# Patient Record
Sex: MALE | Race: AMERICAN INDIAN OR ALASKA NATIVE | NOT HISPANIC OR LATINO | Employment: UNEMPLOYED | URBAN - METROPOLITAN AREA
[De-identification: names, ages, dates, MRNs, and addresses within clinical notes are randomized per-mention and may not be internally consistent; named-entity substitution may affect disease eponyms.]

---

## 2024-06-07 ENCOUNTER — TRANSFERRED RECORDS (OUTPATIENT)
Dept: HEALTH INFORMATION MANAGEMENT | Facility: CLINIC | Age: 39
End: 2024-06-07

## 2024-07-30 ENCOUNTER — TRANSFERRED RECORDS (OUTPATIENT)
Dept: HEALTH INFORMATION MANAGEMENT | Facility: CLINIC | Age: 39
End: 2024-07-30

## 2024-08-12 ENCOUNTER — REFERRAL (OUTPATIENT)
Dept: TRANSPLANT | Facility: CLINIC | Age: 39
End: 2024-08-12

## 2024-08-12 DIAGNOSIS — K70.11 ALCOHOLIC HEPATITIS WITH ASCITES (H): ICD-10-CM

## 2024-08-12 DIAGNOSIS — K70.10 ALCOHOLIC HEPATITIS, UNSPECIFIED WHETHER ASCITES PRESENT (H): Primary | ICD-10-CM

## 2024-08-12 NOTE — LETTER
PHYSICIAN ORDERS      DATE & TIME ISSUED: 2024 1:56 PM  PATIENT NAME: Jag Smith   : 1985     [facility] MR# : [id number]     DIAGNOSIS:  Cirrhosis  ICD-10 CODE: K70.2  Orders  1 year after issue.    These labs must be drawn all together on the same day when done:   INR   Basic Metabolic Panel   Hepatic Panel        Phosphatidyethanol (PEth)     PLEASE FAX RESULTS AS SOON AS POSSIBLE TO [phone], ATTN:LabDE    FOR CLINICAL QUESTIONS, PLEASE CALL Aicha Kwon RN, at [phone]    .  Hepatology/Liver Transplant  Medical Director, Liver Transplantation  [facility]

## 2024-08-12 NOTE — LETTER
8/14/2024    Jag Smith   [address]  [facility] [address]    Dear Jag,    Thank you for your interest in the Transplant Center at [facility]. We look forward to being a part of your care team and assisting you through the transplant process.    As we discussed, your transplant coordinator is Megan Kwon (Liver).  You may call your coordinator at any time with questions or concerns. Your first scheduled call will be on 8/19/2024 between 10:00 AM -1:00 PM.  If this needs to change, call [phone].    Please complete the following.    Fill out and return the enclosed forms  Authorization for Electronic Communication  Authorization to Discuss Protected Health Information  Authorization for Release of Protected Health Information    Sign up for:  [employer], access to your electronic medical record (see enclosed pamphlet)  [employer] a transplant education website        You can use these tools to learn more about your transplant, communicate with your care team, and track your medical details      Sincerely,      Solid Organ Transplant  [facility]    cc: Referring Physician

## 2024-08-14 VITALS — WEIGHT: 245 LBS | BODY MASS INDEX: 33.18 KG/M2 | HEIGHT: 72 IN

## 2024-08-14 PROBLEM — F10.10 ALCOHOL ABUSE: Status: ACTIVE | Noted: 2024-06-08

## 2024-08-14 PROBLEM — E87.6 HYPOKALEMIA: Status: ACTIVE | Noted: 2024-06-08

## 2024-08-14 PROBLEM — D72.829 LEUKOCYTOSIS: Status: ACTIVE | Noted: 2024-06-21

## 2024-08-14 PROBLEM — J18.9 PNEUMONIA: Status: ACTIVE | Noted: 2024-07-30

## 2024-08-14 PROBLEM — K70.10 ALCOHOLIC HEPATITIS (H): Status: ACTIVE | Noted: 2024-06-08

## 2024-08-14 PROBLEM — R17 JAUNDICE: Status: ACTIVE | Noted: 2024-06-08

## 2024-08-14 PROBLEM — E80.6 HYPERBILIRUBINEMIA: Status: ACTIVE | Noted: 2024-06-08

## 2024-08-14 RX ORDER — FUROSEMIDE 40 MG
40 TABLET ORAL
Status: ON HOLD | COMMUNITY
Start: 2024-08-06 | End: 2024-09-05

## 2024-08-14 RX ORDER — THIAMINE HCL 100 MG
1 TABLET ORAL 2 TIMES DAILY WITH MEALS
Status: ON HOLD | COMMUNITY
Start: 2024-06-25

## 2024-08-14 RX ORDER — FOLIC ACID 1 MG/1
1 TABLET ORAL DAILY
Status: ON HOLD | COMMUNITY
Start: 2024-06-25

## 2024-08-14 RX ORDER — GAUZE BANDAGE 2" X 2"
100 BANDAGE TOPICAL DAILY
Status: ON HOLD | COMMUNITY
Start: 2024-06-25 | End: 2024-10-03

## 2024-08-14 RX ORDER — SPIRONOLACTONE 100 MG/1
100 TABLET, FILM COATED ORAL
Status: ON HOLD | COMMUNITY
Start: 2024-08-06 | End: 2024-09-05

## 2024-08-14 RX ORDER — FOLIC ACID/MV,IRON,MIN/LUTEIN 0.4-18-25
1 TABLET ORAL DAILY
Status: ON HOLD | COMMUNITY
Start: 2024-06-25

## 2024-08-14 NOTE — TELEPHONE ENCOUNTER
Patient was asked the following questions during liver intake call.     SOT LIVER INTAKE      Referring Provider: Rik Xavier-CNP   Referring Diagnosis: Alcholic Hepatitis   PCP:  [facility]      1)Do you know why you have liver disease: Yes, alcohol      If Alcoholic Cirrhosis is present when was your last drink: June 7th, 2024      Have you ever been through treatment for alcohol: Yes, outpatient & inpatient 2020  2) Presence of Ascites: yes Paracentesis: no  3) Presence of Hepatic Encephalopathy: yes  Medications: lasix, furosemide, folic acid, vitamin d 1  4) History of GI Bleeding: yes  5) Oxygen Use: no  6) EGD: no  7) Colonoscopy: no  8) MELD Score: 31  9)  Labs available for review from PCP/GI:  yes  10)HCC Diagnosis: no (if no, go to #11)                                            11)Insurance information:          [place] Medicaid [id number]   [facility] [id number]     Referral intake process completed.  Patient is aware that after financial approval is received, medical records will be requested.   Patient confirmed for a callback from transplant coordinator on 8/19/2024.       Confirmed coordinator will discuss evaluation process in more detail at the time of their call.   Patient is aware of the need to arrange age appropriate cancer screening, vaccinations, and dental care.  Reminded patient to complete questionnaire, complete medical records release, and review packet prior to evaluation visit .  Assessed patient for special needs (ie--wheelchair, assistance, guardian, and ):   no  Patient instructed to call [phone] with questions.      Patient gave verbal consent during intake call to obtain medical records and documents outside of [facility]:   Yes

## 2024-08-19 ENCOUNTER — TRANSFERRED RECORDS (OUTPATIENT)
Dept: HEALTH INFORMATION MANAGEMENT | Facility: CLINIC | Age: 39
End: 2024-08-19

## 2024-08-19 NOTE — TELEPHONE ENCOUNTER
LIVER DISEASE ETOH - possibly early consideration criteria? A1AT  REFERRING PROVIDER [facility]   -----------------------------------------------------------------------------------------------------------------------------  MELD 31       ABO   unknown      Didn't know liver issues til beginning of June, ended up in hospital and discovered that he had liver disease.      Went to hospital from [facility], went to [place] for a month ago.  When he went to [place] he was still drinking at that time, didn't feel go- went to [place] ER, then sent to [place];  Was feeling weak, tired, lightheaded, severe fatigue with even short walks.      Ascites  Suresh Furosemide - on both   Told to limit fluid to 64 oz/day;  limiting sodium   Marco [place] no, but says abdomen bloated- getting updated US today at [facility]  TIPS no   HE no   Kidney function no   Variceal screening never had EGD or colonosocopy     Had some bleeding hemorrhoids - had a little blood in stool today, oozing.   HCC Screening CT June [place]   Alcohol use   Was drinking heavily, worse with loss of grandma 5 months ago;  last ETOH early June before admission - prior to that was drinking e/o day - was drinking beer and mixed drinks 10 drinks around 3 x a week; prior mom and family telling him to quit but was never told by medical professional until hospitalization in June; Did have DUI in 2010- did 8 weeks of court ordered treatment at that time at [facility];  seeking resources for rule 25 through [place] right now to do assessment;     Hospitalizations Recent PNA, fatigue worse with PNA   ---------------------------------------------------------------------------------------------------------------------------------  PMH  Cardiac- denies  Pulm- recent; non smoker   Cancer- denies  Diabetes- denies  Surgery- no, only eye surgery    SHX   Lives with mom- caregiver  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

## 2024-08-21 ENCOUNTER — ALLIED HEALTH/NURSE VISIT (OUTPATIENT)
Dept: TRANSPLANT | Facility: CLINIC | Age: 39
End: 2024-08-21
Payer: MEDICAID

## 2024-08-21 DIAGNOSIS — K70.11 ALCOHOLIC HEPATITIS WITH ASCITES (H): Primary | ICD-10-CM

## 2024-08-21 NOTE — CONFIDENTIAL NOTE
"Psychosocial Assessment  Jag Smith had a virtual visit as part of his evaluation as a potential liver transplant recipient. DONG spoke with Jag over the phone and was able to speak with his mother, Sania, as well.  Living Situation: Jag currently lives at home with his mother in [place], [place]. It is a single story house that has three steps to get to the front door. [place], [place] is about [place]  [place]iles away from our transplant center. I reviewed with him the requirement to remain local post transplant for about a month with a caregiver. His mom, Sania, would be his primary caregiver, I reviewed local lodging options.  Education/Employment:  Jag completed the 12th  grade, he is not a . Jag is currently not employed. 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 He had worked at the school since he was 23 years old, he started at the [employer] a year ago. Jag reports that he stopped working in February after the death of his grandmother; he \"was not doing well emotionally or physically\".   Financial /Income: Jag does not currently have a source of income. He stated that he had just applied for food stamps. This writer discussed applying for SSDI.  Health Insurance:  Jag has Medicaid, he stated that he just renewed it. This writer talked with Jag about the financial risks of transplant, particularly about the high cost of transplant related medications and the importance of maintaining adequate health insurance coverage.  Family/Social Support: Jag is not/has not been , he does not have children. Jag has three older brothers, one lives in [facility] [place], one lives in [place], [place] and one lives in [facility] [place]. His father is , he currently lives with his mother, Sania, who Jag identifies as his primary support. This writer spoke with Sania on the phone to go over caregiver expectations. Sania said that she is retired and would be " available to stay locally with Jag for the 30 days.  This writer stressed the importance of having a stable and involved support network before and after transplant.  Provided Jag  with education about the relationship between a stable support system and better surgical and post-transplant outcomes compared to patients with a limited support system.    Functional Status: Jag manages his own medications. He is independent with ADLs, although he does need support with things like cooking and cleaning due to his fatigue. He stated that the single story home is accessible, there are three steps to get in/out of the front door, he says that he uses the railing to pull himself up.   Chemical Dependency:  ***  Mental Health: Jag reports that he has a history of depression and anxiety. He has been prescribed medications for anxiety and depression in the past which he stated were helpful, however, he reported that when he was hospitalized in June his doctors discontinued them. He does have an appointment scheduled with a psychiatrist coming up.   Adjustment to Illness:  This writer provided Jag  with supportive counseling throughout this interview.  This writer also encouraged Jag to attend the liver transplant support group for additional support and encouragement.   Impression/Recommendations:   Jag verbalizes understanding the psychosocial risks of transplant and teaching provided during this evaluation.  Jag has met the sobriety criteria recommended for listing, however this writer recommends Jag complete a chemical dependency evaluation and any treatment recommendations before being listed for transplant.  Pending completion of a chemical dependency evaluation and recommendations from the chemical dependency evaluation, he would be an acceptable transplant candidate from a psychosocial perspective.  There are no contraindications to transplant from a psychosocial perspective.  Jag has adequate  finances and health insurance for transplant, and an intact support system. Jag is a *** transplant candidate from a psychosocial perspective.  This writer will remain available to assist patient throughout the evaluation process and will follow patient through transplant if --- is listed.  It was a pleasure to evaluate this patient for liver transplant.   Teaching completed during assessment:  1.     Housing and relocation needs post transplant.  2.     Caregiver needs post transplant.  3.     Financial issues related to transplant.  4.     Risks of alcohol use post transplant.  5.     Common psychosocial stressors pre/post transplant.        6.     Liver Transplant support group availability.        7.     Advanced Health Care Directive             Psychosocial Risks of Transplant Reviewed:  1.     Increased stress related to your emotional, family, social, employment, or   financial situation.  2.     Affect on work and/or disability benefits.  3.     Affect on future health and life insurance.  4.     Transplant outcome expectations may not be met.  5.     Mental Health risks: anxiety, depression, PTSD, guilt, grief and chronic fatigue.

## 2024-08-21 NOTE — PROGRESS NOTES
"Psychosocial Assessment  Jag Smith had a virtual visit as part of his evaluation as a potential liver transplant recipient. DONG spoke with Jag over the phone and was able to speak with his mother, Sania, as well.   Living Situation: Jag currently lives at home with his mother in [place], [place]. It is a single story house that has three steps to get to the front door. [place], [place] is about 270  [place]iles away from our transplant center. I reviewed with him the requirement to remain local post transplant for about a month with a caregiver. His mom, Sania, would be his primary caregiver, I reviewed local lodging options.   Education/Employment:  Jag completed the 12th grade, he is not a . Jag is currently not employed. 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 He had worked at the school since he was 23 years old, he started at the [employer] a year ago. Jag reports that he stopped working in February after the death of his grandmother; he \"was not doing well emotionally or physically\".   Financial /Income: Jag does not currently have a source of income. He stated that he had just applied for food stamps. This writer discussed applying for SSDI.   Health Insurance:   Jag has Medicaid and [place] [employer]. This writer talked with Jag about the financial risks of transplant, particularly about the high cost of transplant related medications and the importance of maintaining adequate health insurance coverage.  Family/Social Support: Jag is not/has not been , he does not have children. Jag has three older brothers, one lives in [place] [place], one lives in [place], [place] and one lives in [place]. His father is , he currently lives with his mother, Sania, who Jag identifies as his primary support. This writer spoke with Sania on the phone to go over caregiver expectations. Sania said that she is retired and would be available to stay " "locally with Jag for the 30 days.  This writer stressed the importance of having a stable and involved support network before and after transplant.  Provided Jag  with education about the relationship between a stable support system and better surgical and post-transplant outcomes compared to patients with a limited support system.    Functional Status: Jag manages his own medications. He is independent with ADLs, although he does need support with things like cooking and cleaning due to his fatigue. He stated that the single story home is accessible, there are three steps to get in/out of the front door, he says that he uses the railing to pull himself up.   Chemical Dependency:  Jag denies current/previous use of tobacco products, misuse/overuse of pain medications and illicit drug use.   Jag was hospitalized for alcoholic hepatitis 2024, he states that it was the first time he was told by a medical professional to abstain from alcohol. Jag does have a history of alcohol use concerns. He first consumed alcohol in the 9th grade, he reports that after that he would \"go out and drink here and there until it got really bad a few months ago when I lost my grandma\". Jag reports that he has a history of three treatment programs. Ten years ago he got a DUI; he explained that his DUI was because police were stopping and checking everyone to \"fill their quota\", he said he only had 2 beers- following that he participated in an OP treatment program. After treatment, he did not drink for 3-4 years until his cousin  in a car accident. In  he was wanting to drink again and engaged in another OP treatment program in [place]. He completed the 12 week program but he did not find it helpful so he asked to go into an IP program at [facility]. When he was discharged from [facility], he was recommended OP treatment but Jag said that he needed money/needed to work and could not make time for it. He " stated that after his IP program that he would consume 6-8 drinks a week up until January/February of 2024 when it increased to 6-8 drinks a day 3-4 days per week. He would drink beer, mixed drinks and sometimes hard liquor. Jag discontinued alcohol use after his hospitalization in June.  This writer will note that Jag was hospitalized 6/7 for 3 weeks (most of June) and upon chart review was recommended CD treatment (see 6/25 discharge note). Jag did not get a CD assessment; he states that he tried to reach out to [facility] but they kept rescheduling on him. He was hospitalized again 7/30 with pneumonia for a week. Jag has been sober outside of a hospital setting for a total of 7 weeks. Jag stated that he has an appointment scheduled at [facility] tomorrow (8/22/24), this writer asked that he schedule a CD assessment while he was there in-person.  Mental Health: Jag reports that he has a history of depression and anxiety. He has been prescribed medications in the past for anxiety and depression which he stated were helpful, however, he reported that when he was hospitalized in June his doctors discontinued them. He has an appointment scheduled with a psychiatrist coming up. Jag denies history of psychiatric hospitalizations and denies previous/current SI.  Adjustment to Illness:  This writer provided Jag  with supportive counseling throughout this interview.  This writer also encouraged Jag to attend the liver transplant support group for additional support and encouragement.   Impression/Recommendations:   Jag verbalizes understanding the psychosocial risks of transplant and teaching provided during this evaluation.  Jag has not met the sobriety criteria recommended for listing, this writer recommends Jag complete a chemical dependency evaluation before being listed for transplant.   Jag has adequate finances and health insurance for transplant, and an intact support system. This writer will  remain available to assist patient throughout the evaluation process and will follow patient through transplant if he is listed.  It was a pleasure to evaluate this patient for liver transplant.   Teaching completed during assessment:  1.     Housing and relocation needs post transplant.  2.     Caregiver needs post transplant.  3.     Financial issues related to transplant.  4.     Risks of alcohol use post transplant.  5.     Common psychosocial stressors pre/post transplant.        6.     Liver Transplant support group availability.        7.     Advanced Health Care Directive             Psychosocial Risks of Transplant Reviewed:  1.     Increased stress related to your emotional, family, social, employment, or   financial situation.  2.     Affect on work and/or disability benefits.  3.     Affect on future health and life insurance.  4.     Transplant outcome expectations may not be met.  5.     Mental Health risks: anxiety, depression, PTSD, guilt, grief and chronic fatigue.     Jennifer CABALLERO, [facility]  Liver Transplant   Phone: [phone]

## 2024-08-22 ENCOUNTER — TELEPHONE (OUTPATIENT)
Dept: TRANSPLANT | Facility: CLINIC | Age: 39
End: 2024-08-22
Payer: MEDICAID

## 2024-08-22 NOTE — TELEPHONE ENCOUNTER
Received a call from BRENNA Kumar care coordinator for [facility].  Patient is reported to have a critical sodium level, and RN CC with like to know if acute criteria for liver transplant listing is a possibility for this patient.  Reviewing the recommendations of social work from visit yesterday, patient must complete YUSEF intake for therapy recommendations, prior to listing.     Recommended to RN CC, to commence treatment locally.  Chart forwarded to inpatient team to review, transfer number given to team.

## 2024-08-22 NOTE — Clinical Note
HIGH MELD Patient in Referral from [facility]. Patient likely going to ED soon for hyponatremia concerns, per local GI RNCC. See note, SW note regarding acute criteria considerations needing YUSEF prior to listing. Thanks!  Respectfully,  Ash Pulliam RN      Pre-Liver Transplant Coordinator

## 2024-09-05 ENCOUNTER — TRANSFERRED RECORDS (OUTPATIENT)
Dept: HEALTH INFORMATION MANAGEMENT | Facility: CLINIC | Age: 39
End: 2024-09-05
Payer: MEDICAID

## 2024-09-06 ENCOUNTER — TELEPHONE (OUTPATIENT)
Dept: TRANSPLANT | Facility: CLINIC | Age: 39
End: 2024-09-06
Payer: MEDICAID

## 2024-09-06 NOTE — TELEPHONE ENCOUNTER
Voice mail left for another coordinator, call from Stacy at [facility] regarding this patient admitted at [facility] with hypoNa+, MELD 35,     Paient seen a couple weeks ago by social work, does not appear to meet early consideration criteria per Jennifer.  Last ETOH intake was early June.  Jennifer has informed him that he needs to complete chemical dependency eval and engage in recs.  Unclear if he has done this, will follow up with patient.  Tentative plan for him to be seen in outpatient clinic on 10/8/24.     Left my cell number for GI clinic to call and discuss.  Will follow up with patient.     [facility]: [phone]

## 2024-09-13 ENCOUNTER — TELEPHONE (OUTPATIENT)
Dept: TRANSPLANT | Facility: CLINIC | Age: 39
End: 2024-09-13
Payer: MEDICAID

## 2024-09-13 NOTE — TELEPHONE ENCOUNTER
Transplant Social Work Services Phone Call    Data: SW has completed a psychosocial evaluation over the phone 8/22, pt was not a candidate for early consideration   Intervention: DONG called pt to get an update on if he was able to schedule a CD eval  Assessment: Jag stated that he had not scheduled a CD evaluation yet, SW explained that it is an important piece to be considered for liver transplant. Jag said that he was currently in the hospital but would work on it and follow up with this writer.  Education provided by DONG: see above  Plan: monitor for a call back from Jag, follow up with pt if I haven't heard back.     Jennifer CABALLERO, LGSW  [facility]  Liver Transplant   Phone: [phone]

## 2024-09-16 NOTE — PROGRESS NOTES
Transfer Type: [facility]  Transfer Triage Note    Date of call: 09/16/24  Time of call: 1:45 PM    Current Patient Location:  [facility]  Current Level of Care: Med Surg    Vitals: P 101/54  Pulse 78  Temp 97.2  F (36.2  C)  Resp 15  Ht 1.829 m (6')  Wt 98.2 kg (216 lb 8 oz)  SpO2 100%  BMI 29.36 kg/m2   Diagnosis: decompensated cirrhosis  Reason for requested transfer: Transplant evaluation that requires inpatient admission   Isolation Needs: None    Care everywhere has been updated and reviewed: Yes  Necessary images have been sent through PACS: No    If patient is transferring for specialty care or specific procedure, the specialist required has participated in the transfer call and agreed with need for transfer and anticipated timeline: Yes, Provider name: [facility] specialty with: GI    Transfer accepted: Yes  Stability of Patient: Patient is at risk for instability, however patient requires urgent transfer and does not meet ICU criteria   Is the patient appropriate for [facility]? No, What specific [place] needs are anticipated? Transplant work up  Level of Care Needed: Med Surg  Telemetry Needed:  None  Expected Time of Arrival for Transfer: greater than 24 hours  Arrival Location:  [facility]     Recommendations for Management and Stabilization: Not needed    Additional Comments: Per Khadar Wood Casey Luis is a 39 year old male who was admitted to [facility] with decompensated cirrhosis likely from alcohol, last drink June 7th, colitis, DENI, anemia, cholelithiasis, hyponatremia, thrombocytopenia, hyperkalemia who was admitted for fatigue and worsening abdominal distension. He has had paracenteses 6L - concerning for SBP on ceftriaxone/flagyl for 1 week, and zosyn for 3 days. No not on antibiotics.     On midodrine  Albumin  Octreotide  Lokelma  Rifaximin   Sodium bicarb - 5 tabs  lactulose    Discussed with GI in [facility] in  [place]    Sodium 120  Creatinine 1.85  Potassium 5.6  Co2 11     ALT 64  Bili 22.2    WBC 12.8  Hgb 9.1  Plt 119    INR (from 8/22) 2.2    Peth on the 22nd negative    Not a liver transplant candidate at this time per  and social work's note (Jessica Stevie from 8/21)      Lizette Mayer MD      Addendum 9/20/24 0819:  For the past few days, his hgb has been 6.5-6.7. He had 2 units of pRBCs. He has been bleeding more, and there is concern for an upper GI bleed. He is not having hematemesis. There is no capability for EGD. His Cr is worsening, GI at [facility] recommended continuing current management.     Last para 1 week ago. Holding lasix recently, but given yesterday. Hgb uptrending when giving lasix.       Priscila Vincent MD, MPH  Internal Medicine-Pediatrics [facility]       Addendum 9/21/24 2:01 PM   Dr. Cox called with an update this afternoon. CTA did not show a source of bleeding in the GI tract. INR increasing from 2.1 to 4.0. A bed at [facility] will be available this evening.     Priscila Vincent MD, MPH  Internal Medicine-Pediatrics [facility]

## 2024-09-21 ENCOUNTER — HOSPITAL ENCOUNTER (INPATIENT)
Facility: CLINIC | Age: 39
End: 2024-09-21
Attending: PEDIATRICS | Admitting: HOSPITALIST
Payer: MEDICAID

## 2024-09-21 DIAGNOSIS — E83.42 HYPOMAGNESEMIA: ICD-10-CM

## 2024-09-21 DIAGNOSIS — Z94.4 LIVER REPLACED BY TRANSPLANT (H): Primary | ICD-10-CM

## 2024-09-21 DIAGNOSIS — G89.18 ACUTE POST-OPERATIVE PAIN: ICD-10-CM

## 2024-09-21 DIAGNOSIS — F10.21 ALCOHOL USE DISORDER, SEVERE, IN EARLY REMISSION (H): ICD-10-CM

## 2024-09-21 DIAGNOSIS — K70.10 ALCOHOLIC HEPATITIS, UNSPECIFIED WHETHER ASCITES PRESENT (H): ICD-10-CM

## 2024-09-21 DIAGNOSIS — E87.5 HYPERKALEMIA: ICD-10-CM

## 2024-09-21 DIAGNOSIS — H11.33 SUBCONJUNCTIVAL HEMORRHAGE OF BOTH EYES: ICD-10-CM

## 2024-09-21 DIAGNOSIS — R60.0 BILATERAL LOWER EXTREMITY EDEMA: ICD-10-CM

## 2024-09-21 LAB
ABO/RH(D): NORMAL
ANTIBODY SCREEN: NEGATIVE
SPECIMEN EXPIRATION DATE: NORMAL

## 2024-09-21 PROCEDURE — 99223 1ST HOSP IP/OBS HIGH 75: CPT | Performed by: HOSPITALIST

## 2024-09-21 PROCEDURE — 80048 BASIC METABOLIC PNL TOTAL CA: CPT | Performed by: HOSPITALIST

## 2024-09-21 PROCEDURE — 250N000013 HC RX MED GY IP 250 OP 250 PS 637: Performed by: HOSPITALIST

## 2024-09-21 PROCEDURE — 250N000009 HC RX 250: Performed by: HOSPITALIST

## 2024-09-21 PROCEDURE — 85730 THROMBOPLASTIN TIME PARTIAL: CPT | Performed by: HOSPITALIST

## 2024-09-21 PROCEDURE — 250N000011 HC RX IP 250 OP 636: Mod: JB | Performed by: HOSPITALIST

## 2024-09-21 PROCEDURE — 120N000002 HC R&B MED SURG/OB UMMC

## 2024-09-21 PROCEDURE — 82247 BILIRUBIN TOTAL: CPT | Performed by: HOSPITALIST

## 2024-09-21 PROCEDURE — 85025 COMPLETE CBC W/AUTO DIFF WBC: CPT | Performed by: HOSPITALIST

## 2024-09-21 PROCEDURE — 85610 PROTHROMBIN TIME: CPT | Performed by: HOSPITALIST

## 2024-09-21 PROCEDURE — 86923 COMPATIBILITY TEST ELECTRIC: CPT | Performed by: PHYSICIAN ASSISTANT

## 2024-09-21 PROCEDURE — 86923 COMPATIBILITY TEST ELECTRIC: CPT

## 2024-09-21 PROCEDURE — 86900 BLOOD TYPING SEROLOGIC ABO: CPT | Performed by: HOSPITALIST

## 2024-09-21 PROCEDURE — 36415 COLL VENOUS BLD VENIPUNCTURE: CPT | Performed by: HOSPITALIST

## 2024-09-21 RX ORDER — AMOXICILLIN 250 MG
1 CAPSULE ORAL 2 TIMES DAILY PRN
Status: DISCONTINUED | OUTPATIENT
Start: 2024-09-21 | End: 2024-09-30

## 2024-09-21 RX ORDER — LIDOCAINE 40 MG/G
CREAM TOPICAL
Status: ACTIVE | OUTPATIENT
Start: 2024-09-21

## 2024-09-21 RX ORDER — PROCHLORPERAZINE 25 MG
25 SUPPOSITORY, RECTAL RECTAL EVERY 12 HOURS PRN
Status: ACTIVE | OUTPATIENT
Start: 2024-09-21

## 2024-09-21 RX ORDER — CALCIUM CARBONATE 500 MG/1
1000 TABLET, CHEWABLE ORAL 4 TIMES DAILY PRN
Status: ACTIVE | OUTPATIENT
Start: 2024-09-21

## 2024-09-21 RX ORDER — NALOXONE HYDROCHLORIDE 0.4 MG/ML
0.4 INJECTION, SOLUTION INTRAMUSCULAR; INTRAVENOUS; SUBCUTANEOUS
Status: ACTIVE | OUTPATIENT
Start: 2024-09-21

## 2024-09-21 RX ORDER — OCTREOTIDE ACETATE 100 UG/ML
100 INJECTION, SOLUTION INTRAVENOUS; SUBCUTANEOUS EVERY 8 HOURS
Status: DISCONTINUED | OUTPATIENT
Start: 2024-09-21 | End: 2024-09-22

## 2024-09-21 RX ORDER — AMOXICILLIN 250 MG
2 CAPSULE ORAL 2 TIMES DAILY PRN
Status: DISCONTINUED | OUTPATIENT
Start: 2024-09-21 | End: 2024-09-30

## 2024-09-21 RX ORDER — NALOXONE HYDROCHLORIDE 0.4 MG/ML
0.2 INJECTION, SOLUTION INTRAMUSCULAR; INTRAVENOUS; SUBCUTANEOUS
Status: ACTIVE | OUTPATIENT
Start: 2024-09-21

## 2024-09-21 RX ORDER — SODIUM BICARBONATE 650 MG/1
1300 TABLET ORAL 3 TIMES DAILY
Status: DISCONTINUED | OUTPATIENT
Start: 2024-09-21 | End: 2024-09-27

## 2024-09-21 RX ORDER — OXYCODONE HYDROCHLORIDE 5 MG/1
5 TABLET ORAL EVERY 6 HOURS PRN
Status: DISCONTINUED | OUTPATIENT
Start: 2024-09-21 | End: 2024-09-28

## 2024-09-21 RX ORDER — FOLIC ACID 1 MG/1
1 TABLET ORAL DAILY
Status: DISPENSED | OUTPATIENT
Start: 2024-09-22

## 2024-09-21 RX ORDER — ONDANSETRON 4 MG/1
4 TABLET, ORALLY DISINTEGRATING ORAL EVERY 6 HOURS PRN
Status: DISPENSED | OUTPATIENT
Start: 2024-09-21

## 2024-09-21 RX ORDER — PROCHLORPERAZINE MALEATE 10 MG
10 TABLET ORAL EVERY 6 HOURS PRN
Status: ACTIVE | OUTPATIENT
Start: 2024-09-21

## 2024-09-21 RX ORDER — ONDANSETRON 2 MG/ML
4 INJECTION INTRAMUSCULAR; INTRAVENOUS EVERY 6 HOURS PRN
Status: DISPENSED | OUTPATIENT
Start: 2024-09-21

## 2024-09-21 RX ORDER — LACTULOSE 10 G/10G
10 SOLUTION ORAL 3 TIMES DAILY
Status: DISCONTINUED | OUTPATIENT
Start: 2024-09-21 | End: 2024-09-26

## 2024-09-21 RX ADMIN — PANTOPRAZOLE SODIUM 40 MG: 40 INJECTION, POWDER, FOR SOLUTION INTRAVENOUS at 23:44

## 2024-09-21 RX ADMIN — OCTREOTIDE ACETATE 100 MCG: 100 INJECTION, SOLUTION INTRAVENOUS; SUBCUTANEOUS at 23:49

## 2024-09-21 RX ADMIN — SODIUM BICARBONATE 1300 MG: 650 TABLET ORAL at 23:45

## 2024-09-21 RX ADMIN — RIFAXIMIN 550 MG: 550 TABLET ORAL at 23:45

## 2024-09-21 ASSESSMENT — COLUMBIA-SUICIDE SEVERITY RATING SCALE - C-SSRS
2. HAVE YOU ACTUALLY HAD ANY THOUGHTS OF KILLING YOURSELF IN THE PAST MONTH?: NO
6. HAVE YOU EVER DONE ANYTHING, STARTED TO DO ANYTHING, OR PREPARED TO DO ANYTHING TO END YOUR LIFE?: NO
1. IN THE PAST MONTH, HAVE YOU WISHED YOU WERE DEAD OR WISHED YOU COULD GO TO SLEEP AND NOT WAKE UP?: NO

## 2024-09-21 ASSESSMENT — ACTIVITIES OF DAILY LIVING (ADL)
ADLS_ACUITY_SCORE: 20
ADLS_ACUITY_SCORE: 35

## 2024-09-21 NOTE — LETTER
Transition Communication Hand-off for Care Transitions to Next Level of Care Provider    Name: Jag Smith  : 1985  MRN #: [id number]  Primary Care Provider: ELMER HUNTLEY     Primary Clinic: [address] [place] [address]     Reason for Hospitalization:  Decompensated Liver Cirrhosis  Liver transplant status  Admit Date/Time: 2024  9:51 PM  Discharge Date: 10/9/2024  Payor Source: Payor: [place] / Plan: [place] / Product Type: Medicaid /          Reason for Communication Hand-off Referral: Other Continuity of care    Discharge Plan: Patient staying locally for 4-6 weeks for close outpatient follow up  Discharge Plan:      Flowsheet Row Most Recent Value   Concerns Comments --  [Patient's mom, Sania, reported she is the only family member that would be included in a family conference]             Concern for non-adherence with plan of care:  No  Discharge Needs Assessment:  Needs      Flowsheet Row Most Recent Value   Equipment Currently Used at Home none   # of Referrals Placed by [facility]          Follow-up specialty is recommended: Yes    Follow-up plan:    Future Appointments   Date Time Provider Department Center   10/10/2024  7:30 AM  LAB [facility]   10/10/2024  9:30 AM Dionicio No, [facility]   10/11/2024  9:45 AM Tiffanie Pardo, [facility]   10/15/2024  8:00 AM  LAB [facility]   10/15/2024  9:30 AM Marycruz Alcala APRN CNP [facility]   10/16/2024  9:00 AM Muriel Gotti, LICSW [facility]   10/17/2024  7:00 AM  LAB [facility]   10/21/2024  7:15 AM  LAB [facility]   10/24/2024  8:15 AM  LAB [facility]   10/24/2024  9:30 AM Fernando Colon MD [facility]   10/28/2024  7:00 AM  LAB [facility]   10/31/2024  7:00 AM  LAB [facility]   2024  8:00 AM [facility]   2024 10:00 AM Marycruz Alcala APRN [facility]   2024  7:00 AM  LAB [facility]  "  11/11/2024  7:15 AM [facility]   11/11/2024  2:30 PM Meli Pulido RD [facility]   11/14/2024  7:45 AM [facility]   11/14/2024  9:00 AM Fernando Colon MD [facility]   1/6/2025  7:30 AM [facility]   1/6/2025  8:30 AM Leventhal, Thomas Michael, MD [facility]       Any outstanding tests or procedures:        Referrals       Future Labs/Procedures    Occupational Therapy  Referral     Process Instructions:    If ordering DME please utilize the DME ordering process. If you are ordering services for pediatric feeding, please utilize order: Speech Therapy  Referral [9048]    Comments:    Please be aware that coverage of these services is subject to the terms and limitations of your health insurance plan.  Call member services at your health plan with any benefit or coverage questions.   [employer] will call you to coordinate your care as prescribed by your provider. If you don't hear from a representative within 2 business days, please call [phone].    Physical Therapy  Referral     Process Instructions:    If ordering DME, please utilize the DME ordering process. If ordering services for Hand Therapy, please utiize order: Occupational Therapy  Referral [9047.005] and select \"Hand Therapy\" under Specialty Services.    Comments:    Please be aware that coverage of these services is subject to the terms and limitations of your health insurance plan.  Call member services at your health plan with any benefit or coverage questions.   [employer] will call you to coordinate your care as prescribed by your provider. If you don't hear from a representative within 2 business days, please call [phone].            Supplies       Future Labs/Procedures    Miscellaneous DME Order     Process Instructions:    Please limit the use of the Miscellaneous Order as a replacement for already existing DME orders. Please use an existing " DME order when ever possible.      Comments:    DME Documentation:   Describe the reason for need to support medical necessity: Post Liver Transplant .     I, the undersigned, certify that the above prescribed supplies are medically necessary for this patient and is both reasonable and necessary in reference to accepted standards of medical and necessary in reference to accepted standards of medical practice in the treatment of this patient's condition and is not prescribed as a convenience.    Miscellaneous DME Order     Process Instructions:    Please limit the use of the Miscellaneous Order as a replacement for already existing DME orders. Please use an existing DME order when ever possible.      Comments:    DME Documentation:   Describe the reason for need to support medical necessity: Post Liver Transplant.     I, the undersigned, certify that the above prescribed supplies are medically necessary for this patient and is both reasonable and necessary in reference to accepted standards of medical and necessary in reference to accepted standards of medical practice in the treatment of this patient's condition and is not prescribed as a convenience.    Walker             Araiza Recommendations:  Please see AVS    Lynnette Choi RN    AVS/Discharge Summary is the source of truth; this is a helpful guide for improved communication of patient story

## 2024-09-22 LAB
ALBUMIN MFR UR ELPH: 51.5 MG/DL
ALBUMIN SERPL BCG-MCNC: 2.9 G/DL (ref 3.5–5.2)
ALBUMIN SERPL BCG-MCNC: 3.3 G/DL (ref 3.5–5.2)
ALBUMIN UR-MCNC: 20 MG/DL
ALP SERPL-CCNC: 81 U/L (ref 40–150)
ALP SERPL-CCNC: 91 U/L (ref 40–150)
ALT SERPL W P-5'-P-CCNC: 48 U/L (ref 0–70)
ALT SERPL W P-5'-P-CCNC: 52 U/L (ref 0–70)
ANION GAP SERPL CALCULATED.3IONS-SCNC: 10 MMOL/L (ref 7–15)
ANION GAP SERPL CALCULATED.3IONS-SCNC: 11 MMOL/L (ref 7–15)
APPEARANCE UR: ABNORMAL
APTT PPP: 65 SECONDS (ref 22–38)
AST SERPL W P-5'-P-CCNC: 119 U/L (ref 0–45)
AST SERPL W P-5'-P-CCNC: 125 U/L (ref 0–45)
BASOPHILS # BLD AUTO: 0.1 10E3/UL (ref 0–0.2)
BASOPHILS NFR BLD AUTO: 1 %
BILIRUB DIRECT SERPL-MCNC: 22.06 MG/DL (ref 0–0.3)
BILIRUB SERPL-MCNC: 29.1 MG/DL
BILIRUB SERPL-MCNC: 31.8 MG/DL
BILIRUB UR QL STRIP: ABNORMAL
BUN SERPL-MCNC: 26.5 MG/DL (ref 6–20)
BUN SERPL-MCNC: 26.9 MG/DL (ref 6–20)
CALCIUM SERPL-MCNC: 8.5 MG/DL (ref 8.8–10.4)
CALCIUM SERPL-MCNC: 8.7 MG/DL (ref 8.8–10.4)
CHLORIDE SERPL-SCNC: 105 MMOL/L (ref 98–107)
CHLORIDE SERPL-SCNC: 107 MMOL/L (ref 98–107)
COLOR UR AUTO: ABNORMAL
CREAT SERPL-MCNC: 1.51 MG/DL (ref 0.67–1.17)
CREAT SERPL-MCNC: 1.54 MG/DL (ref 0.67–1.17)
CREAT UR-MCNC: 117 MG/DL
EGFRCR SERPLBLD CKD-EPI 2021: 59 ML/MIN/1.73M2
EGFRCR SERPLBLD CKD-EPI 2021: 61 ML/MIN/1.73M2
EOSINOPHIL # BLD AUTO: 0.1 10E3/UL (ref 0–0.7)
EOSINOPHIL NFR BLD AUTO: 2 %
ERYTHROCYTE [DISTWIDTH] IN BLOOD BY AUTOMATED COUNT: 20.8 % (ref 10–15)
ERYTHROCYTE [DISTWIDTH] IN BLOOD BY AUTOMATED COUNT: 21 % (ref 10–15)
GLUCOSE SERPL-MCNC: 105 MG/DL (ref 70–99)
GLUCOSE SERPL-MCNC: 87 MG/DL (ref 70–99)
GLUCOSE UR STRIP-MCNC: NEGATIVE MG/DL
HCO3 SERPL-SCNC: 14 MMOL/L (ref 22–29)
HCO3 SERPL-SCNC: 16 MMOL/L (ref 22–29)
HCT VFR BLD AUTO: 21.8 % (ref 40–53)
HCT VFR BLD AUTO: 22.8 % (ref 40–53)
HGB BLD-MCNC: 7.4 G/DL (ref 13.3–17.7)
HGB BLD-MCNC: 7.6 G/DL (ref 13.3–17.7)
HGB UR QL STRIP: ABNORMAL
HYALINE CASTS: 6 /LPF
IMM GRANULOCYTES # BLD: 0.1 10E3/UL
IMM GRANULOCYTES NFR BLD: 1 %
INR PPP: 3.08 (ref 0.85–1.15)
KETONES UR STRIP-MCNC: NEGATIVE MG/DL
LEUKOCYTE ESTERASE UR QL STRIP: NEGATIVE
LYMPHOCYTES # BLD AUTO: 1 10E3/UL (ref 0.8–5.3)
LYMPHOCYTES NFR BLD AUTO: 14 %
MCH RBC QN AUTO: 33.9 PG (ref 26.5–33)
MCH RBC QN AUTO: 34.1 PG (ref 26.5–33)
MCHC RBC AUTO-ENTMCNC: 33.3 G/DL (ref 31.5–36.5)
MCHC RBC AUTO-ENTMCNC: 33.9 G/DL (ref 31.5–36.5)
MCV RBC AUTO: 101 FL (ref 78–100)
MCV RBC AUTO: 102 FL (ref 78–100)
MONOCYTES # BLD AUTO: 0.7 10E3/UL (ref 0–1.3)
MONOCYTES NFR BLD AUTO: 10 %
NEUTROPHILS # BLD AUTO: 5.3 10E3/UL (ref 1.6–8.3)
NEUTROPHILS NFR BLD AUTO: 73 %
NITRATE UR QL: NEGATIVE
NRBC # BLD AUTO: 0 10E3/UL
NRBC BLD AUTO-RTO: 0 /100
PH UR STRIP: 6 [PH] (ref 5–7)
PLATELET # BLD AUTO: 100 10E3/UL (ref 150–450)
PLATELET # BLD AUTO: 84 10E3/UL (ref 150–450)
POTASSIUM SERPL-SCNC: 4.2 MMOL/L (ref 3.4–5.3)
POTASSIUM SERPL-SCNC: 4.6 MMOL/L (ref 3.4–5.3)
PROT SERPL-MCNC: ABNORMAL G/DL
PROT SERPL-MCNC: ABNORMAL G/DL
PROT/CREAT 24H UR: 0.44 MG/MG CR (ref 0–0.2)
RBC # BLD AUTO: 2.17 10E6/UL (ref 4.4–5.9)
RBC # BLD AUTO: 2.24 10E6/UL (ref 4.4–5.9)
RBC URINE: 11 /HPF
SODIUM SERPL-SCNC: 131 MMOL/L (ref 135–145)
SODIUM SERPL-SCNC: 132 MMOL/L (ref 135–145)
SODIUM UR-SCNC: 22 MMOL/L
SP GR UR STRIP: 1.03 (ref 1–1.03)
SQUAMOUS EPITHELIAL: <1 /HPF
UROBILINOGEN UR STRIP-MCNC: NORMAL MG/DL
WBC # BLD AUTO: 7.2 10E3/UL (ref 4–11)
WBC # BLD AUTO: 7.3 10E3/UL (ref 4–11)
WBC URINE: 5 /HPF

## 2024-09-22 PROCEDURE — 250N000009 HC RX 250: Performed by: HOSPITALIST

## 2024-09-22 PROCEDURE — 120N000002 HC R&B MED SURG/OB UMMC

## 2024-09-22 PROCEDURE — 84300 ASSAY OF URINE SODIUM: CPT | Performed by: STUDENT IN AN ORGANIZED HEALTH CARE EDUCATION/TRAINING PROGRAM

## 2024-09-22 PROCEDURE — 250N000013 HC RX MED GY IP 250 OP 250 PS 637: Performed by: PHYSICIAN ASSISTANT

## 2024-09-22 PROCEDURE — 99223 1ST HOSP IP/OBS HIGH 75: CPT | Performed by: STUDENT IN AN ORGANIZED HEALTH CARE EDUCATION/TRAINING PROGRAM

## 2024-09-22 PROCEDURE — 85027 COMPLETE CBC AUTOMATED: CPT | Performed by: HOSPITALIST

## 2024-09-22 PROCEDURE — 81001 URINALYSIS AUTO W/SCOPE: CPT | Performed by: HOSPITALIST

## 2024-09-22 PROCEDURE — 250N000013 HC RX MED GY IP 250 OP 250 PS 637: Performed by: HOSPITALIST

## 2024-09-22 PROCEDURE — 250N000011 HC RX IP 250 OP 636: Mod: JB | Performed by: HOSPITALIST

## 2024-09-22 PROCEDURE — 36415 COLL VENOUS BLD VENIPUNCTURE: CPT | Performed by: HOSPITALIST

## 2024-09-22 PROCEDURE — 82248 BILIRUBIN DIRECT: CPT | Performed by: INTERNAL MEDICINE

## 2024-09-22 PROCEDURE — 99207 PR APP CREDIT; MD BILLING SHARED VISIT: CPT | Performed by: PHYSICIAN ASSISTANT

## 2024-09-22 PROCEDURE — 82247 BILIRUBIN TOTAL: CPT | Performed by: HOSPITALIST

## 2024-09-22 PROCEDURE — 99255 IP/OBS CONSLTJ NEW/EST HI 80: CPT | Mod: GC | Performed by: INTERNAL MEDICINE

## 2024-09-22 PROCEDURE — 80048 BASIC METABOLIC PNL TOTAL CA: CPT | Performed by: HOSPITALIST

## 2024-09-22 PROCEDURE — 84156 ASSAY OF PROTEIN URINE: CPT | Performed by: HOSPITALIST

## 2024-09-22 PROCEDURE — 99232 SBSQ HOSP IP/OBS MODERATE 35: CPT | Mod: FS | Performed by: INTERNAL MEDICINE

## 2024-09-22 RX ORDER — LACTULOSE 10 G/15ML
10 SOLUTION ORAL 3 TIMES DAILY PRN
Status: DISCONTINUED | OUTPATIENT
Start: 2024-09-22 | End: 2024-09-29

## 2024-09-22 RX ORDER — SULFAMETHOXAZOLE/TRIMETHOPRIM 800-160 MG
1 TABLET ORAL DAILY
Status: DISCONTINUED | OUTPATIENT
Start: 2024-09-22 | End: 2024-09-23

## 2024-09-22 RX ADMIN — LACTULOSE 10 G: 10 POWDER, FOR SOLUTION ORAL at 13:22

## 2024-09-22 RX ADMIN — SODIUM BICARBONATE 1300 MG: 650 TABLET ORAL at 13:22

## 2024-09-22 RX ADMIN — OCTREOTIDE ACETATE 100 MCG: 100 INJECTION, SOLUTION INTRAVENOUS; SUBCUTANEOUS at 09:23

## 2024-09-22 RX ADMIN — PANTOPRAZOLE SODIUM 40 MG: 40 INJECTION, POWDER, FOR SOLUTION INTRAVENOUS at 21:18

## 2024-09-22 RX ADMIN — OXYCODONE HYDROCHLORIDE 5 MG: 5 TABLET ORAL at 00:27

## 2024-09-22 RX ADMIN — SULFAMETHOXAZOLE AND TRIMETHOPRIM 1 TABLET: 800; 160 TABLET ORAL at 15:04

## 2024-09-22 RX ADMIN — RIFAXIMIN 550 MG: 550 TABLET ORAL at 09:19

## 2024-09-22 RX ADMIN — SODIUM BICARBONATE 1300 MG: 650 TABLET ORAL at 21:18

## 2024-09-22 RX ADMIN — FOLIC ACID 1 MG: 1 TABLET ORAL at 09:18

## 2024-09-22 RX ADMIN — OXYCODONE HYDROCHLORIDE 5 MG: 5 TABLET ORAL at 09:18

## 2024-09-22 RX ADMIN — OXYCODONE HYDROCHLORIDE 5 MG: 5 TABLET ORAL at 18:23

## 2024-09-22 RX ADMIN — LACTULOSE 10 G: 10 POWDER, FOR SOLUTION ORAL at 21:18

## 2024-09-22 RX ADMIN — THIAMINE HCL TAB 100 MG 100 MG: 100 TAB at 09:19

## 2024-09-22 RX ADMIN — SODIUM BICARBONATE 1300 MG: 650 TABLET ORAL at 09:19

## 2024-09-22 RX ADMIN — LACTULOSE 10 G: 10 POWDER, FOR SOLUTION ORAL at 09:23

## 2024-09-22 RX ADMIN — CARBIDOPA AND LEVODOPA 12.5 MG: 50; 200 TABLET, EXTENDED RELEASE ORAL at 09:19

## 2024-09-22 RX ADMIN — RIFAXIMIN 550 MG: 550 TABLET ORAL at 21:18

## 2024-09-22 RX ADMIN — CARBIDOPA AND LEVODOPA 12.5 MG: 50; 200 TABLET, EXTENDED RELEASE ORAL at 13:23

## 2024-09-22 RX ADMIN — CARBIDOPA AND LEVODOPA 12.5 MG: 50; 200 TABLET, EXTENDED RELEASE ORAL at 18:23

## 2024-09-22 RX ADMIN — PANTOPRAZOLE SODIUM 40 MG: 40 INJECTION, POWDER, FOR SOLUTION INTRAVENOUS at 09:19

## 2024-09-22 RX ADMIN — ONDANSETRON 4 MG: 2 INJECTION INTRAMUSCULAR; INTRAVENOUS at 09:32

## 2024-09-22 ASSESSMENT — ACTIVITIES OF DAILY LIVING (ADL)
ADLS_ACUITY_SCORE: 21
ADLS_ACUITY_SCORE: 20
ADLS_ACUITY_SCORE: 21
ADLS_ACUITY_SCORE: 20
ADLS_ACUITY_SCORE: 21
ADLS_ACUITY_SCORE: 20
ADLS_ACUITY_SCORE: 21
ADLS_ACUITY_SCORE: 21
ADLS_ACUITY_SCORE: 20
ADLS_ACUITY_SCORE: 21
ADLS_ACUITY_SCORE: 20
ADLS_ACUITY_SCORE: 21

## 2024-09-22 NOTE — CARE PLAN
/81 (BP Location: Right arm)   Pulse 85   Temp 98.2  F (36.8  C) (Oral)   Resp 16   Ht 1.829 m (6')   Wt 102.3 kg (225 lb 8 oz)   SpO2 100%   BMI 30.58 kg/m     Time: 9305-8361  Patient admitted from [facility], alert and oriented, SBA, skin assessment and admission done, VS stable, continue to monitor.

## 2024-09-22 NOTE — PLAN OF CARE
Goal Outcome Evaluation:      Plan of Care Reviewed With: patient    Overall Patient Progress: no change    Time: 8198-8276  Status: presenting as a direct admit from [facility] for liver transplant evaluation   Neuros: A&Ox4, makes needs known.   Cardiac: WNL, denies cardiac chest pain.   Respiratory: WNL on RA, denies SOB.   Pain: abdominal pain with prn oxycodone x1 with relief, pt slept well overnight.   Nausea: Denies N/V   Diet: NPO at midnight.   GI/: Voided overnight, sample sent to lab. Abdomen distended, +BS, +flatus, pt reports he had x3 BMs on 9/21, refused scheduled lactulose at bedtime.   Skin: Skin is jaundice in color. BUE bruised and abdomen with large bruising. R abdomen old paracentesis site covered with dressing. BLE edema, elevated on pillows.   Lines: R/L piv placed in OSH is SL.   Labs: Reviewed. Critical bilirubin total 31.8 (Med gold night MD paged at 1448).   Activity: Up ambulated to BR overnight with SBA.   New Changes this Shift: NPO, no acute changes this shift.   Plan: Continue POC.

## 2024-09-22 NOTE — PLAN OF CARE
/63 (BP Location: Right arm)   Pulse 86   Temp 97.9  F (36.6  C) (Oral)   Resp 16   Ht 1.829 m (6')   Wt 102.3 kg (225 lb 8 oz)   SpO2 100%   BMI 30.58 kg/m      Cares from: 0700 - 1500  VSS on RA. C/o right abdomen pain, managed with PRN oxycodone x1. AxO x4. Skin is jaundice in color. BUE bruised and abdomen with large bruising. R abdomen- old paracentesis site covered with dressing. BLE edema +3, elevated on pillows. R./L. PIV- SL. C/o nausea, managed with IV zofran x1- effective. Continue plan of care.    Goal Outcome Evaluation:  Plan of Care Reviewed With: patient  Overall Patient Progress: no change

## 2024-09-22 NOTE — CONSULTS
GASTROENTEROLOGY CONSULTATION      Date of Admission:  9/21/2024          ASSESSMENT AND RECOMMENDATIONS:     39 year old male with a history of EtOH related decompensated cirrhosis who presents from [facility] for liver transplant evaluation.    #. EtOH related decompensated cirrhosis  #. Acute kidney injury  #. Spontaneous bacterial peritonitis    MELD 3.0: 37 at 9/22/2024  7:37 AM  MELD-Na: 37 at 9/22/2024  7:37 AM  Calculated from:  Serum Creatinine: 1.54 mg/dL at 9/22/2024  7:37 AM  Serum Sodium: 132 mmol/L at 9/22/2024  7:37 AM  Total Bilirubin: 29.1 mg/dL at 9/22/2024  7:37 AM  Serum Albumin: 2.9 g/dL at 9/22/2024  7:37 AM  INR(ratio): 3.08 at 9/21/2024 11:17 PM  Age at listing (hypothetical): 39 years  Sex: Male at 9/22/2024  7:37 AM      Patient with diagnosis of cirrhosis on 06/2024. Presented at that time at OSH with jaundice, found to have alcoholic hepatitis, treated with prednisone. Sober since 06/07/2024.    Then with another admission on 07/2024 for pneumonia, then 09/05-09/21 at OSH for hyponatremia, found to have SBP and DENI this admission. Now transferred here for liver transplant evaluation.    #. Ascites: Recent diagnosis of SBP, with repeat tap showing resolution on 09/13. Off diuretics given DENI.  #. Varices: No prior EGD.  #. HE: Diagnosed with HE at OSH, started on lactulose, rifaximin  #. DENI: No evidence of HRS at this time, improving.  #. HCC: No liver lesion on prior imaging.         RECOMMENDATIONS    - Can discontinue Octreotide at this time. Okay to continue Midodrine.  - Continue PO Folic acid and Thiamine.  - Continue PO Lactulose, PO Rifaximine.  - Hold diuretics given DENI.  - No indication for albumin at this time.  - Start cipro PO Q24 for SBP prophylaxis.  - Will plan for EGD later this week for variceal screening.  - Liver transplant evaluation to be started while inpatient.     Gastroenterology follow up recommendations: TBD    Thank you for involving us in this  patient's care. Please do not hesitate to contact the GI service with any questions or concerns.     Patient care plan discussed with Dr. Clayton, GI staff physician.    Williams Mayen MD  Gastroenterology Fellow  Pager             Chief Complaint:   We were asked by Medicine service of [place] to evaluate this patient with cirrhosis    History is obtained from the patient and the medical record.          History of Present Illness:   Jag Smith is a 39 year old male with a history of EtOH related decompensated cirrhosis who presents from [facility] for liver transplant evaluation.    Patient with diagnosis of cirrhosis on 06/2024. Presented at that time at OSH with jaundice, found to have alcoholic hepatitis, treated with prednisone. Sober since 06/07/2024.    Then with another admission on 07/2024 for pneumonia, then 09/05-09/21 at OSH for hyponatremia, found to have SBP and DENI this admission.     Now transferred here for liver transplant evaluation.      Patient seen this morning, feels well, only reports some mild abdominal pain on the RLQ of his abdomen which is the site of his prior paracentesis. Denies chest pain, shortness of breath, nausea, fever, chills, diarrhea, or blood in his stool. Denies any ongoing alcohol use. [place] from [place]. Works at a school district. Denies tobacco use, illicit drug use.          Past Medical History:   Reviewed and edited as appropriate  Past Medical History:   Diagnosis Date    Alcoholic hepatitis (H28) 06/07/2024    Anxiety     Depression     Hypertension             Past Surgical History:   Reviewed and edited as appropriate   Past Surgical History:   Procedure Laterality Date    DACRYOCYSTORHINOSTOMY Left 06/14/2013    INCISION AND DRAINAGE BUTTOCKS Left 03/03/2017            Previous Endoscopy:   No results found for this or any previous visit.         Social History:   Reviewed and edited as appropriate  Social History      Socioeconomic History    Marital status: Single     Spouse name: Not on file    Number of children: Not on file    Years of education: Not on file    Highest education level: Not on file   Occupational History    Not on file   Tobacco Use    Smoking status: Never    Smokeless tobacco: Never   Substance and Sexual Activity    Alcohol use: Not Currently     Comment: last drink june 7th, 2024    Drug use: Never    Sexual activity: Not on file   Other Topics Concern    Not on file   Social History Narrative    Not on file     Social Determinants of Health     Financial Resource Strain: Low Risk  (9/21/2024)    Financial Resource Strain     Within the past 12 months, have you or your family members you live with been unable to get utilities (heat, electricity) when it was really needed?: No   Food Insecurity: Low Risk  (9/21/2024)    Food Insecurity     Within the past 12 months, did you worry that your food would run out before you got money to buy more?: No     Within the past 12 months, did the food you bought just not last and you didn t have money to get more?: No   Transportation Needs: Low Risk  (9/21/2024)    Transportation Needs     Within the past 12 months, has lack of transportation kept you from medical appointments, getting your medicines, non-medical meetings or appointments, work, or from getting things that you need?: No   Physical Activity: Not on file   Stress: Not on file   Social Connections: Not on file   Interpersonal Safety: High Risk (9/22/2024)    Interpersonal Safety     Do you feel physically and emotionally safe where you currently live?: No     Within the past 12 months, have you been hit, slapped, kicked or otherwise physically hurt by someone?: No     Within the past 12 months, have you been humiliated or emotionally abused in other ways by your partner or ex-partner?: No   Housing Stability: Low Risk  (9/21/2024)    Housing Stability     Do you have housing? : Yes     Are you worried  about losing your housing?: No            Family History:   Reviewed and edited as appropriate  No known history of gastrointestinal/liver disease or  gastrointestinal malignancies       Allergies:   Reviewed and edited as appropriate     Allergies   Allergen Reactions    Azithromycin Rash            Medications:     Current Facility-Administered Medications   Medication Dose Route Frequency Provider Last Rate Last Admin    calcium carbonate (TUMS) chewable tablet 1,000 mg  1,000 mg Oral 4x Daily PRN José Miguel Aviles MD        folic acid (FOLVITE) tablet 1 mg  1 mg Oral Daily José Miguel Aviles MD   1 mg at 09/22/24 0918    lactulose (CEPHULAC) Packet 10 g  10 g Oral TID José Miguel Aviles MD   10 g at 09/22/24 1322    lactulose (CHRONULAC) solution 10 g  10 g Oral TID PRN Marissa Bocanegra PA-C        lidocaine (LMX4) cream   Topical Q1H PRN José Miguel Aviles MD        lidocaine 1 % 0.1-1 mL  0.1-1 mL Other Q1H PRN José Miguel Aviles MD        melatonin tablet 5 mg  5 mg Oral At Bedtime PRN José Miguel Aviles MD        midodrine (PROAMATINE) tablet 12.5 mg  12.5 mg Oral TID w/meals José Miguel Avlies MD   12.5 mg at 09/22/24 1323    naloxone (NARCAN) injection 0.2 mg  0.2 mg Intravenous Q2 Min PRN Lizette Mayer MD        Or    naloxone (NARCAN) injection 0.4 mg  0.4 mg Intravenous Q2 Min PRN Lizette Mayer MD        Or    naloxone (NARCAN) injection 0.2 mg  0.2 mg Intramuscular Q2 Min PRN Lizette Mayer MD        Or    naloxone (NARCAN) injection 0.4 mg  0.4 mg Intramuscular Q2 Min PRN Lizette Mayer MD        ondansetron (ZOFRAN ODT) ODT tab 4 mg  4 mg Oral Q6H PRN José Miguel Aviles MD        Or    ondansetron (ZOFRAN) injection 4 mg  4 mg Intravenous Q6H PRN José Miguel Aviles MD   4 mg at 09/22/24 0932    oxyCODONE (ROXICODONE) tablet 5 mg  5 mg Oral Q6H PRN José Miguel Aviles,  MD   5 mg at 09/22/24 0918    pantoprazole (PROTONIX) IV push injection 40 mg  40 mg Intravenous BID José Miguel Aviles MD   40 mg at 09/22/24 0919    prochlorperazine (COMPAZINE) injection 10 mg  10 mg Intravenous Q6H PRN José Miguel Aviles MD        Or    prochlorperazine (COMPAZINE) tablet 10 mg  10 mg Oral Q6H PRN José Miguel Aviles MD        Or    prochlorperazine (COMPAZINE) suppository 25 mg  25 mg Rectal Q12H PRN José Miguel Aviles MD        rifaximin (XIFAXAN) tablet 550 mg  550 mg Oral BID José Miguel Aviles MD   550 mg at 09/22/24 0919    senna-docusate (SENOKOT-S/PERICOLACE) 8.6-50 MG per tablet 1 tablet  1 tablet Oral BID PRN José Miguel Aviles MD        Or    senna-docusate (SENOKOT-S/PERICOLACE) 8.6-50 MG per tablet 2 tablet  2 tablet Oral BID PRN José Miguel Aviles MD        sodium bicarbonate tablet 1,300 mg  1,300 mg Oral TID José Miguel Aviles MD   1,300 mg at 09/22/24 1322    sodium chloride (PF) 0.9% PF flush 3 mL  3 mL Intracatheter Q8H José Miguel Aviles MD   3 mL at 09/22/24 0922    sodium chloride (PF) 0.9% PF flush 3 mL  3 mL Intracatheter q1 min prn José Miguel Aviles MD        sulfamethoxazole-trimethoprim (BACTRIM DS) 800-160 MG per tablet 1 tablet  1 tablet Oral Daily Marissa Bocanegra PA-C   1 tablet at 09/22/24 1504    thiamine (B-1) tablet 100 mg  100 mg Oral Daily José Miguel Aviles MD   100 mg at 09/22/24 0919             Review of Systems:     A complete review of systems was performed and is negative except as noted in the HPI           Physical Exam:   /66 (BP Location: Right arm)   Pulse 85   Temp 98.5  F (36.9  C) (Oral)   Resp 16   Ht 1.829 m (6')   Wt 102.3 kg (225 lb 8 oz)   SpO2 99%   BMI 30.58 kg/m    Wt:   Wt Readings from Last 2 Encounters:   09/21/24 102.3 kg (225 lb 8 oz)   08/14/24 111.1 kg (245 lb)      Constitutional: cooperative, pleasant,  not dyspneic/diaphoretic, no acute distress  Eyes: Sclera icteric  Ears/nose/mouth/throat: Normal oropharynx without ulcers or exudate  Neck: supple  CV: No edema  Respiratory: Unlabored breathing  Abdomen: Mildly distended, nontender  Skin: + jaundice  Neuro: AAO x 3, No asterixis  Psych: Normal affect  MSK: Normal gait         Data:   Labs and imaging below were independently reviewed and interpreted    BMP  Recent Labs   Lab 09/22/24 0737 09/21/24 2317   * 131*   POTASSIUM 4.6 4.2   CHLORIDE 107 105   AMAIRANI 8.5* 8.7*   CO2 14* 16*   BUN 26.5* 26.9*   CR 1.54* 1.51*   GLC 87 105*     CBC  Recent Labs   Lab 09/22/24 0737 09/21/24 2317   WBC 7.3 7.2   RBC 2.17* 2.24*   HGB 7.4* 7.6*   HCT 21.8* 22.8*   * 102*   MCH 34.1* 33.9*   MCHC 33.9 33.3   RDW 20.8* 21.0*   PLT 84* 100*     INR  Recent Labs   Lab 09/21/24 2317   INR 3.08*     LFTs  Recent Labs   Lab 09/22/24 0737 09/21/24 2317   ALKPHOS 81 91   * 125*   ALT 48 52   BILITOTAL 29.1* 31.8*   ALBUMIN 2.9* 3.3*      PANCNo lab results found in last 7 days.

## 2024-09-22 NOTE — H&P
[facility]    History and Physical - Hospitalist Service, GOLD TEAM        Date of Admission:  9/21/2024    Assessment & Plan      Jag Smith is a 39 year old male with recently diagnosed decompensated alcoholic liver cirrhosis who is presenting as a direct admit from [facility] for liver transplant evaluation    He was an otherwise healthy male with a PMHx of BETTY/MDD and HTN as of June 2024. On June 7, 2024 presented with c/o jaundice and was diagnosed with acute alcoholic hepatitis with a MF of 46 and MELD of 28 for which he was started on Prednisolone. He was discharged with GI follow-up. He's been sober since June 7th.   Admitted in July for bilateral pneumonia.   Readmitted again on 9/5/24 for abdominal distention. During this stay from 9/5-9/21, he was treated for hyponatremia, HRS, SBP and anemia. He is being referred to the [facility] for transplant work-up.    #Decompensated Alcoholic Liver Cirrhosis  - Transferred for transplant evaluation. Hepatology consult placed  - MELD 3.0 is 36  - He was treated for SBP from 9/6 (had 6L drained) with 3 days of zosyn and 1 week of ceftriaxone and flagyl. He had a repeat diagnostic tap on 9/13 that showed resolution  - Continued to hold lasix and spironolactone given HRS  - Continue PO lactulose and PO Rifaximin  - Continue PO Folic acid and Thiamine    #HRS  - Continue PO midodrine 12.5mg TID and  Octrotide 100mg Q8H  - He's received 25g of albumin/day from 9/19-9/21.   - Appreciate nephrology recs  - Continue PO bicarb 1300mg TID    #Coagulopathy  - INR and PTT elevated likely due to decompensated liver disease  - Thrombocytopenia also due to liver diease  - Patient has diffuse bruising all over his abdominal wall and his arms  - Received 10mg PO Vitamin K on 9/20 and 9/21    #Anemia  - Type and screen, transfuse for Hgb>7  - On 9/20, there was a concern for UGIB as patient's Hgb dropped to 6.5 and  "needed 2U PRBC.   - A CTA abdomen pelvis was done on 9/21 and it showed no source of active bleeding  - Patient reports no melena or hematemesis to me. However, he did report a nosebleed 2 days ago and that he has been having some bleeding per rectum with each BM- which he notes is from his pre-existing hemorrhoids.   - With nosebleeds, daily blood draws, bruising across his abdominal wall and small volume BRBPR, it's possible his Hgb has trended down  - For now, NPO for GI to evaluate him in AM    #Hyponatremia  - Improving from 120 on admission. He is on fluid restriction with 1L per day    #Incidental CTAP findings from 9/21  - Cholelithiasis w gallbladder wall thickening. No note of cholecystitis concerns, no biliary dilatation  - Colon thickening also noted on CT w/o any concern for colitis     Per the last progress note on transfer   \"Final plan  As the patient is sober since the month of June, current transplant team in [facility], planned for liver transplant soon after his transfer\"          Diet: NPO per Anesthesia Guidelines for Procedure/Surgery Except for: Meds  Regular Diet Adult    DVT Prophylaxis: Pneumatic Compression Devices + Ambulation. Patient is at risk of bleed + clotting with his liver disease. However, he states he's been walking at least 3 times a day. If this changes, need to consider chemoprophylaxis  Lima Catheter: Not present  Lines: None     Cardiac Monitoring: None  Code Status:  FULL CODE    Clinically Significant Risk Factors Present on Admission                          # Obesity: Estimated body mass index is 30.58 kg/m  as calculated from the following:    Height as of this encounter: 1.829 m (6').    Weight as of this encounter: 102.3 kg (225 lb 8 oz).              Disposition Plan     Medically Ready for Discharge: Anticipated in 2-4 Days           José Miguel Aviles MD  Hospitalist Service, [facility]  Securely message with [employer] (more info)  Text page via [employer] Paging/Directory   See signed in provider for up to date coverage information    ______________________________________________________________________    Chief Complaint   Patient came in as a direct admission for liver Tx evaluation    History is obtained from the patient and chart review    History of Present Illness   Jag Smith is a 39 year old male who is presenting to [facility] as a transfer from [facility]   He confirms the hospital course of SBP, paracentesis and renal injury.   At the time of interview, his only symptom is abdominal pain at the site of the paracentesis. He reports that he was stuck 6 times in the same area in order to have one successful procedure and since then the pain has been ongoing.   Otherwise, no cough, dyspnea, HA, sore throat, nausea vomiting (he does get nauseous with lactulose), diarrhea, blood in stool or urine or syncopal episodes      Past Medical History    No past medical history on file.  BETTY  MDD  HTN    Past Surgical History   No past surgical history on file.  Keely-rectal abscess I & D  Fracture of metacarpal R hand    Prior to Admission Medications   Prior to Admission Medications   Prescriptions Last Dose Informant Patient Reported? Taking?   calcium citrate-vitamin D (CALCIUM CITRATE-VITAMIN D3) 315-6.25 MG-MCG TABS per tablet   Yes No   Sig: Take 1 tablet by mouth 2 times daily (with meals)   folic acid (FOLVITE) 1 MG tablet   Yes No   Sig: Take 1 mg by mouth   furosemide (LASIX) 40 MG tablet   Yes No   Sig: Take 40 mg by mouth   multivitamin w/minerals (CERTAVITE/ANTIOXIDANTS) tablet   Yes No   Sig: Take 1 tablet by mouth   omeprazole (PRILOSEC) 20 MG DR capsule   Yes No   Sig: Take 20 mg by mouth   spironolactone (ALDACTONE) 100 MG tablet   Yes No   Sig: Take 100 mg by mouth   vitamin B1 (THIAMINE) 100 MG tablet   Yes No   Sig: Take 100 mg by mouth      Facility-Administered Medications:  None        Review of Systems    The 10 point Review of Systems is negative other than noted in the HPI or here.      Physical Exam   Vital Signs: Temp: 98.2  F (36.8  C) Temp src: Oral BP: 112/81 Pulse: 85   Resp: 16 SpO2: 100 % O2 Device: None (Room air)    Weight: 225 lbs 8 oz    General Appearance: Alert, well appearing, and in no acute distress  Mental Status: Oriented to person, place, and time  HEENT: Jaundice ++ Pupils equal and reactive, extraocular eye movements intact. Mucous membranes moist, pharynx normal without lesions.   Chest: Clear to auscultation bilaterally, no wheezes, rales or rhonchi or crackels, symmetric air entry  Heart: Normal S1, S2. No murmurs, rubs, clicks or gallops. Normal rate, regular rhythm  Abdomen: Soft, mildly distended, diffuse bruising on the abdominal wall, no masses or organomegaly, Bowel sounds heard  Neurological: Alert, oriented, normal speech, CNS: CN 3-7, 9-12 intact, Strength bilaterally intact in UE and LE 5/5  Skin and Extremities: Peripheral pulses normal, no pedal edema, no clubbing or cyanosis. No rashes, no suspicious skin lesions noted

## 2024-09-22 NOTE — PHARMACY-ADMISSION MEDICATION HISTORY
Pharmacy Intern Admission Medication History    Admission medication history is complete. The information provided in this note is only as accurate as the sources available at the time of the update.    Information Source(s): Patient via in-person    Pertinent Information:   Patient was hospitalized at [facility], so all of the medication in the list are what he was given at the that hospital.      furosemide (LASIX) 40 MG tablet: --> he states that he had been on this med for 5 days then it got discontinued.   spironolactone (ALDACTONE) 100 MG tablet: --> he doesn't recall taking this med.    Changes made to PTA medication list:  Added: None  Deleted: None  Changed: None    Allergies reviewed with patient and updates made in EHR: yes    Medication History Completed By: Olinda Becerril 9/22/2024 11:49 AM    PTA Med List   Medication Sig Last Dose    calcium citrate-vitamin D (CALCIUM CITRATE-VITAMIN D3) 315-6.25 MG-MCG TABS per tablet Take 1 tablet by mouth 2 times daily (with meals) Unknown    folic acid (FOLVITE) 1 MG tablet Take 1 mg by mouth daily. Unknown    multivitamin w/minerals (CERTAVITE/ANTIOXIDANTS) tablet Take 1 tablet by mouth daily. Unknown    omeprazole (PRILOSEC) 20 MG DR capsule Take 20 mg by mouth daily. Unknown    vitamin B1 (THIAMINE) 100 MG tablet Take 100 mg by mouth daily. Unknown

## 2024-09-22 NOTE — CONSULTS
Nephrology Initial Consult  September 22, 2024      Jag Smith MRN:[id number] YOB: 1985  Date of Admission:9/21/2024  Primary care provider: No primary care provider on file.  Requesting physician: Ben Mcmullen MD    ASSESSMENT AND RECOMMENDATIONS:   Jag Smith is a 39 year old male with h/o EtOH related decompensated cirrhosis who was admitted at OSH with DENI and SBP now transferred here for transplant workup, for whom renal team is consulted regarding DENI.     # DENI - appearing to be HRS (Va 22 after fluids)  # cirrhosis  GI stating octreotide can stop - reasonable. Midodrine recommended to continue by GI, also reasonable. Creatinine, being stable for the time being, making us less enthusiastic about considering terlipressin (GI didn't bring this up in their note - perhaps their thinking is similarly unenthusiastic at this time).      # hyponatremia, mild  Fluid restrict diet.     # metabolic acidosis  Given serum bicarb down to low teens, would recommend continuing current order for sodium-bicarb 1300mg TID. CTM BMP in event we need to increase this dose further. Patient not in any respiratory distress when seen on rounds by me.       Recommendations were communicated to primary team via this note.    Yaron Bowman MD   Division of Nephrology and Hypertension  [employer]  Vocera Web Console      REASON FOR CONSULT: DENI    HISTORY OF PRESENT ILLNESS:  Admitting provider and nursing notes reviewed  Jag Smith is a 39 year old male with h/o EtOH related decompensated cirrhosis who was admitted at OSH with DENI and SBP now transferred here for transplant workup, for whom renal team is consulted regarding DENI.     The patient has a creatinine of 1.5, which is stable over several days based on OSH labs in care-everywhere. Per report the patient is s/p IV albumin, and VA checked today is 22. Creatinine was 1.0 in August.     T bili is 29.     Kidneys described as  "\"unremarkable\" on CTA done 9/21 at OSH.    PAST MEDICAL HISTORY:  Reviewed with patient on 09/22/2024     Past Medical History:   Diagnosis Date    Alcoholic hepatitis (H28) 06/07/2024    Anxiety     Depression     Hypertension        Past Surgical History:   Procedure Laterality Date    DACRYOCYSTORHINOSTOMY Left 06/14/2013    INCISION AND DRAINAGE BUTTOCKS Left 03/03/2017        MEDICATIONS:  PTA Meds  Prior to Admission medications    Medication Sig Last Dose Taking? Auth Provider Long Term End Date   calcium citrate-vitamin D (CALCIUM CITRATE-VITAMIN D3) 315-6.25 MG-MCG TABS per tablet Take 1 tablet by mouth 2 times daily (with meals) Unknown Yes Reported, Patient     folic acid (FOLVITE) 1 MG tablet Take 1 mg by mouth daily. Unknown Yes Reported, Patient     multivitamin w/minerals (CERTAVITE/ANTIOXIDANTS) tablet Take 1 tablet by mouth daily. Unknown Yes Reported, Patient     omeprazole (PRILOSEC) 20 MG DR capsule Take 20 mg by mouth daily. Unknown Yes Reported, Patient     vitamin B1 (THIAMINE) 100 MG tablet Take 100 mg by mouth daily. Unknown Yes Reported, Patient  10/3/24   furosemide (LASIX) 40 MG tablet Take 40 mg by mouth   Reported, Patient Yes 9/5/24   spironolactone (ALDACTONE) 100 MG tablet Take 100 mg by mouth   Reported, Patient Yes 9/5/24      Current Meds  Current Facility-Administered Medications   Medication Dose Route Frequency Provider Last Rate Last Admin    folic acid (FOLVITE) tablet 1 mg  1 mg Oral Daily José Miguel Aviles MD   1 mg at 09/22/24 0918    lactulose (CEPHULAC) Packet 10 g  10 g Oral TID José Miguel Aviles MD   10 g at 09/22/24 1322    midodrine (PROAMATINE) tablet 12.5 mg  12.5 mg Oral TID w/meals José Miguel Aviles MD   12.5 mg at 09/22/24 1323    pantoprazole (PROTONIX) IV push injection 40 mg  40 mg Intravenous BID José Miguel Aviles MD   40 mg at 09/22/24 0919    rifaximin (XIFAXAN) tablet 550 mg  550 mg Oral BID José Miguel Aivles" MD Luís   550 mg at 09/22/24 0919    sodium bicarbonate tablet 1,300 mg  1,300 mg Oral TID José Miguel Aviles MD   1,300 mg at 09/22/24 1322    sodium chloride (PF) 0.9% PF flush 3 mL  3 mL Intracatheter Q8H José Miguel Aviles MD   3 mL at 09/22/24 0922    sulfamethoxazole-trimethoprim (BACTRIM DS) 800-160 MG per tablet 1 tablet  1 tablet Oral Daily Marissa Bocanegra PA-C   1 tablet at 09/22/24 1504    thiamine (B-1) tablet 100 mg  100 mg Oral Daily José Miguel Aviles MD   100 mg at 09/22/24 0919     Infusion Meds  Current Facility-Administered Medications   Medication Dose Route Frequency Provider Last Rate Last Admin       ALLERGIES:    Allergies   Allergen Reactions    Azithromycin Rash       REVIEW OF SYSTEMS:  A comprehensive of systems was negative except as noted above.    SOCIAL HISTORY:   Social History     Socioeconomic History    Marital status: Single     Spouse name: Not on file    Number of children: Not on file    Years of education: Not on file    Highest education level: Not on file   Occupational History    Not on file   Tobacco Use    Smoking status: Never    Smokeless tobacco: Never   Substance and Sexual Activity    Alcohol use: Not Currently     Comment: last drink june 7th, 2024    Drug use: Never    Sexual activity: Not on file   Other Topics Concern    Not on file   Social History Narrative    Not on file     Social Determinants of Health     Financial Resource Strain: Low Risk  (9/21/2024)    Financial Resource Strain     Within the past 12 months, have you or your family members you live with been unable to get utilities (heat, electricity) when it was really needed?: No   Food Insecurity: Low Risk  (9/21/2024)    Food Insecurity     Within the past 12 months, did you worry that your food would run out before you got money to buy more?: No     Within the past 12 months, did the food you bought just not last and you didn t have money to get more?: No    Transportation Needs: Low Risk  (2024)    Transportation Needs     Within the past 12 months, has lack of transportation kept you from medical appointments, getting your medicines, non-medical meetings or appointments, work, or from getting things that you need?: No   Physical Activity: Not on file   Stress: Not on file   Social Connections: Not on file   Interpersonal Safety: High Risk (2024)    Interpersonal Safety     Do you feel physically and emotionally safe where you currently live?: No     Within the past 12 months, have you been hit, slapped, kicked or otherwise physically hurt by someone?: No     Within the past 12 months, have you been humiliated or emotionally abused in other ways by your partner or ex-partner?: No   Housing Stability: Low Risk  (2024)    Housing Stability     Do you have housing? : Yes     Are you worried about losing your housing?: No     Reviewed with patient       FAMILY MEDICAL HISTORY:   Family History   Problem Relation Age of Onset    Alcoholism Brother     Alzheimer Disease Maternal Grandmother 94    Diabetes Maternal Aunt     Colon Cancer No family hx of    Reviewed with patient     PHYSICAL EXAM:   Temp  Av.2  F (36.8  C)  Min: 97.9  F (36.6  C)  Max: 98.5  F (36.9  C)      Pulse  Av.5  Min: 85  Max: 90 Resp  Av  Min: 16  Max: 16  SpO2  Av.8 %  Min: 99 %  Max: 100 %       /66 (BP Location: Right arm)   Pulse 85   Temp 98.5  F (36.9  C) (Oral)   Resp 16   Ht 1.829 m (6')   Wt 102.3 kg (225 lb 8 oz)   SpO2 99%   BMI 30.58 kg/m     Date 24 0700 - 24 0659   Shift 0693-0674 5147-6227 [id number] 24 Hour Total   INTAKE   P.O. 240   240   Shift Total(mL/kg) 240(2.35)   240(2.35)   OUTPUT   Shift Total(mL/kg)       Weight (kg) 102.28 102.28 102.28 102.28      Admit Weight: 102.3 kg (225 lb 8 oz)     GENERAL APPEARANCE: no acute distress, is awake  EYES: has scleral icterus  Pulmonary: lungs CTAB  CV: regular rhythm, normal  rate, no rub   - Edema - 1+ LE bilat  GI: soft, nontender, normal bowel sounds  MS: no gross abnormalities noted  SKIN: jaundiced   NEURO: conversant     LABS:   I have reviewed the following labs:  CMP  Recent Labs   Lab 09/22/24 0737 09/21/24 2317   * 131*   POTASSIUM 4.6 4.2   CHLORIDE 107 105   CO2 14* 16*   ANIONGAP 11 10   GLC 87 105*   BUN 26.5* 26.9*   CR 1.54* 1.51*   GFRESTIMATED 59* 61   AMAIRANI 8.5* 8.7*   ALBUMIN 2.9* 3.3*   BILITOTAL 29.1* 31.8*   ALKPHOS 81 91   * 125*   ALT 48 52     CBC  Recent Labs   Lab 09/22/24 0737 09/21/24 2317   HGB 7.4* 7.6*   WBC 7.3 7.2   RBC 2.17* 2.24*   HCT 21.8* 22.8*   * 102*   MCH 34.1* 33.9*   MCHC 33.9 33.3   RDW 20.8* 21.0*   PLT 84* 100*     INR  Recent Labs   Lab 09/21/24 2317   INR 3.08*   PTT 65*     ABGNo lab results found in last 7 days.   URINE STUDIES  Recent Labs   Lab Test 09/22/24 0417   COLOR Dark Yellow*   APPEARANCE Slightly Cloudy*   URINEGLC Negative   URINEBILI Large*   URINEKETONE Negative   SG 1.027   UBLD Small*   URINEPH 6.0   PROTEIN 20*   NITRITE Negative   LEUKEST Negative   RBCU 11*   WBCU 5     No lab results found.  PTH  No lab results found.  IRON STUDIES  No lab results found.    IMAGING:  All imaging studies reviewed by me.     Yaron Bowman MD

## 2024-09-22 NOTE — PROGRESS NOTES
[facility]    Medicine Progress Note - Hospitalist Service, GOLD TEAM 5    Date of Admission:  9/21/2024    Assessment & Plan   Jag Smith is a 39 year old male with PMHx of BETTY/MDD and HTN and recently diagnosed decompensated alcoholic liver cirrhosis who is presenting as a direct admit from [facility] with worsening liver failure admitted on 9/21/2024 for expedited liver transplant evaluation    #Decompensated Alcoholic Liver Cirrhosis  C/b portal HTN, ascites, hx of SBP, and HE. No known EV as no prior EGD performed. Recently diagnosed in June 2024 with etiology related to alcohol, last drink in June. PETh 8/22 negative. Recent hospital stay on 9/5-9/21 for hyponatremia, HRS, SBP (dx 9/6 s/p treatment of 5 days of ceftriaxone and flagyl) and anemia. Last paracentesis on 9/13 negative for SBP. Transferred for transplant evaluation. Per prior notes, patient wasn't a liver candidate, waiting for patient to complete chem dep evaluation.   - Hepatology consult placed  - Start Bactrim DS daily for SBP ppx per GI recs.   - Continued to hold lasix and spironolactone given HRS  - Continue PO lactulose and PO Rifaximin, goal BM 3-4 per day  - Continue PO Folic acid and Thiamine  - Daily CMP, Plt, and INR  - EGD per GI later this upcoming week for variceal screening.     MELD 3.0: 37 at 9/21/2024 11:17 PM  MELD-Na: 37 at 9/21/2024 11:17 PM  Calculated from:  Serum Creatinine: 1.51 mg/dL at 9/21/2024 11:17 PM  Serum Sodium: 131 mmol/L at 9/21/2024 11:17 PM  Total Bilirubin: 31.8 mg/dL at 9/21/2024 11:17 PM  Serum Albumin: 3.3 g/dL at 9/21/2024 11:17 PM  INR(ratio): 3.08 at 9/21/2024 11:17 PM  Age at listing (hypothetical): 39 years  Sex: Male at 9/21/2024 11:17 PM    # HRS  Baseline Cr recent 0.9-1.0, but developed DENI on 9/5 suspicious for HRS. Recent creatinine ranging 1.2-1.8, currently 1.5.   - Nephrology consult, awaiting recommendations.   - Continue PO  midodrine 12.5mg TID   - Stop Octreotide   - No indication for albumin currently per GI  - Continue PO bicarb 1300mg TID  - Trend BMP, Renally dose medications, avoid nephrotoxic agents.     # Coagulopathy  INR and PTT elevated and thrombocytopenia likely due to decompensated liver disease.  Patient has diffuse bruising all over his abdominal wall and his arms. Received 10mg PO Vitamin K on 9/20 and 9/21  - Trend CBC and INR    # Anemia  Baseline Hgb previously 11-12.0s, slowly drifted down to 9.0s over the last month, then acute drop from 9/17-->9/18 and again on 9/20, there was a concern for UGIB as patient's Hgb dropped to 6.5 and needed 2U PRBC.  A CTA abdomen pelvis was done on 9/21 and it showed no source of active bleeding. Currently patient denies any melena or hematemesis. Intermittent nose bleeds and hematochezia, thought to be 2/2 prior know hemorrhoids. Since transfusion, hgb trending in 7.0s.   - Type and screen, transfuse for Hgb > 7.0  - Trend CBC daily   - No concern for GIB per GI discussion, will advance diet and monitor     # Hyponatremia   Improving from 120 on admission currently to 130s.   - Continue  fluid restriction with 1L per day  - Trend BMP daily     # Incidental CTAP findings from 9/21  - Cholelithiasis w gallbladder wall thickening. No note of cholecystitis concerns, no biliary dilatation  - Colon thickening also noted on CT w/o any concern for colitis             Diet: Fluid restriction 1000 ML FLUID  NPO per Anesthesia Guidelines for Procedure/Surgery Except for: Meds    DVT Prophylaxis: Pneumatic Compression Devices  Lima Catheter: Not present  Lines: None     Cardiac Monitoring: None  Code Status: Full Code      Clinically Significant Risk Factors Present on Admission         # Hyponatremia: Lowest Na = 131 mmol/L in last 2 days, will monitor as appropriate      # Hypoalbuminemia: Lowest albumin = 3.3 g/dL at 9/21/2024 11:17 PM, will monitor as appropriate  # Coagulation Defect:  INR = 3.08 (Ref range: 0.85 - 1.15) and/or PTT = 65 Seconds (Ref range: 22 - 38 Seconds), will monitor for bleeding  # Thrombocytopenia: Lowest platelets = 84 in last 2 days, will monitor for bleeding      # Anemia: based on hgb <11       # Obesity: Estimated body mass index is 30.58 kg/m  as calculated from the following:    Height as of this encounter: 1.829 m (6').    Weight as of this encounter: 102.3 kg (225 lb 8 oz).              Disposition Plan     Medically Ready for Discharge: Anticipated in 2-4 Days         The patient's care was discussed with the Attending Physician, Dr. Ryan Mcmullen, Bedside Nurse, Patient, and GI Consultant(s).    Marissa Bocanegra PA-C  Hospitalist Service, GOLD TEAM [address] [place] [address]  Securely message with [employer] (more info)  Text page via [facility] Paging/Directory   See signed in provider for up to date coverage information  ______________________________________________________________________    Interval History   Patient reports feeling okay.  Continues to have some abdominal pain in his right lower quadrant where he had his last paracentesis.  Denies any fevers, chills, chest pain, shortness of breath, nausea, vomiting or urinary symptoms.  Reports having 3-4 stools a day.  Denies any black stools.  Has some rectal bleeding that he suspects is related to known hemorrhoids.  Denies any hematemesis or coffee-ground emesis.  Patient denies any alcohol use since June.  States he was planning on getting chem dep treatment and evaluation in outpatient but got sick and has not yet been completed.    Physical Exam   Vital Signs: Temp: 98  F (36.7  C) Temp src: Oral BP: 100/69 Pulse: 90   Resp: 16 SpO2: 100 % O2 Device: None (Room air)    Weight: 225 lbs 8 oz  GENERAL: Alert and awake. Answering questions appropriately. Oriented x 3. NAD, chronically ill appearing. Pleasant and conversational   HEENT: Icteric sclera.  Mucous membranes moist    CARDIOVASCULAR: RRR. S1, S2. No murmurs, rubs, or gallops.   RESPIRATORY: Effort normal on RA. Clear to auscultation bilaterally, no rales, rhonchi or wheezes  GI: Abdomen soft, mild TTP in RLQ without rebound or guarding, normoactive bowel sounds present  EXTREMITIES: +1 pitting edema in his LE bilaterally.   NEUROLOGICAL: CN II-XII grossly intact. Moving all extremities symmetrically. No asterixis.   SKIN: Intact. Warm and dry. + Jaundice.   PSYCH: Affect appropriate     Medical Decision Making       50 MINUTES SPENT BY ME on the date of service doing chart review, history, exam, documentation & further activities per the note.      Data   ------------------------- PAST 24 HR DATA REVIEWED -----------------------------------------------    I have personally reviewed the following data over the past 24 hrs:    7.3  \   7.4 (L)   / 84 (L)     132 (L) 107 26.5 (H) /  87   4.6 14 (L) 1.54 (H) \     ALT: 48 AST: 119 (H) AP: 81 TBILI: 29.1 (HH)   ALB: 2.9 (L) TOT PROTEIN: N/A LIPASE: N/A     INR:  3.08 (H) PTT:  65 (H)   D-dimer:  N/A Fibrinogen:  N/A       Imaging results reviewed over the past 24 hrs:   No results found for this or any previous visit (from the past 24 hour(s)).

## 2024-09-22 NOTE — PLAN OF CARE
Admitted/transferred from: [place], [place]  2 RN full  skin assessment completed by Shivam Maguire RN and BRENNA Upton  Skin assessment finding: UE bruises, ABD bruises, Paracentesis site R ABD, and LE edema.   Interventions/actions:  Monitor.   Will continue to monitor.

## 2024-09-23 ENCOUNTER — APPOINTMENT (OUTPATIENT)
Dept: CARDIOLOGY | Facility: CLINIC | Age: 39
End: 2024-09-23
Attending: STUDENT IN AN ORGANIZED HEALTH CARE EDUCATION/TRAINING PROGRAM
Payer: MEDICAID

## 2024-09-23 ENCOUNTER — APPOINTMENT (OUTPATIENT)
Dept: GENERAL RADIOLOGY | Facility: CLINIC | Age: 39
End: 2024-09-23
Attending: STUDENT IN AN ORGANIZED HEALTH CARE EDUCATION/TRAINING PROGRAM
Payer: MEDICAID

## 2024-09-23 LAB
ALBUMIN SERPL BCG-MCNC: 2.9 G/DL (ref 3.5–5.2)
ALP SERPL-CCNC: 83 U/L (ref 40–150)
ALT SERPL W P-5'-P-CCNC: 46 U/L (ref 0–70)
ANION GAP SERPL CALCULATED.3IONS-SCNC: 10 MMOL/L (ref 7–15)
AST SERPL W P-5'-P-CCNC: 118 U/L (ref 0–45)
BILIRUB SERPL-MCNC: 31 MG/DL
BUN SERPL-MCNC: 25.9 MG/DL (ref 6–20)
CALCIUM SERPL-MCNC: 8.4 MG/DL (ref 8.8–10.4)
CHLORIDE SERPL-SCNC: 107 MMOL/L (ref 98–107)
CHOLEST SERPL-MCNC: ABNORMAL MG/DL
CMV IGG SERPL IA-ACNC: >10 U/ML
CMV IGG SERPL IA-ACNC: ABNORMAL
CREAT SERPL-MCNC: 1.53 MG/DL (ref 0.67–1.17)
EBV VCA IGG SER IA-ACNC: 86.2 U/ML
EBV VCA IGG SER IA-ACNC: POSITIVE
EGFRCR SERPLBLD CKD-EPI 2021: 60 ML/MIN/1.73M2
ERYTHROCYTE [DISTWIDTH] IN BLOOD BY AUTOMATED COUNT: 20.8 % (ref 10–15)
EST. AVERAGE GLUCOSE BLD GHB EST-MCNC: 88 MG/DL
GLUCOSE SERPL-MCNC: 110 MG/DL (ref 70–99)
HAV AB SER QL IA: REACTIVE
HBA1C MFR BLD: 4.7 %
HBV SURFACE AG SERPL QL IA: NONREACTIVE
HCO3 SERPL-SCNC: 16 MMOL/L (ref 22–29)
HCT VFR BLD AUTO: 20.4 % (ref 40–53)
HCV AB SERPL QL IA: NONREACTIVE
HDLC SERPL-MCNC: 10 MG/DL
HGB BLD-MCNC: 7 G/DL (ref 13.3–17.7)
HIV 1+2 AB+HIV1 P24 AG SERPL QL IA: NONREACTIVE
INR PPP: 3.33 (ref 0.85–1.15)
IRON BINDING CAPACITY (ROCHE): ABNORMAL
IRON SATN MFR SERPL: ABNORMAL %
IRON SERPL-MCNC: 58 UG/DL (ref 61–157)
LDLC SERPL CALC-MCNC: ABNORMAL MG/DL
LVEF ECHO: NORMAL
MCH RBC QN AUTO: 34.5 PG (ref 26.5–33)
MCHC RBC AUTO-ENTMCNC: 34.3 G/DL (ref 31.5–36.5)
MCV RBC AUTO: 101 FL (ref 78–100)
NONHDLC SERPL-MCNC: ABNORMAL MG/DL
PHOSPHATE SERPL-MCNC: 3.1 MG/DL (ref 2.5–4.5)
PLATELET # BLD AUTO: 81 10E3/UL (ref 150–450)
POTASSIUM SERPL-SCNC: 4.2 MMOL/L (ref 3.4–5.3)
PROT SERPL-MCNC: ABNORMAL G/DL
RBC # BLD AUTO: 2.03 10E6/UL (ref 4.4–5.9)
SODIUM SERPL-SCNC: 133 MMOL/L (ref 135–145)
T PALLIDUM AB SER QL: NONREACTIVE
TRIGL SERPL-MCNC: ABNORMAL MG/DL
TSH SERPL DL<=0.005 MIU/L-ACNC: 2.17 UIU/ML (ref 0.3–4.2)
WBC # BLD AUTO: 7.3 10E3/UL (ref 4–11)

## 2024-09-23 PROCEDURE — 99207 PR APP CREDIT; MD BILLING SHARED VISIT: CPT | Mod: FS | Performed by: INTERNAL MEDICINE

## 2024-09-23 PROCEDURE — 255N000002 HC RX 255 OP 636: Performed by: INTERNAL MEDICINE

## 2024-09-23 PROCEDURE — 84443 ASSAY THYROID STIM HORMONE: CPT | Performed by: STUDENT IN AN ORGANIZED HEALTH CARE EDUCATION/TRAINING PROGRAM

## 2024-09-23 PROCEDURE — 83036 HEMOGLOBIN GLYCOSYLATED A1C: CPT | Performed by: STUDENT IN AN ORGANIZED HEALTH CARE EDUCATION/TRAINING PROGRAM

## 2024-09-23 PROCEDURE — 36415 COLL VENOUS BLD VENIPUNCTURE: CPT | Performed by: STUDENT IN AN ORGANIZED HEALTH CARE EDUCATION/TRAINING PROGRAM

## 2024-09-23 PROCEDURE — 93005 ELECTROCARDIOGRAM TRACING: CPT

## 2024-09-23 PROCEDURE — 80069 RENAL FUNCTION PANEL: CPT | Performed by: PHYSICIAN ASSISTANT

## 2024-09-23 PROCEDURE — 83550 IRON BINDING TEST: CPT | Performed by: STUDENT IN AN ORGANIZED HEALTH CARE EDUCATION/TRAINING PROGRAM

## 2024-09-23 PROCEDURE — 87340 HEPATITIS B SURFACE AG IA: CPT | Performed by: STUDENT IN AN ORGANIZED HEALTH CARE EDUCATION/TRAINING PROGRAM

## 2024-09-23 PROCEDURE — 86481 TB AG RESPONSE T-CELL SUSP: CPT | Performed by: STUDENT IN AN ORGANIZED HEALTH CARE EDUCATION/TRAINING PROGRAM

## 2024-09-23 PROCEDURE — 86708 HEPATITIS A ANTIBODY: CPT | Performed by: STUDENT IN AN ORGANIZED HEALTH CARE EDUCATION/TRAINING PROGRAM

## 2024-09-23 PROCEDURE — 250N000011 HC RX IP 250 OP 636: Performed by: HOSPITALIST

## 2024-09-23 PROCEDURE — 93306 TTE W/DOPPLER COMPLETE: CPT | Mod: 26 | Performed by: INTERNAL MEDICINE

## 2024-09-23 PROCEDURE — 85027 COMPLETE CBC AUTOMATED: CPT | Performed by: PHYSICIAN ASSISTANT

## 2024-09-23 PROCEDURE — 85610 PROTHROMBIN TIME: CPT | Performed by: PHYSICIAN ASSISTANT

## 2024-09-23 PROCEDURE — 82107 ALPHA-FETOPROTEIN L3: CPT | Performed by: STUDENT IN AN ORGANIZED HEALTH CARE EDUCATION/TRAINING PROGRAM

## 2024-09-23 PROCEDURE — 86780 TREPONEMA PALLIDUM: CPT | Performed by: STUDENT IN AN ORGANIZED HEALTH CARE EDUCATION/TRAINING PROGRAM

## 2024-09-23 PROCEDURE — 250N000013 HC RX MED GY IP 250 OP 250 PS 637: Performed by: HOSPITALIST

## 2024-09-23 PROCEDURE — 86803 HEPATITIS C AB TEST: CPT | Performed by: STUDENT IN AN ORGANIZED HEALTH CARE EDUCATION/TRAINING PROGRAM

## 2024-09-23 PROCEDURE — 83540 ASSAY OF IRON: CPT | Performed by: STUDENT IN AN ORGANIZED HEALTH CARE EDUCATION/TRAINING PROGRAM

## 2024-09-23 PROCEDURE — 250N000009 HC RX 250: Performed by: HOSPITALIST

## 2024-09-23 PROCEDURE — 71046 X-RAY EXAM CHEST 2 VIEWS: CPT | Mod: 26 | Performed by: RADIOLOGY

## 2024-09-23 PROCEDURE — 80321 ALCOHOLS BIOMARKERS 1OR 2: CPT | Performed by: STUDENT IN AN ORGANIZED HEALTH CARE EDUCATION/TRAINING PROGRAM

## 2024-09-23 PROCEDURE — 120N000002 HC R&B MED SURG/OB UMMC

## 2024-09-23 PROCEDURE — 36415 COLL VENOUS BLD VENIPUNCTURE: CPT | Performed by: PHYSICIAN ASSISTANT

## 2024-09-23 PROCEDURE — 250N000013 HC RX MED GY IP 250 OP 250 PS 637: Performed by: PHYSICIAN ASSISTANT

## 2024-09-23 PROCEDURE — 82247 BILIRUBIN TOTAL: CPT | Performed by: PHYSICIAN ASSISTANT

## 2024-09-23 PROCEDURE — 80061 LIPID PANEL: CPT | Performed by: STUDENT IN AN ORGANIZED HEALTH CARE EDUCATION/TRAINING PROGRAM

## 2024-09-23 PROCEDURE — 93010 ELECTROCARDIOGRAM REPORT: CPT | Performed by: INTERNAL MEDICINE

## 2024-09-23 PROCEDURE — 86665 EPSTEIN-BARR CAPSID VCA: CPT | Performed by: STUDENT IN AN ORGANIZED HEALTH CARE EDUCATION/TRAINING PROGRAM

## 2024-09-23 PROCEDURE — 87389 HIV-1 AG W/HIV-1&-2 AB AG IA: CPT | Performed by: STUDENT IN AN ORGANIZED HEALTH CARE EDUCATION/TRAINING PROGRAM

## 2024-09-23 PROCEDURE — 99233 SBSQ HOSP IP/OBS HIGH 50: CPT | Mod: GC | Performed by: INTERNAL MEDICINE

## 2024-09-23 PROCEDURE — 84450 TRANSFERASE (AST) (SGOT): CPT | Performed by: PHYSICIAN ASSISTANT

## 2024-09-23 PROCEDURE — 86644 CMV ANTIBODY: CPT | Performed by: STUDENT IN AN ORGANIZED HEALTH CARE EDUCATION/TRAINING PROGRAM

## 2024-09-23 PROCEDURE — 99233 SBSQ HOSP IP/OBS HIGH 50: CPT | Mod: FS | Performed by: PHYSICIAN ASSISTANT

## 2024-09-23 PROCEDURE — 71046 X-RAY EXAM CHEST 2 VIEWS: CPT

## 2024-09-23 RX ORDER — CIPROFLOXACIN 250 MG/1
500 TABLET, FILM COATED ORAL EVERY 12 HOURS SCHEDULED
Status: DISCONTINUED | OUTPATIENT
Start: 2024-09-23 | End: 2024-09-28

## 2024-09-23 RX ADMIN — RIFAXIMIN 550 MG: 550 TABLET ORAL at 08:16

## 2024-09-23 RX ADMIN — LACTULOSE 10 G: 10 POWDER, FOR SOLUTION ORAL at 22:00

## 2024-09-23 RX ADMIN — CIPROFLOXACIN 500 MG: 500 TABLET ORAL at 20:40

## 2024-09-23 RX ADMIN — PANTOPRAZOLE SODIUM 40 MG: 40 INJECTION, POWDER, FOR SOLUTION INTRAVENOUS at 08:16

## 2024-09-23 RX ADMIN — ONDANSETRON 4 MG: 2 INJECTION INTRAMUSCULAR; INTRAVENOUS at 17:12

## 2024-09-23 RX ADMIN — LACTULOSE 10 G: 10 POWDER, FOR SOLUTION ORAL at 08:16

## 2024-09-23 RX ADMIN — PANTOPRAZOLE SODIUM 40 MG: 40 INJECTION, POWDER, FOR SOLUTION INTRAVENOUS at 20:41

## 2024-09-23 RX ADMIN — SODIUM BICARBONATE 1300 MG: 650 TABLET ORAL at 14:18

## 2024-09-23 RX ADMIN — FOLIC ACID 1 MG: 1 TABLET ORAL at 08:17

## 2024-09-23 RX ADMIN — OXYCODONE HYDROCHLORIDE 5 MG: 5 TABLET ORAL at 08:22

## 2024-09-23 RX ADMIN — OXYCODONE HYDROCHLORIDE 5 MG: 5 TABLET ORAL at 20:38

## 2024-09-23 RX ADMIN — RIFAXIMIN 550 MG: 550 TABLET ORAL at 20:40

## 2024-09-23 RX ADMIN — CARBIDOPA AND LEVODOPA 12.5 MG: 50; 200 TABLET, EXTENDED RELEASE ORAL at 12:49

## 2024-09-23 RX ADMIN — OXYCODONE HYDROCHLORIDE 5 MG: 5 TABLET ORAL at 14:26

## 2024-09-23 RX ADMIN — CARBIDOPA AND LEVODOPA 12.5 MG: 50; 200 TABLET, EXTENDED RELEASE ORAL at 08:23

## 2024-09-23 RX ADMIN — PERFLUTREN 6 ML: 6.52 INJECTION, SUSPENSION INTRAVENOUS at 15:46

## 2024-09-23 RX ADMIN — PROCHLORPERAZINE EDISYLATE 10 MG: 5 INJECTION INTRAMUSCULAR; INTRAVENOUS at 22:00

## 2024-09-23 RX ADMIN — OXYCODONE HYDROCHLORIDE 5 MG: 5 TABLET ORAL at 00:36

## 2024-09-23 RX ADMIN — THIAMINE HCL TAB 100 MG 100 MG: 100 TAB at 08:17

## 2024-09-23 RX ADMIN — SODIUM BICARBONATE 1300 MG: 650 TABLET ORAL at 08:16

## 2024-09-23 RX ADMIN — SODIUM BICARBONATE 1300 MG: 650 TABLET ORAL at 20:39

## 2024-09-23 RX ADMIN — CARBIDOPA AND LEVODOPA 12.5 MG: 50; 200 TABLET, EXTENDED RELEASE ORAL at 17:14

## 2024-09-23 RX ADMIN — SULFAMETHOXAZOLE AND TRIMETHOPRIM 1 TABLET: 800; 160 TABLET ORAL at 08:17

## 2024-09-23 RX ADMIN — LACTULOSE 10 G: 10 POWDER, FOR SOLUTION ORAL at 14:18

## 2024-09-23 ASSESSMENT — ACTIVITIES OF DAILY LIVING (ADL)
ADLS_ACUITY_SCORE: 20

## 2024-09-23 NOTE — PROGRESS NOTES
HEPATOLOGY PROGRESS NOTE    Date: 09/23/2024     39 year old male with a history of EtOH related decompensated cirrhosis who presents from [facility] for liver transplant evaluation.     #. EtOH related decompensated cirrhosis, MELD 38   #. Acute kidney injury, Stable   #. Spontaneous bacterial peritonitis  -Patient diagnosed with cirrhosis in 6/7/2024 in setting of acute alcoholic hepatitis. Tx with steroids and although he initially responded, liver disease then subsequently progressively worsened. He was then admitted in 9/2024 for PNA and then again in 9/5-9/21 (transferred here) where he was found to have SBP and DENI.  #. Ascites: Recent diagnosis of SBP, with repeat tap showing resolution on 09/13. Off diuretics given DENI.  #. Varices: No prior EGD.  #. HE: Diagnosed with HE at OSH, started on lactulose, rifaximin  #. DENI: No evidence of HRS at this time, improving.  #. HCC: No liver lesion on prior imaging.  #. Liver Transplant Candidacy: Patient overall good candidate for LT.  Ongoing early sobriety, last drink 6/2024. Hx of DUI x2 and hx of IP Chem Dep x1 and OP chem dep x2. Plan for IP LT eval             >Cardiology: needs echo (ordered)             >Surgery: will need formal eval. US doppler 8/2024 w/o  mention of PV thrombosis             >SW: consulted. YUSEF eval placed            RECOMMENDATIONS  - Okay to continue Midodrine.  - Continue PO Folic acid and Thiamine.  - Continue PO Lactulose, PO Rifaximine.  - Hold diuretics given recent DENI.  - No indication for albumin at this time.  - Start cipro PO Q24 for SBP prophylaxis; Stop Bactrim   - Will plan for EGD 9/25/2024   - Liver transplant evaluation to be started while inpatient.   - Chem dep eval ; consult placed       Gastroenterology follow up recommendations: Pending clinical course.      Thank you for involving us in this patient's care. Please do not hesitate to contact the IP Hep service with any questions or concerns.      Pt care  plan discussed with Dr. Clayton, Hepatology staff physician.      Zuleyma Shaikh MD   Gastroenterology/Transplant Hepatology Fellow  Division of Gastroenterology, Hepatology and Nutrition  [facility]    _______________________________________________________________        Subjective:  NAEON. Patient seen at bedside this AM, NAD. Denies any abdominal pain, nausea, vomiting, fever, chills.     Objective:  Blood pressure 100/68, pulse 91, temperature 98.7  F (37.1  C), temperature source Oral, resp. rate 16, height 1.829 m (6'), weight 102.2 kg (225 lb 6.4 oz), SpO2 100%.    Gen: A&Ox3, NAD  HEENT: ncat, perrl, eomi, sclera icteric  CV: RRR  Lungs: CTA b/l  Abd: Distended, soft,+ttp in lower abdomen. Periumbilical ecchymosis appreciated. No fluid wave    Skin: + jaundice, + stigmata of chronic liver disease  MS: reduced muscle mass for age  Neuro: non focal       LABS:  BMP  Recent Labs   Lab 09/23/24  0659 09/22/24  0737 09/21/24  2317   * 132* 131*   POTASSIUM 4.2 4.6 4.2   CHLORIDE 107 107 105   AMAIRANI 8.4* 8.5* 8.7*   CO2 16* 14* 16*   BUN 25.9* 26.5* 26.9*   CR 1.53* 1.54* 1.51*   * 87 105*     CBC  Recent Labs   Lab 09/23/24  0659 09/22/24  0737 09/21/24  2317   WBC 7.3 7.3 7.2   RBC 2.03* 2.17* 2.24*   HGB 7.0* 7.4* 7.6*   HCT 20.4* 21.8* 22.8*   * 101* 102*   MCH 34.5* 34.1* 33.9*   MCHC 34.3 33.9 33.3   RDW 20.8* 20.8* 21.0*   PLT 81* 84* 100*     INR  Recent Labs   Lab 09/23/24  0659 09/21/24  2317   INR 3.33* 3.08*     LFTs  Recent Labs   Lab 09/23/24  0659 09/22/24  0737 09/21/24  2317   ALKPHOS 83 81 91   * 119* 125*   ALT 46 48 52   BILITOTAL 31.0* 29.1* 31.8*   ALBUMIN 2.9* 2.9* 3.3*      PANCNo lab results found in last 7 days.

## 2024-09-23 NOTE — PROGRESS NOTES
Brief nephrology note:    No changes to current recommendations. Will continue to follow     Jag Jasbir Smith is a 39 year old male with h/o EtOH related decompensated cirrhosis who was admitted at OSH with DENI and SBP now transferred here for transplant workup.  Nephrology consulted for DENI.     Likely non-oliguric DENI  sCr on admit 1.51.  Peak sCr 1.54.  Baseline sCr 0.7  UPCR 0.44, U Na 22  Diagnosed w SBP 9/6 and treated for 10d   At OSH, received 25g of albumin/day from 9/19-9/21 (underdosed)  Was transferred on octreotide & midodrine, octreotide stopped on 9/22  Given his sCr has been fairly stable (ranging from 1.4-1.7) since 9/6, and has not worsened since the discontinuation of octreotide, additionally this U Na is 22, this seems to be less concerning for HRS, though given the extent of his liver disease he certain does have hepatorenal physiology at play  His DENI is may be 2/2 ATN in the setting of his SBP v bile cast nephropathy given his high bili (has been >>20 since 9/8)    Hyponatremia in setting of cirrhosis: on fluid restriction   Metabolic acidosis: likely 2/2 DENI, cont w bicarb supplementation     Decompensated cirrhosis

## 2024-09-23 NOTE — PLAN OF CARE
/69 (BP Location: Left arm)   Pulse 83   Temp 98.7  F (37.1  C) (Oral)   Resp 16   Ht 1.829 m (6')   Wt 102.3 kg (225 lb 8 oz)   SpO2 99%   BMI 30.58 kg/m      Goal Outcome Evaluation:      Plan of Care Reviewed With: patient    Overall Patient Progress: no changeOverall Patient Progress: no change     A&Ox4, VSS on RA, Pain managed with PRN oxycodone, PIVx2 SL, Bruising on BUE and Abdomen, Old R paracentesis site CDI, Up ad ginny, Regular 2g Na diet -- will be NPO at midnight

## 2024-09-23 NOTE — PLAN OF CARE
/66 (BP Location: Right arm)   Pulse 83   Temp 98.2  F (36.8  C) (Oral)   Resp 18   Ht 1.829 m (6')   Wt 102.3 kg (225 lb 8 oz)   SpO2 100%   BMI 30.58 kg/m      Neuro: Alert and oriented x 4, lets needs known  Cardiac:Wnl, denies chest pain  Respiratory:WNL, Denies SOB  GI/: AUOP , No BM overnight  Diet/Appetite:Tolerating diet, denies nausea,   Skin:no new skin deficient this shift, jaundise  LDA: L/R PIV SL  Labs: Reviewed.   Activity: Independent  Pain:pain contolled with oxy 5 mg x1   Plan:Cont with POC          Goal Outcome Evaluation:Ongoing

## 2024-09-23 NOTE — PROGRESS NOTES
[facility]    Medicine Progress Note - Hospitalist Service, GOLD TEAM 5    Date of Admission:  9/21/2024    Assessment & Plan   Jag Smith is a 39 year old male with PMHx of BETTY/MDD and HTN and recently diagnosed decompensated alcoholic liver cirrhosis who is presenting as a direct admit from [facility] with worsening liver failure admitted on 9/21/2024 for expedited liver transplant evaluation     # Decompensated alcoholic liver cirrhosis  # Recurrent ascites  # Hx SBP  # Hepatic encephalopathy  Recently diagnosed in June 2024 with etiology related to alcohol, last drink in June. No prior EGD. PETh 8/22 negative. Admitted at OSH 9/5-9/21 for HRS, SBP from para 9/6 (s/p treatment with ceftriaxone and Flagyl). Last paracentesis on 9/13 negative for SBP. Transferred for transplant evaluation. Per prior notes, patient wasn't a liver candidate, waiting for patient to complete chem dep evaluation.   - Hepatology involvement appreciated  - routine lab workup per hepatology including CMV, EBV, hep B/C, HIV, TB, syphilis, AFP  - Switch from bactrim to cipro for SBP ppx due to DENI.   - Continue to hold PTA lasix and spironolactone given HRS  - Continue PO lactulose and PO Rifaximin, goal BM 3-4 per day  - Continue PO Folic acid and Thiamine  - Daily CMP, Plt, and INR  - EGD planned for Wednesday; NPO at [place] on 9/25/24    MELD 3.0: 38 at 9/23/2024  6:59 AM  MELD-Na: 37 at 9/23/2024  6:59 AM  Calculated from:  Serum Creatinine: 1.53 mg/dL at 9/23/2024  6:59 AM  Serum Sodium: 133 mmol/L at 9/23/2024  6:59 AM  Total Bilirubin: 31.0 mg/dL at 9/23/2024  6:59 AM  Serum Albumin: 2.9 g/dL at 9/23/2024  6:59 AM  INR(ratio): 3.33 at 9/23/2024  6:59 AM  Age at listing (hypothetical): 39 years  Sex: Male at 9/23/2024  6:59 AM     # HRS  Baseline Cr recent 0.9-1.0, but developed DENI on 9/5 suspicious for HRS. Recent creatinine ranging 1.2-1.8, currently 1.5.   - Nephrology  consult, awaiting recommendations.   - Continue PO midodrine 12.5mg TID   - No indication for albumin currently per GI  - Continue PO bicarb 1300 mg TID  - Trend BMP, renally dose medications, avoid nephrotoxic agents.      # Cirrhosis-related coagulopathy  INR and PTT elevated and thrombocytopenia likely due to decompensated liver disease.  Patient has diffuse bruising all over his abdominal wall and his arms. Received 10mg PO Vitamin K on 9/20 and 9/21  - Trend CBC and INR     # Acute on chronic anemia  Baseline Hgb previously 11-12.0s, slowly drifted down to 9.0s over the last month, then acute drop from 9/17-->9/18 and again on 9/20, there was a concern for UGIB as patient's Hgb dropped to 6.5 and needed 2U PRBC.  A CTA abdomen pelvis was done on 9/21 and it showed no source of active bleeding. Currently patient denies any melena or hematemesis. Intermittent nose bleeds and hematochezia, thought to be 2/2 prior know hemorrhoids. Since transfusion, hgb trending in 7.0s.   - Type and screen, transfuse for Hgb < 7  - Trend CBC daily   - No concern for GIB per GI discussion, will advance diet and monitor      # Acute hyponatremia  Improving from 120 on admission at OSH currently to 130s.   - Continue fluid restriction with 1L per day  - Trend BMP daily           Diet: Fluid restriction 1000 ML FLUID  Regular Diet Adult    DVT Prophylaxis: Pneumatic Compression Devices  Lima Catheter: Not present  Lines: None     Cardiac Monitoring: None  Code Status: Full Code      Clinically Significant Risk Factors         # Hyponatremia: Lowest Na = 131 mmol/L in last 2 days, will monitor as appropriate      # Hypoalbuminemia: Lowest albumin = 2.9 g/dL at 9/23/2024  6:59 AM, will monitor as appropriate  # Coagulation Defect: INR = 3.33 (Ref range: 0.85 - 1.15) and/or PTT = 65 Seconds (Ref range: 22 - 38 Seconds), will monitor for bleeding  # Thrombocytopenia: Lowest platelets = 81 in last 2 days, will monitor for bleeding              # Obesity: Estimated body mass index is 30.57 kg/m  as calculated from the following:    Height as of this encounter: 1.829 m (6').    Weight as of this encounter: 102.2 kg (225 lb 6.4 oz)., PRESENT ON ADMISSION            Disposition Plan     Medically Ready for Discharge: Anticipated in 2-4 Days           The patient's care was discussed with the Attending Physician, Dr. Mcmullen, Bedside Nurse, Patient, Patient's Family, and GI hepatology team .    Peg Fan PA-C  Hospitalist Service, GOLD TEAM [address] [place] [address]  Securely message with [employer] (more info)  Text page via [facility] Paging/Directory   See signed in provider for up to date coverage information  ______________________________________________________________________    Interval History   Patient was seen in the bedside.  He reports feeling that his strength is improving.  He was able to have a full conversation with me although his speech was slow.  He denies any pain or discomfort except to his recent paracentesis site.  He states he has good appetite and last bowel movement was this morning and was soft. Feels his edema is somewhat worse than where it's been.     Physical Exam   Vital Signs: Temp: 98.7  F (37.1  C) Temp src: Oral BP: 100/68 Pulse: 91   Resp: 16 SpO2: 100 % O2 Device: None (Room air)    Weight: 225 lbs 6.4 oz    GENERAL: adult male seen sitting comfortably in bed. NAD.   NEURO / PSYCH: Alert, converses appropriately. No focal deficits. Moves all extremities.   HEENT: Anicteric sclera.   CV: RRR. S1, S2. No murmurs appreciated.  2+ right radial pulse.  RESPIRATORY: Effort normal. Lungs CTAB with no wheezing, rales, rhonchi.   GI: Abdomen soft and non distended with bowel sounds rare. No tenderness or guarding to palpation.   MSK: no gross deformities  EXTREMITIES: trace to 1+ edema to BLE  SKIN: No jaundice. No rashes or lesions to exposed areas.       Medical Decision Making       60 MINUTES  SPENT BY ME on the date of service doing chart review, history, exam, documentation & further activities per the note.      Data     I have personally reviewed the following data over the past 24 hrs:    7.3  \   7.0 (L)   / 81 (L)     133 (L) 107 25.9 (H) /  110 (H)   4.2 16 (L) 1.53 (H) \     ALT: 46 AST: 118 (H) AP: 83 TBILI: 31.0 (HH)   ALB: 2.9 (L) TOT PROTEIN: N/A LIPASE: N/A     INR:  3.33 (H) PTT:  N/A   D-dimer:  N/A Fibrinogen:  N/A

## 2024-09-24 ENCOUNTER — DOCUMENTATION ONLY (OUTPATIENT)
Dept: TRANSPLANT | Facility: CLINIC | Age: 39
End: 2024-09-24
Payer: MEDICAID

## 2024-09-24 ENCOUNTER — COMMITTEE REVIEW (OUTPATIENT)
Dept: TRANSPLANT | Facility: CLINIC | Age: 39
End: 2024-09-24
Payer: MEDICAID

## 2024-09-24 ENCOUNTER — APPOINTMENT (OUTPATIENT)
Dept: PHYSICAL THERAPY | Facility: CLINIC | Age: 39
End: 2024-09-24
Attending: STUDENT IN AN ORGANIZED HEALTH CARE EDUCATION/TRAINING PROGRAM
Payer: MEDICAID

## 2024-09-24 ENCOUNTER — TELEPHONE (OUTPATIENT)
Dept: TRANSPLANT | Facility: CLINIC | Age: 39
End: 2024-09-24
Payer: MEDICAID

## 2024-09-24 LAB
ABO/RH(D): NORMAL
ALBUMIN SERPL BCG-MCNC: 2.9 G/DL (ref 3.5–5.2)
ALP SERPL-CCNC: 82 U/L (ref 40–150)
ALT SERPL W P-5'-P-CCNC: 47 U/L (ref 0–70)
ANION GAP SERPL CALCULATED.3IONS-SCNC: 11 MMOL/L (ref 7–15)
ANTIBODY SCREEN: NEGATIVE
AST SERPL W P-5'-P-CCNC: 128 U/L (ref 0–45)
ATRIAL RATE - MUSE: 87 BPM
BILIRUB SERPL-MCNC: 30.8 MG/DL
BLD PROD TYP BPU: NORMAL
BLOOD COMPONENT TYPE: NORMAL
BUN SERPL-MCNC: 28.5 MG/DL (ref 6–20)
CALCIUM SERPL-MCNC: 8.2 MG/DL (ref 8.8–10.4)
CHLORIDE SERPL-SCNC: 105 MMOL/L (ref 98–107)
CODING SYSTEM: NORMAL
CREAT SERPL-MCNC: 1.91 MG/DL (ref 0.67–1.17)
CROSSMATCH: NORMAL
DIASTOLIC BLOOD PRESSURE - MUSE: NORMAL MMHG
EGFRCR SERPLBLD CKD-EPI 2021: 46 ML/MIN/1.73M2
ERYTHROCYTE [DISTWIDTH] IN BLOOD BY AUTOMATED COUNT: 21.2 % (ref 10–15)
GAMMA INTERFERON BACKGROUND BLD IA-ACNC: 0.03 IU/ML
GLUCOSE SERPL-MCNC: 86 MG/DL (ref 70–99)
HCO3 SERPL-SCNC: 15 MMOL/L (ref 22–29)
HCT VFR BLD AUTO: 20.5 % (ref 40–53)
HGB BLD-MCNC: 6.8 G/DL (ref 13.3–17.7)
INR PPP: 3.51 (ref 0.85–1.15)
INTERPRETATION ECG - MUSE: NORMAL
M TB IFN-G BLD-IMP: NEGATIVE
M TB IFN-G CD4+ BCKGRND COR BLD-ACNC: 9.97 IU/ML
MCH RBC QN AUTO: 33.3 PG (ref 26.5–33)
MCHC RBC AUTO-ENTMCNC: 33.2 G/DL (ref 31.5–36.5)
MCV RBC AUTO: 101 FL (ref 78–100)
MITOGEN IGNF BCKGRD COR BLD-ACNC: -0.02 IU/ML
MITOGEN IGNF BCKGRD COR BLD-ACNC: 0.03 IU/ML
P AXIS - MUSE: 3 DEGREES
PLATELET # BLD AUTO: 78 10E3/UL (ref 150–450)
POTASSIUM SERPL-SCNC: 4.5 MMOL/L (ref 3.4–5.3)
PR INTERVAL - MUSE: 144 MS
PROT SERPL-MCNC: ABNORMAL G/DL
QRS DURATION - MUSE: 92 MS
QT - MUSE: 380 MS
QTC - MUSE: 457 MS
QUANTIFERON MITOGEN: 10 IU/ML
QUANTIFERON NIL TUBE: 0.03 IU/ML
QUANTIFERON TB1 TUBE: 0.06 IU/ML
QUANTIFERON TB2 TUBE: 0.01
R AXIS - MUSE: -2 DEGREES
RBC # BLD AUTO: 2.04 10E6/UL (ref 4.4–5.9)
SODIUM SERPL-SCNC: 131 MMOL/L (ref 135–145)
SPECIMEN EXPIRATION DATE: NORMAL
SYSTOLIC BLOOD PRESSURE - MUSE: NORMAL MMHG
T AXIS - MUSE: 30 DEGREES
UNIT ABO/RH: NORMAL
UNIT NUMBER: NORMAL
UNIT STATUS: NORMAL
UNIT TYPE ISBT: 5100
VENTRICULAR RATE- MUSE: 87 BPM
WBC # BLD AUTO: 8.7 10E3/UL (ref 4–11)

## 2024-09-24 PROCEDURE — 250N000011 HC RX IP 250 OP 636: Mod: JZ | Performed by: PHYSICIAN ASSISTANT

## 2024-09-24 PROCEDURE — 80048 BASIC METABOLIC PNL TOTAL CA: CPT | Performed by: PHYSICIAN ASSISTANT

## 2024-09-24 PROCEDURE — C7901 HC HOPD MH 30-60 MINS: HCPCS | Performed by: COUNSELOR

## 2024-09-24 PROCEDURE — 86923 COMPATIBILITY TEST ELECTRIC: CPT | Performed by: PHYSICIAN ASSISTANT

## 2024-09-24 PROCEDURE — 86850 RBC ANTIBODY SCREEN: CPT | Performed by: PHYSICIAN ASSISTANT

## 2024-09-24 PROCEDURE — 120N000002 HC R&B MED SURG/OB UMMC

## 2024-09-24 PROCEDURE — 84450 TRANSFERASE (AST) (SGOT): CPT | Performed by: PHYSICIAN ASSISTANT

## 2024-09-24 PROCEDURE — 99233 SBSQ HOSP IP/OBS HIGH 50: CPT | Mod: GC | Performed by: INTERNAL MEDICINE

## 2024-09-24 PROCEDURE — 97161 PT EVAL LOW COMPLEX 20 MIN: CPT | Mod: GP

## 2024-09-24 PROCEDURE — 999N000111 HC STATISTIC OT IP EVAL DEFER

## 2024-09-24 PROCEDURE — 99222 1ST HOSP IP/OBS MODERATE 55: CPT | Mod: GC | Performed by: TRANSPLANT SURGERY

## 2024-09-24 PROCEDURE — 86900 BLOOD TYPING SEROLOGIC ABO: CPT | Performed by: PHYSICIAN ASSISTANT

## 2024-09-24 PROCEDURE — P9016 RBC LEUKOCYTES REDUCED: HCPCS

## 2024-09-24 PROCEDURE — 82247 BILIRUBIN TOTAL: CPT | Performed by: PHYSICIAN ASSISTANT

## 2024-09-24 PROCEDURE — 97530 THERAPEUTIC ACTIVITIES: CPT | Mod: GP

## 2024-09-24 PROCEDURE — 85027 COMPLETE CBC AUTOMATED: CPT | Performed by: PHYSICIAN ASSISTANT

## 2024-09-24 PROCEDURE — P9047 ALBUMIN (HUMAN), 25%, 50ML: HCPCS | Mod: JZ | Performed by: PHYSICIAN ASSISTANT

## 2024-09-24 PROCEDURE — 85610 PROTHROMBIN TIME: CPT | Performed by: PHYSICIAN ASSISTANT

## 2024-09-24 PROCEDURE — 250N000013 HC RX MED GY IP 250 OP 250 PS 637: Performed by: PHYSICIAN ASSISTANT

## 2024-09-24 PROCEDURE — 36415 COLL VENOUS BLD VENIPUNCTURE: CPT | Performed by: PHYSICIAN ASSISTANT

## 2024-09-24 PROCEDURE — 99207 PR APP CREDIT; MD BILLING SHARED VISIT: CPT | Performed by: PHYSICIAN ASSISTANT

## 2024-09-24 PROCEDURE — 250N000013 HC RX MED GY IP 250 OP 250 PS 637: Performed by: HOSPITALIST

## 2024-09-24 PROCEDURE — 99233 SBSQ HOSP IP/OBS HIGH 50: CPT | Mod: FS | Performed by: STUDENT IN AN ORGANIZED HEALTH CARE EDUCATION/TRAINING PROGRAM

## 2024-09-24 PROCEDURE — 250N000011 HC RX IP 250 OP 636: Performed by: HOSPITALIST

## 2024-09-24 PROCEDURE — 250N000009 HC RX 250: Performed by: HOSPITALIST

## 2024-09-24 PROCEDURE — 97116 GAIT TRAINING THERAPY: CPT | Mod: GP

## 2024-09-24 RX ORDER — ALBUMIN (HUMAN) 12.5 G/50ML
50 SOLUTION INTRAVENOUS 2 TIMES DAILY
Status: DISCONTINUED | OUTPATIENT
Start: 2024-09-24 | End: 2024-09-25

## 2024-09-24 RX ADMIN — RIFAXIMIN 550 MG: 550 TABLET ORAL at 21:49

## 2024-09-24 RX ADMIN — SODIUM BICARBONATE 1300 MG: 650 TABLET ORAL at 14:22

## 2024-09-24 RX ADMIN — ONDANSETRON 4 MG: 2 INJECTION INTRAMUSCULAR; INTRAVENOUS at 09:32

## 2024-09-24 RX ADMIN — PANTOPRAZOLE SODIUM 40 MG: 40 INJECTION, POWDER, FOR SOLUTION INTRAVENOUS at 21:49

## 2024-09-24 RX ADMIN — ONDANSETRON 4 MG: 2 INJECTION INTRAMUSCULAR; INTRAVENOUS at 16:10

## 2024-09-24 RX ADMIN — LACTULOSE 10 G: 10 POWDER, FOR SOLUTION ORAL at 08:58

## 2024-09-24 RX ADMIN — CARBIDOPA AND LEVODOPA 12.5 MG: 50; 200 TABLET, EXTENDED RELEASE ORAL at 09:01

## 2024-09-24 RX ADMIN — LACTULOSE 10 G: 10 POWDER, FOR SOLUTION ORAL at 22:23

## 2024-09-24 RX ADMIN — CARBIDOPA AND LEVODOPA 12.5 MG: 50; 200 TABLET, EXTENDED RELEASE ORAL at 18:15

## 2024-09-24 RX ADMIN — SODIUM BICARBONATE 1300 MG: 650 TABLET ORAL at 08:58

## 2024-09-24 RX ADMIN — ALBUMIN HUMAN 50 G: 0.25 SOLUTION INTRAVENOUS at 21:48

## 2024-09-24 RX ADMIN — CIPROFLOXACIN 500 MG: 500 TABLET ORAL at 21:49

## 2024-09-24 RX ADMIN — CIPROFLOXACIN 500 MG: 500 TABLET ORAL at 08:58

## 2024-09-24 RX ADMIN — OXYCODONE HYDROCHLORIDE 5 MG: 5 TABLET ORAL at 09:01

## 2024-09-24 RX ADMIN — RIFAXIMIN 550 MG: 550 TABLET ORAL at 08:58

## 2024-09-24 RX ADMIN — PANTOPRAZOLE SODIUM 40 MG: 40 INJECTION, POWDER, FOR SOLUTION INTRAVENOUS at 08:58

## 2024-09-24 RX ADMIN — ONDANSETRON 4 MG: 2 INJECTION INTRAMUSCULAR; INTRAVENOUS at 22:50

## 2024-09-24 RX ADMIN — OXYCODONE HYDROCHLORIDE 5 MG: 5 TABLET ORAL at 16:05

## 2024-09-24 RX ADMIN — THIAMINE HCL TAB 100 MG 100 MG: 100 TAB at 08:58

## 2024-09-24 RX ADMIN — OXYCODONE HYDROCHLORIDE 5 MG: 5 TABLET ORAL at 22:49

## 2024-09-24 RX ADMIN — SODIUM BICARBONATE 1300 MG: 650 TABLET ORAL at 21:50

## 2024-09-24 RX ADMIN — LACTULOSE 10 G: 10 POWDER, FOR SOLUTION ORAL at 14:22

## 2024-09-24 RX ADMIN — FOLIC ACID 1 MG: 1 TABLET ORAL at 08:58

## 2024-09-24 ASSESSMENT — ACTIVITIES OF DAILY LIVING (ADL)
ADLS_ACUITY_SCORE: 20
DEPENDENT_IADLS:: INDEPENDENT;TRANSPORTATION
ADLS_ACUITY_SCORE: 20
ADLS_ACUITY_SCORE: 22
ADLS_ACUITY_SCORE: 22
ADLS_ACUITY_SCORE: 20
ADLS_ACUITY_SCORE: 22
ADLS_ACUITY_SCORE: 20
ADLS_ACUITY_SCORE: 20

## 2024-09-24 NOTE — PROGRESS NOTES
"CLINICAL NUTRITION SERVICES - ASSESSMENT NOTE     Nutrition Prescription    RECOMMENDATIONS FOR MDs/PROVIDERS TO ORDER:  For liver transplant evaluation purposes, pt's BMI at this time is 30.57 kg/m^2    Recommendations already ordered by Registered Dietitian (RD):  None at this time     Future/Additional Recommendations:  Encourage patient to consume at least 75% of meals TID or the equivalent with snacks/supplements.  If consuming less than this offer oral nutrition supplements, scheduled snacks, and/or consider ordering calorie counts to assess PO intake adequacy.        REASON FOR ASSESSMENT  Jag Smith is a/an 39 year old male assessed by the dietitian for Admission Nutrition Risk Screen for positive and Provider Order - \"Liver transplant evaluation\"    NUTRITION HISTORY  Per chart review, pt recently had RD assessment on 9/6/24.  Per that note, pt reported good appetite and weights variable 2/2 ascites. Pt was on a heart healthy diet + 1200 ml fluid restriction at time of that RD visit.     Pt also saw RD at OSH on 6/20/24, per that note: \"Visit with patient this morning. He would like to work on eating a healthier diet. Pt reports he has been trying to reduce his sugar intake. We discussed working on a general, overall healthful diet using MyPlate. He is to work on portion control, reducing his sodium intake and improving his intake of protein, fruits, vegetables, and whole grains. We discussed starting a short term protein supplement daily to assist in adequate intake. He is currently eating well at meals; % throughout the day\"    CURRENT NUTRITION ORDERS  Diet: Regular and 1000 mL Fluid Restriction  Intake/Tolerance: fair-good appetite documented per chart review since admission. Orders to be NPO at midnight tonight for procedure.    LABS  Labs reviewed  - Na+ 131 (L)  - BUN 28.5 (H), Cr 1.91 (H), GFR 46 (L) --> per Nephrology note yesterday, pt with DENI  - Tbili 30.8 (H)  - (9/23): Phos " "WNL    MEDICATIONS  Medications reviewed  - Folic acid  - Thiamine  - Lactulose TID    ANTHROPOMETRICS  Height: 182.9 cm (6' 0\")  Most Recent Weight: 102.2 kg (225 lb 6.4 oz)    IBW: 80.9 kg   BMI: Obesity Grade I BMI 30-34.9   Weight History: 20 lb wt loss the past month (8%)  Wt Readings from Last 10 Encounters:   09/23/24 102.2 kg (225 lb 6.4 oz)   08/14/24 111.1 kg (245 lb)      Additional PTA weight hx per OSH RD note on 9/6/22:  09/05/24 105.1 kg (231 lb 9.6 oz)   08/09/24 111.1 kg (245 lb)   08/07/24 110.5 kg (243 lb 9.6 oz)   08/06/24 108.7 kg (239 lb 10.2 oz)   07/26/24 106.9 kg (235 lb 11.2 oz)   07/02/24 107 kg (236 lb)   06/25/24 111.2 kg (245 lb 2.4 oz)   09/29/22 108.9 kg (240 lb)       Dosing Weight: 86 kg (adjusted based on 102.2 kg and IBW)    ASSESSED NUTRITION NEEDS  Estimated Energy Needs: 4284-0253+ kcals/day (25 - 30+ kcals/kg)  Justification: Maintenance, aim higher end given cirrhosis  Estimated Protein Needs: 103-129 grams protein/day (1.2 - 1.5 grams of pro/kg)  Justification: Hypercatabolism with acute illness, pending ongoing renal function  Estimated Fluid Needs: 1000 mL fluid restriction currently per provider    PHYSICAL FINDINGS  See malnutrition section below.    MALNUTRITION  % Intake: Unable to assess  % Weight Loss: > 5% in 1 month (severe)  Subcutaneous Fat Loss: Unable to assess  Muscle Loss: Unable to assess  Fluid Accumulation/Edema: mild-moderate per flowsheets, unclear if nutrition related or not  Malnutrition Diagnosis: Unable to determine due to pt with other cares during attempts to visit    NUTRITION DIAGNOSIS  Increased nutrient needs (energy; protein, pending ongoing renal function) related to decompensated cirrhosis as evidenced by estimated energy needs 30 kcal/kg and estimated protein needs 1.2-1.5 g/kg (pending ongoing renal function)      INTERVENTIONS  Implementation  Nutrition Education: Unable to complete due to pt with other cares during attempts to visit "     Goals  Patient to consume % of nutritionally adequate meal trays TID, or the equivalent with supplements/snacks.     Monitoring/Evaluation  Progress toward goals will be monitored and evaluated per protocol.     Jane Zazueta RD, , LD  Weekday Units covered: 5A (non-Heme Onc pts) and 7B (8287-1852)  Available by 5A or 7B Clinical Dietitieddie Sorensen  Weekend Coverage: Weekend Clinical Dietitieddie Sorensen    Clinical Dietitians no longer available via paging
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
## 2024-09-24 NOTE — LETTER
September 27, 2024    Jag Smith   [address]  [facility] [address]      Dear Mr. Smith,    This letter is sent to confirm that you have completed your transplant work-up and you are a candidate in the liver transplant program at the [facility].  You were placed on the liver active waitlist on 9/24/24.      When you are active on the waitlist and an organ becomes available, a coordinator will need to speak to you immediately.  You could be contacted at any time during the day or night as an organ could become available at any time.  Please make certain our office always has your current telephone numbers and address.      Items we will need from you:    We have received approval from your insurance company for the transplant procedure.  It is critical that you notify us if there is any change in your insurance.  It is also important that you familiarize yourself with the details of your specific insurance policy.  Our patient  is available to assist you if you should have any questions regarding your coverage.    During this waiting period, we may request additional periodic laboratory tests with your primary physician.  It will be your responsibility to remind your physician to forward your results to the Transplant Office.    We need to be kept informed of any changes in your medical condition such as:  changes in your medications,   significant changes in your health  significant infections (such as pneumonia or abscesses)  blood transfusions  any condition which requires hospitalization  any surgery    Remember to complete any routine cancer screening tests required before your transplant.  This includes colonoscopy; prostate screening for men, and mammogram and gynecologic testing for women, as well as dental work.  Your primary care clinic can assist you with arranging for these exams.  Remind your caregivers to forward copies of the  records and final reports.      We want you to know that our program has physician and surgeon coverage 24 hours a day, 365 days a year. In addition, our transplant surgeons and physicians will not be on call for two or more transplant programs more than 30 miles apart unless the circumstances have been reviewed and approved by the [employer]aring (UNOS) Membership and Professional Standards Committee (MPSC). Finally, our primary physician and primary surgeons are not designated as the primary surgeon or primary physician at more than 1 transplant hospital. If this coverage changes or there are substantial program changes, you will be notified in writing by letter.     Attached is a letter from UNOS that describes the services and information offered to patients by UN and the Organ Procurement and Transplantation Network (OPTN).    We appreciate having had the opportunity to participate in your care.  If you have questions, please feel free to call the Transplant Office at [phone] or [phone].      Sincerely,    Aicha Kwon RN, BSN  Pre-Liver Transplant Coordinator  Phone: [phone]     Solid Organ Transplant  [facility], [facility]      Enclosures: OS Letter  cc: Care Team                        The [employer] [facility]ment and Transplantation Network Toll-free patient services line:  [phone]  Your resource for organ transplant information    Staffed 8:30 am - 5:00 pm ET Monday - Friday Leave a message 24/7 to receive a call back    The Organ Procurement and Transplantation Network (OPTN) is the national transplant system. It makes the policies that decide how donated organs are matched to patients waiting for a transplant. The OPTN:    Makes sure donated organs get matched to people on the transplant waiting list  Tells people about the donation and transplant processes  Makes sure that  the public knows about the need for more organ and tissue donations    The OPTN has a free patient services line that you can call to:  Get more information about:  Organ donation and organ transplants  Donation and transplant policies  Get an information kit with:  A list of transplant hospitals  Waiting list information  Talk about any questions you may have about your transplant hospital or organ procurement organization. The staff will do their best to help you or point you to others who may help.  Find out how you can volunteer with the OPTN and help shape transplant policy     The patient services line number is: 2-865-687-6334    Patient services line staff CANNOT answer questions about your own medical care, including:  Waiting list status  Test results  Medical records  You will need to call your transplant hospital for this information.    The following websites have more information about transplantation and donation:    OPTN: https://optn.transplant.hrsa.gov/    For potential living donors and transplant recipients:    Living with transplant: https://www.transplantliving.org/    Living donation process: https://optn.transplant.hrsa.gov/living-donation/    Financial assistance: https://www.livingdonorassistance.org/    Transplantation data: https://www.srtr.org/    Organ donation: https://www.organdonor.gov/    Volunteer with the OPTN: https://optn.transplant.hrsa.gov/get-involved/

## 2024-09-24 NOTE — COMMITTEE REVIEW
Liver Committee Review Note     Evaluation Date: 9/22/2024  Committee Review Date: 9/24/2024    Organ being evaluated for: Liver    Transplant Phase: Evaluation  Transplant Status: Active    Transplant Coordinator: Megan Kwon  Transplant Surgeon:        Referring Physician: Morenita Jolly    Primary Diagnosis:   Secondary Diagnosis:     Transplant Eligibility: Cirrhosis with MELD, ETOH    Committee Review Decision: Approved    Relative Contraindications: None    Absolute Contraindications: None    Live Donor: No     Committee Chair Nolan Ding MD verbally attested to the committee's decision.    Committee Discussion Details:     Committee Review Members:  Anesthesiology Cleve Duncan, DO   Cardiology Jamison Hernandez MD   Nutrition Yany Pulido, VASHTI    - Clinical ADA Dorado, Afshin Bush, [facility]   Transplant Katherin Cruz, APRN CNP, Ash Pulliam RN   Transplant Hepatology  Everette Estes MD, TIFFANIE SALDAÑA MD, Darcie Lewis, [facility], Linda Chavira MD, Nolan Clayton MD, Thomas M. Leventhal, MD   Transplant Surgery Fernando Colon MD, Lexie Wells NP, Russell Waters MD       Approved to list, meets guidelines for early consideration after full review of transplant team.

## 2024-09-24 NOTE — PLAN OF CARE
Plan of Care Reviewed With: patient, parent    Overall Patient Progress: no change    Outcome Evaluation: AO x4. VSS. No changes [id number] shift. 1000 mL fluid restrcition. NPO at midnight.    Status:[id number] shift. Pt reported doing okay. Received a call stating pt was on the transplant list - pt/family happy about that. AO x4 - can have intermittent confusion if not on top of lactulose. L/R PIV - SL. Reg diet with a 1000 mL FR. To be NPO at midnight. Independent. No BM. VSS - occasional softer BP's. BLE edema. Jaundice.     Labs: Reviewed.     Pain/Nausea: Pain rated as high as 7/10 in abdomen. PRN oxy given. Pt complained of nausea - IV zofran given - effective.     New Changes: Oxy given. Zofran given. Transplant news.     Plan: Possible care conference to be held, 9/25. Monitor pain. Continue POC.     Vital signs:  Temp: 98.4  F (36.9  C) Temp src: Oral BP: 121/74 Pulse: 98   Resp: 16 SpO2: 100 % O2 Device: None (Room air)   Height: 182.9 cm (6') Weight: 102.2 kg (225 lb 6.4 oz)  Estimated body mass index is 30.57 kg/m  as calculated from the following:    Height as of this encounter: 1.829 m (6').    Weight as of this encounter: 102.2 kg (225 lb 6.4 oz).

## 2024-09-24 NOTE — PLAN OF CARE
Occupational Therapy: Defer - Orders received. Chart reviewed and discussed with care team.?Occupational Therapy not indicated.?Per discussion with PT, patient is completing ADLs IND in room and appropriate for single rehab discipline to follow. No IP OT needs identified. PT to continue to follow. Defer discharge recommendations to PT and medical team.?Will complete orders. Please re-order OT if mobility status changes or further needs arise.

## 2024-09-24 NOTE — PLAN OF CARE
Goal Outcome Evaluation:      Plan of Care Reviewed With: patient    Overall Patient Progress: no change    Outcome Evaluation: No acute changes. Patient remains alert and oriented, VSS, RA. Patient had ECHO, CXR and labs completed this shift. Patient on 1000mL fluid restriction. Patient having low urine output. Patient had 2 BM this shift - needs to have 1-2 more today. Lactulose taken per orders.    /68 (BP Location: Right arm)   Pulse 83   Temp 98.5  F (36.9  C) (Oral)   Resp 16   Ht 1.829 m (6')   Wt 102.2 kg (225 lb 6.4 oz)   SpO2 97%   BMI 30.57 kg/m

## 2024-09-24 NOTE — CONSULTS
"Care Management Initial Consult    General Information  Assessment completed with: Parents, Mom, Sania    Type of CM/SW Visit: Initial Assessment    Primary Care Provider verified and updated as needed: Yes   Jamison Iqbal    Readmission within the last 30 days: no previous admission in last 30 days        Reason for Consult: other (see comments) (\"family conference\")    Advance Care Planning: Advance Care Planning Reviewed: other (see comments) (Social work provided patient's mom with an advanced health directive and encouraged her and her son to request it be notarized during this hospital stay if desired by patient)          Communication Assessment  Patient's communication style: spoken language (English or Bilingual)    Hearing Difficulty or Deaf: no   Wear Glasses or Blind: yes    Cognitive  Cognitive/Neuro/Behavioral: WDL                      Living Environment:   People in home: parent(s), sibling(s)     Current living Arrangements: house      Able to return to prior arrangements: yes       Family/Social Support:  Care provided by: self, parent(s)  Patient's mom, Sania, reported she is retired and has been available to act as a caregiver for her son    Provides care for: no one  Marital Status: Single  Support system: Friend, Parent(s), Sibling(s)          Description of Support System: Supportive, Involved    Support Assessment: Adequate family and caregiver support    Current Resources:   Patient receiving home care services: No        Community Resources: WIC    Equipment currently used at home: none, other (see comments) (Patient's mom reported they have a shower chair at home)    Supplies currently used at home: None    Employment/Financial:  Employment Status: unemployed, other (see comments) (Patient's mom indicated her son 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        Financial Concerns: none     Referral to Financial Worker: No       Does the patient's insurance plan have a 3 day qualifying hospital stay waiver?  No    Lifestyle & Psychosocial Needs:  Social Determinants of Health     Food Insecurity: Low Risk  (9/21/2024)    Food Insecurity     Within the past 12 months, did you worry that your food would run out before you got money to buy more?: No     Within the past 12 months, did the food you bought just not last and you didn t have money to get more?: No   Depression: Not at risk (7/26/2024)    Received from [facility]    PHQ-2     PHQ-2 Total : 0   Housing Stability: Low Risk  (9/21/2024)    Housing Stability     Do you have housing? : Yes     Are you worried about losing your housing?: No   Tobacco Use: Low Risk  (9/22/2024)    Patient History     Smoking Tobacco Use: Never     Smokeless Tobacco Use: Never     Passive Exposure: Not on file   Recent Concern: Tobacco Use - Medium Risk (9/5/2024)    Received from [facility]    Patient History     Smoking Tobacco Use: Passive Smoke Exposure - Never Smoker     Smokeless Tobacco Use: Never     Passive Exposure: Yes   Financial Resource Strain: Low Risk  (9/21/2024)    Financial Resource Strain     Within the past 12 months, have you or your family members you live with been unable to get utilities (heat, electricity) when it was really needed?: No   Alcohol Use: Not At Risk (9/5/2024)    Received from [facility]    AUDIT-C     Frequency of Alcohol Consumption: Never     Average Number of Drinks: Patient does not drink     Frequency of Binge Drinking: Never   Recent Concern: Alcohol Use - Alcohol Misuse (6/8/2024)    Received from [facility]    AUDIT-C     Frequency of Alcohol Consumption: 4 or more times a week     Average Number of Drinks: 10 or more     Frequency of Binge Drinking: Daily or almost daily    Transportation Needs: Low Risk  (9/21/2024)    Transportation Needs     Within the past 12 months, has lack of transportation kept you from medical appointments, getting your medicines, non-medical meetings or appointments, work, or from getting things that you need?: No   Physical Activity: Not on file   Interpersonal Safety: High Risk (9/22/2024)    Interpersonal Safety     Do you feel physically and emotionally safe where you currently live?: No     Within the past 12 months, have you been hit, slapped, kicked or otherwise physically hurt by someone?: No     Within the past 12 months, have you been humiliated or emotionally abused in other ways by your partner or ex-partner?: No   Stress: Not on file   Social Connections: Not on file   Health Literacy: Not on file       Functional Status:  Prior to admission patient needed assistance:   Dependent ADLs:: Independent  Dependent IADLs:: Independent, Transportation for long distances       Mental Health Status:  Did not assess as patient was sleeping and social work spoke with patient's mom       Chemical Dependency Status:  Did not assess as patient was sleeping and social work spoke with patient's mom.    Per YUSEF Consult Completed on 9/24/24, patient has a long history of alcohol use and is willing to attend an IOP YUSEF treatment program with [facility]. Per this note, patient is not healthy enough to attend IP YUSEF treatment programming.              Values/Beliefs:  Spiritual, Cultural Beliefs, Jew Practices, Values that affect care: no               Discussed  Partnership in Safe Discharge Planning  document with patient/family: No    Additional Information:  Background:  Per H&P, patient was a direct admission from [facility] for liver transplant evaluation. He was recently diagnosed with decompensated alcoholic liver cirrhosis.    Progress:  Social work reviewed patient s chart, patient was discussed during rounds and  "social work met with patient and his mom, Sania, at bedside to complete a Care Management Initial Consult due to a social work consult for a \"family conference\". Introduced self, explained role and the reason for this visit. Patient was sleeping and did not wake but his mom agreeable to this assessment.     Sania indicated she was not sure why there was a consult placed on her son's behalf for a family conference and said she would be the family involved in this type of discussion.    Sania talked about her son being fully independent with ADLs/IADLs prior to this hospitalization and said he did not feel comfortable driving long distances. She said she assisted with transportation if needed for long distance trips.    Social work engaged in active listening and validation of Sania's feelings during this visit as she talked about this hospitalization being stressful for her.    Plan:   Social work left a blank advanced health care directive in patient's room as well as a printed copy of Honoring Choices, Your Rights: Making Your Own Health Care Treatment Decisions in patient's room    Patient's mom reported she anticipates her son to discharge home with family when stable. She said family/friends will provide transportation upon discharge.     Next Steps:   Patient's mom encouraged her and her son to contact care management if needs, questions or concerns arise during this hospitalization.      Care Management will continue to follow for a safe hospital discharge.       ADA Vernon, [facility] Hospital[facility]     Phone: [phone]    "

## 2024-09-24 NOTE — PROGRESS NOTES
[facility]    Medicine Progress Note - Hospitalist Service, GOLD TEAM 5    Date of Admission:  9/21/2024    Assessment & Plan   Jag Smith is a 39 year old male with PMHx of BETTY/MDD and HTN and recently diagnosed decompensated alcoholic liver cirrhosis who is presenting as a direct admit from [facility] with worsening liver failure admitted on 9/21/2024 for expedited liver transplant evaluation     # Decompensated alcoholic liver cirrhosis  # Recurrent ascites  # Hx SBP  # Hepatic encephalopathy  Recently diagnosed in June 2024 with etiology related to alcohol, last drink in June. No prior EGD. PETh 8/22 negative. Admitted at OSH 9/5-9/21 for HRS, SBP from para 9/6 (s/p treatment with ceftriaxone and Flagyl). Last paracentesis on 9/13 negative for SBP. Transferred for transplant evaluation. Per prior notes, patient wasn't a liver candidate, waiting for patient to complete chem dep evaluation.   Hepatology involvement appreciated  Routine lab workup per hepatology including CMV, EBV, hep B/C, HIV, TB, syphilis, AFP  EGD planned for Wednesday; NPO at [facility] on 9/25/24  ECHO, CXR, and EKG complete  Per transplant team, requesting outside images be obtained. Waiting to hear back from them regarding which images specifically  Cipro for SBP ppx  Discontinued Bactrim ppx given DENI  Continue to hold PTA lasix and spironolactone given concern for HRS  Continue PO lactulose and PO Rifaximin, goal BM 3-4 per day  Continue PO Folic acid and Thiamine  Daily CMP, Plt, and INR  Chem dep consult completed, please see their note for more details.     MELD 3.0: 41 at 9/24/2024  6:52 AM  MELD-Na: 40 at 9/24/2024  6:52 AM  Calculated from:  Serum Creatinine: 1.91 mg/dL at 9/24/2024  6:52 AM  Serum Sodium: 131 mmol/L at 9/24/2024  6:52 AM  Total Bilirubin: 30.8 mg/dL at 9/24/2024  6:52 AM  Serum Albumin: 2.9 g/dL at 9/24/2024  6:52 AM  INR(ratio): 3.51 at 9/24/2024  6:52  AM  Age at listing (hypothetical): 39 years  Sex: Male at 9/24/2024  6:52 AM     # HRS  Baseline Cr recent 0.9-1.0, but developed DENI on 9/5 suspicious for HRS. Recent creatinine ranging 1.2-1.8, worsened to 1.9 today.   - Nephrology consult, appreciate recs  - albumin challenge with 50 g albumin BID x 2 days  - Continue PO midodrine 12.5mg TID   - Continue PO bicarb 1300 mg TID  - Trend BMP, renally dose medications, avoid nephrotoxic agents.      # Cirrhosis-related coagulopathy  INR and PTT elevated and thrombocytopenia likely due to decompensated liver disease.  Patient has diffuse bruising all over his abdominal wall and his arms. Received 10mg PO Vitamin K on 9/20 and 9/21  - Trend CBC and INR     # Acute on chronic anemia  Baseline Hgb previously 11-12.0s, slowly drifted down to 9.0s over the last month, then acute drop from 9/17-->9/18 and again on 9/20, there was a concern for UGIB as patient's Hgb dropped to 6.5 and needed 2U PRBC.  A CTA abdomen pelvis was done on 9/21 and it showed no source of active bleeding. Currently patient denies any melena or hematemesis. Intermittent nose bleeds and hematochezia, thought to be 2/2 prior know hemorrhoids. Since transfusion, hgb trending in 7.0s.   - Type and screen, transfuse for Hgb < 7  - give 1 unit(s) PRBC today 9/24  - Trend CBC daily   - No concern for GIB per GI discussion, will advance diet and monitor      # Acute hyponatremia  Improving from 120 on admission at OSH currently to 130s.   - Continue fluid restriction with 1L per day  - Trend BMP daily           Diet: Fluid restriction 1000 ML FLUID  Regular Diet Adult  NPO per Anesthesia Guidelines for Procedure/Surgery Except for: Meds    DVT Prophylaxis: Pneumatic Compression Devices  Lima Catheter: Not present  Lines: None     Cardiac Monitoring: None  Code Status: Full Code      Clinically Significant Risk Factors         # Hyponatremia: Lowest Na = 131 mmol/L in last 2 days, will monitor as  appropriate      # Hypoalbuminemia: Lowest albumin = 2.9 g/dL at 9/24/2024  6:52 AM, will monitor as appropriate    # Coagulation Defect: INR = 3.51 (Ref range: 0.85 - 1.15) and/or PTT = 65 Seconds (Ref range: 22 - 38 Seconds), will monitor for bleeding  # Thrombocytopenia: Lowest platelets = 78 in last 2 days, will monitor for bleeding             # Obesity: Estimated body mass index is 30.57 kg/m  as calculated from the following:    Height as of this encounter: 1.829 m (6').    Weight as of this encounter: 102.2 kg (225 lb 6.4 oz)., PRESENT ON ADMISSION     # Financial/Environmental Concerns: none         Disposition Plan     Medically Ready for Discharge: Anticipated in 2-4 Days           The patient's care was discussed with the Attending Physician, Dr. Garces, Bedside Nurse, Patient, nephrology team.     Peg Fan PA-C  Hospitalist Service, [address]  Securely message with [employer] (more info)  Text page via [facility] Paging/Directory   See signed in provider for up to date coverage information  ______________________________________________________________________    Interval History   Patient was seen in the bedside.  He denies any symptoms or complaints today.  He has ongoing pain that is improving at site of most recent paracentesis.  He is interested in speaking with hepatology about the plan.  He does not feel that his edema has worsened.  He had 3 bowel movements yesterday, without evidence of blood.     Physical Exam   Vital Signs: Temp: 98.4  F (36.9  C) Temp src: Oral BP: 112/71 Pulse: 86   Resp: 16 SpO2: 99 % O2 Device: None (Room air)    Weight: 225 lbs 6.4 oz    GENERAL: adult male seen sitting comfortably in bed. NAD.   NEURO / PSYCH: Alert, converses appropriately. No focal deficits. Moves all extremities.   HEENT: scleral icterus noted  CV: RRR. S1, S2. No murmurs appreciated.  2+ right radial pulse.  RESPIRATORY: Effort normal. Lungs  CTAB with no wheezing, rales, rhonchi.   GI: Abdomen soft and non distended with bowel sounds rare. No tenderness or guarding to palpation.   MSK: no gross deformities  EXTREMITIES: trace to 1+ edema to BLE  SKIN: Jaundiced. No rashes or lesions to exposed areas.       Medical Decision Making       45 MINUTES SPENT BY ME on the date of service doing chart review, history, exam, documentation & further activities per the note.      Data     I have personally reviewed the following data over the past 24 hrs:    8.7  \   6.8 (LL)   / 78 (L)     131 (L) 105 28.5 (H) /  86   4.5 15 (L) 1.91 (H) \     ALT: 47 AST: 128 (H) AP: 82 TBILI: 30.8 (HH)   ALB: 2.9 (L) TOT PROTEIN: N/A LIPASE: N/A     TSH: N/A T4: N/A A1C: 4.7     INR:  3.51 (H) PTT:  N/A   D-dimer:  N/A Fibrinogen:  N/A

## 2024-09-24 NOTE — CONSULTS
"9/24/2024  Pt completed his YUSEF CA today. Pt would like to attend an IP YUSEF treatment program, however, due to his health he will not be able to do this. He is open to [facility] UYSEF treatment and he will contact [facility] to get started with treatment.    CLARISA Service Initiation Date ID: [id number]    Recommendations:   1)  Complete an Intensive Outpatient CD Treatment Program.  2)  Abstain from all mood-altering chemicals unless prescribed by a licensed provider.   3)  You are strongly encouraged to attend one weekly sober support group meeting, such as 12 step based (AA/NA), SMART Recovery, Health Realizations, Refuge Recovery, TENISHA, [place] Recovery Connection meetings.     4)  Follow all the recommendations of your treatment/medical providers.  5)  It is recommended to continue to provide ongoing PEth/ETG testing per transplant team guidelines.    Clinical Substantiation:    Pt has a long history of alcohol use. He reports his drinking increased after his grandmother passed away in early 20254. What pt reported to me about his drinking versus what he reported to the liver transplant  is somewhat concerning. He reported he would drink was a 10 pack of beer and a couple of mixed drinks on Fridays and Saturdays to me and to the  he reported he would have \"6-8 drinks a day 3-4 days per week.\" Pt appears to lack insight into sober coping skills. He isn't sure what lead him to drink in the past. His biggest motivation for sobriety at this time is to stay alive. Pt is willing to attend a YUSEF treatment program.    Referrals/ Alternatives:  [facility]:  [address]  [place] [address]  Phone: [phone]  Fax: [phone]   [email or web address]     YUSEF consult completed by: LORETA Pandey.  Phone Number: [phone]  E-mail Address: [email or web address] " Evaluation Department  [address] [place] [address]     *Due to regulation of Title 42 of the Code of Federal Regulations (CFR) Part 2: Confidentiality laws apply to this note and the information wherein.  Thus, this note cannot be copy and pasted into any other health care staff's note nor can it be included in general medical records sent to ANY outside agency without the patient's written consent.

## 2024-09-24 NOTE — PLAN OF CARE
"Goal Outcome Evaluation:      Plan of Care Reviewed With: parent    Overall Patient Progress: improvingOverall Patient Progress: improving    Outcome Evaluation: Social work met with patient and his mom, Sania, at bedside to follow up on a social work consult regarding a \"family conference\". Patient was sleeping and did not wake so social work completed a CMA with Snaia. Sania indicated she is \"hoping for the best\" for her son, talked about anticipating to discharge home when patient is medically stable and that she will provide transportation upon discharge.      ADA Vernon, LICSW  7B   Phone: [phone]     "

## 2024-09-24 NOTE — PROGRESS NOTES
Nephrology Progress Note  09/24/2024           MEDICAL DECISION MAKING     RECOMMENDATIONS:  Renally dose medications, avoid unnecessary nephrotoxins, avoid unnecessary radiographic contrast, daily renal panel, strict I/O, daily standing weights     ASSESSMENT:  Jag Smith is a 39 year old male with h/o EtOH related decompensated cirrhosis who was admitted at OSH with DENI and SBP now transferred here for transplant workup.  Nephrology consulted for DENI.      Likely non-oliguric DENI  sCr on admit 1.51.  Peak sCr 1.54.  Baseline sCr 0.7  UPCR 0.44, U Na 22  Diagnosed w SBP 9/6 and treated for 10d   At OSH, received 25g of albumin/day from 9/19-9/21 (underdosed)  Was transferred on octreotide & midodrine, octreotide stopped on 9/22  Given his sCr has been fairly stable (ranging from 1.4-1.7) since 9/6, and has not worsened since the discontinuation of octreotide, additionally this U Na is 22, this seems to be less concerning for HRS, though given the extent of his liver disease he certain does have hepatorenal physiology at play  On direct visualization of urine microscopy, granular casts were appreciated, suggestive of ATN  His DENI is may be 2/2 ATN in the setting of his SBP v bile cast nephropathy given his high bili (has been >>20 since 9/8)     Hyponatremia in setting of cirrhosis: on fluid restriction   Metabolic acidosis: likely 2/2 DENI, cont w bicarb supplementation      Decompensated cirrhosis     Recommendations were communicated to primary team via note     Seen and discussed with Dr. Henry Sanderson MD   Division of Renal Disease and Hypertension  [facility]  [email or web address]  Vocera Web Console    SUBJECTIVE     Interval History :   Nursing and provider notes from last 24 hours reviewed.  In the last 24 hours Jag Smith   No SOB  Feeling well  Some pain at prior para sight    A comprehensive review of systems was negative except as noted above.    OBJECTIVE     Physical Exam:   I/O last 3  completed shifts:  In: 1017 [P.O.:1017]  Out: -    BP 97/65 (BP Location: Right arm, Patient Position: Semi-Blackwood's)   Pulse 97   Temp 98.6  F (37  C) (Oral)   Resp 16   Ht 1.829 m (6')   Wt 102.2 kg (225 lb 6.4 oz)   SpO2 99%   BMI 30.57 kg/m       General:lying in bed  HEENT:scleral icterus   Pulmonary:unlabored   Abdominal:mild distention   Extremities:1+ b/l LE edema   Neuro:A&Ox4  Access:PIV    Labs:   All labs reviewed by me  Electrolytes/Renal -   Recent Labs   Lab Test 09/24/24  0652 09/23/24  0659 09/22/24  0737   * 133* 132*   POTASSIUM 4.5 4.2 4.6   CHLORIDE 105 107 107   CO2 15* 16* 14*   BUN 28.5* 25.9* 26.5*   CR 1.91* 1.53* 1.54*   GLC 86 110* 87   AMAIRANI 8.2* 8.4* 8.5*   PHOS  --  3.1  --        CBC -   Recent Labs   Lab Test 09/24/24  0652 09/23/24  0659 09/22/24  0737   WBC 8.7 7.3 7.3   HGB 6.8* 7.0* 7.4*   PLT 78* 81* 84*       LFTs -   Recent Labs   Lab Test 09/24/24  0652 09/23/24  0659 09/22/24  0737   ALKPHOS 82 83 81   BILITOTAL 30.8* 31.0* 29.1*   ALT 47 46 48   * 118* 119*   ALBUMIN 2.9* 2.9* 2.9*       Iron Panel -   Recent Labs   Lab Test 09/23/24  0659   IRON 58*       Current Medications:  Current Facility-Administered Medications   Medication Dose Route Frequency Provider Last Rate Last Admin    ciprofloxacin (CIPRO) tablet 500 mg  500 mg Oral Q12H [facility] (08/20) Peg Fan PA-C   500 mg at 09/24/24 0858    folic acid (FOLVITE) tablet 1 mg  1 mg Oral Daily José Miguel Aviles MD   1 mg at 09/24/24 0858    lactulose (CEPHULAC) Packet 10 g  10 g Oral TID José Miguel Aviles MD   10 g at 09/24/24 0858    midodrine (PROAMATINE) tablet 12.5 mg  12.5 mg Oral TID w/meals José Miguel Aviles MD   12.5 mg at 09/24/24 0901    pantoprazole (PROTONIX) IV push injection 40 mg  40 mg Intravenous BID José Miguel Aviles MD   40 mg at 09/24/24 0858    rifaximin (XIFAXAN) tablet 550 mg  550 mg Oral BID José Miguel Aviles MD   550 mg at  09/24/24 0858    sodium bicarbonate tablet 1,300 mg  1,300 mg Oral TID José Miguel Aviles MD   1,300 mg at 09/24/24 0858    sodium chloride (PF) 0.9% PF flush 3 mL  3 mL Intracatheter Q8H José Miguel Aviles MD   3 mL at 09/24/24 0859    thiamine (B-1) tablet 100 mg  100 mg Oral Daily José Miguel Aviles MD   100 mg at 09/24/24 0858     Current Facility-Administered Medications   Medication Dose Route Frequency Provider Last Rate Last Admin     Georges Sanderson MD

## 2024-09-24 NOTE — PLAN OF CARE
Goal Outcome Evaluation:      Plan of Care Reviewed With: patient    Overall Patient Progress: improving    Blood pressure 101/56, pulse 88, temperature 97.9  F (36.6  C), temperature source Oral, resp. rate 16, height 1.829 m (6'), weight 102.2 kg (225 lb 6.4 oz), SpO2 100%.    Status: Decompensated alcoholic liver cirrhosis   Activity: SBA-Indp, noob overnight  Neuros: A&O x 4  Cardiac: WDL  Respiratory: WDL, RA  GI/: no Bms, gave scheduled lactulose, low urine output  Diet: NPO @ 0000, FR 1 L  Skin/Incisions: Jaundice, +3 LE edema  Lines/Drains: PIV x2 SL  Pain/Nausea: PRN compazine and oxy given  New Changes: none  Plan:  continue POC

## 2024-09-24 NOTE — CONFIDENTIAL NOTE
Pre-Liver Transplant Evaluation/Teaching    TEACHING TOPICS: Evaluation Process, Evaluation Items, Diagnostic Studies, Consultation, Chemical Dependency Policy, CD Eval, Donor Source, Liver Allocation, MELD System, UNOS, Waiting List, Follow up while on transplant list, Follow up after transplantation, Infection and Rejection, Immunosuppression , Medication Teaching, Lab Recording after transplant, Laboratory Frequency after transplant , Consent for evaluation and One year survival rates    INSTRUCTIONAL MATERIAL USED/GIVEN:   Liver Transplant Handbook, MELD Booklet, Donor Booklet, Web Sites Options, Verbal instructions, Multiple Listing Brochure , Consent for evaluation signed, One year survival rates and SRTR (Scientific Registry) Data    Person(s) involved in teaching: patient and mom via phone  Asks Questions: YES  Eager to Learn: YES  Cooperative: YES  Receptive (willing/able to accept information): YES  Reason for the appointment, diagnosis and treatment plan: YES  Knowledge of proper use of medications and conditions for which they are ordered (with special attention to potential side effects or drug interactions): YES  Which situations necessitate calling provider and whom to contact: YES    Teaching Concerns Addressed   Comments: Extended criteria  Nutritional needs and diet plan: YES  How and/when to access community resources: YES  Patient is aware of and agrees to required commitment to post-op care and long term follow-up: YES    Patient open to all Extended Criteria organ offers (underutilized donors): Yes or no: Yes  Details: open to extended criteria.  Patient having technical difficulties getting into docusign to complete.  Can read it but not sign it, will have admin team re-send documents for completion but patient verbally accepts all extended criteria.     Patient has name and phone number of transplant coordinator.   Time Spent face-to-face teachin minutes.

## 2024-09-24 NOTE — PROGRESS NOTES
HEPATOLOGY PROGRESS NOTE    Date: 09/24/2024     39 year old male with a history of EtOH related decompensated cirrhosis who presents from [facility] for liver transplant evaluation. MELD 41. ABO O.      #. EtOH related decompensated cirrhosis, MELD 41  #. Acute kidney injury, worsening   #. Spontaneous bacterial peritonitis  -Patient diagnosed with cirrhosis in 6/7/2024 in setting of acute alcoholic hepatitis. Tx with steroids and although he initially responded, liver disease then subsequently progressively worsened. He was then admitted in 9/2024 for PNA and then again in 9/5-9/21 (transferred here) where he was found to have SBP and DENI.  #. Ascites: Recent diagnosis of SBP, with repeat tap showing resolution on 09/13. Off diuretics given DENI.  #. Varices: No prior EGD.  #. HE: Diagnosed with HE at OSH, started on lactulose, rifaximin  #. DENI: No evidence of HRS at this timeRenal function has bumped today, unclear etiology ? Iatorgenic (Bactrim vs Infection although largely benign abdomen and not toxic appearing)  #. HCC: No liver lesion on prior imaging.  #. Liver Transplant Candidacy: Patient overall good candidate for LT.  Tentative plan to list for LT pending insurance approval.             >Cardiology: s/p TTE, normal LV and RV function. No PAH             >Surgery: evaluated 9/24, deemed good candidate overall.             >SW: consulted. Underwent YUSEF eval, recommendation of IOP. This can be done post-LT            RECOMMENDATIONS  - Okay to continue Midodrine.  - Continue PO Folic acid and Thiamine.  - Continue PO Lactulose, PO Rifaximine.  - Hold diuretics given recent DENI.  - No indication for albumin or octreotide at this time.  - cipro PO Q24 for SBP prophylaxis  - Will plan for EGD 9/25/2024 (please make NPO at midnight)       Gastroenterology follow up recommendations: Pending clinical course.      Thank you for involving us in this patient's care. Please do not hesitate to contact the IP  Hep service with any questions or concerns.      Pt care plan discussed with Dr. Calyton, Hepatology staff physician.      Zuleyma Shaikh MD   Gastroenterology/Transplant Hepatology Fellow  Division of Gastroenterology, Hepatology and Nutrition  [facility]    _______________________________________________________________      Objective:  Blood pressure 121/74, pulse 98, temperature 98.4  F (36.9  C), temperature source Oral, resp. rate 16, height 1.829 m (6'), weight 102.2 kg (225 lb 6.4 oz), SpO2 100%.    Gen: A&Ox3, NAD  HEENT: ncat, perrl, eomi, sclera icteric  CV: RRR  Lungs: CTA b/l  Abd: Distended, soft,+ttp in lower abdomen. Periumbilical ecchymosis appreciated. No fluid wave    Skin: + jaundice, + stigmata of chronic liver disease  MS: reduced muscle mass for age  Neuro: non focal       LABS:  BMP  Recent Labs   Lab 09/24/24  0652 09/23/24  0659 09/22/24  0737 09/21/24  2317   * 133* 132* 131*   POTASSIUM 4.5 4.2 4.6 4.2   CHLORIDE 105 107 107 105   AMAIRANI 8.2* 8.4* 8.5* 8.7*   CO2 15* 16* 14* 16*   BUN 28.5* 25.9* 26.5* 26.9*   CR 1.91* 1.53* 1.54* 1.51*   GLC 86 110* 87 105*     CBC  Recent Labs   Lab 09/24/24  0652 09/23/24  0659 09/22/24  0737 09/21/24  2317   WBC 8.7 7.3 7.3 7.2   RBC 2.04* 2.03* 2.17* 2.24*   HGB 6.8* 7.0* 7.4* 7.6*   HCT 20.5* 20.4* 21.8* 22.8*   * 101* 101* 102*   MCH 33.3* 34.5* 34.1* 33.9*   MCHC 33.2 34.3 33.9 33.3   RDW 21.2* 20.8* 20.8* 21.0*   PLT 78* 81* 84* 100*     INR  Recent Labs   Lab 09/24/24  0652 09/23/24  0659 09/21/24  2317   INR 3.51* 3.33* 3.08*     LFTs  Recent Labs   Lab 09/24/24  0652 09/23/24  0659 09/22/24  0737 09/21/24  2317   ALKPHOS 82 83 81 91   * 118* 119* 125*   ALT 47 46 48 52   BILITOTAL 30.8* 31.0* 29.1* 31.8*   ALBUMIN 2.9* 2.9* 2.9* 3.3*      PANCNo lab results found in last 7 days.

## 2024-09-24 NOTE — PLAN OF CARE
Goal Outcome Evaluation:      Plan of Care Reviewed With: patient    Overall Patient Progress: improving    Outcome Evaluation: No acute changes this shift - pt remains alert and oriented; taking lactulose as ordered. x2 BM this shift, pt needs to have 2 more. Patient received 1 unit PRBCs, no hemoglobin recheck ordered. Patient with fair diet toleration. 1000mL fluid restriction. Plan for NPO tonight for procedure tomorrow.    /71   Pulse 86   Temp 98.4  F (36.9  C) (Oral)   Resp 16   Ht 1.829 m (6')   Wt 102.2 kg (225 lb 6.4 oz)   SpO2 99%   BMI 30.57 kg/m

## 2024-09-24 NOTE — CONSULTS
Assessment and Plan:  1. liver transplant evaluation - patient is a good candidate overall. Benefits and surgical risks of a liver transplantation were discussed.  2. End stage liver disease due to cirrohsis of the liver due to EtOH  3. SBP resolved 9/13/24    MELD 3.0: 38 at 9/23/2024  6:59 AM  MELD-Na: 37 at 9/23/2024  6:59 AM  Calculated from:  Serum Creatinine: 1.53 mg/dL at 9/23/2024  6:59 AM  Serum Sodium: 133 mmol/L at 9/23/2024  6:59 AM  Total Bilirubin: 31.0 mg/dL at 9/23/2024  6:59 AM  Serum Albumin: 2.9 g/dL at 9/23/2024  6:59 AM  INR(ratio): 3.33 at 9/23/2024  6:59 AM  Age at listing (hypothetical): 39 years  Sex: Male at 9/23/2024  6:59 AM      Surgical evaluation:  1. Portal Vein: patent  2. Hepatic Artery: patent  3. TIPS: no  4. Previous Abdominal Surgery: no  5. Hepatocellular Carcinoma: no  6. Ascites: yes  7. Costal Angle: wide  8. Portopulmonary Hypertension: no  9. Hepatopulmonary Syndrome: no  10. Cardiac Evaluation: pending  11. Nutritional Status: okay, albumin 2.9  12. Tobacco:  no  13. Diabetes: no  14. HTN: not currently but does have hx  15. LD candidate: no    Recommendations:   Needs imaging from outside hospital uploaded for review. If okay, then no surgical contraindications      Patients overall evaluation will be discussed at the Liver Transplant selection committee meeting with a final recommendation on the patients suitability for transplant to be made at that time.    HPI:  39M with etoh cirrhosis here for liver evaluation. Sober since June.     Previous Transplant Hx: no    Cardiovascular Hx:  h/o Cardiac Issues: no  Exercise Tolerance: okay    Potential Donor(s): n/a    ROS:   REVIEW OF SYSTEMS (check box if normal)  [X]   GENERAL [X]   PULMONARY [X]   GENITOURINARY  [X]    CNS [X]   CARDIAC [X]    ENDOCRINE  [X]   EARS,NOSE,THROAT [X]    GASTROINTESTINAL [X]   NEUROLOGIC   [X]   MUSCLOSKELTAL [X]    HEMATOLOGY    Examination:     Vitals:  There were no vitals taken for this  visit.    GENERAL APPEARANCE: alert and no distress  EYES: PERRL  HENT: mouth without ulcers or lesions  NECK: supple, no adenopathy  RESP: lungs clear to auscultation - no rales, rhonchi or wheezes  CV: regular rhythm, normal rate, no rub   ABDOMEN: soft, nontender, no scars  MS: extremities normal- no gross deformities noted, no evidence of inflammation in joints, no muscle tenderness  SKIN: no rash  NEURO: Normal strength and tone, sensory exam grossly normal, mentation intact and speech normal  PSYCH: mentation appears normal. and affect normal/bright      Results:             I had a long discussion with the patient regarding liver transplantation which included but was not limited to  the following points:  Liver transplant selection committee process.  The federal rules for cadaveric waiting list, the size and blood type matching of the organ. The availability of living-related donor transplantation.  The types of donors: brain death donors, non-heart beating donors, partial liver grafts: splits and living donor grafts  Extended criteria Donors (older age, steasosis) and the increased risk of primary non-function using the extended criteria donors  The CDC high risk donors, Risk of donor transmitted infections and donor transmitted malignancy  The liver transplant operation and the associated risks and technical complications which can include intraoperative death, post operative death, Primary non-function, bleeding requiring re-operations, arterial and biliary complications, bowel perforations, and intra abdominal abscess. Some of these complicaitons may require a second operation.  The postoperative course, the ICU stay and risk of postoperative complications which can include sepsis, MI, stroke, brain injury, pneumonia, pleural effusions, and renal dysfunction.  The current 1 year and 5 year graft and patient survivals.  The need for life long immunosuppressive therapy and the side effects of these  medications, including the possibility of toxicity, opportunistic infections, risk of cancer including lymphoma, and the possibility of rejection even if the patient is taking the medication exactly as prescribed.  The need for compliance with medications and follow-up visits in the clinic and thereafter.  The patient and family understand these risks and wish to proceed to transplantation    I spent 60 minutes with the patient and more than 50% of the time was spend in direct face to face counseling.

## 2024-09-24 NOTE — PROGRESS NOTES
"   09/24/24 1611   Appointment Info   Signing Clinician's Name / Credentials (PT) WILLIAM Mckeon   Student Supervision Direct Patient Contact Provided;Therapy services provided with the co-signing licensed therapist guiding and directing the services, and providing the skilled judgement and assessment throughout the session       Present no   Living Environment   People in Home parent(s);sibling(s);other relative(s)   Current Living Arrangements house   Home Accessibility stairs to enter home   Number of Stairs, Main Entrance 3   Stair Railings, Main Entrance railings on both sides of stairs   Transportation Anticipated family or friend will provide   Living Environment Comments Pt lives in a 1-story home w/ mother, adult brother and brother's 2 children. There are 3 JENNIFER and 12 stairs to basement. Pt does not utilize basement and all needs met on main floor.   Self-Care   Usual Activity Tolerance moderate   Current Activity Tolerance fair   Regular Exercise No   Equipment Currently Used at Home none   Fall history within last six months no   Activity/Exercise/Self-Care Comment Does not use AD. Shower chair and shower grab bars available at home.   General Information   Onset of Illness/Injury or Date of Surgery 09/21/24   Referring Physician Zuleyma Shaikh MD   Patient/Family Therapy Goals Statement (PT) get up and walk more   Pertinent History of Current Problem (include personal factors and/or comorbidities that impact the POC) Per EMR, a 39 year old male assessed by the dietitian for Admission Nutrition Risk Screen for positive and Provider Order - \"Liver transplant evaluation.   Existing Precautions/Restrictions no known precautions/restrictions   General Observations Activity Ambulate - 3 TIMES DAILY   Cognition   Affect/Mental Status (Cognition) WFL   Orientation Status (Cognition) oriented x 4   Pain Assessment   Patient Currently in Pain Yes, see Vital Sign flowsheet " "  Integumentary/Edema   Integumentary/Edema no deficits were identifed   Posture    Posture Comments maintained midline in sitting/standing   Range of Motion (ROM)   Range of Motion ROM is WFL   Strength (Manual Muscle Testing)   Strength Comments Demonstrated at least 3/5 BUE/BLE per functional observation   Bed Mobility   Comment, (Bed Mobility) IND   Transfers   Comment, (Transfers) IND   Gait/Stairs (Locomotion)   Comment, (Gait/Stairs) Pt ambulated 10' SBAx1 w/o device   Balance   Balance no deficits were identified   Sensory Examination   Sensory Perception patient reports no sensory changes   Coordination   Coordination no deficits were identified   Muscle Tone   Muscle Tone no deficits were identified   Clinical Impression   Criteria for Skilled Therapeutic Intervention Yes, treatment indicated   PT Diagnosis (PT) impaired functional mobility   Influenced by the following impairments pain, fatigue, low activity tolerance   Functional limitations due to impairments gait tolerance, stairs   Clinical Presentation (PT Evaluation Complexity) stable   Clinical Presentation Rationale pt presentation   Clinical Decision Making (Complexity) low complexity   Planned Therapy Interventions (PT) home exercise program;patient/family education;postural re-education;stair training;strengthening;progressive activity/exercise   Risk & Benefits of therapy have been explained evaluation/treatment results reviewed;care plan/treatment goals reviewed;risks/benefits reviewed;current/potential barriers reviewed;participants voiced agreement with care plan;participants included;patient   Clinical Impression Comments Pt is a 39 year old male assessed by the dietitian for Admission Nutrition Risk Screen for positive and Provider Order - \"Liver transplant evaluation. The main impairments are pain, fatigue, and low activity tolerance. Skilled PT is appropriate to address gait tolerance and stairs to return to PLOF.   PT Total Evaluation " Time   PT Eval, Low Complexity Minutes (78162) 4   Physical Therapy Goals   PT Frequency 3x/week   PT Predicted Duration/Target Date for Goal Attainment 10/01/24   PT: Gait Independent;Greater than 200 feet   PT: Stairs Independent;3 stairs   PT: Perform aerobic activity with stable cardiovascular response intermittent activity;20 minutes;NuStep;treadmill;ambulation   PT Discharge Planning   PT Plan progress gait tolerance, stairs, nustep   PT Discharge Recommendation (DC Rec) home with assist;home with outpatient physical therapy   PT Rationale for DC Rec Pt is functioning below baseline, secondary to fatigue, pain, and low activity tolerance. Currently IND for bed mobility/transfers and SBA w/o AD for gait. Recommend discharge to home w/ assist and home w/ OP PT to improve activity tolerance to return to PLOF.   PT Brief overview of current status SBAx1 w/o AD   Total Session Time   Total Session Time (sum of timed and untimed services) 4

## 2024-09-25 ENCOUNTER — DOCUMENTATION ONLY (OUTPATIENT)
Dept: PHARMACY | Facility: CLINIC | Age: 39
End: 2024-09-25

## 2024-09-25 ENCOUNTER — APPOINTMENT (OUTPATIENT)
Dept: PHYSICAL THERAPY | Facility: CLINIC | Age: 39
End: 2024-09-25
Attending: PEDIATRICS
Payer: MEDICAID

## 2024-09-25 LAB
ABSOLUTE NEUTROPHILS, BODY FLUID: 8.6 /UL
AFP L3 MFR SERPL: 25.4 %
AFP SERPL-MCNC: 9 NG/ML
ALBUMIN BODY FLUID SOURCE: NORMAL
ALBUMIN FLD-MCNC: 0.7 G/DL
ALBUMIN SERPL BCG-MCNC: 3.2 G/DL (ref 3.5–5.2)
ALP SERPL-CCNC: 75 U/L (ref 40–150)
ALT SERPL W P-5'-P-CCNC: 45 U/L (ref 0–70)
ANION GAP SERPL CALCULATED.3IONS-SCNC: 8 MMOL/L (ref 7–15)
APPEARANCE FLD: ABNORMAL
AST SERPL W P-5'-P-CCNC: 127 U/L (ref 0–45)
BILIRUB SERPL-MCNC: 34.3 MG/DL
BLD PROD TYP BPU: NORMAL
BLOOD COMPONENT TYPE: NORMAL
BUN SERPL-MCNC: 34 MG/DL (ref 6–20)
CALCIUM SERPL-MCNC: 8.5 MG/DL (ref 8.8–10.4)
CELL COUNT BODY FLUID SOURCE: ABNORMAL
CHLORIDE SERPL-SCNC: 106 MMOL/L (ref 98–107)
CODING SYSTEM: NORMAL
COLOR FLD: YELLOW
CREAT SERPL-MCNC: 2.11 MG/DL (ref 0.67–1.17)
CROSSMATCH: NORMAL
EGFRCR SERPLBLD CKD-EPI 2021: 41 ML/MIN/1.73M2
ERYTHROCYTE [DISTWIDTH] IN BLOOD BY AUTOMATED COUNT: 22.8 % (ref 10–15)
FERRITIN SERPL-MCNC: 2577 NG/ML (ref 31–409)
FIBRINOGEN PPP-MCNC: 132 MG/DL (ref 170–510)
GLUCOSE SERPL-MCNC: 90 MG/DL (ref 70–99)
HCO3 SERPL-SCNC: 15 MMOL/L (ref 22–29)
HCT VFR BLD AUTO: 20.4 % (ref 40–53)
HGB BLD-MCNC: 6.9 G/DL (ref 13.3–17.7)
HOLD SPECIMEN: NORMAL
HOLD SPECIMEN: NORMAL
INR PPP: 3.36 (ref 0.85–1.15)
ISSUE DATE AND TIME: NORMAL
LABORATORY REPORT: NORMAL
LACTATE SERPL-SCNC: 1.2 MMOL/L (ref 0.7–2)
LYMPHOCYTES NFR FLD MANUAL: 12 %
MCH RBC QN AUTO: 33.2 PG (ref 26.5–33)
MCHC RBC AUTO-ENTMCNC: 33.8 G/DL (ref 31.5–36.5)
MCV RBC AUTO: 98 FL (ref 78–100)
MONOS+MACROS NFR FLD MANUAL: 83 %
NEUTS BAND NFR FLD MANUAL: 6 %
PETH INTERPRETATION: NORMAL
PLATELET # BLD AUTO: 64 10E3/UL (ref 150–450)
PLPETH BLD-MCNC: <10 NG/ML
POPETH BLD-MCNC: <10 NG/ML
POTASSIUM SERPL-SCNC: 4.6 MMOL/L (ref 3.4–5.3)
PROT FLD-MCNC: 1.1 G/DL
PROT SERPL-MCNC: ABNORMAL G/DL
PROTEIN BODY FLUID SOURCE: NORMAL
RBC # BLD AUTO: 2.08 10E6/UL (ref 4.4–5.9)
SODIUM SERPL-SCNC: 129 MMOL/L (ref 135–145)
UNIT ABO/RH: NORMAL
UNIT NUMBER: NORMAL
UNIT STATUS: NORMAL
UNIT TYPE ISBT: 5100
UPPER GI ENDOSCOPY: NORMAL
WBC # BLD AUTO: 7.9 10E3/UL (ref 4–11)
WBC # FLD AUTO: 144 /UL

## 2024-09-25 PROCEDURE — 97116 GAIT TRAINING THERAPY: CPT | Mod: GP

## 2024-09-25 PROCEDURE — 120N000002 HC R&B MED SURG/OB UMMC

## 2024-09-25 PROCEDURE — 250N000013 HC RX MED GY IP 250 OP 250 PS 637: Performed by: PHYSICIAN ASSISTANT

## 2024-09-25 PROCEDURE — 250N000009 HC RX 250: Performed by: HOSPITALIST

## 2024-09-25 PROCEDURE — 36415 COLL VENOUS BLD VENIPUNCTURE: CPT | Performed by: PHYSICIAN ASSISTANT

## 2024-09-25 PROCEDURE — 87205 SMEAR GRAM STAIN: CPT | Performed by: PHYSICIAN ASSISTANT

## 2024-09-25 PROCEDURE — 82042 OTHER SOURCE ALBUMIN QUAN EA: CPT | Performed by: PHYSICIAN ASSISTANT

## 2024-09-25 PROCEDURE — 99233 SBSQ HOSP IP/OBS HIGH 50: CPT | Mod: GC | Performed by: INTERNAL MEDICINE

## 2024-09-25 PROCEDURE — G0500 MOD SEDAT ENDO SERVICE >5YRS: HCPCS | Performed by: INTERNAL MEDICINE

## 2024-09-25 PROCEDURE — 89051 BODY FLUID CELL COUNT: CPT | Performed by: PHYSICIAN ASSISTANT

## 2024-09-25 PROCEDURE — P9047 ALBUMIN (HUMAN), 25%, 50ML: HCPCS | Mod: JZ | Performed by: PHYSICIAN ASSISTANT

## 2024-09-25 PROCEDURE — 97530 THERAPEUTIC ACTIVITIES: CPT | Mod: GP

## 2024-09-25 PROCEDURE — 84460 ALANINE AMINO (ALT) (SGPT): CPT | Performed by: PHYSICIAN ASSISTANT

## 2024-09-25 PROCEDURE — 49083 ABD PARACENTESIS W/IMAGING: CPT | Performed by: STUDENT IN AN ORGANIZED HEALTH CARE EDUCATION/TRAINING PROGRAM

## 2024-09-25 PROCEDURE — 250N000011 HC RX IP 250 OP 636: Performed by: INTERNAL MEDICINE

## 2024-09-25 PROCEDURE — 0DJ08ZZ INSPECTION OF UPPER INTESTINAL TRACT, VIA NATURAL OR ARTIFICIAL OPENING ENDOSCOPIC: ICD-10-PCS | Performed by: INTERNAL MEDICINE

## 2024-09-25 PROCEDURE — 83605 ASSAY OF LACTIC ACID: CPT | Performed by: STUDENT IN AN ORGANIZED HEALTH CARE EDUCATION/TRAINING PROGRAM

## 2024-09-25 PROCEDURE — 250N000009 HC RX 250: Performed by: INTERNAL MEDICINE

## 2024-09-25 PROCEDURE — 250N000011 HC RX IP 250 OP 636: Performed by: HOSPITALIST

## 2024-09-25 PROCEDURE — 250N000011 HC RX IP 250 OP 636: Mod: JZ | Performed by: PHYSICIAN ASSISTANT

## 2024-09-25 PROCEDURE — 85384 FIBRINOGEN ACTIVITY: CPT | Performed by: STUDENT IN AN ORGANIZED HEALTH CARE EDUCATION/TRAINING PROGRAM

## 2024-09-25 PROCEDURE — 0W9G3ZZ DRAINAGE OF PERITONEAL CAVITY, PERCUTANEOUS APPROACH: ICD-10-PCS | Performed by: STUDENT IN AN ORGANIZED HEALTH CARE EDUCATION/TRAINING PROGRAM

## 2024-09-25 PROCEDURE — 82247 BILIRUBIN TOTAL: CPT | Performed by: PHYSICIAN ASSISTANT

## 2024-09-25 PROCEDURE — 36415 COLL VENOUS BLD VENIPUNCTURE: CPT | Performed by: STUDENT IN AN ORGANIZED HEALTH CARE EDUCATION/TRAINING PROGRAM

## 2024-09-25 PROCEDURE — 250N000013 HC RX MED GY IP 250 OP 250 PS 637: Performed by: HOSPITALIST

## 2024-09-25 PROCEDURE — 85027 COMPLETE CBC AUTOMATED: CPT | Performed by: PHYSICIAN ASSISTANT

## 2024-09-25 PROCEDURE — 82728 ASSAY OF FERRITIN: CPT | Performed by: STUDENT IN AN ORGANIZED HEALTH CARE EDUCATION/TRAINING PROGRAM

## 2024-09-25 PROCEDURE — 84157 ASSAY OF PROTEIN OTHER: CPT | Performed by: PHYSICIAN ASSISTANT

## 2024-09-25 PROCEDURE — P9016 RBC LEUKOCYTES REDUCED: HCPCS | Performed by: PHYSICIAN ASSISTANT

## 2024-09-25 PROCEDURE — 85610 PROTHROMBIN TIME: CPT | Performed by: PHYSICIAN ASSISTANT

## 2024-09-25 PROCEDURE — 43244 EGD VARICES LIGATION: CPT | Performed by: INTERNAL MEDICINE

## 2024-09-25 PROCEDURE — 80048 BASIC METABOLIC PNL TOTAL CA: CPT | Performed by: PHYSICIAN ASSISTANT

## 2024-09-25 PROCEDURE — 99233 SBSQ HOSP IP/OBS HIGH 50: CPT | Mod: 25 | Performed by: PHYSICIAN ASSISTANT

## 2024-09-25 RX ORDER — ALBUMIN (HUMAN) 12.5 G/50ML
12.5 SOLUTION INTRAVENOUS ONCE
Status: COMPLETED | OUTPATIENT
Start: 2024-09-25 | End: 2024-09-25

## 2024-09-25 RX ORDER — DIPHENHYDRAMINE HYDROCHLORIDE 50 MG/ML
INJECTION INTRAMUSCULAR; INTRAVENOUS PRN
Status: DISCONTINUED | OUTPATIENT
Start: 2024-09-25 | End: 2024-09-28 | Stop reason: HOSPADM

## 2024-09-25 RX ORDER — FENTANYL CITRATE 50 UG/ML
INJECTION, SOLUTION INTRAMUSCULAR; INTRAVENOUS PRN
Status: DISCONTINUED | OUTPATIENT
Start: 2024-09-25 | End: 2024-09-28 | Stop reason: HOSPADM

## 2024-09-25 RX ADMIN — OXYCODONE HYDROCHLORIDE 5 MG: 5 TABLET ORAL at 18:35

## 2024-09-25 RX ADMIN — OXYCODONE HYDROCHLORIDE 5 MG: 5 TABLET ORAL at 05:00

## 2024-09-25 RX ADMIN — LACTULOSE 10 G: 10 POWDER, FOR SOLUTION ORAL at 21:24

## 2024-09-25 RX ADMIN — CARBIDOPA AND LEVODOPA 12.5 MG: 50; 200 TABLET, EXTENDED RELEASE ORAL at 09:08

## 2024-09-25 RX ADMIN — SODIUM BICARBONATE 1300 MG: 650 TABLET ORAL at 15:32

## 2024-09-25 RX ADMIN — ALBUMIN HUMAN 12.5 G: 0.25 SOLUTION INTRAVENOUS at 16:37

## 2024-09-25 RX ADMIN — ONDANSETRON 4 MG: 2 INJECTION INTRAMUSCULAR; INTRAVENOUS at 12:17

## 2024-09-25 RX ADMIN — THIAMINE HCL TAB 100 MG 100 MG: 100 TAB at 09:08

## 2024-09-25 RX ADMIN — CIPROFLOXACIN 500 MG: 500 TABLET ORAL at 21:24

## 2024-09-25 RX ADMIN — CARBIDOPA AND LEVODOPA 12.5 MG: 50; 200 TABLET, EXTENDED RELEASE ORAL at 18:28

## 2024-09-25 RX ADMIN — ONDANSETRON 4 MG: 2 INJECTION INTRAMUSCULAR; INTRAVENOUS at 04:57

## 2024-09-25 RX ADMIN — LACTULOSE 10 G: 10 POWDER, FOR SOLUTION ORAL at 09:43

## 2024-09-25 RX ADMIN — PANTOPRAZOLE SODIUM 40 MG: 40 INJECTION, POWDER, FOR SOLUTION INTRAVENOUS at 09:07

## 2024-09-25 RX ADMIN — SODIUM BICARBONATE 1300 MG: 650 TABLET ORAL at 21:24

## 2024-09-25 RX ADMIN — LACTULOSE 10 G: 20 SOLUTION ORAL at 16:36

## 2024-09-25 RX ADMIN — PANTOPRAZOLE SODIUM 40 MG: 40 INJECTION, POWDER, FOR SOLUTION INTRAVENOUS at 21:23

## 2024-09-25 RX ADMIN — ONDANSETRON 4 MG: 2 INJECTION INTRAMUSCULAR; INTRAVENOUS at 18:35

## 2024-09-25 RX ADMIN — CARBIDOPA AND LEVODOPA 12.5 MG: 50; 200 TABLET, EXTENDED RELEASE ORAL at 12:18

## 2024-09-25 RX ADMIN — OXYCODONE HYDROCHLORIDE 5 MG: 5 TABLET ORAL at 12:18

## 2024-09-25 RX ADMIN — FOLIC ACID 1 MG: 1 TABLET ORAL at 09:07

## 2024-09-25 RX ADMIN — RIFAXIMIN 550 MG: 550 TABLET ORAL at 21:24

## 2024-09-25 RX ADMIN — SODIUM BICARBONATE 1300 MG: 650 TABLET ORAL at 09:08

## 2024-09-25 RX ADMIN — CIPROFLOXACIN 500 MG: 500 TABLET ORAL at 09:07

## 2024-09-25 RX ADMIN — RIFAXIMIN 550 MG: 550 TABLET ORAL at 09:07

## 2024-09-25 RX ADMIN — LACTULOSE 10 G: 10 POWDER, FOR SOLUTION ORAL at 15:33

## 2024-09-25 ASSESSMENT — ACTIVITIES OF DAILY LIVING (ADL)
ADLS_ACUITY_SCORE: 22
ADLS_ACUITY_SCORE: 20
ADLS_ACUITY_SCORE: 22
ADLS_ACUITY_SCORE: 20
ADLS_ACUITY_SCORE: 22
ADLS_ACUITY_SCORE: 20
ADLS_ACUITY_SCORE: 22
ADLS_ACUITY_SCORE: 20
ADLS_ACUITY_SCORE: 20

## 2024-09-25 NOTE — PROGRESS NOTES
HEPATOLOGY PROGRESS NOTE    Date: 09/25/2024     39 year old male with a history of EtOH related decompensated cirrhosis who presents from [facility] for liver transplant evaluation. Patient is currently listed for LT. MELD 41. ABO O.      #. EtOH related decompensated cirrhosis, MELD 41  #. Acute kidney injury, worsening   #. Spontaneous bacterial peritonitis  -Patient diagnosed with cirrhosis in 6/7/2024 in setting of acute alcoholic hepatitis. Tx with steroids and although he initially responded, liver disease then subsequently progressively worsened. He was then admitted in 9/2024 for PNA and then again in 9/5-9/21 (transferred here) where he was found to have SBP and DENI.  #. Ascites: Recent diagnosis of SBP, with repeat tap showing resolution on 09/13. Off diuretics given DENI.  #. Varices: EGD 9/25 w/ large EV s/p EVLx2.   #. HE: Diagnosed with HE at OSH, started on lactulose, rifaximin  #. DENI: No evidence of HRS at this timeRenal function has bumped today, unclear etiology ? Iatorgenic (Bactrim vs Infection although largely benign abdomen and not toxic appearing) vs infectious (SBP?)  vs ATN   #. HCC: No liver lesion on prior imaging.  #. Liver Transplant Candidacy: Patient is listed for LT as of 9/24.             >Cardiology: s/p TTE, normal LV and RV function. No PAH             >Surgery: evaluated 9/24, deemed good candidate overall.             >SW: consulted. Underwent YUSEF eval, recommendation of IOP. This can be done post-LT            RECOMMENDATIONS  - Full liquid diet, regular diet tomorrow   - Obtain diagnostic paracentesis, please send for gram stain, cell count, cx.   - Obtain UA   - Okay to continue Midodrine.  - Continue PO Folic acid and Thiamine.  - Continue PO Lactulose, PO Rifaximine.  - Hold diuretics given recent DENI.  - No indication for octreotide at this time.  - cipro PO Q24 for SBP prophylaxis (renal dosing)         Gastroenterology follow up recommendations: Pending  clinical course.      Thank you for involving us in this patient's care. Please do not hesitate to contact the IP Hep service with any questions or concerns.      Pt care plan discussed with Dr. Clayton, Hepatology staff physician.      Zuleyma Shaikh MD   Gastroenterology/Transplant Hepatology Fellow  Division of Gastroenterology, Hepatology and Nutrition  [facility]    _______________________________________________________________      Objective:  Blood pressure 117/78, pulse 88, temperature 98.4  F (36.9  C), temperature source Oral, resp. rate 19, height 1.829 m (6'), weight 105.6 kg (232 lb 12.9 oz), SpO2 100%.    Gen: A&Ox3, NAD  HEENT: ncat, perrl, eomi, sclera icteric  CV: RRR  Lungs: CTA b/l  Abd: Distended, soft,+ttp R>L. Periumbilical ecchymosis appreciated. No fluid wave    Skin: + jaundice, + stigmata of chronic liver disease  MS: reduced muscle mass for age  Neuro: non focal       LABS:  BMP  Recent Labs   Lab 09/25/24  0653 09/24/24  0652 09/23/24  0659 09/22/24  0737   * 131* 133* 132*   POTASSIUM 4.6 4.5 4.2 4.6   CHLORIDE 106 105 107 107   AMAIRANI 8.5* 8.2* 8.4* 8.5*   CO2 15* 15* 16* 14*   BUN 34.0* 28.5* 25.9* 26.5*   CR 2.11* 1.91* 1.53* 1.54*   GLC 90 86 110* 87     CBC  Recent Labs   Lab 09/25/24  0653 09/24/24  0652 09/23/24  0659 09/22/24  0737   WBC 7.9 8.7 7.3 7.3   RBC 2.08* 2.04* 2.03* 2.17*   HGB 6.9* 6.8* 7.0* 7.4*   HCT 20.4* 20.5* 20.4* 21.8*   MCV 98 101* 101* 101*   MCH 33.2* 33.3* 34.5* 34.1*   MCHC 33.8 33.2 34.3 33.9   RDW 22.8* 21.2* 20.8* 20.8*   PLT 64* 78* 81* 84*     INR  Recent Labs   Lab 09/25/24  0653 09/24/24  0652 09/23/24  0659 09/21/24  2317   INR 3.36* 3.51* 3.33* 3.08*     LFTs  Recent Labs   Lab 09/25/24  0653 09/24/24  0652 09/23/24  0659 09/22/24  0737   ALKPHOS 75 82 83 81   * 128* 118* 119*   ALT 45 47 46 48   BILITOTAL 34.3* 30.8* 31.0* 29.1*   ALBUMIN 3.2* 2.9* 2.9* 2.9*      PANCNo lab results found in last 7 days.

## 2024-09-25 NOTE — PROGRESS NOTES
[facility]    Medicine Progress Note - Hospitalist Service, GOLD TEAM 5    Date of Admission:  9/21/2024    Assessment & Plan   Jag Smith is a 39 year old male with PMHx of BETTY/MDD and HTN and recently diagnosed decompensated alcoholic liver cirrhosis who is presenting as a direct admit from [facility] with worsening liver failure admitted on 9/21/2024 for expedited liver transplant evaluation    Updates today:   - listed for transplant as of 9/24/24  - s/p EGD with esophageal varices seen, s/p banding x 2  - worsening renal function despite albumin challenge yesterday making HRS less likely. Possibly ATN.   - dx and tx para today, eval for recurrent SBP  - 3 stools yesterday on lactulose     # Decompensated alcoholic liver cirrhosis  # Recurrent ascites  # Esophageal varices s/p banding x2  # Hx SBP  # Hx hepatic encephalopathy  Recently diagnosed in June 2024 with etiology related to alcohol, last drink in June. No prior EGD. PETh 8/22 negative. Admitted at OSH 9/5-9/21 for HRS, SBP from para 9/6 (s/p treatment with ceftriaxone and Flagyl). Last paracentesis on 9/13 negative for SBP. Transferred for transplant evaluation. Per prior notes, patient wasn't a liver candidate, waiting for patient to complete chem dep evaluation. Underwent all necessary testing and listed for transplant on 9/24/24.   Hepatology involvement appreciated  Routine lab workup per hepatology including CMV, EBV, hep B/C, HIV, TB, syphilis. AFP and peth pending  EGD 9/25 with esophageal varices noted s/p banding x 2. Full liquid today, regular diet tomorrow.   Cipro for SBP ppx  Continue to hold PTA lasix and spironolactone given concern for HRS  Continue PO lactulose and PO Rifaximin, goal BM 3-4 per day  Continue PO Folic acid and Thiamine  Daily CMP, Plt, and INR  Diagnostic and therapeutic para today.     MELD 3.0: 41 at 9/25/2024  6:53 AM  MELD-Na: 41 at 9/25/2024  6:53  AM  Calculated from:  Serum Creatinine: 2.11 mg/dL at 2024  6:53 AM  Serum Sodium: 129 mmol/L at 2024  6:53 AM  Total Bilirubin: 34.3 mg/dL at 2024  6:53 AM  Serum Albumin: 3.2 g/dL at 2024  6:53 AM  INR(ratio): 3.36 at 2024  6:53 AM  Age at listin years  Sex: Male at 2024  6:53 AM     # Non-oliguric DENI, worsening  Baseline Cr recent 0.9-1.0, but developed DENI on  suspicious for HRS. Recent creatinine ranging 1.2-1.8, worsened to 2.1 today.  - Nephrology consult, appreciate recs  - albumin challenge with 50 g albumin BID on - without significant improvement  - Continue PO midodrine 12.5mg TID   - Continue PO bicarb 1300 mg TID  - Trend BMP, renally dose medications, avoid nephrotoxic agents.      # Cirrhosis-related coagulopathy  INR and PTT elevated and thrombocytopenia likely due to decompensated liver disease.  Patient has diffuse bruising all over his abdominal wall and his arms. Received 10mg PO Vitamin K on  and   - Trend CBC and INR     # Acute on chronic anemia  Baseline Hgb previously 11-12.0s, slowly drifted down to 9.0s over the last month, then acute drop from --> and again on , there was a concern for UGIB as patient's Hgb dropped to 6.5 and needed 2U PRBC.  A CTA abdomen pelvis was done on  and it showed no source of active bleeding. Currently patient denies any melena or hematemesis. Intermittent nose bleeds and hematochezia, thought to be 2/2 prior know hemorrhoids. Since transfusion, hgb trending in 7.0s.   - Type and screen, transfuse for Hgb < 7  - s/p 1 unit(s) prbc daily on  and  for hgb 6.8-6.9  - Trend CBC daily   - No concern for GIB per GI discussion, will advance diet and monitor      # Acute hyponatremia  Improving from 120 on admission at OSH currently to 129.   - Continue fluid restriction with 1L per day  - Trend BMP daily           Diet: Fluid restriction 1000 ML FLUID  Full Liquid Diet    DVT Prophylaxis:  Pneumatic Compression Devices  Lima Catheter: Not present  Lines: None     Cardiac Monitoring: None  Code Status: Full Code      Clinically Significant Risk Factors         # Hyponatremia: Lowest Na = 129 mmol/L in last 2 days, will monitor as appropriate      # Hypoalbuminemia: Lowest albumin = 2.9 g/dL at 9/24/2024  6:52 AM, will monitor as appropriate    # Coagulation Defect: INR = 3.36 (Ref range: 0.85 - 1.15) and/or PTT = 65 Seconds (Ref range: 22 - 38 Seconds), will monitor for bleeding  # Thrombocytopenia: Lowest platelets = 64 in last 2 days, will monitor for bleeding  # Acute Kidney Injury, unspecified: based on a >150% or 0.3 mg/dL increase in last creatinine compared to past 90 day average, will monitor renal function            # Obesity: Estimated body mass index is 31.57 kg/m  as calculated from the following:    Height as of this encounter: 1.829 m (6').    Weight as of this encounter: 105.6 kg (232 lb 12.9 oz)., PRESENT ON ADMISSION     # Financial/Environmental Concerns: none         Disposition Plan     Medically Ready for Discharge: Anticipated in 2-4 Days           The patient's care was discussed with the Attending Physician, Dr. Garces, Bedside Nurse, Patient, nephrology team.     Peg Fan PA-C  Hospitalist Service, GOLD TEAM [address] [place] [address]  Securely message with [employer] (more info)  Text page via [facility] Paging/Directory   See signed in provider for up to date coverage information  ______________________________________________________________________    Interval History   Patient was seen in the bedside.  He denies any symptoms or complaints today.  He has ongoing pain that is improving at site of most recent paracentesis. He had 3 bowel movements yesterday, without evidence of blood. Swelling stable. Fair appetite.     Physical Exam   Vital Signs: Temp: 98.4  F (36.9  C) Temp src: Oral BP: 118/56 Pulse: 94   Resp: 16 SpO2: 99 % O2 Device:  Nasal cannula Oxygen Delivery: 1 LPM  Weight: 232 lbs 12.89 oz    GENERAL: adult male seen sitting comfortably in bed. NAD.   NEURO / PSYCH: Alert, converses appropriately. No focal deficits. Moves all extremities.   HEENT: scleral icterus noted  CV: RRR. S1, S2. No murmurs appreciated.  2+ right radial pulse.  RESPIRATORY: Effort normal. Lungs CTAB with no wheezing, rales, rhonchi.   GI: Abdomen soft and non distended with bowel sounds rare. No tenderness or guarding to palpation.   MSK: no gross deformities  EXTREMITIES: 1-2+ edema to BLE  SKIN: Jaundiced. No rashes or lesions to exposed areas.       Medical Decision Making       60 MINUTES SPENT BY ME on the date of service doing chart review, history, exam, documentation & further activities per the note.      Data     I have personally reviewed the following data over the past 24 hrs:    7.9  \   6.9 (LL)   / 64 (L)     129 (L) 106 34.0 (H) /  90   4.6 15 (L) 2.11 (H) \     ALT: 45 AST: 127 (H) AP: 75 TBILI: 34.3 (HH)   ALB: 3.2 (L) TOT PROTEIN: N/A LIPASE: N/A     Procal: N/A CRP: N/A Lactic Acid: 1.2       INR:  3.36 (H) PTT:  N/A   D-dimer:  N/A Fibrinogen:  132 (L)     Ferritin:  2,577 (H) % Retic:  N/A LDH:  N/A

## 2024-09-25 NOTE — PROGRESS NOTES
Transplant Social Work Services Progress Note      Date of Initial Social Work Evaluation: 8/21/2024  Collaborated with: liver transplant team    Data: Jag is in evaluation for liver transplant, he is currently inpatient at the [facility]  Intervention: SW and liver transplant team reviewed and discussed pt to determine if he is an acceptable liver transplant candidate   Assessment: Jag was transferred to [facility] 9/21/2024 from [facility]. Jag completed a CD evaluation while inpatient where treatment was recommended, he is agreeable and willing to engage in treatment but has been and is currently too ill. He is motivated to complete treatment post transplant and has identified a program that is local to him ([facility]). Jag has an intact support system to provide support and care post-transplant. Jag has adequate finances and health insurance for transplant.  This SW along with the liver transplant team have determined that Jag is an acceptable liver transplant candidate, a plan is in place to complete the recommended CD treatment once he is able to.  Plan: SW will remain available for psychosocial support.    Jennifer CABALLERO, LGSW  [facility]  Liver Transplant   Phone: [phone]

## 2024-09-25 NOTE — PROGRESS NOTES
PA Initiation    Medication: XIFAXAN 550 MG PO TABS  Insurance Company: [place] Medicaid ([facility] - Phone [phone] Fax [phone]  Start Date: 9/25/2024          Rubina Dennis  [facility] Liaison  Phone [phone]  Fax [phone]  Available on Teams & Vocera

## 2024-09-25 NOTE — PROGRESS NOTES
Nephrology Progress Note  09/25/2024           MEDICAL DECISION MAKING     RECOMMENDATIONS:  Renally dose medications, avoid unnecessary nephrotoxins, avoid unnecessary radiographic contrast, daily renal panel, strict I/O, daily standing weights     ASSESSMENT:  Jag Smtih is a 39 year old male with h/o EtOH related decompensated cirrhosis who was admitted at OSH with DENI and SBP now transferred here for transplant workup.  Nephrology consulted for DENI.      Worsening likely non-oliguric DENI  sCr on admit 1.51.  Peak sCr 2.1.  Baseline sCr 0.7  UPCR 0.44, U Na 22  Diagnosed w SBP 9/6 and treated for 10d   At OSH, received 25g of albumin/day from 9/19-9/21 (underdosed)  Was transferred on octreotide & midodrine, octreotide stopped on 9/22  Given his sCr has been fairly stable (ranging from 1.4-1.7) since 9/6, and has not worsened since the discontinuation of octreotide, additionally this U Na is 22, this seems to be less concerning for HRS, though given the extent of his liver disease he certain does have hepatorenal physiology at play  On direct visualization of urine microscopy, granular casts were appreciated, suggestive of ATN  His DENI is may be 2/2 ATN in the setting of his SBP v bile cast nephropathy given his high bili (has been >>20 since 9/8)     Hyponatremia in setting of cirrhosis: on fluid restriction   Metabolic acidosis: likely 2/2 DENI, cont w bicarb supplementation      Decompensated cirrhosis     Recommendations were communicated to primary team via note     Seen and discussed with Dr. Henry Sanderson MD   Division of Renal Disease and Hypertension  [facility]  [facility]  Vocera Web Console    SUBJECTIVE     Interval History :   Nursing and provider notes from last 24 hours reviewed.  In the last 24 hours Jag Smith   EGD done this AM  Listed for transplant on 9/24    A comprehensive review of systems was negative except as noted above.    OBJECTIVE     Physical Exam:   I/O last  3 completed shifts:  In: 1737 [P.O.:1460]  Out: -    /71 (BP Location: Left arm)   Pulse 94   Temp 98.3  F (36.8  C) (Oral)   Resp 16   Ht 1.829 m (6')   Wt 105.6 kg (232 lb 12.9 oz)   SpO2 99%   BMI 31.57 kg/m       General:sitting in bed  HEENT:scleral icterus   Pulmonary:unlabored   Abdominal:mild distention   Extremities:1+ b/l LE edema   Neuro:A&Ox4  Access:PIV    Labs:   All labs reviewed by me  Electrolytes/Renal -   Recent Labs   Lab Test 09/25/24  0653 09/24/24  0652 09/23/24  0659   * 131* 133*   POTASSIUM 4.6 4.5 4.2   CHLORIDE 106 105 107   CO2 15* 15* 16*   BUN 34.0* 28.5* 25.9*   CR 2.11* 1.91* 1.53*   GLC 90 86 110*   AMAIRANI 8.5* 8.2* 8.4*   PHOS  --   --  3.1       CBC -   Recent Labs   Lab Test 09/25/24  0653 09/24/24  0652 09/23/24  0659   WBC 7.9 8.7 7.3   HGB 6.9* 6.8* 7.0*   PLT 64* 78* 81*       LFTs -   Recent Labs   Lab Test 09/25/24  0653 09/24/24  0652 09/23/24  0659   ALKPHOS 75 82 83   BILITOTAL 34.3* 30.8* 31.0*   ALT 45 47 46   * 128* 118*   ALBUMIN 3.2* 2.9* 2.9*       Iron Panel -   Recent Labs   Lab Test 09/25/24  0653 09/23/24  0659   IRON  --  58*   ELPIDIO 2,577*  --        Current Medications:  Current Facility-Administered Medications   Medication Dose Route Frequency Provider Last Rate Last Admin    ciprofloxacin (CIPRO) tablet 500 mg  500 mg Oral Q12H [facility] (08/20) Peg Fan PA-C   500 mg at 09/25/24 0907    folic acid (FOLVITE) tablet 1 mg  1 mg Oral Daily José Miguel Aviles MD   1 mg at 09/25/24 0907    lactulose (CEPHULAC) Packet 10 g  10 g Oral TID José Miguel Aviles MD   10 g at 09/25/24 0943    midodrine (PROAMATINE) tablet 12.5 mg  12.5 mg Oral TID w/meals José Miguel Aviles MD   12.5 mg at 09/25/24 1218    pantoprazole (PROTONIX) IV push injection 40 mg  40 mg Intravenous BID José Miguel Aviles MD   40 mg at 09/25/24 0907    rifaximin (XIFAXAN) tablet 550 mg  550 mg Oral BID José Miguel Aviles MD    550 mg at 09/25/24 0907    sodium bicarbonate tablet 1,300 mg  1,300 mg Oral TID José Miguel Aviles MD   1,300 mg at 09/25/24 0908    sodium chloride (PF) 0.9% PF flush 3 mL  3 mL Intracatheter Q8H José Miguel Aviles MD   3 mL at 09/25/24 0909    thiamine (B-1) tablet 100 mg  100 mg Oral Daily José Miguel Aviles MD   100 mg at 09/25/24 0908     Current Facility-Administered Medications   Medication Dose Route Frequency Provider Last Rate Last Admin     Georges Sanderson MD

## 2024-09-25 NOTE — OR NURSING
Patient had EGD with variceal banding x 2.  Patient tolerated procedure under conscious sedation and 2 liters nasal cannula.  Patient to resume full liquid diet for 24 hours.  Report called to 7B RN, Nancy Campbell.  Patient transferred back to floor on 2 liters nasal cannula and in stable condition

## 2024-09-25 NOTE — PROCEDURES
[facility]  CAPS PROCEDURE NOTE  Date of Admission:  9/21/2024  Consult Requested by: Medicine  Reason for Consult: diagnostic evaluation of ascites and management of symptomatic ascites    Indication/HPI: 39-year-old man with alcoholic liver cirrhosis admitted for expedited liver transplant evaluation. Diagnostic and therapeutic paracentesis requested by primary team. After discussion with hepatologist, primary team felt 2 liters ascites fluid removal today balances risks and benefits best.     Pre-Procedure Diagnosis: Ascites    Post-Procedure Diagnosis: Ascites    Risk Assessment: Higher bleeding risk due to liver-induced coagulopathy but within acceptable parameters for paracentesis without need for cryoprecipitate (fibrinogen 132).     Procedure Outcome:  Therapeutic paracentesis performed with 2 liters of ascites removed.   Sample sent to the lab for analysis.   The patient reported a lot of pain with lidocaine injection but less with catheter introduction.   See additional procedure details below.    The primary covering service should follow up and address any lab results as appropriate.    Morgan Holcomb MD  [facility]  Securely message with [email or web address] (more info)  Text page via [facility] Paging/Directory   See signed in provider for up to date coverage information      [facility]    Paracentesis    Date/Time: 9/25/2024 3:19 PM    Performed by: Morgan Holcomb MD  Authorized by: Morgan Holcomb MD    PRE-PROCEDURE DETAILS  Initial or subsequent exam: subsequent  Procedure purpose: therapeutic and diagnostic  Indications: abdominal discomfort secondary to ascites        UNIVERSAL PROTOCOL   Site Marked: Yes  Prior Images Obtained and Reviewed:  NA  Required items: Required blood products, implants, devices and special equipment available    Patient identity confirmed:   Verbally with patient, arm band and hospital-assigned identification number  NA - No sedation, light sedation, or local anesthesia  Confirmation Checklist:  Patient's identity using two indicators  Time out: Immediately prior to the procedure a time out was called    Universal Protocol: the Joint Commission Universal Protocol was followed    Preparation: Patient was prepped and draped in usual sterile fashion    ESBL (mL):  0     ANESTHESIA    Anesthesia:  Local infiltration  Local Anesthetic:  Lidocaine 1% without epinephrine  Anesthetic Total (mL):  10      SEDATION    Patient Sedated: No    POST PROCEDURE DETAILS  Needle gauge: 19 guague, 5 [place] Islander pigtail catheter.  Ultrasound guidance: yes  Puncture site: left lower quadrant  Fluid removed: 2000(ml)  Fluid characteristics: brittany colored.  Dressing: Dermabond adhesive glue.        PROCEDURE    Patient Tolerance:  Patient tolerated the procedure well with no immediate complications  Length of time physician/provider present for 1:1 monitoring during sedation: 0        POC US GUIDE FOR PARACENTESIS     Narrative  US Indication: abdominal distension    Limited abdominal ultrasound was performed and demonstrated an adequate fluid collection on the left side of the abdomen.    Doppler of the skin demonstrated an area at this site without significant vasculature.  A paracentesis at this site was subsequently performed. Lidocaine and catheter needle visualized entering the ascites fluid.    Morgan Holcomb MD

## 2024-09-25 NOTE — PROGRESS NOTES
Brief GI procedure note    EGD performed without complication under moderate sedation in UPU.    Esophagus- large esophageal varices with red kingston signs, bandedx2    Stomach- mild portal hypertensive gastropathy in fundus and body    Duodenum- normal    Assessment  Large esophageal varices, bandedx2 for primary prophylaxis of variceal bleeding.      Recommend  Return to hospital floor  Full liquid diet  Regular diet tomorrow    Nolan Clayton MD  Hepatology staff

## 2024-09-25 NOTE — PLAN OF CARE
Goal Outcome Evaluation:    /59 (BP Location: Left arm, Patient Position: Supine)   Pulse 91   Temp 98.4  F (36.9  C) (Oral)   Resp 16   Ht 1.829 m (6')   Wt 105.6 kg (232 lb 12.9 oz)   SpO2 97%   BMI 31.57 kg/m    [id number]    Neuro: A&Ox4.   Cardiac: SR. VSS.   Respiratory: Sating 97% on RA.  GI/: Adequate urine output. BM X1  Diet/appetite: Tolerating full diet. Eating well.  Activity:  Stand by assist of  up to chair and in halls.  Pain: At acceptable level on current regimen.   Skin: No new deficits noted.  LDA's:PIV x2.  New this shift: Pt went to endo for EGD,see note. Paracentesis done at bedside. Hemoglobin 6.9, pt is getting one unit of PRBC.   Plan: Continue with POC. Notify primary team with changes.

## 2024-09-25 NOTE — PLAN OF CARE
Goal Outcome Evaluation:      Plan of Care Reviewed With: patient    Overall Patient Progress: improving    Blood pressure 93/67, pulse 90, temperature 98.2  F (36.8  C), temperature source Oral, resp. rate 18, height 1.829 m (6'), weight 102.2 kg (225 lb 6.4 oz), SpO2 99%.    Status: Decompensated alcoholic liver cirrhosis   Activity: INDP  Neuros: A&O x 4  Cardiac: WDL, soft BP  Respiratory: WDL, RA  GI/: 1 small BM, gave scheduled lactulose, low urine output  Diet: NPO @ 0000, FR 1 L (exceeded)  Skin/Incisions: Jaundice, +3 LE edema  Lines/Drains: PIV x2 SL  Pain/Nausea: PRN zofran and oxy given x 2  New Changes: none  Plan:  continue POC, procedure this am

## 2024-09-26 ENCOUNTER — APPOINTMENT (OUTPATIENT)
Dept: PHYSICAL THERAPY | Facility: CLINIC | Age: 39
End: 2024-09-26
Attending: PEDIATRICS
Payer: MEDICAID

## 2024-09-26 LAB
ALBUMIN SERPL BCG-MCNC: 3.5 G/DL (ref 3.5–5.2)
ALP SERPL-CCNC: 73 U/L (ref 40–150)
ALT SERPL W P-5'-P-CCNC: 45 U/L (ref 0–70)
ANION GAP SERPL CALCULATED.3IONS-SCNC: 11 MMOL/L (ref 7–15)
AST SERPL W P-5'-P-CCNC: 135 U/L (ref 0–45)
BILIRUB SERPL-MCNC: 38 MG/DL
BUN SERPL-MCNC: 33.6 MG/DL (ref 6–20)
CALCIUM SERPL-MCNC: 9 MG/DL (ref 8.8–10.4)
CHLORIDE SERPL-SCNC: 103 MMOL/L (ref 98–107)
CREAT SERPL-MCNC: 2.22 MG/DL (ref 0.67–1.17)
EGFRCR SERPLBLD CKD-EPI 2021: 38 ML/MIN/1.73M2
ERYTHROCYTE [DISTWIDTH] IN BLOOD BY AUTOMATED COUNT: 27.3 % (ref 10–15)
GLUCOSE SERPL-MCNC: 77 MG/DL (ref 70–99)
HCO3 SERPL-SCNC: 15 MMOL/L (ref 22–29)
HCT VFR BLD AUTO: 23.8 % (ref 40–53)
HGB BLD-MCNC: 7.9 G/DL (ref 13.3–17.7)
INR PPP: 3.19 (ref 0.85–1.15)
LACTATE SERPL-SCNC: 1.9 MMOL/L (ref 0.7–2)
MCH RBC QN AUTO: 31 PG (ref 26.5–33)
MCHC RBC AUTO-ENTMCNC: 33.2 G/DL (ref 31.5–36.5)
MCV RBC AUTO: 93 FL (ref 78–100)
PLATELET # BLD AUTO: 67 10E3/UL (ref 150–450)
POTASSIUM SERPL-SCNC: 4.6 MMOL/L (ref 3.4–5.3)
PROT SERPL-MCNC: ABNORMAL G/DL
RBC # BLD AUTO: 2.55 10E6/UL (ref 4.4–5.9)
SODIUM SERPL-SCNC: 129 MMOL/L (ref 135–145)
WBC # BLD AUTO: 7.9 10E3/UL (ref 4–11)

## 2024-09-26 PROCEDURE — 120N000002 HC R&B MED SURG/OB UMMC

## 2024-09-26 PROCEDURE — 99233 SBSQ HOSP IP/OBS HIGH 50: CPT | Performed by: PHYSICIAN ASSISTANT

## 2024-09-26 PROCEDURE — 97116 GAIT TRAINING THERAPY: CPT | Mod: GP

## 2024-09-26 PROCEDURE — 99232 SBSQ HOSP IP/OBS MODERATE 35: CPT | Mod: GC | Performed by: STUDENT IN AN ORGANIZED HEALTH CARE EDUCATION/TRAINING PROGRAM

## 2024-09-26 PROCEDURE — 250N000013 HC RX MED GY IP 250 OP 250 PS 637: Performed by: HOSPITALIST

## 2024-09-26 PROCEDURE — 80048 BASIC METABOLIC PNL TOTAL CA: CPT | Performed by: PHYSICIAN ASSISTANT

## 2024-09-26 PROCEDURE — 36415 COLL VENOUS BLD VENIPUNCTURE: CPT | Performed by: PHYSICIAN ASSISTANT

## 2024-09-26 PROCEDURE — 83605 ASSAY OF LACTIC ACID: CPT | Performed by: STUDENT IN AN ORGANIZED HEALTH CARE EDUCATION/TRAINING PROGRAM

## 2024-09-26 PROCEDURE — 85027 COMPLETE CBC AUTOMATED: CPT | Performed by: PHYSICIAN ASSISTANT

## 2024-09-26 PROCEDURE — 250N000009 HC RX 250: Performed by: HOSPITALIST

## 2024-09-26 PROCEDURE — 250N000013 HC RX MED GY IP 250 OP 250 PS 637: Performed by: PHYSICIAN ASSISTANT

## 2024-09-26 PROCEDURE — 250N000011 HC RX IP 250 OP 636: Performed by: STUDENT IN AN ORGANIZED HEALTH CARE EDUCATION/TRAINING PROGRAM

## 2024-09-26 PROCEDURE — 97530 THERAPEUTIC ACTIVITIES: CPT | Mod: GP

## 2024-09-26 PROCEDURE — 250N000011 HC RX IP 250 OP 636: Performed by: HOSPITALIST

## 2024-09-26 PROCEDURE — 36415 COLL VENOUS BLD VENIPUNCTURE: CPT | Performed by: STUDENT IN AN ORGANIZED HEALTH CARE EDUCATION/TRAINING PROGRAM

## 2024-09-26 PROCEDURE — 99233 SBSQ HOSP IP/OBS HIGH 50: CPT | Performed by: INTERNAL MEDICINE

## 2024-09-26 PROCEDURE — 250N000011 HC RX IP 250 OP 636: Mod: JZ | Performed by: INTERNAL MEDICINE

## 2024-09-26 PROCEDURE — 85610 PROTHROMBIN TIME: CPT | Performed by: PHYSICIAN ASSISTANT

## 2024-09-26 PROCEDURE — 82247 BILIRUBIN TOTAL: CPT | Performed by: PHYSICIAN ASSISTANT

## 2024-09-26 PROCEDURE — P9047 ALBUMIN (HUMAN), 25%, 50ML: HCPCS | Mod: JZ | Performed by: INTERNAL MEDICINE

## 2024-09-26 RX ORDER — ALBUMIN (HUMAN) 12.5 G/50ML
25 SOLUTION INTRAVENOUS ONCE
Status: COMPLETED | OUTPATIENT
Start: 2024-09-26 | End: 2024-09-26

## 2024-09-26 RX ORDER — LACTULOSE 10 G/15ML
10 SOLUTION ORAL 3 TIMES DAILY
Status: DISCONTINUED | OUTPATIENT
Start: 2024-09-26 | End: 2024-09-28

## 2024-09-26 RX ORDER — FUROSEMIDE 10 MG/ML
40 INJECTION INTRAMUSCULAR; INTRAVENOUS ONCE
Status: COMPLETED | OUTPATIENT
Start: 2024-09-26 | End: 2024-09-26

## 2024-09-26 RX ADMIN — SODIUM BICARBONATE 1300 MG: 650 TABLET ORAL at 21:48

## 2024-09-26 RX ADMIN — PANTOPRAZOLE SODIUM 40 MG: 40 INJECTION, POWDER, FOR SOLUTION INTRAVENOUS at 21:48

## 2024-09-26 RX ADMIN — ONDANSETRON 4 MG: 2 INJECTION INTRAMUSCULAR; INTRAVENOUS at 01:32

## 2024-09-26 RX ADMIN — CARBIDOPA AND LEVODOPA 12.5 MG: 50; 200 TABLET, EXTENDED RELEASE ORAL at 08:44

## 2024-09-26 RX ADMIN — LACTULOSE 10 G: 10 POWDER, FOR SOLUTION ORAL at 09:07

## 2024-09-26 RX ADMIN — SODIUM BICARBONATE 1300 MG: 650 TABLET ORAL at 15:47

## 2024-09-26 RX ADMIN — RIFAXIMIN 550 MG: 550 TABLET ORAL at 08:43

## 2024-09-26 RX ADMIN — ALBUMIN HUMAN 25 G: 0.25 SOLUTION INTRAVENOUS at 01:13

## 2024-09-26 RX ADMIN — FOLIC ACID 1 MG: 1 TABLET ORAL at 08:43

## 2024-09-26 RX ADMIN — CARBIDOPA AND LEVODOPA 12.5 MG: 50; 200 TABLET, EXTENDED RELEASE ORAL at 12:16

## 2024-09-26 RX ADMIN — SODIUM BICARBONATE 1300 MG: 650 TABLET ORAL at 08:44

## 2024-09-26 RX ADMIN — FUROSEMIDE 40 MG: 10 INJECTION, SOLUTION INTRAVENOUS at 15:54

## 2024-09-26 RX ADMIN — LACTULOSE 10 G: 20 SOLUTION ORAL at 01:33

## 2024-09-26 RX ADMIN — ONDANSETRON 4 MG: 4 TABLET, ORALLY DISINTEGRATING ORAL at 15:46

## 2024-09-26 RX ADMIN — CIPROFLOXACIN 500 MG: 500 TABLET ORAL at 08:44

## 2024-09-26 RX ADMIN — OXYCODONE HYDROCHLORIDE 5 MG: 5 TABLET ORAL at 01:32

## 2024-09-26 RX ADMIN — OXYCODONE HYDROCHLORIDE 5 MG: 5 TABLET ORAL at 21:47

## 2024-09-26 RX ADMIN — LACTULOSE 10 G: 20 SOLUTION ORAL at 21:48

## 2024-09-26 RX ADMIN — CARBIDOPA AND LEVODOPA 12.5 MG: 50; 200 TABLET, EXTENDED RELEASE ORAL at 17:00

## 2024-09-26 RX ADMIN — CIPROFLOXACIN 500 MG: 500 TABLET ORAL at 21:48

## 2024-09-26 RX ADMIN — ONDANSETRON 4 MG: 2 INJECTION INTRAMUSCULAR; INTRAVENOUS at 08:05

## 2024-09-26 RX ADMIN — LACTULOSE 10 G: 20 SOLUTION ORAL at 15:46

## 2024-09-26 RX ADMIN — OXYCODONE HYDROCHLORIDE 5 MG: 5 TABLET ORAL at 08:42

## 2024-09-26 RX ADMIN — OXYCODONE HYDROCHLORIDE 5 MG: 5 TABLET ORAL at 16:59

## 2024-09-26 RX ADMIN — ONDANSETRON 4 MG: 4 TABLET, ORALLY DISINTEGRATING ORAL at 21:47

## 2024-09-26 RX ADMIN — THIAMINE HCL TAB 100 MG 100 MG: 100 TAB at 08:44

## 2024-09-26 RX ADMIN — RIFAXIMIN 550 MG: 550 TABLET ORAL at 21:48

## 2024-09-26 RX ADMIN — PANTOPRAZOLE SODIUM 40 MG: 40 INJECTION, POWDER, FOR SOLUTION INTRAVENOUS at 08:46

## 2024-09-26 ASSESSMENT — ACTIVITIES OF DAILY LIVING (ADL)
ADLS_ACUITY_SCORE: 20
ADLS_ACUITY_SCORE: 23
ADLS_ACUITY_SCORE: 20
ADLS_ACUITY_SCORE: 23
ADLS_ACUITY_SCORE: 20
ADLS_ACUITY_SCORE: 20
ADLS_ACUITY_SCORE: 23
ADLS_ACUITY_SCORE: 20
ADLS_ACUITY_SCORE: 20
ADLS_ACUITY_SCORE: 23
ADLS_ACUITY_SCORE: 20
ADLS_ACUITY_SCORE: 23
ADLS_ACUITY_SCORE: 20
ADLS_ACUITY_SCORE: 20
ADLS_ACUITY_SCORE: 23
ADLS_ACUITY_SCORE: 20
ADLS_ACUITY_SCORE: 20
ADLS_ACUITY_SCORE: 23

## 2024-09-26 NOTE — PLAN OF CARE
Goal Outcome Evaluation:      Plan of Care Reviewed With: patient          Outcome Evaluation: Patient ambulated in hallway this shift. Medications given in applesauce because of fluid restriction. Patient encourage to order fruit for snacks to help aide in the one liter fluid restriction, patient reported that helpful. Continue to monitor.

## 2024-09-26 NOTE — PLAN OF CARE
Goal Outcome Evaluation:      Plan of Care Reviewed With: patient    Overall Patient Progress: improving    Blood pressure 115/66, pulse 89, temperature 98.6  F (37  C), temperature source Oral, resp. rate 16, height 1.829 m (6'), weight 105.6 kg (232 lb 12.9 oz), SpO2 100%.       Status: Decompensated alcoholic liver cirrhosis   Activity: INDP  Neuros: A&O x 4  Cardiac: WDL, soft BP, paged provider for BP, order albumin, given with increase in pressure  Respiratory: WDL, RA  GI/: 1 BM, gave PRN lactulose, low urine output  Diet: NPO @ 0000, FR 1 L  Skin/Incisions: Jaundice, +3 LE edema  Lines/Drains: PIV x2 SL  Pain/Nausea: PRN zofran and oxy given x 1  New Changes: low BP, gave albumin per provider order  Plan:  continue POC, procedure this am

## 2024-09-26 NOTE — PROGRESS NOTES
9/26/2024  12:43 AM    HOSPITAL MEDICINE NOTE:  MIRIAM page received - low BP.  BP 93/43 (BP Location: Left arm)   Pulse 91   Temp 98  F (36.7  C) (Axillary)   Resp 16   Ht 1.829 m (6')   Wt 105.6 kg (232 lb 12.9 oz)   SpO2 96%   BMI 31.57 kg/m     S/p recent 2L paracentesis.  Plan:  albumin 25g X 1 IV    Ryan Mcmullen MD  [phone] pager  [phone] mobile/cell

## 2024-09-26 NOTE — PROGRESS NOTES
[facility]    Medicine Progress Note - Hospitalist Service, GOLD TEAM 5    Date of Admission:  9/21/2024    Assessment & Plan   Jag Smith is a 39 year old male with PMHx of BETTY/MDD and HTN and recently diagnosed decompensated alcoholic liver cirrhosis who is presenting as a direct admit from [facility] with worsening liver failure admitted on 9/21/2024 for expedited liver transplant evaluation    Updates today:   - listed for transplant as of 9/24/24  - s/p EGD with esophageal varices seen, s/p banding x 2  - worsening renal function despite albumin challenge yesterday making HRS less likely. Possibly ATN.   - dx and tx para today, eval for recurrent SBP  - 3 stools yesterday on lactulose     # Decompensated alcoholic liver cirrhosis  # Recurrent ascites  # Esophageal varices s/p banding x2  # Hx SBP  # Hx hepatic encephalopathy  Recently diagnosed in June 2024 with etiology related to alcohol, last drink in June. No prior EGD. PETh 8/22 negative. Admitted at OSH 9/5-9/21 for HRS, SBP from para 9/6 (s/p treatment with ceftriaxone and Flagyl). Last paracentesis on 9/13 negative for SBP. Transferred for transplant evaluation. Per prior notes, patient wasn't a liver candidate, waiting for patient to complete chem dep evaluation. Underwent all necessary testing and listed for transplant on 9/24/24. Diagnostic and therapeutic para 9/25 with 2 L removed, negative for SBP.   Hepatology involvement appreciated  Routine lab workup per hepatology including CMV, EBV, hep B/C, HIV, TB, syphilis. AFP and peth pending  EGD 9/25 with esophageal varices noted s/p banding x 2.  Cipro for SBP ppx  Continue to hold PTA lasix and spironolactone given concern for HRS  Continue PO lactulose and PO Rifaximin, goal BM 3-4 per day  Continue PO Folic acid and Thiamine  Daily CMP, Plt, and INR    MELD 3.0: 42 at 9/26/2024  7:02 AM  MELD-Na: 41 at 9/26/2024  7:02  AM  Calculated from:  Serum Creatinine: 2.22 mg/dL at 2024  7:02 AM  Serum Sodium: 129 mmol/L at 2024  7:02 AM  Total Bilirubin: 38.0 mg/dL at 2024  7:02 AM  Serum Albumin: 3.5 g/dL at 2024  7:02 AM  INR(ratio): 3.19 at 2024  7:02 AM  Age at listin years  Sex: Male at 2024  7:02 AM     # Non-oliguric DENI, worsening  Baseline Cr recent 0.9-1.0, but developed DENI on  suspicious for HRS. Recent creatinine ranging 1.2-1.8, worsened to 2.1 today. Possibly ATN  - Nephrology consult, appreciate recs  - albumin challenge with 50 g albumin BID on - without significant improvement  - Continue PO midodrine 12.5mg TID   - Continue PO bicarb 1300 mg TID  - Trend BMP, renally dose medications, avoid nephrotoxic agents.   - one time 40 mg IV Lasix today      # Cirrhosis-related coagulopathy  INR and PTT elevated and thrombocytopenia likely due to decompensated liver disease.  Patient has diffuse bruising all over his abdominal wall and his arms. Received 10mg PO Vitamin K on  and   - Trend CBC and INR     # Acute on chronic anemia  Baseline Hgb previously 11-12.0s, slowly drifted down to 9.0s over the last month, then acute drop from --> and again on , there was a concern for UGIB as patient's Hgb dropped to 6.5 and needed 2U PRBC.  A CTA abdomen pelvis was done on  and it showed no source of active bleeding. Currently patient denies any melena or hematemesis. Intermittent nose bleeds and hematochezia, thought to be 2/2 prior know hemorrhoids. Since transfusion, hgb trending in 7.0s.   - Type and screen, transfuse for Hgb < 7  - s/p 1 unit(s) prbc daily on  and  for hgb 6.8-6.9  - Trend CBC daily   - No concern for GIB per GI discussion, will advance diet and monitor      # Acute hyponatremia  Improving from 120 on admission at OSH currently to 129.   - Continue fluid restriction with 1L per day  - Trend BMP daily           Diet: Fluid restriction  1000 ML FLUID  Regular Diet Adult    DVT Prophylaxis: Pneumatic Compression Devices  Lima Catheter: Not present  Lines: None     Cardiac Monitoring: None  Code Status: Full Code      Clinically Significant Risk Factors         # Hyponatremia: Lowest Na = 129 mmol/L in last 2 days, will monitor as appropriate  # Hypocalcemia: Lowest Ca = 8.5 mg/dL in last 2 days, will monitor and replace as appropriate     # Hypoalbuminemia: Lowest albumin = 2.9 g/dL at 9/24/2024  6:52 AM, will monitor as appropriate    # Coagulation Defect: INR = 3.19 (Ref range: 0.85 - 1.15) and/or PTT = 65 Seconds (Ref range: 22 - 38 Seconds), will monitor for bleeding  # Thrombocytopenia: Lowest platelets = 64 in last 2 days, will monitor for bleeding  # Acute Kidney Injury, unspecified: based on a >150% or 0.3 mg/dL increase in last creatinine compared to past 90 day average, will monitor renal function            # Obesity: Estimated body mass index is 31.57 kg/m  as calculated from the following:    Height as of this encounter: 1.829 m (6').    Weight as of this encounter: 105.6 kg (232 lb 12.9 oz).      # Financial/Environmental Concerns: none         Disposition Plan     Medically Ready for Discharge: Anticipated in 2-4 Days           The patient's care was discussed with the Attending Physician, Dr. Garces, Bedside Nurse, Patient, nephrology team.     Peg Fan PA-C  Hospitalist Service, GOLD TEAM [address] [place] [address]  Securely message with [employer] (more info)  Text page via [facility] Paging/Directory   See signed in provider for up to date coverage information  ______________________________________________________________________    Interval History   Patient was seen in the bedside.  He denies any new symptoms or complaints today.  He has ongoing pain that is improving at site of most recent paracentesis. He had 3 loose bowel movements yesterday, without evidence of blood. Swelling stable. Fair  appetite. Has been able to ambulate the halls.     Physical Exam   Vital Signs: Temp: 98.5  F (36.9  C) Temp src: Oral BP: 115/65 Pulse: 89   Resp: 16 SpO2: 99 % O2 Device: None (Room air)    Weight: 232 lbs 12.89 oz    GENERAL: adult male seen sitting comfortably in bed. NAD.   NEURO / PSYCH: Alert, converses appropriately. No focal deficits. Moves all extremities.   HEENT: scleral icterus noted  CV: RRR. S1, S2. No murmurs appreciated.  2+ right radial pulse.  RESPIRATORY: Effort normal. Lungs CTAB with no wheezing, rales, rhonchi.   GI: Abdomen soft and non distended with bowel sounds rare. No tenderness or guarding to palpation.   MSK: no gross deformities  EXTREMITIES: 1-2+ edema to BLE  SKIN: Jaundiced. No rashes or lesions to exposed areas.       Medical Decision Making       45 MINUTES SPENT BY ME on the date of service doing chart review, history, exam, documentation & further activities per the note.      Data     I have personally reviewed the following data over the past 24 hrs:    7.9  \   7.9 (L)   / 67 (L)     129 (L) 103 33.6 (H) /  77   4.6 15 (L) 2.22 (H) \     ALT: 45 AST: 135 (H) AP: 73 TBILI: 38.0 (HH)   ALB: 3.5 TOT PROTEIN: N/A LIPASE: N/A     Procal: N/A CRP: N/A Lactic Acid: 1.9       INR:  3.19 (H) PTT:  N/A   D-dimer:  N/A Fibrinogen:  N/A

## 2024-09-26 NOTE — PROVIDER NOTIFICATION
Paged: Benedict @ 12:30 am    FYI: current BP 93/43    Benedict ordered albumin 25g x 1 IV    Writer administered as ordered    Pt has increased BP following admin.    Blood pressure 114/69, pulse 89, temperature 98  F (36.7  C), temperature source Axillary, resp. rate 16, height 1.829 m (6'), weight 105.6 kg (232 lb 12.9 oz), SpO2 96%.

## 2024-09-26 NOTE — PLAN OF CARE
Goal Outcome Evaluation:      Plan of Care Reviewed With: patient      /67 (BP Location: Left arm)   Pulse 98   Temp 98.4  F (36.9  C) (Oral)   Resp 14   Ht 1.829 m (6')   Wt 105.6 kg (232 lb 12.9 oz)   SpO2 98%   BMI 31.57 kg/m          Neuro: Alert & oriented x 4, able to make needs known  Cardiac: WDL, denies cardiac chest pain      Respiratory: Sating 994-96% on RA.  GI/: Adequate urine output, no BM this shift  Diet/appetite: Tolerating full diet  Activity:  SBA, did not get OOB this shift  Pain/Nausea: oxycodone and Zofran given X 1 for pain and nausea  Line:PIV x 2 SL  Changes: No new change this shift  Plan: Continue with current POC.

## 2024-09-26 NOTE — CONSULTS
Transplant Admission Psychosocial Assessment    Patient Name: Jag Smith  : 1985  Age: 39 year old  MRN: [id number]  Date of Initial Social Work Evaluation: 24    Patient on waitlist for transplant.  Met with pt and mother, Sania, to update psychosocial assessment and provide education about SW role while inpatient, and to begin discussion of expectations/requirements, caregiver needs and follow up needs post-transplant.     Presenting Information   Living Situation: Jag currently lives at home with his mother in [place], [place].   If not local, plans for short term stay: Sania is currently staying at the [facility] while Jag is in the hospital, it is being paid by the [place]. We reviewed local lodging options for post-transplant and discussed lodging/travel benefits.  Previous Functional Status: Since the initial evaluation Jag's functional status has declined, per the assessment: He is independent with ADLs, although he does need support with things like cooking and cleaning due to his fatigue. He stated that the single story home is accessible, there are three steps to get in/out of the front door, he says that he uses the railing to pull himself up.   Cultural/Language/Spiritual Considerations: none reported    Support System  Primary Support Person Sania sarah  Other support:  Sania will be primary support while they are local  Plan for support in immediate post-transplant period: stay locally    Health Care Directive  Decision Maker: self when able  Alternate Decision Maker: NOK: Sania sarah  Health Care Directive: Provided education    Mental Health/Coping:   History of Mental Health: no changes reported  History of Chemical Health: no changes reported. SW discussed treatment post discharge, Jag has a plan to begin  Current status: no changes  Coping: appropriate  Services Needed/Recommended: will continue to assess    Financial   Income: Jag does not currently have a source of  income. This writer discussed applying for SSDI.   Impact of transplant on income: none reported  Insurance and medication coverage: Jag has Medicaid and [employer].   Financial concerns: none reported  Resources needed: will continue to assess    Education provided by DONG: Social Work role inpatient setting, parking information and discussed looking into lodging benefits through insurance.    Assessment and recommendations and plan:  I met with PT and mother at bedside and updated psychological assessment. No changes to his living environment, after staying locally post transplant he will return home with his mother. He continues to have adequate finances and insurance. SW encouraged pt and Sania to look into travel benefits through insurance as well as applying for SSDI. His mother will be his primary caregiver post transplant.    Jennifer CABALLERO, LGSW  [facility]  Liver Transplant   Phone: [phone]

## 2024-09-26 NOTE — PROGRESS NOTES
Nephrology Progress Note  09/26/2024           MEDICAL DECISION MAKING     RECOMMENDATIONS:  One time dose of 40mg IV Lasix (ordered)    Renally dose medications, avoid unnecessary nephrotoxins, avoid unnecessary radiographic contrast, daily renal panel, strict I/O, daily standing weights     ASSESSMENT:  Jag Smith is a 39 year old male with h/o EtOH related decompensated cirrhosis who was admitted at OSH with DENI and SBP now transferred here for transplant workup.  Nephrology consulted for DENI.      Worsening likely non-oliguric DENI  sCr on admit 1.51.  Peak sCr 2.1.  Baseline sCr 0.7  UPCR 0.44, U Na 22  Diagnosed w SBP 9/6 and treated for 10d   At OSH, received 25g of albumin/day from 9/19-9/21 (underdosed)  Was transferred on octreotide & midodrine, octreotide stopped on 9/22  Given his sCr has been fairly stable (ranging from 1.4-1.7) since 9/6, and has not worsened since the discontinuation of octreotide, additionally this U Na is 22, this seems to be less concerning for HRS, though given the extent of his liver disease he certain does have hepatorenal physiology at play  On direct visualization of urine microscopy, granular casts were appreciated, suggestive of ATN  His DENI is may be 2/2 ATN in the setting of his SBP v bile cast nephropathy given his high bili (has been >>20 since 9/8)     Hyponatremia in setting of cirrhosis: on fluid restriction   Metabolic acidosis: likely 2/2 DENI, cont w bicarb supplementation      Decompensated cirrhosis     Recommendations were communicated to primary team via note     Seen and discussed with Dr. Henry Sanderson MD   Division of Renal Disease and Hypertension  [facility]  [employer] Web Console    SUBJECTIVE     Interval History :   Nursing and provider notes from last 24 hours reviewed.  In the last 24 hours Jag Smith   Mom at bedside. Making urine. Breathing well. Some LE swelling   Para done yesterday  Listed for transplant on 9/24    A  comprehensive review of systems was negative except as noted above.    OBJECTIVE     Physical Exam:   I/O last 3 completed shifts:  In: 1565 [P.O.:1165]  Out: -    /65   Pulse 89   Temp 98.5  F (36.9  C) (Oral)   Resp 16   Ht 1.829 m (6')   Wt 105.6 kg (232 lb 12.9 oz)   SpO2 99%   BMI 31.57 kg/m       General:sitting in bed  HEENT:scleral icterus   Pulmonary:unlabored   Abdominal:mild distention   Extremities:1+ b/l LE edema   Neuro:A&Ox4  Access:PIV    Labs:   All labs reviewed by me  Electrolytes/Renal -   Recent Labs   Lab Test 09/26/24  0702 09/25/24  0653 09/24/24  0652 09/23/24  0659   * 129* 131* 133*   POTASSIUM 4.6 4.6 4.5 4.2   CHLORIDE 103 106 105 107   CO2 15* 15* 15* 16*   BUN 33.6* 34.0* 28.5* 25.9*   CR 2.22* 2.11* 1.91* 1.53*   GLC 77 90 86 110*   AMAIRANI 9.0 8.5* 8.2* 8.4*   PHOS  --   --   --  3.1       CBC -   Recent Labs   Lab Test 09/26/24  0702 09/25/24  0653 09/24/24  0652   WBC 7.9 7.9 8.7   HGB 7.9* 6.9* 6.8*   PLT 67* 64* 78*       LFTs -   Recent Labs   Lab Test 09/26/24  0702 09/25/24  0653 09/24/24  0652   ALKPHOS 73 75 82   BILITOTAL 38.0* 34.3* 30.8*   ALT 45 45 47   * 127* 128*   ALBUMIN 3.5 3.2* 2.9*       Iron Panel -   Recent Labs   Lab Test 09/25/24  0653 09/23/24  0659   IRON  --  58*   ELPIDIO 2,577*  --        Current Medications:  Current Facility-Administered Medications   Medication Dose Route Frequency Provider Last Rate Last Admin    ciprofloxacin (CIPRO) tablet 500 mg  500 mg Oral Q12H [facility] (08/20) Peg Fan, AYDEN   500 mg at 09/26/24 0844    folic acid (FOLVITE) tablet 1 mg  1 mg Oral Daily José Miguel Aviles MD   1 mg at 09/26/24 0843    furosemide (LASIX) injection 40 mg  40 mg Intravenous Once Georges Sanderson MD        lactulose (CEPHULAC) Packet 10 g  10 g Oral TID José Miguel Aviles MD   10 g at 09/26/24 0907    midodrine (PROAMATINE) tablet 12.5 mg  12.5 mg Oral TID w/meals José Miguel Aviles MD   12.5 mg at  09/26/24 1216    pantoprazole (PROTONIX) IV push injection 40 mg  40 mg Intravenous BID José Miguel Aviles MD   40 mg at 09/26/24 0846    rifaximin (XIFAXAN) tablet 550 mg  550 mg Oral BID José Miguel Aviles MD   550 mg at 09/26/24 0843    sodium bicarbonate tablet 1,300 mg  1,300 mg Oral TID José Miguel Aviles MD   1,300 mg at 09/26/24 0844    sodium chloride (PF) 0.9% PF flush 3 mL  3 mL Intracatheter Q8H José Miguel Aviles MD   3 mL at 09/26/24 0914    thiamine (B-1) tablet 100 mg  100 mg Oral Daily José Miguel Aviles MD   100 mg at 09/26/24 0844     Current Facility-Administered Medications   Medication Dose Route Frequency Provider Last Rate Last Admin     Georges Sanderson MD

## 2024-09-26 NOTE — PROGRESS NOTES
HEPATOLOGY PROGRESS NOTE    Date: 09/26/2024     39 year old male with a history of EtOH related decompensated cirrhosis who presents from [facility] for liver transplant evaluation. Patient is currently listed for LT. MELD 41. ABO O.      #. EtOH related decompensated cirrhosis, MELD 42  #. Acute kidney injury 2/2 ATN   #. Spontaneous bacterial peritonitis  -Patient diagnosed with cirrhosis in 6/7/2024 in setting of acute alcoholic hepatitis. Tx with steroids and although he initially responded, liver disease then subsequently progressively worsened. He was then admitted in 9/2024 for PNA and then again in 9/5-9/21 (transferred here) where he was found to have SBP and DENI.  #. Ascites: Recent diagnosis of SBP, with repeat tap showing resolution on 09/13. Off diuretics given DENI.  #. Varices: EGD 9/25 w/ large EV s/p EVLx2.   #. HE: Diagnosed with HE at OSH, started on lactulose, rifaximin  #. DENI: No evidence of HRS at this timeRenal function has continued to uptrnd today. Diagnostic paracentesis (-) for SBP.  UA consistent with ATN   #. HCC: No liver lesion on prior imaging.  #. Liver Transplant Candidacy: Patient is listed for LT as of 9/24.             >Cardiology: s/p TTE, normal LV and RV function. No PAH             >Surgery: evaluated 9/24, deemed good candidate overall.             >SW: consulted. Underwent YUSEF eval, recommendation of IOP. This can be done post-LT            RECOMMENDATIONS  - Regular Diet   - Avoid Nephrotoxic agents   - Okay to continue Midodrine.  - Continue PO Folic acid and Thiamine.  - Continue PO Lactulose, PO Rifaximine.  - Hold diuretics given recent DENI.  - No indication for octreotide at this time.  - cipro PO Q24 for SBP prophylaxis (renal dosing)       Gastroenterology follow up recommendations: Pending clinical course.      Thank you for involving us in this patient's care. Please do not hesitate to contact the IP Hep service with any questions or concerns.      Pt  care plan discussed with Dr. Chavira, Hepatology staff physician.      Zuleyma Shaikh MD   Gastroenterology/Transplant Hepatology Fellow  Division of Gastroenterology, Hepatology and Nutrition  [facility]    _______________________________________________________________      Objective:  Blood pressure 115/65, pulse 89, temperature 98.5  F (36.9  C), temperature source Oral, resp. rate 16, height 1.829 m (6'), weight 105.6 kg (232 lb 12.9 oz), SpO2 99%.    Gen: A&Ox3, NAD  HEENT: ncat, perrl, eomi, sclera icteric  CV: RRR  Lungs: CTA b/l  Abd: Distended, soft,+ttp R>L. Periumbilical ecchymosis appreciated. No fluid wave    Skin: + jaundice, + stigmata of chronic liver disease  MS: reduced muscle mass for age  Neuro: non focal       LABS:  BMP  Recent Labs   Lab 09/26/24  0702 09/25/24  0653 09/24/24  0652 09/23/24  0659   * 129* 131* 133*   POTASSIUM 4.6 4.6 4.5 4.2   CHLORIDE 103 106 105 107   AMAIRANI 9.0 8.5* 8.2* 8.4*   CO2 15* 15* 15* 16*   BUN 33.6* 34.0* 28.5* 25.9*   CR 2.22* 2.11* 1.91* 1.53*   GLC 77 90 86 110*     CBC  Recent Labs   Lab 09/26/24  0702 09/25/24  0653 09/24/24  0652 09/23/24  0659   WBC 7.9 7.9 8.7 7.3   RBC 2.55* 2.08* 2.04* 2.03*   HGB 7.9* 6.9* 6.8* 7.0*   HCT 23.8* 20.4* 20.5* 20.4*   MCV 93 98 101* 101*   MCH 31.0 33.2* 33.3* 34.5*   MCHC 33.2 33.8 33.2 34.3   RDW 27.3* 22.8* 21.2* 20.8*   PLT 67* 64* 78* 81*     INR  Recent Labs   Lab 09/26/24  0702 09/25/24  0653 09/24/24  0652 09/23/24  0659   INR 3.19* 3.36* 3.51* 3.33*     LFTs  Recent Labs   Lab 09/26/24  0702 09/25/24  0653 09/24/24  0652 09/23/24  0659   ALKPHOS 73 75 82 83   * 127* 128* 118*   ALT 45 45 47 46   BILITOTAL 38.0* 34.3* 30.8* 31.0*   ALBUMIN 3.5 3.2* 2.9* 2.9*      PANCNo lab results found in last 7 days.

## 2024-09-27 ENCOUNTER — DOCUMENTATION ONLY (OUTPATIENT)
Dept: TRANSPLANT | Facility: CLINIC | Age: 39
End: 2024-09-27
Payer: MEDICAID

## 2024-09-27 ENCOUNTER — APPOINTMENT (OUTPATIENT)
Dept: GENERAL RADIOLOGY | Facility: CLINIC | Age: 39
End: 2024-09-27
Attending: NURSE PRACTITIONER
Payer: MEDICAID

## 2024-09-27 ENCOUNTER — ANESTHESIA EVENT (OUTPATIENT)
Dept: SURGERY | Facility: CLINIC | Age: 39
End: 2024-09-27
Payer: MEDICAID

## 2024-09-27 ENCOUNTER — ORGAN (OUTPATIENT)
Dept: TRANSPLANT | Facility: CLINIC | Age: 39
End: 2024-09-27

## 2024-09-27 PROBLEM — N17.0 ACUTE KIDNEY FAILURE WITH TUBULAR NECROSIS (H): Status: ACTIVE | Noted: 2024-09-27

## 2024-09-27 LAB
ABO/RH(D): NORMAL
ALBUMIN SERPL BCG-MCNC: 3.3 G/DL (ref 3.5–5.2)
ALBUMIN UR-MCNC: NEGATIVE MG/DL
ALP SERPL-CCNC: 85 U/L (ref 40–150)
ALT SERPL W P-5'-P-CCNC: 45 U/L (ref 0–70)
AMYLASE SERPL-CCNC: 46 U/L (ref 28–100)
ANION GAP SERPL CALCULATED.3IONS-SCNC: 14 MMOL/L (ref 7–15)
ANTIBODY SCREEN: NEGATIVE
APPEARANCE UR: ABNORMAL
APTT PPP: 63 SECONDS (ref 22–38)
AST SERPL W P-5'-P-CCNC: 129 U/L (ref 0–45)
BASE EXCESS BLDV CALC-SCNC: -8.9 MMOL/L (ref -3–3)
BASOPHILS # BLD AUTO: 0.1 10E3/UL (ref 0–0.2)
BASOPHILS NFR BLD AUTO: 1 %
BILIRUB SERPL-MCNC: 38.2 MG/DL
BILIRUB UR QL STRIP: ABNORMAL
BLD PROD TYP BPU: NORMAL
BLOOD COMPONENT TYPE: NORMAL
BUN SERPL-MCNC: 34.1 MG/DL (ref 6–20)
CALCIUM SERPL-MCNC: 8.9 MG/DL (ref 8.8–10.4)
CHLORIDE SERPL-SCNC: 104 MMOL/L (ref 98–107)
CODING SYSTEM: NORMAL
COLOR UR AUTO: ABNORMAL
CREAT SERPL-MCNC: 2.27 MG/DL (ref 0.67–1.17)
CROSSMATCH: NORMAL
EGFRCR SERPLBLD CKD-EPI 2021: 37 ML/MIN/1.73M2
EOSINOPHIL # BLD AUTO: 0.2 10E3/UL (ref 0–0.7)
EOSINOPHIL NFR BLD AUTO: 2 %
ERYTHROCYTE [DISTWIDTH] IN BLOOD BY AUTOMATED COUNT: 27.4 % (ref 10–15)
ERYTHROCYTE [DISTWIDTH] IN BLOOD BY AUTOMATED COUNT: 27.4 % (ref 10–15)
FIBRINOGEN PPP-MCNC: 150 MG/DL (ref 170–510)
GLUCOSE BLDC GLUCOMTR-MCNC: 129 MG/DL (ref 70–99)
GLUCOSE SERPL-MCNC: 82 MG/DL (ref 70–99)
GLUCOSE UR STRIP-MCNC: NEGATIVE MG/DL
HBV CORE AB SERPL QL IA: NORMAL
HBV SURFACE AB SERPL IA-ACNC: NORMAL M[IU]/ML
HBV SURFACE AB SERPL IA-ACNC: NORMAL M[IU]/ML
HBV SURFACE AG SERPL QL IA: NORMAL
HCO3 BLDV-SCNC: 16 MMOL/L (ref 21–28)
HCO3 SERPL-SCNC: 13 MMOL/L (ref 22–29)
HCT VFR BLD AUTO: 22.9 % (ref 40–53)
HCT VFR BLD AUTO: 22.9 % (ref 40–53)
HCV AB SERPL QL IA: NONREACTIVE
HGB BLD-MCNC: 7.8 G/DL (ref 13.3–17.7)
HGB BLD-MCNC: 7.8 G/DL (ref 13.3–17.7)
HGB UR QL STRIP: NEGATIVE
HYALINE CASTS: 4 /LPF
IMM GRANULOCYTES # BLD: 0.1 10E3/UL
IMM GRANULOCYTES NFR BLD: 1 %
INR PPP: 3.14 (ref 0.85–1.15)
ISSUE DATE AND TIME: NORMAL
KETONES UR STRIP-MCNC: NEGATIVE MG/DL
LACTATE SERPL-SCNC: 1.9 MMOL/L (ref 0.7–2)
LACTATE SERPL-SCNC: 2.6 MMOL/L (ref 0.7–2)
LEUKOCYTE ESTERASE UR QL STRIP: NEGATIVE
LYMPHOCYTES # BLD AUTO: 1.4 10E3/UL (ref 0.8–5.3)
LYMPHOCYTES NFR BLD AUTO: 15 %
MAGNESIUM SERPL-MCNC: 1.5 MG/DL (ref 1.7–2.3)
MCH RBC QN AUTO: 32.2 PG (ref 26.5–33)
MCH RBC QN AUTO: 32.2 PG (ref 26.5–33)
MCHC RBC AUTO-ENTMCNC: 34.1 G/DL (ref 31.5–36.5)
MCHC RBC AUTO-ENTMCNC: 34.1 G/DL (ref 31.5–36.5)
MCV RBC AUTO: 95 FL (ref 78–100)
MCV RBC AUTO: 95 FL (ref 78–100)
MONOCYTES # BLD AUTO: 1 10E3/UL (ref 0–1.3)
MONOCYTES NFR BLD AUTO: 10 %
MUCOUS THREADS #/AREA URNS LPF: PRESENT /LPF
NEUTROPHILS # BLD AUTO: 7.1 10E3/UL (ref 1.6–8.3)
NEUTROPHILS NFR BLD AUTO: 72 %
NITRATE UR QL: NEGATIVE
NRBC # BLD AUTO: 0 10E3/UL
NRBC BLD AUTO-RTO: 0 /100
O2/TOTAL GAS SETTING VFR VENT: 21 %
OXYHGB MFR BLDV: 88 % (ref 70–75)
PCO2 BLDV: 29 MM HG (ref 40–50)
PH BLDV: 7.35 [PH] (ref 7.32–7.43)
PH UR STRIP: 6 [PH] (ref 5–7)
PHOSPHATE SERPL-MCNC: 3.6 MG/DL (ref 2.5–4.5)
PLATELET # BLD AUTO: 77 10E3/UL (ref 150–450)
PLATELET # BLD AUTO: 77 10E3/UL (ref 150–450)
PO2 BLDV: 55 MM HG (ref 25–47)
POTASSIUM SERPL-SCNC: 4.5 MMOL/L (ref 3.4–5.3)
PROT SERPL-MCNC: ABNORMAL G/DL
RBC # BLD AUTO: 2.42 10E6/UL (ref 4.4–5.9)
RBC # BLD AUTO: 2.42 10E6/UL (ref 4.4–5.9)
RBC URINE: 1 /HPF
SAO2 % BLDV: 89.1 % (ref 70–75)
SODIUM SERPL-SCNC: 131 MMOL/L (ref 135–145)
SP GR UR STRIP: 1.01 (ref 1–1.03)
SPECIMEN EXPIRATION DATE: NORMAL
UNIT ABO/RH: NORMAL
UNIT NUMBER: NORMAL
UNIT STATUS: NORMAL
UNIT TYPE ISBT: 5100
UNIT TYPE ISBT: 600
UNIT TYPE ISBT: 6200
UROBILINOGEN UR STRIP-MCNC: NORMAL MG/DL
WBC # BLD AUTO: 9.5 10E3/UL (ref 4–11)
WBC # BLD AUTO: 9.5 10E3/UL (ref 4–11)
WBC URINE: 5 /HPF

## 2024-09-27 PROCEDURE — 71046 X-RAY EXAM CHEST 2 VIEWS: CPT

## 2024-09-27 PROCEDURE — 87081 CULTURE SCREEN ONLY: CPT | Performed by: NURSE PRACTITIONER

## 2024-09-27 PROCEDURE — 86923 COMPATIBILITY TEST ELECTRIC: CPT

## 2024-09-27 PROCEDURE — 86644 CMV ANTIBODY: CPT | Performed by: NURSE PRACTITIONER

## 2024-09-27 PROCEDURE — 83735 ASSAY OF MAGNESIUM: CPT | Performed by: NURSE PRACTITIONER

## 2024-09-27 PROCEDURE — 85610 PROTHROMBIN TIME: CPT | Performed by: PHYSICIAN ASSISTANT

## 2024-09-27 PROCEDURE — 82805 BLOOD GASES W/O2 SATURATION: CPT | Performed by: STUDENT IN AN ORGANIZED HEALTH CARE EDUCATION/TRAINING PROGRAM

## 2024-09-27 PROCEDURE — 87535 HIV-1 PROBE&REVERSE TRNSCRPJ: CPT | Performed by: NURSE PRACTITIONER

## 2024-09-27 PROCEDURE — 86900 BLOOD TYPING SEROLOGIC ABO: CPT | Performed by: NURSE PRACTITIONER

## 2024-09-27 PROCEDURE — 82150 ASSAY OF AMYLASE: CPT | Performed by: NURSE PRACTITIONER

## 2024-09-27 PROCEDURE — 86706 HEP B SURFACE ANTIBODY: CPT | Performed by: NURSE PRACTITIONER

## 2024-09-27 PROCEDURE — 99207 PR APP CREDIT; MD BILLING SHARED VISIT: CPT | Performed by: PHYSICIAN ASSISTANT

## 2024-09-27 PROCEDURE — 81001 URINALYSIS AUTO W/SCOPE: CPT | Performed by: NURSE PRACTITIONER

## 2024-09-27 PROCEDURE — 86923 COMPATIBILITY TEST ELECTRIC: CPT | Performed by: PHYSICIAN ASSISTANT

## 2024-09-27 PROCEDURE — 93005 ELECTROCARDIOGRAM TRACING: CPT

## 2024-09-27 PROCEDURE — 250N000013 HC RX MED GY IP 250 OP 250 PS 637: Performed by: HOSPITALIST

## 2024-09-27 PROCEDURE — 87521 HEPATITIS C PROBE&RVRS TRNSC: CPT | Performed by: NURSE PRACTITIONER

## 2024-09-27 PROCEDURE — 71046 X-RAY EXAM CHEST 2 VIEWS: CPT | Mod: 26 | Performed by: RADIOLOGY

## 2024-09-27 PROCEDURE — 36415 COLL VENOUS BLD VENIPUNCTURE: CPT | Performed by: PHYSICIAN ASSISTANT

## 2024-09-27 PROCEDURE — 83605 ASSAY OF LACTIC ACID: CPT | Performed by: STUDENT IN AN ORGANIZED HEALTH CARE EDUCATION/TRAINING PROGRAM

## 2024-09-27 PROCEDURE — 36415 COLL VENOUS BLD VENIPUNCTURE: CPT | Performed by: NURSE PRACTITIONER

## 2024-09-27 PROCEDURE — 93010 ELECTROCARDIOGRAM REPORT: CPT | Performed by: INTERNAL MEDICINE

## 2024-09-27 PROCEDURE — 250N000011 HC RX IP 250 OP 636: Performed by: HOSPITALIST

## 2024-09-27 PROCEDURE — 86704 HEP B CORE ANTIBODY TOTAL: CPT | Performed by: NURSE PRACTITIONER

## 2024-09-27 PROCEDURE — 250N000009 HC RX 250: Performed by: HOSPITALIST

## 2024-09-27 PROCEDURE — 120N000002 HC R&B MED SURG/OB UMMC

## 2024-09-27 PROCEDURE — 36415 COLL VENOUS BLD VENIPUNCTURE: CPT | Performed by: STUDENT IN AN ORGANIZED HEALTH CARE EDUCATION/TRAINING PROGRAM

## 2024-09-27 PROCEDURE — 85384 FIBRINOGEN ACTIVITY: CPT | Performed by: NURSE PRACTITIONER

## 2024-09-27 PROCEDURE — 250N000013 HC RX MED GY IP 250 OP 250 PS 637: Performed by: PHYSICIAN ASSISTANT

## 2024-09-27 PROCEDURE — 86665 EPSTEIN-BARR CAPSID VCA: CPT | Performed by: NURSE PRACTITIONER

## 2024-09-27 PROCEDURE — 99233 SBSQ HOSP IP/OBS HIGH 50: CPT | Mod: GC | Performed by: INTERNAL MEDICINE

## 2024-09-27 PROCEDURE — 85025 COMPLETE CBC W/AUTO DIFF WBC: CPT | Performed by: NURSE PRACTITIONER

## 2024-09-27 PROCEDURE — 87516 HEPATITIS B DNA AMP PROBE: CPT | Performed by: NURSE PRACTITIONER

## 2024-09-27 PROCEDURE — 250N000011 HC RX IP 250 OP 636: Performed by: STUDENT IN AN ORGANIZED HEALTH CARE EDUCATION/TRAINING PROGRAM

## 2024-09-27 PROCEDURE — 250N000013 HC RX MED GY IP 250 OP 250 PS 637: Performed by: STUDENT IN AN ORGANIZED HEALTH CARE EDUCATION/TRAINING PROGRAM

## 2024-09-27 PROCEDURE — 80048 BASIC METABOLIC PNL TOTAL CA: CPT | Performed by: PHYSICIAN ASSISTANT

## 2024-09-27 PROCEDURE — 82247 BILIRUBIN TOTAL: CPT | Performed by: PHYSICIAN ASSISTANT

## 2024-09-27 PROCEDURE — 86850 RBC ANTIBODY SCREEN: CPT | Performed by: NURSE PRACTITIONER

## 2024-09-27 PROCEDURE — 87340 HEPATITIS B SURFACE AG IA: CPT | Performed by: NURSE PRACTITIONER

## 2024-09-27 PROCEDURE — 99232 SBSQ HOSP IP/OBS MODERATE 35: CPT | Mod: FS | Performed by: STUDENT IN AN ORGANIZED HEALTH CARE EDUCATION/TRAINING PROGRAM

## 2024-09-27 PROCEDURE — 86803 HEPATITIS C AB TEST: CPT | Performed by: NURSE PRACTITIONER

## 2024-09-27 PROCEDURE — 85025 COMPLETE CBC W/AUTO DIFF WBC: CPT | Performed by: PHYSICIAN ASSISTANT

## 2024-09-27 PROCEDURE — 85730 THROMBOPLASTIN TIME PARTIAL: CPT | Performed by: NURSE PRACTITIONER

## 2024-09-27 PROCEDURE — 99207 PR NO BILLABLE SERVICE THIS VISIT: CPT | Performed by: PHYSICIAN ASSISTANT

## 2024-09-27 RX ORDER — LIDOCAINE 40 MG/G
CREAM TOPICAL
Status: DISCONTINUED | OUTPATIENT
Start: 2024-09-28 | End: 2024-09-28 | Stop reason: HOSPADM

## 2024-09-27 RX ORDER — SODIUM BICARBONATE 650 MG/1
1950 TABLET ORAL 3 TIMES DAILY
Status: DISCONTINUED | OUTPATIENT
Start: 2024-09-27 | End: 2024-09-29

## 2024-09-27 RX ORDER — PIPERACILLIN SODIUM, TAZOBACTAM SODIUM 3; .375 G/15ML; G/15ML
3.38 INJECTION, POWDER, LYOPHILIZED, FOR SOLUTION INTRAVENOUS EVERY 4 HOURS PRN
Status: DISCONTINUED | OUTPATIENT
Start: 2024-09-28 | End: 2024-09-28 | Stop reason: HOSPADM

## 2024-09-27 RX ORDER — FUROSEMIDE 10 MG/ML
40 INJECTION INTRAMUSCULAR; INTRAVENOUS ONCE
Status: COMPLETED | OUTPATIENT
Start: 2024-09-27 | End: 2024-09-27

## 2024-09-27 RX ORDER — PIPERACILLIN SODIUM, TAZOBACTAM SODIUM 3; .375 G/15ML; G/15ML
3.38 INJECTION, POWDER, LYOPHILIZED, FOR SOLUTION INTRAVENOUS ONCE
Status: COMPLETED | OUTPATIENT
Start: 2024-09-28 | End: 2024-09-28

## 2024-09-27 RX ORDER — FLUCONAZOLE 2 MG/ML
400 INJECTION, SOLUTION INTRAVENOUS ONCE
Status: COMPLETED | OUTPATIENT
Start: 2024-09-28 | End: 2024-09-28

## 2024-09-27 RX ADMIN — OXYCODONE HYDROCHLORIDE 5 MG: 5 TABLET ORAL at 06:11

## 2024-09-27 RX ADMIN — RIFAXIMIN 550 MG: 550 TABLET ORAL at 08:58

## 2024-09-27 RX ADMIN — CARBIDOPA AND LEVODOPA 12.5 MG: 50; 200 TABLET, EXTENDED RELEASE ORAL at 08:57

## 2024-09-27 RX ADMIN — OXYCODONE HYDROCHLORIDE 5 MG: 5 TABLET ORAL at 20:07

## 2024-09-27 RX ADMIN — OXYCODONE HYDROCHLORIDE 5 MG: 5 TABLET ORAL at 13:56

## 2024-09-27 RX ADMIN — THIAMINE HCL TAB 100 MG 100 MG: 100 TAB at 08:57

## 2024-09-27 RX ADMIN — ONDANSETRON 4 MG: 4 TABLET, ORALLY DISINTEGRATING ORAL at 06:11

## 2024-09-27 RX ADMIN — LACTULOSE 10 G: 20 SOLUTION ORAL at 20:07

## 2024-09-27 RX ADMIN — LACTULOSE 10 G: 20 SOLUTION ORAL at 08:58

## 2024-09-27 RX ADMIN — PANTOPRAZOLE SODIUM 40 MG: 40 INJECTION, POWDER, FOR SOLUTION INTRAVENOUS at 08:58

## 2024-09-27 RX ADMIN — PANTOPRAZOLE SODIUM 40 MG: 40 INJECTION, POWDER, FOR SOLUTION INTRAVENOUS at 20:07

## 2024-09-27 RX ADMIN — SODIUM BICARBONATE 1300 MG: 650 TABLET ORAL at 08:58

## 2024-09-27 RX ADMIN — CIPROFLOXACIN 500 MG: 500 TABLET ORAL at 20:07

## 2024-09-27 RX ADMIN — FUROSEMIDE 40 MG: 10 INJECTION, SOLUTION INTRAMUSCULAR; INTRAVENOUS at 18:13

## 2024-09-27 RX ADMIN — SODIUM BICARBONATE 1300 MG: 650 TABLET ORAL at 13:56

## 2024-09-27 RX ADMIN — FOLIC ACID 1 MG: 1 TABLET ORAL at 08:58

## 2024-09-27 RX ADMIN — SODIUM BICARBONATE 1950 MG: 650 TABLET ORAL at 20:07

## 2024-09-27 RX ADMIN — CARBIDOPA AND LEVODOPA 12.5 MG: 50; 200 TABLET, EXTENDED RELEASE ORAL at 13:56

## 2024-09-27 RX ADMIN — LACTULOSE 10 G: 20 SOLUTION ORAL at 13:56

## 2024-09-27 RX ADMIN — CIPROFLOXACIN 500 MG: 500 TABLET ORAL at 08:58

## 2024-09-27 RX ADMIN — CARBIDOPA AND LEVODOPA 12.5 MG: 50; 200 TABLET, EXTENDED RELEASE ORAL at 18:13

## 2024-09-27 RX ADMIN — RIFAXIMIN 550 MG: 550 TABLET ORAL at 20:07

## 2024-09-27 ASSESSMENT — ACTIVITIES OF DAILY LIVING (ADL)
ADLS_ACUITY_SCORE: 23
ADLS_ACUITY_SCORE: 25
ADLS_ACUITY_SCORE: 23
ADLS_ACUITY_SCORE: 24
ADLS_ACUITY_SCORE: 23
ADLS_ACUITY_SCORE: 25
ADLS_ACUITY_SCORE: 25
ADLS_ACUITY_SCORE: 23
ADLS_ACUITY_SCORE: 24
ADLS_ACUITY_SCORE: 23
ADLS_ACUITY_SCORE: 23

## 2024-09-27 NOTE — PROGRESS NOTES
[facility]    Medicine Progress Note - Hospitalist Service, GOLD TEAM 5    Date of Admission:  2024    Assessment & Plan   Jag Smith is a 39 year old male with PMHx of BETTY/MDD and HTN and recently diagnosed decompensated alcoholic liver cirrhosis who is presenting as a direct admit from [facility] with worsening liver failure admitted on 2024 for expedited liver transplant evaluation     # Decompensated alcoholic liver cirrhosis  # Recurrent ascites  # Esophageal varices s/p banding x2  # Hx SBP  # Hx hepatic encephalopathy  Recently diagnosed in 2024 with etiology related to alcohol, last drink in . No prior EGD. PETh  negative. Admitted at OSH - for HRS, SBP from para  (s/p treatment with ceftriaxone and Flagyl). Last paracentesis on  negative for SBP. Transferred for transplant evaluation. Underwent all necessary testing and listed for transplant on 24. Diagnostic and therapeutic para  with 2 L removed, negative for SBP.   Hepatology involvement appreciated  EGD  with esophageal varices noted s/p banding x 2.  Cipro for SBP ppx  Continue to hold PTA lasix and spironolactone given concern for HRS  Continue PO lactulose and PO Rifaximin, goal BM 3-4 per day  Continue po folic acid and thiamine  Daily CMP, CBC, and INR    MELD 3.0: 42 at 2024  6:46 AM  MELD-Na: 41 at 2024  6:46 AM  Calculated from:  Serum Creatinine: 2.27 mg/dL at 2024  6:46 AM  Serum Sodium: 131 mmol/L at 2024  6:46 AM  Total Bilirubin: 38.2 mg/dL at 2024  6:46 AM  Serum Albumin: 3.3 g/dL at 2024  6:46 AM  INR(ratio): 3.14 at 2024  6:46 AM  Age at listin years  Sex: Male at 2024  6:46 AM     # Non-oliguric DENI, worsening  Baseline Cr recent 0.9-1.0, but developed DENI on  suspicious for HRS. Recent creatinine ranging 1.2-1.8, worsened to 2.2 today. Possibly ATN per nephrology.   -  Nephrology consult, appreciate recs  - albumin challenge with 50 g albumin BID on 9/24-9/25 without significant improvement  - Continue PO midodrine 12.5mg TID   - Continue PO bicarb 1300 mg TID  - Trend BMP, renally dose medications, avoid nephrotoxic agents.      # Cirrhosis-related coagulopathy  INR and PTT elevated and thrombocytopenia likely due to decompensated liver disease.  Patient has diffuse bruising all over his abdominal wall and his arms. Received 10mg PO Vitamin K on 9/20 and 9/21  - Trend CBC and INR     # Acute on chronic anemia  Baseline Hgb previously 11-12.0s, slowly drifted down to 9.0s over the last month, then acute drop from 9/17-->9/18 and again on 9/20, there was a concern for UGIB as patient's Hgb dropped to 6.5 and needed 2U PRBC.  A CTA abdomen pelvis was done on 9/21 and it showed no source of active bleeding. Currently patient denies any melena or hematemesis. Intermittent nose bleeds and hematochezia, thought to be 2/2 prior know hemorrhoids. Since transfusion, hgb trending in 7.0s.   - Type and screen, transfuse for Hgb < 7  - s/p 1 unit(s) prbc daily each on 9/24 and 9/25 for hgb 6.8-6.9  - Trend CBC daily   - No concern for GIB per GI discussion, will advance diet and monitor      # Acute hyponatremia  Improving from 120 on admission at OSH currently to 129.   - Continue fluid restriction with 1L per day  - Trend BMP daily           Diet: Fluid restriction 1000 ML FLUID  Regular Diet Adult    DVT Prophylaxis: Pneumatic Compression Devices  Lima Catheter: Not present  Lines: None     Cardiac Monitoring: None  Code Status: Full Code      Clinically Significant Risk Factors         # Hyponatremia: Lowest Na = 129 mmol/L in last 2 days, will monitor as appropriate      # Hypoalbuminemia: Lowest albumin = 2.9 g/dL at 9/24/2024  6:52 AM, will monitor as appropriate    # Coagulation Defect: INR = 3.14 (Ref range: 0.85 - 1.15) and/or PTT = 65 Seconds (Ref range: 22 - 38 Seconds), will  monitor for bleeding  # Thrombocytopenia: Lowest platelets = 67 in last 2 days, will monitor for bleeding  # Acute Kidney Injury, unspecified: based on a >150% or 0.3 mg/dL increase in last creatinine compared to past 90 day average, will monitor renal function            # Obesity: Estimated body mass index is 31.57 kg/m  as calculated from the following:    Height as of this encounter: 1.829 m (6').    Weight as of this encounter: 105.6 kg (232 lb 12.9 oz).      # Financial/Environmental Concerns: none         Disposition Plan     Medically Ready for Discharge: Anticipated in 2-4 Days           The patient's care was discussed with the Attending Physician, Dr. Garces, Bedside Nurse, Patient.     Peg Fan PA-C  Hospitalist Service, [address]  Securely message with [employer] (more info)  Text page via [facility] Paging/Directory   See signed in provider for up to date coverage information  ______________________________________________________________________    Interval History   Patient was seen in the bedside. He denies any new symptoms or complaints today. He has been eating quite well. He has ongoing abdominal pain localized to para sites. He had 3 Bms yesterday and 1 thus far today.     Physical Exam   Vital Signs: Temp: 98.5  F (36.9  C) Temp src: Oral BP: 99/64 Pulse: 96   Resp: 16 SpO2: 99 % O2 Device: None (Room air)    Weight: 232 lbs 12.89 oz    GENERAL: adult male seen sitting comfortably in bed. NAD.   NEURO / PSYCH: Alert, converses appropriately. No focal deficits. Moves all extremities.   HEENT: scleral icterus noted  CV: RRR. S1, S2. No murmurs appreciated.  2+ right radial pulse.  RESPIRATORY: Effort normal. Lungs CTAB with no wheezing, rales, rhonchi.   GI: Abdomen soft and non distended with bowel sounds rare. No tenderness or guarding to palpation.   MSK: no gross deformities  EXTREMITIES: trace to 1+ edema to BLE  SKIN: Jaundiced. No  rashes or lesions to exposed areas.       Medical Decision Making       30 MINUTES SPENT BY ME on the date of service doing chart review, history, exam, documentation & further activities per the note.      Data     I have personally reviewed the following data over the past 24 hrs:    9.5  \   7.8 (L)   / 77 (L)     131 (L) 104 34.1 (H) /  82   4.5 13 (L) 2.27 (H) \     ALT: 45 AST: 129 (H) AP: 85 TBILI: 38.2 (HH)   ALB: 3.3 (L) TOT PROTEIN: N/A LIPASE: N/A     INR:  3.14 (H) PTT:  N/A   D-dimer:  N/A Fibrinogen:  N/A

## 2024-09-27 NOTE — PROGRESS NOTES
Brief Medicine Progress Note  September 27, 2024     Notified by Transplant Surgery team that there is a liver offer for patient. All labs/relevant orders have been placed. Plan for pt to go to OR 0400 on 9/28.      Jose Manuel Schwartz PA-C  [facility] Hospitalist Service  Securely message with [employer] (more info)  Text page via [facility] Paging/Directory

## 2024-09-27 NOTE — CODE/RAPID RESPONSE
Rapid Response Team Note    Assessment   A rapid response was called on Jag Smith due to  Hyperlactatemia w/ LA 2.6 . This presentation is likely due to ESLD and DENI. Patient VSS and no current complaints. Has been having intermittent nose bleed, though not currently. Plan for liver transplant in AM.    Plan   -  Receiving albumin x 1 and zosyn x 1 per transplant team  -  The Internal Medicine primary team was able to be reached and they are in agreement with the above plan.  - Repeat LA in 2 hours   -  Disposition: The patient will remain on the current unit. We will continue to monitor this patient closely.  -  Reassessment and plan follow-up will be performed by the primary team    Belinda Harley PA-C  Rapid Response Team [facility]  Securely message with [employer]     Medical Decision Making       30 MINUTES SPENT BY ME on the date of service doing chart review, history, exam, documentation & further activities per the note.          Hospital Course   Brief Summary of events leading to rapid response:   RRT called for LA 2.6. Patient w/ intermittent nose bleed, though not currently. No other complaints.     Physical Exam   Vital Signs: Temp: 98.3  F (36.8  C) Temp src: Oral BP: 108/75 Pulse: 98   Resp: 16 SpO2: 100 % O2 Device: None (Room air)    Weight: 232 lbs 12.89 oz      Exam:     Physical Exam   Constitutional: Jaundice young male sitting up in bed. NAD. Conversant.   Well nourished, well developed, resting comfortably   HEENT:   Head: Normocephalic and atraumatic.   Eyes: Bilateral scleral icterus. Pupils are equal, round, and reactive to light.  Pharynx has no erythema or exudate, mucous membranes are moist  Neck:   No adenopathy, no bony tenderness  Cardiovascular: Regular rate and rhythm without murmurs or gallops  Pulmonary/Chest: Clear to auscultation bilaterally, with no wheezes or retractions. No respiratory distress.  GI: Soft with good bowel sounds.  Non-tender, non-distended, with no  guarding, no rebound, no peritoneal signs.   Back:  No bony or CVA tenderness   Musculoskeletal:  No edema or clubbing   Skin: Skin is warm and dry. No rash noted to exposed skin areas.   Neurological: Alert and oriented to person, place, and time. Nonfocal exam  Psychiatric:  Normal mood and affect.          Sepsis Evaluation   The patient is not known to have an infection.    NO EVIDENCE OF SEPSIS at this time.  Vital sign, physical exam, and lab findings are due to ESLD and DENI.

## 2024-09-27 NOTE — PLAN OF CARE
Goal Outcome Evaluation:      Plan of Care Reviewed With: patient    Overall Patient Progress: no change    Outcome Evaluation: continues to progress    Neuro:A/Ox4  Respiratory:WDL on RA  Cardiac:WDL. Denies chest pain  Diet:reg. 1L fluild restriction  GI/:voiding spontaneously, passing gas  Incision/Drains:old paracentesis sites  IV Access:R PIV SL, L PIV SL  Pain:managed with PRN oxy  VS:Temp: 98.5  F (36.9  C) Temp src: Oral BP: 104/61 Pulse: 94   Resp: 18 SpO2: 98 % O2 Device: None (Room air)     Activity:ind     New Changes this shift:none  Plan: continue POC

## 2024-09-27 NOTE — PROGRESS NOTES
Nephrology Progress Note  09/27/2024           MEDICAL DECISION MAKING     RECOMMENDATIONS:  One time dose of 40mg IV Lasix (ordered)  Bicarb tabs increased (ordered)    Renally dose medications, avoid unnecessary nephrotoxins, avoid unnecessary radiographic contrast, daily renal panel, strict I/O, daily standing weights     ASSESSMENT:  Jag Smith is a 39 year old male with h/o EtOH related decompensated cirrhosis who was admitted at OSH with DENI and SBP now transferred here for transplant workup.  Nephrology consulted for DENI.      Worsening likely non-oliguric DENI  sCr on admit 1.51.  Peak sCr 2.1.  Baseline sCr 0.7  UPCR 0.44, U Na 22  Diagnosed w SBP 9/6 and treated for 10d   At OSH, received 25g of albumin/day from 9/19-9/21 (underdosed)  Was transferred on octreotide & midodrine, octreotide stopped on 9/22  Given his sCr has been fairly stable (ranging from 1.4-1.7) since 9/6, and has not worsened since the discontinuation of octreotide, additionally this U Na is 22, this seems to be less concerning for HRS, though given the extent of his liver disease he certain does have hepatorenal physiology at play  On direct visualization of urine microscopy, granular casts were appreciated, suggestive of ATN  His DENI is may be 2/2 ATN in the setting of his SBP v bile cast nephropathy given his high bili (has been >>20 since 9/8)     Hyponatremia in setting of cirrhosis: on fluid restriction   Metabolic acidosis: likely 2/2 DENI, cont w bicarb supplementation      Decompensated cirrhosis     In the event of a liver transplant, patient was consented for dialysis on 9/27/24    Recommendations were communicated to primary team via note     Seen and discussed with Dr. Henry Sanderson MD   Division of Renal Disease and Hypertension  [facility]  [employer] Web Console    SUBJECTIVE     Interval History :   Nursing and provider notes from last 24 hours reviewed.  In the last 24 hours Jag Smith    Lasix yesterday w reportedly good output  sCr mostly the same today  Denies SOB    A comprehensive review of systems was negative except as noted above.    OBJECTIVE     Physical Exam:   I/O last 3 completed shifts:  In: 1075 [P.O.:1050; I.V.:25]  Out: -    BP 99/64 (BP Location: Right arm)   Pulse 96   Temp 98.5  F (36.9  C) (Oral)   Resp 16   Ht 1.829 m (6')   Wt 105.6 kg (232 lb 12.9 oz)   SpO2 99%   BMI 31.57 kg/m       General: standing in room  HEENT:scleral icterus   Pulmonary:unlabored   Abdominal:mild distention   Extremities:1+ b/l LE edema   Neuro:A&Ox4  Access:PIV    Labs:   All labs reviewed by me  Electrolytes/Renal -   Recent Labs   Lab Test 09/27/24 0646 09/26/24  0702 09/25/24  0653 09/24/24  0652 09/23/24  0659   * 129* 129*   < > 133*   POTASSIUM 4.5 4.6 4.6   < > 4.2   CHLORIDE 104 103 106   < > 107   CO2 13* 15* 15*   < > 16*   BUN 34.1* 33.6* 34.0*   < > 25.9*   CR 2.27* 2.22* 2.11*   < > 1.53*   GLC 82 77 90   < > 110*   AMAIRANI 8.9 9.0 8.5*   < > 8.4*   PHOS  --   --   --   --  3.1    < > = values in this interval not displayed.       CBC -   Recent Labs   Lab Test 09/27/24 0646 09/26/24  0702 09/25/24  0653   WBC 9.5 7.9 7.9   HGB 7.8* 7.9* 6.9*   PLT 77* 67* 64*       LFTs -   Recent Labs   Lab Test 09/27/24 0646 09/26/24  0702 09/25/24  0653   ALKPHOS 85 73 75   BILITOTAL 38.2* 38.0* 34.3*   ALT 45 45 45   * 135* 127*   ALBUMIN 3.3* 3.5 3.2*       Iron Panel -   Recent Labs   Lab Test 09/25/24  0653 09/23/24  0659   IRON  --  58*   ELPIDIO 2,577*  --        Current Medications:  Current Facility-Administered Medications   Medication Dose Route Frequency Provider Last Rate Last Admin    ciprofloxacin (CIPRO) tablet 500 mg  500 mg Oral Q12H [facility] (08/20) Peg Fan PA-C   500 mg at 09/27/24 0858    folic acid (FOLVITE) tablet 1 mg  1 mg Oral Daily José Miguel Aviles MD   1 mg at 09/27/24 0858    lactulose (CHRONULAC) solution 10 g  10 g Oral TID Peg Fan PA-C    10 g at 09/27/24 1356    midodrine (PROAMATINE) tablet 12.5 mg  12.5 mg Oral TID w/meals José Miguel Aviles MD   12.5 mg at 09/27/24 1356    pantoprazole (PROTONIX) IV push injection 40 mg  40 mg Intravenous BID José Miguel Aviles MD   40 mg at 09/27/24 0858    rifaximin (XIFAXAN) tablet 550 mg  550 mg Oral BID José Miguel Aviles MD   550 mg at 09/27/24 0858    sodium bicarbonate tablet 1,300 mg  1,300 mg Oral TID José Miguel Aviles MD   1,300 mg at 09/27/24 1356    sodium chloride (PF) 0.9% PF flush 3 mL  3 mL Intracatheter Q8H José Miguel Aviles MD   3 mL at 09/27/24 0858    thiamine (B-1) tablet 100 mg  100 mg Oral Daily José Miguel Aviles MD   100 mg at 09/27/24 0857     Current Facility-Administered Medications   Medication Dose Route Frequency Provider Last Rate Last Admin     Georges Sanderson MD

## 2024-09-27 NOTE — PROGRESS NOTES
"Care Management Follow Up    Length of Stay (days): 6    Expected Discharge Date: 09/30/2024     Concerns to be Addressed: other (see comments) (\"Family conference\")   (Patient's mom, Sania, reported she is the only family member that would be included in a family conference)    Patient plan of care discussed at interdisciplinary rounds: Yes    Anticipated Discharge Disposition:  Home              Anticipated Discharge Services:  TBD    Anticipated Discharge DME:  TBD      Referrals Placed by CM/SW:  None    Private pay costs discussed: Not applicable    Discussed  Partnership in Safe Discharge Planning  document with patient/family: No     Handoff Completed: No, handoff not indicated or clinically appropriate    Additional Information:  Background:  Per H&P, patient was a direct admission from [facility] for liver transplant evaluation. He was recently diagnosed with decompensated alcoholic liver cirrhosis.     Progress: Social work reviewed patient's chart and he was discussed during rounds. Patient has been added to the transplant list and is not stable to discharge at this time.    Next Steps:   Patient's mom and her son encouraged to contact care management if needs, questions or concerns arise during this hospitalization.       Care Management will continue to follow for a safe hospital discharge.       ADA Vrenon, LICSW  7B   Phone: [phone]        "

## 2024-09-27 NOTE — TELEPHONE ENCOUNTER
"TRANSPLANT OR REPORT    Organ: Liver  Laterality (if known): na  Organ Location: Local    [facility] ID: [id number]   Donor OR Time: 0200 9/28  Expected/Actual Cross Clamp Time: 0400  Expected Organ Arrival Time: 4022-1240    Surgeon: Isaiah  Time in OR: 0400- 9/28  Time in 3C: 0300-9/28    Recipient Details  Admission ETA: Admitted  Unit: 7B  Isolation: no  Latex Allergy: no  : no  Diagnosis: ESLD    Liver Transplants  Bypass/Perfusion: no  Cellsaver: Yes  Hemodialysis: no  ~ \"RENAL STAFF TEACHING SERVICE MEDICINE\" : Remind LI Fellow to discuss with nephrology on call.  ~ CRRT Resource Nurse:   (Telephone Number for CRRT [phone]. When prompted, the caller should say  CRRT Resource 1\")      Liver or KP/PA Recipients - Vessel Banking:  Donor has positive serologies for HIV/HCV/HBV: no  Donor has risk criteria for HIV/HCV/HBV: no      Transplant Coordinator Contact Info: Ashley      Vessel Bank Information  Transplant hospitals must not store a donor s extra vessels if the donor has tested positive for any of the following:   - HIV by antibody, antigen, or nucleic acid test (MATTHEW)   - Hepatitis B surface antigen (HBsAg)   - Hepatitis B (HBV) by MATTHEW   - Hepatitis C (HCV) by antibody or MATTHEW     Extra vessels from donors that do not test positive for HIV, HBV, or HCV as above may be stored   "

## 2024-09-28 ENCOUNTER — ORGAN (OUTPATIENT)
Dept: TRANSPLANT | Facility: CLINIC | Age: 39
End: 2024-09-28
Payer: MEDICAID

## 2024-09-28 ENCOUNTER — APPOINTMENT (OUTPATIENT)
Dept: GENERAL RADIOLOGY | Facility: CLINIC | Age: 39
End: 2024-09-28
Attending: STUDENT IN AN ORGANIZED HEALTH CARE EDUCATION/TRAINING PROGRAM
Payer: MEDICAID

## 2024-09-28 ENCOUNTER — APPOINTMENT (OUTPATIENT)
Dept: GENERAL RADIOLOGY | Facility: CLINIC | Age: 39
End: 2024-09-28
Attending: PHYSICIAN ASSISTANT
Payer: MEDICAID

## 2024-09-28 ENCOUNTER — DOCUMENTATION ONLY (OUTPATIENT)
Dept: TRANSPLANT | Facility: CLINIC | Age: 39
End: 2024-09-28
Payer: MEDICAID

## 2024-09-28 ENCOUNTER — ANESTHESIA (OUTPATIENT)
Dept: SURGERY | Facility: CLINIC | Age: 39
End: 2024-09-28
Payer: MEDICAID

## 2024-09-28 ENCOUNTER — APPOINTMENT (OUTPATIENT)
Dept: ULTRASOUND IMAGING | Facility: CLINIC | Age: 39
End: 2024-09-28
Attending: STUDENT IN AN ORGANIZED HEALTH CARE EDUCATION/TRAINING PROGRAM
Payer: MEDICAID

## 2024-09-28 LAB
ALBUMIN SERPL BCG-MCNC: 3.1 G/DL (ref 3.5–5.2)
ALLEN'S TEST: ABNORMAL
ALP SERPL-CCNC: 64 U/L (ref 40–150)
ALT SERPL W P-5'-P-CCNC: 418 U/L (ref 0–70)
ANION GAP SERPL CALCULATED.3IONS-SCNC: 16 MMOL/L (ref 7–15)
ANION GAP SERPL CALCULATED.3IONS-SCNC: 19 MMOL/L (ref 7–15)
APTT PPP: 31 SECONDS (ref 22–38)
APTT PPP: 34 SECONDS (ref 22–38)
APTT PPP: 40 SECONDS (ref 22–38)
APTT PPP: 52 SECONDS (ref 22–38)
APTT PPP: 86 SECONDS (ref 22–38)
AST SERPL W P-5'-P-CCNC: 678 U/L (ref 0–45)
BASE EXCESS BLDA CALC-SCNC: -0.3 MMOL/L (ref -3–3)
BASE EXCESS BLDA CALC-SCNC: -3.4 MMOL/L (ref -3–3)
BASE EXCESS BLDA CALC-SCNC: -5.4 MMOL/L (ref -3–3)
BASE EXCESS BLDA CALC-SCNC: -7.3 MMOL/L (ref -3–3)
BASE EXCESS BLDA CALC-SCNC: -9.5 MMOL/L (ref -3–3)
BASE EXCESS BLDA CALC-SCNC: -9.9 MMOL/L (ref -3–3)
BASE EXCESS BLDA CALC-SCNC: 0.9 MMOL/L (ref -3–3)
BASE EXCESS BLDA CALC-SCNC: 1.8 MMOL/L (ref -3–3)
BASE EXCESS BLDA CALC-SCNC: 2 MMOL/L (ref -3–3)
BASOPHILS # BLD AUTO: 0 10E3/UL (ref 0–0.2)
BASOPHILS NFR BLD AUTO: 0 %
BILIRUB DIRECT SERPL-MCNC: 13.08 MG/DL (ref 0–0.3)
BILIRUB SERPL-MCNC: 14.3 MG/DL
BLD PROD TYP BPU: NORMAL
BLOOD COMPONENT TYPE: NORMAL
BUN SERPL-MCNC: 27 MG/DL (ref 6–20)
BUN SERPL-MCNC: 27 MG/DL (ref 6–20)
CA-I BLD-MCNC: 4.1 MG/DL (ref 4.4–5.2)
CA-I BLD-MCNC: 4.3 MG/DL (ref 4.4–5.2)
CA-I BLD-MCNC: 4.4 MG/DL (ref 4.4–5.2)
CA-I BLD-MCNC: 4.7 MG/DL (ref 4.4–5.2)
CA-I BLD-MCNC: 5 MG/DL (ref 4.4–5.2)
CA-I BLD-MCNC: 5 MG/DL (ref 4.4–5.2)
CA-I BLD-MCNC: 5.4 MG/DL (ref 4.4–5.2)
CALCIUM SERPL-MCNC: 10.2 MG/DL (ref 8.8–10.4)
CALCIUM SERPL-MCNC: 10.6 MG/DL (ref 8.8–10.4)
CHLORIDE SERPL-SCNC: 100 MMOL/L (ref 98–107)
CHLORIDE SERPL-SCNC: 103 MMOL/L (ref 98–107)
CLOT INIT KAOL IND TO POST HEP NEUT TRTO: 1 {RATIO}
CLOT INIT KAOL IND TO POST HEP NEUT TRTO: 1 {RATIO}
CLOT INIT KAOL IND TO POST HEP NEUT TRTO: 1.2 {RATIO}
CLOT INIT KAOLIN IND BLD US: 127 SEC (ref 121–175)
CLOT INIT KAOLIN IND BLD US: 130 SEC (ref 121–175)
CLOT INIT KAOLIN IND BLD US: 142 SEC (ref 113–166)
CLOT INIT KAOLIN IND BLD US: 164 SEC (ref 121–175)
CLOT INIT KAOLIN IND BLD US: 166 SEC (ref 121–175)
CLOT INIT KAOLIN IND BLD US: 194 SEC (ref 113–166)
CLOT INIT KAOLIN IND BLD US: 202 SEC (ref 121–175)
CLOT INIT KAOLIN IND BLD US: 227 SEC (ref 113–166)
CLOT INIT KAOLIN IND P HEP NEUT BLD US: 100 PERCENT (ref 92–100)
CLOT INIT KAOLIN IND P HEP NEUT BLD US: 100 PERCENT (ref 92–100)
CLOT INIT KAOLIN IND P HEP NEUT BLD US: 143 SEC (ref 103–153)
CLOT INIT KAOLIN IND P HEP NEUT BLD US: 196 SEC (ref 103–153)
CLOT INIT KAOLIN IND P HEP NEUT BLD US: 199 SEC (ref 103–153)
CLOT INIT KAOLIN IND P HEP NEUT BLD US: 67 PERCENT (ref 92–100)
CLOT INIT KAOLIN IND P HEP NEUT BLD US: 97 PERCENT (ref 92–100)
CLOT INIT KAOLIN IND P HEP NEUT BLD US: 97 PERCENT (ref 92–100)
CLOT STIFF PLT CONT BLD CALC: 10.4 HPA (ref 11.9–29.8)
CLOT STIFF PLT CONT BLD CALC: 4.2 HPA (ref 11.9–29.8)
CLOT STIFF PLT CONT BLD CALC: 6.7 HPA (ref 12.8–32.3)
CLOT STIFF PLT CONT BLD CALC: 7.4 HPA (ref 11.9–29.8)
CLOT STIFF PLT CONT BLD CALC: 7.4 HPA (ref 12.8–32.3)
CLOT STIFF PLT CONT BLD CALC: 8.4 HPA (ref 12.8–32.3)
CLOT STIFF PLT CONT BLD CALC: 9.2 HPA (ref 12.8–32.3)
CLOT STIFF PLT CONT BLD CALC: 9.9 HPA (ref 12.8–32.3)
CLOT STIFF TF IND P HEP NEUT BLD US: 10.5 HPA (ref 14–35.4)
CLOT STIFF TF IND P HEP NEUT BLD US: 11.1 HPA (ref 14–35.4)
CLOT STIFF TF IND P HEP NEUT BLD US: 11.8 HPA (ref 14–35.4)
CLOT STIFF TF IND P HEP NEUT BLD US: 12.3 HPA (ref 13–33.2)
CLOT STIFF TF IND P HEP NEUT BLD US: 5.2 HPA (ref 13–33.2)
CLOT STIFF TF IND P HEP NEUT BLD US: 8.4 HPA (ref 14–35.4)
CLOT STIFF TF IND P HEP NEUT BLD US: 9.2 HPA (ref 13–33.2)
CLOT STIFF TF IND P HEP NEUT BLD US: 9.2 HPA (ref 14–35.4)
CLOT STIFF TF IND+IIB-IIIA INH P HEP NEU: 1 HPA (ref 1–3.7)
CLOT STIFF TF IND+IIB-IIIA INH P HEP NEU: 1.7 HPA (ref 0.9–4.2)
CLOT STIFF TF IND+IIB-IIIA INH P HEP NEU: 1.8 HPA (ref 0.9–4.2)
CLOT STIFF TF IND+IIB-IIIA INH P HEP NEU: 1.8 HPA (ref 1–3.7)
CLOT STIFF TF IND+IIB-IIIA INH P HEP NEU: 1.9 HPA (ref 0.9–4.2)
CLOT STIFF TF IND+IIB-IIIA INH P HEP NEU: 1.9 HPA (ref 0.9–4.2)
CLOT STIFF TF IND+IIB-IIIA INH P HEP NEU: 1.9 HPA (ref 1–3.7)
CLOT STIFF TF IND+IIB-IIIA INH P HEP NEU: 2.1 HPA (ref 0.9–4.2)
CODING SYSTEM: NORMAL
COHGB MFR BLD: 100 % (ref 96–97)
CREAT SERPL-MCNC: 1.72 MG/DL (ref 0.67–1.17)
CREAT SERPL-MCNC: 1.9 MG/DL (ref 0.67–1.17)
CROSSMATCH: NORMAL
EGFRCR SERPLBLD CKD-EPI 2021: 46 ML/MIN/1.73M2
EGFRCR SERPLBLD CKD-EPI 2021: 52 ML/MIN/1.73M2
EOSINOPHIL # BLD AUTO: 0 10E3/UL (ref 0–0.7)
EOSINOPHIL NFR BLD AUTO: 0 %
ERYTHROCYTE [DISTWIDTH] IN BLOOD BY AUTOMATED COUNT: 18.4 % (ref 10–15)
ERYTHROCYTE [DISTWIDTH] IN BLOOD BY AUTOMATED COUNT: 18.5 % (ref 10–15)
ERYTHROCYTE [DISTWIDTH] IN BLOOD BY AUTOMATED COUNT: 19 % (ref 10–15)
EST. AVERAGE GLUCOSE BLD GHB EST-MCNC: 108 MG/DL
FIBRINOGEN PPP-MCNC: 156 MG/DL (ref 170–510)
FIBRINOGEN PPP-MCNC: 173 MG/DL (ref 170–510)
FIBRINOGEN PPP-MCNC: 201 MG/DL (ref 170–510)
FIBRINOGEN PPP-MCNC: 245 MG/DL (ref 170–510)
FIBRINOGEN PPP-MCNC: 248 MG/DL (ref 170–510)
GLUCOSE BLD-MCNC: 140 MG/DL (ref 70–99)
GLUCOSE BLD-MCNC: 150 MG/DL (ref 70–99)
GLUCOSE BLD-MCNC: 168 MG/DL (ref 70–99)
GLUCOSE BLD-MCNC: 198 MG/DL (ref 70–99)
GLUCOSE BLD-MCNC: 200 MG/DL (ref 70–99)
GLUCOSE BLD-MCNC: 216 MG/DL (ref 70–99)
GLUCOSE BLD-MCNC: 223 MG/DL (ref 70–99)
GLUCOSE BLD-MCNC: 87 MG/DL (ref 70–99)
GLUCOSE BLDC GLUCOMTR-MCNC: 125 MG/DL (ref 70–99)
GLUCOSE BLDC GLUCOMTR-MCNC: 183 MG/DL (ref 70–99)
GLUCOSE BLDC GLUCOMTR-MCNC: 200 MG/DL (ref 70–99)
GLUCOSE BLDC GLUCOMTR-MCNC: 203 MG/DL (ref 70–99)
GLUCOSE BLDC GLUCOMTR-MCNC: 208 MG/DL (ref 70–99)
GLUCOSE BLDC GLUCOMTR-MCNC: 215 MG/DL (ref 70–99)
GLUCOSE BLDC GLUCOMTR-MCNC: 219 MG/DL (ref 70–99)
GLUCOSE BLDC GLUCOMTR-MCNC: 86 MG/DL (ref 70–99)
GLUCOSE SERPL-MCNC: 200 MG/DL (ref 70–99)
GLUCOSE SERPL-MCNC: 218 MG/DL (ref 70–99)
HBA1C MFR BLD: 5.4 %
HCO3 BLD-SCNC: 26 MMOL/L (ref 21–28)
HCO3 BLDA-SCNC: 16 MMOL/L (ref 21–28)
HCO3 BLDA-SCNC: 17 MMOL/L (ref 21–28)
HCO3 BLDA-SCNC: 18 MMOL/L (ref 21–28)
HCO3 BLDA-SCNC: 19 MMOL/L (ref 21–28)
HCO3 BLDA-SCNC: 21 MMOL/L (ref 21–28)
HCO3 BLDA-SCNC: 24 MMOL/L (ref 21–28)
HCO3 BLDA-SCNC: 25 MMOL/L (ref 21–28)
HCO3 BLDA-SCNC: 26 MMOL/L (ref 21–28)
HCO3 SERPL-SCNC: 23 MMOL/L (ref 22–29)
HCO3 SERPL-SCNC: 24 MMOL/L (ref 22–29)
HCT VFR BLD AUTO: 21.9 % (ref 40–53)
HCT VFR BLD AUTO: 25.1 % (ref 40–53)
HCT VFR BLD AUTO: 25.2 % (ref 40–53)
HGB BLD-MCNC: 6.7 G/DL (ref 13.3–17.7)
HGB BLD-MCNC: 7 G/DL (ref 13.3–17.7)
HGB BLD-MCNC: 7 G/DL (ref 13.3–17.7)
HGB BLD-MCNC: 7.4 G/DL (ref 13.3–17.7)
HGB BLD-MCNC: 7.5 G/DL (ref 13.3–17.7)
HGB BLD-MCNC: 7.5 G/DL (ref 13.3–17.7)
HGB BLD-MCNC: 7.6 G/DL (ref 13.3–17.7)
HGB BLD-MCNC: 8.3 G/DL (ref 13.3–17.7)
HGB BLD-MCNC: 8.7 G/DL (ref 13.3–17.7)
HGB BLD-MCNC: 8.7 G/DL (ref 13.3–17.7)
HGB BLD-MCNC: 8.8 G/DL (ref 13.3–17.7)
HGB BLD-MCNC: 8.9 G/DL (ref 13.3–17.7)
IMM GRANULOCYTES # BLD: 0.1 10E3/UL
IMM GRANULOCYTES NFR BLD: 1 %
INR PPP: 1.26 (ref 0.85–1.15)
INR PPP: 1.37 (ref 0.85–1.15)
INR PPP: 1.57 (ref 0.85–1.15)
INR PPP: 1.86 (ref 0.85–1.15)
INR PPP: 2.44 (ref 0.85–1.15)
ISSUE DATE AND TIME: NORMAL
LACTATE BLD-SCNC: 1.8 MMOL/L (ref 0.7–2)
LACTATE BLD-SCNC: 1.8 MMOL/L (ref 0.7–2)
LACTATE BLD-SCNC: 3 MMOL/L (ref 0.7–2)
LACTATE BLD-SCNC: 3.3 MMOL/L (ref 0.7–2)
LACTATE BLD-SCNC: 4.2 MMOL/L (ref 0.7–2)
LACTATE BLD-SCNC: 4.3 MMOL/L (ref 0.7–2)
LACTATE BLD-SCNC: 4.4 MMOL/L (ref 0.7–2)
LACTATE BLD-SCNC: 4.6 MMOL/L (ref 0.7–2)
LACTATE SERPL-SCNC: 3.7 MMOL/L (ref 0.7–2)
LACTATE SERPL-SCNC: 4.5 MMOL/L (ref 0.7–2)
LACTATE SERPL-SCNC: 5 MMOL/L (ref 0.7–2)
LYMPHOCYTES # BLD AUTO: 0.3 10E3/UL (ref 0.8–5.3)
LYMPHOCYTES NFR BLD AUTO: 5 %
MAGNESIUM SERPL-MCNC: 1.5 MG/DL (ref 1.7–2.3)
MAGNESIUM SERPL-MCNC: 1.7 MG/DL (ref 1.7–2.3)
MCH RBC QN AUTO: 30.1 PG (ref 26.5–33)
MCH RBC QN AUTO: 30.3 PG (ref 26.5–33)
MCH RBC QN AUTO: 30.4 PG (ref 26.5–33)
MCHC RBC AUTO-ENTMCNC: 34.2 G/DL (ref 31.5–36.5)
MCHC RBC AUTO-ENTMCNC: 34.5 G/DL (ref 31.5–36.5)
MCHC RBC AUTO-ENTMCNC: 35.1 G/DL (ref 31.5–36.5)
MCV RBC AUTO: 87 FL (ref 78–100)
MCV RBC AUTO: 88 FL (ref 78–100)
MCV RBC AUTO: 88 FL (ref 78–100)
MONOCYTES # BLD AUTO: 0.2 10E3/UL (ref 0–1.3)
MONOCYTES NFR BLD AUTO: 4 %
NEUTROPHILS # BLD AUTO: 4.9 10E3/UL (ref 1.6–8.3)
NEUTROPHILS NFR BLD AUTO: 89 %
NRBC # BLD AUTO: 0 10E3/UL
NRBC BLD AUTO-RTO: 0 /100
O2/TOTAL GAS SETTING VFR VENT: 100 %
O2/TOTAL GAS SETTING VFR VENT: 33 %
O2/TOTAL GAS SETTING VFR VENT: 37 %
O2/TOTAL GAS SETTING VFR VENT: 50 %
O2/TOTAL GAS SETTING VFR VENT: 60 %
OXYHGB MFR BLDA: 92 % (ref 92–100)
OXYHGB MFR BLDA: 95 % (ref 92–100)
OXYHGB MFR BLDA: 96 % (ref 92–100)
OXYHGB MFR BLDA: 97 % (ref 92–100)
OXYHGB MFR BLDA: 97 % (ref 92–100)
OXYHGB MFR BLDA: 98 % (ref 92–100)
PCO2 BLD: 38 MM HG (ref 35–45)
PCO2 BLDA: 31 MM HG (ref 35–45)
PCO2 BLDA: 33 MM HG (ref 35–45)
PCO2 BLDA: 34 MM HG (ref 35–45)
PCO2 BLDA: 34 MM HG (ref 35–45)
PCO2 BLDA: 36 MM HG (ref 35–45)
PCO2 BLDA: 36 MM HG (ref 35–45)
PCO2 BLDA: 39 MM HG (ref 35–45)
PCO2 BLDA: 40 MM HG (ref 35–45)
PH BLD: 7.45 [PH] (ref 7.35–7.45)
PH BLDA: 7.24 [PH] (ref 7.35–7.45)
PH BLDA: 7.26 [PH] (ref 7.35–7.45)
PH BLDA: 7.34 [PH] (ref 7.35–7.45)
PH BLDA: 7.39 [PH] (ref 7.35–7.45)
PH BLDA: 7.4 [PH] (ref 7.35–7.45)
PH BLDA: 7.44 [PH] (ref 7.35–7.45)
PH BLDA: 7.45 [PH] (ref 7.35–7.45)
PH BLDA: 7.45 [PH] (ref 7.35–7.45)
PHOSPHATE SERPL-MCNC: 4 MG/DL (ref 2.5–4.5)
PHOSPHATE SERPL-MCNC: 5.1 MG/DL (ref 2.5–4.5)
PLATELET # BLD AUTO: 59 10E3/UL (ref 150–450)
PLATELET # BLD AUTO: 72 10E3/UL (ref 150–450)
PLATELET # BLD AUTO: 75 10E3/UL (ref 150–450)
PLATELET # BLD AUTO: 75 10E3/UL (ref 150–450)
PLATELET # BLD AUTO: 80 10E3/UL (ref 150–450)
PO2 BLD: 139 MM HG (ref 80–105)
PO2 BLDA: 106 MM HG (ref 80–105)
PO2 BLDA: 110 MM HG (ref 80–105)
PO2 BLDA: 120 MM HG (ref 80–105)
PO2 BLDA: 202 MM HG (ref 80–105)
PO2 BLDA: 72 MM HG (ref 80–105)
PO2 BLDA: 79 MM HG (ref 80–105)
PO2 BLDA: 92 MM HG (ref 80–105)
PO2 BLDA: 94 MM HG (ref 80–105)
POTASSIUM BLD-SCNC: 3.5 MMOL/L (ref 3.4–5.3)
POTASSIUM BLD-SCNC: 3.5 MMOL/L (ref 3.4–5.3)
POTASSIUM BLD-SCNC: 3.7 MMOL/L (ref 3.4–5.3)
POTASSIUM BLD-SCNC: 4.3 MMOL/L (ref 3.4–5.3)
POTASSIUM BLD-SCNC: 4.4 MMOL/L (ref 3.4–5.3)
POTASSIUM BLD-SCNC: 4.6 MMOL/L (ref 3.4–5.3)
POTASSIUM BLD-SCNC: 4.6 MMOL/L (ref 3.4–5.3)
POTASSIUM BLD-SCNC: 4.9 MMOL/L (ref 3.4–5.3)
POTASSIUM SERPL-SCNC: 3.2 MMOL/L (ref 3.4–5.3)
POTASSIUM SERPL-SCNC: 3.8 MMOL/L (ref 3.4–5.3)
PROT SERPL-MCNC: 4.8 G/DL (ref 6.4–8.3)
RADIOLOGIST FLAGS: ABNORMAL
RBC # BLD AUTO: 2.49 10E6/UL (ref 4.4–5.9)
RBC # BLD AUTO: 2.87 10E6/UL (ref 4.4–5.9)
RBC # BLD AUTO: 2.89 10E6/UL (ref 4.4–5.9)
SAO2 % BLDA: 100 % (ref 96–97)
SAO2 % BLDA: 93 % (ref 96–97)
SAO2 % BLDA: 97 % (ref 92–100)
SAO2 % BLDA: 97 % (ref 96–97)
SAO2 % BLDA: 97 % (ref 96–97)
SAO2 % BLDA: 99 % (ref 96–97)
SARS-COV-2 RNA RESP QL NAA+PROBE: NEGATIVE
SODIUM BLD-SCNC: 134 MMOL/L (ref 135–145)
SODIUM BLD-SCNC: 135 MMOL/L (ref 135–145)
SODIUM BLD-SCNC: 135 MMOL/L (ref 135–145)
SODIUM BLD-SCNC: 136 MMOL/L (ref 135–145)
SODIUM BLD-SCNC: 139 MMOL/L (ref 135–145)
SODIUM BLD-SCNC: 141 MMOL/L (ref 135–145)
SODIUM BLD-SCNC: 142 MMOL/L (ref 135–145)
SODIUM BLD-SCNC: 142 MMOL/L (ref 135–145)
SODIUM SERPL-SCNC: 140 MMOL/L (ref 135–145)
SODIUM SERPL-SCNC: 145 MMOL/L (ref 135–145)
UNIT ABO/RH: NORMAL
UNIT NUMBER: NORMAL
UNIT STATUS: NORMAL
UNIT TYPE ISBT: 5100
UNIT TYPE ISBT: 6200
UNIT TYPE ISBT: 7300
UNIT TYPE ISBT: 9500
WBC # BLD AUTO: 4 10E3/UL (ref 4–11)
WBC # BLD AUTO: 4.2 10E3/UL (ref 4–11)
WBC # BLD AUTO: 5.5 10E3/UL (ref 4–11)

## 2024-09-28 PROCEDURE — 93975 VASCULAR STUDY: CPT

## 2024-09-28 PROCEDURE — 99292 CRITICAL CARE ADDL 30 MIN: CPT | Mod: FS | Performed by: ANESTHESIOLOGY

## 2024-09-28 PROCEDURE — 83036 HEMOGLOBIN GLYCOSYLATED A1C: CPT | Performed by: PHYSICIAN ASSISTANT

## 2024-09-28 PROCEDURE — 250N000013 HC RX MED GY IP 250 OP 250 PS 637

## 2024-09-28 PROCEDURE — 47135 TRANSPLANTATION OF LIVER: CPT | Performed by: TRANSPLANT SURGERY

## 2024-09-28 PROCEDURE — 999N000157 HC STATISTIC RCP TIME EA 10 MIN

## 2024-09-28 PROCEDURE — 85049 AUTOMATED PLATELET COUNT: CPT | Performed by: STUDENT IN AN ORGANIZED HEALTH CARE EDUCATION/TRAINING PROGRAM

## 2024-09-28 PROCEDURE — P9045 ALBUMIN (HUMAN), 5%, 250 ML: HCPCS | Performed by: STUDENT IN AN ORGANIZED HEALTH CARE EDUCATION/TRAINING PROGRAM

## 2024-09-28 PROCEDURE — 82330 ASSAY OF CALCIUM: CPT | Performed by: PHYSICIAN ASSISTANT

## 2024-09-28 PROCEDURE — 85730 THROMBOPLASTIN TIME PARTIAL: CPT | Performed by: PHYSICIAN ASSISTANT

## 2024-09-28 PROCEDURE — 250N000011 HC RX IP 250 OP 636: Performed by: STUDENT IN AN ORGANIZED HEALTH CARE EDUCATION/TRAINING PROGRAM

## 2024-09-28 PROCEDURE — 85027 COMPLETE CBC AUTOMATED: CPT | Performed by: STUDENT IN AN ORGANIZED HEALTH CARE EDUCATION/TRAINING PROGRAM

## 2024-09-28 PROCEDURE — 85018 HEMOGLOBIN: CPT | Performed by: STUDENT IN AN ORGANIZED HEALTH CARE EDUCATION/TRAINING PROGRAM

## 2024-09-28 PROCEDURE — 250N000011 HC RX IP 250 OP 636: Mod: JZ | Performed by: ANESTHESIOLOGY

## 2024-09-28 PROCEDURE — 250N000011 HC RX IP 250 OP 636: Performed by: NURSE PRACTITIONER

## 2024-09-28 PROCEDURE — 370N000017 HC ANESTHESIA TECHNICAL FEE, PER MIN: Performed by: TRANSPLANT SURGERY

## 2024-09-28 PROCEDURE — 83605 ASSAY OF LACTIC ACID: CPT | Performed by: STUDENT IN AN ORGANIZED HEALTH CARE EDUCATION/TRAINING PROGRAM

## 2024-09-28 PROCEDURE — 82805 BLOOD GASES W/O2 SATURATION: CPT | Performed by: STUDENT IN AN ORGANIZED HEALTH CARE EDUCATION/TRAINING PROGRAM

## 2024-09-28 PROCEDURE — 88313 SPECIAL STAINS GROUP 2: CPT | Mod: 26 | Performed by: PATHOLOGY

## 2024-09-28 PROCEDURE — P9016 RBC LEUKOCYTES REDUCED: HCPCS

## 2024-09-28 PROCEDURE — 82330 ASSAY OF CALCIUM: CPT

## 2024-09-28 PROCEDURE — 83605 ASSAY OF LACTIC ACID: CPT

## 2024-09-28 PROCEDURE — 0FY00Z0 TRANSPLANTATION OF LIVER, ALLOGENEIC, OPEN APPROACH: ICD-10-PCS | Performed by: TRANSPLANT SURGERY

## 2024-09-28 PROCEDURE — 85610 PROTHROMBIN TIME: CPT | Performed by: STUDENT IN AN ORGANIZED HEALTH CARE EDUCATION/TRAINING PROGRAM

## 2024-09-28 PROCEDURE — 250N000011 HC RX IP 250 OP 636: Performed by: TRANSPLANT SURGERY

## 2024-09-28 PROCEDURE — 250N000009 HC RX 250: Performed by: TRANSPLANT SURGERY

## 2024-09-28 PROCEDURE — P9012 CRYOPRECIPITATE EACH UNIT: HCPCS

## 2024-09-28 PROCEDURE — 250N000013 HC RX MED GY IP 250 OP 250 PS 637: Performed by: PHYSICIAN ASSISTANT

## 2024-09-28 PROCEDURE — P9059 PLASMA, FRZ BETWEEN 8-24HOUR: HCPCS

## 2024-09-28 PROCEDURE — 250N000009 HC RX 250: Performed by: STUDENT IN AN ORGANIZED HEALTH CARE EDUCATION/TRAINING PROGRAM

## 2024-09-28 PROCEDURE — S5010 5% DEXTROSE AND 0.45% SALINE: HCPCS | Performed by: STUDENT IN AN ORGANIZED HEALTH CARE EDUCATION/TRAINING PROGRAM

## 2024-09-28 PROCEDURE — 85730 THROMBOPLASTIN TIME PARTIAL: CPT | Performed by: STUDENT IN AN ORGANIZED HEALTH CARE EDUCATION/TRAINING PROGRAM

## 2024-09-28 PROCEDURE — 71045 X-RAY EXAM CHEST 1 VIEW: CPT | Mod: 26 | Performed by: RADIOLOGY

## 2024-09-28 PROCEDURE — 88313 SPECIAL STAINS GROUP 2: CPT | Mod: TC | Performed by: TRANSPLANT SURGERY

## 2024-09-28 PROCEDURE — C1760 CLOSURE DEV, VASC: HCPCS | Performed by: TRANSPLANT SURGERY

## 2024-09-28 PROCEDURE — 258N000003 HC RX IP 258 OP 636: Performed by: STUDENT IN AN ORGANIZED HEALTH CARE EDUCATION/TRAINING PROGRAM

## 2024-09-28 PROCEDURE — 84295 ASSAY OF SERUM SODIUM: CPT | Performed by: STUDENT IN AN ORGANIZED HEALTH CARE EDUCATION/TRAINING PROGRAM

## 2024-09-28 PROCEDURE — 258N000003 HC RX IP 258 OP 636: Performed by: PHYSICIAN ASSISTANT

## 2024-09-28 PROCEDURE — 83735 ASSAY OF MAGNESIUM: CPT | Performed by: PHYSICIAN ASSISTANT

## 2024-09-28 PROCEDURE — 250N000011 HC RX IP 250 OP 636

## 2024-09-28 PROCEDURE — 88309 TISSUE EXAM BY PATHOLOGIST: CPT | Mod: 26 | Performed by: PATHOLOGY

## 2024-09-28 PROCEDURE — P9037 PLATE PHERES LEUKOREDU IRRAD: HCPCS

## 2024-09-28 PROCEDURE — 999N000065 XR ABDOMEN PORT 1 VIEW

## 2024-09-28 PROCEDURE — 76700 US EXAM ABDOM COMPLETE: CPT

## 2024-09-28 PROCEDURE — 85384 FIBRINOGEN ACTIVITY: CPT | Performed by: PHYSICIAN ASSISTANT

## 2024-09-28 PROCEDURE — P9045 ALBUMIN (HUMAN), 5%, 250 ML: HCPCS | Mod: JZ | Performed by: ANESTHESIOLOGY

## 2024-09-28 PROCEDURE — 85384 FIBRINOGEN ACTIVITY: CPT | Performed by: STUDENT IN AN ORGANIZED HEALTH CARE EDUCATION/TRAINING PROGRAM

## 2024-09-28 PROCEDURE — 250N000012 HC RX MED GY IP 250 OP 636 PS 637: Performed by: TRANSPLANT SURGERY

## 2024-09-28 PROCEDURE — 85025 COMPLETE CBC W/AUTO DIFF WBC: CPT | Performed by: STUDENT IN AN ORGANIZED HEALTH CARE EDUCATION/TRAINING PROGRAM

## 2024-09-28 PROCEDURE — 250N000025 HC SEVOFLURANE, PER MIN: Performed by: TRANSPLANT SURGERY

## 2024-09-28 PROCEDURE — 84100 ASSAY OF PHOSPHORUS: CPT | Performed by: PHYSICIAN ASSISTANT

## 2024-09-28 PROCEDURE — 250N000009 HC RX 250: Performed by: PHYSICIAN ASSISTANT

## 2024-09-28 PROCEDURE — 85018 HEMOGLOBIN: CPT | Performed by: PHYSICIAN ASSISTANT

## 2024-09-28 PROCEDURE — 85396 CLOTTING ASSAY WHOLE BLOOD: CPT

## 2024-09-28 PROCEDURE — 74018 RADEX ABDOMEN 1 VIEW: CPT | Mod: 26 | Performed by: RADIOLOGY

## 2024-09-28 PROCEDURE — P9012 CRYOPRECIPITATE EACH UNIT: HCPCS | Performed by: PHYSICIAN ASSISTANT

## 2024-09-28 PROCEDURE — 999N000141 HC STATISTIC PRE-PROCEDURE NURSING ASSESSMENT: Performed by: TRANSPLANT SURGERY

## 2024-09-28 PROCEDURE — 272N000001 HC OR GENERAL SUPPLY STERILE: Performed by: TRANSPLANT SURGERY

## 2024-09-28 PROCEDURE — 272N000002 HC OR SUPPLY OTHER OPNP: Performed by: TRANSPLANT SURGERY

## 2024-09-28 PROCEDURE — P9016 RBC LEUKOCYTES REDUCED: HCPCS | Performed by: PHYSICIAN ASSISTANT

## 2024-09-28 PROCEDURE — 99291 CRITICAL CARE FIRST HOUR: CPT | Mod: FS | Performed by: ANESTHESIOLOGY

## 2024-09-28 PROCEDURE — 250N000013 HC RX MED GY IP 250 OP 250 PS 637: Performed by: HOSPITALIST

## 2024-09-28 PROCEDURE — 94002 VENT MGMT INPAT INIT DAY: CPT

## 2024-09-28 PROCEDURE — 250N000012 HC RX MED GY IP 250 OP 636 PS 637: Performed by: STUDENT IN AN ORGANIZED HEALTH CARE EDUCATION/TRAINING PROGRAM

## 2024-09-28 PROCEDURE — 258N000003 HC RX IP 258 OP 636: Performed by: TRANSPLANT SURGERY

## 2024-09-28 PROCEDURE — 87635 SARS-COV-2 COVID-19 AMP PRB: CPT

## 2024-09-28 PROCEDURE — 258N000003 HC RX IP 258 OP 636: Performed by: NURSE PRACTITIONER

## 2024-09-28 PROCEDURE — 83735 ASSAY OF MAGNESIUM: CPT

## 2024-09-28 PROCEDURE — 85049 AUTOMATED PLATELET COUNT: CPT | Performed by: PHYSICIAN ASSISTANT

## 2024-09-28 PROCEDURE — 258N000003 HC RX IP 258 OP 636: Performed by: ANESTHESIOLOGY

## 2024-09-28 PROCEDURE — 250N000013 HC RX MED GY IP 250 OP 250 PS 637: Performed by: STUDENT IN AN ORGANIZED HEALTH CARE EDUCATION/TRAINING PROGRAM

## 2024-09-28 PROCEDURE — 999N000065 XR CHEST PORT 1 VIEW

## 2024-09-28 PROCEDURE — P9037 PLATE PHERES LEUKOREDU IRRAD: HCPCS | Performed by: PHYSICIAN ASSISTANT

## 2024-09-28 PROCEDURE — 93975 VASCULAR STUDY: CPT | Mod: 26 | Performed by: RADIOLOGY

## 2024-09-28 PROCEDURE — 120N000005 HC R&B MS OVERFLOW UMMC

## 2024-09-28 PROCEDURE — 88304 TISSUE EXAM BY PATHOLOGIST: CPT | Mod: 26 | Performed by: PATHOLOGY

## 2024-09-28 PROCEDURE — 250N000009 HC RX 250: Performed by: ANESTHESIOLOGY

## 2024-09-28 PROCEDURE — 88304 TISSUE EXAM BY PATHOLOGIST: CPT | Mod: TC | Performed by: TRANSPLANT SURGERY

## 2024-09-28 PROCEDURE — 82310 ASSAY OF CALCIUM: CPT | Performed by: STUDENT IN AN ORGANIZED HEALTH CARE EDUCATION/TRAINING PROGRAM

## 2024-09-28 PROCEDURE — 360N000078 HC SURGERY LEVEL 5, PER MIN: Performed by: TRANSPLANT SURGERY

## 2024-09-28 PROCEDURE — 82248 BILIRUBIN DIRECT: CPT | Performed by: STUDENT IN AN ORGANIZED HEALTH CARE EDUCATION/TRAINING PROGRAM

## 2024-09-28 PROCEDURE — 250N000011 HC RX IP 250 OP 636: Performed by: PHYSICIAN ASSISTANT

## 2024-09-28 PROCEDURE — 85610 PROTHROMBIN TIME: CPT | Performed by: PHYSICIAN ASSISTANT

## 2024-09-28 PROCEDURE — 258N000001 HC RX 258: Performed by: ANESTHESIOLOGY

## 2024-09-28 PROCEDURE — 250N000009 HC RX 250: Performed by: HOSPITALIST

## 2024-09-28 PROCEDURE — 84100 ASSAY OF PHOSPHORUS: CPT

## 2024-09-28 PROCEDURE — 812N000006 HC ACQUISITION LIVER CADAVER DONOR

## 2024-09-28 RX ORDER — CHLORHEXIDINE GLUCONATE ORAL RINSE 1.2 MG/ML
15 SOLUTION DENTAL EVERY 12 HOURS
Status: DISCONTINUED | OUTPATIENT
Start: 2024-09-28 | End: 2024-09-29

## 2024-09-28 RX ORDER — FLUCONAZOLE 2 MG/ML
400 INJECTION, SOLUTION INTRAVENOUS EVERY 24 HOURS
Status: DISCONTINUED | OUTPATIENT
Start: 2024-09-29 | End: 2024-09-28

## 2024-09-28 RX ORDER — HYDROMORPHONE HCL IN WATER/PF 6 MG/30 ML
0.2 PATIENT CONTROLLED ANALGESIA SYRINGE INTRAVENOUS
Status: DISCONTINUED | OUTPATIENT
Start: 2024-09-28 | End: 2024-09-30

## 2024-09-28 RX ORDER — FIBRINOGEN (HUMAN) 700-1300MG
1150 KIT INTRAVENOUS
Status: COMPLETED | OUTPATIENT
Start: 2024-09-28 | End: 2024-09-28

## 2024-09-28 RX ORDER — VASOPRESSIN IN 0.9 % NACL 2 UNIT/2ML
SYRINGE (ML) INTRAVENOUS PRN
Status: DISCONTINUED | OUTPATIENT
Start: 2024-09-28 | End: 2024-09-28

## 2024-09-28 RX ORDER — NOREPINEPHRINE BITARTRATE 0.06 MG/ML
.01-.6 INJECTION, SOLUTION INTRAVENOUS CONTINUOUS
Status: DISCONTINUED | OUTPATIENT
Start: 2024-09-28 | End: 2024-09-29

## 2024-09-28 RX ORDER — DEXTROSE MONOHYDRATE AND SODIUM CHLORIDE 5; .45 G/100ML; G/100ML
INJECTION, SOLUTION INTRAVENOUS CONTINUOUS
Status: DISCONTINUED | OUTPATIENT
Start: 2024-09-28 | End: 2024-09-29

## 2024-09-28 RX ORDER — METHOCARBAMOL 500 MG/1
500 TABLET, FILM COATED ORAL EVERY 8 HOURS PRN
Status: DISCONTINUED | OUTPATIENT
Start: 2024-09-28 | End: 2024-09-29

## 2024-09-28 RX ORDER — PROPOFOL 10 MG/ML
5-75 INJECTION, EMULSION INTRAVENOUS CONTINUOUS
Status: DISCONTINUED | OUTPATIENT
Start: 2024-09-28 | End: 2024-09-28

## 2024-09-28 RX ORDER — MYCOPHENOLATE MOFETIL 200 MG/ML
750 POWDER, FOR SUSPENSION ORAL
Status: DISCONTINUED | OUTPATIENT
Start: 2024-09-28 | End: 2024-10-01

## 2024-09-28 RX ORDER — PREDNISONE 10 MG/1
10 TABLET ORAL ONCE
Status: COMPLETED | OUTPATIENT
Start: 2024-10-03 | End: 2024-10-03

## 2024-09-28 RX ORDER — DEXTROSE MONOHYDRATE 25 G/50ML
INJECTION, SOLUTION INTRAVENOUS PRN
Status: DISCONTINUED | OUTPATIENT
Start: 2024-09-28 | End: 2024-09-28

## 2024-09-28 RX ORDER — DEXTROSE MONOHYDRATE 100 MG/ML
INJECTION, SOLUTION INTRAVENOUS CONTINUOUS PRN
Status: ACTIVE | OUTPATIENT
Start: 2024-09-28

## 2024-09-28 RX ORDER — EPINEPHRINE IN 0.9 % SOD CHLOR 5 MG/250ML
.01-.3 PLASTIC BAG, INJECTION (ML) INTRAVENOUS CONTINUOUS
Status: DISCONTINUED | OUTPATIENT
Start: 2024-09-28 | End: 2024-09-28 | Stop reason: HOSPADM

## 2024-09-28 RX ORDER — OXYCODONE HYDROCHLORIDE 5 MG/1
5-10 TABLET ORAL EVERY 6 HOURS PRN
Status: DISCONTINUED | OUTPATIENT
Start: 2024-09-28 | End: 2024-09-30

## 2024-09-28 RX ORDER — SODIUM CHLORIDE, SODIUM GLUCONATE, SODIUM ACETATE, POTASSIUM CHLORIDE AND MAGNESIUM CHLORIDE 526; 502; 368; 37; 30 MG/100ML; MG/100ML; MG/100ML; MG/100ML; MG/100ML
INJECTION, SOLUTION INTRAVENOUS CONTINUOUS PRN
Status: DISCONTINUED | OUTPATIENT
Start: 2024-09-28 | End: 2024-09-28

## 2024-09-28 RX ORDER — DEXTROSE MONOHYDRATE 25 G/50ML
25-50 INJECTION, SOLUTION INTRAVENOUS
Status: ACTIVE | OUTPATIENT
Start: 2024-09-28

## 2024-09-28 RX ORDER — ALBUTEROL SULFATE 90 UG/1
AEROSOL, METERED RESPIRATORY (INHALATION) PRN
Status: DISCONTINUED | OUTPATIENT
Start: 2024-09-28 | End: 2024-09-28

## 2024-09-28 RX ORDER — CALCIUM CHLORIDE 100 MG/ML
INJECTION INTRAVENOUS; INTRAVENTRICULAR PRN
Status: DISCONTINUED | OUTPATIENT
Start: 2024-09-28 | End: 2024-09-28

## 2024-09-28 RX ORDER — NICOTINE POLACRILEX 4 MG
15-30 LOZENGE BUCCAL
Status: ACTIVE | OUTPATIENT
Start: 2024-09-28

## 2024-09-28 RX ORDER — FENTANYL CITRATE 50 UG/ML
INJECTION, SOLUTION INTRAMUSCULAR; INTRAVENOUS PRN
Status: DISCONTINUED | OUTPATIENT
Start: 2024-09-28 | End: 2024-09-28

## 2024-09-28 RX ORDER — TACROLIMUS 1 MG/1
2 CAPSULE ORAL
Status: DISCONTINUED | OUTPATIENT
Start: 2024-09-28 | End: 2024-10-01

## 2024-09-28 RX ORDER — VALGANCICLOVIR 450 MG/1
450 TABLET, FILM COATED ORAL DAILY
Status: DISPENSED | OUTPATIENT
Start: 2024-09-28

## 2024-09-28 RX ORDER — PIPERACILLIN SODIUM, TAZOBACTAM SODIUM 3; .375 G/15ML; G/15ML
3.38 INJECTION, POWDER, LYOPHILIZED, FOR SOLUTION INTRAVENOUS EVERY 6 HOURS
Status: COMPLETED | OUTPATIENT
Start: 2024-09-28 | End: 2024-09-30

## 2024-09-28 RX ORDER — PROPOFOL 10 MG/ML
INJECTION, EMULSION INTRAVENOUS PRN
Status: DISCONTINUED | OUTPATIENT
Start: 2024-09-28 | End: 2024-09-28

## 2024-09-28 RX ORDER — METHYLPREDNISOLONE SODIUM SUCCINATE 125 MG/2ML
50 INJECTION, POWDER, LYOPHILIZED, FOR SOLUTION INTRAMUSCULAR; INTRAVENOUS ONCE
Status: COMPLETED | OUTPATIENT
Start: 2024-10-01 | End: 2024-10-01

## 2024-09-28 RX ORDER — FUROSEMIDE 10 MG/ML
INJECTION INTRAMUSCULAR; INTRAVENOUS PRN
Status: DISCONTINUED | OUTPATIENT
Start: 2024-09-28 | End: 2024-09-28

## 2024-09-28 RX ORDER — OXYCODONE HYDROCHLORIDE 5 MG/1
5 TABLET ORAL EVERY 6 HOURS PRN
Status: CANCELLED | OUTPATIENT
Start: 2024-09-28

## 2024-09-28 RX ORDER — VALGANCICLOVIR HYDROCHLORIDE 50 MG/ML
450 POWDER, FOR SOLUTION ORAL DAILY
Status: DISCONTINUED | OUTPATIENT
Start: 2024-09-28 | End: 2024-10-01

## 2024-09-28 RX ORDER — LIDOCAINE 40 MG/G
CREAM TOPICAL
Status: DISCONTINUED | OUTPATIENT
Start: 2024-09-28 | End: 2024-09-28 | Stop reason: HOSPADM

## 2024-09-28 RX ORDER — FENTANYL CITRATE 50 UG/ML
INJECTION, SOLUTION INTRAMUSCULAR; INTRAVENOUS
Status: COMPLETED
Start: 2024-09-28 | End: 2024-09-28

## 2024-09-28 RX ORDER — MYCOPHENOLATE MOFETIL 250 MG/1
750 CAPSULE ORAL
Status: DISPENSED | OUTPATIENT
Start: 2024-09-28

## 2024-09-28 RX ORDER — PAPAVERINE HYDROCHLORIDE 30 MG/ML
INJECTION INTRAMUSCULAR; INTRAVENOUS PRN
Status: DISCONTINUED | OUTPATIENT
Start: 2024-09-28 | End: 2024-09-28 | Stop reason: HOSPADM

## 2024-09-28 RX ORDER — NOREPINEPHRINE BITARTRATE 0.06 MG/ML
.01-.6 INJECTION, SOLUTION INTRAVENOUS CONTINUOUS
Status: DISCONTINUED | OUTPATIENT
Start: 2024-09-28 | End: 2024-09-28 | Stop reason: HOSPADM

## 2024-09-28 RX ORDER — METHYLPREDNISOLONE SODIUM SUCCINATE 125 MG/2ML
100 INJECTION, POWDER, LYOPHILIZED, FOR SOLUTION INTRAMUSCULAR; INTRAVENOUS ONCE
Status: COMPLETED | OUTPATIENT
Start: 2024-09-30 | End: 2024-09-30

## 2024-09-28 RX ORDER — LIDOCAINE 4 G/G
2 PATCH TOPICAL
Status: DISCONTINUED | OUTPATIENT
Start: 2024-09-28 | End: 2024-10-07

## 2024-09-28 RX ORDER — ASPIRIN 81 MG/1
81 TABLET, CHEWABLE ORAL DAILY
Status: DISPENSED | OUTPATIENT
Start: 2024-09-28

## 2024-09-28 RX ORDER — FENTANYL CITRATE 50 UG/ML
100 INJECTION, SOLUTION INTRAMUSCULAR; INTRAVENOUS ONCE
Status: COMPLETED | OUTPATIENT
Start: 2024-09-28 | End: 2024-09-28

## 2024-09-28 RX ORDER — POTASSIUM CHLORIDE 29.8 MG/ML
20 INJECTION INTRAVENOUS
Status: COMPLETED | OUTPATIENT
Start: 2024-09-28 | End: 2024-09-28

## 2024-09-28 RX ADMIN — ALBUMIN HUMAN 25 G: 0.05 INJECTION, SOLUTION INTRAVENOUS at 17:26

## 2024-09-28 RX ADMIN — PROPOFOL 50 MCG/KG/MIN: 10 INJECTION, EMULSION INTRAVENOUS at 14:37

## 2024-09-28 RX ADMIN — FENTANYL CITRATE 50 MCG: 50 INJECTION INTRAMUSCULAR; INTRAVENOUS at 08:13

## 2024-09-28 RX ADMIN — NOREPINEPHRINE BITARTRATE 19.2 MCG: 1 INJECTION, SOLUTION, CONCENTRATE INTRAVENOUS at 09:34

## 2024-09-28 RX ADMIN — POTASSIUM CHLORIDE 20 MEQ: 29.8 INJECTION, SOLUTION INTRAVENOUS at 21:28

## 2024-09-28 RX ADMIN — PIPERACILLIN AND TAZOBACTAM 3.38 G: 3; .375 INJECTION, POWDER, FOR SOLUTION INTRAVENOUS at 09:11

## 2024-09-28 RX ADMIN — POTASSIUM CHLORIDE 20 MEQ: 29.8 INJECTION, SOLUTION INTRAVENOUS at 20:26

## 2024-09-28 RX ADMIN — SODIUM BICARBONATE 50 MEQ: 84 INJECTION, SOLUTION INTRAVENOUS at 07:35

## 2024-09-28 RX ADMIN — ALBUMIN (HUMAN): 12.5 SOLUTION INTRAVENOUS at 08:36

## 2024-09-28 RX ADMIN — EPINEPHRINE 0.03 MCG/KG/MIN: 1 INJECTION INTRAMUSCULAR; INTRAVENOUS; SUBCUTANEOUS at 09:12

## 2024-09-28 RX ADMIN — OXYCODONE HYDROCHLORIDE 5 MG: 5 TABLET ORAL at 19:34

## 2024-09-28 RX ADMIN — FLUCONAZOLE 400 MG: 2 INJECTION, SOLUTION INTRAVENOUS at 05:10

## 2024-09-28 RX ADMIN — SODIUM CHLORIDE 1000 MG: 9 INJECTION, SOLUTION INTRAVENOUS at 11:15

## 2024-09-28 RX ADMIN — SODIUM CHLORIDE, SODIUM GLUCONATE, SODIUM ACETATE, POTASSIUM CHLORIDE AND MAGNESIUM CHLORIDE: 526; 502; 368; 37; 30 INJECTION, SOLUTION INTRAVENOUS at 05:01

## 2024-09-28 RX ADMIN — METHOCARBAMOL 500 MG: 500 TABLET ORAL at 21:23

## 2024-09-28 RX ADMIN — FENTANYL CITRATE 50 MCG: 50 INJECTION INTRAMUSCULAR; INTRAVENOUS at 11:58

## 2024-09-28 RX ADMIN — NOREPINEPHRINE BITARTRATE 12.8 MCG: 1 INJECTION, SOLUTION, CONCENTRATE INTRAVENOUS at 05:02

## 2024-09-28 RX ADMIN — CALCIUM CHLORIDE INJECTION 1 G: 100 INJECTION, SOLUTION INTRAVENOUS at 09:35

## 2024-09-28 RX ADMIN — Medication 1 UNITS: at 05:38

## 2024-09-28 RX ADMIN — PROPOFOL 50 MCG/KG/MIN: 10 INJECTION, EMULSION INTRAVENOUS at 12:04

## 2024-09-28 RX ADMIN — Medication 2 UNITS: at 09:36

## 2024-09-28 RX ADMIN — OXYCODONE HYDROCHLORIDE 5 MG: 5 TABLET ORAL at 21:23

## 2024-09-28 RX ADMIN — NOREPINEPHRINE BITARTRATE 12.8 MCG: 1 INJECTION, SOLUTION, CONCENTRATE INTRAVENOUS at 05:36

## 2024-09-28 RX ADMIN — CALCIUM CHLORIDE 1 G/HR: 100 INJECTION, SOLUTION INTRAVENOUS at 05:19

## 2024-09-28 RX ADMIN — PANTOPRAZOLE SODIUM 40 MG: 40 INJECTION, POWDER, FOR SOLUTION INTRAVENOUS at 19:40

## 2024-09-28 RX ADMIN — EPINEPHRINE 100 MCG: 1 INJECTION INTRAMUSCULAR; INTRAVENOUS; SUBCUTANEOUS at 09:33

## 2024-09-28 RX ADMIN — SODIUM BICARBONATE 1950 MG: 650 TABLET ORAL at 15:25

## 2024-09-28 RX ADMIN — PROPOFOL 45 MCG/KG/MIN: 10 INJECTION, EMULSION INTRAVENOUS at 17:05

## 2024-09-28 RX ADMIN — CALCIUM CHLORIDE INJECTION 0.5 G: 100 INJECTION, SOLUTION INTRAVENOUS at 10:49

## 2024-09-28 RX ADMIN — SODIUM CHLORIDE, SODIUM GLUCONATE, SODIUM ACETATE, POTASSIUM CHLORIDE AND MAGNESIUM CHLORIDE: 526; 502; 368; 37; 30 INJECTION, SOLUTION INTRAVENOUS at 08:30

## 2024-09-28 RX ADMIN — SODIUM BICARBONATE 50 MEQ: 84 INJECTION, SOLUTION INTRAVENOUS at 08:27

## 2024-09-28 RX ADMIN — SODIUM BICARBONATE 50 MEQ: 84 INJECTION, SOLUTION INTRAVENOUS at 08:05

## 2024-09-28 RX ADMIN — FENTANYL CITRATE 50 MCG: 50 INJECTION INTRAMUSCULAR; INTRAVENOUS at 09:00

## 2024-09-28 RX ADMIN — DEXTROSE 50 % IN WATER (D50W) INTRAVENOUS SYRINGE 25 ML: at 08:17

## 2024-09-28 RX ADMIN — CHLORHEXIDINE GLUCONATE 0.12% ORAL RINSE 15 ML: 1.2 LIQUID ORAL at 19:40

## 2024-09-28 RX ADMIN — Medication 2 UNITS: at 09:34

## 2024-09-28 RX ADMIN — SODIUM CHLORIDE, SODIUM GLUCONATE, SODIUM ACETATE, POTASSIUM CHLORIDE AND MAGNESIUM CHLORIDE: 526; 502; 368; 37; 30 INJECTION, SOLUTION INTRAVENOUS at 09:45

## 2024-09-28 RX ADMIN — Medication 30 MG: at 07:15

## 2024-09-28 RX ADMIN — HYDROMORPHONE HYDROCHLORIDE 0.2 MG: 0.2 INJECTION, SOLUTION INTRAMUSCULAR; INTRAVENOUS; SUBCUTANEOUS at 22:57

## 2024-09-28 RX ADMIN — FENTANYL CITRATE 100 MCG: 50 INJECTION, SOLUTION INTRAMUSCULAR; INTRAVENOUS at 13:03

## 2024-09-28 RX ADMIN — PROPOFOL 60 MCG/KG/MIN: 10 INJECTION, EMULSION INTRAVENOUS at 13:28

## 2024-09-28 RX ADMIN — HYDROMORPHONE HYDROCHLORIDE 1 MG: 1 INJECTION, SOLUTION INTRAMUSCULAR; INTRAVENOUS; SUBCUTANEOUS at 12:16

## 2024-09-28 RX ADMIN — PIPERACILLIN AND TAZOBACTAM 3.38 G: 3; .375 INJECTION, POWDER, LYOPHILIZED, FOR SOLUTION INTRAVENOUS at 16:40

## 2024-09-28 RX ADMIN — Medication 200 MG: at 12:52

## 2024-09-28 RX ADMIN — INSULIN HUMAN 2 UNITS/HR: 1 INJECTION, SOLUTION INTRAVENOUS at 05:54

## 2024-09-28 RX ADMIN — SODIUM CHLORIDE, SODIUM GLUCONATE, SODIUM ACETATE, POTASSIUM CHLORIDE AND MAGNESIUM CHLORIDE: 526; 502; 368; 37; 30 INJECTION, SOLUTION INTRAVENOUS at 08:00

## 2024-09-28 RX ADMIN — PIPERACILLIN AND TAZOBACTAM 3.38 G: 3; .375 INJECTION, POWDER, FOR SOLUTION INTRAVENOUS at 11:06

## 2024-09-28 RX ADMIN — Medication 50 MG: at 09:13

## 2024-09-28 RX ADMIN — NOREPINEPHRINE BITARTRATE 12.8 MCG: 1 INJECTION, SOLUTION, CONCENTRATE INTRAVENOUS at 05:40

## 2024-09-28 RX ADMIN — EPINEPHRINE 100 MCG: 1 INJECTION INTRAMUSCULAR; INTRAVENOUS; SUBCUTANEOUS at 09:31

## 2024-09-28 RX ADMIN — Medication 100 MG: at 04:20

## 2024-09-28 RX ADMIN — SODIUM CHLORIDE, SODIUM GLUCONATE, SODIUM ACETATE, POTASSIUM CHLORIDE AND MAGNESIUM CHLORIDE: 526; 502; 368; 37; 30 INJECTION, SOLUTION INTRAVENOUS at 11:15

## 2024-09-28 RX ADMIN — FENTANYL CITRATE 100 MCG: 50 INJECTION INTRAMUSCULAR; INTRAVENOUS at 09:20

## 2024-09-28 RX ADMIN — CALCIUM CHLORIDE INJECTION 0.5 G: 100 INJECTION, SOLUTION INTRAVENOUS at 09:13

## 2024-09-28 RX ADMIN — FIBRINOGEN (HUMAN) 1150 MG: KIT INTRAVENOUS at 11:38

## 2024-09-28 RX ADMIN — EPINEPHRINE 100 MCG: 1 INJECTION INTRAMUSCULAR; INTRAVENOUS; SUBCUTANEOUS at 09:28

## 2024-09-28 RX ADMIN — TACROLIMUS 2 MG: 5 CAPSULE ORAL at 17:25

## 2024-09-28 RX ADMIN — FENTANYL CITRATE 150 MCG: 50 INJECTION INTRAMUSCULAR; INTRAVENOUS at 04:19

## 2024-09-28 RX ADMIN — PIPERACILLIN AND TAZOBACTAM 3.38 G: 3; .375 INJECTION, POWDER, FOR SOLUTION INTRAVENOUS at 07:12

## 2024-09-28 RX ADMIN — NOREPINEPHRINE BITARTRATE 12.8 MCG: 1 INJECTION, SOLUTION, CONCENTRATE INTRAVENOUS at 04:38

## 2024-09-28 RX ADMIN — DEXTROSE AND SODIUM CHLORIDE: 5; 450 INJECTION, SOLUTION INTRAVENOUS at 13:30

## 2024-09-28 RX ADMIN — ALBUTEROL SULFATE 6 PUFF: 108 INHALANT RESPIRATORY (INHALATION) at 09:18

## 2024-09-28 RX ADMIN — CALCIUM CHLORIDE INJECTION 0.5 G: 100 INJECTION, SOLUTION INTRAVENOUS at 06:03

## 2024-09-28 RX ADMIN — SODIUM CHLORIDE, SODIUM GLUCONATE, SODIUM ACETATE, POTASSIUM CHLORIDE AND MAGNESIUM CHLORIDE: 526; 502; 368; 37; 30 INJECTION, SOLUTION INTRAVENOUS at 07:00

## 2024-09-28 RX ADMIN — INSULIN HUMAN 10 UNITS/HR: 1 INJECTION, SOLUTION INTRAVENOUS at 13:29

## 2024-09-28 RX ADMIN — SODIUM CHLORIDE, SODIUM GLUCONATE, SODIUM ACETATE, POTASSIUM CHLORIDE AND MAGNESIUM CHLORIDE: 526; 502; 368; 37; 30 INJECTION, SOLUTION INTRAVENOUS at 07:08

## 2024-09-28 RX ADMIN — NOREPINEPHRINE BITARTRATE 12.8 MCG: 1 INJECTION, SOLUTION, CONCENTRATE INTRAVENOUS at 04:51

## 2024-09-28 RX ADMIN — VALGANCICLOVIR HYDROCHLORIDE 450 MG: 50 POWDER, FOR SOLUTION ORAL at 15:26

## 2024-09-28 RX ADMIN — DEXTROSE 50 % IN WATER (D50W) INTRAVENOUS SYRINGE 25 ML: at 09:12

## 2024-09-28 RX ADMIN — NOREPINEPHRINE BITARTRATE 19.2 MCG: 1 INJECTION, SOLUTION, CONCENTRATE INTRAVENOUS at 05:16

## 2024-09-28 RX ADMIN — Medication 20 MG: at 11:29

## 2024-09-28 RX ADMIN — MICAFUNGIN SODIUM 100 MG: 50 INJECTION, POWDER, LYOPHILIZED, FOR SOLUTION INTRAVENOUS at 15:17

## 2024-09-28 RX ADMIN — SODIUM CHLORIDE, SODIUM GLUCONATE, SODIUM ACETATE, POTASSIUM CHLORIDE AND MAGNESIUM CHLORIDE: 526; 502; 368; 37; 30 INJECTION, SOLUTION INTRAVENOUS at 04:13

## 2024-09-28 RX ADMIN — VASOPRESSIN 4 UNITS/HR: 20 INJECTION, SOLUTION INTRAMUSCULAR; SUBCUTANEOUS at 05:11

## 2024-09-28 RX ADMIN — SODIUM BICARBONATE 50 MEQ: 84 INJECTION, SOLUTION INTRAVENOUS at 09:10

## 2024-09-28 RX ADMIN — MIDAZOLAM 2 MG: 1 INJECTION INTRAMUSCULAR; INTRAVENOUS at 04:13

## 2024-09-28 RX ADMIN — Medication 25 MCG: at 16:35

## 2024-09-28 RX ADMIN — Medication 100 MCG/HR: at 13:22

## 2024-09-28 RX ADMIN — NOREPINEPHRINE BITARTRATE 0.03 MCG/KG/MIN: 1 INJECTION, SOLUTION, CONCENTRATE INTRAVENOUS at 05:38

## 2024-09-28 RX ADMIN — HYDROMORPHONE HYDROCHLORIDE 0.2 MG: 0.2 INJECTION, SOLUTION INTRAMUSCULAR; INTRAVENOUS; SUBCUTANEOUS at 18:10

## 2024-09-28 RX ADMIN — Medication 20 MG: at 08:44

## 2024-09-28 RX ADMIN — LIDOCAINE 2 PATCH: 4 PATCH TOPICAL at 22:57

## 2024-09-28 RX ADMIN — DEXTROSE 50 % IN WATER (D50W) INTRAVENOUS SYRINGE 25 ML: at 07:15

## 2024-09-28 RX ADMIN — VASOPRESSIN 1 UNITS/HR: 20 INJECTION, SOLUTION INTRAMUSCULAR; SUBCUTANEOUS at 05:01

## 2024-09-28 RX ADMIN — FENTANYL CITRATE 50 MCG: 50 INJECTION INTRAMUSCULAR; INTRAVENOUS at 10:47

## 2024-09-28 RX ADMIN — CALCIUM CHLORIDE 1 G/HR: 100 INJECTION, SOLUTION INTRAVENOUS at 08:29

## 2024-09-28 RX ADMIN — Medication 1 UNITS: at 05:55

## 2024-09-28 RX ADMIN — ALBUTEROL SULFATE 6 PUFF: 108 INHALANT RESPIRATORY (INHALATION) at 09:25

## 2024-09-28 RX ADMIN — SODIUM BICARBONATE 1950 MG: 650 TABLET ORAL at 19:37

## 2024-09-28 RX ADMIN — FENTANYL CITRATE 50 MCG: 50 INJECTION INTRAMUSCULAR; INTRAVENOUS at 10:01

## 2024-09-28 RX ADMIN — INSULIN HUMAN 11 UNITS/HR: 1 INJECTION, SOLUTION INTRAVENOUS at 15:43

## 2024-09-28 RX ADMIN — MYCOPHENOLATE MOFETIL 750 MG: 200 POWDER, FOR SUSPENSION ORAL at 17:26

## 2024-09-28 RX ADMIN — SODIUM BICARBONATE 50 MEQ: 84 INJECTION, SOLUTION INTRAVENOUS at 09:45

## 2024-09-28 RX ADMIN — PIPERACILLIN AND TAZOBACTAM 3.38 G: 3; .375 INJECTION, POWDER, LYOPHILIZED, FOR SOLUTION INTRAVENOUS at 22:57

## 2024-09-28 RX ADMIN — Medication 50 MCG: at 13:40

## 2024-09-28 RX ADMIN — Medication 50 MG: at 05:36

## 2024-09-28 RX ADMIN — HYDROMORPHONE HYDROCHLORIDE 0.2 MG: 0.2 INJECTION, SOLUTION INTRAMUSCULAR; INTRAVENOUS; SUBCUTANEOUS at 20:41

## 2024-09-28 RX ADMIN — NOREPINEPHRINE BITARTRATE 6.4 MCG: 1 INJECTION, SOLUTION, CONCENTRATE INTRAVENOUS at 04:34

## 2024-09-28 RX ADMIN — VASOPRESSIN 2 UNITS/HR: 20 INJECTION, SOLUTION INTRAMUSCULAR; SUBCUTANEOUS at 05:05

## 2024-09-28 RX ADMIN — PROPOFOL 170 MG: 10 INJECTION, EMULSION INTRAVENOUS at 04:20

## 2024-09-28 RX ADMIN — SODIUM CHLORIDE, SODIUM GLUCONATE, SODIUM ACETATE, POTASSIUM CHLORIDE AND MAGNESIUM CHLORIDE: 526; 502; 368; 37; 30 INJECTION, SOLUTION INTRAVENOUS at 08:46

## 2024-09-28 RX ADMIN — DEXTROSE 50 % IN WATER (D50W) INTRAVENOUS SYRINGE 25 ML: at 05:54

## 2024-09-28 RX ADMIN — SODIUM BICARBONATE 50 MEQ: 84 INJECTION, SOLUTION INTRAVENOUS at 06:03

## 2024-09-28 RX ADMIN — FUROSEMIDE 20 MG: 10 INJECTION, SOLUTION INTRAVENOUS at 07:35

## 2024-09-28 RX ADMIN — EPINEPHRINE 50 MCG: 1 INJECTION INTRAMUSCULAR; INTRAVENOUS; SUBCUTANEOUS at 09:35

## 2024-09-28 RX ADMIN — PIPERACILLIN AND TAZOBACTAM 3.38 G: 3; .375 INJECTION, POWDER, FOR SOLUTION INTRAVENOUS at 05:10

## 2024-09-28 ASSESSMENT — ACTIVITIES OF DAILY LIVING (ADL)
ADLS_ACUITY_SCORE: 32
ADLS_ACUITY_SCORE: 24
ADLS_ACUITY_SCORE: 24
ADLS_ACUITY_SCORE: 36
ADLS_ACUITY_SCORE: 24
ADLS_ACUITY_SCORE: 32
ADLS_ACUITY_SCORE: 24
ADLS_ACUITY_SCORE: 32
ADLS_ACUITY_SCORE: 24
ADLS_ACUITY_SCORE: 24
ADLS_ACUITY_SCORE: 32

## 2024-09-28 NOTE — CARE PLAN
Vitals: Blood pressure 100/57, pulse 93, temperature 97.8  F (36.6  C), temperature source Oral, resp. rate 18, height 1.829 m (6'), weight 105.6 kg (232 lb 12.9 oz), SpO2 98%.  Pt is A/O x 4, calls appropriately and makes needs known. Up ad ginny, O2 > 94% on RA. Lower abdominal pain managed with oxycodone, denies nausea at this time. No BM this shift, voiding spontaneously, AUOP. Pt's skin is jaundiced and generalized lower abdominal bruising. Pre-op check list is completed, NPO status maintained, and pt took a shower and did a CHG wipe before going down to OR.

## 2024-09-28 NOTE — ANESTHESIA PROCEDURE NOTES
Central Line/PA Catheter Placement    Pre-Procedure   Staff -        Anesthesiologist:  Sukhi Gagnon MD       Performed By: anesthesiologist       Location: OR       Pre-Anesthestic Checklist: patient identified, IV checked, site marked, risks and benefits discussed, informed consent, monitors and equipment checked, pre-op evaluation and at physician/surgeon's request  Timeout:       Correct Patient: Yes        Correct Procedure: Yes        Correct Site: Yes        Correct Position: Yes        Correct Laterality: Yes   Line Placement:   This line was placed Post Induction    Procedure   Procedure: central line       Laterality: right       Insertion Site: internal jugular.       Patient Position: Trendelenburg  Sterile Prep        All elements of maximal sterile barrier technique followed       Patient Prep/Sterile Barriers: draped, hand hygiene, gloves , hat , mask , draped, gown, sterile gel and probe cover  Insertion/Injection        Technique: ultrasound guided and Seldinger Technique        1. Ultrasound was used to evaluate the access site.       2. Vein evaluated via ultrasound for patency/adequacy.       3. Using real-time ultrasound the needle/catheter was observed entering the artery/vein.       Introducer Type: 9 Fr, 2-lumen MAC        Type: Introducer  Narrative         Secured by: suture       Tegaderm and Biopatch dressing used.       Complications: None apparent,        blood aspirated from all lumens,        All lumens flushed: Yes       Verification method: Ultrasound and Placement to be verified post-op

## 2024-09-28 NOTE — ANESTHESIA PROCEDURE NOTES
Airway       Patient location during procedure: OR       Procedure Start/Stop Times: 9/28/2024 4:24 AM  Staff -        Anesthesiologist:  Sukhi Gagnon MD       CRNA: Andrae Johnson APRN CRNA       Performed By: CRNA  Consent for Airway        Urgency: elective  Indications and Patient Condition       Indications for airway management: scott-procedural       Induction type:intravenous       Mask difficulty assessment: 2 - vent by mask + OA or adjuvant +/- NMBA    Final Airway Details       Final airway type: endotracheal airway       Successful airway: ETT - single and Oral  Endotracheal Airway Details        ETT size (mm): 8.0       Cuffed: yes       Successful intubation technique: direct laryngoscopy       DL Blade Type: MAC 3       Grade View of Cords: 1       Adjucts: stylet       Position: Right       Measured from: gums/teeth       Secured at (cm): 23       Bite block used: None    Post intubation assessment        Placement verified by: capnometry, equal breath sounds and chest rise        Number of attempts at approach: 1       Number of other approaches attempted: 0       Secured with: tape       Ease of procedure: easy       Dentition: Unchanged    Medication(s) Administered   Medication Administration Time: 9/28/2024 4:24 AM    Additional Comments       Small lip laceration after masking the patient, before laryngoscopy. Ointment applied.

## 2024-09-28 NOTE — ANESTHESIA PREPROCEDURE EVALUATION
Anesthesia Pre-Procedure Evaluation    Patient: Jag Smith   MRN: [id number] : 1985        Procedure : Procedure(s):  Transplant liver recipient  donor          Past Medical History:   Diagnosis Date    Alcoholic hepatitis (H) 2024    Anxiety     Depression     Hypertension       Past Surgical History:   Procedure Laterality Date    DACRYOCYSTORHINOSTOMY Left 2013    INCISION AND DRAINAGE BUTTOCKS Left 2017      Allergies   Allergen Reactions    Azithromycin Rash      Social History     Tobacco Use    Smoking status: Never    Smokeless tobacco: Never   Substance Use Topics    Alcohol use: Not Currently     Comment: last drink 2024      Wt Readings from Last 1 Encounters:   24 105.6 kg (232 lb 12.9 oz)        Anesthesia Evaluation   Pt has had prior anesthetic. Type: General.    No history of anesthetic complications       ROS/MED HX  ENT/Pulmonary:     (+)                              recent URI (2024), resolved,         Neurologic:       Cardiovascular:     (+)  hypertension- -   -  - -                                 Previous cardiac testing   Echo: Date: 24 Results:  Mild left ventricular dilation is present.  Global and regional left ventricular function is normal with an EF of 55-60%.  Right ventricular function, chamber size, wall motion, and thickness are  normal.  Pulmonary artery systolic pressure is normal.  The inferior vena cava is normal.  No significant valvular abnormalities present.  No pericardial effusion is present.  There is no prior study for direct comparison    Stress Test:  Date: Results:    ECG Reviewed:  Date: 24 Results:  Sinus tachycardia  Otherwise normal ECG  When compared with ECG of 23-Sep-2024 14:38,  No significant change was found      Cath:  Date: Results:      METS/Exercise Tolerance:     Hematologic: Comments: thrombocytopenia    (+)      anemia,          Musculoskeletal:  - neg musculoskeletal ROS      GI/Hepatic: Comment: Decompensated alcoholic liver cirrhosis, last drink in   Ascites, h/o SBP, varices, encephalopathy    MELD 3.0: 42 at 2024  6:46 AM  MELD-Na: 41 at 2024  6:46 AM  Calculated from:  Serum Creatinine: 2.27 mg/dL at 2024  6:46 AM  Serum Sodium: 131 mmol/L at 2024  6:46 AM  Total Bilirubin: 38.2 mg/dL at 2024  6:46 AM  Serum Albumin: 3.3 g/dL at 2024  6:46 AM  INR(ratio): 3.14 at 2024  6:46 AM  Age at listin years  Sex: Male at 2024  6:46 AM      (+)           hepatitis type Alcoholic, liver disease,       Renal/Genitourinary: Comment: Hepatorenal syndrome (Cr 2.27 and GFR 37)    (+) renal disease, type: ARF,            Endo:  - neg endo ROS     Psychiatric/Substance Use:     (+) psychiatric history depression and anxiety alcohol abuse      Infectious Disease:       Malignancy:       Other:            Physical Exam    Airway        Mallampati: III   TM distance: > 3 FB   Neck ROM: full   Mouth opening: > 3 cm    Respiratory Devices and Support         Dental       (+) Multiple visibly decayed, broken teeth      Cardiovascular             Pulmonary                   OUTSIDE LABS:  CBC:   Lab Results   Component Value Date    WBC 9.5 2024    WBC 9.5 2024    HGB 7.8 (L) 2024    HGB 7.8 (L) 2024    HCT 22.9 (L) 2024    HCT 22.9 (L) 2024    PLT 77 (L) 2024    PLT 77 (L) 2024     BMP:   Lab Results   Component Value Date     (L) 2024     (L) 2024    POTASSIUM 4.5 2024    POTASSIUM 4.6 2024    CHLORIDE 104 2024    CHLORIDE 103 2024    CO2 13 (L) 2024    CO2 15 (L) 2024    BUN 34.1 (H) 2024    BUN 33.6 (H) 2024    CR 2.27 (H) 2024    CR 2.22 (H) 2024     (H) 2024    GLC 82 2024     COAGS:   Lab Results   Component Value Date    PTT 63 (H) 2024    INR 3.14 (H) 2024    FIBR 150 (L) 2024  "    POC: No results found for: \"BGM\", \"HCG\", \"HCGS\"  HEPATIC:   Lab Results   Component Value Date    ALBUMIN 3.3 (L) 09/27/2024    PROTTOTAL  09/27/2024      Comment:      Unsatisfactory specimen - icteric.     ALT 45 09/27/2024     (H) 09/27/2024    ALKPHOS 85 09/27/2024    BILITOTAL 38.2 (HH) 09/27/2024     OTHER:   Lab Results   Component Value Date    LACT 1.9 09/27/2024    A1C 4.7 09/23/2024    AMAIRANI 8.9 09/27/2024    PHOS 3.6 09/27/2024    MAG 1.5 (L) 09/27/2024    AMYLASE 46 09/27/2024    TSH 2.17 09/23/2024       Anesthesia Plan    ASA Status:  4, emergent    NPO Status:  NPO Appropriate    Anesthesia Type: General.     - Airway: ETT   Induction: Intravenous, Propofol.   Maintenance: Balanced.   Techniques and Equipment:     - Lines/Monitors: 2nd IV, Arterial Line, Central Line, CVP, BIS, SHARON            SHARON Absolute Contra-indication: NONE            SHARON Relative Contra-indication: Coagulopathy, Thrombocytopenia, Esophageal Varices     - Blood: Blood in Room, PRBC, Cell Saver, FFP, PLT, T&C     - Drips/Meds: Epinephrine, Norepi, Vasopressin     Consents    Anesthesia Plan(s) and associated risks, benefits, and realistic alternatives discussed. Questions answered and patient/representative(s) expressed understanding.     - Discussed: Risks, Benefits and Alternatives for BOTH SEDATION and the PROCEDURE were discussed     - Discussed with:  Patient      - Extended Intubation/Ventilatory Support Discussed: Yes.      - Patient is DNR/DNI Status: No     Use of blood products discussed: Yes.     - Discussed with: Patient.     - Consented: consented to blood products     Postoperative Care    Pain management: IV analgesics.   PONV prophylaxis: Background Propofol Infusion     Comments:               Dante Galvan MD    I have reviewed the pertinent notes and labs in the chart from the past 30 days and (re)examined the patient.  Any updates or changes from those notes are reflected in this note.       "

## 2024-09-28 NOTE — H&P
INTENSIVIST FACULTY NOTE:  39M hx depression/ anxiety, HTN, EtOH cirrhosis, SBP, encephalopathy.  MELD 42.  Has some LV dilation at baseline  DDLT, easy intubation  2L ascites, plus a lot of OG output  Bradycardia with reperfusion, but able to come off epinephrine shortly thereafter  Coagulopathic for a while; EBL 6L  8 PRBC, 15FFP, 3 plts, 4 cryo, fibrega, 1700 cellsaver, 1 platelets    Intubated, sedated, slowly reversed from paralysis in the ICU  Bloody lip that was present prior to intubation, currently covered with gauze  RIJ MAC with handsfree  L radial A line  Nearly off pressors  Lima dilute  Jps relatively bloody still. Have gone through 3 SHENA bulbs already    Labs lactate finally starting to clear  Oxygenating well  Had been hyperkalemic  Coagas and CBC still pending    liver doppler pending  CXR shows ETT to be right at the juana    This is a 39M hx depression/ anxiety, HTN, EtOH cirrhosis and a high MELD score who is s/p DDLT.    We will work towards extubation later this afternoon after liver doppler and assuming his coagulopathy is fully corected.  With his baseline LV dilation, may obtain a formal echo tomorrow to ensure that the sudden increase in LV afterload does not trigger heart failure, but for now I am not concerned.      I think he meets criteria for micafungin rather than diflucan.  He needs ongoing transfusion at the moment.      ENCEPHALOPATHY / DELIRIUM:  -due to hepatic encephalopathy; monitoring with clinical exam  -lactulose can stop post-op  -melatonin for sleep; propofol for sedation   -using non-pharmacologic methods as much as possilble    ACUTE ON CHRONIC LIVER FAILURE S/P TRANSPLANT:  -EtOH as underlying cause of liver failure  -induction immunosuppression with methylprednisolone/prednisone taper through POD #5.  Will eventually continue prednisone at 5 mg PO daily until tacrolimus level is therapeutic  -maintenance immunosuppression to include mycophenolate and tacrolimus with  anticipated goal trough levels of 8-12 (to be determined by surgical service) mcg/L for 0-3 months post-transplant.    -Surgical prophylaxis includes: piperacillin-tazobactam IV for 48 hours and micafungin IV for 14 days. Needs micafungin given high MELD score pre-op and desire to avoid tacro/diflucan interactions.  -Opportunistic pathogen prophylaxis includes: trimethoprim/sulfamethoxazole, valganciclovir, and topical antifungal coverage to begin once systemic antifungal therapy is complete.  -oral and IV narcotics available  -will clarify why no urosdiol; eventually will probably start on aspirin    ACUTE HYPOXIC RESPIRATORY FAILURE  -need for mechanical ventilation:  -secondary to fluid overload, need for aggressive transfusion  -lung protective ventilator settings with Vt <8ml/kg IBW (in this case, at least <500ml)    THROMBOCYTOPENIA AND ACUTE BLOOD LOSS ANEMIA ON ANEMIA OF CHRONIC DISEASE:  -thrombocytopenia was present prior to admission due to liver disease; some consumption with blood loss intra-operatively  -monitoring with q4-6h Hgb & other labs in the immediate post-op period.    -Will use a transfusion trigger of Hgb <8, INR >2, Platelet count <20, Fibrinogen <200 in the acute post-op period    COAGULOPATHY:  -present prior to admission due to liver disease, exacerbated by surgical blood loss  -transfusion triggers as above    HYPERGLYCEMIA:  -insulin infusion    MISC:  -full code  -family updated by primary team  -SQH not necessary immediately post-op; PPI for steroids  -lines: MAC introducer with hands-free  -mckeon to remain while resuscitating   -anticipate discharge to acute rehab in >2 weeks    Billing statement (shared visit with AZALIA): 42min of critical care time spent at the bedside, and on the critical care unit, evaluating the patient, directing care and reviewing laboratory values and radiologic reports as described above. I personally examined and evaluated the patient today, as part of a  shared critical care visit with the AZALIA. The patient is in critical condition today due to hemorrhage and graft-related reasons.  All physician orders and treatments were placed at my direction. I personally managed all factors described above, and key decisions made by me today are summarized above.    AMARILIS Wilson MD  Clinical   Anesthesia / Critical Care  *[phone]

## 2024-09-28 NOTE — TELEPHONE ENCOUNTER
"Organ Offer Encounter Information    Organ Offer Information  Organ offer date & time: 9/27/2024  5:46 PM  Coordinator/Fellow/Attending name: Ashley Benito, RN   Organ(s):  Organ UNOS ID Match Run ID Comment Organ Laterality   Liver [id number] MNOP, Primary DBD       Organ offer decision status Patient/Other: Accepted Offer  Organ disposition: Transplanted  Additional Comments: 9/27/2024 5:53 PM  Liver: Local, Primary DBD  MD: Isaiah  OPO Contact: Import line  Donor/Recip HCV Status: neg/neg  (HCV+ Donors - Discuss HCV genotyping/quant testing with MD & send Epic in basket message to [facility] HCV POOL - Include Donor UNOS ID. Follow HCVGENOTYPING Smart Phrase)  Donor Nutritional Status:no  Local or Import Donor: local  (For import cases, utilize the \"TRANSPORTSETUP\" smart phrase to arrange transportation)  Using [employer] OCS: no  (For OCS Cases, utilize the \"OCSLIVER\" smart phrase)  Donor Meets Risk Criteria for HIV/HCV/HBV: no  (If YES - MD to discuss & document discussion regarding risk criteria)  Plan: Patient currently hospitalized.  MD to speak to patient and/or family.  Dr. Colon to speak to the patient about the offer.  QUINN Patino consented patient and talked with his mother.      Admissions: Already admitted  Unit: 7B  Update Provider Entering Orders (XM Plan & COVID Testing):  QUINN Patino @ 1600  Immunology: @ 1605  Book OR: Afshin @ 1810  Vessel Storage Confirmation (PA/NATHAN/LUIS): ok to bank  Blood Bank: Silvina @ 1800  TransNet/ABO Verification: printed @ 1750  Add Organ: added @ 1755    Donor OR Time: 0200 9/28  Procuring MD: Mallory  Contact in the OR:  Imports  Organs Being Procured: Heart, lungs, liver, kidneys  Expected Delays: no  Flush Solution: [facility]  Biopsy: only if needed (Slides can not be prepped or read at [facility] will need to be brought back to  or read at the [facility]  Pump: no  Special Requests (Special blood tubes, nodes, waivers-XM and/or anatomical): no   MD for Visualization: " Isaiah  Transportation Details: Pickup at [facility] @ 0130     Attestation I have discussed all of the above with the Patient/Legal Guardian/Caregiver regarding this organ offer.: Yes  Coordinator/Fellow/Attending name: Ashley Benito RN

## 2024-09-28 NOTE — BRIEF OP NOTE
[facility]    Brief Operative Note    Pre-operative diagnosis: End stage liver disease (H) [K72.10]  Post-operative diagnosis Same as pre-operative diagnosis    Procedure: Transplant liver recipient  donor, N/A - Abdomen    Surgeon: Surgeons and Role:     * Fernando Colon MD - Primary     * Dominick Tejada MD - Fellow - Assisting     * Jericho Orr MD - Fellow - Assisting  Anesthesia: General   Estimated Blood Loss:     6,000 ml EBL  40 yo Etoh cirrhosis, MELD 40  Local DBD  PV flow 2700  HA flow 100 ml, 350 ml after augmentation  Duct to duct, no stent  3 drains  9 prbc, 15 ffp, 3 plt, 4 cryo, 1700 ml cellsaver    Drains: Chandrakant-Melara  Specimens:   ID Type Source Tests Collected by Time Destination   1 : Donor Gallbladder Tissue Gallbladder SURGICAL PATHOLOGY EXAM Fernando Colon MD 2024  9:46 AM    2 : Native Liver and Gallbladder Tissue Liver SURGICAL PATHOLOGY EXAM Fernando Colon MD 2024  9:51 AM      Findings:   None.  Complications: None.  Implants: * No implants in log *    Luis Daniel Tejada MD  Transplant Surgery Fellow

## 2024-09-28 NOTE — ANESTHESIA CARE TRANSFER NOTE
Patient: Jag Smith    Procedure: Procedure(s):  Transplant liver recipient  donor       Diagnosis: End stage liver disease (H) [K72.10]  Diagnosis Additional Information: No value filed.    Anesthesia Type:   No value filed.     Note:    Oropharynx: ventilatory support and endotracheal tube in place  Level of Consciousness: iatrogenic sedation      Independent Airway: airway patency not satisfactory and stable  Dentition: dentition unchanged  Vital Signs Stable: post-procedure vital signs reviewed and stable  Report to RN Given: handoff report given  Patient transferred to: PACU  Comments: Patient transferred to ICU in stable condition, mechanically ventilated on propofol fro sedation, drips reviewed and report given to ICU RN.  Handoff Report: Identifed the Patient, Identified the Reponsible Provider, Reviewed the pertinent medical history, Discussed the surgical course, Reviewed Intra-OP anesthesia mangement and issues during anesthesia, Set expectations for post-procedure period and Allowed opportunity for questions and acknowledgement of understanding      Vitals:  Vitals Value Taken Time   BP     Temp     Pulse 104 24 1257   Resp     SpO2 100 % 24 1257   Vitals shown include unfiled device data.    Electronically Signed By: SIL Bello CRNA  2024  12:58 PM

## 2024-09-28 NOTE — ANESTHESIA PROCEDURE NOTES
Perioperative SHARON Procedure Note    Staff -        Anesthesiologist:  Sukhi Gagnon MD       Performed By: anesthesiologist  Preanesthesia Checklist:  Patient identified, IV assessed, risks and benefits discussed, monitors and equipment assessed, procedure being performed at surgeon's request and anesthesia consent obtained.    SHARON Probe Insertion  Probe Number: Unlabeled  Probe Status PRE Insertion: NO obvious damage  Probe type:  Adult 3D  Bite block used:   Soft  Insertion Technique: Laryngoscopy, Jaw Lift  Insertion complications: None obvious  Billing Report:A SHARON report is NOT being generated.  Probe Status POST Removal: NO obvious damage

## 2024-09-28 NOTE — PLAN OF CARE
Goal Outcome Evaluation:      Plan of Care Reviewed With: patient    Overall Patient Progress: improvingOverall Patient Progress: improving    Temp: 98.3  F (36.8  C) Temp src: Oral BP: 108/75 Pulse: 98   Resp: 16 SpO2: 100 % O2 Device: None (Room air)      Pt A&O X4, able to communicate needs. VSS. 02 saturating adequately on RA. Denies SOB at rest and with exertion. Lower abdominal pain relieved with PRN oxycodone. Pt tolerating regular diet with fair appetite, but gets nauseous after eating. Pt's lactic at 1700 was 2.6, MD notified and RRT called per protocol. No additional orders added, pt continued to monitor. Pt voiding spontaneously with good UOP, urine tea colored. Pt met goal BM of 3 stools. Pt NPO for liver transplant in the AM.

## 2024-09-28 NOTE — PROGRESS NOTES
Patient removed from [facility] waitlist after  donor liver transplant. OS ID [id number].    Donor Has Risk Criteria for Transmission of HIV/HCV/HBV: No  Recipient Notified of Risk Criteria: N/A

## 2024-09-28 NOTE — PROGRESS NOTES
"CLINICAL NUTRITION SERVICES - BRIEF NOTE     Nutrition Prescription    RECOMMENDATIONS FOR MDs/PROVIDERS TO ORDER:  Diet adv vs nutrition support within 2-3 days    Future/Additional Recommendations:  If plan to start TF, please send consult Registered Dietitian to Assess and Order TF per Medical Nutrition Therapy Guidelines.  Would recommend the following at this time:  -- Start TwoCal HN @ 15 mL/hr and advance by 10 mL q8h as tolerated to goal rate 55 mL/hr  -- Do not advance TF unless K+ > or = 3, Mg++ > or = 1.5, and phos > or = 1.9   -- 30 mL q4h free water flushes at minimum for FT patency  -- Prosoruce TF20, 1 pkt/day (additional 80 kcal and 20 g protein  -- GOAL: TwoCal HN @ 55 mL/hr (1320 mL/day) + 1 pkt Prosource TF20 to provide 2720 kcals (32 kcal/kg/day), 131 g PRO (1.5 g/kg/day), 924 mL H2O, 289 g CHO and 7 g Fiber daily.        *Received consult: Provider order - \"Liver Transplant Pre Op\"  *See RD note on 9/24 for nutrition assessment    Addendum @ 1327: pt back from surgery, received Nutrition Services Consult: \"Dietitian to Assess and Order TF per Medical Nutrition \" --> per provider, no plan to start TF today.  Team will reassess tomorrow. RD discontinued room service with assist order.    Diet: NPO    NPO since 1527 yesterday for anticipated liver transplant today.  Pt has had fair-good appetite on regular diet since admission. Has been on a 1 L fluid restriction.     NEW FINDINGS    Dosing Weight: 86 kg (adjusted based on 102.2 kg and IBW 80.9 kg)     ASSESSED NUTRITION NEEDS   Estimated Energy Needs: 7879-7216 kcals/day (30 -35 kcals/kg)   Justification: Increased needs s/p anticipated liver transplant  Estimated Protein Needs: 129-172 grams protein/day (1.5 - 2 grams of pro/kg)   Justification: Increased needs s/p anticipated liver transplant, pending ongoing renal function  Estimated Fluid Needs: per provider pending fluid status    Labs  - 9/27: Na 131 (L); BUN 34.1 (H), Cr 2.27 (H), GFR 37 " (L) --> per chart, Nephrology following for DENI       INTERVENTIONS  See recs above    Monitoring/Evaluation  Progress toward goals will be monitored and evaluated per protocol.      Jane Zazueta RD, , LD  Weekday Units covered: 5A (non-Heme Onc pts) and 7B ([id number])  Available by 5A or 7B Clinical Dietitieddie Sorensen  Weekend Coverage: Weekend Clinical Dietitieddie Sorensen    Clinical Dietitians no longer available via paging

## 2024-09-28 NOTE — PLAN OF CARE
Neuro: Oriented x 4, neuro intact. Pain control with dilaudid 0.2mg PRN x 1.    CV: Sinus Tachycardia 90-110bpm, normotensive. Vasopressors discontinued postop. Palpable distal pulses. 1-2+ dependent edema. Afebrile.    RR: Extubated to 2L nasal cannula at 1730. Tolerated well. Lungs clear/diminished. Incentive spirometer provided. Good productive cough, small amounts of tan/blood tinged sputum.    GI: Abdomen rounded, hypoactive bowel sounds. NPO, will need bedside swallow and advance diet as tolerated.    : Urinary catheter with large amounts of clear/yellow output.    L/T/D: PIV x 4, RIJ MAC, L.Radial Art Line, SHENA x 3 with moderate sanguinous output.    Skin: Clamshell abdominal incision, R. Lip abrasion present on admit from OR, generalized bruising

## 2024-09-28 NOTE — PROGRESS NOTES
[facility], Procedure Note          Extubation:       Jag Smith  MRN# [id number]   September 28, 2024, 5:55 PM         Patient extubated at: September 28, 2024, 5:55 PM   Supplemental Oxygen: Via nasal cannula at 2 liters per minute   Cough: The cough is good   Secretion Mode: Able to clear   Secretion Amount: Small amount, moderately thick and white / yellow in color   Respiratory Exam:: Breath sounds: good aeration     Location: bilaterally   Skin Exam:: Patient color: jaundiced   Patient Status: Currently appears comfortable   Arterial Blood Gasses: pH Arterial (no units)   Date Value   09/28/2024 7.45     pH Arterial POCT (no units)   Date Value   09/28/2024 7.44     pO2 Arterial (mm Hg)   Date Value   09/28/2024 139 (H)     pO2 Arterial POCT (mm Hg)   Date Value   09/28/2024 79 (L)     pCO2 Arterial (mm Hg)   Date Value   09/28/2024 38     pCO2 Arterial POCT (mm Hg)   Date Value   09/28/2024 39     Bicarbonate Arterial (mmol/L)   Date Value   09/28/2024 26     Bicarbonate Arterial POCT (mmol/L)   Date Value   09/28/2024 26            Recorded by Adele Thacker, RT

## 2024-09-28 NOTE — PROGRESS NOTES
Admitted/transferred from: OR  Reason for admission/transfer: Postop DDLT  2 RN skin assessment: completed by Nirmal BROWN and Brad TOMPKINS RN  Result of skin assessment and interventions/actions: R. Lip abrasion, clamshell surgical incision, generalized bruising, petechiae to bilateral lower extremities  Height, weight, drug calc weight: Done  Patient belongings (see Flowsheet)  MDRO education added to care planN/A    Patient admit from OR postop DDLT. Transfused 2u pRBC's, 1 PLT, 1 Cryo, 500mL 5% Albumin with improvement in lab values/SHENA drain output. Extubated at 1730.   ?

## 2024-09-28 NOTE — H&P
SURGICAL ICU ADMISSION NOTE  09/28/2024      PRIMARY TEAM:  Transplant Surgery- Liver  PRIMARY PHYSICIAN: Dr. Colon   REASON FOR CRITICAL CARE ADMISSION:  s/p DDLT cares    ADMITTING PHYSICIAN:  Dr. Wilson.   Date of Service (when I saw the patient): 09/28/2024    ASSESSMENT: Jag Smith is a 39 year old male with PMHx of BETYT/MDD and HTN and recently diagnosed decompensated alcoholic liver cirrhosis transferred from  [facility] on 9/21/24 for expedited liver  transplant evaluation due to decompensated alcoholic liver cirrhosis.  Per EMR,  patient presented to OSH in June 2024 presented with  jaundice and was diagnosed with acute alcoholic hepatitis was started on Prednisolone. He was discharged with GI follow-up, the admitted in July for bilateral pneumonia. Readmitted again on 9/5/24 for abdominal distention.  During this stay from 9/5-9/21, he was treated for hyponatremia, HRS, SBP and anemia.  - Recently diagnosed in June 2024 with etiology related to alcohol,s/p steroid course. Admitted at OSH 9/5-9/21 for HRS, SBP from paracetntesos 9/6 (s/p treatment with ceftriaxone and Flagyl). Last paracentesis on 9/13 negative for SBP. Diagnostic and therapeutic para 9/25 with 2 L removed, negative for SBP. EGD 9/25/24 with banding x2. Now Admitted to SICU 9/28/24 s/p DDLT with Dr Colon.     OR course  PV flow 2700  HA flow 100 ml, 350 ml after augmentation  Duct to duct, no stent  3 SHENA  drains  6,000 ml EBL  Products: 8 prbc, 15 ffp, 3 plt, 4 cryo, 1700 ml cellsaver, Fibriyga 1mg   8 L plasmalyte       PLAN:    Neurological:  # Anxiety and depression   # Acute post operative pain   # Hx hepatic encephalopathy  - Monitor neurological status. Delirium preventions and precautions.   - Pain:  will plan to start fentanyl gtt    - Sedation plan:  propofol gtt   Target RASS 0 to -1.     Pulmonary:  # Post operative ventilatory support   - VENT: 16/500/7 on 60% FiO2   - Continue full vent support. PST when  meets criteria.  Ventilatory bundle.  - CXR: ETT deep, will plan to retract ETT   - Supplemental oxygen to keep saturation above 92 %.    Cardiovascular:    # hx of hypertension   # Shock-distributive, vaso plegia,  hypovolemic   - Monitor hemodynamic status.  MAP goal >65  - norepinephrine gtt   - off vaso and epi at this time.   CVP  goal target:  10-12   - Intra-op bradycardia and some RV dilation noted, monitor for now       Gastroenterology/Nutrition:  # Decompensated alcoholic liver cirrhosis  # Recurrent ascites  # Esophageal varices s/p banding x2  # Hx SBP (s/p abx 9/6 x10 days)   - Recently diagnosed in 2024 with etiology related to alcohol treated with steroid course. Admitted at OSH - for HRS, SBP from para  (s/p treatment with ceftriaxone and Flagyl). Last paracentesis on  negative for SBP. Diagnostic and therapeutic para  with 2 L removed, negative for SBP.   - EGD  with esophageal varices noted s/p banding x 2  - Holding PTA lactulose and PO Rifaximin,spirolactone and lasix at this time   MELD 3.0: 42 at 2024  6:46 AM  MELD-Na: 41 at 2024  6:46 AM  Calculated from:  Serum Creatinine: 2.27 mg/dL at 2024  6:46 AM  Serum Sodium: 131 mmol/L at 2024  6:46 AM  Total Bilirubin: 38.2 mg/dL at 2024  6:46 AM  Serum Albumin: 3.3 g/dL at 2024  6:46 AM  INR(ratio): 3.14 at 2024  6:46 AM  Age at listin years  Sex: Male at 2024  6:46 AM    - post operative liver resuscitation and care  - trend AST/ALT/ coags  - ABD CXR to assess OG location   - Liver US stat  - send labs stat now.     Fluids/Electrolytes/Renal:  # Acute kidney injury,  non-oliguric, felt to be ATN  -- Recent creatinine ranging 1.2-1.8    # concern for hepatorenal syndrome s/p albumin challenge -  # hyponatremia, due to liver cirrhosis, Na 120 on admit, now improved   # Lactic acidosis   # hypomagnesia  # hypocalcemia, replaced   # hyperkalemia, reported intraoperatively,  treated with shifting   - previously on midodrine, octreotide, and enteral bicarbonate and lasix-- hold for now   - nephology following   - keep Lima for now. Will continue to monitor intake and output.   - lactic acid level 4.5, will trend and monitor for now.   - electrolyte replacement as needed     Endocrine:  # Stress hyperglycemia due to steroid use and critical illness   - Insulin infusion. Plan to keep for now, transition when steroid taper stable and  feeds at goal.   - Goal to keep BG< 180 for optimal wound healing     ID:  # immunosuppression   - induction steroids per transplant, MMF/tacrolimus per transplant   # immunoprophylaxis  - Bactrim and Valcyte   # perioperative antibiotics/antimicrobials:   Zosyn/Micafungin, stop fluconazole   - hold Ciprofloxacin     Heme:     # Acute blood loss anemia due to surgical blood loss    # Anemia of critical illness  # coagulopathy due to cirrhosis and surgical blood loss    # thrombocytopenia   - Baseline Hgb previously 11-12.0s, slowly drifted down to 9.0s over the last month, then acute drop from 9/17-->9/18 and again on 9/20, there was a concern for UGIB as patient's Hgb dropped to 6.5 and needed 2U PRBC.  A CTA abdomen pelvis was done on 9/21 and it showed no source of active bleeding. Hx of Intermittent nose bleeds and hematochezia, thought to be 2/2 prior know hemorrhoids.   S/p 10mg PO Vitamin K on 9/20 and 9/21.  s/p 1 unit(s) prbc daily on 9/24 and 9/25 for hgb 6.8-6.9  - Transfuse and replace products as needed to correct coagulation factors.   6,000 ml EBL Products: 8 prbc, 15 ffp, 3 plt, 4 cryo, 1700 ml cellsaver, Fibriyga 1mg     Musculoskeletal:  # Weakness and deconditioning of critical illness   - Physical and occupational therapy consult when appropriate     Skin:  # Ecchymosis on abdominal wall and arms   -  felt to be due to  cirrhosis related coagulopathy     # lip laceration   - local wound cares   - WOC consult    General Cares/Prophylaxis:     DVT Prophylaxis: Pneumatic Compression Devices. Chemical prophylaxis hold for now  GI Prophylaxis:  Protonix   Restraints: Restraints for medical healing needed:  mitts     Lines/ tubes/ drains:  - ETT   - OG T  - SHENA drainsX 3   - Right MAC   - Left radial arterial line   - PIV x4.   - Janie     Disposition:  -  Surgical ICU     Patient seen, findings and plan discussed with surgical ICU staff, Dr. Wilson     Time spent on this encounter  Billing:  I spent 60 minutes bedside and on the inpatient unit today managing the critical care of Jag Smith, excluding  procedures,  in relation to the issues listed in this note.      Bon Pino PA-C  - - - - - - - - - - - - - - - - - - - - - - - - - - - - - - - - - - - - - - - - - - - - - -   HISTORY PRESENTING ILLNESS:Jag Smith is a 39 year old male with PMHx of BETTY/MDD and HTN and recently diagnosed decompensated alcoholic liver cirrhosis transferred from  [facility] on 9/21/24 for expedited liver  transplant evaluation due to decompensated alcoholic liver cirrhosis.  Per EMR, presented to OSH in June 2024 presented with  jaundice and was diagnosed with acute alcoholic hepatitis was started on Prednisolone. He was discharged with GI follow-up, the admitted in July for bilateral pneumonia. Readmitted again on 9/5/24 for abdominal distention.  During this stay from 9/5-9/21, he was treated for hyponatremia, HRS, SBP and anemia.  - Recently diagnosed in June 2024 with etiology related to alcohol,s/p steroid course. Admitted at OSH 9/5-9/21 for HRS, SBP from para 9/6 (s/p treatment with ceftriaxone and Flagyl). Last paracentesis on 9/13 negative for SBP. Diagnostic and therapeutic para 9/25 with 2 L removed, negative for SBP. EGD 9/25/24 with banding x2. Admitted to SICU 9/28/24 s/p DDLT.     REVIEW OF SYSTEMS: 10 point ROS not able to be performed due to intubation and sedation.   PAST MEDICAL HISTORY:   Past Medical History:   Diagnosis Date     Alcoholic hepatitis (H) 06/07/2024    Anxiety     Depression     Hypertension      SURGICAL HISTORY:   Past Surgical History:   Procedure Laterality Date    DACRYOCYSTORHINOSTOMY Left 06/14/2013    INCISION AND DRAINAGE BUTTOCKS Left 03/03/2017     SOCIAL HISTORY:   Social History     Socioeconomic History    Marital status: Single     Spouse name: None    Number of children: None    Years of education: None    Highest education level: None   Tobacco Use    Smoking status: Never    Smokeless tobacco: Never   Substance and Sexual Activity    Alcohol use: Not Currently     Comment: last drink june 7th, 2024    Drug use: Never     Social Determinants of Health     Financial Resource Strain: Low Risk  (9/21/2024)    Financial Resource Strain     Within the past 12 months, have you or your family members you live with been unable to get utilities (heat, electricity) when it was really needed?: No   Food Insecurity: Low Risk  (9/21/2024)    Food Insecurity     Within the past 12 months, did you worry that your food would run out before you got money to buy more?: No     Within the past 12 months, did the food you bought just not last and you didn t have money to get more?: No   Transportation Needs: Low Risk  (9/21/2024)    Transportation Needs     Within the past 12 months, has lack of transportation kept you from medical appointments, getting your medicines, non-medical meetings or appointments, work, or from getting things that you need?: No   Interpersonal Safety: Low Risk  (9/28/2024)    Interpersonal Safety     Do you feel physically and emotionally safe where you currently live?: Yes     Within the past 12 months, have you been hit, slapped, kicked or otherwise physically hurt by someone?: No     Within the past 12 months, have you been humiliated or emotionally abused in other ways by your partner or ex-partner?: No   Recent Concern: Interpersonal Safety - High Risk (9/22/2024)    Interpersonal Safety     Do you  feel physically and emotionally safe where you currently live?: No     Within the past 12 months, have you been hit, slapped, kicked or otherwise physically hurt by someone?: No     Within the past 12 months, have you been humiliated or emotionally abused in other ways by your partner or ex-partner?: No   Housing Stability: Low Risk  (9/21/2024)    Housing Stability     Do you have housing? : Yes     Are you worried about losing your housing?: No     FAMILY HISTORY: No bleeding/clotting disorders nor problems with anesthesia.    ALLERGIES:   Allergies   Allergen Reactions    Azithromycin Rash       MEDICATIONS:  Current Facility-Administered Medications   Medication Dose Route Frequency Provider Last Rate Last Admin    albumin human 5 % injection 50 g  50 g Irrigation Once Gretchen Patino NP        benzocaine 20% (HURRICAINE/TOPEX) 20 % spray    PRN Nolan Ding MD   1 spray at 09/25/24 0753    [Auto Hold] calcium carbonate (TUMS) chewable tablet 1,000 mg  1,000 mg Oral 4x Daily PRN José Miguel Aviles MD        calcium chloride 1 g in sodium chloride 0.9 % 100 mL intermittent infusion  1 g Intravenous Once Dante Galvan MD        calcium chloride 1 g in sodium chloride 0.9 % 100 mL intermittent infusion  1 g Intravenous Once Dante Galvan MD        calcium chloride 1 g in sodium chloride 0.9 % 100 mL intermittent infusion  1 g Intravenous Q1H PRN Gretchen Patino NP        [Auto Hold] ciprofloxacin (CIPRO) tablet 500 mg  500 mg Oral Q12H [facility] (08/20) Peg Fan PA-C   500 mg at 09/27/24 2007    cold storage (BELZERS/ALVARENGA IGL) external solution    PRN Fernando Colon MD   2,000 mL at 09/28/24 0625    diphenhydrAMINE (BENADRYL) injection    PRN Nolan Ding MD   50 mg at 09/25/24 0753    EPINEPHrine (ADRENALIN) 5 mg in  mL infusion  0.01-0.3 mcg/kg/min Intravenous Continuous Dante Galvan MD        fentaNYL (PF) (SUBLIMAZE) injection    PRN Forrest  Nolan Serrano MD   100 mcg at 09/25/24 0756    [Auto Hold] folic acid (FOLVITE) tablet 1 mg  1 mg Oral Daily José Miguel Aviles MD   1 mg at 09/27/24 0858    [Auto Hold] lactulose (CHRONULAC) solution 10 g  10 g Oral TID Peg Fan PA-C   10 g at 09/27/24 2007    [Auto Hold] lactulose (CHRONULAC) solution 10 g  10 g Oral TID PRN Marissa Bocanegra PA-C   10 g at 09/26/24 0133    lidocaine (LMX4) cream   Topical Q1H PRN Sukhi Gagnon MD        lidocaine (LMX4) cream   Topical Q1H PRN Gretchen Patino NP        [Auto Hold] lidocaine (LMX4) cream   Topical Q1H PRN José Miguel Aviles MD        lidocaine 1 % 0.1-1 mL  0.1-1 mL Other Q1H PRN Sukhi Gagnon MD        lidocaine 1 % 0.1-1 mL  0.1-1 mL Other Q1H PRN Gretchen Patino NP        [Auto Hold] lidocaine 1 % 0.1-1 mL  0.1-1 mL Other Q1H PRN José Miguel Aviles MD        [Auto Hold] melatonin tablet 5 mg  5 mg Oral At Bedtime PRN José Miguel Aviles MD        methylPREDNISolone sodium succinate (solu-MEDROL) 1,000 mg in sodium chloride 0.9 % 283 mL intermittent infusion  1,000 mg Intravenous Once Gretchen Patino NP        midazolam (VERSED) injection   Intravenous PRN Nolan Ding MD   2 mg at 09/25/24 0757    [Auto Hold] midodrine (PROAMATINE) tablet 12.5 mg  12.5 mg Oral TID w/meals José Miguel Aviles MD   12.5 mg at 09/27/24 1813    [Auto Hold] naloxone (NARCAN) injection 0.2 mg  0.2 mg Intravenous Q2 Min PRN Lizette Mayer MD        Or    [Auto Hold] naloxone (NARCAN) injection 0.4 mg  0.4 mg Intravenous Q2 Min PRN Lizette Mayer MD        Or    [Auto Hold] naloxone (NARCAN) injection 0.2 mg  0.2 mg Intramuscular Q2 Min PRN Lizette Mayer MD        Or    [Auto Hold] naloxone (NARCAN) injection 0.4 mg  0.4 mg Intramuscular Q2 Min PRN Lizette Mayer MD        norepinephrine (LEVOPHED) 16 mg in  mL infusion MAX  CONC CENTRAL LINE  0.01-0.6 mcg/kg/min Intravenous Continuous Dante Galvan MD        [Auto Hold] ondansetron (ZOFRAN ODT) ODT tab 4 mg  4 mg Oral Q6H PRN José Miguel Aviles MD   4 mg at 09/27/24 0611    Or    [Auto Hold] ondansetron (ZOFRAN) injection 4 mg  4 mg Intravenous Q6H PRN José Miguel Aviles MD   4 mg at 09/26/24 0805    [Auto Hold] oxyCODONE (ROXICODONE) tablet 5 mg  5 mg Oral Q6H PRN José Miguel Aviles MD   5 mg at 09/27/24 2007    [Auto Hold] pantoprazole (PROTONIX) IV push injection 40 mg  40 mg Intravenous BID José Miguel Aviles MD   40 mg at 09/27/24 2007    papaverine injection    PRN Fernando Colon MD   60 mg at 09/28/24 0936    piperacillin-tazobactam (ZOSYN) 3.375 g vial to attach to  mL bag  3.375 g Intravenous Q4H PRN Gretchen Patino NP        [Auto Hold] prochlorperazine (COMPAZINE) injection 10 mg  10 mg Intravenous Q6H PRN José Miguel Aviles MD   10 mg at 09/23/24 2200    Or    [Auto Hold] prochlorperazine (COMPAZINE) tablet 10 mg  10 mg Oral Q6H PRN José Miguel Aviles MD        Or    [Auto Hold] prochlorperazine (COMPAZINE) suppository 25 mg  25 mg Rectal Q12H PRN José Miguel Aviles MD        [Auto Hold] rifaximin (XIFAXAN) tablet 550 mg  550 mg Oral BID José Miguel Aviles MD   550 mg at 09/27/24 2007    [Auto Hold] senna-docusate (SENOKOT-S/PERICOLACE) 8.6-50 MG per tablet 1 tablet  1 tablet Oral BID PRN José Miguel Aviles MD        Or    [Auto Hold] senna-docusate (SENOKOT-S/PERICOLACE) 8.6-50 MG per tablet 2 tablet  2 tablet Oral BID PRN José Miguel Aviles MD        [Auto Hold] sodium bicarbonate tablet 1,950 mg  1,950 mg Oral TID Georges Sanderson MD   1,950 mg at 09/27/24 2007    sodium chloride (PF) 0.9% PF flush 3 mL  3 mL Intracatheter Q8H Sukhi Gagnon MD        sodium chloride (PF) 0.9% PF flush 3 mL  3 mL Intracatheter q1 min prn Chelsi  Sukhi King MD        sodium chloride (PF) 0.9% PF flush 3 mL  3 mL Intracatheter Q8H Gretchen Patino NP   3 mL at 09/28/24 0004    sodium chloride (PF) 0.9% PF flush 3 mL  3 mL Intracatheter q1 min prn Gretchen Patino NP        [Auto Hold] sodium chloride (PF) 0.9% PF flush 3 mL  3 mL Intracatheter Q8H José Miguel Aviles MD   3 mL at 09/28/24 0004    [Auto Hold] sodium chloride (PF) 0.9% PF flush 3 mL  3 mL Intracatheter q1 min prn José Miguel Aviles MD        [Auto Hold] thiamine (B-1) tablet 100 mg  100 mg Oral Daily José Miguel Aviles MD   100 mg at 09/27/24 0857    vasopressin 1 unit/mL MAX Conc (PITRESSIN) infusion  2.4 Units/hr Intravenous Continuous Dante Galvan MD        verapamil (ISOPTIN) 5 mg irrigation    PRN Fernando Colon MD   2 mL at 09/28/24 1019     Facility-Administered Medications Ordered in Other Encounters   Medication Dose Route Frequency Provider Last Rate Last Admin    albumin human 5 % injection   Intravenous Continuous PRDarvin Gregorio APRN CRNA   Stopped at 09/28/24 0841    albuterol (PROVENTIL HFA/VENTOLIN HFA) inhaler   Inhalation PRDarvin Gregorio APRN CRNA   6 puff at 09/28/24 0925    calcium chloride 4 g in sodium chloride 0.9 % 100 mL intermittent infusion   Intravenous Continuous PRN Sukhi Gagnon MD 25 mL/hr at 09/28/24 0829 1 g/hr at 09/28/24 0829    calcium chloride injection   Intravenous PRSukhi Banks MD   1 g at 09/28/24 0935    dextrose 50 % injection   Intravenous PRSukhi Banks MD   25 mL at 09/28/24 0912    EPINEPHRINE BOLUS 100 MCG/10 ML ANESTHESIA                              [id number]   Intravenous Continuous PRN Darvin Caruso APRN CRNA   50 mcg at 09/28/24 0935    EPINEPHrine drip 0.02 mg/mL (5 mg/250 mL)   Intravenous Continuous PRN Caruso, Darvin B, APRN CRNA   Stopped at 09/28/24 1024    fentaNYL (PF) (SUBLIMAZE) injection    Intravenous Sukhi Barnhart MD   50 mcg at 09/28/24 1001    furosemide (LASIX) injection   Intravenous Darvin Mitchell APRN CRNA   20 mg at 09/28/24 0735    insulin (regular) (NovoLIN-R, HumuLIN-R) BOLUS from syringe or bag ADULT/PEDS   Intravenous PRDarvin Gregorio APRN CRNA   5 Units at 09/28/24 0915    insulin regular (MYXREDLIN) 1 unit/mL infusion   Intravenous Continuous Sukhi Barnhart MD 8 mL/hr at 09/28/24 0908 8 Units/hr at 09/28/24 0908    midazolam (VERSED) injection   Intravenous Sukhi Barnhart MD   2 mg at 09/28/24 0413    norepinephrine  bolus 6.4 mcg/mL   Intravenous Continuous Sukhi Barnhart MD   19.2 mcg at 09/28/24 0934    norepinephrine (LEVOPHED) 3.2 mg in sodium chloride 0.9 % 50 mL infusion   Intravenous Continuous Sukhi Barnhart MD 8.91 mL/hr at 09/28/24 1027 0.09 mcg/kg/min at 09/28/24 1027    Plasma-Lyte A (electrolyte A) BOLUS   Intravenous Continuous Sukhi Barnhart MD   New Bag at 09/28/24 0945    Plasma-Lyte A (electrolyte A) BOLUS   Intravenous Continuous Sukhi Barnhart  mL/hr at 09/28/24 0501 New Bag at 09/28/24 0501    propofol (DIPRIVAN) injection 10 mg/mL vial   Intravenous Sukhi Barnhart MD   170 mg at 09/28/24 0420    rocuronium injection   Intravenous Sukhi Barnhart MD   50 mg at 09/28/24 0913    sodium bicarbonate 8.4 % injection   Intravenous Sukhi Barnhart MD   50 mEq at 09/28/24 0945    vasopressin 1 unit/mL syringe   Intravenous Sukhi Barnhart MD   2 Units at 09/28/24 0936    vasopressin drip 1 unit/mL ADULT   Intravenous Continuous Sukhi Barnhart MD   Stopped at 09/28/24 0946     PHYSICAL EXAMINATION:  Temp:  [97.8  F (36.6  C)-98.5  F (36.9  C)] 97.8  F (36.6  C)  Pulse:  [] 93  Resp:  [16-20] 18  BP:  ()/(56-75) 100/57  SpO2:  [98 %-100 %] 98 %  General: chemically sedated.  HEENT: pupils 3mm, scleral edema.   Neck: Right neck with MAC and hands free  Neuro: chemically sedated.   Pulm/Resp: Clear breath sounds bilaterally without rhonchi, crackles or wheeze, breathing non-labored.   CV: RRR, S1/S2   Abdomen: subcostal incision dressed, some shadowing.   : (+) mckeon catheter in place, urine yellow and clear  Incisions/Skin: skin jaundiced,  ecchymosis on lower abdomen, scattered ecchymosis on feet bilateral. Left lower abdomen with dermabond from prior SBP drainage site. SHENA x3. SHENA labelled #3--> with bloody output.   MSK/Extremities: 1+peripheral edema, moving all extremities, peripheral pulses intact, calves soft compressible, extremities well perfused, extremities warm.     LABS: Reviewed.   Arterial Blood Gases   Recent Labs   Lab 09/28/24  0942 09/28/24  0904 09/28/24  0811 09/28/24  0600   PH 7.40 7.39 7.34* 7.26*   PCO2 34* 31* 33* 36   PO2 202* 120* 106* 94   HCO3 21 19* 18* 16*     Complete Blood Count   Recent Labs   Lab 09/28/24  0942 09/28/24  0904 09/28/24  0811 09/28/24  0600 09/28/24  0508 09/27/24  0646 09/26/24  0702 09/25/24  0653 09/24/24  0652   WBC  --   --   --   --   --  9.5  9.5 7.9 7.9 8.7   HGB 7.0* 8.7* 8.3* 7.6*   < > 7.8*  7.8* 7.9* 6.9* 6.8*   PLT  --   --   --   --   --  77*  77* 67* 64* 78*    < > = values in this interval not displayed.     Basic Metabolic Panel  Recent Labs   Lab 09/28/24  0942 09/28/24  0904 09/28/24  0811 09/28/24  0600 09/27/24  1719 09/27/24  0646 09/26/24  0702 09/25/24  0653 09/24/24  0652    136 135 134*   < > 131* 129* 129* 131*   POTASSIUM 3.7 4.3 4.6 4.9   < > 4.5 4.6 4.6 4.5   CHLORIDE  --   --   --   --   --  104 103 106 105   CO2  --   --   --   --   --  13* 15* 15* 15*   BUN  --   --   --   --   --  34.1* 33.6* 34.0* 28.5*   CR  --   --   --   --   --  2.27* 2.22* 2.11* 1.91*   * 150* 140* 168*   < > 82 77 90 86    < > = values  in this interval not displayed.     Liver Function Tests  Recent Labs   Lab 09/27/24  0646 09/26/24  0702 09/25/24  0653 09/24/24  0652   * 135* 127* 128*   ALT 45 45 45 47   ALKPHOS 85 73 75 82   BILITOTAL 38.2* 38.0* 34.3* 30.8*   ALBUMIN 3.3* 3.5 3.2* 2.9*   INR 3.14* 3.19* 3.36* 3.51*     Pancreatic Enzymes  Recent Labs   Lab 09/27/24  0646   AMYLASE 46     Coagulation Profile  Recent Labs   Lab 09/27/24  1654 09/27/24  0646 09/26/24  0702 09/25/24  0653 09/24/24  0652 09/23/24  0659 09/21/24  2317   INR  --  3.14* 3.19* 3.36* 3.51*   < > 3.08*   PTT 63*  --   --   --   --   --  65*    < > = values in this interval not displayed.       IMAGING:  Recent Results (from the past 24 hour(s))   XR Chest 2 Views    Narrative    Exam: XR CHEST 2 VIEWS, 9/27/2024 5:43 PM    Indication: Pre-op Liver transplant per protocol; evaluate for  infection/other pathologies    Comparison: 9/23/2024    Findings:   The cardiomediastinal silhouette and pulmonary vasculature are within  normal limits. No pleural effusion or pneumothorax. No focal airspace  opacity. Stable mild asymmetric elevation of the right hemidiaphragm.      Impression    Impression: No acute airspace disease.    SANDHYA PYLE DO         SYSTEM ID:  [id number]

## 2024-09-28 NOTE — ANESTHESIA PROCEDURE NOTES
Arterial Line Procedure Note    Pre-Procedure   Staff -        Anesthesiologist:  Sukhi Gagnon MD       Performed By: anesthesiologist       Location: OR       Pre-Anesthestic Checklist: patient identified, IV checked, risks and benefits discussed, informed consent, monitors and equipment checked, pre-op evaluation and at physician/surgeon's request  Timeout:       Correct Patient: Yes        Correct Procedure: Yes        Correct Site: Yes        Correct Position: Yes   Line Placement:   This line was placed Post Induction  Procedure   Procedure: arterial line and new line       Laterality: left       Insertion Site: radial.  Sterile Prep        Standard elements of sterile barrier followed       Skin prep: Chloraprep  Insertion/Injection        Technique: Seldinger Technique and ultrasound guided        1. Ultrasound was used to evaluate the access site.       2. Artery evaluated via ultrasound for patency/adequacy.       3. Using real-time ultrasound the needle/catheter was observed entering the artery/vein.       Catheter Type/Size: 20 G, 12 cm  Narrative         Secured by: suture       Tegaderm dressing used.       Complications: None apparent,        Arterial waveform: Yes        IBP within 10% of NIBP: Yes

## 2024-09-28 NOTE — ANESTHESIA POSTPROCEDURE EVALUATION
Patient: Jag Smith    Procedure: Procedure(s):  Transplant liver recipient  donor       Anesthesia Type:  No value filed.    Note:  Disposition: Inpatient; ICU            ICU Sign Out: Anesthesiologist/ICU physician sign out WAS performed   Postop Pain Control: Uneventful            Sign Out: Well controlled pain   PONV: No   Neuro/Psych: Uneventful            Sign Out: PLANNED postop sedation   Airway/Respiratory: Uneventful            Sign Out: AIRWAY IN SITU/Resp. Support               Airway in situ/Resp. Support: ETT                 Reason: Planned Pre-op   CV/Hemodynamics: Uneventful            Sign Out: Detailed CV status               Blood Pressure: Pressors               Rate/Rhythm: Normal HR               Perfusion:  Adequate perfusion indices   Other NRE: NONE   DID A NON-ROUTINE EVENT OCCUR? No           Last vitals:  Vitals:    24 0039 24 0335 24 1300   BP: 99/56 100/57    Pulse: 93  104   Resp: 20 18 18   Temp: 36.9  C (98.5  F) 36.6  C (97.8  F) (!) 35.8  C (96.4  F)   SpO2: 98% 98% 100%       Electronically Signed By: Sukhi Gagnon MD  2024  1:26 PM

## 2024-09-28 NOTE — PHARMACY-TRANSPLANT NOTE
Adult Liver Transplant Post Operative Note    39 year old male s/p  donor liver transplant on 24 for Alcoholic liver disease.      Planned immunosuppression regimen to include:   INDUCTION with: methylprednisolone/prednisone taper through POD #6.  MAINTENANCE to include mycophenolate and tacrolimus with initial goal trough levels of 8-12 (closer to 10) mcg/L for 0-3 months post-transplant.  Patient may continue prednisone at 5 mg PO daily until tacrolimus level is therapeutic.     Surgical prophylaxis includes: piperacillin-tazobactam IV for 48 hours and fluconazole IV/PO for 7 days .      Opportunistic pathogen prophylaxis includes: trimethoprim/sulfamethoxazole (not yet ordered), valganciclovir, and topical antifungal coverage to begin once systemic antifungal therapy is complete.    Patient is not enrolled in medication study.    Pharmacy will monitor for medication interactions and immunosuppression levels in conjunction with the team. Medication therapy needs for discharge planning will continue to be addressed throughout the current admission via multidisciplinary rounds and order review.  Pharmacy will make recommendations as appropriate.

## 2024-09-29 ENCOUNTER — APPOINTMENT (OUTPATIENT)
Dept: PHYSICAL THERAPY | Facility: CLINIC | Age: 39
End: 2024-09-29
Attending: PEDIATRICS
Payer: MEDICAID

## 2024-09-29 ENCOUNTER — APPOINTMENT (OUTPATIENT)
Dept: OCCUPATIONAL THERAPY | Facility: CLINIC | Age: 39
End: 2024-09-29
Attending: NURSE PRACTITIONER
Payer: MEDICAID

## 2024-09-29 LAB
ALBUMIN SERPL BCG-MCNC: 3.2 G/DL (ref 3.5–5.2)
ALP SERPL-CCNC: 55 U/L (ref 40–150)
ALT SERPL W P-5'-P-CCNC: 249 U/L (ref 0–70)
AMYLASE SERPL-CCNC: 26 U/L (ref 28–100)
ANION GAP SERPL CALCULATED.3IONS-SCNC: 14 MMOL/L (ref 7–15)
ANION GAP SERPL CALCULATED.3IONS-SCNC: 14 MMOL/L (ref 7–15)
ANION GAP SERPL CALCULATED.3IONS-SCNC: 15 MMOL/L (ref 7–15)
APTT PPP: 27 SECONDS (ref 22–38)
APTT PPP: 28 SECONDS (ref 22–38)
APTT PPP: 30 SECONDS (ref 22–38)
APTT PPP: 31 SECONDS (ref 22–38)
AST SERPL W P-5'-P-CCNC: 261 U/L (ref 0–45)
BACTERIA SPEC CULT: NORMAL
BILIRUB DIRECT SERPL-MCNC: 7.89 MG/DL (ref 0–0.3)
BILIRUB SERPL-MCNC: 9.9 MG/DL
BUN SERPL-MCNC: 27.4 MG/DL (ref 6–20)
BUN SERPL-MCNC: 27.4 MG/DL (ref 6–20)
BUN SERPL-MCNC: 40.4 MG/DL (ref 6–20)
CA-I BLD-MCNC: 4.8 MG/DL (ref 4.4–5.2)
CALCIUM SERPL-MCNC: 8.4 MG/DL (ref 8.8–10.4)
CALCIUM SERPL-MCNC: 9.2 MG/DL (ref 8.8–10.4)
CALCIUM SERPL-MCNC: 9.2 MG/DL (ref 8.8–10.4)
CHLORIDE SERPL-SCNC: 100 MMOL/L (ref 98–107)
CHLORIDE SERPL-SCNC: 100 MMOL/L (ref 98–107)
CHLORIDE SERPL-SCNC: 95 MMOL/L (ref 98–107)
CREAT SERPL-MCNC: 1.56 MG/DL (ref 0.67–1.17)
CREAT SERPL-MCNC: 1.56 MG/DL (ref 0.67–1.17)
CREAT SERPL-MCNC: 2.02 MG/DL (ref 0.67–1.17)
EGFRCR SERPLBLD CKD-EPI 2021: 43 ML/MIN/1.73M2
EGFRCR SERPLBLD CKD-EPI 2021: 58 ML/MIN/1.73M2
EGFRCR SERPLBLD CKD-EPI 2021: 58 ML/MIN/1.73M2
ERYTHROCYTE [DISTWIDTH] IN BLOOD BY AUTOMATED COUNT: 18.7 % (ref 10–15)
ERYTHROCYTE [DISTWIDTH] IN BLOOD BY AUTOMATED COUNT: 18.8 % (ref 10–15)
ERYTHROCYTE [DISTWIDTH] IN BLOOD BY AUTOMATED COUNT: 19.6 % (ref 10–15)
ERYTHROCYTE [DISTWIDTH] IN BLOOD BY AUTOMATED COUNT: 19.7 % (ref 10–15)
FIBRINOGEN PPP-MCNC: 216 MG/DL (ref 170–510)
FIBRINOGEN PPP-MCNC: 223 MG/DL (ref 170–510)
FIBRINOGEN PPP-MCNC: 231 MG/DL (ref 170–510)
GLUCOSE BLDC GLUCOMTR-MCNC: 117 MG/DL (ref 70–99)
GLUCOSE BLDC GLUCOMTR-MCNC: 122 MG/DL (ref 70–99)
GLUCOSE BLDC GLUCOMTR-MCNC: 137 MG/DL (ref 70–99)
GLUCOSE BLDC GLUCOMTR-MCNC: 138 MG/DL (ref 70–99)
GLUCOSE BLDC GLUCOMTR-MCNC: 140 MG/DL (ref 70–99)
GLUCOSE BLDC GLUCOMTR-MCNC: 142 MG/DL (ref 70–99)
GLUCOSE BLDC GLUCOMTR-MCNC: 142 MG/DL (ref 70–99)
GLUCOSE BLDC GLUCOMTR-MCNC: 150 MG/DL (ref 70–99)
GLUCOSE BLDC GLUCOMTR-MCNC: 154 MG/DL (ref 70–99)
GLUCOSE BLDC GLUCOMTR-MCNC: 156 MG/DL (ref 70–99)
GLUCOSE BLDC GLUCOMTR-MCNC: 158 MG/DL (ref 70–99)
GLUCOSE BLDC GLUCOMTR-MCNC: 161 MG/DL (ref 70–99)
GLUCOSE BLDC GLUCOMTR-MCNC: 163 MG/DL (ref 70–99)
GLUCOSE BLDC GLUCOMTR-MCNC: 169 MG/DL (ref 70–99)
GLUCOSE BLDC GLUCOMTR-MCNC: 196 MG/DL (ref 70–99)
GLUCOSE SERPL-MCNC: 165 MG/DL (ref 70–99)
GLUCOSE SERPL-MCNC: 165 MG/DL (ref 70–99)
GLUCOSE SERPL-MCNC: 167 MG/DL (ref 70–99)
HCO3 SERPL-SCNC: 21 MMOL/L (ref 22–29)
HCO3 SERPL-SCNC: 25 MMOL/L (ref 22–29)
HCO3 SERPL-SCNC: 25 MMOL/L (ref 22–29)
HCT VFR BLD AUTO: 23.4 % (ref 40–53)
HCT VFR BLD AUTO: 23.5 % (ref 40–53)
HCT VFR BLD AUTO: 25.6 % (ref 40–53)
HCT VFR BLD AUTO: 26.7 % (ref 40–53)
HGB BLD-MCNC: 8.1 G/DL (ref 13.3–17.7)
HGB BLD-MCNC: 8.3 G/DL (ref 13.3–17.7)
HGB BLD-MCNC: 8.7 G/DL (ref 13.3–17.7)
HGB BLD-MCNC: 9 G/DL (ref 13.3–17.7)
INR PPP: 1.14 (ref 0.85–1.15)
INR PPP: 1.21 (ref 0.85–1.15)
INR PPP: 1.26 (ref 0.85–1.15)
LACTATE SERPL-SCNC: 0.8 MMOL/L (ref 0.7–2)
LACTATE SERPL-SCNC: 1.1 MMOL/L (ref 0.7–2)
LIPASE SERPL-CCNC: 17 U/L (ref 13–60)
MAGNESIUM SERPL-MCNC: 1.4 MG/DL (ref 1.7–2.3)
MAGNESIUM SERPL-MCNC: 2.2 MG/DL (ref 1.7–2.3)
MAGNESIUM SERPL-MCNC: 2.4 MG/DL (ref 1.7–2.3)
MCH RBC QN AUTO: 30.1 PG (ref 26.5–33)
MCH RBC QN AUTO: 30.4 PG (ref 26.5–33)
MCH RBC QN AUTO: 30.4 PG (ref 26.5–33)
MCH RBC QN AUTO: 30.6 PG (ref 26.5–33)
MCHC RBC AUTO-ENTMCNC: 33.7 G/DL (ref 31.5–36.5)
MCHC RBC AUTO-ENTMCNC: 34 G/DL (ref 31.5–36.5)
MCHC RBC AUTO-ENTMCNC: 34.6 G/DL (ref 31.5–36.5)
MCHC RBC AUTO-ENTMCNC: 35.3 G/DL (ref 31.5–36.5)
MCV RBC AUTO: 87 FL (ref 78–100)
MCV RBC AUTO: 87 FL (ref 78–100)
MCV RBC AUTO: 90 FL (ref 78–100)
MCV RBC AUTO: 90 FL (ref 78–100)
PHOSPHATE SERPL-MCNC: 5.1 MG/DL (ref 2.5–4.5)
PHOSPHATE SERPL-MCNC: 5.8 MG/DL (ref 2.5–4.5)
PLATELET # BLD AUTO: 63 10E3/UL (ref 150–450)
PLATELET # BLD AUTO: 71 10E3/UL (ref 150–450)
PLATELET # BLD AUTO: 71 10E3/UL (ref 150–450)
PLATELET # BLD AUTO: 78 10E3/UL (ref 150–450)
POTASSIUM SERPL-SCNC: 3.8 MMOL/L (ref 3.4–5.3)
POTASSIUM SERPL-SCNC: 4.1 MMOL/L (ref 3.4–5.3)
POTASSIUM SERPL-SCNC: 4.1 MMOL/L (ref 3.4–5.3)
POTASSIUM SERPL-SCNC: 4.7 MMOL/L (ref 3.4–5.3)
PROT SERPL-MCNC: 5 G/DL (ref 6.4–8.3)
RBC # BLD AUTO: 2.69 10E6/UL (ref 4.4–5.9)
RBC # BLD AUTO: 2.71 10E6/UL (ref 4.4–5.9)
RBC # BLD AUTO: 2.86 10E6/UL (ref 4.4–5.9)
RBC # BLD AUTO: 2.96 10E6/UL (ref 4.4–5.9)
SODIUM SERPL-SCNC: 131 MMOL/L (ref 135–145)
SODIUM SERPL-SCNC: 139 MMOL/L (ref 135–145)
SODIUM SERPL-SCNC: 139 MMOL/L (ref 135–145)
TACROLIMUS BLD-MCNC: 1.5 UG/L (ref 5–15)
TME LAST DOSE: ABNORMAL H
TME LAST DOSE: ABNORMAL H
WBC # BLD AUTO: 11.1 10E3/UL (ref 4–11)
WBC # BLD AUTO: 5.2 10E3/UL (ref 4–11)
WBC # BLD AUTO: 6.9 10E3/UL (ref 4–11)
WBC # BLD AUTO: 9.2 10E3/UL (ref 4–11)

## 2024-09-29 PROCEDURE — 85384 FIBRINOGEN ACTIVITY: CPT | Performed by: STUDENT IN AN ORGANIZED HEALTH CARE EDUCATION/TRAINING PROGRAM

## 2024-09-29 PROCEDURE — 250N000011 HC RX IP 250 OP 636: Performed by: STUDENT IN AN ORGANIZED HEALTH CARE EDUCATION/TRAINING PROGRAM

## 2024-09-29 PROCEDURE — 97116 GAIT TRAINING THERAPY: CPT | Mod: GP | Performed by: PHYSICAL THERAPIST

## 2024-09-29 PROCEDURE — 84100 ASSAY OF PHOSPHORUS: CPT

## 2024-09-29 PROCEDURE — 250N000011 HC RX IP 250 OP 636

## 2024-09-29 PROCEDURE — 85610 PROTHROMBIN TIME: CPT | Performed by: STUDENT IN AN ORGANIZED HEALTH CARE EDUCATION/TRAINING PROGRAM

## 2024-09-29 PROCEDURE — 83735 ASSAY OF MAGNESIUM: CPT

## 2024-09-29 PROCEDURE — 250N000013 HC RX MED GY IP 250 OP 250 PS 637: Performed by: STUDENT IN AN ORGANIZED HEALTH CARE EDUCATION/TRAINING PROGRAM

## 2024-09-29 PROCEDURE — 250N000009 HC RX 250: Performed by: HOSPITALIST

## 2024-09-29 PROCEDURE — 99233 SBSQ HOSP IP/OBS HIGH 50: CPT | Mod: 24 | Performed by: INTERNAL MEDICINE

## 2024-09-29 PROCEDURE — 84132 ASSAY OF SERUM POTASSIUM: CPT

## 2024-09-29 PROCEDURE — 250N000011 HC RX IP 250 OP 636: Performed by: ANESTHESIOLOGY

## 2024-09-29 PROCEDURE — 99232 SBSQ HOSP IP/OBS MODERATE 35: CPT | Mod: GC | Performed by: ANESTHESIOLOGY

## 2024-09-29 PROCEDURE — 85730 THROMBOPLASTIN TIME PARTIAL: CPT | Performed by: STUDENT IN AN ORGANIZED HEALTH CARE EDUCATION/TRAINING PROGRAM

## 2024-09-29 PROCEDURE — 97530 THERAPEUTIC ACTIVITIES: CPT | Mod: GO

## 2024-09-29 PROCEDURE — 80048 BASIC METABOLIC PNL TOTAL CA: CPT | Performed by: STUDENT IN AN ORGANIZED HEALTH CARE EDUCATION/TRAINING PROGRAM

## 2024-09-29 PROCEDURE — 84100 ASSAY OF PHOSPHORUS: CPT | Performed by: STUDENT IN AN ORGANIZED HEALTH CARE EDUCATION/TRAINING PROGRAM

## 2024-09-29 PROCEDURE — 250N000013 HC RX MED GY IP 250 OP 250 PS 637

## 2024-09-29 PROCEDURE — 250N000009 HC RX 250: Performed by: PHYSICIAN ASSISTANT

## 2024-09-29 PROCEDURE — 85384 FIBRINOGEN ACTIVITY: CPT | Performed by: PHYSICIAN ASSISTANT

## 2024-09-29 PROCEDURE — 85730 THROMBOPLASTIN TIME PARTIAL: CPT | Performed by: PHYSICIAN ASSISTANT

## 2024-09-29 PROCEDURE — 250N000013 HC RX MED GY IP 250 OP 250 PS 637: Performed by: PHYSICIAN ASSISTANT

## 2024-09-29 PROCEDURE — 83690 ASSAY OF LIPASE: CPT | Performed by: STUDENT IN AN ORGANIZED HEALTH CARE EDUCATION/TRAINING PROGRAM

## 2024-09-29 PROCEDURE — 258N000003 HC RX IP 258 OP 636: Performed by: STUDENT IN AN ORGANIZED HEALTH CARE EDUCATION/TRAINING PROGRAM

## 2024-09-29 PROCEDURE — S5010 5% DEXTROSE AND 0.45% SALINE: HCPCS | Performed by: STUDENT IN AN ORGANIZED HEALTH CARE EDUCATION/TRAINING PROGRAM

## 2024-09-29 PROCEDURE — 97535 SELF CARE MNGMENT TRAINING: CPT | Mod: GO

## 2024-09-29 PROCEDURE — 80197 ASSAY OF TACROLIMUS: CPT | Performed by: STUDENT IN AN ORGANIZED HEALTH CARE EDUCATION/TRAINING PROGRAM

## 2024-09-29 PROCEDURE — 250N000013 HC RX MED GY IP 250 OP 250 PS 637: Performed by: HOSPITALIST

## 2024-09-29 PROCEDURE — 80048 BASIC METABOLIC PNL TOTAL CA: CPT

## 2024-09-29 PROCEDURE — 250N000011 HC RX IP 250 OP 636: Performed by: HOSPITALIST

## 2024-09-29 PROCEDURE — 85610 PROTHROMBIN TIME: CPT | Performed by: PHYSICIAN ASSISTANT

## 2024-09-29 PROCEDURE — 97165 OT EVAL LOW COMPLEX 30 MIN: CPT | Mod: GO

## 2024-09-29 PROCEDURE — 120N000005 HC R&B MS OVERFLOW UMMC

## 2024-09-29 PROCEDURE — 83605 ASSAY OF LACTIC ACID: CPT | Performed by: STUDENT IN AN ORGANIZED HEALTH CARE EDUCATION/TRAINING PROGRAM

## 2024-09-29 PROCEDURE — 250N000012 HC RX MED GY IP 250 OP 636 PS 637: Performed by: STUDENT IN AN ORGANIZED HEALTH CARE EDUCATION/TRAINING PROGRAM

## 2024-09-29 PROCEDURE — 99232 SBSQ HOSP IP/OBS MODERATE 35: CPT | Mod: 24 | Performed by: PHYSICIAN ASSISTANT

## 2024-09-29 PROCEDURE — 80069 RENAL FUNCTION PANEL: CPT

## 2024-09-29 PROCEDURE — 97164 PT RE-EVAL EST PLAN CARE: CPT | Mod: GP | Performed by: PHYSICAL THERAPIST

## 2024-09-29 PROCEDURE — 82330 ASSAY OF CALCIUM: CPT | Performed by: STUDENT IN AN ORGANIZED HEALTH CARE EDUCATION/TRAINING PROGRAM

## 2024-09-29 PROCEDURE — 85027 COMPLETE CBC AUTOMATED: CPT | Performed by: STUDENT IN AN ORGANIZED HEALTH CARE EDUCATION/TRAINING PROGRAM

## 2024-09-29 PROCEDURE — 85027 COMPLETE CBC AUTOMATED: CPT | Performed by: PHYSICIAN ASSISTANT

## 2024-09-29 PROCEDURE — 36415 COLL VENOUS BLD VENIPUNCTURE: CPT | Performed by: PHYSICIAN ASSISTANT

## 2024-09-29 PROCEDURE — 250N000011 HC RX IP 250 OP 636: Performed by: PHYSICIAN ASSISTANT

## 2024-09-29 PROCEDURE — 258N000003 HC RX IP 258 OP 636: Performed by: PHYSICIAN ASSISTANT

## 2024-09-29 PROCEDURE — G0463 HOSPITAL OUTPT CLINIC VISIT: HCPCS

## 2024-09-29 PROCEDURE — 85014 HEMATOCRIT: CPT | Performed by: PHYSICIAN ASSISTANT

## 2024-09-29 PROCEDURE — 83735 ASSAY OF MAGNESIUM: CPT | Performed by: STUDENT IN AN ORGANIZED HEALTH CARE EDUCATION/TRAINING PROGRAM

## 2024-09-29 PROCEDURE — 36415 COLL VENOUS BLD VENIPUNCTURE: CPT

## 2024-09-29 PROCEDURE — 36415 COLL VENOUS BLD VENIPUNCTURE: CPT | Performed by: STUDENT IN AN ORGANIZED HEALTH CARE EDUCATION/TRAINING PROGRAM

## 2024-09-29 PROCEDURE — 97530 THERAPEUTIC ACTIVITIES: CPT | Mod: GP | Performed by: PHYSICAL THERAPIST

## 2024-09-29 PROCEDURE — 82248 BILIRUBIN DIRECT: CPT | Performed by: STUDENT IN AN ORGANIZED HEALTH CARE EDUCATION/TRAINING PROGRAM

## 2024-09-29 PROCEDURE — 82150 ASSAY OF AMYLASE: CPT | Performed by: STUDENT IN AN ORGANIZED HEALTH CARE EDUCATION/TRAINING PROGRAM

## 2024-09-29 RX ORDER — SULFAMETHOXAZOLE AND TRIMETHOPRIM 400; 80 MG/1; MG/1
1 TABLET ORAL DAILY
Status: DISPENSED | OUTPATIENT
Start: 2024-09-29

## 2024-09-29 RX ORDER — POTASSIUM CHLORIDE 29.8 MG/ML
20 INJECTION INTRAVENOUS ONCE
Status: COMPLETED | OUTPATIENT
Start: 2024-09-29 | End: 2024-09-29

## 2024-09-29 RX ORDER — MAGNESIUM SULFATE HEPTAHYDRATE 40 MG/ML
4 INJECTION, SOLUTION INTRAVENOUS ONCE
Status: COMPLETED | OUTPATIENT
Start: 2024-09-29 | End: 2024-09-29

## 2024-09-29 RX ORDER — PREDNISONE 5 MG/1
5 TABLET ORAL DAILY
Status: DISPENSED | OUTPATIENT
Start: 2024-10-04

## 2024-09-29 RX ORDER — METHOCARBAMOL 500 MG/1
500 TABLET, FILM COATED ORAL 3 TIMES DAILY
Status: DISCONTINUED | OUTPATIENT
Start: 2024-09-29 | End: 2024-09-30

## 2024-09-29 RX ORDER — URSODIOL 300 MG/1
300 CAPSULE ORAL 2 TIMES DAILY
Status: DISCONTINUED | OUTPATIENT
Start: 2024-09-29 | End: 2024-10-03

## 2024-09-29 RX ORDER — BUMETANIDE 0.25 MG/ML
3 INJECTION INTRAMUSCULAR; INTRAVENOUS ONCE
Status: COMPLETED | OUTPATIENT
Start: 2024-09-29 | End: 2024-09-29

## 2024-09-29 RX ADMIN — MYCOPHENOLATE MOFETIL 750 MG: 250 CAPSULE ORAL at 07:52

## 2024-09-29 RX ADMIN — OXYCODONE HYDROCHLORIDE 10 MG: 5 TABLET ORAL at 03:44

## 2024-09-29 RX ADMIN — HYDROMORPHONE HYDROCHLORIDE 0.2 MG: 0.2 INJECTION, SOLUTION INTRAMUSCULAR; INTRAVENOUS; SUBCUTANEOUS at 05:42

## 2024-09-29 RX ADMIN — VALGANCICLOVIR HYDROCHLORIDE 450 MG: 450 TABLET ORAL at 07:52

## 2024-09-29 RX ADMIN — HYDROMORPHONE HYDROCHLORIDE 0.2 MG: 0.2 INJECTION, SOLUTION INTRAMUSCULAR; INTRAVENOUS; SUBCUTANEOUS at 22:07

## 2024-09-29 RX ADMIN — PANTOPRAZOLE SODIUM 40 MG: 40 INJECTION, POWDER, FOR SOLUTION INTRAVENOUS at 07:52

## 2024-09-29 RX ADMIN — FOLIC ACID 1 MG: 1 TABLET ORAL at 07:52

## 2024-09-29 RX ADMIN — METHOCARBAMOL 500 MG: 500 TABLET ORAL at 13:18

## 2024-09-29 RX ADMIN — LIDOCAINE 2 PATCH: 4 PATCH TOPICAL at 19:48

## 2024-09-29 RX ADMIN — MAGNESIUM SULFATE IN WATER 4 G: 40 INJECTION, SOLUTION INTRAVENOUS at 05:42

## 2024-09-29 RX ADMIN — SULFAMETHOXAZOLE AND TRIMETHOPRIM 1 TABLET: 400; 80 TABLET ORAL at 07:52

## 2024-09-29 RX ADMIN — URSODIOL 300 MG: 300 CAPSULE ORAL at 20:01

## 2024-09-29 RX ADMIN — PROCHLORPERAZINE EDISYLATE 10 MG: 5 INJECTION INTRAMUSCULAR; INTRAVENOUS at 19:48

## 2024-09-29 RX ADMIN — METHYLPREDNISOLONE SODIUM SUCCINATE 200 MG: 125 INJECTION, POWDER, LYOPHILIZED, FOR SOLUTION INTRAMUSCULAR; INTRAVENOUS at 08:00

## 2024-09-29 RX ADMIN — MYCOPHENOLATE MOFETIL 750 MG: 250 CAPSULE ORAL at 17:46

## 2024-09-29 RX ADMIN — PIPERACILLIN AND TAZOBACTAM 3.38 G: 3; .375 INJECTION, POWDER, LYOPHILIZED, FOR SOLUTION INTRAVENOUS at 10:30

## 2024-09-29 RX ADMIN — OXYCODONE HYDROCHLORIDE 10 MG: 5 TABLET ORAL at 18:01

## 2024-09-29 RX ADMIN — OXYCODONE HYDROCHLORIDE 10 MG: 5 TABLET ORAL at 09:51

## 2024-09-29 RX ADMIN — METHOCARBAMOL 500 MG: 500 TABLET ORAL at 05:42

## 2024-09-29 RX ADMIN — URSODIOL 300 MG: 300 CAPSULE ORAL at 09:50

## 2024-09-29 RX ADMIN — SODIUM BICARBONATE 1950 MG: 650 TABLET ORAL at 07:54

## 2024-09-29 RX ADMIN — THIAMINE HCL TAB 100 MG 100 MG: 100 TAB at 07:52

## 2024-09-29 RX ADMIN — Medication 5 MG: at 01:31

## 2024-09-29 RX ADMIN — PIPERACILLIN AND TAZOBACTAM 3.38 G: 3; .375 INJECTION, POWDER, LYOPHILIZED, FOR SOLUTION INTRAVENOUS at 22:07

## 2024-09-29 RX ADMIN — HYDROMORPHONE HYDROCHLORIDE 0.2 MG: 0.2 INJECTION, SOLUTION INTRAMUSCULAR; INTRAVENOUS; SUBCUTANEOUS at 01:16

## 2024-09-29 RX ADMIN — METHOCARBAMOL 500 MG: 500 TABLET ORAL at 20:01

## 2024-09-29 RX ADMIN — INSULIN HUMAN 1 UNITS/HR: 1 INJECTION, SOLUTION INTRAVENOUS at 03:57

## 2024-09-29 RX ADMIN — TACROLIMUS 2 MG: 1 CAPSULE ORAL at 17:46

## 2024-09-29 RX ADMIN — DEXTROSE AND SODIUM CHLORIDE: 5; 450 INJECTION, SOLUTION INTRAVENOUS at 05:42

## 2024-09-29 RX ADMIN — TACROLIMUS 2 MG: 1 CAPSULE ORAL at 07:52

## 2024-09-29 RX ADMIN — PIPERACILLIN AND TAZOBACTAM 3.38 G: 3; .375 INJECTION, POWDER, LYOPHILIZED, FOR SOLUTION INTRAVENOUS at 04:00

## 2024-09-29 RX ADMIN — POTASSIUM CHLORIDE 20 MEQ: 29.8 INJECTION, SOLUTION INTRAVENOUS at 01:58

## 2024-09-29 RX ADMIN — PIPERACILLIN AND TAZOBACTAM 3.38 G: 3; .375 INJECTION, POWDER, LYOPHILIZED, FOR SOLUTION INTRAVENOUS at 16:12

## 2024-09-29 RX ADMIN — BUMETANIDE 3 MG: 0.25 INJECTION INTRAMUSCULAR; INTRAVENOUS at 19:53

## 2024-09-29 RX ADMIN — ONDANSETRON 4 MG: 2 INJECTION INTRAMUSCULAR; INTRAVENOUS at 08:00

## 2024-09-29 RX ADMIN — MICAFUNGIN SODIUM 100 MG: 50 INJECTION, POWDER, LYOPHILIZED, FOR SOLUTION INTRAVENOUS at 13:38

## 2024-09-29 ASSESSMENT — ACTIVITIES OF DAILY LIVING (ADL)
ADLS_ACUITY_SCORE: 32
ADLS_ACUITY_SCORE: 30
ADLS_ACUITY_SCORE: 32
ADLS_ACUITY_SCORE: 29
ADLS_ACUITY_SCORE: 29
ADLS_ACUITY_SCORE: 32
ADLS_ACUITY_SCORE: 30
ADLS_ACUITY_SCORE: 32
ADLS_ACUITY_SCORE: 30
ADLS_ACUITY_SCORE: 32
ADLS_ACUITY_SCORE: 29
ADLS_ACUITY_SCORE: 32

## 2024-09-29 NOTE — PROGRESS NOTES
INTENSIVIST FACULTY NOTE:  Extubated late evening yesterday  Off pressors, no further signs of bleeding    Awake, lucid, CAM-ICU negative  Bloody lip has stopped oozing  RIJ MAC with handsfree  L radial A line  Lima concentrated  Jps now pretty serous    Labs notable for K+ that needs replacemnet  Creatinine is improving  Mg++ also needs replacement  CBC no surprises or concerns; relatively stable thrombocytopenia    liver doppler was fine    This is a 39M hx depression/ anxiety, HTN, EtOH cirrhosis and a high MELD score who is s/p DDLT.    He looks great s/p extubation.  We can remove his lines today.  He seems to be autodiuresing adequately on his own, but will consider lasix this evening given a CVP in the 20s if UOP falls off.  Will probably wait to start aspirin until tomorrow.         ENCEPHALOPATHY / DELIRIUM:  -resolved   -melatonin for sleep    ACUTE ON CHRONIC LIVER FAILURE S/P TRANSPLANT:  -EtOH as underlying cause of liver failure  -induction immunosuppression with methylprednisolone/prednisone taper through POD #5.  Will eventually continue prednisone at 5 mg PO daily until tacrolimus level is therapeutic  -maintenance immunosuppression to include mycophenolate and tacrolimus with anticipated goal trough levels of 8-12 (to be determined by surgical service) mcg/L for 0-3 months post-transplant.    -Surgical prophylaxis includes: piperacillin-tazobactam IV for 48 hours and micafungin IV for 14 days. Needs micafungin given high MELD score pre-op and desire to avoid tacro/diflucan interactions.  -Opportunistic pathogen prophylaxis includes: trimethoprim/sulfamethoxazole, valganciclovir, and topical antifungal coverage to begin once systemic antifungal therapy is complete.  -oral and IV narcotics available  -urosdiol  -aspirin can probably start tomorrow    THROMBOCYTOPENIA AND ACUTE BLOOD LOSS ANEMIA ON ANEMIA OF CHRONIC DISEASE:  -thrombocytopenia was present prior to admission due to liver disease; some  consumption with blood loss intra-operatively  -change labs to q8h    COAGULOPATHY:  -present prior to admission due to liver disease, exacerbated by surgical blood loss  -transfusion triggers as above  -should be ok to start aspirin tomorrow as above    HYPERGLYCEMIA:  -insulin infusion    MISC:  -full code  -family updated by AZALIA  -SQH not necessary immediately post-op; PPI for steroids can be once daily  -lines: MAC introducer with hands-free can be removed  -mckeon to remain while resuscitating   -ok to floor today    Billing statement: 35min of E&M time   .    AMARILIS Wilson MD  Clinical   Anesthesia / Critical Care  *[phone]     Methotrexate Pregnancy And Lactation Text: This medication is Pregnancy Category X and is known to cause fetal harm. This medication is excreted in breast milk.

## 2024-09-29 NOTE — PROGRESS NOTES
SURGICAL ICU PROGRESS NOTE  09/29/2024    ASSESSMENT: Jag Smith is a 39 year old male with PMHx of BETTY/MDD and HTN and recently diagnosed decompensated alcoholic liver cirrhosis transferred from  [facility] on 9/21/24 for expedited liver  transplant evaluation due to decompensated alcoholic liver cirrhosis.  Per EMR,  patient presented to OSH in June 2024 presented with  jaundice and was diagnosed with acute alcoholic hepatitis was started on Prednisolone. He was discharged with GI follow-up, the admitted in July for bilateral pneumonia. Readmitted again on 9/5/24 for abdominal distention.  During this stay from 9/5-9/21, he was treated for hyponatremia, HRS, SBP and anemia.  - Recently diagnosed in June 2024 with etiology related to alcohol,s/p steroid course. Admitted at OSH 9/5-9/21 for HRS, SBP from paracetntesos 9/6 (s/p treatment with ceftriaxone and Flagyl). Last paracentesis on 9/13 negative for SBP. Diagnostic and therapeutic para 9/25 with 2 L removed, negative for SBP. EGD 9/25/24 with banding x2. Now Admitted to SICU 9/28/24 s/p DDLT with Dr Colon.     OVERNIGHT EVENTS:  No acute issues overnight, pain well controlled. Extubated post-operatively. No transfusions required following initial resuscitation. No pressor needs.    TODAY'S PROGRESS/PLANS:   - Change robaxin to 500 mg TID scheduled  - Discontinue midodrine from prior to OR  - ADAT  - Add Ensure supplements with meals  - Change PPI to daily  - Add ursodiol 300 mg BID  - Discontinue MIVF  - Discontinue scheduled PO bicarbonate  - Liberalize transfusion goals, Hgb < 7, Plts < 20k  - Discontinue CVC, arterial line  - Continue mckeon for today  - Change labs to q8h for today then daily starting tomorrow  - OOB with PT/OT  - May be ready for transfer to floor today    PLAN:  Neurological:  # Anxiety and depression   # Acute post operative pain   # Hx hepatic encephalopathy - improving  - Monitor neurological status. Delirium  preventions and precautions.   - Pain:  PRN dilaudid IV/oxycodone PO      - Melatonin as needed at bedtime for sleep     Pulmonary:  # Post operative ventilatory support  - resolved  - Extubated post-operatively 9/28.  - Supplemental oxygen to keep saturation above 92 %.     Cardiovascular:    # hx of hypertension   # Shock-distributive, vaso plegia,  hypovolemic - resolved  - Monitor hemodynamic status.  MAP goal >65  - norepinephrine gtt weaned 9/28, discontinue order  - Intra-op bradycardia and some RV dilation noted, monitor for now   - Discontinue midodrine from prior to surgery     Gastroenterology/Nutrition:  # Decompensated alcoholic liver cirrhosis s/p liver transplant  # Recurrent ascites  # Esophageal varices s/p banding x2  # Hx SBP (s/p abx 9/6 x10 days)   - Recently diagnosed in June 2024 with etiology related to alcohol treated with steroid course. Admitted at OSH 9/5-9/21 for HRS, SBP from para 9/6 (s/p treatment with ceftriaxone and Flagyl). Last paracentesis on 9/13 negative for SBP. Diagnostic and therapeutic para 9/25 with 2 L removed, negative for SBP.   - EGD 9/25 with esophageal varices noted s/p banding x 2  - Discontinue  PTA lactulose and PO Rifaximin,spirolactone  - Holding lasix at this time   - trend AST/ALT/ coags  - Liver US reassuring post-op  - ADAT  - Ensure with meals  - Ursodiol 300 mg BID  - Change protonix to daily  - Thiamine/Folate     Fluids/Electrolytes/Renal:  # Acute kidney injury,  non-oliguric, felt to be ATN  -- Recent creatinine ranging 1.2-1.8, continues to improve today at 1.5 from 2.3 pre-op  # concern for hepatorenal syndrome s/p albumin challenge 9/24-9/25  # hyponatremia, due to liver cirrhosis, Na 120 on admit, now improved   # Lactic acidosis - resolved  # hypomagnesia, replaced  # hypocalcemia, replaced   # hyperkalemia, reported intraoperatively- resolved  - previously on midodrine, octreotide, and enteral bicarbonate and lasix--discontinue  - nephology  following   - keep Lima for now. Will continue to monitor intake and output.   - electrolyte replacement as needed      Endocrine:  # Stress hyperglycemia due to steroid use and critical illness   - Insulin infusion. Plan to keep for now, transition when steroid taper and diet stable  - Goal to keep BG< 180 for optimal wound healing      ID:  # immunosuppression   - induction steroids per transplant, MMF/tacrolimus per transplant   # immunoprophylaxis  - Bactrim and Valcyte   # perioperative antibiotics/antimicrobials:   Zosyn/Micafungin  - Zosyn will complete today  - Micafungin for 7 days     Heme:     # Acute blood loss anemia due to surgical blood loss    # Anemia of critical illness  # coagulopathy due to cirrhosis and surgical blood loss  - improving  # thrombocytopenia   - Baseline Hgb previously 11-12.0s, slowly drifted down to 9.0s over the last month, then acute drop from 9/17-->9/18 and again on 9/20, there was a concern for UGIB as patient's Hgb dropped to 6.5 and needed 2U PRBC.  A CTA abdomen pelvis was done on 9/21 and it showed no source of active bleeding. Hx of Intermittent nose bleeds and hematochezia, thought to be 2/2 prior know hemorrhoids.   S/p 10mg PO Vitamin K on 9/20 and 9/21.  s/p 1 unit(s) prbc daily on 9/24 and 9/25 for hgb 6.8-6.9  - Transfuse and replace products as needed to correct coagulation factors.   6,000 ml EBL Products: 8 prbc, 15 ffp, 3 plt, 4 cryo, 1700 ml cellsaver, Fibriyga 1mg   - No additional transfusions needed after initial post-operative resuscitation  - Liberalize transfusion goals, Hgb < 7, Plts < 20k     Musculoskeletal:  # Weakness and deconditioning of critical illness   - Physical and occupational therapy consult when appropriate      Skin:  # Ecchymosis on abdominal wall and arms   -  felt to be due to  cirrhosis related coagulopathy      # lip laceration - resolved  - local wound cares   - WOC consult     General Cares/Prophylaxis:    DVT Prophylaxis:  Pneumatic Compression Devices. Chemical prophylaxis hold for now  GI Prophylaxis:  Protonix daily (home medication)  Restraints: None     Lines/ tubes/ drains:  - SHENA drainsX 3   - Right MAC - remove  - Left radial arterial line - remove  - PIV x4.   - Janie      Disposition:  -  SICU, transfer to floor today     Patient seen, findings and plan discussed with surgical ICU staff, Dr. Steve Flannery  Surgical Critical Care Fellow    ====================================    SUBJECTIVE:   No acute events. Extubated post-operatively. Weaned from pressors. No additional need for transfusions after initial resuscitation.    OBJECTIVE:   1. VITAL SIGNS:   Temp:  [96.4  F (35.8  C)-99.3  F (37.4  C)] 98.4  F (36.9  C)  Pulse:  [] 86  Resp:  [10-20] 18  MAP:  [70 mmHg-287 mmHg] 93 mmHg  Arterial Line BP: ()/(55-81) 140/70  FiO2 (%):  [40 %-60 %] 40 %  SpO2:  [92 %-100 %] 100 %  FiO2 (%): 40 %, Resp: 18, Vent Mode: CPAP/PS, Resp Rate (Set): 16 breaths/min, Tidal Volume (Set, mL): 500 mL, PEEP (cm H2O): 7 cmH2O, Pressure Support (cm H2O): 5 cmH2O, Resp Rate (Set): 16 breaths/min, Tidal Volume (Set, mL): 500 mL, PEEP (cm H2O): 7 cmH2O    2. INTAKE/ OUTPUT:   I/O last 3 completed shifts:  In: 06120.48 [P.O.:680; I.V.:26433.48; Other:1700]  Out: 33436 [Urine:6090; Drains:2102; Blood:6000]    3. PHYSICAL EXAMINATION:   General: Awake, comfortable. Jaundiced though improving  Neuro: A&Ox3, NAD  Resp: Breathing non-labored on NC  CV: Regular rate and rhythm on monitor  Abdomen: Soft, Non-distended, appropriately tender. Dressing c/d/i. SHENA x 3 with thin, serosanguineous drainage   Extremities: warm and well perfused, scattered ecchymosis over dorsum of bilateral feet and abdominal wall    4. INVESTIGATIONS:   Arterial Blood Gases   Recent Labs   Lab 09/28/24  1308 09/28/24  1215 09/28/24  1143 09/28/24  1047   PH 7.45 7.44 7.45 7.45   PCO2 38 39 36 34*   PO2 139* 79* 110* 92   HCO3 26 26 25 24     Complete Blood  "Count   Recent Labs   Lab 09/29/24  0353 09/29/24  0014 09/28/24  1950 09/28/24  1801 09/28/24  1553   WBC 6.9 5.2 4.0  --  4.2   HGB 8.1* 8.3* 8.8* 8.9* 8.7*   PLT 71* 63* 72*  --  80*     Basic Metabolic Panel  Recent Labs   Lab 09/29/24  0819 09/29/24  0654 09/29/24  0552 09/29/24  0505 09/29/24  0353 09/29/24  0018 09/29/24  0014 09/28/24  1841 09/28/24  1801 09/28/24  1419 09/28/24  1305 09/28/24  1301 09/28/24  1215 09/27/24  1719 09/27/24  0646   NA  --   --   --   --  139  139  --   --   --  140  --  145  --  142   < > 131*   POTASSIUM  --   --   --   --  4.1  4.1  --  3.8  --  3.2*  --  3.8  --  4.4   < > 4.5   CHLORIDE  --   --   --   --  100  100  --   --   --  100  --  103  --   --   --  104   CO2  --   --   --   --  25  25  --   --   --  24  --  23  --   --   --  13*   BUN  --   --   --   --  27.4*  27.4*  --   --   --  27.0*  --  27.0*  --   --   --  34.1*   CR  --   --   --   --  1.56*  1.56*  --   --   --  1.72*  --  1.90*  --   --   --  2.27*   * 163* 154* 158* 161*  165*  165*   < >  --    < > 218*   < > 200*   < > 200*   < > 82    < > = values in this interval not displayed.     Liver Function Tests  Recent Labs   Lab 09/29/24  0353 09/29/24  0014 09/28/24  1950 09/28/24  1553 09/28/24  1305 09/28/24  1047 09/27/24  0646 09/26/24  0702   *  --   --   --  678*  --  129* 135*   *  --   --   --  418*  --  45 45   ALKPHOS 55  --   --   --  64  --  85 73   BILITOTAL 9.9*  --   --   --  14.3*  --  38.2* 38.0*   ALBUMIN 3.2*  --   --   --  3.1*  --  3.3* 3.5   INR 1.21* 1.26* 1.26* 1.37* 1.57*   < > 3.14* 3.19*    < > = values in this interval not displayed.     Pancreatic Enzymes  Recent Labs   Lab 09/29/24  0353 09/27/24  0646   LIPASE 17  --    AMYLASE 26* 46     Coagulation Profile  Recent Labs   Lab 09/29/24  0353 09/29/24  0014 09/28/24  1950 09/28/24  1553   INR 1.21* 1.26* 1.26* 1.37*   PTT 30 31 31 34     Lactate  Invalid input(s): \"LACTATE\"    5. RADIOLOGY:   Recent " Results (from the past 24 hour(s))   XR Abdomen Port 1 View    Narrative    EXAM: XR ABDOMEN PORT 1 VIEW, 9/28/2024 1:40 PM    INDICATION: assess OG tube location    COMPARISON: CT abdomen 9/20/2024    FINDINGS/    Impression    IMPRESSION:  1.  Postsurgical changes related to liver transplantation including  numerous surgical drains, surgical clips, and cutaneous surgical  staples.  2.  Enteric tube terminating in the stomach with sidehole past the GE  junction.    I have personally reviewed the examination and initial interpretation  and I agree with the findings.    MERCEDES OSBORNE MD         SYSTEM ID:  [id number]   XR Chest Port 1 View   Result Value    Radiologist flags Right mainstem intubation (AA)    Narrative    Exam: XR CHEST PORT 1 VIEW, 9/28/2024 1:32 PM    Comparison: 9/27/2024    History: central line, et tube    Findings:  Single AP portable supine view the chest. Endotracheal tube with tip  in the right mainstem bronchus. Right IJ central venous catheter with  tip in the SVC. Enteric tube is present in the stomach. Postsurgical  changes of liver transplant with abdominal drain and right upper  quadrant staples.    Trachea is midline. Stable cardiac mediastinal silhouette.  Retrocardiac streaky opacities.. There is no pneumothorax or pleural  effusion. Postsurgical changes with drains in the right upper quadrant  from liver transplant. Please see separate abdominal radiograph.      Impression    Impression:   1. New endotracheal tube with right mainstem intubation. Recommend  slight retraction.  2. Right IJ central catheter with tip projecting over the SVC.  3. New enteric tube in the stomach.  4. New retrocardiac atelectasis.  5. Postsurgical changes of liver transplant.    [Critical Result: Right mainstem intubation]    Finding was identified on 9/28/2024 1:37 PM.     Dr. Tejada was contacted by Dr. Goddard at 9/28/2024 1:46 PM and  verbalized understanding of the critical finding.     I have  personally reviewed the examination and initial interpretation  and I agree with the findings.    MERCEDES OSBORNE MD         SYSTEM ID:  [id number]   US Liver Transplant    Narrative    EXAMINATION: US LIVER TRANSPLANT, 9/28/2024 2:56 PM     COMPARISON: None.    HISTORY: Liver ultrasound, standard anatomy    TECHNIQUE:  Gray-scale, color Doppler and spectral flow analysis.    FINDINGS:   There is small ascites. Small peritransplant fluid collection along  the posterior right lobe measuring 3.3 x 3.1 x 1.0 cm.    Liver:   The liver demonstrates normal homogeneous echotexture. No  evidence of a focal hepatic mass.     Bile Ducts: Both the intra- and extrahepatic biliary system are of  normal caliber.  The common bile duct measures 4 mm in diameter.    Gallbladder: The gallbladder is surgically absent.    Kidneys:   Right kidney:  The right kidney demonstrates normal echotexture with  no evidence of a shadowing stone, focal mass or hydronephrosis.   12.6  cm in long axis dimension.  Left kidney: Poorly visualized    Pancreas: Visualized portions of the pancreas are normal in  appearance.    Spleen: Poorly visualized, approximately 15.8 cm in craniocaudal  dimension.    Visualized portions of the aorta are unremarkable.    LIVER DOPPLER:  Splenic vein: Not visualized  Extrahepatic portal vein:  Patent continuous antegrade flow, 66  cm/sec.  Portal vein at anastomosis: Patent continuous antegrade flow, 84  cm/sec.  Intrahepatic portal vein:  Patent continuous antegrade flow, 136  cm/sec.  Right portal vein flow is antegrade, measuring 158 cm/sec.  Left portal vein flow is antegrade, measuring 118 cm/sec.    Inferior vena cava: patent with flow toward the heart throughout..  IVC above anastomosis: Not seen  IVC at anastomosis:  110 cm/sec.  Intrahepatic IVC:  106 cm/sec.  Extrahepatic IVC:  58 cm/sec.    Right, mid, left hepatic veins: Patent with flow towards the inferior  vena cava.    Extrahepatic hepatic artery: Low  resistance waveform with flow towards  the liver. 37 cm/sec with resistive index 1.0.  Right hepatic artery: 39 cm/sec with resistive index 1.0.  Left hepatic artery: 25 cm/sec.      Impression    Impression:   1.  Liver transplant with patent hepatic vasculature. Elevated  resistive indices in the hepatic arterial system likely due to  immediate postoperative setting. Continued attention on follow-up.  2.  Small peritransplant fluid collection.  3.  Splenomegaly.  4.  Trace ascites.    I have personally reviewed the examination and initial interpretation  and I agree with the findings.    MERCEDES OSBORNE MD         SYSTEM ID:  [id number]       =========================================

## 2024-09-29 NOTE — PROGRESS NOTES
09/29/24 1058   Appointment Info   Signing Clinician's Name / Credentials (OT) Taylor Ruiz OTR/L   Rehab Comments (OT) abd precautions   Living Environment   People in Home parent(s);sibling(s);other relative(s)   Current Living Arrangements house   Home Accessibility stairs to enter home   Number of Stairs, Main Entrance 3   Stair Railings, Main Entrance railings on both sides of stairs   Transportation Anticipated family or friend will provide   Living Environment Comments Pt reports living in house with multiple family members, 3 JENNIFER, access to walk-in shower.   Self-Care   Usual Activity Tolerance moderate   Current Activity Tolerance fair   Regular Exercise No   Equipment Currently Used at Home none   Fall history within last six months no   Activity/Exercise/Self-Care Comment Pt IND with ADL tasks, no AD for functional mobility.   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   General Information   Onset of Illness/Injury or Date of Surgery 09/21/24   Referring Physician Dominick Tejada MD   Patient/Family Therapy Goal Statement (OT) Return home and to PLOF   Additional Occupational Profile Info/Pertinent History of Current Problem Per EMR, Jga Smith is a 39 year old male with PMHx of BETTY/MDD and HTN and recently diagnosed decompensated alcoholic liver cirrhosis transferred from  [facility] on 9/21/24 for expedited liver  transplant evaluation due to decompensated alcoholic liver cirrhosis.  Per EMR,  patient presented to OSH in June 2024 presented with  jaundice and was diagnosed with acute alcoholic hepatitis was started on Prednisolone. He was discharged with GI follow-up, the admitted in July for bilateral pneumonia. Readmitted again on 9/5/24 for abdominal distention.  During this stay from 9/5-9/21, he was treated for hyponatremia, HRS, SBP  and anemia.  - Recently diagnosed in June 2024 with etiology related to alcohol,s/p steroid course. Admitted at OSH 9/5-9/21 for HRS, SBP from paracetntesos 9/6 (s/p treatment with ceftriaxone and Flagyl). Last paracentesis on 9/13 negative for SBP. Diagnostic and therapeutic para 9/25 with 2 L removed, negative for SBP. EGD 9/25/24 with banding x2. Now Admitted to SICU 9/28/24 s/p DDLT with Dr Colon.   Existing Precautions/Restrictions fall;abdominal;lifting   Limitations/Impairments safety/cognitive   Left Upper Extremity (Weight-bearing Status) partial weight-bearing (PWB)  (no > 10 lb)   Right Upper Extremity (Weight-bearing Status) partial weight-bearing (PWB)  (no > 10 lb)   Left Lower Extremity (Weight-bearing Status) full weight-bearing (FWB)   Right Lower Extremity (Weight-bearing Status) full weight-bearing (FWB)   General Observations and Info OT consult: post liver transplant   Cognitive Status Examination   Orientation Status orientation to person, place and time   Cognitive Status Comments Cognition appears grossly intact; will monitor   Visual Perception   Visual Impairment/Limitations WFL;corrective lenses full-time   Impact of Vision Impairment on Function (Vision) Denies acute vision changes   Sensory   Sensory Quick Adds sensation intact   Pain Assessment   Patient Currently in Pain Yes, see Vital Sign flowsheet   Posture   Posture forward head position   Posture Comments intermittent in static standing due to post-op pain, able to self-correct   Range of Motion Comprehensive   General Range of Motion bilateral upper extremity ROM WFL   Strength Comprehensive (MMT)   Comment, General Manual Muscle Testing (MMT) Assessment B UE strength grossly deconditioned, formal MMT not assessed this date   Muscle Tone Assessment   Muscle Tone Quick Adds No deficits were identified   Coordination   Upper Extremity Coordination No deficits were identified   Bed Mobility   Bed Mobility sit-supine   Sit-Supine  [place] (Bed Mobility) minimum assist (75% patient effort);moderate assist (50% patient effort)   Comment (Bed Mobility) per clinical judgement   Transfers   Transfers sit-stand transfer;toilet transfer   Sit-Stand Transfer   Sit-Stand [place] (Transfers) minimum assist (75% patient effort)   Sit/Stand Transfer Comments from EOB + forearm assist   Toilet Transfer   Type (Toilet Transfer) sit-stand   [place] Level (Toilet Transfer) minimum assist (75% patient effort);verbal cues   Assistive Device (Toilet Transfer) grab bars/safety frame   Toilet Transfer Comments per clinical judgement   Balance   Balance Assessment sitting static balance   Balance Comments SBA-CGA at EOB   Activities of Daily Living   BADL Assessment/Intervention bathing;upper body dressing;lower body dressing;toileting   Bathing Assessment/Intervention   [place] Level (Bathing) moderate assist (50% patient effort);set up   Comment, (Bathing) per clinical judgement   Assistive Devices (Bathing) shower chair   Upper Body Dressing Assessment/Training   Comment, (Upper Body Dressing) per clinical judgement   [place] Level (Upper Body Dressing) minimum assist (75% patient effort)   Lower Body Dressing Assessment/Training   Comment, (Lower Body Dressing) per clinical judgement   [place] Level (Lower Body Dressing) moderate assist (50% patient effort);set up   Toileting   Comment, (Toileting) per clinical judgement   [place] Level (Toileting) moderate assist (50% patient effort);set up   Clinical Impression   Criteria for Skilled Therapeutic Interventions Met (OT) Yes, treatment indicated   OT Diagnosis Decreased ADL IND/safety, functional mobility, activity tolerance, cognition   OT Problem List-Impairments impacting ADL problems related to;activity tolerance impaired;balance;cognition;mobility;range of motion (ROM);strength;pain;post-surgical precautions;postural control   Assessment of Occupational Performance 5 or  more Performance Deficits   Identified Performance Deficits FB bathing, FB dressing, cognition, toileting, IADL tasks, work, functional mobility   Planned Therapy Interventions (OT) ADL retraining;IADL retraining;balance training;bed mobility training;cognition;ROM;strengthening;transfer training;progressive activity/exercise   Clinical Decision Making Complexity (OT) problem focused assessment/low complexity   Risk & Benefits of therapy have been explained evaluation/treatment results reviewed;care plan/treatment goals reviewed;risks/benefits reviewed;participants included;patient   Clinical Impression Comments Pt will benefit from continued skilled OT during IP stay to progress ADL/IADL IND/safety and return to PLOF   OT Total Evaluation Time   OT Eval, Low Complexity Minutes (16367) 5   OT Goals   Therapy Frequency (OT) 6 times/week   OT Predicted Duration/Target Date for Goal Attainment 10/25/24   OT Goals Hygiene/Grooming;Lower Body Dressing;Upper Body Bathing;Lower Body Bathing;Toilet Transfer/Toileting;Cognition;Home Management;OT Goal 1   OT: Hygiene/Grooming modified independent;while standing;within precautions   OT: Lower Body Dressing Modified independent;within precautions;including set-up/clothing retrieval   OT: Upper Body Bathing Modified independent;within precautions   OT: Lower Body Bathing Modified independent;with precautions   OT: Toilet Transfer/Toileting Modified independent;toilet transfer;within precautions   OT: Home Management Modified independent;with light demand household tasks;ambulatory level;within precautions   OT: Cognitive Patient/caregiver will verbalize understanding of cognitive assessment results/recommendations as needed for safe discharge planning   OT: Goal 1 Pt will be able to verbalize and demonstrate 100% of post-surgical abdominal precautions prior to discharge home.   OT Discharge Planning   OT Plan OT: review precs, monitor cognition, standing ADLs, functional  mobility, LB dressing   OT Discharge Recommendation (DC Rec) home with assist   OT Rationale for DC Rec Pt below baseline function for ADL tasks, functional mobility, overall activity tolerance. Limited by post-op pain this date. Currently, ambulating with A x 1 with IV pole in-hallway. Anticipate with continued IP therapies, pt will be able to dc home with assist from family to progress IND/safety and return to PLOF.   OT Brief overview of current status A x 1 in-hallway + IV pole   Total Session Time   Total Session Time (sum of timed and untimed services) 5

## 2024-09-29 NOTE — PLAN OF CARE
D/I: Patient on unit 4A Surgical/Neuro ICU   Neuro- a&o X4. Neuro intact.  CV-NSR, normotensive. Afebrile. Good pulses. +1-+2 gen edema.  Pulm- Room air to 2L NC while asleep  GI- Clears, tolerating well.  - Lima, adequate output  Gtts- Insulin, D5 1/2NS.  Skin- Clamshell incision dressing changed once d/t saturation. Jaundiced, jaundiced schlera, Petechia BLE   Pain- consistently ranks pain high, managed with PRN's.  Lines and drains- R internal jugular MAC with HFTL, PIV x3, L rad A line, SHENA x3, moderate amount of SS output. Lima.  See flow sheets for further interventions and assessments.   A: Stable   P: Continue to monitor pt closely. Notify MD of significant changes

## 2024-09-29 NOTE — PLAN OF CARE
Problem: Liver Failure  Goal: Optimal Pain Control, Comfort and Function  Outcome: Progressing  Intervention: Prevent or Manage Pain  Recent Flowsheet Documentation  Taken 9/29/2024 1801 by Shelley Lund RN  Pain Management Interventions: medication (see MAR)  Taken 9/29/2024 1600 by Shelley Lund RN  Sleep/Rest Enhancement:   natural light exposure provided   regular sleep/rest pattern promoted  Taken 9/29/2024 1200 by Shelley Lund RN  Sleep/Rest Enhancement:   natural light exposure provided   regular sleep/rest pattern promoted  Taken 9/29/2024 0800 by Shelley Lund RN  Sleep/Rest Enhancement:   natural light exposure provided   regular sleep/rest pattern promoted     Problem: Liver Failure  Goal: Effective Oxygenation and Ventilation  Outcome: Met  Intervention: Promote Airway Secretion Clearance  Recent Flowsheet Documentation  Taken 9/29/2024 1700 by Shelley Lund RN  Activity Management: activity adjusted per tolerance  Taken 9/29/2024 1600 by Shelley Lund RN  Cough And Deep Breathing: done independently per patient  Activity Management: up in chair  Taken 9/29/2024 1200 by Shelley Lund RN  Cough And Deep Breathing: done independently per patient  Activity Management: up in chair  Taken 9/29/2024 1039 by Shelley Lund RN  Activity Management: (with PT/OT) ambulated outside room  Taken 9/29/2024 0800 by Shelley Lund RN  Cough And Deep Breathing: done independently per patient  Activity Management: activity adjusted per tolerance  Intervention: Optimize Oxygenation and Ventilation  Recent Flowsheet Documentation  Taken 9/29/2024 1700 by Shelley Lund RN  Head of Bed (HOB) Positioning: HOB at 30 degrees  Taken 9/29/2024 1600 by Shelley Lund RN  Head of Bed (HOB) Positioning: HOB at 30 degrees  Taken 9/29/2024 1200 by Shelley Lund RN  Head of Bed (HOB) Positioning: HOB at 30 degrees  Taken 9/29/2024 0800 by Shelley Lund RN  Head of Bed (HOB) Positioning: HOB at 30 degrees   Goal Outcome Evaluation:  Major Shift Events:  Pt up in  the chair most of the after ambulating in the halls with PT/OT. Abd incisional pain managed by PRN oxycodone and scheduled Robaxin. Tolerating regular diet and good fluid intake. Low UO. Per SICU during rounds today, holding off lasix. 3 SHENA outputs per I&O  Plan: Transfer to the floor when bed is available.   For vital signs and complete assessments, please see documentation flowsheets.         Plan of Care Reviewed With: patient, parent    Overall Patient Progress: improvingOverall Patient Progress: improving

## 2024-09-29 NOTE — PROGRESS NOTES
[facility]  Transplant Nephrology Progress Note  Date of Admission:  9/21/2024  Today's Date: 09/29/2024    Recommendations:   - No acute indications for dialysis.  - Recommend giving bumetanide 3 mg IV x once and reassess.  Goal net negative 1 liter daily.  - Would start magnesium oxide 400 mg daily at mid day.    Assessment & Plan   # DENI: Trend down in creatinine.  Very good urine output yesterday, but decreased today.  Non oliguric still.  No acute indications for dialysis.   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  Transaminases Trend down.  Followed by Transplant Surgery.    # Immunosuppression: Tacrolimus immediate release (goal 6-8), Mycophenolate mofetil (dose 750 mg every 12 hours), and Methylprednisolone (dose taper)   - Induction with Recent Transplant:  Per Liver Tx Protocol   - Continue with intensive monitoring of immunosuppression for efficacy and toxicity.   - Goal tacrolimus level lower due to DENI.   - Changes: Not at this time; Management per Transplant Surgery.    # Infection Prophylaxis:   - PJP: Sulfa/TMP (Bactrim)  - CMV: Valganciclovir (Valcyte)  - Thrush: Micafungin (Mycamine)  - Fungal: Micafungin (Mycamine)      - CMV IgG Ab High Risk Discordance (D+/R-): No  CMV Serostatus: Positive  - EBV IgG Ab High Risk Discordance (D+/R-): No  EBV Serostatus: Positive    # Blood Pressure: Controlled;  Goal BP: < 140/90 (Hospitalization goal)   - Changes: Not at this time    # Elevated Blood Glucose: Glucose generally running ~ 150-200s   - Presently on insulin gtt.    # Anemia in Chronic Disease/Surgery: Hgb: Stable      TRACE: No   - Iron studies: Low iron  saturation, but high ferritin    # Thrombocytopenia: Stable, moderately low platelet level.  No overt signs of bleeding.    # Mineral Bone Disorder:   - Vitamin D; level: Low        On supplement: No; If serum calcium trends down further, would start cholecalciferol 50 mcg daily.  - Calcium; level: Normal        On supplement: No  - Phosphorus; level: High        On supplement: No    # Electrolytes:   - Potassium; level: Normal        On supplement: No  - Magnesium; level: Low        On supplement: No  - Bicarbonate; level: Normal        On supplement: No  - Sodium; level: Normal    # Obesity, Class I (BMI = 31.6): Weight is up since admission.   - Once patient recovers from surgery and incision heals, would recommend working on weight loss for overall health by increasing exercise and watching caloric intake.    # Other Significant PMH:   - Alcohol Abuse: Presently sober.   - Depression/Anxiety: Presently stable.     # Transplant History:  Etiology of Organ Failure: Alcohol-related liver disease  Tx: Liver Tx  Transplant: 9/28/2024 (Liver)  Significant changes in immunosuppression: Slightly lower tacrolimus level goal due to DENI.  Significant transplant-related complications: DENI    Recommendations were communicated to the primary team via this note.    Guzman Mercado MD  Transplant Nephrology  Contact information via Vocera Web Console or via [facility] Paging/Directory      Interval History  Mr. Smith's creatinine is 1.56, 1.56 (09/29 0353); Trend down.  Very good urine output yesterday, but decreased today.  Still non oliguric.  Trend down transaminases.  Other significant labs/tests/vitals: Stable electrolytes.  Stable hemoglobin.  Stable platelet level.  No new events overnight.  Reports some incisional pain.  No chest pain, but a little shortness of breath and clearing some stuff out of his lungs.  Stable leg swelling.  No nausea and vomiting.  Bowel movements are none and not passing gas yet.  No fever  or sweats, but some chills.    Review of Systems   4 point ROS was obtained and negative except as noted in the Interval History.    MEDICATIONS:  Current Facility-Administered Medications   Medication Dose Route Frequency Provider Last Rate Last Admin    [Held by provider] aspirin (ASA) chewable tablet 81 mg  81 mg Oral or Feeding Tube Daily Dominick Tejada MD        chlorhexidine (PERIDEX) 0.12 % solution 15 mL  15 mL Mouth/Throat Q12H Bon Pino PA-C   15 mL at 09/28/24 1940    folic acid (FOLVITE) tablet 1 mg  1 mg Oral Daily José Miguel Aviles MD   1 mg at 09/29/24 0752    Lidocaine (LIDOCARE) 4 % Patch 2 patch  2 patch Transdermal Q24h Ashley Loyd MD   2 patch at 09/28/24 2257    methocarbamol (ROBAXIN) tablet 500 mg  500 mg Oral TID Hardy Flannery MD        [START ON 9/30/2024] methylPREDNISolone Na Suc (solu-MEDROL) injection 100 mg  100 mg Intravenous Once Dominick Tejada MD        Followed by    [START ON 10/1/2024] methylPREDNISolone Na Suc (solu-MEDROL) injection 50 mg  50 mg Intravenous Once Dominick Tejada MD        Followed by    [START ON 10/2/2024] predniSONE (DELTASONE) tablet 25 mg  25 mg Oral Once Dominick Tejada MD        Followed by    [START ON 10/3/2024] predniSONE (DELTASONE) tablet 10 mg  10 mg Oral Once Dominick Tejada MD        micafungin (MYCAMINE) 100 mg in sodium chloride 0.9 % 100 mL intermittent infusion  100 mg Intravenous Q24H Bon Pino PA-C 100 mL/hr at 09/28/24 1517 100 mg at 09/28/24 1517    mycophenolate (GENERIC EQUIVALENT) capsule 750 mg  750 mg Oral BID IS Dominick Tejada MD   750 mg at 09/29/24 0752    Or    mycophenolate (CELLCEPT BRAND) suspension 750 mg  750 mg Oral or NG Tube BID IS Dominick Tejada MD   750 mg at 09/28/24 1726    [START ON 9/30/2024] pantoprazole (PROTONIX) 2 mg/mL suspension 40 mg  40 mg Oral or Feeding Tube QAM AC Fernando Colon MD        piperacillin-tazobactam (ZOSYN) 3.375 g vial to attach to  mL bag  3.375  g Intravenous Q6H Dominick Tejada MD   3.375 g at 24 0400    [START ON 10/4/2024] predniSONE (DELTASONE) tablet 5 mg  5 mg Oral Daily Abi Quiros PA-C        sodium chloride (PF) 0.9% PF flush 3 mL  3 mL Intravenous Q8H Dominick Tejada MD   3 mL at 24 0400    sodium chloride (PF) 0.9% PF flush 3 mL  3 mL Intracatheter Q8H José Miguel Aviles MD   3 mL at 24 0753    sulfamethoxazole-trimethoprim (BACTRIM) 400-80 MG per tablet 1 tablet  1 tablet Oral Daily Abi Quiros PA-C   1 tablet at 24 0752    tacrolimus (GENERIC EQUIVALENT) capsule 2 mg  2 mg Oral BID IS Dominick Tejada MD   2 mg at 24 0752    Or    tacrolimus (GENERIC) suspension 2 mg  2 mg Oral or NG Tube BID IS Dominick Tejada MD   2 mg at 24 1725    thiamine (B-1) tablet 100 mg  100 mg Oral Daily José Miguel Aviles MD   100 mg at 24 0752    ursodiol (ACTIGALL) capsule 300 mg  300 mg Oral BID Hardy Flannery MD        valGANciclovir (VALCYTE) tablet 450 mg  450 mg Oral Daily Dominick Tejada MD   450 mg at 24 0752    Or    valGANciclovir (VALCYTE) solution 450 mg  450 mg Oral or NG Tube Daily Dominick Tejada MD   450 mg at 24 1526     Current Facility-Administered Medications   Medication Dose Route Frequency Provider Last Rate Last Admin    dextrose 10% infusion   Intravenous Continuous PRN Bon Pino PA-C        insulin regular (MYXREDLIN) 1 unit/mL infusion  0-24 Units/hr Intravenous Continuous Bon Pino PA-C 3 mL/hr at 24 0819 3 Units/hr at 24 0819    Reason beta blocker order not selected   Does not apply DOES NOT GO TO Dominick Segundo MD        sodium chloride 0.45 % 1,000 mL infusion   Intravenous Continuous PRN Dominick Tejada MD           Physical Exam   Temp  Av.2  F (36.8  C)  Min: 96.4  F (35.8  C)  Max: 99.3  F (37.4  C)  Arterial Line BP  Min: 99/55  Max: 143/73  Arterial Line MAP (mmHg)  Av.8 mmHg  Min: 70 mmHg  Max: 287  mmHg      Pulse  Av.2  Min: 74  Max: 110 Resp  Av.6  Min: 10  Max: 24  FiO2 (%)  Av %  Min: 40 %  Max: 60 %  SpO2  Av.9 %  Min: 92 %  Max: 100 %    CVP (mmHg): 16 mmHgBP 100/57 (Cuff Size: Adult Regular)   Pulse 86   Temp 98.4  F (36.9  C)   Resp 18   Ht 1.829 m (6')   Wt 105.6 kg (232 lb 12.9 oz)   SpO2 100%   BMI 31.57 kg/m     Date 24 07 - 24 0659   Shift 3399-5157 8584-4392 4136-4653 24 Hour Total   INTAKE   P.O. 240   240   I.V. 375.2   375.2   Shift Total(mL/kg) 615.2(5.83)   615.2(5.83)   OUTPUT   Urine 40   40   Drains 280   280   Shift Total(mL/kg) 320(3.03)   320(3.03)   Weight (kg) 105.6 105.6 105.6 105.6      Admit Weight: 102.3 kg (225 lb 8 oz)     GENERAL APPEARANCE: alert and no distress  HENT: mouth without ulcers or lesions, scleral icterus  RESP: lungs clear to auscultation - no rales, rhonchi or wheezes  CV: regular rhythm, normal rate, no rub, no murmur  EDEMA: 1+ LE and dependent edema bilaterally  ABDOMEN: soft, nondistended, moderate TTP, bowel sounds quiet  MS: extremities normal - no gross deformities noted, no evidence of inflammation in joints, no muscle tenderness  SKIN: no rash, jaundiced  DIALYSIS ACCESS:  None    Data   All labs reviewed by me.  CMP  Recent Labs   Lab 24  0819 24  0654 24  0552 24  0505 24  0353 24  0018 24  0014 24  1841 24  1801 24  1419 24  1305 24  1301 24  1215 24  1719 24  0646 24  0702   NA  --   --   --   --  139  139  --   --   --  140  --  145  --  142   < > 131* 129*   POTASSIUM  --   --   --   --  4.1  4.1  --  3.8  --  3.2*  --  3.8  --  4.4   < > 4.5 4.6   CHLORIDE  --   --   --   --  100  100  --   --   --  100  --  103  --   --   --  104 103   CO2  --   --   --   --  25  25  --   --   --  24  --  23  --   --   --  13* 15*   ANIONGAP  --   --   --   --  14  14  --   --   --  16*  --  19*  --   --   --  14 11   GLC  156* 163* 154* 158* 161*  165*  165*   < >  --    < > 218*   < > 200*   < > 200*   < > 82 77   BUN  --   --   --   --  27.4*  27.4*  --   --   --  27.0*  --  27.0*  --   --   --  34.1* 33.6*   CR  --   --   --   --  1.56*  1.56*  --   --   --  1.72*  --  1.90*  --   --   --  2.27* 2.22*   GFRESTIMATED  --   --   --   --  58*  58*  --   --   --  52*  --  46*  --   --   --  37* 38*   AMAIRANI  --   --   --   --  9.2  9.2  --   --   --  10.2  --  10.6*  --   --   --  8.9 9.0   MAG  --   --   --   --  1.4*  --   --   --  1.5*  --  1.7  --   --   --  1.5*  --    PHOS  --   --   --   --  5.1*  --   --   --  4.0  --  5.1*  --   --   --  3.6  --    PROTTOTAL  --   --   --   --  5.0*  --   --   --   --   --  4.8*  --   --   --   --   --    ALBUMIN  --   --   --   --  3.2*  --   --   --   --   --  3.1*  --   --   --  3.3* 3.5   BILITOTAL  --   --   --   --  9.9*  --   --   --   --   --  14.3*  --   --   --  38.2* 38.0*   ALKPHOS  --   --   --   --  55  --   --   --   --   --  64  --   --   --  85 73   AST  --   --   --   --  261*  --   --   --   --   --  678*  --   --   --  129* 135*   ALT  --   --   --   --  249*  --   --   --   --   --  418*  --   --   --  45 45    < > = values in this interval not displayed.     CBC  Recent Labs   Lab 09/29/24  0353 09/29/24  0014 09/28/24  1950 09/28/24  1801 09/28/24  1553   HGB 8.1* 8.3* 8.8* 8.9* 8.7*   WBC 6.9 5.2 4.0  --  4.2   RBC 2.69* 2.71* 2.89*  --  2.87*   HCT 23.4* 23.5* 25.1*  --  25.2*   MCV 87 87 87  --  88   MCH 30.1 30.6 30.4  --  30.3   MCHC 34.6 35.3 35.1  --  34.5   RDW 18.8* 18.7* 18.4*  --  18.5*   PLT 71* 63* 72*  --  80*     INR  Recent Labs   Lab 09/29/24  0353 09/29/24  0014 09/28/24  1950 09/28/24  1553   INR 1.21* 1.26* 1.26* 1.37*   PTT 30 31 31 34     ABG  Recent Labs   Lab 09/28/24  1308 09/28/24  1215 09/28/24  1143 09/28/24  1047   PH 7.45 7.44 7.45 7.45   PCO2 38 39 36 34*   PO2 139* 79* 110* 92   HCO3 26 26 25 24   O2PER 60 50.0 50.0 50.0      Urine  Studies  Recent Labs   Lab Test 09/27/24  2113 09/22/24  0417   COLOR Dark Yellow* Dark Yellow*   APPEARANCE Slightly Cloudy* Slightly Cloudy*   URINEGLC Negative Negative   URINEBILI Large* Large*   URINEKETONE Negative Negative   SG 1.011 1.027   UBLD Negative Small*   URINEPH 6.0 6.0   PROTEIN Negative 20*   NITRITE Negative Negative   LEUKEST Negative Negative   RBCU 1 11*   WBCU 5 5     No lab results found.  PTH  No lab results found.  Iron Studies  Recent Labs   Lab Test 09/25/24  0653 09/23/24  0659   IRON  --  58*   ELPIDIO 2,577*  --        IMAGING:  All imaging studies reviewed by me.

## 2024-09-29 NOTE — PROGRESS NOTES
Transplant Surgery  Inpatient Daily Progress Note  09/29/2024    Assessment & Plan: Jag Smith is a 39 year old male with recently diagnosed decompensated alcoholic liver cirrhosis transferred from  [facility] on 9/21/24 for expedited liver  transplant evaluation due to decompensated alcoholic liver cirrhosis. MELD 42. Now s/p DDLT with Dr Colon on 9/28/24.     Graft function: POD #1  Liver transplant: LFTs decreased. LA WNL. US liver: patent doppler. Continue SHENA x 3.  -Start ASA and ursodiol tomorrow    Immunosuppressed status secondary to medications:   -Steroid taper per protocol  -Tacro goal level 8-12  -MMF 750mg BID   -Prednisone 5mg daily x3 months after taper    Hematology: Transfused 2u pRBC's, 1 PLT, 1 Cryo, 500mL 5% Albumin   Acute blood loss anemia: Hgb 8.1.   Thrombocytopenia: Plt 71. No transfusion needed  Coagulopathy: INR < 1.5, fibrinogen > 200.    Cardiorespiratory:   Post-op mechanical ventilation: Extubated. On 2L NC  Pulmonary toilet: IS, C&DB.    GI/Nutrition:   Diet: CLD  Bowel regimen: Start    Endocrine:   Steroid induced hyperglycemia: insulin gtt    Fluid/Electrolytes: MIVF: D5 1/2 NS @ 100 ml/hr. Replace Mg    : Remove mckeon tomorrow    Infectious disease: afebrile    Prophylaxis: DVT, fall, GI, surgical ppx (Zosyn), fungal (Micafungin), viral (vanganciclovir), pneumocystis (Bactrim)    Disposition: Transfer to       AZALIA/Fellow/Resident Provider: Abi Quiros, PAC 1928    Faculty: Fernando Colon MD, PhD      __________________________________________________________________    Interval History:   Overnight events: No c/o per patient. Pain control fair. Denies SOB or nausea. No flatus or BM.    ROS:   A 10-point review of systems was negative except as noted above.    Curent Meds:  Current Facility-Administered Medications   Medication Dose Route Frequency Provider Last Rate Last Admin    [Held by provider] aspirin (ASA) chewable tablet 81 mg  81 mg Oral or Feeding  Tube Daily Dominick Tejada MD        chlorhexidine (PERIDEX) 0.12 % solution 15 mL  15 mL Mouth/Throat Q12H Bon Pino PA-C   15 mL at 09/28/24 1940    folic acid (FOLVITE) tablet 1 mg  1 mg Oral Daily José Miguel Aviles MD   1 mg at 09/27/24 0858    Lidocaine (LIDOCARE) 4 % Patch 2 patch  2 patch Transdermal Q24h [facility]   2 patch at 09/28/24 2257    magnesium sulfate 4 g in 100 mL sterile water intermittent infusion  4 g Intravenous Once Morgan Holcomb MD 50 mL/hr at 09/29/24 0542 4 g at 09/29/24 0542    methylPREDNISolone Na Suc (solu-MEDROL) 200 mg in sodium chloride 0.9 % 58.2 mL intermittent infusion  200 mg Intravenous Once Dominick Tejada MD        Followed by    [START ON 9/30/2024] methylPREDNISolone Na Suc (solu-MEDROL) injection 100 mg  100 mg Intravenous Once Dominick Tejada MD        Followed by    [START ON 10/1/2024] methylPREDNISolone Na Suc (solu-MEDROL) injection 50 mg  50 mg Intravenous Once Dominick Tejada MD        Followed by    [START ON 10/2/2024] predniSONE (DELTASONE) tablet 25 mg  25 mg Oral Once Dominick Tejada MD        Followed by    [START ON 10/3/2024] predniSONE (DELTASONE) tablet 10 mg  10 mg Oral Once Dominick Tejada MD        micafungin (MYCAMINE) 100 mg in sodium chloride 0.9 % 100 mL intermittent infusion  100 mg Intravenous Q24H Bon Pino PA-C 100 mL/hr at 09/28/24 1517 100 mg at 09/28/24 1517    [Held by provider] midodrine (PROAMATINE) tablet 12.5 mg  12.5 mg Oral TID w/meals José Miguel Aviles MD   12.5 mg at 09/27/24 1813    mycophenolate (GENERIC EQUIVALENT) capsule 750 mg  750 mg Oral BID IS Dominick Tejada MD        Or    mycophenolate (CELLCEPT BRAND) suspension 750 mg  750 mg Oral or NG Tube BID IS Dominick Tejada MD   750 mg at 09/28/24 1726    pantoprazole (PROTONIX) IV push injection 40 mg  40 mg Intravenous BID José Miguel Aviles MD   40 mg at 09/28/24 1940    piperacillin-tazobactam (ZOSYN) 3.375 g  vial to attach to  mL bag  3.375 g Intravenous Q6H Dominick Tejada MD   3.375 g at 09/29/24 0400    sodium bicarbonate tablet 1,950 mg  1,950 mg Oral TID Georges Sanderson MD   1,950 mg at 09/28/24 1937    sodium chloride (PF) 0.9% PF flush 3 mL  3 mL Intravenous Q8H Dominick Tejada MD   3 mL at 09/29/24 0400    sodium chloride (PF) 0.9% PF flush 3 mL  3 mL Intracatheter Q8H José Miguel Aviles MD   3 mL at 09/29/24 0019    tacrolimus (GENERIC EQUIVALENT) capsule 2 mg  2 mg Oral BID IS Dominick Tejada MD        Or    tacrolimus (GENERIC) suspension 2 mg  2 mg Oral or NG Tube BID IS Dominick Tejada MD   2 mg at 09/28/24 1725    thiamine (B-1) tablet 100 mg  100 mg Oral Daily José Miguel Aviles MD   100 mg at 09/27/24 0857    valGANciclovir (VALCYTE) tablet 450 mg  450 mg Oral Daily Dominick Tejada MD        Or    valGANciclovir (VALCYTE) solution 450 mg  450 mg Oral or NG Tube Daily Dominick Tejada MD   450 mg at 09/28/24 1526       Physical Exam:     Admit Weight: 102.3 kg (225 lb 8 oz)    Current Vitals:   /57 (Cuff Size: Adult Regular)   Pulse 85   Temp 98.6  F (37  C)   Resp 14   Ht 1.829 m (6')   Wt 105.6 kg (232 lb 12.9 oz)   SpO2 99%   BMI 31.57 kg/m      Vital sign ranges:    Temp:  [96.4  F (35.8  C)-99.3  F (37.4  C)] 98.6  F (37  C)  Pulse:  [] 85  Resp:  [10-20] 14  MAP:  [70 mmHg-287 mmHg] 94 mmHg  Arterial Line BP: ()/(55-81) 133/73  FiO2 (%):  [40 %-60 %] 40 %  SpO2:  [92 %-100 %] 99 %    General Appearance: in no apparent distress.   Skin: normal, warm, jaundice  Heart: NSR  Lungs: NLB on 2L NC.   Abdomen:  Soft, non distended.  The wound is covered with surgical dressing. SHENA x 3 serosanguinous   :  mckeon  Extremities: no edema  Neurologic:axo x 3       Frailty Scores           No data to display                Data:   CMP  Recent Labs   Lab 09/29/24  0654 09/29/24  0552 09/29/24  0505 09/29/24  0353 09/29/24  0018 09/29/24  0014 09/28/24  1841  09/28/24  1801 09/28/24  1419 09/28/24  1308 09/28/24  1305 09/27/24  1719 09/27/24  0646   NA  --   --   --  139  139  --   --   --  140  --   --  145   < > 131*   POTASSIUM  --   --   --  4.1  4.1  --  3.8  --  3.2*  --   --  3.8   < > 4.5   CHLORIDE  --   --   --  100  100  --   --   --  100  --   --  103  --  104   CO2  --   --   --  25  25  --   --   --  24  --   --  23  --  13*   * 154*   < > 161*  165*  165*   < >  --    < > 218*   < >  --  200*   < > 82   BUN  --   --   --  27.4*  27.4*  --   --   --  27.0*  --   --  27.0*  --  34.1*   CR  --   --   --  1.56*  1.56*  --   --   --  1.72*  --   --  1.90*  --  2.27*   GFRESTIMATED  --   --   --  58*  58*  --   --   --  52*  --   --  46*  --  37*   AMAIRANI  --   --   --  9.2  9.2  --   --   --  10.2  --   --  10.6*  --  8.9   ICAW  --   --   --  4.8  --   --   --   --   --  5.4*  --    < >  --    MAG  --   --   --  1.4*  --   --   --  1.5*  --   --  1.7  --  1.5*   PHOS  --   --   --  5.1*  --   --   --  4.0  --   --  5.1*  --  3.6   AMYLASE  --   --   --  26*  --   --   --   --   --   --   --   --  46   LIPASE  --   --   --  17  --   --   --   --   --   --   --   --   --    ALBUMIN  --   --   --  3.2*  --   --   --   --   --   --  3.1*  --  3.3*   BILITOTAL  --   --   --  9.9*  --   --   --   --   --   --  14.3*  --  38.2*   ALKPHOS  --   --   --  55  --   --   --   --   --   --  64  --  85   AST  --   --   --  261*  --   --   --   --   --   --  678*  --  129*   ALT  --   --   --  249*  --   --   --   --   --   --  418*  --  45    < > = values in this interval not displayed.     CBC  Recent Labs   Lab 09/29/24  0353 09/29/24  0014 09/28/24  1215 09/28/24  1207   HGB 8.1* 8.3*   < >  --    WBC 6.9 5.2   < >  --    PLT 71* 63*   < > 59*   A1C  --   --   --  5.4    < > = values in this interval not displayed.

## 2024-09-29 NOTE — CONSULTS
[facility]  WO Nurse Inpatient Assessment     Consulted for: lip wound    Patient History (according to provider note(s):    Jag Smith is a 39 year old male with PMHx of BETTY/MDD and HTN and recently diagnosed decompensated alcoholic liver cirrhosis transferred from  [facility] on 9/21/24 for expedited liver  transplant evaluation due to decompensated alcoholic liver cirrhosis.  Per EMR,  patient presented to OSH in June 2024 presented with  jaundice and was diagnosed with acute alcoholic hepatitis was started on Prednisolone. He was discharged with GI follow-up, the admitted in July for bilateral pneumonia. Readmitted again on 9/5/24 for abdominal distention.  During this stay from 9/5-9/21, he was treated for hyponatremia, HRS, SBP and anemia.  - Recently diagnosed in June 2024 with etiology related to alcohol,s/p steroid course. Admitted at OSH 9/5-9/21 for HRS, SBP from paracetntesos 9/6 (s/p treatment with ceftriaxone and Flagyl). Last paracentesis on 9/13 negative for SBP. Diagnostic and therapeutic para 9/25 with 2 L removed, negative for SBP. EGD 9/25/24 with banding x2. Now Admitted to SICU 9/28/24 s/p DDLT with Dr Colon.     lip laceration   - local wound cares   - [facility] consult      Assessment:      Areas visualized during today's visit: Focused: and lips    Pressure Injury Location: lips      Last photo: 9/29/2024  Wound type: Pressure Injury and Trauma     Pressure Injury Stage: Mucosal, hospital acquired      This is a Medical Device Related Pressure Injury (MDRPI) due to ETT  Wound history/plan of care:   x4 areas of mucosal pressure injury to upper and lower inner lips. Small blistered area that is deep maroon to lower right lip. Related to pressure likely from ETT tubing. Patient recently had liver transplant and parents in room feel this is was not present prior to surgery    Wound base: 15 % Maroon and blister , 85 % Moist, mucosa      Palpation of the wound bed: normal      Drainage: none     Description of drainage: none     Measurements (length x width x depth, in cm) see media, less than 1 cm      Periwound skin:  dry lips      Color: normal and consistent with surrounding tissue      Temperature: normal   Odor: none  Pain: denies  and mild, tender  Pain intervention prior to dressing change: no significant pain present  and slow and gentle cares   Treatment goal: Heal , Infection control/prevention, Maintain (prevention of deterioration), and Protection  STATUS: initial assessment  Supplies ordered: gathered, supplies stored on unit, and discussed with patient       Treatment Plan:     lips  BID and as needed  Perform oral cares per unit protocols  Apply Mouth moisturizer or small amount of vaseline to lips     Orders: Written    RECOMMEND PRIMARY TEAM ORDER: None, at this time  Education provided: importance of repositioning, plan of care, wound progress, Moisture management, Hygiene, and Off-loading pressure  Discussed plan of care with: Patient and Family  WOC nurse follow-up plan: weekly  Notify WOC if wound(s) deteriorate.  Nursing to notify the Provider(s) and re-consult the WOC Nurse if new skin concern.    DATA:     Current support surface: Standard  Standard gel mattress (Isoflex)  Containment of urine/stool: Incontinence Protocol and Incontinent pad in bed  BMI: Body mass index is 31.57 kg/m .   Active diet order: Orders Placed This Encounter      Regular Diet Adult     Output: I/O last 3 completed shifts:  In: 5835.18 [P.O.:1720; I.V.:3521.18]  Out: 4680 [Urine:2525; Drains:2155]     Labs:   Recent Labs   Lab 09/29/24  1139 09/29/24  0353 09/28/24  1215 09/28/24  1207   ALBUMIN  --  3.2*   < >  --    HGB 8.7* 8.1*   < >  --    INR 1.14 1.21*   < > 1.86*   WBC 9.2 6.9   < >  --    A1C  --   --   --  5.4    < > = values in this interval not displayed.     Pressure injury risk assessment:   Sensory Perception: 3-->slightly  limited  Moisture: 3-->occasionally moist  Activity: 3-->walks occasionally  Mobility: 3-->slightly limited  Nutrition: 2-->probably inadequate  Friction and Shear: 2-->potential problem  Gary Score: 16    Irene Samuel RN, BSN, CWOCN   Pager no longer is use, please contact through [employer]: [facility] Nurse [place]  Dept. Office Number: [phone]

## 2024-09-30 ENCOUNTER — APPOINTMENT (OUTPATIENT)
Dept: PHYSICAL THERAPY | Facility: CLINIC | Age: 39
End: 2024-09-30
Attending: PEDIATRICS
Payer: MEDICAID

## 2024-09-30 ENCOUNTER — APPOINTMENT (OUTPATIENT)
Dept: OCCUPATIONAL THERAPY | Facility: CLINIC | Age: 39
End: 2024-09-30
Attending: NURSE PRACTITIONER
Payer: MEDICAID

## 2024-09-30 ENCOUNTER — DOCUMENTATION ONLY (OUTPATIENT)
Dept: TRANSPLANT | Facility: CLINIC | Age: 39
End: 2024-09-30
Payer: MEDICAID

## 2024-09-30 DIAGNOSIS — K70.10 ALCOHOLIC HEPATITIS (H): ICD-10-CM

## 2024-09-30 DIAGNOSIS — Z94.4 LIVER REPLACED BY TRANSPLANT (H): Primary | ICD-10-CM

## 2024-09-30 LAB
ALBUMIN SERPL BCG-MCNC: 3.1 G/DL (ref 3.5–5.2)
ALP SERPL-CCNC: 62 U/L (ref 40–150)
ALT SERPL W P-5'-P-CCNC: 224 U/L (ref 0–70)
ANION GAP SERPL CALCULATED.3IONS-SCNC: 12 MMOL/L (ref 7–15)
APTT PPP: 27 SECONDS (ref 22–38)
AST SERPL W P-5'-P-CCNC: 118 U/L (ref 0–45)
ATRIAL RATE - MUSE: 102 BPM
BACTERIA FLD CULT: NO GROWTH
BASOPHILS # BLD AUTO: 0 10E3/UL (ref 0–0.2)
BASOPHILS NFR BLD AUTO: 0 %
BILIRUB DIRECT SERPL-MCNC: 5.91 MG/DL (ref 0–0.3)
BILIRUB SERPL-MCNC: 7.8 MG/DL
BUN SERPL-MCNC: 47.2 MG/DL (ref 6–20)
CALCIUM SERPL-MCNC: 8.6 MG/DL (ref 8.8–10.4)
CHLORIDE SERPL-SCNC: 93 MMOL/L (ref 98–107)
CMV IGG SERPL IA-ACNC: >10 U/ML
CMV IGG SERPL IA-ACNC: ABNORMAL
CREAT SERPL-MCNC: 2.27 MG/DL (ref 0.67–1.17)
DIASTOLIC BLOOD PRESSURE - MUSE: NORMAL MMHG
EBV VCA IGG SER IA-ACNC: 88.9 U/ML
EBV VCA IGG SER IA-ACNC: POSITIVE
EGFRCR SERPLBLD CKD-EPI 2021: 37 ML/MIN/1.73M2
EOSINOPHIL # BLD AUTO: 0 10E3/UL (ref 0–0.7)
EOSINOPHIL NFR BLD AUTO: 0 %
ERYTHROCYTE [DISTWIDTH] IN BLOOD BY AUTOMATED COUNT: 19.4 % (ref 10–15)
GLUCOSE BLDC GLUCOMTR-MCNC: 118 MG/DL (ref 70–99)
GLUCOSE BLDC GLUCOMTR-MCNC: 118 MG/DL (ref 70–99)
GLUCOSE BLDC GLUCOMTR-MCNC: 120 MG/DL (ref 70–99)
GLUCOSE BLDC GLUCOMTR-MCNC: 133 MG/DL (ref 70–99)
GLUCOSE BLDC GLUCOMTR-MCNC: 133 MG/DL (ref 70–99)
GLUCOSE BLDC GLUCOMTR-MCNC: 134 MG/DL (ref 70–99)
GLUCOSE BLDC GLUCOMTR-MCNC: 139 MG/DL (ref 70–99)
GLUCOSE BLDC GLUCOMTR-MCNC: 144 MG/DL (ref 70–99)
GLUCOSE BLDC GLUCOMTR-MCNC: 146 MG/DL (ref 70–99)
GLUCOSE BLDC GLUCOMTR-MCNC: 149 MG/DL (ref 70–99)
GLUCOSE BLDC GLUCOMTR-MCNC: 150 MG/DL (ref 70–99)
GLUCOSE BLDC GLUCOMTR-MCNC: 155 MG/DL (ref 70–99)
GLUCOSE BLDC GLUCOMTR-MCNC: 157 MG/DL (ref 70–99)
GLUCOSE BLDC GLUCOMTR-MCNC: 167 MG/DL (ref 70–99)
GLUCOSE BLDC GLUCOMTR-MCNC: 188 MG/DL (ref 70–99)
GLUCOSE SERPL-MCNC: 113 MG/DL (ref 70–99)
GRAM STAIN RESULT: NORMAL
GRAM STAIN RESULT: NORMAL
HBV DNA SERPL QL NAA+PROBE: NORMAL
HCO3 SERPL-SCNC: 24 MMOL/L (ref 22–29)
HCT VFR BLD AUTO: 25.7 % (ref 40–53)
HCV RNA SERPL QL NAA+PROBE: NORMAL
HGB BLD-MCNC: 8.9 G/DL (ref 13.3–17.7)
HIV1+2 RNA SERPL QL NAA+PROBE: NORMAL
IMM GRANULOCYTES # BLD: 0.1 10E3/UL
IMM GRANULOCYTES NFR BLD: 1 %
INR PPP: 1.14 (ref 0.85–1.15)
INTERPRETATION ECG - MUSE: NORMAL
LACTATE SERPL-SCNC: 1.8 MMOL/L (ref 0.7–2)
LACTATE SERPL-SCNC: 2.1 MMOL/L (ref 0.7–2)
LYMPHOCYTES # BLD AUTO: 0.7 10E3/UL (ref 0.8–5.3)
LYMPHOCYTES NFR BLD AUTO: 7 %
MAGNESIUM SERPL-MCNC: 2.2 MG/DL (ref 1.7–2.3)
MCH RBC QN AUTO: 31.3 PG (ref 26.5–33)
MCHC RBC AUTO-ENTMCNC: 34.6 G/DL (ref 31.5–36.5)
MCV RBC AUTO: 91 FL (ref 78–100)
MONOCYTES # BLD AUTO: 0.8 10E3/UL (ref 0–1.3)
MONOCYTES NFR BLD AUTO: 8 %
NEUTROPHILS # BLD AUTO: 8.7 10E3/UL (ref 1.6–8.3)
NEUTROPHILS NFR BLD AUTO: 85 %
NRBC # BLD AUTO: 0 10E3/UL
NRBC BLD AUTO-RTO: 0 /100
P AXIS - MUSE: 6 DEGREES
PHOSPHATE SERPL-MCNC: 6.2 MG/DL (ref 2.5–4.5)
PLATELET # BLD AUTO: 70 10E3/UL (ref 150–450)
POTASSIUM SERPL-SCNC: 4.2 MMOL/L (ref 3.4–5.3)
PR INTERVAL - MUSE: 158 MS
PROT SERPL-MCNC: 5.3 G/DL (ref 6.4–8.3)
QRS DURATION - MUSE: 92 MS
QT - MUSE: 368 MS
QTC - MUSE: 479 MS
R AXIS - MUSE: -21 DEGREES
RBC # BLD AUTO: 2.84 10E6/UL (ref 4.4–5.9)
SODIUM SERPL-SCNC: 129 MMOL/L (ref 135–145)
SYSTOLIC BLOOD PRESSURE - MUSE: NORMAL MMHG
T AXIS - MUSE: 32 DEGREES
TACROLIMUS BLD-MCNC: 2.3 UG/L (ref 5–15)
TME LAST DOSE: ABNORMAL H
TME LAST DOSE: ABNORMAL H
VENTRICULAR RATE- MUSE: 102 BPM
WBC # BLD AUTO: 10.2 10E3/UL (ref 4–11)

## 2024-09-30 PROCEDURE — 120N000011 HC R&B TRANSPLANT UMMC

## 2024-09-30 PROCEDURE — 250N000011 HC RX IP 250 OP 636: Performed by: PHYSICIAN ASSISTANT

## 2024-09-30 PROCEDURE — 99231 SBSQ HOSP IP/OBS SF/LOW 25: CPT | Performed by: PHYSICIAN ASSISTANT

## 2024-09-30 PROCEDURE — 258N000003 HC RX IP 258 OP 636: Performed by: PHYSICIAN ASSISTANT

## 2024-09-30 PROCEDURE — 82040 ASSAY OF SERUM ALBUMIN: CPT | Performed by: PHYSICIAN ASSISTANT

## 2024-09-30 PROCEDURE — 250N000011 HC RX IP 250 OP 636: Performed by: STUDENT IN AN ORGANIZED HEALTH CARE EDUCATION/TRAINING PROGRAM

## 2024-09-30 PROCEDURE — 999N000157 HC STATISTIC RCP TIME EA 10 MIN

## 2024-09-30 PROCEDURE — 97116 GAIT TRAINING THERAPY: CPT | Mod: GP

## 2024-09-30 PROCEDURE — 36415 COLL VENOUS BLD VENIPUNCTURE: CPT | Performed by: STUDENT IN AN ORGANIZED HEALTH CARE EDUCATION/TRAINING PROGRAM

## 2024-09-30 PROCEDURE — 250N000009 HC RX 250: Performed by: PHYSICIAN ASSISTANT

## 2024-09-30 PROCEDURE — 85730 THROMBOPLASTIN TIME PARTIAL: CPT | Performed by: STUDENT IN AN ORGANIZED HEALTH CARE EDUCATION/TRAINING PROGRAM

## 2024-09-30 PROCEDURE — 36415 COLL VENOUS BLD VENIPUNCTURE: CPT | Performed by: TRANSPLANT SURGERY

## 2024-09-30 PROCEDURE — 250N000013 HC RX MED GY IP 250 OP 250 PS 637: Performed by: PHYSICIAN ASSISTANT

## 2024-09-30 PROCEDURE — 250N000013 HC RX MED GY IP 250 OP 250 PS 637: Performed by: TRANSPLANT SURGERY

## 2024-09-30 PROCEDURE — 250N000012 HC RX MED GY IP 250 OP 636 PS 637: Performed by: STUDENT IN AN ORGANIZED HEALTH CARE EDUCATION/TRAINING PROGRAM

## 2024-09-30 PROCEDURE — 80197 ASSAY OF TACROLIMUS: CPT | Performed by: STUDENT IN AN ORGANIZED HEALTH CARE EDUCATION/TRAINING PROGRAM

## 2024-09-30 PROCEDURE — 250N000013 HC RX MED GY IP 250 OP 250 PS 637: Performed by: HOSPITALIST

## 2024-09-30 PROCEDURE — 85004 AUTOMATED DIFF WBC COUNT: CPT | Performed by: STUDENT IN AN ORGANIZED HEALTH CARE EDUCATION/TRAINING PROGRAM

## 2024-09-30 PROCEDURE — 83605 ASSAY OF LACTIC ACID: CPT | Performed by: TRANSPLANT SURGERY

## 2024-09-30 PROCEDURE — 250N000011 HC RX IP 250 OP 636: Performed by: NURSE PRACTITIONER

## 2024-09-30 PROCEDURE — 250N000013 HC RX MED GY IP 250 OP 250 PS 637: Performed by: STUDENT IN AN ORGANIZED HEALTH CARE EDUCATION/TRAINING PROGRAM

## 2024-09-30 PROCEDURE — P9047 ALBUMIN (HUMAN), 25%, 50ML: HCPCS | Performed by: NURSE PRACTITIONER

## 2024-09-30 PROCEDURE — 250N000013 HC RX MED GY IP 250 OP 250 PS 637: Performed by: NURSE PRACTITIONER

## 2024-09-30 PROCEDURE — 83735 ASSAY OF MAGNESIUM: CPT | Performed by: STUDENT IN AN ORGANIZED HEALTH CARE EDUCATION/TRAINING PROGRAM

## 2024-09-30 PROCEDURE — 99233 SBSQ HOSP IP/OBS HIGH 50: CPT | Mod: 24

## 2024-09-30 PROCEDURE — 97140 MANUAL THERAPY 1/> REGIONS: CPT | Mod: GO | Performed by: OCCUPATIONAL THERAPIST

## 2024-09-30 PROCEDURE — 97530 THERAPEUTIC ACTIVITIES: CPT | Mod: GP

## 2024-09-30 PROCEDURE — 99024 POSTOP FOLLOW-UP VISIT: CPT | Mod: FS | Performed by: NURSE PRACTITIONER

## 2024-09-30 PROCEDURE — 85610 PROTHROMBIN TIME: CPT | Performed by: STUDENT IN AN ORGANIZED HEALTH CARE EDUCATION/TRAINING PROGRAM

## 2024-09-30 PROCEDURE — 250N000013 HC RX MED GY IP 250 OP 250 PS 637

## 2024-09-30 PROCEDURE — 84100 ASSAY OF PHOSPHORUS: CPT | Performed by: STUDENT IN AN ORGANIZED HEALTH CARE EDUCATION/TRAINING PROGRAM

## 2024-09-30 PROCEDURE — 250N000011 HC RX IP 250 OP 636: Performed by: ANESTHESIOLOGY

## 2024-09-30 PROCEDURE — 80053 COMPREHEN METABOLIC PANEL: CPT | Performed by: PHYSICIAN ASSISTANT

## 2024-09-30 RX ORDER — BUMETANIDE 0.25 MG/ML
4 INJECTION INTRAMUSCULAR; INTRAVENOUS ONCE
Status: COMPLETED | OUTPATIENT
Start: 2024-09-30 | End: 2024-09-30

## 2024-09-30 RX ORDER — HYDROMORPHONE HCL IN WATER/PF 6 MG/30 ML
0.2 PATIENT CONTROLLED ANALGESIA SYRINGE INTRAVENOUS EVERY 4 HOURS PRN
Status: DISPENSED | OUTPATIENT
Start: 2024-09-30 | End: 2024-09-30

## 2024-09-30 RX ORDER — OXYCODONE HYDROCHLORIDE 10 MG/1
10 TABLET ORAL EVERY 4 HOURS PRN
Status: DISCONTINUED | OUTPATIENT
Start: 2024-09-30 | End: 2024-10-02

## 2024-09-30 RX ORDER — POLYETHYLENE GLYCOL 3350 17 G/17G
17 POWDER, FOR SOLUTION ORAL DAILY
Status: DISPENSED | OUTPATIENT
Start: 2024-09-30

## 2024-09-30 RX ORDER — HYDROXYZINE HYDROCHLORIDE 25 MG/1
25 TABLET, FILM COATED ORAL EVERY 6 HOURS PRN
Status: DISPENSED | OUTPATIENT
Start: 2024-09-30

## 2024-09-30 RX ORDER — METHOCARBAMOL 750 MG/1
750 TABLET, FILM COATED ORAL 3 TIMES DAILY
Status: DISCONTINUED | OUTPATIENT
Start: 2024-09-30 | End: 2024-09-30

## 2024-09-30 RX ORDER — METHOCARBAMOL 500 MG/1
1000 TABLET, FILM COATED ORAL EVERY 6 HOURS
Status: DISCONTINUED | OUTPATIENT
Start: 2024-09-30 | End: 2024-10-07

## 2024-09-30 RX ORDER — OXYCODONE HYDROCHLORIDE 5 MG/1
5 TABLET ORAL EVERY 4 HOURS PRN
Status: DISCONTINUED | OUTPATIENT
Start: 2024-09-30 | End: 2024-10-02

## 2024-09-30 RX ORDER — SENNOSIDES 8.6 MG
2 TABLET ORAL 2 TIMES DAILY
Status: DISPENSED | OUTPATIENT
Start: 2024-09-30

## 2024-09-30 RX ORDER — HYDROXYZINE HYDROCHLORIDE 50 MG/1
50 TABLET, FILM COATED ORAL EVERY 6 HOURS PRN
Status: DISPENSED | OUTPATIENT
Start: 2024-09-30

## 2024-09-30 RX ORDER — ALBUMIN (HUMAN) 12.5 G/50ML
12.5 SOLUTION INTRAVENOUS ONCE
Status: COMPLETED | OUTPATIENT
Start: 2024-09-30 | End: 2024-09-30

## 2024-09-30 RX ADMIN — BUMETANIDE 4 MG: 0.25 INJECTION INTRAMUSCULAR; INTRAVENOUS at 12:19

## 2024-09-30 RX ADMIN — HYDROMORPHONE HYDROCHLORIDE 0.2 MG: 0.2 INJECTION, SOLUTION INTRAMUSCULAR; INTRAVENOUS; SUBCUTANEOUS at 04:12

## 2024-09-30 RX ADMIN — INSULIN HUMAN 3 UNITS/HR: 1 INJECTION, SOLUTION INTRAVENOUS at 21:56

## 2024-09-30 RX ADMIN — METHOCARBAMOL 750 MG: 750 TABLET ORAL at 20:02

## 2024-09-30 RX ADMIN — TACROLIMUS 2 MG: 1 CAPSULE ORAL at 08:09

## 2024-09-30 RX ADMIN — OXYCODONE HYDROCHLORIDE 10 MG: 5 TABLET ORAL at 00:11

## 2024-09-30 RX ADMIN — Medication 40 MG: at 08:15

## 2024-09-30 RX ADMIN — SULFAMETHOXAZOLE AND TRIMETHOPRIM 1 TABLET: 400; 80 TABLET ORAL at 08:10

## 2024-09-30 RX ADMIN — URSODIOL 300 MG: 300 CAPSULE ORAL at 20:01

## 2024-09-30 RX ADMIN — ALBUMIN HUMAN 12.5 G: 0.25 SOLUTION INTRAVENOUS at 11:53

## 2024-09-30 RX ADMIN — VALGANCICLOVIR HYDROCHLORIDE 450 MG: 450 TABLET ORAL at 08:10

## 2024-09-30 RX ADMIN — HYDROMORPHONE HYDROCHLORIDE 0.2 MG: 0.2 INJECTION, SOLUTION INTRAMUSCULAR; INTRAVENOUS; SUBCUTANEOUS at 05:34

## 2024-09-30 RX ADMIN — METHOCARBAMOL 500 MG: 500 TABLET ORAL at 08:04

## 2024-09-30 RX ADMIN — INSULIN HUMAN 3 UNITS/HR: 1 INJECTION, SOLUTION INTRAVENOUS at 18:15

## 2024-09-30 RX ADMIN — MYCOPHENOLATE MOFETIL 750 MG: 250 CAPSULE ORAL at 18:47

## 2024-09-30 RX ADMIN — OXYCODONE HYDROCHLORIDE 10 MG: 10 TABLET ORAL at 12:32

## 2024-09-30 RX ADMIN — SENNOSIDES 2 TABLET: 8.6 TABLET, FILM COATED ORAL at 08:10

## 2024-09-30 RX ADMIN — TACROLIMUS 2 MG: 1 CAPSULE ORAL at 18:47

## 2024-09-30 RX ADMIN — PIPERACILLIN AND TAZOBACTAM 3.38 G: 3; .375 INJECTION, POWDER, LYOPHILIZED, FOR SOLUTION INTRAVENOUS at 10:35

## 2024-09-30 RX ADMIN — OXYCODONE HYDROCHLORIDE 5 MG: 5 TABLET ORAL at 22:21

## 2024-09-30 RX ADMIN — MYCOPHENOLATE MOFETIL 750 MG: 250 CAPSULE ORAL at 08:09

## 2024-09-30 RX ADMIN — METHOCARBAMOL 1000 MG: 500 TABLET ORAL at 22:22

## 2024-09-30 RX ADMIN — MICAFUNGIN SODIUM 100 MG: 50 INJECTION, POWDER, LYOPHILIZED, FOR SOLUTION INTRAVENOUS at 13:56

## 2024-09-30 RX ADMIN — THIAMINE HCL TAB 100 MG 100 MG: 100 TAB at 08:10

## 2024-09-30 RX ADMIN — METHOCARBAMOL 750 MG: 750 TABLET ORAL at 13:56

## 2024-09-30 RX ADMIN — HYDROMORPHONE HYDROCHLORIDE 0.2 MG: 0.2 INJECTION, SOLUTION INTRAMUSCULAR; INTRAVENOUS; SUBCUTANEOUS at 15:40

## 2024-09-30 RX ADMIN — OXYCODONE HYDROCHLORIDE 10 MG: 10 TABLET ORAL at 20:03

## 2024-09-30 RX ADMIN — FOLIC ACID 1 MG: 1 TABLET ORAL at 08:10

## 2024-09-30 RX ADMIN — URSODIOL 300 MG: 300 CAPSULE ORAL at 08:10

## 2024-09-30 RX ADMIN — METHYLPREDNISOLONE SODIUM SUCCINATE 100 MG: 125 INJECTION, POWDER, FOR SOLUTION INTRAMUSCULAR; INTRAVENOUS at 08:15

## 2024-09-30 RX ADMIN — PIPERACILLIN AND TAZOBACTAM 3.38 G: 3; .375 INJECTION, POWDER, LYOPHILIZED, FOR SOLUTION INTRAVENOUS at 04:03

## 2024-09-30 RX ADMIN — POLYETHYLENE GLYCOL 3350 17 G: 17 POWDER, FOR SOLUTION ORAL at 09:31

## 2024-09-30 RX ADMIN — HYDROMORPHONE HYDROCHLORIDE 0.2 MG: 0.2 INJECTION, SOLUTION INTRAMUSCULAR; INTRAVENOUS; SUBCUTANEOUS at 09:31

## 2024-09-30 RX ADMIN — OXYCODONE HYDROCHLORIDE 10 MG: 10 TABLET ORAL at 08:08

## 2024-09-30 RX ADMIN — LIDOCAINE 2 PATCH: 4 PATCH TOPICAL at 20:04

## 2024-09-30 ASSESSMENT — ACTIVITIES OF DAILY LIVING (ADL)
ADLS_ACUITY_SCORE: 30
ADLS_ACUITY_SCORE: 29
ADLS_ACUITY_SCORE: 30
ADLS_ACUITY_SCORE: 29
ADLS_ACUITY_SCORE: 29
ADLS_ACUITY_SCORE: 30
ADLS_ACUITY_SCORE: 29
ADLS_ACUITY_SCORE: 31
ADLS_ACUITY_SCORE: 31
ADLS_ACUITY_SCORE: 30
ADLS_ACUITY_SCORE: 29
ADLS_ACUITY_SCORE: 30
ADLS_ACUITY_SCORE: 29
ADLS_ACUITY_SCORE: 29
ADLS_ACUITY_SCORE: 30

## 2024-09-30 NOTE — PROGRESS NOTES
Admitted/transferred from: 4E  Time of arrival on unit 6:30am  BP (!) 124/90   Pulse 72   Temp 98  F (36.7  C) (Oral)   Resp 18   Ht 1.829 m (6')   Wt 105.6 kg (232 lb 12.9 oz)   SpO2 94%   BMI 31.57 kg/m      Plan of Care Reviewed With: patient     Overall Patient Progress: no change          Shift: 6:30-7:30   Isolation: none   VS: stable on RA, afebrile  Neuro: AOx4  BG: insulin drip, alg 2   Labs: pending am collections   Respiratory: WNL  Pain/Nausea/PRN: denies nausea; pain managed with PRNs   Diet: reg  LDA: 3 piv; 3 Yuriy  GI/: mckeon; passing gas   Skin: jaundice   Mobility: assist of 2   Plan: continue POC       Handoff given to following RN.

## 2024-09-30 NOTE — PROGRESS NOTES
CLINICAL NUTRITION SERVICES - REASSESSMENT NOTE     Nutrition Prescription    RECOMMENDATIONS FOR MDs/PROVIDERS TO ORDER:  Recommend patient to consume at least 1700 kcal and 85 grams of protein (~65% needs) OR consider supplemental TF.     Recommend stopping thiamine and folic acid supplementation.  Start daily MVI with minerals.     Malnutrition Status:    Severe malnutrition in the context of acute illness    Recommendations already ordered by Registered Dietitian (RD):  Ensure Max Protein TID with meals    Calorie counts (9/30-10/2)    Post-Tx diet education (verbal/written) provided to patient/mother.   -increased calorie/protein needs, sources  -heart healthy diet guidelines  -food safety precautions       EVALUATION OF THE PROGRESS TOWARD GOALS   Diet: Regular    Intake: Pt reports eating is going well.  Had fruit, cream of wheat, applesauce and an Ensure this morning.         NEW FINDINGS   Weight: Weight stable this admission, however 20# weight loss in the last month prior to admission noted.     Labs: Na 129 (low), Cre 2.27 (elevated), Phos 6.2 (elevated)    Meds: Protoonix, Miralax, Senokot BID, Prednisone, Insulin gtt, Cellcept, Tacrolimus, Steroid taper, Thiamine 100 mg daily, Folic acid 1 mg daily    GI: LBM noted on 9/27.  DDLT on 9/28.     MALNUTRITION  % Intake: Decreased intake does not meet criteria  % Weight Loss: > 5% in 1 month (severe) - on admission  Subcutaneous Fat Loss: Facial region:  mild and Thoracic/intercostal: mild  Muscle Loss: Thoracic region (clavicle, acromium bone, deltoid, trapezius, pectoral): moderate and Upper arm (bicep, tricep):  moderate-severe  Fluid Accumulation/Edema: Moderate  Malnutrition Diagnosis: Severe malnutrition in the context of acute illness    Previous Goals   Patient to consume % of nutritionally adequate meal trays TID, or the equivalent with supplements/snacks.   Evaluation: Not met    Previous Nutrition Diagnosis  Increased nutrient needs  (energy; protein, pending ongoing renal function)   Evaluation: Declining    CURRENT NUTRITION DIAGNOSIS  Inadequate protein intake related to high assessed needs as evidenced by patient eating minimal high protein foods with breakfast.       INTERVENTIONS  Implementation  Medical food supplement therapy - see above  Calorie counts  Nutrition education for recommended modifications - Post Tx diet guideline education provided to patient and mother.      Goals  Total avg nutritional intake to meet a minimum of 1700 kcal and 85 grams protein (~65% needs)    Monitoring/Evaluation  Progress toward goals will be monitored and evaluated per protocol.    Gretchen Goddard, MS, RD, LD, CCTD, [facility]  7A + 7B (beds [id number])  Available on Vocera [7A or 7B Clinical Dietitian]   Weekend/Holiday: Vocera [Weekend Clinical Dietitian]

## 2024-09-30 NOTE — PROGRESS NOTES
"Care Management Follow Up    Length of Stay (days): 9    Expected Discharge Date: 10/04/2024     Concerns to be Addressed: other (see comments) (\"Family conference\")   (Patient's mom, Sania, reported she is the only family member that would be included in a family conference)  Patient plan of care discussed at interdisciplinary rounds: Yes    Anticipated Discharge Disposition:                Anticipated Discharge Services:    Anticipated Discharge DME:      Patient/family educated on Medicare website which has current facility and service quality ratings:    Education Provided on the Discharge Plan:    Patient/Family in Agreement with the Plan:      Referrals Placed by CM/SW:    Private pay costs discussed: Not applicable    Discussed  Partnership in Safe Discharge Planning  document with patient/family: No     Handoff Completed: No, handoff not indicated or clinically appropriate    Additional Information:  Pt s/p liver transplant.  PT/OT recommending home.  Pt is from [facility] and will need to stay locally for at least 4 weeks post hospitalization.    Met briefly with pt. Introduced RNCC role.  Pt requested writer speak with his mother Sania.  Spoke very briefly with pt mother via phone however she was in clinic for an appointment. Agreed to have covering RNCC follow up with pt and his mother tomorrow.    Next Steps:   Confirm where pt and his mother plan to stay locally (pt mom is currently at the [facility])  Discuss home care services, transplant lab schedule M/TH    Lynnette Choi, RN BSN, PHN, ACM-RN  September 30, 2024  7A Nurse Coordinator    Phone: [phone]  Available on [employer] 7A SOT RNCC  Weekend/Holiday 7A SOT RNCC    9/30/2024                  "

## 2024-09-30 NOTE — PROGRESS NOTES
[facility]  Transplant Nephrology Progress Note  Date of Admission:  9/21/2024  Today's Date: 09/30/2024    Recommendations:   - Give 12.5 grams 25% albumin followed by 4 mg bumetanide.  - No indication for dialysis.    Assessment & Plan   # DENI: Increased serum creatinine   - DENI felt secondary initially to HRS and/or bile cast nephropathy, but now s/p liver transplant with hemodynamic changes, although no apparent hypotension.    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  Transaminases Trend down.  Followed by Transplant Surgery.    # Immunosuppression: Tacrolimus immediate release (goal 6-8), Mycophenolate mofetil (dose 750 mg every 12 hours), and Methylprednisolone (dose taper)   - Induction with Recent Transplant:  Per Liver Tx Protocol   - Continue with intensive monitoring of immunosuppression for efficacy and toxicity.   - Goal tacrolimus level lower due to DENI.   - Changes: Not at this time; Management per Transplant Surgery.    # Infection Prophylaxis:   - PJP: Sulfa/TMP (Bactrim)  - CMV: Valganciclovir (Valcyte)  - Thrush: Micafungin (Mycamine)  - Fungal: Micafungin (Mycamine)      - CMV IgG Ab High Risk Discordance (D+/R-): No  CMV Serostatus: Positive  - EBV IgG Ab High Risk Discordance (D+/R-): No  EBV Serostatus: Positive    # Blood Pressure: Controlled;  Goal BP: < 140/90 (Hospitalization goal)   - Volume status: Total body volume up, but intravascularly euvolemic     - Changes: Yes - Give 12.5 grams 25% albumin followed by 4 mg bumetanide.    # Elevated Blood Glucose: Glucose generally running ~ 150-200s   - Presently on insulin gtt.    # Anemia in Chronic Disease/Surgery: Hgb: Stable, low      TRACE: No   - Iron studies: Low iron saturation, but high ferritin    # Thrombocytopenia: Stable, moderately low platelet level.   No overt signs of bleeding.    # Mineral Bone Disorder:   - Vitamin D; level: Low         On supplement: No; If serum calcium trends down further, would start cholecalciferol 50 mcg daily.  - Calcium; level: Low normal        On supplement: No (trend for now)  - Phosphorus; level: High        On supplement: No (trend for now)    # Electrolytes:   - Potassium; level: Normal        On supplement: No  - Magnesium; level: Normal        On supplement: No  - Bicarbonate; level: Normal        On supplement: No  - Sodium; level: Low    # Hyponatremia: Trend down in serum sodium.  Likely secondary to decreased renal function.    # Other Significant PMH:   - Alcohol Abuse: Presently sober.   - Depression/Anxiety: Presently stable.     # Transplant History:  Etiology of Organ Failure: Alcohol-related liver disease  Tx: Liver Tx  Transplant: 9/28/2024 (Liver)  Significant changes in immunosuppression: Slightly lower tacrolimus level goal due to DENI.  Significant transplant-related complications: DENI    Recommendations were communicated to the primary team via this note.    Seen and discussed with .    SIL Theodore CNP  Transplant Nephrology  Contact information via Vocera Web Console or via [facility] Paging/Directory        Physician Attestation     I saw and evaluated Jag Smith as part of a shared APRN/PA visit.     I personally reviewed the vital signs, medications, labs, and imaging.    I personally provided a substantive portion of care for this patient and I approve the care plan as written by the AZALIA.  I was involved with Medical Decision Making including: Please see A&P for additional details of medical decision making.  MANAGEMENT DISCUSSED with the following over the past 24 hours: DENI post liver transplant, presumed hemodynamically mediated on top of pre transplant DENI due to possible bile cast nephropathy. Slow uptrend in cr and drop in UOP, recommend iv albumin+bumex. No RRT indications       Teresa Ibarra MD  Date of Service (when I saw the patient): 09/30/24      Interval History  Day 2 s/p liver transplant.  Pt received 3 mg bumetanide yesterday.  Mr. Smith's creatinine is 2.27 (09/30 0529); Increased from 1.56 yesterday morning.  Estimated Creatinine Clearance: 56 mL/min (A) (based on SCr of 2.27 mg/dL (H)).   I/O last 3 completed shifts:  In: 2541.92 [P.O.:1620; I.V.:921.92]  Out: 1816 [Urine:592; Drains:1224]   Other significant labs/tests/vitals: SBP 120s - 130s overnight. Afebrile.  Transaminases trending down.  No acute events overnight.  No chest pain or shortness of breath.  1+ leg swelling.  Reports some nausea, but denies vomiting.        Review of Systems   4 point ROS was obtained and negative except as noted in the Interval History.    MEDICATIONS:  Current Facility-Administered Medications   Medication Dose Route Frequency Provider Last Rate Last Admin    [Held by provider] aspirin (ASA) chewable tablet 81 mg  81 mg Oral or Feeding Tube Daily Dominick Tejada MD        folic acid (FOLVITE) tablet 1 mg  1 mg Oral Daily José Miguel Aviles MD   1 mg at 09/29/24 0752    Lidocaine (LIDOCARE) 4 % Patch 2 patch  2 patch Transdermal Q24h Ashley Loyd MD   2 patch at 09/29/24 1948    methocarbamol (ROBAXIN) tablet 500 mg  500 mg Oral TID Hardy Flannery MD   500 mg at 09/29/24 2001    methylPREDNISolone Na Suc (solu-MEDROL) injection 100 mg  100 mg Intravenous Once Dominick Tejada MD        Followed by    [START ON 10/1/2024] methylPREDNISolone Na Suc (solu-MEDROL) injection 50 mg  50 mg Intravenous Once Dominick Tejada MD        Followed by    [START ON 10/2/2024] predniSONE (DELTASONE) tablet 25 mg  25 mg Oral Once Dominick Tejada MD        Followed by    [START ON 10/3/2024] predniSONE (DELTASONE) tablet 10 mg  10 mg Oral Once Dominick Tejada MD        micafungin (MYCAMINE) 100 mg in sodium chloride 0.9 % 100 mL intermittent infusion  100 mg Intravenous Q24H  Bon Pino PA-C 100 mL/hr at 09/29/24 1338 100 mg at 09/29/24 1338    mycophenolate (GENERIC EQUIVALENT) capsule 750 mg  750 mg Oral BID IS Dominick Tejada MD   750 mg at 09/29/24 1746    Or    mycophenolate (CELLCEPT BRAND) suspension 750 mg  750 mg Oral or NG Tube BID IS Dominick Tejada MD   750 mg at 09/28/24 1726    pantoprazole (PROTONIX) 2 mg/mL suspension 40 mg  40 mg Oral or Feeding Tube [facility] Fernando Colon MD        piperacillin-tazobactam (ZOSYN) 3.375 g vial to attach to  mL bag  3.375 g Intravenous Q6H Dominick Tejada MD   3.375 g at 09/30/24 0403    [START ON 10/4/2024] predniSONE (DELTASONE) tablet 5 mg  5 mg Oral Daily Abi Quiros PA-C        sennosides (SENOKOT) tablet 2 tablet  2 tablet Oral BID Maddi Culver, SIL CNP        sodium chloride (PF) 0.9% PF flush 3 mL  3 mL Intravenous Q8H Dominick Tejada MD   3 mL at 09/30/24 0403    sodium chloride (PF) 0.9% PF flush 3 mL  3 mL Intracatheter Q8H José Miguel Aviles MD   3 mL at 09/29/24 0753    sulfamethoxazole-trimethoprim (BACTRIM) 400-80 MG per tablet 1 tablet  1 tablet Oral Daily Abi Quiros PA-C   1 tablet at 09/29/24 0752    tacrolimus (GENERIC EQUIVALENT) capsule 2 mg  2 mg Oral BID IS Dominick Tejada MD   2 mg at 09/29/24 1746    Or    tacrolimus (GENERIC) suspension 2 mg  2 mg Oral or NG Tube BID IS Dominick Tejada MD   2 mg at 09/28/24 1725    thiamine (B-1) tablet 100 mg  100 mg Oral Daily José Miguel Aviles MD   100 mg at 09/29/24 0752    ursodiol (ACTIGALL) capsule 300 mg  300 mg Oral BID Hardy Flannery MD   300 mg at 09/29/24 2001    valGANciclovir (VALCYTE) tablet 450 mg  450 mg Oral Daily Dominick Tejada MD   450 mg at 09/29/24 0752    Or    valGANciclovir (VALCYTE) solution 450 mg  450 mg Oral or NG Tube Daily Dominick Tejada MD   450 mg at 09/28/24 1526     Current Facility-Administered Medications   Medication Dose Route Frequency Provider Last Rate Last Admin    dextrose 10%  infusion   Intravenous Continuous PRN Bon Pino PA-C        insulin regular (MYXREDLIN) 1 unit/mL infusion  0-24 Units/hr Intravenous Continuous Bon Pino PA-C 1 mL/hr at 24 0724 1 Units/hr at 24 0724       Physical Exam   Temp  Av.2  F (36.8  C)  Min: 96.4  F (35.8  C)  Max: 99.3  F (37.4  C)  Arterial Line BP  Min: 99/55  Max: 143/73  Arterial Line MAP (mmHg)  Av.8 mmHg  Min: 70 mmHg  Max: 287 mmHg      Pulse  Av.2  Min: 74  Max: 110 Resp  Av.6  Min: 10  Max: 24  FiO2 (%)  Av %  Min: 40 %  Max: 60 %  SpO2  Av.9 %  Min: 92 %  Max: 100 %    CVP (mmHg): 16 mmHgBP (!) 124/90   Pulse 72   Temp 98  F (36.7  C) (Oral)   Resp 18   Ht 1.829 m (6')   Wt 105.6 kg (232 lb 12.9 oz)   SpO2 94%   BMI 31.57 kg/m     Date 24 0700 - 24 0659   Shift 4422-8775 1766-9022 1212-3689 24 Hour Total   INTAKE   P.O. 240   240   I.V. 375.2   375.2   Shift Total(mL/kg) 615.2(5.83)   615.2(5.83)   OUTPUT   Urine 40   40   Drains 280   280   Shift Total(mL/kg) 320(3.03)   320(3.03)   Weight (kg) 105.6 105.6 105.6 105.6      Admit Weight: 102.3 kg (225 lb 8 oz)     GENERAL APPEARANCE: alert and no distress  RESP: lungs clear to auscultation - no rales, rhonchi or wheezes  CV: regular rhythm, normal rate, no rub, no murmur  EDEMA: 1+ LE and dependent edema bilaterally  ABDOMEN: soft, nondistended, appropriately tender  MS: extremities normal - no gross deformities noted, no evidence of inflammation in joints, no muscle tenderness  SKIN: no rash, jaundiced  DIALYSIS ACCESS:  None    Data   All labs reviewed by me.  CMP  Recent Labs   Lab 24  0723 24  0537 24  0529 24  0405 24  2159 24  2004 24  1212 24  1139 24  0505 24  0353 24  0018 24  0014 24  1841 24  1801 24  1419 24  1305 24  1719 24  0646   NA  --   --  129*  --   --  131*  --   --   --  139  139  --   --   --   140  --  145   < > 131*   POTASSIUM  --   --  4.2  --   --  4.7  --   --   --  4.1  4.1  --  3.8  --  3.2*  --  3.8   < > 4.5   CHLORIDE  --   --  93*  --   --  95*  --   --   --  100  100  --   --   --  100  --  103  --  104   CO2  --   --  24  --   --  21*  --   --   --  25  25  --   --   --  24  --  23  --  13*   ANIONGAP  --   --  12  --   --  15  --   --   --  14  14  --   --   --  16*  --  19*  --  14   * 118* 113* 134*   < > 167*   < >  --    < > 161*  165*  165*   < >  --    < > 218*   < > 200*   < > 82   BUN  --   --  47.2*  --   --  40.4*  --   --   --  27.4*  27.4*  --   --   --  27.0*  --  27.0*  --  34.1*   CR  --   --  2.27*  --   --  2.02*  --   --   --  1.56*  1.56*  --   --   --  1.72*  --  1.90*  --  2.27*   GFRESTIMATED  --   --  37*  --   --  43*  --   --   --  58*  58*  --   --   --  52*  --  46*  --  37*   AMAIRANI  --   --  8.6*  --   --  8.4*  --   --   --  9.2  9.2  --   --   --  10.2  --  10.6*  --  8.9   MAG  --   --  2.2  --   --  2.2  --  2.4*  --  1.4*  --   --   --  1.5*  --  1.7  --  1.5*   PHOS  --   --  6.2*  --   --  5.8*  --   --   --  5.1*  --   --   --  4.0  --  5.1*  --  3.6   PROTTOTAL  --   --  5.3*  --   --   --   --   --   --  5.0*  --   --   --   --   --  4.8*  --   --    ALBUMIN  --   --  3.1*  --   --   --   --   --   --  3.2*  --   --   --   --   --  3.1*  --  3.3*   BILITOTAL  --   --  7.8*  --   --   --   --   --   --  9.9*  --   --   --   --   --  14.3*  --  38.2*   ALKPHOS  --   --  62  --   --   --   --   --   --  55  --   --   --   --   --  64  --  85   AST  --   --  118*  --   --   --   --   --   --  261*  --   --   --   --   --  678*  --  129*   ALT  --   --  224*  --   --   --   --   --   --  249*  --   --   --   --   --  418*  --  45    < > = values in this interval not displayed.     CBC  Recent Labs   Lab 09/30/24  0529 09/29/24  1738 09/29/24  1139 09/29/24  0354   HGB 8.9* 9.0* 8.7* 8.1*   WBC 10.2 11.1* 9.2 6.9   RBC 2.84* 2.96* 2.86* 2.69*    HCT 25.7* 26.7* 25.6* 23.4*   MCV 91 90 90 87   MCH 31.3 30.4 30.4 30.1   MCHC 34.6 33.7 34.0 34.6   RDW 19.4* 19.7* 19.6* 18.8*   PLT 70* 78* 71* 71*     INR  Recent Labs   Lab 09/30/24  0529 09/29/24  1738 09/29/24  1139 09/29/24  0353 09/29/24  0014   INR 1.14  --  1.14 1.21* 1.26*   PTT 27 27 28 30 31     ABG  Recent Labs   Lab 09/28/24  1308 09/28/24  1215 09/28/24  1143 09/28/24  1047   PH 7.45 7.44 7.45 7.45   PCO2 38 39 36 34*   PO2 139* 79* 110* 92   HCO3 26 26 25 24   O2PER 60 50.0 50.0 50.0      Urine Studies  Recent Labs   Lab Test 09/27/24  2113 09/22/24  0417   COLOR Dark Yellow* Dark Yellow*   APPEARANCE Slightly Cloudy* Slightly Cloudy*   URINEGLC Negative Negative   URINEBILI Large* Large*   URINEKETONE Negative Negative   SG 1.011 1.027   UBLD Negative Small*   URINEPH 6.0 6.0   PROTEIN Negative 20*   NITRITE Negative Negative   LEUKEST Negative Negative   RBCU 1 11*   WBCU 5 5     No lab results found.  PTH  No lab results found.  Iron Studies  Recent Labs   Lab Test 09/25/24  0653 09/23/24  0659   IRON  --  58*   ELPIDIO 2,577*  --        IMAGING:  All imaging studies reviewed by me.

## 2024-09-30 NOTE — PROGRESS NOTES
Immunosuppression Management Note:    Jag Smith is a 39 year old male who is seen today  for immunosuppression management     I, Russell Waters MD, I have examined the patient with our AZALIA/Fellow as part of a shared visit.   I participated in the rounds,  discussed and agree with the note and findings and  reviewed today's vital signs, medications, labs and imaging as noted in this note.  I  reviewed the  immunosuppression medications.  I personally provided a substantive portion of the care of this patient. I personally performed the immunosuppressive management of this patient, reviewed the overall  immunosuppression including drug levels, allograft function and provided the recommendations to adjust the dose to provide optimal levels to prevent rejection of the allograft and prevent toxicity to the organs. This was complex care due to the fresh allograft.   Time spent: evaluating patient, examining patient, discussion of plan, counseling and documentation: >35 min   I spoke to the patient/family and explained below clinical details and answered all the questions      Transplant Surgery  Inpatient Daily Progress Note  09/30/2024    Assessment & Plan: Jag Smith is a 39 year old male with recently diagnosed decompensated alcoholic liver cirrhosis transferred from  [facility] for expedited liver  transplant evaluation due to decompensated alcoholic liver cirrhosis. MELD 42. Now s/p DDLT with Dr Colon on 9/28/24.     Graft function: POD #2   Liver transplant: LFTs decreased. US liver: patent doppler. Continue SHENA x 3.  -Start ASA and ursodiol    Immunosuppressed status secondary to medications:   -Steroid taper per protocol  -Tacro goal level 8-12  -MMF 750mg BID   -Prednisone 5mg daily x3 months after taper    Neurology:  Acute post-op pain: Poor control. Underutilizing oral oxycodone (8h between doses).  -Oxycodone, increase to 5-10mg q4H PRN  -Dilaudid IV, reduce dose  and discontinue later today  -Robaxin 750mg TID, increased  -Lidoderm  -Atarax, start today    Hematology:   Acute blood loss anemia: Hgb 8.9, stable.  Thrombocytopenia: Plt 70. No transfusion needed    Cardiorespiratory:   Pulmonary toilet: IS, C&DB.    GI/Nutrition:   Diet: ADAT  Bowel regimen: Senna, PEG    Endocrine:   Steroid induced hyperglycemia: A1c 5.4%. Continue insulin gtt.    Fluid/Electrolytes:   Hyponatremia: Na 129, off IVF. Monitor.   DENI, HRS: Cr 2->2.3. Nephrology consulted.    : Will discuss mckeon removal with Nephrology    Infectious disease: No acute issues.     Skin:  Medical device related mucosal pressure injury of the lips, hospital acquired: WOC consulted.    Prophylaxis: DVT, fall, GI, surgical ppx (Zosyn), fungal (Micafungin), viral (vanganciclovir), pneumocystis (Bactrim)    Disposition: 7A      AZALIA/Fellow/Resident Provider: Maddi Culver NP [id number]    Faculty: Russell Waters MD     __________________________________________________________________    Interval History:   Overnight events: Pain issues, dizzy when OOB yesterday.     ROS:   A 10-point review of systems was negative except as noted above.    Curent Meds:  Current Facility-Administered Medications   Medication Dose Route Frequency Provider Last Rate Last Admin    [Held by provider] aspirin (ASA) chewable tablet 81 mg  81 mg Oral or Feeding Tube Daily Dominick Tejada MD        folic acid (FOLVITE) tablet 1 mg  1 mg Oral Daily José Miguel Aviles MD   1 mg at 09/29/24 0752    Lidocaine (LIDOCARE) 4 % Patch 2 patch  2 patch Transdermal Q24h Ashley Loyd MD   2 patch at 09/29/24 1948    methocarbamol (ROBAXIN) tablet 500 mg  500 mg Oral TID Hardy Flannery MD   500 mg at 09/29/24 2001    methylPREDNISolone Na Suc (solu-MEDROL) injection 100 mg  100 mg Intravenous Once Dominick Tejada MD        Followed by    [START ON 10/1/2024] methylPREDNISolone Na Suc (solu-MEDROL) injection 50 mg  50 mg Intravenous  Once Dominick Tejada MD        Followed by    [START ON 10/2/2024] predniSONE (DELTASONE) tablet 25 mg  25 mg Oral Once Dominick Tejada MD        Followed by    [START ON 10/3/2024] predniSONE (DELTASONE) tablet 10 mg  10 mg Oral Once Dominick Tejada MD        micafungin (MYCAMINE) 100 mg in sodium chloride 0.9 % 100 mL intermittent infusion  100 mg Intravenous Q24H Bon Pino PA-C 100 mL/hr at 09/29/24 1338 100 mg at 09/29/24 1338    mycophenolate (GENERIC EQUIVALENT) capsule 750 mg  750 mg Oral BID IS Dominick Tejada MD   750 mg at 09/29/24 1746    Or    mycophenolate (CELLCEPT BRAND) suspension 750 mg  750 mg Oral or NG Tube BID IS Dominick Tejada MD   750 mg at 09/28/24 1726    pantoprazole (PROTONIX) 2 mg/mL suspension 40 mg  40 mg Oral or Feeding Tube [facility] Fernando Colon MD        piperacillin-tazobactam (ZOSYN) 3.375 g vial to attach to  mL bag  3.375 g Intravenous Q6H Dominick Tejada MD   3.375 g at 09/30/24 0403    [START ON 10/4/2024] predniSONE (DELTASONE) tablet 5 mg  5 mg Oral Daily Abi Quiros PA-C        sennosides (SENOKOT) tablet 2 tablet  2 tablet Oral BID Maddi Culver, APRN CNP        sodium chloride (PF) 0.9% PF flush 3 mL  3 mL Intravenous Q8H Dominick Tejada MD   3 mL at 09/30/24 0403    sodium chloride (PF) 0.9% PF flush 3 mL  3 mL Intracatheter Q8H José Miguel Aviles MD   3 mL at 09/29/24 0753    sulfamethoxazole-trimethoprim (BACTRIM) 400-80 MG per tablet 1 tablet  1 tablet Oral Daily Abi Quiros PA-C   1 tablet at 09/29/24 0752    tacrolimus (GENERIC EQUIVALENT) capsule 2 mg  2 mg Oral BID IS Dominick Tejada MD   2 mg at 09/29/24 1746    Or    tacrolimus (GENERIC) suspension 2 mg  2 mg Oral or NG Tube BID IS Dominick Tejada MD   2 mg at 09/28/24 1725    thiamine (B-1) tablet 100 mg  100 mg Oral Daily José Miguel Aviles MD   100 mg at 09/29/24 0752    ursodiol (ACTIGALL) capsule 300 mg  300 mg Oral BID Hardy Flannery MD   300 mg at  09/29/24 2001    valGANciclovir (VALCYTE) tablet 450 mg  450 mg Oral Daily Dominick Tejdaa MD   450 mg at 09/29/24 0752    Or    valGANciclovir (VALCYTE) solution 450 mg  450 mg Oral or NG Tube Daily Dominick Tejada MD   450 mg at 09/28/24 1526       Physical Exam:     Admit Weight: 102.3 kg (225 lb 8 oz)    Current Vitals:   BP (!) 124/90   Pulse 72   Temp 98  F (36.7  C) (Oral)   Resp 18   Ht 1.829 m (6')   Wt 105.6 kg (232 lb 12.9 oz)   SpO2 94%   BMI 31.57 kg/m      Vital sign ranges:    Temp:  [98  F (36.7  C)-98.5  F (36.9  C)] 98  F (36.7  C)  Pulse:  [67-92] 72  Resp:  [18-24] 18  BP: (112-131)/(76-97) 124/90  MAP:  [93 mmHg-99 mmHg] 93 mmHg  Arterial Line BP: (139-140)/(70-77) 140/70  SpO2:  [93 %-100 %] 94 %    General Appearance: in no apparent distress.   Skin: normal, warm, jaundice  Heart: Perfused  Lungs: Unlabored on RA  Abdomen:  Soft, non distended.  The wound is covered with surgical dressing. SHENA x 3 serosanguinous   :  mckeon, brittany  Extremities: no edema  Neurologic: A&Ox4      Frailty Scores           No data to display                Data:   CMP  Recent Labs   Lab 09/30/24  0723 09/30/24  0537 09/30/24  0529 09/29/24  2159 09/29/24  2004 09/29/24  0505 09/29/24  0353 09/28/24  1419 09/28/24  1308 09/27/24  1719 09/27/24  0646   NA  --   --  129*  --  131*  --  139  139   < >  --    < > 131*   POTASSIUM  --   --  4.2  --  4.7  --  4.1  4.1   < >  --    < > 4.5   CHLORIDE  --   --  93*  --  95*  --  100  100   < >  --    < > 104   CO2  --   --  24  --  21*  --  25  25   < >  --    < > 13*   * 118* 113*   < > 167*   < > 161*  165*  165*   < >  --    < > 82   BUN  --   --  47.2*  --  40.4*  --  27.4*  27.4*   < >  --    < > 34.1*   CR  --   --  2.27*  --  2.02*  --  1.56*  1.56*   < >  --    < > 2.27*   GFRESTIMATED  --   --  37*  --  43*  --  58*  58*   < >  --    < > 37*   AMAIRANI  --   --  8.6*  --  8.4*  --  9.2  9.2   < >  --    < > 8.9   ICAW  --   --   --   --   --   --   4.8  --  5.4*   < >  --    MAG  --   --  2.2  --  2.2   < > 1.4*   < >  --    < > 1.5*   PHOS  --   --  6.2*  --  5.8*  --  5.1*   < >  --    < > 3.6   AMYLASE  --   --   --   --   --   --  26*  --   --   --  46   LIPASE  --   --   --   --   --   --  17  --   --   --   --    ALBUMIN  --   --  3.1*  --   --   --  3.2*  --   --    < > 3.3*   BILITOTAL  --   --  7.8*  --   --   --  9.9*  --   --    < > 38.2*   ALKPHOS  --   --  62  --   --   --  55  --   --    < > 85   AST  --   --  118*  --   --   --  261*  --   --    < > 129*   ALT  --   --  224*  --   --   --  249*  --   --    < > 45    < > = values in this interval not displayed.     CBC  Recent Labs   Lab 09/30/24  0529 09/29/24  1738 09/28/24  1215 09/28/24  1207   HGB 8.9* 9.0*   < >  --    WBC 10.2 11.1*   < >  --    PLT 70* 78*   < > 59*   A1C  --   --   --  5.4    < > = values in this interval not displayed.

## 2024-09-30 NOTE — CODE/RAPID RESPONSE
Rapid Response Team Note    Assessment   A rapid response was called on Jag Smith due to SIRS/Sepsis trigger and lactic acidosis. This presentation is likely due to ESLD s/p liver transplant two days ago.    Plan   -  No medical intervention indicated at this time other than ongoing close clinical monitoring by primary team.   -  The Transplant Surgery  primary team was able to be reached and they are in agreement with the above plan.  -  Disposition: The patient will remain on the current unit. We will continue to monitor this patient closely.  -  Reassessment and plan follow-up will be performed by the primary team    Ash Dsouza PA-C  Rapid Response Team AZALIA  Securely message with [employer]     Medical Decision Making       15 MINUTES SPENT BY ME on the date of service doing chart review, history, exam, documentation & further activities per the note.      Hospital Course   Brief Summary of events leading to rapid response:   Pt triggered sepsis BPA, of which subsequently ordered LA>2.    Physical Exam   Vital Signs: Temp: 98.2  F (36.8  C) Temp src: Oral BP: (!) 133/92 Pulse: 74   Resp: 18 SpO2: 99 % O2 Device: None (Room air)    Weight: 232 lbs 12.89 oz    Exam:   GEN: In NAD      Significant Results and Procedures   LA>2.0    Sepsis Evaluation   The patient is not known to have an infection.    NO EVIDENCE OF SEPSIS at this time.  Vital sign, physical exam, and lab findings are due to ESLD s/p liver transplant two days ago.

## 2024-09-30 NOTE — DISCHARGE INSTRUCTIONS
Nutrition Services - Post-Transplant Diet Guidelines   Follow recommendations on the Guide to Your Diet after Transplant handout (summary below).    You have increased energy and protein needs for six to eight weeks after transplant. Your goal is to consume at least 130 grams of protein per day during this time frame.   Eat a heart-healthy diet (low sodium, low saturated and trans-fat) once you are outside of the 6-8 week post-transplant window, and once your appetite improves to normal  In some cases (but not in all cases), adjust potassium intake   Take calcium and vitamin D supplement if your doctor or team orders  Take measures to prevent food poisoning: store and prepare foods to the proper temperature, practice good handwashing, heat all deli meat (to 165 degrees Fahrenheit), avoid raw fish and meats (including uncooked eggs, non-pasteurized or raw milk, and mold-ripened, non-pasteurized, and raw cheeses [including Brie, Camembert, Roquefort, Stilton, Gorgonzola and Juan or other soft, unpasteurized cheeses such as Mexican queso fresco]), and throw out leftovers older than two days.   Do not consume grapefruit or pomegranate-containing products. These can interact with your medications.   Avoid the following post txp d/t risk for rejection, unknown effects on the organs, and/or potential interactions with immunosuppressants:       - Herbal, Chinese, holistic, chiropractic, natural, alternative medicines and supplements       - Detoxes and cleanses       - Weight loss pills       - Protein powders or other products with extracts or herbs (ie green tea extract)  If you have any nutrition-related questions or concerns after discharge, please contact our outpatient transplant dietitian, Yany Pulido at [phone]

## 2024-09-30 NOTE — PLAN OF CARE
Goal Outcome Evaluation:      Plan of Care Reviewed With: patient, family    Overall Patient Progress: improvingOverall Patient Progress: improving    Outcome Evaluation: increased urine output  /87 (BP Location: Left arm)   Pulse 72   Temp 98.7  F (37.1  C) (Oral)   Resp 18   Ht 1.829 m (6')   Wt 105.6 kg (232 lb 12.9 oz)   SpO2 97%   BMI 31.57 kg/m     Neuro: A/Ox4  VS: VSS on RA  Triggered sepsis protocol, Lactic acid 2.1 Rapid response code called with no intervention, recheck Lactic acid 1.8  Pain: c/o abdominal/incisional pain controlled by PRN oxycodone 10mg Q4h, Hydromorphone 0.2mg IV x1 and scheduled Robaxin  GI: On regular diet and tolerating good. Denies nausea. No BM  : Lima in place with good UOP  Edema: 3+ pt received Albumin and Bumex 4mg IV resulting in increased urine output  BG Between 120-159 on Insulin gtt Alg2,  3unit/h  Skin/Drains - Abdominal incision dressing intact, JPx3 with serosanguineous drainage  Activity - Extensive assist of 1 GB/walker  Education - Medcard/lab book and pt was encouraged to watch videos  Plan of Care - Continue with POC.

## 2024-09-30 NOTE — PROGRESS NOTES
Transplant Social Work Services Progress Note      Date of Initial Social Work Evaluation: 24  Collaborated with: Liver Transplant Team, Karena Villegas-post transplant coordinator    Data: Jag received a  donor liver transplant on 24.  Intervention: I met with Jag and provided supportive counseling.  Assessment: PT/OT anticipate a home discharge.  Patient [place] 270 [place] transplant center, and he is aware he will need to stay locally post hospitalization for upwards of one month.  His mother Sania has been staying at the [facility].    Education provided by SW: pharmacy test claim for immunosuppression and valganciclovir (all $0), Writing to the Donor Family  Plan:    Discharge Plans in Progress: discharge to a local setting ([place] vs[facility])    Barriers to d/c plan: medical stability, estimated discharge date is 10/4    Follow up Plan: I will update patient's usual  SAUNDRA Moscoso.  She returns to the office tomorrow.      ADA Regalado, [facility] Hospital[facility]  [facility] Trans[facility]   Phone [phone]

## 2024-10-01 ENCOUNTER — APPOINTMENT (OUTPATIENT)
Dept: OCCUPATIONAL THERAPY | Facility: CLINIC | Age: 39
End: 2024-10-01
Attending: PEDIATRICS
Payer: MEDICAID

## 2024-10-01 ENCOUNTER — TELEPHONE (OUTPATIENT)
Dept: PHARMACY | Facility: CLINIC | Age: 39
End: 2024-10-01
Payer: MEDICAID

## 2024-10-01 PROBLEM — G89.18 ACUTE POST-OPERATIVE PAIN: Status: ACTIVE | Noted: 2024-10-01

## 2024-10-01 PROBLEM — D72.829 LEUKOCYTOSIS: Status: RESOLVED | Noted: 2024-06-21 | Resolved: 2024-10-01

## 2024-10-01 PROBLEM — T38.0X5A STEROID-INDUCED HYPERGLYCEMIA: Status: ACTIVE | Noted: 2024-10-01

## 2024-10-01 PROBLEM — R73.9 STEROID-INDUCED HYPERGLYCEMIA: Status: ACTIVE | Noted: 2024-10-01

## 2024-10-01 PROBLEM — F10.21 ALCOHOL USE DISORDER, SEVERE, IN EARLY REMISSION (H): Status: ACTIVE | Noted: 2024-06-08

## 2024-10-01 PROBLEM — N17.9 AKI (ACUTE KIDNEY INJURY) (H): Status: ACTIVE | Noted: 2024-09-27

## 2024-10-01 PROBLEM — Z94.4 LIVER REPLACED BY TRANSPLANT (H): Status: ACTIVE | Noted: 2024-09-28

## 2024-10-01 PROBLEM — D84.9 IMMUNOSUPPRESSED STATUS (H): Status: ACTIVE | Noted: 2024-10-01

## 2024-10-01 LAB
ALBUMIN SERPL BCG-MCNC: 3.2 G/DL (ref 3.5–5.2)
ALP SERPL-CCNC: 79 U/L (ref 40–150)
ALT SERPL W P-5'-P-CCNC: 194 U/L (ref 0–70)
ANION GAP SERPL CALCULATED.3IONS-SCNC: 13 MMOL/L (ref 7–15)
AST SERPL W P-5'-P-CCNC: 62 U/L (ref 0–45)
BASOPHILS # BLD AUTO: 0 10E3/UL (ref 0–0.2)
BASOPHILS NFR BLD AUTO: 0 %
BILIRUB DIRECT SERPL-MCNC: 4.95 MG/DL (ref 0–0.3)
BILIRUB SERPL-MCNC: 6.4 MG/DL
BUN SERPL-MCNC: 60.3 MG/DL (ref 6–20)
CALCIUM SERPL-MCNC: 8.3 MG/DL (ref 8.8–10.4)
CHLORIDE SERPL-SCNC: 94 MMOL/L (ref 98–107)
CREAT SERPL-MCNC: 2.05 MG/DL (ref 0.67–1.17)
EGFRCR SERPLBLD CKD-EPI 2021: 42 ML/MIN/1.73M2
EOSINOPHIL # BLD AUTO: 0 10E3/UL (ref 0–0.7)
EOSINOPHIL NFR BLD AUTO: 0 %
ERYTHROCYTE [DISTWIDTH] IN BLOOD BY AUTOMATED COUNT: 18.6 % (ref 10–15)
GLUCOSE BLDC GLUCOMTR-MCNC: 100 MG/DL (ref 70–99)
GLUCOSE BLDC GLUCOMTR-MCNC: 100 MG/DL (ref 70–99)
GLUCOSE BLDC GLUCOMTR-MCNC: 108 MG/DL (ref 70–99)
GLUCOSE BLDC GLUCOMTR-MCNC: 109 MG/DL (ref 70–99)
GLUCOSE BLDC GLUCOMTR-MCNC: 110 MG/DL (ref 70–99)
GLUCOSE BLDC GLUCOMTR-MCNC: 122 MG/DL (ref 70–99)
GLUCOSE BLDC GLUCOMTR-MCNC: 128 MG/DL (ref 70–99)
GLUCOSE BLDC GLUCOMTR-MCNC: 128 MG/DL (ref 70–99)
GLUCOSE BLDC GLUCOMTR-MCNC: 130 MG/DL (ref 70–99)
GLUCOSE BLDC GLUCOMTR-MCNC: 135 MG/DL (ref 70–99)
GLUCOSE BLDC GLUCOMTR-MCNC: 136 MG/DL (ref 70–99)
GLUCOSE BLDC GLUCOMTR-MCNC: 142 MG/DL (ref 70–99)
GLUCOSE BLDC GLUCOMTR-MCNC: 144 MG/DL (ref 70–99)
GLUCOSE BLDC GLUCOMTR-MCNC: 145 MG/DL (ref 70–99)
GLUCOSE BLDC GLUCOMTR-MCNC: 148 MG/DL (ref 70–99)
GLUCOSE BLDC GLUCOMTR-MCNC: 151 MG/DL (ref 70–99)
GLUCOSE BLDC GLUCOMTR-MCNC: 155 MG/DL (ref 70–99)
GLUCOSE BLDC GLUCOMTR-MCNC: 159 MG/DL (ref 70–99)
GLUCOSE BLDC GLUCOMTR-MCNC: 171 MG/DL (ref 70–99)
GLUCOSE SERPL-MCNC: 108 MG/DL (ref 70–99)
HCO3 SERPL-SCNC: 24 MMOL/L (ref 22–29)
HCT VFR BLD AUTO: 26.2 % (ref 40–53)
HGB BLD-MCNC: 8.9 G/DL (ref 13.3–17.7)
IMM GRANULOCYTES # BLD: 0.1 10E3/UL
IMM GRANULOCYTES NFR BLD: 1 %
INR PPP: 1.03 (ref 0.85–1.15)
LACTATE SERPL-SCNC: 1.3 MMOL/L (ref 0.7–2)
LYMPHOCYTES # BLD AUTO: 0.9 10E3/UL (ref 0.8–5.3)
LYMPHOCYTES NFR BLD AUTO: 10 %
MAGNESIUM SERPL-MCNC: 1.9 MG/DL (ref 1.7–2.3)
MCH RBC QN AUTO: 30.7 PG (ref 26.5–33)
MCHC RBC AUTO-ENTMCNC: 34 G/DL (ref 31.5–36.5)
MCV RBC AUTO: 90 FL (ref 78–100)
MONOCYTES # BLD AUTO: 0.8 10E3/UL (ref 0–1.3)
MONOCYTES NFR BLD AUTO: 9 %
NEUTROPHILS # BLD AUTO: 7.2 10E3/UL (ref 1.6–8.3)
NEUTROPHILS NFR BLD AUTO: 80 %
NRBC # BLD AUTO: 0 10E3/UL
NRBC BLD AUTO-RTO: 0 /100
PHOSPHATE SERPL-MCNC: 4.9 MG/DL (ref 2.5–4.5)
PLATELET # BLD AUTO: 79 10E3/UL (ref 150–450)
POTASSIUM SERPL-SCNC: 3.8 MMOL/L (ref 3.4–5.3)
PROT SERPL-MCNC: 5.2 G/DL (ref 6.4–8.3)
RBC # BLD AUTO: 2.9 10E6/UL (ref 4.4–5.9)
SODIUM SERPL-SCNC: 131 MMOL/L (ref 135–145)
TACROLIMUS BLD-MCNC: 2.7 UG/L (ref 5–15)
TME LAST DOSE: ABNORMAL H
TME LAST DOSE: ABNORMAL H
WBC # BLD AUTO: 9 10E3/UL (ref 4–11)

## 2024-10-01 PROCEDURE — 250N000013 HC RX MED GY IP 250 OP 250 PS 637: Performed by: NURSE PRACTITIONER

## 2024-10-01 PROCEDURE — 80197 ASSAY OF TACROLIMUS: CPT | Performed by: STUDENT IN AN ORGANIZED HEALTH CARE EDUCATION/TRAINING PROGRAM

## 2024-10-01 PROCEDURE — 83605 ASSAY OF LACTIC ACID: CPT | Performed by: TRANSPLANT SURGERY

## 2024-10-01 PROCEDURE — 250N000012 HC RX MED GY IP 250 OP 636 PS 637: Performed by: STUDENT IN AN ORGANIZED HEALTH CARE EDUCATION/TRAINING PROGRAM

## 2024-10-01 PROCEDURE — 97530 THERAPEUTIC ACTIVITIES: CPT | Mod: GO

## 2024-10-01 PROCEDURE — 250N000013 HC RX MED GY IP 250 OP 250 PS 637: Performed by: STUDENT IN AN ORGANIZED HEALTH CARE EDUCATION/TRAINING PROGRAM

## 2024-10-01 PROCEDURE — 99233 SBSQ HOSP IP/OBS HIGH 50: CPT | Mod: 24

## 2024-10-01 PROCEDURE — 36415 COLL VENOUS BLD VENIPUNCTURE: CPT | Performed by: STUDENT IN AN ORGANIZED HEALTH CARE EDUCATION/TRAINING PROGRAM

## 2024-10-01 PROCEDURE — 250N000013 HC RX MED GY IP 250 OP 250 PS 637: Performed by: TRANSPLANT SURGERY

## 2024-10-01 PROCEDURE — 250N000011 HC RX IP 250 OP 636: Mod: JZ | Performed by: NURSE PRACTITIONER

## 2024-10-01 PROCEDURE — 80053 COMPREHEN METABOLIC PANEL: CPT | Performed by: PHYSICIAN ASSISTANT

## 2024-10-01 PROCEDURE — 82248 BILIRUBIN DIRECT: CPT | Performed by: PHYSICIAN ASSISTANT

## 2024-10-01 PROCEDURE — 97140 MANUAL THERAPY 1/> REGIONS: CPT | Mod: GO

## 2024-10-01 PROCEDURE — 250N000013 HC RX MED GY IP 250 OP 250 PS 637

## 2024-10-01 PROCEDURE — 250N000012 HC RX MED GY IP 250 OP 636 PS 637: Performed by: NURSE PRACTITIONER

## 2024-10-01 PROCEDURE — 36415 COLL VENOUS BLD VENIPUNCTURE: CPT | Performed by: TRANSPLANT SURGERY

## 2024-10-01 PROCEDURE — 82310 ASSAY OF CALCIUM: CPT | Performed by: STUDENT IN AN ORGANIZED HEALTH CARE EDUCATION/TRAINING PROGRAM

## 2024-10-01 PROCEDURE — 84100 ASSAY OF PHOSPHORUS: CPT | Performed by: STUDENT IN AN ORGANIZED HEALTH CARE EDUCATION/TRAINING PROGRAM

## 2024-10-01 PROCEDURE — 85610 PROTHROMBIN TIME: CPT | Performed by: STUDENT IN AN ORGANIZED HEALTH CARE EDUCATION/TRAINING PROGRAM

## 2024-10-01 PROCEDURE — 85025 COMPLETE CBC W/AUTO DIFF WBC: CPT | Performed by: STUDENT IN AN ORGANIZED HEALTH CARE EDUCATION/TRAINING PROGRAM

## 2024-10-01 PROCEDURE — 99232 SBSQ HOSP IP/OBS MODERATE 35: CPT | Mod: 24 | Performed by: NURSE PRACTITIONER

## 2024-10-01 PROCEDURE — 250N000011 HC RX IP 250 OP 636: Performed by: STUDENT IN AN ORGANIZED HEALTH CARE EDUCATION/TRAINING PROGRAM

## 2024-10-01 PROCEDURE — 250N000013 HC RX MED GY IP 250 OP 250 PS 637: Performed by: PHYSICIAN ASSISTANT

## 2024-10-01 PROCEDURE — P9047 ALBUMIN (HUMAN), 25%, 50ML: HCPCS | Mod: JZ | Performed by: NURSE PRACTITIONER

## 2024-10-01 PROCEDURE — 83735 ASSAY OF MAGNESIUM: CPT | Performed by: STUDENT IN AN ORGANIZED HEALTH CARE EDUCATION/TRAINING PROGRAM

## 2024-10-01 PROCEDURE — G0463 HOSPITAL OUTPT CLINIC VISIT: HCPCS

## 2024-10-01 PROCEDURE — 250N000013 HC RX MED GY IP 250 OP 250 PS 637: Performed by: HOSPITALIST

## 2024-10-01 PROCEDURE — 250N000011 HC RX IP 250 OP 636: Performed by: HOSPITALIST

## 2024-10-01 PROCEDURE — 80076 HEPATIC FUNCTION PANEL: CPT | Performed by: PHYSICIAN ASSISTANT

## 2024-10-01 PROCEDURE — 120N000011 HC R&B TRANSPLANT UMMC

## 2024-10-01 RX ORDER — PANTOPRAZOLE SODIUM 40 MG/1
40 TABLET, DELAYED RELEASE ORAL DAILY
Status: DISPENSED | OUTPATIENT
Start: 2024-10-02

## 2024-10-01 RX ORDER — BISACODYL 10 MG
10 SUPPOSITORY, RECTAL RECTAL ONCE
Status: COMPLETED | OUTPATIENT
Start: 2024-10-01 | End: 2024-10-01

## 2024-10-01 RX ORDER — HYDROMORPHONE HYDROCHLORIDE 4 MG/1
4 TABLET ORAL ONCE
Status: COMPLETED | OUTPATIENT
Start: 2024-10-01 | End: 2024-10-01

## 2024-10-01 RX ORDER — ALBUMIN (HUMAN) 12.5 G/50ML
25 SOLUTION INTRAVENOUS 2 TIMES DAILY
Status: COMPLETED | OUTPATIENT
Start: 2024-10-01 | End: 2024-10-01

## 2024-10-01 RX ORDER — TACROLIMUS 1 MG/1
4 CAPSULE ORAL
Status: DISCONTINUED | OUTPATIENT
Start: 2024-10-01 | End: 2024-10-01

## 2024-10-01 RX ORDER — TACROLIMUS 1 MG/1
3 CAPSULE ORAL
Status: DISCONTINUED | OUTPATIENT
Start: 2024-10-01 | End: 2024-10-02

## 2024-10-01 RX ORDER — TACROLIMUS 1 MG/1
2 CAPSULE ORAL
Status: DISCONTINUED | OUTPATIENT
Start: 2024-10-01 | End: 2024-10-01

## 2024-10-01 RX ORDER — FLUCONAZOLE 200 MG/1
400 TABLET ORAL DAILY
Status: COMPLETED | OUTPATIENT
Start: 2024-10-01 | End: 2024-10-04

## 2024-10-01 RX ADMIN — MYCOPHENOLATE MOFETIL 750 MG: 250 CAPSULE ORAL at 08:31

## 2024-10-01 RX ADMIN — HYDROXYZINE HYDROCHLORIDE 50 MG: 50 TABLET, FILM COATED ORAL at 06:37

## 2024-10-01 RX ADMIN — ALBUMIN HUMAN 25 G: 0.25 SOLUTION INTRAVENOUS at 20:22

## 2024-10-01 RX ADMIN — HYDROMORPHONE HYDROCHLORIDE 4 MG: 4 TABLET ORAL at 06:37

## 2024-10-01 RX ADMIN — OXYCODONE HYDROCHLORIDE 10 MG: 10 TABLET ORAL at 16:38

## 2024-10-01 RX ADMIN — URSODIOL 300 MG: 300 CAPSULE ORAL at 20:20

## 2024-10-01 RX ADMIN — ALBUMIN HUMAN 25 G: 0.25 SOLUTION INTRAVENOUS at 11:46

## 2024-10-01 RX ADMIN — MYCOPHENOLATE MOFETIL 750 MG: 250 CAPSULE ORAL at 18:05

## 2024-10-01 RX ADMIN — FOLIC ACID 1 MG: 1 TABLET ORAL at 08:30

## 2024-10-01 RX ADMIN — TACROLIMUS 2 MG: 1 CAPSULE ORAL at 08:30

## 2024-10-01 RX ADMIN — POLYETHYLENE GLYCOL 3350 17 G: 17 POWDER, FOR SOLUTION ORAL at 08:30

## 2024-10-01 RX ADMIN — ASPIRIN 81 MG CHEWABLE TABLET 81 MG: 81 TABLET CHEWABLE at 08:30

## 2024-10-01 RX ADMIN — BISACODYL 10 MG: 10 SUPPOSITORY RECTAL at 15:11

## 2024-10-01 RX ADMIN — TACROLIMUS 3 MG: 1 CAPSULE ORAL at 18:02

## 2024-10-01 RX ADMIN — OXYCODONE HYDROCHLORIDE 10 MG: 10 TABLET ORAL at 21:05

## 2024-10-01 RX ADMIN — OXYCODONE HYDROCHLORIDE 10 MG: 10 TABLET ORAL at 08:29

## 2024-10-01 RX ADMIN — HYDROXYZINE HYDROCHLORIDE 25 MG: 25 TABLET, FILM COATED ORAL at 00:11

## 2024-10-01 RX ADMIN — HYDROXYZINE HYDROCHLORIDE 50 MG: 50 TABLET, FILM COATED ORAL at 12:32

## 2024-10-01 RX ADMIN — SULFAMETHOXAZOLE AND TRIMETHOPRIM 1 TABLET: 400; 80 TABLET ORAL at 08:30

## 2024-10-01 RX ADMIN — OXYCODONE HYDROCHLORIDE 10 MG: 10 TABLET ORAL at 12:31

## 2024-10-01 RX ADMIN — FLUCONAZOLE 400 MG: 200 TABLET ORAL at 14:03

## 2024-10-01 RX ADMIN — SENNOSIDES 2 TABLET: 8.6 TABLET, FILM COATED ORAL at 20:19

## 2024-10-01 RX ADMIN — OXYCODONE HYDROCHLORIDE 10 MG: 10 TABLET ORAL at 02:31

## 2024-10-01 RX ADMIN — METHYLPREDNISOLONE SODIUM SUCCINATE 50 MG: 125 INJECTION, POWDER, FOR SOLUTION INTRAMUSCULAR; INTRAVENOUS at 08:37

## 2024-10-01 RX ADMIN — ONDANSETRON 4 MG: 4 TABLET, ORALLY DISINTEGRATING ORAL at 12:32

## 2024-10-01 RX ADMIN — Medication 40 MG: at 08:42

## 2024-10-01 RX ADMIN — URSODIOL 300 MG: 300 CAPSULE ORAL at 08:31

## 2024-10-01 RX ADMIN — SENNOSIDES 2 TABLET: 8.6 TABLET, FILM COATED ORAL at 08:30

## 2024-10-01 RX ADMIN — METHOCARBAMOL 1000 MG: 500 TABLET ORAL at 22:35

## 2024-10-01 RX ADMIN — METHOCARBAMOL 1000 MG: 500 TABLET ORAL at 16:20

## 2024-10-01 RX ADMIN — VALGANCICLOVIR HYDROCHLORIDE 450 MG: 450 TABLET ORAL at 08:30

## 2024-10-01 RX ADMIN — METHOCARBAMOL 1000 MG: 500 TABLET ORAL at 04:12

## 2024-10-01 RX ADMIN — METHOCARBAMOL 1000 MG: 500 TABLET ORAL at 10:11

## 2024-10-01 RX ADMIN — THIAMINE HCL TAB 100 MG 100 MG: 100 TAB at 08:31

## 2024-10-01 RX ADMIN — HYDROXYZINE HYDROCHLORIDE 25 MG: 25 TABLET, FILM COATED ORAL at 21:05

## 2024-10-01 ASSESSMENT — ACTIVITIES OF DAILY LIVING (ADL)
ADLS_ACUITY_SCORE: 34
ADLS_ACUITY_SCORE: 33
ADLS_ACUITY_SCORE: 34
ADLS_ACUITY_SCORE: 34
ADLS_ACUITY_SCORE: 33
ADLS_ACUITY_SCORE: 34
ADLS_ACUITY_SCORE: 33
ADLS_ACUITY_SCORE: 34
ADLS_ACUITY_SCORE: 33
ADLS_ACUITY_SCORE: 34
ADLS_ACUITY_SCORE: 33

## 2024-10-01 NOTE — PROGRESS NOTES
Final positive donor sputum culture results have been uploaded to [facility]orNet.  Donor ID [id number].  Dr. Sandoval notified of results.

## 2024-10-01 NOTE — PROGRESS NOTES
Calorie Count  Intake recorded for: 9/30  Total Kcals: 1545 Total Protein: 97g  Kcals from Hospital Food: 1545  Protein: 97g  Kcals from Outside Food (average):0 Protein: 0g  # Meals Ordered from Kitchen: 3 meals   # Meals Recorded: 2 meals (First - 100% cream of wheat w/ brown sugar, 2 servings mandarin oranges, orange juice, 2 applesauces)       (Second - 100% penne w/ meat sauce, green beans, dinner roll w/ butter, pudding, mandarin oranges, lactose free milk)   # Supplements Recorded: 100% 2 Ensure Max Protein

## 2024-10-01 NOTE — PROGRESS NOTES
[facility]  Transplant Nephrology Progress Note  Date of Admission:  9/21/2024  Today's Date: 10/01/2024    Recommendations:   - Give 25 grams 25% albumin twice today.  - Would hold off on diuretics today.  - Daily standing wts.      Assessment & Plan   # DENI: Increased serum creatinine.  No indication for dialysis.   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  Transaminases Trend down.  Followed by Transplant Surgery.    # Immunosuppression: Tacrolimus immediate release (goal 6-8), Mycophenolate mofetil (dose 750 mg every 12 hours), and Methylprednisolone (dose taper)   - Induction with Recent Transplant:  Per Liver Tx Protocol   - Continue with intensive monitoring of immunosuppression for efficacy and toxicity.   - Goal tacrolimus level lower due to DENI.   - Changes: Not at this time; Management per Transplant Surgery.    # Infection Prophylaxis:   - PJP: Sulfa/TMP (Bactrim)  - CMV: Valganciclovir (Valcyte)  - Thrush: Micafungin (Mycamine)  - Fungal: Micafungin (Mycamine)      - CMV IgG Ab High Risk Discordance (D+/R-): No  CMV Serostatus: Positive  - EBV IgG Ab High Risk Discordance (D+/R-): No  EBV Serostatus: Positive    # Blood Pressure: Controlled;  Goal BP: < 140/90 (Hospitalization goal)   - Volume status: Total body volume up, but intravascularly euvolemic     - Changes: Not at this time    # Elevated Blood Glucose: Glucose generally running ~ 150-200s   - Presently on insulin gtt.    # Anemia in Chronic Disease/Surgery: Hgb: Stable, low      TRACE: No   - Iron studies: Low iron saturation, but high ferritin    # Thrombocytopenia: Stable, moderately low platelet level.   No overt signs of bleeding.    # Mineral Bone Disorder:   - Vitamin D; level: Low          On supplement: No  - Calcium; level: Low normal         On supplement: No (trend for now)  - Phosphorus; level: High         On supplement: No (trend for now)    # Electrolytes:   - Potassium; level: Normal        On supplement: No  - Magnesium; level: Normal        On supplement: No  - Bicarbonate; level: Normal        On supplement: No  - Sodium; level: Low    # Hyponatremia: Stable, low serum sodium.  Likely secondary to decreased renal function.    # Other Significant PMH:   - Alcohol Abuse: Presently sober.   - Depression/Anxiety: Presently stable.     # Transplant History:  Etiology of Organ Failure: Alcohol-related liver disease  Tx: Liver Tx  Transplant: 9/28/2024 (Liver)  Significant changes in immunosuppression: Slightly lower tacrolimus level goal due to DENI.  Significant transplant-related complications: DENI    Recommendations were communicated to the primary team via this note.    Seen and discussed with .    Ari Sams APRN CNP  Transplant Nephrology  Contact information via Vocera Web Console or via [facility] Paging/Directory        Physician Attestation     I saw and evaluated Jag Smith as part of a shared APRN/PA visit.     I personally reviewed the vital signs, medications, labs, and imaging.    I personally provided a substantive portion of care for this patient and I approve the care plan as written by the AZALIA.  I was involved with Medical Decision Making including: Please see A&P for additional details of medical decision making.  MANAGEMENT DISCUSSED with the following over the past 24 hours: brisk diuresis with bumex+albumin and slight downtrend in Cr to 2. Recommend iv albumin today and hold off on diuresis today.     Teresa Ibarra MD  Date of Service (when I saw the patient): 10/01/24    Interval History  Day 3 s/p liver transplant.    Mr. Smith's creatinine is 2.05 (10/01  0437); Decreased from 2.27 yesterday.  Estimated Creatinine Clearance: 62 mL/min (A) (based on SCr of 2.05 mg/dL (H)).   I/O last 3 completed shifts:  In: 1705 [P.O.:1455; I.V.:200]  Out: 7070 [Urine:6450; Drains:620]   Other significant labs/tests/vitals: SBP 120s - 140s overnight. Afebrile  No acute events overnight.  No chest pain or shortness of breath.  Mild leg swelling.  Bowel movements are loose.      Review of Systems   4 point ROS was obtained and negative except as noted in the Interval History.    MEDICATIONS:  Current Facility-Administered Medications   Medication Dose Route Frequency Provider Last Rate Last Admin    aspirin (ASA) chewable tablet 81 mg  81 mg Oral or Feeding Tube Daily Maddi Culver APRN CNP        bisacodyl (DULCOLAX) suppository 10 mg  10 mg Rectal Once Maddi Culver APRN CNP        folic acid (FOLVITE) tablet 1 mg  1 mg Oral Daily José Miguel Aviles MD   1 mg at 09/30/24 0810    Lidocaine (LIDOCARE) 4 % Patch 2 patch  2 patch Transdermal Q24h Ashley Loyd MD   2 patch at 09/30/24 2004    methocarbamol (ROBAXIN) tablet 1,000 mg  1,000 mg Oral Q6H Paresh Lara MD   1,000 mg at 10/01/24 0412    methylPREDNISolone Na Suc (solu-MEDROL) injection 50 mg  50 mg Intravenous Once Dominick Tejada MD        Followed by    [START ON 10/2/2024] predniSONE (DELTASONE) tablet 25 mg  25 mg Oral Once Dominick Tejada MD        Followed by    [START ON 10/3/2024] predniSONE (DELTASONE) tablet 10 mg  10 mg Oral Once Dominick Tejada MD        micafungin (MYCAMINE) 100 mg in sodium chloride 0.9 % 100 mL intermittent infusion  100 mg Intravenous Q24H Bon Pino PA-C 100 mL/hr at 09/30/24 1356 100 mg at 09/30/24 1356    mycophenolate (GENERIC EQUIVALENT) capsule 750 mg  750 mg Oral BID IS Dominick Tejada MD   750 mg at 09/30/24 1847    Or    mycophenolate (CELLCEPT BRAND) suspension 750 mg  750 mg Oral or NG Tube BID IS Dominick Tejada MD   750 mg at 09/28/24  1726    pantoprazole (PROTONIX) 2 mg/mL suspension 40 mg  40 mg Oral or Feeding Tube Daily Fernando Colon MD        polyethylene glycol (MIRALAX) Packet 17 g  17 g Oral Daily Palomo MaddiSIL Jurado CNP   17 g at 24 0931    [START ON 10/4/2024] predniSONE (DELTASONE) tablet 5 mg  5 mg Oral Daily Abi Quiros PA-C        sennosides (SENOKOT) tablet 2 tablet  2 tablet Oral BID Maddi Culver APRN CNP   2 tablet at 24 0810    sodium chloride (PF) 0.9% PF flush 3 mL  3 mL Intravenous Q8H Dominick Tejada MD   3 mL at 24    sodium chloride (PF) 0.9% PF flush 3 mL  3 mL Intracatheter Q8H José Miguel Aviles MD   3 mL at 24 1540    sulfamethoxazole-trimethoprim (BACTRIM) 400-80 MG per tablet 1 tablet  1 tablet Oral Daily Abi Quiros PA-C   1 tablet at 24 0810    tacrolimus (GENERIC EQUIVALENT) capsule 2 mg  2 mg Oral BID IS Dominick Tejada MD   2 mg at 24 1847    Or    tacrolimus (GENERIC) suspension 2 mg  2 mg Oral or NG Tube BID IS Dominick Tejada MD   2 mg at 24 1725    thiamine (B-1) tablet 100 mg  100 mg Oral Daily José Miguel Aviles MD   100 mg at 24 0810    ursodiol (ACTIGALL) capsule 300 mg  300 mg Oral BID Hardy Flannery MD   300 mg at 24    valGANciclovir (VALCYTE) tablet 450 mg  450 mg Oral Daily Dominick Tejada MD   450 mg at 24 0810    Or    valGANciclovir (VALCYTE) solution 450 mg  450 mg Oral or NG Tube Daily Dominick Tejada MD   450 mg at 24 1526     Current Facility-Administered Medications   Medication Dose Route Frequency Provider Last Rate Last Admin    dextrose 10% infusion   Intravenous Continuous PRN Her-Templeton, Bon, PA-C        insulin regular (MYXREDLIN) 1 unit/mL infusion  0-24 Units/hr Intravenous Continuous Her-Bon Templeton PA-C 1.5 mL/hr at 10/01/24 0624 1.5 Units/hr at 10/01/24 06       Physical Exam   Temp  Av.2  F (36.8  C)  Min: 96.4  F (35.8  C)  Max: 99.3  F (37.4   C)  Arterial Line BP  Min: 99/55  Max: 143/73  Arterial Line MAP (mmHg)  Av.8 mmHg  Min: 70 mmHg  Max: 287 mmHg      Pulse  Av.2  Min: 74  Max: 110 Resp  Av.6  Min: 10  Max: 24  FiO2 (%)  Av %  Min: 40 %  Max: 60 %  SpO2  Av.9 %  Min: 92 %  Max: 100 %    CVP (mmHg): 16 mmHgBP 120/84 (BP Location: Left arm)   Pulse 73   Temp 98  F (36.7  C) (Oral)   Resp 18   Ht 1.829 m (6')   Wt 105.6 kg (232 lb 12.9 oz)   SpO2 95%   BMI 31.57 kg/m     Date 24 07 - 24 0659   Shift 9730-1283 7408-7644 7218-1197 24 Hour Total   INTAKE   P.O. 240   240   I.V. 375.2   375.2   Shift Total(mL/kg) 615.2(5.83)   615.2(5.83)   OUTPUT   Urine 40   40   Drains 280   280   Shift Total(mL/kg) 320(3.03)   320(3.03)   Weight (kg) 105.6 105.6 105.6 105.6      Admit Weight: 102.3 kg (225 lb 8 oz)     GENERAL APPEARANCE: alert and no distress  RESP: lungs clear to auscultation - no rales, rhonchi or wheezes  CV: regular rhythm, normal rate, no rub, no murmur  EDEMA: mild LE and dependent edema bilaterally  ABDOMEN: soft, nondistended, appropriately tender  MS: extremities normal - no gross deformities noted, no evidence of inflammation in joints, no muscle tenderness  SKIN: no rash, jaundiced  DIALYSIS ACCESS:  None    Data   All labs reviewed by me.  CMP  Recent Labs   Lab 10/01/24  0552 10/01/24  0437 10/01/24  0408 10/01/24  0150 24  0537 24  0529 24  2159 24  2004 24  1212 24  1139 24  0505 24  0353 24  1419 24  1305   NA  --  131*  --   --   --  129*  --  131*  --   --   --  139  139   < > 145   POTASSIUM  --  3.8  --   --   --  4.2  --  4.7  --   --   --  4.1  4.1   < > 3.8   CHLORIDE  --  94*  --   --   --  93*  --  95*  --   --   --  100  100   < > 103   CO2  --  24  --   --   --  24  --  21*  --   --   --  25  25   < > 23   ANIONGAP  --  13  --   --   --  12  --  15  --   --   --  14  14   < > 19*   * 108* 109* 110*   < >  113*   < > 167*   < >  --    < > 161*  165*  165*   < > 200*   BUN  --  60.3*  --   --   --  47.2*  --  40.4*  --   --   --  27.4*  27.4*   < > 27.0*   CR  --  2.05*  --   --   --  2.27*  --  2.02*  --   --   --  1.56*  1.56*   < > 1.90*   GFRESTIMATED  --  42*  --   --   --  37*  --  43*  --   --   --  58*  58*   < > 46*   AMAIRANI  --  8.3*  --   --   --  8.6*  --  8.4*  --   --   --  9.2  9.2   < > 10.6*   MAG  --  1.9  --   --   --  2.2  --  2.2  --  2.4*  --  1.4*   < > 1.7   PHOS  --  4.9*  --   --   --  6.2*  --  5.8*  --   --   --  5.1*   < > 5.1*   PROTTOTAL  --  5.2*  --   --   --  5.3*  --   --   --   --   --  5.0*  --  4.8*   ALBUMIN  --  3.2*  --   --   --  3.1*  --   --   --   --   --  3.2*  --  3.1*   BILITOTAL  --  6.4*  --   --   --  7.8*  --   --   --   --   --  9.9*  --  14.3*   ALKPHOS  --  79  --   --   --  62  --   --   --   --   --  55  --  64   AST  --  62*  --   --   --  118*  --   --   --   --   --  261*  --  678*   ALT  --  194*  --   --   --  224*  --   --   --   --   --  249*  --  418*    < > = values in this interval not displayed.     CBC  Recent Labs   Lab 10/01/24  0437 09/30/24  0529 09/29/24  1738 09/29/24  1139   HGB 8.9* 8.9* 9.0* 8.7*   WBC 9.0 10.2 11.1* 9.2   RBC 2.90* 2.84* 2.96* 2.86*   HCT 26.2* 25.7* 26.7* 25.6*   MCV 90 91 90 90   MCH 30.7 31.3 30.4 30.4   MCHC 34.0 34.6 33.7 34.0   RDW 18.6* 19.4* 19.7* 19.6*   PLT 79* 70* 78* 71*     INR  Recent Labs   Lab 10/01/24  0437 09/30/24  0529 09/29/24  1738 09/29/24  1139 09/29/24  0353   INR 1.03 1.14  --  1.14 1.21*   PTT  --  27 27 28 30     ABG  Recent Labs   Lab 09/28/24  1308 09/28/24  1215 09/28/24  1143 09/28/24  1047   PH 7.45 7.44 7.45 7.45   PCO2 38 39 36 34*   PO2 139* 79* 110* 92   HCO3 26 26 25 24   O2PER 60 50.0 50.0 50.0      Urine Studies  Recent Labs   Lab Test 09/27/24  2113 09/22/24  0417   COLOR Dark Yellow* Dark Yellow*   APPEARANCE Slightly Cloudy* Slightly Cloudy*   URINEGLC Negative Negative    URINEBILI Large* Large*   URINEKETONE Negative Negative   SG 1.011 1.027   UBLD Negative Small*   URINEPH 6.0 6.0   PROTEIN Negative 20*   NITRITE Negative Negative   LEUKEST Negative Negative   RBCU 1 11*   WBCU 5 5     No lab results found.  PTH  No lab results found.  Iron Studies  Recent Labs   Lab Test 09/25/24  0653 09/23/24  0659   IRON  --  58*   ELPIDIO 2,577*  --        IMAGING:  All imaging studies reviewed by me.

## 2024-10-01 NOTE — PROGRESS NOTES
Immunosuppression Management Note:    Jag Smith is a 39 year old male who is seen today  for immunosuppression management     I, Russell Waters MD, I have examined the patient with our AZALIA/Fellow as part of a shared visit.   I participated in the rounds,  discussed and agree with the note and findings and  reviewed today's vital signs, medications, labs and imaging as noted in this note.  I  reviewed the  immunosuppression medications.  I personally provided a substantive portion of the care of this patient. I personally performed the immunosuppressive management of this patient, reviewed the overall  immunosuppression including drug levels, allograft function and provided the recommendations to adjust the dose to provide optimal levels to prevent rejection of the allograft and prevent toxicity to the organs. This was complex care due to the fresh allograft.   Time spent: evaluating patient, examining patient, discussion of plan, counseling and documentation: >35 min   I spoke to the patient/family and explained below clinical details and answered all the questions    Transplant Surgery  Inpatient Daily Progress Note  10/01/2024    Assessment & Plan: Jag Smith is a 39 year old male with recently diagnosed decompensated alcoholic liver cirrhosis transferred from [facility] on 9/21/24 for expedited liver transplant evaluation due to decompensated alcoholic liver cirrhosis. MELD 42. He is now s/p DDLT on 9/28/24 with Dr. Colon.     s/p DBD DDLT 9/28/24: POD #3. LFTs trending down. LA WNL. Post-op US with patent doppler.    - Continue SHENA x 3.   - Start ASA and ursodiol.    Immunosuppressed status secondary to medications:   Induction: Steroid taper per protocol.   Maintenance:   -  mg BID   - Tacrolimus 3 mg BID. Goal level 8-12.    - Prednisone 5 mg daily x3 months after taper    Neurology:  Acute post-op pain: Fair control on current regimen:    - Oxycodone 5-10 mg q4H PRN   -  Robaxin 1000 mg Q6H   - Lidocaine patches   - Atarax Q6H PRN    Hematology:   Acute blood loss anemia: Hgb 8-9, stable.  Thrombocytopenia: Plt 70's, monitor.     Cardiorespiratory: No issues.     GI/Nutrition:   Severe malnutrition in the context of acute illness: Nutrition consulted. Continue Regular diet with supplements. Calorie counts demonstrating good PO intake.   Bowel regimen: Senna, PEG. Suppository x1.     Endocrine:   Steroid induced hyperglycemia: A1c 5.4%. Continue insulin gtt.    Fluid/Electrolytes:   Hyponatremia: Na 131 (from 129), monitor.   DENI: Nephrology consulted. 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 Cr 2 > 2.3 > 2.1, improving.    - Give albumin x 2 doses today.     : Will discuss mckeon removal with Nephrology    Infectious disease: No acute issues.     Skin:  Medical device related mucosal pressure injury of the lips, hospital acquired: WOC consulted.    Prophylaxis: DVT, fall, GI, surgical ppx (Zosyn), fungal (Micafungin > fluconazole per protocol), viral (vanganciclovir), pneumocystis (Bactrim)    Disposition: 7A    AZALIA/Fellow/Resident Provider: Gretchen Patino NP #[id number]    Faculty: Russell Waters MD     __________________________________________________________________    Interval History:   Overnight events: Pain overnight, received PO Dilaudid x1. Patient continues to reports pain on morning rounds. No BM since surgery.      ROS:   A 10-point review of systems was negative except as noted above.    Curent Meds:  Current Facility-Administered Medications   Medication Dose Route Frequency Provider Last Rate Last Admin    albumin human 25 % injection 25 g  25 g Intravenous BID Gretchen Patino NP   25 g at 10/01/24 1146    aspirin (ASA) chewable tablet 81 mg  81 mg Oral or Feeding Tube Daily Maddi Culver APRN CNP   81 mg at 10/01/24 0830    bisacodyl (DULCOLAX) suppository 10 mg  10 mg Rectal Once Maddi Culver APRN CNP        folic acid (FOLVITE) tablet 1 mg   1 mg Oral Daily José Miguel Aviles MD   1 mg at 10/01/24 0830    Lidocaine (LIDOCARE) 4 % Patch 2 patch  2 patch Transdermal Q24h Ashley Loyd MD   2 patch at 09/30/24 2004    methocarbamol (ROBAXIN) tablet 1,000 mg  1,000 mg Oral Q6H Paresh Lara MD   1,000 mg at 10/01/24 1011    micafungin (MYCAMINE) 100 mg in sodium chloride 0.9 % 100 mL intermittent infusion  100 mg Intravenous Q24H Bon Pino PA-C 100 mL/hr at 09/30/24 1356 100 mg at 09/30/24 1356    mycophenolate (GENERIC EQUIVALENT) capsule 750 mg  750 mg Oral BID IS Dominick Tejada MD   750 mg at 10/01/24 0831    Or    mycophenolate (CELLCEPT BRAND) suspension 750 mg  750 mg Oral or NG Tube BID IS Dominick Tejada MD   750 mg at 09/28/24 1726    pantoprazole (PROTONIX) 2 mg/mL suspension 40 mg  40 mg Oral or Feeding Tube Daily Fernando Colon MD   40 mg at 10/01/24 0842    polyethylene glycol (MIRALAX) Packet 17 g  17 g Oral Daily Maddi Culver APRN CNP   17 g at 10/01/24 0830    [START ON 10/2/2024] predniSONE (DELTASONE) tablet 25 mg  25 mg Oral Once Dominick Tejada MD        Followed by    [START ON 10/3/2024] predniSONE (DELTASONE) tablet 10 mg  10 mg Oral Once Dominick Tejada MD        [START ON 10/4/2024] predniSONE (DELTASONE) tablet 5 mg  5 mg Oral Daily Abi Quiros PA-C        sennosides (SENOKOT) tablet 2 tablet  2 tablet Oral BID Maddi Culver APRN CNP   2 tablet at 10/01/24 0830    sodium chloride (PF) 0.9% PF flush 3 mL  3 mL Intravenous Q8H Dominick Tejada MD   3 mL at 09/30/24 2004    sodium chloride (PF) 0.9% PF flush 3 mL  3 mL Intracatheter Q8H José Miguel Aviles MD   3 mL at 10/01/24 0833    sulfamethoxazole-trimethoprim (BACTRIM) 400-80 MG per tablet 1 tablet  1 tablet Oral Daily Abi Quiros PA-C   1 tablet at 10/01/24 0830    tacrolimus (GENERIC EQUIVALENT) capsule 2 mg  2 mg Oral BID IS Dominick Tejada MD   2 mg at 10/01/24 0830    Or    tacrolimus (GENERIC) suspension  2 mg  2 mg Oral or NG Tube BID IS Dominick Tejada MD   2 mg at 09/28/24 1725    thiamine (B-1) tablet 100 mg  100 mg Oral Daily José Miguel Aviles MD   100 mg at 10/01/24 0831    ursodiol (ACTIGALL) capsule 300 mg  300 mg Oral BID Hardy Flannery MD   300 mg at 10/01/24 0831    valGANciclovir (VALCYTE) tablet 450 mg  450 mg Oral Daily Dominick Tejada MD   450 mg at 10/01/24 0830    Or    valGANciclovir (VALCYTE) solution 450 mg  450 mg Oral or NG Tube Daily Dominick Tejada MD   450 mg at 09/28/24 1526       Physical Exam:     Admit Weight: 102.3 kg (225 lb 8 oz)    Current Vitals:   /89 (BP Location: Left arm)   Pulse 79   Temp 97.3  F (36.3  C) (Oral)   Resp 16   Ht 1.829 m (6')   Wt 105.6 kg (232 lb 12.9 oz)   SpO2 99%   BMI 31.57 kg/m      Vital sign ranges:    Temp:  [97.3  F (36.3  C)-98.7  F (37.1  C)] 97.3  F (36.3  C)  Pulse:  [69-85] 79  Resp:  [16-18] 16  BP: (120-146)/(84-92) 134/89  SpO2:  [95 %-99 %] 99 %    General Appearance: in no apparent distress.   Skin: normal, warm, jaundice  Heart: Perfused  Lungs: Unlabored on RA  Abdomen:  Soft, non distended.  The wound is covered with surgical dressing. SHENA x 3 serosanguinous   :  mckeon, brittany  Extremities: no edema  Neurologic: A&Ox4      Frailty Scores           No data to display                Data:   CMP  Recent Labs   Lab 10/01/24  1104 10/01/24  1003 10/01/24  0552 10/01/24  0437 09/30/24  0537 09/30/24  0529 09/29/24  0505 09/29/24  0353 09/28/24  1419 09/28/24  1308 09/27/24  1719 09/27/24  0646   NA  --   --   --  131*  --  129*   < > 139  139   < >  --    < > 131*   POTASSIUM  --   --   --  3.8  --  4.2   < > 4.1  4.1   < >  --    < > 4.5   CHLORIDE  --   --   --  94*  --  93*   < > 100  100   < >  --    < > 104   CO2  --   --   --  24  --  24   < > 25  25   < >  --    < > 13*   * 145*   < > 108*   < > 113*   < > 161*  165*  165*   < >  --    < > 82   BUN  --   --   --  60.3*  --  47.2*   < > 27.4*  27.4*    < >  --    < > 34.1*   CR  --   --   --  2.05*  --  2.27*   < > 1.56*  1.56*   < >  --    < > 2.27*   GFRESTIMATED  --   --   --  42*  --  37*   < > 58*  58*   < >  --    < > 37*   AMAIRANI  --   --   --  8.3*  --  8.6*   < > 9.2  9.2   < >  --    < > 8.9   ICAW  --   --   --   --   --   --   --  4.8  --  5.4*   < >  --    MAG  --   --   --  1.9  --  2.2   < > 1.4*   < >  --    < > 1.5*   PHOS  --   --   --  4.9*  --  6.2*   < > 5.1*   < >  --    < > 3.6   AMYLASE  --   --   --   --   --   --   --  26*  --   --   --  46   LIPASE  --   --   --   --   --   --   --  17  --   --   --   --    ALBUMIN  --   --   --  3.2*  --  3.1*  --  3.2*  --   --    < > 3.3*   BILITOTAL  --   --   --  6.4*  --  7.8*  --  9.9*  --   --    < > 38.2*   ALKPHOS  --   --   --  79  --  62  --  55  --   --    < > 85   AST  --   --   --  62*  --  118*  --  261*  --   --    < > 129*   ALT  --   --   --  194*  --  224*  --  249*  --   --    < > 45    < > = values in this interval not displayed.     CBC  Recent Labs   Lab 10/01/24  0437 09/30/24  0529 09/28/24  1215 09/28/24  1207   HGB 8.9* 8.9*   < >  --    WBC 9.0 10.2   < >  --    PLT 79* 70*   < > 59*   A1C  --   --   --  5.4    < > = values in this interval not displayed.

## 2024-10-01 NOTE — OP NOTE
Transplant Center  Operative Note     Procedure date:  9/28/24    Preoperative diagnosis:  End Stage Liver Disease due to Laennec's    Postoperative diagnosis:  Same,     Procedure:  1. Donation after Brain Death Piggyback liver transplant   2. End-to-end Choledochocholedochostomy        Surgeon  Surgeons and Role:     * Fernando Colon MD - Primary     * Dominick Tejada MD - Fellow - Assisting     * Jericho Orr MD - Fellow - Assisting    Co-Surgeon:  Fernando Colon    Fellow/Assistant:  Dr Luis Daniel Tejada served as first assistant as there was no qualified resident available.  Dr Tejada assisted with the hepatectomy and performed the vascular and biliary anastomoses    Anesthesia:  General    Specimen:  Liver, gallbladder    Drains:  3    Urine output:  300 mls    Estimated blood loss:  6,000    Fluids administered:   See below     Intraoperative Events:     39M hx depression/ anxiety, HTN, EtOH cirrhosis, SBP, encephalopathy.  MELD 42.  Has some LV dilation at baseline  DDLT, piggyback, 2L ascites  Bradycardia with reperfusion, but able to come off epinephrine shortly thereafter  EBL 6L  9 PRBC, 15FFP, 3 plts, 4 cryo, fibrega, 1700 cellsaver, 1 platelets  PV flow 2700  HA flow 100 ml, 350 ml with temporary portal vein occlusion  Duct to duct, no stent  3 drains    Complications: None.    Findings: as above  None.     Indication: Jag Smith with a history of End Stage Liver Disease due to Laennec's who presents for Donation after Brain Death Whole Liver liver transplant. A suitable donor offer has become available. After discussing the risks and benefits of proceeding, the patient agreed to proceed with surgery and provided informed consent.    Final ABO/Crossmatch verification: After the donor organ arrived to the operating room and prior to anastomosis, I participated in the transplant pre-verification upon organ receipt timeout by visually verifying the donor ID, organ and laterality, donor blood type,  recipient unique identifier, recipient blood type, and that the donor and recipient are blood type compatible.    Donor Organ Information:   Donor UNOS ID:  [id number]    Donor ABO:  O    Donor arterial clamp on:  9/28/2024  4:18 AM    Preservation fluid:  UW      Vessels with organ:  Yes    Donor organ arrival to recipient room:  9/28/2024  5:39 AM    Total ischemic time:  310    Cold ischemic time:  310    Warm ischemic time:  45    Ex-vivo:  No    Time placed on ex-vivo perfusion:  na    Total time on ex-vivo perfusion:  na     Back Table Preparation:   Procedure:  Bench preparation of the liver allograft for transplantation    Preoperative diagnosis:  End Stage Liver Disease due to Laennec's    Postoperative diagnosis:  Same,     Surgeon:  Surgeons and Role:     * Fernando Colon MD - Primary     * Dominick Tejada MD - Fellow - Assisting     * Jericho Orr MD - Fellow - Assisting    Faculty Co-Surgeon:  Fernando Colon    Fellow/Assistant:  Dr Luis Daniel Tejada served as first assistant as there was no qualified resident available.  Dr Tejada performed the back table preparation of the donor liver under my direct supervision.    Anesthesia:  None    Graft biopsy:  No    Macroscopic steatosis:  na    Back table reconstruction:  na    Intimal flap repair:  na    # of hepatic arteries:  1    # of portal veins:  1    Accessory arteries:  na    # of hepatic veins:  3    # of bile ducts:  1    Graft weight:  na     Findings:  Liver laceration: No  Overall quality of liver: good    Back Table Procedure: The liver allograft was received and inspected and the aforementioned findings were noted. It was flushed with UW. The donor liver was placed in fresh ice-cold preservation solution. The inferior vena cava was identified. Two stay sutures were placed on the supra-hepatic portion of the cava. Two stay sutures were placed on the infra-hepatic portion of the cava. The fibro-fatty tissue and adrenal gland was cleared of inferior vena  cava. The phrenic vein was ligated. The adrenal vein was ligated. The IVC was tested for leaks by using a bulb syringe. All the leaks identified were suture ligated. The portal vein was identified. All the fibro-fatty area or tissue around the portal vein was removed and the portal vein was dissected up to its bifurcation. An 8-[place] Island[place] cannula was placed in the portal vein and fixed with a stitch. The portal vein was tested for leaks. All the leaks identified were suture ligated. The cannula was left in place to be used for flushing the liver at the time of implantation. The hepatic artery anatomy was identified. The celiac axis  was traced all the way from the aortic patch to the level of the gastro-duodenal artery. Dissection was stopped at the level of the gastro-duodenal artery. All the leaks in the hepatic artery tributaries were suture ligated. The bile duct was inspected and flushed. No reconstruction was required. The liver was placed back in ice-cold preservation solution until ready for transplantation.     Findings:     39M hx depression/ anxiety, HTN, EtOH cirrhosis, SBP, encephalopathy.  MELD 42.  Has some LV dilation at baseline  DDLT, piggyback, 2L ascites  Bradycardia with reperfusion, but able to come off epinephrine shortly thereafter  EBL 6L  9 PRBC, 15FFP, 3 plts, 4 cryo, fibrega, 1700 cellsaver, 1 platelets  PV flow 2700  HA flow 100 ml, 350 ml after augmentation  Duct to duct, no stent  3 drains    Operative Procedure:   Arterial anastomosis start:  9/28/2024  8:43 AM    Recipient arterial unclamp:  9/28/2024  9:50 AM    Extra vessels used:   na    Extra vessels banked:  Yes    Previous upper abd surgery?  No    Previous cholecystectomy?  No    Portal vein:  Thrombus? No   Specify:    Patent? Yes-      On portal bypass?  No    Arterial flow:  Sufficient? Yes-  see above   Specify:     Bile duct anastomosis:  To bowel? No   Specify:    To duct? Yes-  duct to duct no stent   Specify:        Jag  Luis was brought to the operating room, placed in a supine position, and a time out was performed. Sequential compression devices were placed on both lower extremities and general endotracheal anesthesia was induced. The patient was given IV antibiotics, and  Solumedrol. A Lima catheter was placed. A central line was placed by Anesthesia service. The abdomen was then shaved, prepped, and draped in the usual sterile fashion. The backtable preparation occurred prior to implantation.     The abdomen was opened through a Vertical Extension (Citlaly) incision. The falciform was taken down. We placed the retractors. The abdomen was examined. The left lateral segment was mobilized off the diaphragm and the lesser omentum opened. The right lobe was mobilized from the inferior peritoneal reflections.      Retractors were set up. Exposure of the wendy hepatis was made. The cystic duct was identified, ligated, and divided. The cystic artery was identified, ligated, and divided. The right lateral side of the wendy hepatis was opened.  Proper hepatic artery and right and small left hepatic artery were identified. The remaining portion of the ewndy hepatis was opened. No significant arteries were found to the right of the bile duct. The bile duct was then divided near the bifurcation. It was elected to devascularize the liver to more easily remove it. The right hepatic artery was ligated. The portal vein was clamped at the hilum and divided with a stapler.     At this juncture, we mobilized the remaining portion of the right lobe to the midline. the small accessory hepatic veins were isolated, ligated and divided as they entered into the inferior vena cava. The remaining portion of the liver was taken off the anterior surface of the inferior vena cava. Eventually, the liver was only suspended on the right middle and left hepatic veins. A [place] clamp was placed on the confluence of the 3 hepatic veins and the liver was  resected off. The specimen was handed off. Hemostasis was obtained.      At this juncture, the liver had been brought back, it had been backtabled and the liver was found to be suitable. The suprahepatic cava of the donor was anastomosed in end-to-side fashion on to the cava out with a running 4-0 Prolene, the liver was then flushed of its preservation solution with cold albumin 1000 cc. We then stapled the donor infrahepatic cava. The portal vein of the donor and recipient was anastomosed in an end-to-end fashion with a running 6-0 Prolene with a growth knot the diameter of the recipient vein. Clamps were released. The patient had some bradycardia on reperfusion. that was treated by anasthesia.      During the anastomosis, any coagulopathic bleeding was corrected by anesthesia. Next, we performed the arterial anastomosis. First we ligated the recipient GDA to create more mobility for the recipient hepatic artery. We anastomosed the donor proper hepatic artery to the recipient's proper hepatic artery with a 7-0 prolene. When hemostasis was secured, the bile ducts were trimmed. The donor gallbladder was removed. The 2 ducts were anastomosed in an end-to-end fashion using running 7-0 PDS.     We measured flows in the portal vein and the hepatic artery.    PV flow 2700  HA flow 100 ml, 350 ml with temporary portal vein occlusion suggesting intrinsic some intrinisic autoregulation    We were pleased. We then irrigated the abdomen. Hemostasis again verified as being adequate. 3 Carlos drains were placed in RUQ. The abdomen was irrigated again and then the fascia closed with a running looped PDS. The skin approximated with staples. All needle, sponge, and instrument counts were accurate. The patient  tolerated the procedure well without apparent complications and was transferred to the ICU in good condition    Physician Attestation   I was present for the entire procedure between opening and closing.    Fernando Colon,  MD  Date of Service (when I saw the patient): 9/28/2024

## 2024-10-01 NOTE — PLAN OF CARE
/86 (BP Location: Left arm)   Pulse 69   Temp 98  F (36.7  C) (Oral)   Resp 18   Ht 1.829 m (6')   Wt 105.6 kg (232 lb 12.9 oz)   SpO2 95%   BMI 31.57 kg/m      2300 -  0730  AVSS on RA. Regular diet, faheem counts. Ax2 w/ walker, gb. Lima in place, good UO. No BM overnight. Lymph wraps on. Incision CDI. 2 R PIV. 3 SHENA's- 2 R, 1 L- minimal output from 2/3. On insulin gtt Alg 3 Q2 hour checks -128. Scheduled robaxin, PRN oxycodone 10 mg x1, hydroxyzine x2, one time PO dilaudid 4mg - pain still 8-9/10.     Abdominal dressing removed, under R SHENA dressing- 3 small skin tears found, documented, and uploaded picture. Team notified. Clamshell incision stapled LACHO- removal of adhesive was very painful for patient- please limit adhesive use and only use paper tape if needed. At top of clamshell some small unopened scrapes were found.    Goal Outcome Evaluation:  Plan of Care Reviewed With: patient  Overall Patient Progress: no change  Outcome Evaluation: Pain not well managed despite several PRNs.

## 2024-10-01 NOTE — TELEPHONE ENCOUNTER
A pharmacist spent 50 minutes providing medication teaching with Jag Smith for discharge with a focus on new medications/dose changes.  His mother (Sania) was also present and asked questions. The discharge medication list was reviewed with the patient/family and the following points were discussed, as applicable: Name, description, purpose, dose/strength, duration of medications, special storage requirements, common side effects, food/medications to avoid, action to be taken if dose is missed, when to call MD, safe disposal of unused medications, and how to obtain refills.  The patient and family member (Sania) will be responsible for managing medications. Additionally, the following transplant related education was covered: Purpose of medication card, Medication videos, Timing of medications and day of lab draw considerations , Emesis, Prescription Insurance , and Discharge process for receiving meds   Patient will  transplant supplies including 7 day pill organizer, thermometer, and BP monitor at the discharge pharmacy along with medications.  Patient chooses to receive medications from [facility] pharmacy.   Clinical Pharmacy Consult:                                                      Transplant Specific:   Date of Transplant: 9/28/2024  Type of Transplant: liver  First Transplant: yes  History of rejection: no    Immunosuppression Regimen   TAC 2 mg qAM & 2 mg qPM, Prednisone taper, and  mg qAM & 750 mg qPM  Patient specific goal: 8 to 12  Most recent level: 2.7, date 10/1/2024  Immunosuppressant Levels:  Subtherapeutic  Pt adherent to lab draws: yes  Scr:   Lab Results   Component Value Date    CR 2.05 10/01/2024     Side effects: no side effects    Prophylactic Medications  PJP Prophylactic: Bactrim SS daily  Scheduled Discontinue Date: 6 months Anticipated date TBD    Antifungal: Not needed thus far  Scheduled Discontinue Date: N/A Anticipated date N/A    CMV Prophylactic: Max dose  in Liver transplant is Valcyte 450mg once daily   Scheduled Discontinue Date: 3 months Anticipated date TBD    Acid Reducer: Protonix (pantoprazole)  Scheduled Reviewed Date:  TBD    Vascular Prophylactic: Aspirin 81 mg PO daily  Scheduled Discontinue Date:  TBD        Reminders:  Bring to first clinic appt: med box, med card, bp monitor, all medications being taken, and lab book.  2.   MTM pharmacist visit on first clinic appt and if ok, again in 3 to 4 months during follow up appt.  3.   Avoid Grapefruit and Grapefruit juice.   4.   Avoid herbal supplements. If wish to take other medications or supplements, call your coordinator.   5.   Keep lab appts.   6.   Can use apps on phone like [employer] to help manage medication lists and reminders.   7.   Make sure you are protecting your skin by wearing long sleeves and applying sunscreen to exposed skin, for any significant time in the sun.     Transplant Coordinator is Karena Villegas.      Everette Osuna [facility]

## 2024-10-01 NOTE — PROGRESS NOTES
Care Management Follow Up    Length of Stay (days): 10  Concerns to be Addressed: discharge planning    Patient plan of care discussed at interdisciplinary rounds: Yes  Anticipated Discharge Disposition: Patient will stay locally for 4-6 weeks. Per SW on 9/30: [facility]. Mother is currently staying at [facility].   Anticipated Discharge Services:  Home Care RN referral for labs placed  Anticipated Discharge DME: None  Patient/family educated on Medicare website which has current facility and service quality ratings: yes  Education Provided on the Discharge Plan: Yes  Patient/Family in Agreement with the Plan: yes  Referrals Placed by CM/SW: Homecare  Private pay costs discussed: Not applicable  Handoff Completed: No, handoff not indicated or clinically appropriate    Additional Information:  Concerns to be Addressed:discharge planning    Patient plan of care discussed at interdisciplinary rounds: Yes     Anticipated Discharge Disposition: Home  Anticipated Discharge Services:  Home Care RN referral for labs placed  Anticipated Discharge DME: None     Patient/family educated on Medicare website which has current facility and service quality ratings: yes  Education Provided on the Discharge Plan: Yes  Patient/Family in Agreement with the Plan: yes     Referrals Placed by CM/SW: Homecare  Private pay costs discussed: Not applicable     Discussed  Partnership in Safe Discharge Planning  document with patient/family: No      Handoff Completed: Internal handoff completed.      Additional Information:  Met with new Liver Transplant patient to review anticipated discharge plan: weekly transplant labs on M/Th, and option for home health RN services.    Pt will need to stay locally and would like referral for HH RN to agency that has staffing and can take their insurance. Pt is aware that if a HH RN is not available, they will need to go to  clinic for weekly labs.     Pt requested RNCC speak with Sania sarah by  phone to review above plans. Sania is agreeable. She is currently staying at [facility] near the hospital. SW helped with this arrangement and are supposed to help confirm where pt will stay after hospitalization.     HH RN referral sent via HUB.     Next Steps:   [] Follow up on HH RN referral  [] CTS handoff to OP TXP RNCC  [] external hand off - Jamison Iqbal, RN, [facility]  Inpatient Care Management - FLOAT

## 2024-10-01 NOTE — PROVIDER NOTIFICATION
Jag Smith, [id number]    FY Pt's urine output has been over 2,000 mL in the past four hours. Do you want to start fluids?    Oriana, RN  [phone]    No new orders placed at this time.

## 2024-10-01 NOTE — CONSULTS
[facility]  WO Nurse Inpatient Assessment     Consulted for: lip wound  10/1: abdomen skin tear    Patient History (according to provider note(s):    Jag Smith is a 39 year old male with PMHx of BETTY/MDD and HTN and recently diagnosed decompensated alcoholic liver cirrhosis transferred from  [facility] on 9/21/24 for expedited liver  transplant evaluation due to decompensated alcoholic liver cirrhosis.  Per EMR,  patient presented to OSH in June 2024 presented with  jaundice and was diagnosed with acute alcoholic hepatitis was started on Prednisolone. He was discharged with GI follow-up, the admitted in July for bilateral pneumonia. Readmitted again on 9/5/24 for abdominal distention.  During this stay from 9/5-9/21, he was treated for hyponatremia, HRS, SBP and anemia.  - Recently diagnosed in June 2024 with etiology related to alcohol,s/p steroid course. Admitted at OSH 9/5-9/21 for HRS, SBP from paracetntesos 9/6 (s/p treatment with ceftriaxone and Flagyl). Last paracentesis on 9/13 negative for SBP. Diagnostic and therapeutic para 9/25 with 2 L removed, negative for SBP. EGD 9/25/24 with banding x2. Now Admitted to SICU 9/28/24 s/p DDLT with Dr Colon.     lip laceration   - local wound cares   - [facility] consult      Assessment:      Areas visualized during today's visit: Focused: and lips    Pressure Injury Location: lips       Last photo: 10/1/2024  Wound type: Pressure Injury and Trauma     Pressure Injury Stage: Mucosal, hospital acquired      This is a Medical Device Related Pressure Injury (MDRPI) due to ETT  Wound history/plan of care:   x4 areas of mucosal pressure injury to upper and lower inner lips. Small blistered area that is deep maroon to lower right lip. Related to pressure likely from ETT tubing. Patient recently had liver transplant and parents in room feel this is was not present prior to surgery. On assessment 10/1, lips are  mostly healed and improved.     Wound base: 5 % Maroon and Yellow, 95 % Moist, mucosa     Palpation of the wound bed: normal      Drainage: none     Description of drainage: none     Measurements (length x width x depth, in cm) see media, less than 0.5 cm      Periwound skin: Intact      Color: normal and consistent with surrounding tissue      Temperature: normal   Odor: none  Pain: denies  and mild, tender  Pain intervention prior to dressing change: no significant pain present  and slow and gentle cares   Treatment goal: Heal , Infection control/prevention, Maintain (prevention of deterioration), and Protection  STATUS: healing and improving  Supplies ordered: gathered, supplies stored on unit, and discussed with patient     Wound location: right adomen      Last photo: 10/1/ per MD  Wound due to: Medical Adhesive Related Skin Injury (MARSI)  Wound history/plan of care: medical adhesive related skin injury to right abdomen near drain and lateral to drains. Abdomen is distended.  Family at bedside reports her son has fragile skin and is prone to tears from tape as this happened at OSH in recent past. Recommend to avoid excess tape directly on skin.  Wound base: 100 % Dermis,       Palpation of the wound bed: normal      Drainage: scant     Description of drainage: serous     Measurements (length x width x depth, in cm): 3.2  x 3.5  x  0.2 cm      Periwound skin: Intact and swollen      Color:  mottling      Temperature: normal   Odor: none  Pain: mild, tender  Pain interventions prior to dressing change: patient tolerated well and slow and gentle cares   Treatment goal: Heal , Infection control/prevention, Maintain (prevention of deterioration), Protection, Promote epidermal migration, and Simplify plan of care  STATUS: initial assessment  Supplies ordered: at bedside, supplies stored on unit, discussed with RN, and discussed with patient        Treatment Plan:     lips  BID and as needed  Perform oral cares per  unit protocols  Apply Mouth moisturizer or small amount of vaseline to lips     Abdomen: Daily and as needed  Cleanse with normal saline and gently pat dry  Apply vaseline guaze to cover open areas  Cover with ABD  DO NOT apply tape to secure, change ABD pad as needed    General body: AVOID tape directly on skin as much as possible. As needed wrap with a soft kerlex or davon and use tape on the dressing and not the skin. If needing to remove tape that is already applied, soak with normal saline to loosen adhesive and gently remove avoiding unnecessary trauma to the area    Orders: Reviewed, Written, and Updated    RECOMMEND PRIMARY TEAM ORDER: None, at this time  Education provided: importance of repositioning, plan of care, wound progress, Moisture management, Hygiene, and Off-loading pressure  Discussed plan of care with: Patient and Family  WOC nurse follow-up plan: weekly  Notify WOC if wound(s) deteriorate.  Nursing to notify the Provider(s) and re-consult the WOC Nurse if new skin concern.    DATA:     Current support surface: Standard  Standard gel mattress (Isoflex)  Containment of urine/stool: Incontinence Protocol and Incontinent pad in bed  BMI: Body mass index is 31.57 kg/m .   Active diet order: Orders Placed This Encounter      Regular Diet Adult     Output: I/O last 3 completed shifts:  In: 995 [P.O.:995]  Out: 7575 [Urine:6500; Drains:1075]     Labs:   Recent Labs   Lab 10/01/24  0437 09/28/24  1215 09/28/24  1207   ALBUMIN 3.2*   < >  --    HGB 8.9*   < >  --    INR 1.03   < > 1.86*   WBC 9.0   < >  --    A1C  --   --  5.4    < > = values in this interval not displayed.     Pressure injury risk assessment:   Sensory Perception: 3-->slightly limited  Moisture: 3-->occasionally moist  Activity: 3-->walks occasionally  Mobility: 3-->slightly limited  Nutrition: 3-->adequate  Friction and Shear: 2-->potential problem  Gary Score: 17    Irene Samuel RN, BSN, CWOCN   Pager no longer is use, please  contact through Franchesca Sorensen group: [facility] Nurse [place]  Dept. Office Number: [phone]

## 2024-10-01 NOTE — PLAN OF CARE
Goal Outcome Evaluation:      Plan of Care Reviewed With: patient, parent    Overall Patient Progress: improvingOverall Patient Progress: improving    Outcome Evaluation: increased urine output after bumex    BP (!) 146/89 (BP Location: Left arm)   Pulse 85   Temp 98  F (36.7  C) (Oral)   Resp 18   Ht 1.829 m (6')   Wt 105.6 kg (232 lb 12.9 oz)   SpO2 97%   BMI 31.57 kg/m       Shift: 8744-7990  Isolation Status: none  VS: slightly hypertensive on room air, afebrile  Neuro: Aox4  Behaviors: calm and cooperative  BG: insulin drip, alg #3, q1 hour checks  Labs: AM labs reviewed: Cre 2.27, hemoglobin 8.9  Respiratory: WDL  Cardiac: WDL  Pain/Nausea: 8/10 incisional pain, denies nausea (scheduled Robaxin given)  PRN: IV dilaudid x1, oxy 10 mg x1  Diet: regular, good appetite   IV Access: 2 PIV's  Infusion(s): insulin drip plus carrier  Lines/Drains: 3 Jps, mckeon with large amounts of urine  GI/: No BM, mckeon in place   Skin: generalized bruising, abdominal incision covered with post op dressing-CDI  Mobility: Assist 2 with walker and gait belt  Plan: Continue plan of care and notify team of any changes

## 2024-10-01 NOTE — PROVIDER NOTIFICATION
Jag Smith, 6044    Pt is reporting 8/10 incisional pain. I gave him oxy 10 mg and Robaxin over an hour ago with no relief. Dilaudid IV discontinued. Can we get a one time dose?    Thanks    Oriana RN  [phone]    Provider ok'ed an additional 5mg of oxy and placed an order for a scheduled dose of robaxin 1,000mg

## 2024-10-01 NOTE — PLAN OF CARE
Goal Outcome Evaluation:      Plan of Care Reviewed With: patient    Overall Patient Progress: no changeOverall Patient Progress: no change    Outcome Evaluation: pt's pain is not decreasing with PRN pain medications    /79 (BP Location: Left arm)   Pulse 76   Temp 98.3  F (36.8  C) (Oral)   Resp 16   Ht 1.829 m (6')   Wt 105.6 kg (232 lb 12.9 oz)   SpO2 97%   BMI 31.57 kg/m      Shift: 1407-5522  VS: VSS on RA, afebrile  Neuro: Aox4  Behaviors: cooperative  BG: q1  Respiratory: diminished sounds at bases   Cardiac: WDL  Pain/Nausea: reports severe abd pain, intermittent nausea   PRN: oxycodone, atarax  Diet: regular  IV Access: RPIVx2  Infusion(s): insulin drip Alg 3   Lines/Drains: RJPx2, LJP, mckeon  GI/: voiding via mckeon , no BM yet   Skin: bruising on abd  &forearms, skin tears on Right side of abd,clamshell incision stapled  Mobility: Assist x 1 -2  Plan: monitor and mange pain, encourage ambulation  , limit adhesive use

## 2024-10-02 ENCOUNTER — APPOINTMENT (OUTPATIENT)
Dept: PHYSICAL THERAPY | Facility: CLINIC | Age: 39
End: 2024-10-02
Attending: PEDIATRICS
Payer: MEDICAID

## 2024-10-02 ENCOUNTER — APPOINTMENT (OUTPATIENT)
Dept: OCCUPATIONAL THERAPY | Facility: CLINIC | Age: 39
End: 2024-10-02
Attending: PEDIATRICS
Payer: MEDICAID

## 2024-10-02 ENCOUNTER — TELEPHONE (OUTPATIENT)
Dept: TRANSPLANT | Facility: CLINIC | Age: 39
End: 2024-10-02
Payer: MEDICAID

## 2024-10-02 DIAGNOSIS — Z94.4 LIVER REPLACED BY TRANSPLANT (H): Primary | ICD-10-CM

## 2024-10-02 LAB
ALBUMIN SERPL BCG-MCNC: 3.2 G/DL (ref 3.5–5.2)
ALP SERPL-CCNC: 103 U/L (ref 40–150)
ALT SERPL W P-5'-P-CCNC: 167 U/L (ref 0–70)
ANION GAP SERPL CALCULATED.3IONS-SCNC: 12 MMOL/L (ref 7–15)
AST SERPL W P-5'-P-CCNC: 57 U/L (ref 0–45)
BASOPHILS # BLD AUTO: 0 10E3/UL (ref 0–0.2)
BASOPHILS NFR BLD AUTO: 0 %
BILIRUB DIRECT SERPL-MCNC: 4.26 MG/DL (ref 0–0.3)
BILIRUB SERPL-MCNC: 5.7 MG/DL
BUN SERPL-MCNC: 54.3 MG/DL (ref 6–20)
CALCIUM SERPL-MCNC: 8.1 MG/DL (ref 8.8–10.4)
CHLORIDE SERPL-SCNC: 97 MMOL/L (ref 98–107)
CREAT SERPL-MCNC: 1.39 MG/DL (ref 0.67–1.17)
EGFRCR SERPLBLD CKD-EPI 2021: 67 ML/MIN/1.73M2
EOSINOPHIL # BLD AUTO: 0.1 10E3/UL (ref 0–0.7)
EOSINOPHIL NFR BLD AUTO: 1 %
ERYTHROCYTE [DISTWIDTH] IN BLOOD BY AUTOMATED COUNT: 18.2 % (ref 10–15)
GLUCOSE BLDC GLUCOMTR-MCNC: 102 MG/DL (ref 70–99)
GLUCOSE BLDC GLUCOMTR-MCNC: 102 MG/DL (ref 70–99)
GLUCOSE BLDC GLUCOMTR-MCNC: 104 MG/DL (ref 70–99)
GLUCOSE BLDC GLUCOMTR-MCNC: 105 MG/DL (ref 70–99)
GLUCOSE BLDC GLUCOMTR-MCNC: 116 MG/DL (ref 70–99)
GLUCOSE BLDC GLUCOMTR-MCNC: 138 MG/DL (ref 70–99)
GLUCOSE BLDC GLUCOMTR-MCNC: 152 MG/DL (ref 70–99)
GLUCOSE BLDC GLUCOMTR-MCNC: 166 MG/DL (ref 70–99)
GLUCOSE BLDC GLUCOMTR-MCNC: 183 MG/DL (ref 70–99)
GLUCOSE BLDC GLUCOMTR-MCNC: 90 MG/DL (ref 70–99)
GLUCOSE BLDC GLUCOMTR-MCNC: 96 MG/DL (ref 70–99)
GLUCOSE SERPL-MCNC: 108 MG/DL (ref 70–99)
HCO3 SERPL-SCNC: 21 MMOL/L (ref 22–29)
HCT VFR BLD AUTO: 26.4 % (ref 40–53)
HGB BLD-MCNC: 8.9 G/DL (ref 13.3–17.7)
IMM GRANULOCYTES # BLD: 0.1 10E3/UL
IMM GRANULOCYTES NFR BLD: 1 %
LACTATE SERPL-SCNC: 1.2 MMOL/L (ref 0.7–2)
LYMPHOCYTES # BLD AUTO: 0.8 10E3/UL (ref 0.8–5.3)
LYMPHOCYTES NFR BLD AUTO: 10 %
MAGNESIUM SERPL-MCNC: 1.5 MG/DL (ref 1.7–2.3)
MCH RBC QN AUTO: 30.6 PG (ref 26.5–33)
MCHC RBC AUTO-ENTMCNC: 33.7 G/DL (ref 31.5–36.5)
MCV RBC AUTO: 91 FL (ref 78–100)
MONOCYTES # BLD AUTO: 0.7 10E3/UL (ref 0–1.3)
MONOCYTES NFR BLD AUTO: 9 %
NEUTROPHILS # BLD AUTO: 6.5 10E3/UL (ref 1.6–8.3)
NEUTROPHILS NFR BLD AUTO: 80 %
NRBC # BLD AUTO: 0 10E3/UL
NRBC BLD AUTO-RTO: 0 /100
PATH REPORT.COMMENTS IMP SPEC: NORMAL
PATH REPORT.FINAL DX SPEC: NORMAL
PATH REPORT.GROSS SPEC: NORMAL
PATH REPORT.MICROSCOPIC SPEC OTHER STN: NORMAL
PATH REPORT.RELEVANT HX SPEC: NORMAL
PHOSPHATE SERPL-MCNC: 3.1 MG/DL (ref 2.5–4.5)
PHOTO IMAGE: NORMAL
PLATELET # BLD AUTO: 78 10E3/UL (ref 150–450)
POTASSIUM SERPL-SCNC: 3.7 MMOL/L (ref 3.4–5.3)
PROT SERPL-MCNC: 5.2 G/DL (ref 6.4–8.3)
RBC # BLD AUTO: 2.91 10E6/UL (ref 4.4–5.9)
SODIUM SERPL-SCNC: 130 MMOL/L (ref 135–145)
TACROLIMUS BLD-MCNC: 3.8 UG/L (ref 5–15)
TME LAST DOSE: ABNORMAL H
TME LAST DOSE: ABNORMAL H
WBC # BLD AUTO: 8.1 10E3/UL (ref 4–11)

## 2024-10-02 PROCEDURE — 250N000013 HC RX MED GY IP 250 OP 250 PS 637: Performed by: HOSPITALIST

## 2024-10-02 PROCEDURE — 83735 ASSAY OF MAGNESIUM: CPT | Performed by: STUDENT IN AN ORGANIZED HEALTH CARE EDUCATION/TRAINING PROGRAM

## 2024-10-02 PROCEDURE — 82248 BILIRUBIN DIRECT: CPT | Performed by: STUDENT IN AN ORGANIZED HEALTH CARE EDUCATION/TRAINING PROGRAM

## 2024-10-02 PROCEDURE — 83605 ASSAY OF LACTIC ACID: CPT | Performed by: TRANSPLANT SURGERY

## 2024-10-02 PROCEDURE — 36415 COLL VENOUS BLD VENIPUNCTURE: CPT | Performed by: TRANSPLANT SURGERY

## 2024-10-02 PROCEDURE — 97535 SELF CARE MNGMENT TRAINING: CPT | Mod: GO

## 2024-10-02 PROCEDURE — 97530 THERAPEUTIC ACTIVITIES: CPT | Mod: GP

## 2024-10-02 PROCEDURE — 250N000011 HC RX IP 250 OP 636: Performed by: NURSE PRACTITIONER

## 2024-10-02 PROCEDURE — 250N000013 HC RX MED GY IP 250 OP 250 PS 637

## 2024-10-02 PROCEDURE — 85025 COMPLETE CBC W/AUTO DIFF WBC: CPT | Performed by: STUDENT IN AN ORGANIZED HEALTH CARE EDUCATION/TRAINING PROGRAM

## 2024-10-02 PROCEDURE — 250N000012 HC RX MED GY IP 250 OP 636 PS 637: Performed by: STUDENT IN AN ORGANIZED HEALTH CARE EDUCATION/TRAINING PROGRAM

## 2024-10-02 PROCEDURE — 250N000013 HC RX MED GY IP 250 OP 250 PS 637: Performed by: NURSE PRACTITIONER

## 2024-10-02 PROCEDURE — 250N000012 HC RX MED GY IP 250 OP 636 PS 637: Performed by: NURSE PRACTITIONER

## 2024-10-02 PROCEDURE — 80053 COMPREHEN METABOLIC PANEL: CPT | Performed by: STUDENT IN AN ORGANIZED HEALTH CARE EDUCATION/TRAINING PROGRAM

## 2024-10-02 PROCEDURE — 99233 SBSQ HOSP IP/OBS HIGH 50: CPT | Mod: FS

## 2024-10-02 PROCEDURE — 84100 ASSAY OF PHOSPHORUS: CPT | Performed by: STUDENT IN AN ORGANIZED HEALTH CARE EDUCATION/TRAINING PROGRAM

## 2024-10-02 PROCEDURE — 80197 ASSAY OF TACROLIMUS: CPT | Performed by: STUDENT IN AN ORGANIZED HEALTH CARE EDUCATION/TRAINING PROGRAM

## 2024-10-02 PROCEDURE — 97116 GAIT TRAINING THERAPY: CPT | Mod: GP

## 2024-10-02 PROCEDURE — 250N000013 HC RX MED GY IP 250 OP 250 PS 637: Performed by: PHYSICIAN ASSISTANT

## 2024-10-02 PROCEDURE — 250N000013 HC RX MED GY IP 250 OP 250 PS 637: Performed by: STUDENT IN AN ORGANIZED HEALTH CARE EDUCATION/TRAINING PROGRAM

## 2024-10-02 PROCEDURE — 120N000011 HC R&B TRANSPLANT UMMC

## 2024-10-02 PROCEDURE — 36415 COLL VENOUS BLD VENIPUNCTURE: CPT | Performed by: STUDENT IN AN ORGANIZED HEALTH CARE EDUCATION/TRAINING PROGRAM

## 2024-10-02 PROCEDURE — 99232 SBSQ HOSP IP/OBS MODERATE 35: CPT | Mod: 24 | Performed by: NURSE PRACTITIONER

## 2024-10-02 RX ORDER — OXYCODONE HYDROCHLORIDE 10 MG/1
10 TABLET ORAL EVERY 6 HOURS PRN
Status: DISCONTINUED | OUTPATIENT
Start: 2024-10-02 | End: 2024-10-03

## 2024-10-02 RX ORDER — OXYCODONE HYDROCHLORIDE 5 MG/1
5 TABLET ORAL EVERY 6 HOURS PRN
Status: DISCONTINUED | OUTPATIENT
Start: 2024-10-02 | End: 2024-10-03

## 2024-10-02 RX ORDER — MAGNESIUM SULFATE HEPTAHYDRATE 40 MG/ML
2 INJECTION, SOLUTION INTRAVENOUS ONCE
Status: COMPLETED | OUTPATIENT
Start: 2024-10-02 | End: 2024-10-02

## 2024-10-02 RX ORDER — TACROLIMUS 1 MG/1
3 CAPSULE ORAL
Status: DISCONTINUED | OUTPATIENT
Start: 2024-10-02 | End: 2024-10-03

## 2024-10-02 RX ORDER — TACROLIMUS 1 MG/1
4 CAPSULE ORAL
Status: DISCONTINUED | OUTPATIENT
Start: 2024-10-02 | End: 2024-10-02

## 2024-10-02 RX ORDER — MAGNESIUM OXIDE 400 MG/1
400 TABLET ORAL DAILY
Status: DISCONTINUED | OUTPATIENT
Start: 2024-10-03 | End: 2024-10-03

## 2024-10-02 RX ADMIN — PANTOPRAZOLE SODIUM 40 MG: 40 TABLET, DELAYED RELEASE ORAL at 07:49

## 2024-10-02 RX ADMIN — OXYCODONE HYDROCHLORIDE 10 MG: 10 TABLET ORAL at 23:57

## 2024-10-02 RX ADMIN — TACROLIMUS 3 MG: 1 CAPSULE ORAL at 17:22

## 2024-10-02 RX ADMIN — METHOCARBAMOL 1000 MG: 500 TABLET ORAL at 22:56

## 2024-10-02 RX ADMIN — METHOCARBAMOL 1000 MG: 500 TABLET ORAL at 17:22

## 2024-10-02 RX ADMIN — INSULIN ASPART 2 UNITS: 100 INJECTION, SOLUTION INTRAVENOUS; SUBCUTANEOUS at 17:23

## 2024-10-02 RX ADMIN — FLUCONAZOLE 400 MG: 200 TABLET ORAL at 07:52

## 2024-10-02 RX ADMIN — MAGNESIUM SULFATE HEPTAHYDRATE 2 G: 2 INJECTION, SOLUTION INTRAVENOUS at 12:35

## 2024-10-02 RX ADMIN — URSODIOL 300 MG: 300 CAPSULE ORAL at 07:47

## 2024-10-02 RX ADMIN — FOLIC ACID 1 MG: 1 TABLET ORAL at 07:49

## 2024-10-02 RX ADMIN — OXYCODONE HYDROCHLORIDE 10 MG: 10 TABLET ORAL at 08:02

## 2024-10-02 RX ADMIN — HYDROXYZINE HYDROCHLORIDE 50 MG: 50 TABLET, FILM COATED ORAL at 08:02

## 2024-10-02 RX ADMIN — MYCOPHENOLATE MOFETIL 750 MG: 250 CAPSULE ORAL at 17:22

## 2024-10-02 RX ADMIN — PREDNISONE 25 MG: 20 TABLET ORAL at 07:48

## 2024-10-02 RX ADMIN — OXYCODONE HYDROCHLORIDE 10 MG: 10 TABLET ORAL at 02:06

## 2024-10-02 RX ADMIN — METHOCARBAMOL 1000 MG: 500 TABLET ORAL at 11:21

## 2024-10-02 RX ADMIN — ASPIRIN 81 MG CHEWABLE TABLET 81 MG: 81 TABLET CHEWABLE at 07:46

## 2024-10-02 RX ADMIN — THIAMINE HCL TAB 100 MG 100 MG: 100 TAB at 07:49

## 2024-10-02 RX ADMIN — SULFAMETHOXAZOLE AND TRIMETHOPRIM 1 TABLET: 400; 80 TABLET ORAL at 07:49

## 2024-10-02 RX ADMIN — INSULIN ASPART 1 UNITS: 100 INJECTION, SOLUTION INTRAVENOUS; SUBCUTANEOUS at 12:49

## 2024-10-02 RX ADMIN — TACROLIMUS 3 MG: 1 CAPSULE ORAL at 07:52

## 2024-10-02 RX ADMIN — VALGANCICLOVIR HYDROCHLORIDE 450 MG: 450 TABLET ORAL at 07:50

## 2024-10-02 RX ADMIN — OXYCODONE HYDROCHLORIDE 10 MG: 10 TABLET ORAL at 17:30

## 2024-10-02 RX ADMIN — URSODIOL 300 MG: 300 CAPSULE ORAL at 20:55

## 2024-10-02 RX ADMIN — MYCOPHENOLATE MOFETIL 750 MG: 250 CAPSULE ORAL at 07:50

## 2024-10-02 RX ADMIN — HYDROXYZINE HYDROCHLORIDE 50 MG: 50 TABLET, FILM COATED ORAL at 20:55

## 2024-10-02 RX ADMIN — METHOCARBAMOL 1000 MG: 500 TABLET ORAL at 04:14

## 2024-10-02 ASSESSMENT — ACTIVITIES OF DAILY LIVING (ADL)
ADLS_ACUITY_SCORE: 33

## 2024-10-02 NOTE — PROGRESS NOTES
Transplant Social Work Services Progress Note    Data: Jag underwent liver transplant 9/28/2024  Intervention: DONG spoke with Sania, pts mother, over the phone  Assessment: DONG discussed discharge planning with Sania, DONG explained that it does not look like [facility] will have an apartment ready for the pt after discharge. DONG gave Sania Davis with [address] [employer] phone number ([phone]) to call to see if he has an apartment available. Sania voiced understanding and will call Ryan.  Education provided by DONG: see above  Plan:    Discharge Plans in Progress: anticipated discharge: 10/4. home, pt and mother will stay locally    Barriers to d/c plan: not medically ready    Follow up Plan: DONG will continue to would this pt and mother for securing local lodging.     Jennifer CABALLERO, [facility]  Liver Transplant   Phone: [phone]

## 2024-10-02 NOTE — PROGRESS NOTES
Calorie Count  Intake recorded for: 10/1  Total Kcals: 2475 Total Protein: 161g  Kcals from Hospital Food: 2475  Protein: 161g  Kcals from Outside Food (average):0 Protein: 0g  # Meals Ordered from Kitchen: 3 meals   # Meals Recorded: 3 meals (First - 100% honey nut cheerios w/ 4oz skim milk, 2 servings mandarin oranges, 75% orange juice, 50% 2 breakfast tacos with egg, sausage, cheese, sour cream & picante sauce)       (Second - 100% small apple juice, small orange juice, chili w/ saltines, grilled cheese, 2 servings peaches, chocolate chip cookie, strawberry kiwi water)  (Third - 100% mandarin oranges, chocolate pudding; 5% penne w/chicken and angel, garlic green beans)  # Supplements Recorded: 100% 3 Ensure Max Protein

## 2024-10-02 NOTE — LETTER
Jag Smith   [address]  [facility] [address]                October 2, 2024    Congratulations on your new liver transplant!   Now that you are leaving the hospital, here are a few reminders:     ** Important Phone Numbers **   Transplant Office: [phone] option 5   Transplant Office (alternate number): [phone]   Transplant Office Fax: [phone]     ** We would like you to see your primary care doctor 1-2 weeks after you go home. Please make this appointment as soon as possible. This is especially important if you have high blood pressure or diabetes. Your primary care doctor will help you with the management of medications for these conditions. The transplant office will not prescribe, or refill blood pressure, mental health or diabetes medications/supplies originally prescribed by your primary care doctor.     ** You need to be seen here in the Transplant Clinic (3rd floor, [facility]) regularly. Please make a return appointment each time you leave an appointment. If your doctor does not tell you when he wants to see you again, please ask him / her. It is far better to schedule an appointment ahead of time than to try to get one at the last minute! We will want to see you at least once per year for your lifetime.     ** You will need an annual dermatology appt for transplant skin cancer screening.     ** Make all medication dose changes on your medication card in pencil (so that you can erase as they may change frequently) as soon as you are told about the change. Doses may change frequently for the first few months after your transplant. When your doctor discontinues or stops a medication please remove it from your card. It is important that your medication card is as accurate as possible. Please bring it with you to every doctor's appointment.     ** You need to take your anti-rejection medicine every day as directed by your doctor or nurse coordinator. If you do  not take your medicine your liver may not work well and may not last as long as we hope. You will have rejection.     ** Do not eat grapefruit or drink grapefruit juice as these will decrease the effectiveness of your immunosuppressant medications.     ** Please call your pharmacy prior to running out of medications. If you wait until you are out or only having a few days left, you may not get the refill in time. If you are having trouble paying for your medications, please notify your transplant  as soon as possible!     ** On a lab day, never take your anti-rejection medicine (Prograf, Neoral, Gengraf, Cyclosporine, Rapamune or Everolimus) until after your blood is drawn. If you take the medication before having your blood drawn the result will not be correct and will have to be redrawn.     ** Anytime you need a new lab order call [phone], press option 5 and tell the person answering that you need to speak to the liver transplant LPNMagda. She can send it to you or fax it to your clinic.     ** Please bring your medication card to every clinic appointment and each time you are admitted to the hospital.     ** Call your transplant coordinator or the on-call nurse anytime you have a temperature of 101.5 or higher or feel sick.     ** Someone is available to you for questions or concerns 24 hours a day. Your transplant nurse coordinator is available from 8:00 a.m. - 4:00 p.m. Monday-Friday. If your transplant coordinator has the day off there is always someone covering. Please call [phone] and press option 5 to speak to a person during the time above. If you call after-hours, ask to speak to an on-call transplant nurse who can answer your questions and/or send a message to a Transplant provider if necessary.     ** Your Transplant Team **   Karena Villegas RN: Post-Liver Transplant Coordinator   Magda Gant LPN: Post- Liver Transplant LPN   Social Workers (If you cannot pay for your  medications or have lost insurance):   Muriel Gotti: [phone]  Ronnie Zimmer: [phone]   Jennifer Hubbard: [phone]

## 2024-10-02 NOTE — PROGRESS NOTES
Immunosuppression Management Note:    Jag Smith is a 39 year old male who is seen today  for immunosuppression management     I, Russell Waters MD, I have examined the patient with our AZALIA/Fellow as part of a shared visit.   I participated in the rounds,  discussed and agree with the note and findings and  reviewed today's vital signs, medications, labs and imaging as noted in this note.  I  reviewed the  immunosuppression medications.  I personally provided a substantive portion of the care of this patient. I personally performed the immunosuppressive management of this patient, reviewed the overall  immunosuppression including drug levels, allograft function and provided the recommendations to adjust the dose to provide optimal levels to prevent rejection of the allograft and prevent toxicity to the organs. This was complex care due to the fresh allograft.   Time spent: evaluating patient, examining patient, discussion of plan, counseling and documentation: >35 min   I spoke to the patient/family and explained below clinical details and answered all the questions    Transplant Surgery  Inpatient Daily Progress Note  10/02/2024    Assessment & Plan: Jag Smith is a 39 year old male with recently diagnosed decompensated alcoholic liver cirrhosis transferred from [facility] on 9/21/24 for expedited liver transplant evaluation due to decompensated alcoholic liver cirrhosis. MELD 42. He is now s/p DDLT on 9/28/24 with Dr. Colon.     Changes today:   - Stop insulin gtt, transition to sliding scale aspart   - Replace magnesium   - Remove left SHENA drain   - Give MOM   - Wean oxycodone   - No change in tacrolimus dose today given fluconazole interaction; recheck tomorrow  ___________________________________________    s/p DBD DDLT 9/28/24: POD#4. LFTs trending down. Post-op US with patent doppler.    - Continue ASA and ursodiol.    Immunosuppressed status secondary to medications:  Rx Refill Note  Requested Prescriptions     Pending Prescriptions Disp Refills   • methylphenidate (Concerta) 36 MG CR tablet 30 tablet 0     Sig: Take 1 tablet by mouth Every Morning      Last office visit with prescribing clinician: 2/8/2022      Next office visit with prescribing clinician: 8/26/2022            Jason Murray MA  06/27/22, 16:02 EDT     Induction: Steroid taper per protocol.   Maintenance:   -  mg BID   - Tacrolimus 3 mg BID. Goal level 8-12.    - Prednisone 5 mg daily x3 months after taper    Neurology:  Acute post-op pain: Fair control on current regimen:    - Oxycodone 5-10 mg q4H PRN > wean to q6H PRN   - Robaxin 1000 mg Q6H   - Lidocaine patches   - Atarax Q6H PRN    Hematology:   Acute blood loss anemia: Hgb 8-9, stable.  Thrombocytopenia: Plt 70's, monitor.     Cardiorespiratory: No issues.     GI/Nutrition:   Severe malnutrition in the context of acute illness: Nutrition consulted. Continue Regular diet with supplements. Calorie counts demonstrating good PO intake.   Bowel regimen: Senna, PEG. MOMx1.     Endocrine:   Steroid induced hyperglycemia: Hgb A1c 5.4%.   - Stop insulin gtt, transition to sliding scale Novolog.     Fluid/Electrolytes/Renal:   Hyponatremia: Na 130 (from 131 < 129), monitor.   Hypomagnesemia: Start Mag-Ox 400 mg daily. Replace 2 grams IV.   DENI: Nephrology consulted. 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 Cr 2 > 2.3 > 2.1 > 1.4, improving.     Infectious disease: No acute issues.     Skin:  Medical device related mucosal pressure injury of the lips, hospital acquired: WOC consulted.    Prophylaxis: DVT, fall, GI (PPI), fungal (fluconazole per protocol), viral (valganciclovir), pneumocystis (Bactrim)    Disposition: 7A; plan for discharge locally as soon as Friday    AZALIA/Fellow/Resident Provider: Gretchen Patino NP #[id number]    Faculty: Russell Waters MD   __________________________________________________________________    Interval History:   Overnight events: No acute events overnight. Patient feeling much better today. Pain better controlled. Tolerating diet. Up ambulating more; motivated to work with therapy.     ROS:   A 10-point review of systems was negative except as noted above.    Curent Meds:  Current Facility-Administered Medications   Medication Dose Route Frequency Provider Last Rate Last  Admin    aspirin (ASA) chewable tablet 81 mg  81 mg Oral or Feeding Tube Daily Maddi Culver APRN CNP   81 mg at 10/02/24 0746    fluconazole (DIFLUCAN) tablet 400 mg  400 mg Oral Daily Gretchen Patino NP   400 mg at 10/02/24 0752    folic acid (FOLVITE) tablet 1 mg  1 mg Oral Daily José Miguel Aviles MD   1 mg at 10/02/24 0749    insulin aspart (NovoLOG) injection (RAPID ACTING)  1-10 Units Subcutaneous TID AC Gretchen Patino NP   1 Units at 10/02/24 1249    insulin aspart (NovoLOG) injection (RAPID ACTING)  1-7 Units Subcutaneous At Bedtime Gretchen Patino NP        Lidocaine (LIDOCARE) 4 % Patch 2 patch  2 patch Transdermal Q24h Ashley Loyd MD   2 patch at 09/30/24 2004    [START ON 10/3/2024] magnesium oxide (MAG-OX) tablet 400 mg  400 mg Oral Daily Gretchen Patino NP        magnesium sulfate 2 g in 50 mL sterile water intermittent infusion  2 g Intravenous Once Gretchen Patino NP 50 mL/hr at 10/02/24 1235 2 g at 10/02/24 1235    methocarbamol (ROBAXIN) tablet 1,000 mg  1,000 mg Oral Q6H Paresh Lara MD   1,000 mg at 10/02/24 1121    mycophenolate (GENERIC EQUIVALENT) capsule 750 mg  750 mg Oral BID IS Dominick Tejada MD   750 mg at 10/02/24 0750    pantoprazole (PROTONIX) EC tablet 40 mg  40 mg Oral Daily Gretchen Patino NP   40 mg at 10/02/24 0749    polyethylene glycol (MIRALAX) Packet 17 g  17 g Oral Daily Maddi Culver APRN CNP   17 g at 10/01/24 0830    [START ON 10/3/2024] predniSONE (DELTASONE) tablet 10 mg  10 mg Oral Once Dominick Tejada MD        [START ON 10/4/2024] predniSONE (DELTASONE) tablet 5 mg  5 mg Oral Daily Abi Quiros PA-C        sennosides (SENOKOT) tablet 2 tablet  2 tablet Oral BID Maddi Culver, SIL CNP   2 tablet at 10/01/24 2019    sodium chloride (PF) 0.9% PF flush 3 mL  3 mL Intravenous Q8H Dominick Tejada MD   3 mL at 10/01/24 2021    sodium chloride (PF) 0.9% PF flush 3 mL  3 mL  Intracatheter Q8H José Miguel Aviles MD   3 mL at 10/01/24 1621    sulfamethoxazole-trimethoprim (BACTRIM) 400-80 MG per tablet 1 tablet  1 tablet Oral Daily Abi Quiros PA-C   1 tablet at 10/02/24 0749    tacrolimus (GENERIC EQUIVALENT) capsule 3 mg  3 mg Oral BID IS Gretchen Patino NP   3 mg at 10/02/24 0752    thiamine (B-1) tablet 100 mg  100 mg Oral Daily José Miguel Aviles MD   100 mg at 10/02/24 0749    ursodiol (ACTIGALL) capsule 300 mg  300 mg Oral BID Hardy Flannery MD   300 mg at 10/02/24 0747    valGANciclovir (VALCYTE) tablet 450 mg  450 mg Oral Daily Dominick Tejada MD   450 mg at 10/02/24 0750       Physical Exam:     Admit Weight: 102.3 kg (225 lb 8 oz)    Current Vitals:   /85 (BP Location: Right arm, Patient Position: Sitting, Cuff Size: Adult Regular)   Pulse 83   Temp 97.8  F (36.6  C) (Oral)   Resp 16   Ht 1.829 m (6')   Wt 106.1 kg (233 lb 12.8 oz)   SpO2 100%   BMI 31.71 kg/m      Vital sign ranges:    Temp:  [97.7  F (36.5  C)-98.8  F (37.1  C)] 97.8  F (36.6  C)  Pulse:  [66-83] 83  Resp:  [16] 16  BP: (118-138)/(79-97) 133/85  SpO2:  [95 %-100 %] 100 %    General Appearance: in no apparent distress.   Skin: normal, warm, jaundice  Heart: Perfused  Lungs: Unlabored on RA  Abdomen:  Soft, non distended.  The wound is covered with surgical dressing. HSENA x 3 serosanguinous   :  mckeon removed  Extremities: generalized edema  Neurologic: A&Ox4      Frailty Scores           No data to display                Data:   CMP  Recent Labs   Lab 10/02/24  1230 10/02/24  0932 10/02/24  0537 10/02/24  0442 10/01/24  0552 10/01/24  0437 09/29/24  0505 09/29/24  0353 09/28/24  1419 09/28/24  1308 09/27/24  1719 09/27/24  0646   NA  --   --   --  130*  --  131*   < > 139  139   < >  --    < > 131*   POTASSIUM  --   --   --  3.7  --  3.8   < > 4.1  4.1   < >  --    < > 4.5   CHLORIDE  --   --   --  97*  --  94*   < > 100  100   < >  --    < > 104   CO2  --    --   --  21*  --  24   < > 25  25   < >  --    < > 13*   * 152*   < > 108*   < > 108*   < > 161*  165*  165*   < >  --    < > 82   BUN  --   --   --  54.3*  --  60.3*   < > 27.4*  27.4*   < >  --    < > 34.1*   CR  --   --   --  1.39*  --  2.05*   < > 1.56*  1.56*   < >  --    < > 2.27*   GFRESTIMATED  --   --   --  67  --  42*   < > 58*  58*   < >  --    < > 37*   AMAIRANI  --   --   --  8.1*  --  8.3*   < > 9.2  9.2   < >  --    < > 8.9   ICAW  --   --   --   --   --   --   --  4.8  --  5.4*   < >  --    MAG  --   --   --  1.5*  --  1.9   < > 1.4*   < >  --    < > 1.5*   PHOS  --   --   --  3.1  --  4.9*   < > 5.1*   < >  --    < > 3.6   AMYLASE  --   --   --   --   --   --   --  26*  --   --   --  46   LIPASE  --   --   --   --   --   --   --  17  --   --   --   --    ALBUMIN  --   --   --  3.2*  --  3.2*   < > 3.2*  --   --    < > 3.3*   BILITOTAL  --   --   --  5.7*  --  6.4*   < > 9.9*  --   --    < > 38.2*   ALKPHOS  --   --   --  103  --  79   < > 55  --   --    < > 85   AST  --   --   --  57*  --  62*   < > 261*  --   --    < > 129*   ALT  --   --   --  167*  --  194*   < > 249*  --   --    < > 45    < > = values in this interval not displayed.     CBC  Recent Labs   Lab 10/02/24  0442 10/01/24  0437 09/28/24  1215 09/28/24  1207   HGB 8.9* 8.9*   < >  --    WBC 8.1 9.0   < >  --    PLT 78* 79*   < > 59*   A1C  --   --   --  5.4    < > = values in this interval not displayed.

## 2024-10-02 NOTE — PROGRESS NOTES
[facility]  Transplant Nephrology Progress Note  Date of Admission:  9/21/2024  Today's Date: 10/02/2024    Recommendations:   - Consider magnesium oxide 400 mg PO daily.  - Kidney function improving.  We will sign off.  Please reach out if further assistance needed.      Assessment & Plan   # DENI: Increased serum creatinine.  No indication for dialysis.   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  Transaminases Trend down.  Followed by Transplant Surgery.    # Immunosuppression: Tacrolimus immediate release (goal 6-8), Mycophenolate mofetil (dose 750 mg every 12 hours), and Methylprednisolone (dose taper)   - Induction with Recent Transplant:  Per Liver Tx Protocol   - Continue with intensive monitoring of immunosuppression for efficacy and toxicity.   - Goal tacrolimus level lower due to DENI.   - Changes: Not at this time; Management per Transplant Surgery.    # Infection Prophylaxis:   - PJP: Sulfa/TMP (Bactrim)  - CMV: Valganciclovir (Valcyte)  - Thrush: Micafungin (Mycamine)  - Fungal: Micafungin (Mycamine)      - CMV IgG Ab High Risk Discordance (D+/R-): No  CMV Serostatus: Positive  - EBV IgG Ab High Risk Discordance (D+/R-): No  EBV Serostatus: Positive    # Blood Pressure: Controlled;  Goal BP: < 140/90 (Hospitalization goal)   - Volume status: Total body volume up, but intravascularly euvolemic     - Changes: Not at this time    # Elevated Blood Glucose: Glucose generally running ~ 150-200s   - Presently on insulin gtt.    # Anemia in Chronic Disease/Surgery: Hgb: Stable, low      TRACE: No   - Iron studies: Low iron saturation, but high ferritin    # Thrombocytopenia:  Stable, moderately low platelet level.  No overt signs of bleeding.    # Mineral Bone Disorder:   - Vitamin D; level: Low          On supplement: No  - Calcium; level: Low          On supplement: No (trend for now)  - Phosphorus; level: Normal         On supplement: No     # Electrolytes:   - Potassium; level: Normal        On supplement: No  - Magnesium; level: Low        On supplement: No. Consider magnesium oxide 400 mg PO daily.  - Bicarbonate; level: Low        On supplement: No. (trend for now)  - Sodium; level: Low    # Hyponatremia: Stable, low serum sodium.  Likely secondary to decreased renal function.    # Other Significant PMH:   - Alcohol Abuse: Presently sober.   - Depression/Anxiety: Presently stable.     # Transplant History:  Etiology of Organ Failure: Alcohol-related liver disease  Tx: Liver Tx  Transplant: 9/28/2024 (Liver)  Significant changes in immunosuppression: Slightly lower tacrolimus level goal due to DENI.  Significant transplant-related complications: DENI    Recommendations were communicated to the primary team via this note.    Seen and discussed with .    SIL Theodore CNP  Transplant Nephrology  Contact information via Vocera Web Console or via [facility] Paging/Directory        Physician Attestation     I saw and evaluated Jag Smith as part of a shared APRN/PA visit.     I personally reviewed the vital signs, medications, labs, and imaging.    I personally provided a substantive portion of care for this patient and I approve the care plan as written by the AZALIA.  I was involved with Medical Decision Making including: Please see A&P for additional details of medical decision making.  MANAGEMENT DISCUSSED with the following over the past 24 hours: resolving DENI post liver transplant. Good UOP and volume status improving. No acute RRT indications. Will sign off. Please call if any concerns     Teresa Ibarra MD  Date of Service (when I saw the patient):  10/02/24      Interval History  Day 4 s/p liver transplant.    Mr. Smith's creatinine is 1.39 (10/02 0442); Trend down from 2.05 yesterday.  Estimated Creatinine Clearance: 93.1 mL/min (A) (based on SCr of 1.39 mg/dL (H)).   I/O last 3 completed shifts:  In: 1090 [P.O.:1090]  Out: 4270 [Urine:3025; Drains:1245]   Other significant labs/tests/vitals: SBP 110s - 120s overnight. Afebrile  No acute events overnight.  No chest pain or shortness of breath.  Legs wrapped, unable to assess leg swelling.  Reports some nausea, but denies vomiting.  Bowel movements are loose.      Review of Systems   4 point ROS was obtained and negative except as noted in the Interval History.    MEDICATIONS:  Current Facility-Administered Medications   Medication Dose Route Frequency Provider Last Rate Last Admin    aspirin (ASA) chewable tablet 81 mg  81 mg Oral or Feeding Tube Daily Maddi Culver APRN CNP   81 mg at 10/01/24 0830    fluconazole (DIFLUCAN) tablet 400 mg  400 mg Oral Daily Gretchen Patino NP   400 mg at 10/01/24 1403    folic acid (FOLVITE) tablet 1 mg  1 mg Oral Daily José Miguel Aviles MD   1 mg at 10/01/24 0830    Lidocaine (LIDOCARE) 4 % Patch 2 patch  2 patch Transdermal Q24h Ashley Loyd MD   2 patch at 09/30/24 2004    methocarbamol (ROBAXIN) tablet 1,000 mg  1,000 mg Oral Q6H Paresh Lara MD   1,000 mg at 10/02/24 0414    mycophenolate (GENERIC EQUIVALENT) capsule 750 mg  750 mg Oral BID IS Dominick Tejada MD   750 mg at 10/01/24 1805    pantoprazole (PROTONIX) EC tablet 40 mg  40 mg Oral Daily Gretchen Patino NP        polyethylene glycol (MIRALAX) Packet 17 g  17 g Oral Daily Maddi Culver APRN CNP   17 g at 10/01/24 0830    predniSONE (DELTASONE) tablet 25 mg  25 mg Oral Once Dominick Tejada MD        Followed by    [START ON 10/3/2024] predniSONE (DELTASONE) tablet 10 mg  10 mg Oral Once Dominick Tejada MD        [START ON 10/4/2024] predniSONE (DELTASONE)  tablet 5 mg  5 mg Oral Daily Abi Quiros PA-C        sennosides (SENOKOT) tablet 2 tablet  2 tablet Oral BID [place], Maddi Hetal, APRN CNP   2 tablet at 10/01/24 2019    sodium chloride (PF) 0.9% PF flush 3 mL  3 mL Intravenous Q8H Dominick Tejada MD   3 mL at 10/01/24 2021    sodium chloride (PF) 0.9% PF flush 3 mL  3 mL Intracatheter Q8H José Miguel Aviles MD   3 mL at 10/01/24 1621    sulfamethoxazole-trimethoprim (BACTRIM) 400-80 MG per tablet 1 tablet  1 tablet Oral Daily Abi Quiros PA-C   1 tablet at 10/01/24 0830    tacrolimus (GENERIC EQUIVALENT) capsule 3 mg  3 mg Oral BID IS Gretchen Patino NP   3 mg at 10/01/24 1802    thiamine (B-1) tablet 100 mg  100 mg Oral Daily José Miguel Aviles MD   100 mg at 10/01/24 0831    ursodiol (ACTIGALL) capsule 300 mg  300 mg Oral BID Hardy Flannery MD   300 mg at 10/01/24 2020    valGANciclovir (VALCYTE) tablet 450 mg  450 mg Oral Daily Dominick Tejada MD   450 mg at 10/01/24 0830     Current Facility-Administered Medications   Medication Dose Route Frequency Provider Last Rate Last Admin    dextrose 10% infusion   Intravenous Continuous PRN Bon Pino PA-C        insulin regular (MYXREDLIN) 1 unit/mL infusion  0-24 Units/hr Intravenous Continuous Bon Pino PA-C 1.5 mL/hr at 10/02/24 0655 1.5 Units/hr at 10/02/24 0655       Physical Exam   Temp  Av.2  F (36.8  C)  Min: 96.4  F (35.8  C)  Max: 99.3  F (37.4  C)  Arterial Line BP  Min: 99/55  Max: 143/73  Arterial Line MAP (mmHg)  Av.8 mmHg  Min: 70 mmHg  Max: 287 mmHg      Pulse  Av.2  Min: 74  Max: 110 Resp  Av.6  Min: 10  Max: 24  FiO2 (%)  Av %  Min: 40 %  Max: 60 %  SpO2  Av.9 %  Min: 92 %  Max: 100 %    CVP (mmHg): 16 mmHgBP 121/87 (BP Location: Left arm)   Pulse 71   Temp 97.8  F (36.6  C) (Oral)   Resp 16   Ht 1.829 m (6')   Wt 109.8 kg (242 lb 1 oz)   SpO2 95%   BMI 32.83 kg/m     Date 24 07 - 24 0659   Shift  2712-5909 8815-7292 6589-7621 24 Hour Total   INTAKE   P.O. 240   240   I.V. 375.2   375.2   Shift Total(mL/kg) 615.2(5.83)   615.2(5.83)   OUTPUT   Urine 40   40   Drains 280   280   Shift Total(mL/kg) 320(3.03)   320(3.03)   Weight (kg) 105.6 105.6 105.6 105.6      Admit Weight: 102.3 kg (225 lb 8 oz)     GENERAL APPEARANCE: alert and no distress  RESP: lungs clear to auscultation - no rales, rhonchi or wheezes  CV: regular rhythm, normal rate, no rub, no murmur  EDEMA: mild LE and dependent edema bilaterally  ABDOMEN: soft, nondistended, appropriately tender  MS: extremities normal - no gross deformities noted, no evidence of inflammation in joints, no muscle tenderness  SKIN: no rash, jaundiced  DIALYSIS ACCESS:  None    Data   All labs reviewed by me.  CMP  Recent Labs   Lab 10/02/24  0652 10/02/24  0537 10/02/24  0442 10/02/24  0410 10/01/24  0552 10/01/24  0437 09/30/24  0537 09/30/24  0529 09/29/24  2159 09/29/24 2004 09/29/24  0505 09/29/24  0353   NA  --   --  130*  --   --  131*  --  129*  --  131*  --  139  139   POTASSIUM  --   --  3.7  --   --  3.8  --  4.2  --  4.7  --  4.1  4.1   CHLORIDE  --   --  97*  --   --  94*  --  93*  --  95*  --  100  100   CO2  --   --  21*  --   --  24  --  24  --  21*  --  25  25   ANIONGAP  --   --  12  --   --  13  --  12  --  15  --  14  14   * 102* 108* 90   < > 108*   < > 113*   < > 167*   < > 161*  165*  165*   BUN  --   --  54.3*  --   --  60.3*  --  47.2*  --  40.4*  --  27.4*  27.4*   CR  --   --  1.39*  --   --  2.05*  --  2.27*  --  2.02*  --  1.56*  1.56*   GFRESTIMATED  --   --  67  --   --  42*  --  37*  --  43*  --  58*  58*   AMAIRANI  --   --  8.1*  --   --  8.3*  --  8.6*  --  8.4*  --  9.2  9.2   MAG  --   --  1.5*  --   --  1.9  --  2.2  --  2.2   < > 1.4*   PHOS  --   --  3.1  --   --  4.9*  --  6.2*  --  5.8*  --  5.1*   PROTTOTAL  --   --  5.2*  --   --  5.2*  --  5.3*  --   --   --  5.0*   ALBUMIN  --   --  3.2*  --   --  3.2*  --   3.1*  --   --   --  3.2*   BILITOTAL  --   --  5.7*  --   --  6.4*  --  7.8*  --   --   --  9.9*   ALKPHOS  --   --  103  --   --  79  --  62  --   --   --  55   AST  --   --  57*  --   --  62*  --  118*  --   --   --  261*   ALT  --   --  167*  --   --  194*  --  224*  --   --   --  249*    < > = values in this interval not displayed.     CBC  Recent Labs   Lab 10/02/24  0442 10/01/24  0437 09/30/24  0529 09/29/24  1738   HGB 8.9* 8.9* 8.9* 9.0*   WBC 8.1 9.0 10.2 11.1*   RBC 2.91* 2.90* 2.84* 2.96*   HCT 26.4* 26.2* 25.7* 26.7*   MCV 91 90 91 90   MCH 30.6 30.7 31.3 30.4   MCHC 33.7 34.0 34.6 33.7   RDW 18.2* 18.6* 19.4* 19.7*   PLT 78* 79* 70* 78*     INR  Recent Labs   Lab 10/01/24  0437 09/30/24  0529 09/29/24  1738 09/29/24  1139 09/29/24  0353   INR 1.03 1.14  --  1.14 1.21*   PTT  --  27 27 28 30     ABG  Recent Labs   Lab 09/28/24  1308 09/28/24  1215 09/28/24  1143 09/28/24  1047   PH 7.45 7.44 7.45 7.45   PCO2 38 39 36 34*   PO2 139* 79* 110* 92   HCO3 26 26 25 24   O2PER 60 50.0 50.0 50.0      Urine Studies  Recent Labs   Lab Test 09/27/24  2113 09/22/24  0417   COLOR Dark Yellow* Dark Yellow*   APPEARANCE Slightly Cloudy* Slightly Cloudy*   URINEGLC Negative Negative   URINEBILI Large* Large*   URINEKETONE Negative Negative   SG 1.011 1.027   UBLD Negative Small*   URINEPH 6.0 6.0   PROTEIN Negative 20*   NITRITE Negative Negative   LEUKEST Negative Negative   RBCU 1 11*   WBCU 5 5     No lab results found.  PTH  No lab results found.  Iron Studies  Recent Labs   Lab Test 09/25/24  0653 09/23/24  0659   IRON  --  58*   ELPIDIO 2,577*  --        IMAGING:  All imaging studies reviewed by me.

## 2024-10-02 NOTE — PLAN OF CARE
Goal Outcome Evaluation:      Plan of Care Reviewed With: patient    Overall Patient Progress: no changeOverall Patient Progress: no change    Outcome Evaluation: Pain control is the main issue tonight. Not decreasing with PRNS    BP (!) 126/91 (BP Location: Left arm)   Pulse 71   Temp 98.6  F (37  C) (Oral)   Resp 16   Ht 1.829 m (6')   Wt 105.6 kg (232 lb 12.9 oz)   SpO2 100%   BMI 31.57 kg/m      Shift: 0055-2478  Isolation Status: none  VS: stable on room air, afebrile  Neuro: Aox4  Behaviors: calm and cooperative  BG: On insulin drip, Alg #3, q1 checks  Labs: Morning labs reviewed   Respiratory: WDL  Cardiac: WDL  Pain/Nausea: Endorses 9/10 abdominal pain, denies nausea  PRN: Oxy 10 mg x2, atarax x1  Diet: regular  IV Access: 2 R PIV  Infusion(s): Insulin drip with carrier, albumin x1  Lines/Drains: 3 Yuriy's (1L and 2 on R), mckeon  GI/: 1 BM, voiding adequately via mckeon  Skin: very bruised, especially on abdomen, has multiple skin tears, incision stapled open to air, avoiding adhesives   Mobility: up with assist 2 with walker and gait belt, walked the hallway with therapy   Events/Education: n/a  Plan: continue plan of care and notify team of any changes

## 2024-10-02 NOTE — TELEPHONE ENCOUNTER
Post Liver Transplant Coordinator Discharge Visit     Met with liver AZALIA and discharge coordinator on 7A.  Discussed pertinent health issues related to liver transplant and discharge planning.  Upon discharge, pt will be going to [address] [facility] [address] for [facility] days.     Met with patient and his mother.  Introduced myself and explained the role of the post liver transplant coordinator and support team.      Patient informed of the need for primary care  appointments within 2 weeks if being discharged to home.  Is aware that they will have clinic visit 1 week and then again weekly x 3 after discharge from hospital or transitional care / rehab.    Is aware that he needs a follow-up appointment with pre transplant primary care within 1-2 weeks of returning home.   Is aware that he will need follow-up with the transplant team for the rest of his life.    Initial follow-up will be with the transplant surgeon, then move to pre-transplant hepatologist.    Patient is aware that he will need lab testing, initially every Monday and Thursday to monitor liver function on a regular basis for the rest of his life.     Briefly discussed the following topics:    Immunosuppression and the importance of taking regularly as directed.    Pertinent labs and their interpretation   Rejection signs / symptoms and treatment   Importance of long term follow-up with the transplant center and primary care physician   Presence of bililary stent placed at time of surgery,  need for patient to notify me if stent passes or need for removal if not seen 2-3 months post transplant  Discussed no grape fruit or grape fruit juice.   Informed patient of annual derm appt for a transplant skin check and risk for skin cancer.  Reviewed if anyone other than the transplant team orders medications to contact the transplant office to review before taking.  Informed patient if becomes ill, forgets to take medications, or other emergencies on how to contact the transplant  "office.    Has lab book and understands responsibility of getting and recording results. Education in process.  (See inpatient education teaching flowsheet). Gave patient handouts entitled \"Things to Remember\" and \"Your Lab Results\" as well as a copy of contact numbers for myself, social work, transplant LPN and after hours/ weekend / holiday phone numbers.  Denies further questions at this time.      Organ specific education completed, and discharge planning has been conducted with multidisciplinary transplant care team including physicians, pharmacy, nutrition, and social work.                    "

## 2024-10-02 NOTE — PLAN OF CARE
Goal Outcome Evaluation:      Plan of Care Reviewed With: patient    Overall Patient Progress: no changeOverall Patient Progress: no change  Neuro: AOx4, VSS  Cardiac: WDL  Respiratory: on RA, denies SOB  GI/: voiding adequately via mckeon, no BM, passing flatus  Diet/Appetite: on regular, denies N/V  Skin: abd bruised with multiple skin tears. Incision stapled LACHO  LDA: PIV infusing insulin gtt  Activity: SBA  Pain: abd pain managed with PRN oxycodone and schedule robaxin  Plan: pain management, insulin management, continue POC

## 2024-10-02 NOTE — PLAN OF CARE
BP (!) 134/92 (BP Location: Left arm)   Pulse 85   Temp 97.8  F (36.6  C) (Oral)   Resp 16   Ht 1.829 m (6')   Wt 106.1 kg (233 lb 12.8 oz)   SpO2 98%   BMI 31.71 kg/m        Shift: 1213-3361  Isolation Status: none  VS: VSS on RA, afebrile  Neuro: Aox4  Behaviors: calm, tearful, cooperative  BG: off insulin gtt now ACHS with sliding scale  Labs/Imaging: Na 130 (131), Cr 1.39 (2.05), LFTs improving, Mag replaced (1.5)  Respiratory: bases dim otherwise WDL   Cardiac: Diastolic runs high 90s  Pain/Nausea: c/o abdominal/incisional pain, given oxycodone, atarax, and robaxin   Diet: Reg diet, on calorie counts, great intake/appetite   LDA: PIV x2   GI/: mckeon removed this AM, already voided***** has had 2 BM's this shift  Skin: skin tears on abd near incision, SHENA x2 with bloody/serosang output, lymph wraps on BLE  Mobility: assist x1  Plan: discharge as soon as friday        Goal Outcome Evaluation:      Plan of Care Reviewed With: patient    Overall Patient Progress: improvingOverall Patient Progress: improving    Outcome Evaluation: off insulin gtt, managing pain, getting OOB

## 2024-10-02 NOTE — PROGRESS NOTES
Prior Authorization Not Needed per Insurance    Medication: XIFAXAN 550 MG PO TABS  Insurance Company: [place] Medicaid ([facility]) - Phone [phone] Fax [phone]  Pharmacy Notified: Yes    Liaison submitted prior authorization process based on patient's medication list, but per patient's chart, medication has been discontinued. If patient restarts medication and needs ELISE Dennis  [facility] Liaison  Phone [phone]  Fax [phone]  Available on Teams & Vocera

## 2024-10-03 ENCOUNTER — APPOINTMENT (OUTPATIENT)
Dept: OCCUPATIONAL THERAPY | Facility: CLINIC | Age: 39
End: 2024-10-03
Attending: PEDIATRICS
Payer: MEDICAID

## 2024-10-03 ENCOUNTER — APPOINTMENT (OUTPATIENT)
Dept: PHYSICAL THERAPY | Facility: CLINIC | Age: 39
End: 2024-10-03
Attending: PEDIATRICS
Payer: MEDICAID

## 2024-10-03 PROBLEM — R33.8 ACUTE URINARY RETENTION: Status: ACTIVE | Noted: 2024-10-03

## 2024-10-03 LAB
ALBUMIN SERPL BCG-MCNC: 2.9 G/DL (ref 3.5–5.2)
ALP SERPL-CCNC: 126 U/L (ref 40–150)
ALT SERPL W P-5'-P-CCNC: 176 U/L (ref 0–70)
ANION GAP SERPL CALCULATED.3IONS-SCNC: 9 MMOL/L (ref 7–15)
AST SERPL W P-5'-P-CCNC: 59 U/L (ref 0–45)
BASOPHILS # BLD AUTO: 0 10E3/UL (ref 0–0.2)
BASOPHILS NFR BLD AUTO: 0 %
BILIRUB DIRECT SERPL-MCNC: 3.55 MG/DL (ref 0–0.3)
BILIRUB SERPL-MCNC: 4.9 MG/DL
BUN SERPL-MCNC: 34.4 MG/DL (ref 6–20)
CALCIUM SERPL-MCNC: 7.9 MG/DL (ref 8.8–10.4)
CHLORIDE SERPL-SCNC: 102 MMOL/L (ref 98–107)
CREAT SERPL-MCNC: 0.91 MG/DL (ref 0.67–1.17)
EGFRCR SERPLBLD CKD-EPI 2021: >90 ML/MIN/1.73M2
EOSINOPHIL # BLD AUTO: 0.3 10E3/UL (ref 0–0.7)
EOSINOPHIL NFR BLD AUTO: 5 %
ERYTHROCYTE [DISTWIDTH] IN BLOOD BY AUTOMATED COUNT: 17.9 % (ref 10–15)
GLUCOSE BLDC GLUCOMTR-MCNC: 120 MG/DL (ref 70–99)
GLUCOSE BLDC GLUCOMTR-MCNC: 120 MG/DL (ref 70–99)
GLUCOSE BLDC GLUCOMTR-MCNC: 137 MG/DL (ref 70–99)
GLUCOSE BLDC GLUCOMTR-MCNC: 140 MG/DL (ref 70–99)
GLUCOSE BLDC GLUCOMTR-MCNC: 143 MG/DL (ref 70–99)
GLUCOSE BLDC GLUCOMTR-MCNC: 175 MG/DL (ref 70–99)
GLUCOSE SERPL-MCNC: 128 MG/DL (ref 70–99)
HCO3 SERPL-SCNC: 21 MMOL/L (ref 22–29)
HCT VFR BLD AUTO: 27.5 % (ref 40–53)
HGB BLD-MCNC: 9.1 G/DL (ref 13.3–17.7)
IMM GRANULOCYTES # BLD: 0.3 10E3/UL
IMM GRANULOCYTES NFR BLD: 5 %
LACTATE SERPL-SCNC: 1.3 MMOL/L (ref 0.7–2)
LYMPHOCYTES # BLD AUTO: 0.7 10E3/UL (ref 0.8–5.3)
LYMPHOCYTES NFR BLD AUTO: 10 %
MAGNESIUM SERPL-MCNC: 1.5 MG/DL (ref 1.7–2.3)
MCH RBC QN AUTO: 30.4 PG (ref 26.5–33)
MCHC RBC AUTO-ENTMCNC: 33.1 G/DL (ref 31.5–36.5)
MCV RBC AUTO: 92 FL (ref 78–100)
MONOCYTES # BLD AUTO: 0.4 10E3/UL (ref 0–1.3)
MONOCYTES NFR BLD AUTO: 6 %
NEUTROPHILS # BLD AUTO: 5.1 10E3/UL (ref 1.6–8.3)
NEUTROPHILS NFR BLD AUTO: 74 %
NRBC # BLD AUTO: 0 10E3/UL
NRBC BLD AUTO-RTO: 0 /100
PHOSPHATE SERPL-MCNC: 1.8 MG/DL (ref 2.5–4.5)
PLATELET # BLD AUTO: 82 10E3/UL (ref 150–450)
POTASSIUM SERPL-SCNC: 3.5 MMOL/L (ref 3.4–5.3)
PROT SERPL-MCNC: 4.8 G/DL (ref 6.4–8.3)
RBC # BLD AUTO: 2.99 10E6/UL (ref 4.4–5.9)
SODIUM SERPL-SCNC: 132 MMOL/L (ref 135–145)
TACROLIMUS BLD-MCNC: 4.3 UG/L (ref 5–15)
TME LAST DOSE: ABNORMAL H
TME LAST DOSE: ABNORMAL H
WBC # BLD AUTO: 6.8 10E3/UL (ref 4–11)

## 2024-10-03 PROCEDURE — 250N000013 HC RX MED GY IP 250 OP 250 PS 637: Performed by: PHYSICIAN ASSISTANT

## 2024-10-03 PROCEDURE — 82248 BILIRUBIN DIRECT: CPT | Performed by: STUDENT IN AN ORGANIZED HEALTH CARE EDUCATION/TRAINING PROGRAM

## 2024-10-03 PROCEDURE — 80053 COMPREHEN METABOLIC PANEL: CPT | Performed by: STUDENT IN AN ORGANIZED HEALTH CARE EDUCATION/TRAINING PROGRAM

## 2024-10-03 PROCEDURE — 250N000012 HC RX MED GY IP 250 OP 636 PS 637: Performed by: STUDENT IN AN ORGANIZED HEALTH CARE EDUCATION/TRAINING PROGRAM

## 2024-10-03 PROCEDURE — 99232 SBSQ HOSP IP/OBS MODERATE 35: CPT | Mod: 24 | Performed by: NURSE PRACTITIONER

## 2024-10-03 PROCEDURE — 83605 ASSAY OF LACTIC ACID: CPT | Performed by: TRANSPLANT SURGERY

## 2024-10-03 PROCEDURE — 97116 GAIT TRAINING THERAPY: CPT | Mod: GP

## 2024-10-03 PROCEDURE — 97535 SELF CARE MNGMENT TRAINING: CPT | Mod: GO

## 2024-10-03 PROCEDURE — 250N000011 HC RX IP 250 OP 636: Performed by: NURSE PRACTITIONER

## 2024-10-03 PROCEDURE — 83735 ASSAY OF MAGNESIUM: CPT | Performed by: STUDENT IN AN ORGANIZED HEALTH CARE EDUCATION/TRAINING PROGRAM

## 2024-10-03 PROCEDURE — 250N000013 HC RX MED GY IP 250 OP 250 PS 637: Performed by: STUDENT IN AN ORGANIZED HEALTH CARE EDUCATION/TRAINING PROGRAM

## 2024-10-03 PROCEDURE — 97140 MANUAL THERAPY 1/> REGIONS: CPT | Mod: GO

## 2024-10-03 PROCEDURE — 36415 COLL VENOUS BLD VENIPUNCTURE: CPT | Performed by: TRANSPLANT SURGERY

## 2024-10-03 PROCEDURE — 250N000013 HC RX MED GY IP 250 OP 250 PS 637: Performed by: NURSE PRACTITIONER

## 2024-10-03 PROCEDURE — 250N000013 HC RX MED GY IP 250 OP 250 PS 637

## 2024-10-03 PROCEDURE — 97530 THERAPEUTIC ACTIVITIES: CPT | Mod: GP

## 2024-10-03 PROCEDURE — 80197 ASSAY OF TACROLIMUS: CPT | Performed by: STUDENT IN AN ORGANIZED HEALTH CARE EDUCATION/TRAINING PROGRAM

## 2024-10-03 PROCEDURE — 85025 COMPLETE CBC W/AUTO DIFF WBC: CPT | Performed by: STUDENT IN AN ORGANIZED HEALTH CARE EDUCATION/TRAINING PROGRAM

## 2024-10-03 PROCEDURE — 84100 ASSAY OF PHOSPHORUS: CPT | Performed by: STUDENT IN AN ORGANIZED HEALTH CARE EDUCATION/TRAINING PROGRAM

## 2024-10-03 PROCEDURE — 250N000013 HC RX MED GY IP 250 OP 250 PS 637: Performed by: HOSPITALIST

## 2024-10-03 PROCEDURE — 120N000011 HC R&B TRANSPLANT UMMC

## 2024-10-03 PROCEDURE — 97110 THERAPEUTIC EXERCISES: CPT | Mod: GP

## 2024-10-03 PROCEDURE — 97530 THERAPEUTIC ACTIVITIES: CPT | Mod: GO

## 2024-10-03 PROCEDURE — 250N000012 HC RX MED GY IP 250 OP 636 PS 637: Performed by: NURSE PRACTITIONER

## 2024-10-03 PROCEDURE — 36415 COLL VENOUS BLD VENIPUNCTURE: CPT | Performed by: STUDENT IN AN ORGANIZED HEALTH CARE EDUCATION/TRAINING PROGRAM

## 2024-10-03 RX ORDER — CARBOXYMETHYLCELLULOSE SODIUM 5 MG/ML
1 SOLUTION/ DROPS OPHTHALMIC
Status: DISCONTINUED | OUTPATIENT
Start: 2024-10-03 | End: 2024-10-05

## 2024-10-03 RX ORDER — TACROLIMUS 1 MG/1
1 CAPSULE ORAL ONCE
Status: COMPLETED | OUTPATIENT
Start: 2024-10-03 | End: 2024-10-03

## 2024-10-03 RX ORDER — OXYCODONE HYDROCHLORIDE 5 MG/1
5 TABLET ORAL EVERY 6 HOURS PRN
Status: DISCONTINUED | OUTPATIENT
Start: 2024-10-03 | End: 2024-10-06

## 2024-10-03 RX ORDER — MAGNESIUM OXIDE 400 MG/1
800 TABLET ORAL DAILY
Status: DISCONTINUED | OUTPATIENT
Start: 2024-10-03 | End: 2024-10-04

## 2024-10-03 RX ORDER — MAGNESIUM SULFATE HEPTAHYDRATE 40 MG/ML
2 INJECTION, SOLUTION INTRAVENOUS ONCE
Status: COMPLETED | OUTPATIENT
Start: 2024-10-03 | End: 2024-10-03

## 2024-10-03 RX ORDER — OXYCODONE HYDROCHLORIDE 10 MG/1
10 TABLET ORAL EVERY 6 HOURS PRN
Status: DISCONTINUED | OUTPATIENT
Start: 2024-10-03 | End: 2024-10-06

## 2024-10-03 RX ORDER — TAMSULOSIN HYDROCHLORIDE 0.4 MG/1
0.4 CAPSULE ORAL DAILY
Status: DISPENSED | OUTPATIENT
Start: 2024-10-03 | End: 2024-10-10

## 2024-10-03 RX ORDER — TACROLIMUS 1 MG/1
4 CAPSULE ORAL
Status: DISCONTINUED | OUTPATIENT
Start: 2024-10-03 | End: 2024-10-04

## 2024-10-03 RX ADMIN — ASPIRIN 81 MG CHEWABLE TABLET 81 MG: 81 TABLET CHEWABLE at 09:20

## 2024-10-03 RX ADMIN — SODIUM PHOSPHATE, DIBASIC, ANHYDROUS, POTASSIUM PHOSPHATE, MONOBASIC, AND SODIUM PHOSPHATE, MONOBASIC, MONOHYDRATE 250 MG: 852; 155; 130 TABLET, COATED ORAL at 09:19

## 2024-10-03 RX ADMIN — PANTOPRAZOLE SODIUM 40 MG: 40 TABLET, DELAYED RELEASE ORAL at 09:19

## 2024-10-03 RX ADMIN — METHOCARBAMOL 1000 MG: 500 TABLET ORAL at 22:48

## 2024-10-03 RX ADMIN — OXYCODONE HYDROCHLORIDE 10 MG: 10 TABLET ORAL at 22:48

## 2024-10-03 RX ADMIN — THIAMINE HCL TAB 100 MG 100 MG: 100 TAB at 09:19

## 2024-10-03 RX ADMIN — SODIUM PHOSPHATE, DIBASIC, ANHYDROUS, POTASSIUM PHOSPHATE, MONOBASIC, AND SODIUM PHOSPHATE, MONOBASIC, MONOHYDRATE 250 MG: 852; 155; 130 TABLET, COATED ORAL at 20:57

## 2024-10-03 RX ADMIN — FLUCONAZOLE 400 MG: 200 TABLET ORAL at 09:18

## 2024-10-03 RX ADMIN — VALGANCICLOVIR HYDROCHLORIDE 450 MG: 450 TABLET ORAL at 09:18

## 2024-10-03 RX ADMIN — SULFAMETHOXAZOLE AND TRIMETHOPRIM 1 TABLET: 400; 80 TABLET ORAL at 09:18

## 2024-10-03 RX ADMIN — HYDROXYZINE HYDROCHLORIDE 50 MG: 50 TABLET, FILM COATED ORAL at 21:53

## 2024-10-03 RX ADMIN — OXYCODONE HYDROCHLORIDE 10 MG: 10 TABLET ORAL at 15:54

## 2024-10-03 RX ADMIN — HYDROXYZINE HYDROCHLORIDE 50 MG: 50 TABLET, FILM COATED ORAL at 05:32

## 2024-10-03 RX ADMIN — FOLIC ACID 1 MG: 1 TABLET ORAL at 09:18

## 2024-10-03 RX ADMIN — METHOCARBAMOL 1000 MG: 500 TABLET ORAL at 05:14

## 2024-10-03 RX ADMIN — MAGNESIUM OXIDE TAB 400 MG (241.3 MG ELEMENTAL MG) 800 MG: 400 (241.3 MG) TAB at 09:20

## 2024-10-03 RX ADMIN — MAGNESIUM SULFATE HEPTAHYDRATE 2 G: 2 INJECTION, SOLUTION INTRAVENOUS at 09:32

## 2024-10-03 RX ADMIN — URSODIOL 300 MG: 300 CAPSULE ORAL at 09:19

## 2024-10-03 RX ADMIN — PREDNISONE 10 MG: 10 TABLET ORAL at 09:19

## 2024-10-03 RX ADMIN — TACROLIMUS 1 MG: 1 CAPSULE ORAL at 12:54

## 2024-10-03 RX ADMIN — Medication 1 DROP: at 13:46

## 2024-10-03 RX ADMIN — SENNOSIDES 2 TABLET: 8.6 TABLET, FILM COATED ORAL at 20:57

## 2024-10-03 RX ADMIN — TACROLIMUS 3 MG: 1 CAPSULE ORAL at 09:19

## 2024-10-03 RX ADMIN — METHOCARBAMOL 1000 MG: 500 TABLET ORAL at 15:53

## 2024-10-03 RX ADMIN — MYCOPHENOLATE MOFETIL 750 MG: 250 CAPSULE ORAL at 18:34

## 2024-10-03 RX ADMIN — METHOCARBAMOL 1000 MG: 500 TABLET ORAL at 10:44

## 2024-10-03 RX ADMIN — TACROLIMUS 4 MG: 1 CAPSULE ORAL at 18:23

## 2024-10-03 RX ADMIN — HYDROXYZINE HYDROCHLORIDE 50 MG: 50 TABLET, FILM COATED ORAL at 13:40

## 2024-10-03 RX ADMIN — MYCOPHENOLATE MOFETIL 750 MG: 250 CAPSULE ORAL at 09:18

## 2024-10-03 RX ADMIN — INSULIN ASPART 2 UNITS: 100 INJECTION, SOLUTION INTRAVENOUS; SUBCUTANEOUS at 12:50

## 2024-10-03 RX ADMIN — TAMSULOSIN HYDROCHLORIDE 0.4 MG: 0.4 CAPSULE ORAL at 09:19

## 2024-10-03 RX ADMIN — OXYCODONE HYDROCHLORIDE 10 MG: 10 TABLET ORAL at 09:16

## 2024-10-03 ASSESSMENT — ACTIVITIES OF DAILY LIVING (ADL)
ADLS_ACUITY_SCORE: 30
ADLS_ACUITY_SCORE: 32
ADLS_ACUITY_SCORE: 30
ADLS_ACUITY_SCORE: 30
ADLS_ACUITY_SCORE: 32
ADLS_ACUITY_SCORE: 30
ADLS_ACUITY_SCORE: 32
ADLS_ACUITY_SCORE: 30
ADLS_ACUITY_SCORE: 32
ADLS_ACUITY_SCORE: 33
ADLS_ACUITY_SCORE: 30
ADLS_ACUITY_SCORE: 32

## 2024-10-03 NOTE — PROGRESS NOTES
Immunosuppression Management Note:    Jag Smith is a 39 year old male who is seen today  for immunosuppression management     I, Russell Waters MD, I have examined the patient with our AZALIA/Fellow as part of a shared visit.   I participated in the rounds,  discussed and agree with the note and findings and  reviewed today's vital signs, medications, labs and imaging as noted in this note.  I  reviewed the  immunosuppression medications.  I personally provided a substantive portion of the care of this patient. I personally performed the immunosuppressive management of this patient, reviewed the overall  immunosuppression including drug levels, allograft function and provided the recommendations to adjust the dose to provide optimal levels to prevent rejection of the allograft and prevent toxicity to the organs. This was complex care due to the fresh allograft.   Time spent: evaluating patient, examining patient, discussion of plan, counseling and documentation: >35 min   I spoke to the patient/family and explained below clinical details and answered all the questions    Transplant Surgery  Inpatient Daily Progress Note  10/03/2024    Assessment & Plan: Jag Smith is a 39 year old male with recently diagnosed decompensated alcoholic liver cirrhosis transferred from [facility] on 9/21/24 for expedited liver transplant evaluation due to decompensated alcoholic liver cirrhosis. MELD 42. He is now s/p DDLT on 9/28/24 with Dr. Colon.     Changes today:   - Bladder scans w/ PRN straight cath   - Flomax x7 days   - Phospha x2 doses   - Increase MagOx   - Stop ursodiol (no stent)  ___________________________________________    s/p DBD DDLT 9/28/24: POD#5. Transaminases stable, Tbili down. Post-op US with patent doppler.    - Continue ASA.    Immunosuppressed status secondary to medications:   Induction: Steroid taper per protocol.   Maintenance:   -  mg BID   - Tacrolimus goal level  8-12 (fluconazole stops 10/4).    - Prednisone 5 mg daily x3 months after taper    Neurology:  Acute post-op pain: Fair control on current regimen:    - Oxycodone 5-10 mg q6H PRN   - Robaxin 1000 mg Q6H   - Lidocaine patches   - Atarax Q6H PRN    Hematology:   Acute blood loss anemia: Hgb 8-9, stable.  Thrombocytopenia: Plt 80s, monitor.     Cardiorespiratory: No issues.     GI/Nutrition:   Severe malnutrition in the context of acute illness: Nutrition consulted. Continue Regular diet with supplements. Calorie counts demonstrating good PO intake.   Bowel regimen: Senna, PEG.     Endocrine:   Steroid induced hyperglycemia: Hgb A1c 5.4%. BG <200. Continue sliding scale insulin.    Fluid/Electrolytes/Renal:   Hyponatremia: Na 132, monitor.   Hypomagnesemia: Increase Mag-Ox to 800 mg daily.   DENI: Nephrology consulted. 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 Cr 1.4->0.9.  Hypophosphatemia: Give Phospha x2 doses.    :   Acute urinary retention: Straight cath for 600ml overnight. Possibly r/t opioids.   -Start Flomax x7 days  -Bladder scan q shift & PRN  -Straight cath PRN    Infectious disease: No acute issues.     Skin:  Medical device related mucosal pressure injury of the lips, hospital acquired: WOC consulted.    Prophylaxis: DVT, fall, GI (PPI), fungal (fluconazole per protocol), viral (valganciclovir), pneumocystis (Bactrim)    Disposition: 7A; plan for discharge locally as soon as Friday    AZALIA/Fellow/Resident Provider: Maddi Culver NP  #[id number]    Faculty: Russell Waters MD   __________________________________________________________________    Interval History:   Overnight events: Urinary retention overnight requiring st cath. 600ml out. Incisional pain.    ROS:   A 10-point review of systems was negative except as noted above.    Curent Meds:  Current Facility-Administered Medications   Medication Dose Route Frequency Provider Last Rate Last Admin    aspirin (ASA) chewable tablet 81 mg  81 mg Oral or  Feeding Tube Daily Maddi Culver APRN CNP   81 mg at 10/02/24 0746    fluconazole (DIFLUCAN) tablet 400 mg  400 mg Oral Daily Gretchen Patino NP   400 mg at 10/02/24 0752    folic acid (FOLVITE) tablet 1 mg  1 mg Oral Daily José Miguel Aviles MD   1 mg at 10/02/24 0749    insulin aspart (NovoLOG) injection (RAPID ACTING)  1-10 Units Subcutaneous TID AC Gretchen Patino NP   2 Units at 10/02/24 1723    insulin aspart (NovoLOG) injection (RAPID ACTING)  1-7 Units Subcutaneous At Bedtime Gretchen Patino NP        Lidocaine (LIDOCARE) 4 % Patch 2 patch  2 patch Transdermal Q24h Ashley Loyd MD   2 patch at 09/30/24 2004    magnesium oxide (MAG-OX) tablet 400 mg  400 mg Oral Daily Gretchen Patino NP        methocarbamol (ROBAXIN) tablet 1,000 mg  1,000 mg Oral Q6H Paresh Lara MD   1,000 mg at 10/03/24 0514    mycophenolate (GENERIC EQUIVALENT) capsule 750 mg  750 mg Oral BID IS Dominick Tejada MD   750 mg at 10/02/24 1722    pantoprazole (PROTONIX) EC tablet 40 mg  40 mg Oral Daily Gretchen Patino NP   40 mg at 10/02/24 0749    polyethylene glycol (MIRALAX) Packet 17 g  17 g Oral Daily Maddi Culver APRN CNP   17 g at 10/01/24 0830    predniSONE (DELTASONE) tablet 10 mg  10 mg Oral Once Dominick Tejada MD        [START ON 10/4/2024] predniSONE (DELTASONE) tablet 5 mg  5 mg Oral Daily Abi Quiros PA-C        sennosides (SENOKOT) tablet 2 tablet  2 tablet Oral BID Maddi Culver APRN CNP   2 tablet at 10/01/24 2019    sodium chloride (PF) 0.9% PF flush 3 mL  3 mL Intravenous Q8H Dominick Tejada MD   3 mL at 10/03/24 0514    sodium chloride (PF) 0.9% PF flush 3 mL  3 mL Intracatheter Q8H José Miguel Aviles MD   3 mL at 10/02/24 9479    sulfamethoxazole-trimethoprim (BACTRIM) 400-80 MG per tablet 1 tablet  1 tablet Oral Daily Abi Quiros PA-C   1 tablet at 10/02/24 0784    tacrolimus (GENERIC EQUIVALENT) capsule 3 mg  3 mg  Oral BID IS Gretchen Patino NP   3 mg at 10/02/24 1722    tamsulosin (FLOMAX) capsule 0.4 mg  0.4 mg Oral Daily Maddi Culver APRN CNP        thiamine (B-1) tablet 100 mg  100 mg Oral Daily José Miguel Aviles MD   100 mg at 10/02/24 0749    ursodiol (ACTIGALL) capsule 300 mg  300 mg Oral BID Hardy Flannery MD   300 mg at 10/02/24 2055    valGANciclovir (VALCYTE) tablet 450 mg  450 mg Oral Daily Dominick Tejada MD   450 mg at 10/02/24 0750       Physical Exam:     Admit Weight: 102.3 kg (225 lb 8 oz)    Current Vitals:   /87 (BP Location: Left arm)   Pulse 70   Temp 97.9  F (36.6  C) (Oral)   Resp 18   Ht 1.829 m (6')   Wt 105 kg (231 lb 8 oz)   SpO2 98%   BMI 31.40 kg/m      Vital sign ranges:    Temp:  [97.7  F (36.5  C)-98.2  F (36.8  C)] 97.9  F (36.6  C)  Pulse:  [61-85] 70  Resp:  [16-18] 18  BP: (129-140)/(85-98) 129/87  SpO2:  [98 %-100 %] 98 %    General Appearance: in no apparent distress.   Eyes: Bilateral injection, present prior to transplant. No drainage.  Skin: normal, warm, jaundice  Heart: Perfused  Lungs: Unlabored on RA  Abdomen:  Soft, non distended.  The wound is covered with surgical dressing. SHENA x 2 serosanguinous, abdominal bruising  :  no mckeon  Extremities: generalized edema, legs wrapped  Neurologic: A&Ox4      Frailty Scores           No data to display                Data:   CMP  Recent Labs   Lab 10/03/24  0304 10/02/24  2254 10/02/24  0537 10/02/24  0442 10/01/24  0552 10/01/24  0437 09/29/24  0505 09/29/24  0353 09/28/24  1419 09/28/24  1308 09/27/24  1719 09/27/24  0646   NA  --   --   --  130*  --  131*   < > 139  139   < >  --    < > 131*   POTASSIUM  --   --   --  3.7  --  3.8   < > 4.1  4.1   < >  --    < > 4.5   CHLORIDE  --   --   --  97*  --  94*   < > 100  100   < >  --    < > 104   CO2  --   --   --  21*  --  24   < > 25  25   < >  --    < > 13*   * 138*   < > 108*   < > 108*   < > 161*  165*  165*   < >  --    < > 82    BUN  --   --   --  54.3*  --  60.3*   < > 27.4*  27.4*   < >  --    < > 34.1*   CR  --   --   --  1.39*  --  2.05*   < > 1.56*  1.56*   < >  --    < > 2.27*   GFRESTIMATED  --   --   --  67  --  42*   < > 58*  58*   < >  --    < > 37*   AMAIRANI  --   --   --  8.1*  --  8.3*   < > 9.2  9.2   < >  --    < > 8.9   ICAW  --   --   --   --   --   --   --  4.8  --  5.4*   < >  --    MAG  --   --   --  1.5*  --  1.9   < > 1.4*   < >  --    < > 1.5*   PHOS  --   --   --  3.1  --  4.9*   < > 5.1*   < >  --    < > 3.6   AMYLASE  --   --   --   --   --   --   --  26*  --   --   --  46   LIPASE  --   --   --   --   --   --   --  17  --   --   --   --    ALBUMIN  --   --   --  3.2*  --  3.2*   < > 3.2*  --   --    < > 3.3*   BILITOTAL  --   --   --  5.7*  --  6.4*   < > 9.9*  --   --    < > 38.2*   ALKPHOS  --   --   --  103  --  79   < > 55  --   --    < > 85   AST  --   --   --  57*  --  62*   < > 261*  --   --    < > 129*   ALT  --   --   --  167*  --  194*   < > 249*  --   --    < > 45    < > = values in this interval not displayed.     CBC  Recent Labs   Lab 10/02/24  0442 10/01/24  0437 09/28/24  1215 09/28/24  1207   HGB 8.9* 8.9*   < >  --    WBC 8.1 9.0   < >  --    PLT 78* 79*   < > 59*   A1C  --   --   --  5.4    < > = values in this interval not displayed.

## 2024-10-03 NOTE — PLAN OF CARE
Goal Outcome Evaluation:      Plan of Care Reviewed With: patient    Overall Patient Progress: improvingOverall Patient Progress: improving    Outcome Evaluation: Pt retaining urine overnight, straight cath x1 for 600 mL, continues to have high bladder scan volumes. Pain control okay with PRN and scheduled meds    /87 (BP Location: Left arm)   Pulse 70   Temp 97.9  F (36.6  C) (Oral)   Resp 18   Ht 1.829 m (6')   Wt 105 kg (231 lb 8 oz)   SpO2 100%   BMI 31.40 kg/m      Shift: 6538-2557  Isolation Status: N/A  VS: stable on RA, afebrile  Neuro: A&O x4  Behaviors: receptive to cares  BG: ACHS with 0200: 138, 120  Labs/Imaging: Mag 1.5, LFTs improving; pending AM labs  Respiratory: REYNA per patient  Cardiac: WDL, slightly hypertensive 130s/90s  Pain/Nausea:  C/O nausea after AM meds, told pt to have crackers prior to help mitigate. C/O pain in side and on abdomen  PRN: Atarax 50 mg x2, Oxycodone 10 mg x1  Diet: regular, calorie counts  LDA: R PIV x2; R SHENA drain x2, #2: 0 mL and #3: 100 mL, both drain sites leaky  Infusion(s): N/A  GI/: No BM on shift, LBM 10/2, loose. Voiding spontaneously, AUO but having urinary retention. Straight cathed x1 for 600 mL, bladder scanning significant volumes pre and post void. Encourage to sit on toilet every 2-3 hours, continue to bladder scan and monitor retention symptoms  Skin: clamshell incision stapled and LACHO, leaking on R side; significant abdominal bruising; jaundiced  Mobility: SBA/Ax1 with walker and gait belt  Events/Education: pt not wanting any adhesives or tapes to be used, causes skin tearing and irritation  Plan: Notify MD of any significant changes, continue plan of care. Hand off to be given to oncoming RN.

## 2024-10-03 NOTE — PLAN OF CARE
Goal Outcome Evaluation:      Plan of Care Reviewed With: patient    Overall Patient Progress: improving    Outcome Evaluation: Pt's pain is slightly under control, pt is voiding adequately and bladder scan values have been WNL    Shift: 5484-6462  VS: VSS on RA, afebrile  Neuro: Aox4  Behaviors: cooperative  BG:ACHS  Respiratory: diminished sounds at bases   Cardiac: WDL  Pain/Nausea: reports severe abd pain, denies nausea   PRN: oxycodone, atarax  Diet: regular  IV Access: RPIVx2  Lines/Drains: RJPx2  GI/: voiding , Last BM this shift   Skin: bruising on abd  &forearms, skin tears on Right side of abd,clamshell incision stapled  Mobility: Assist x 1 -2  Plan: monitor and mange pain, encourage ambulation  , limit adhesive use

## 2024-10-03 NOTE — PROVIDER NOTIFICATION
"\"FYI- I was told by the AM nurse that she was getting PVRs over 500 mL after the pt would void 400-500 mL. Pt has significant ascites and says he feels like he is fully emptying his bladder. Does straight cathing seem necessary?\"  "

## 2024-10-03 NOTE — PROGRESS NOTES
Calorie Count  Intake recorded for: 10/2  Total Kcals: 3761 Total Protein: 158g  Kcals from Hospital Food: 3761   Protein: 158g  Kcals from Outside Food (average):0 Protein: 0g  # Meals Ordered from Kitchen: 3  # Meals Recorded: 3 + snacks  (1: 100% oatmeal w/brown sugar, 2 Albanian toast w/butter, syrup, strawberry sauce & whipped topping, mandarin oranges, applesauce, 8oz Lactase milk, 8oz orange juice)  (2: 100% chicken tenders, tater tots, side mac & cheese, 2 mandarin oranges, 6 BBQ sauce, 4 ketchup)  (3: 100% mini corn dogs, side mac & cheese, corn, home fried potatoes, rice krispy bar, 6 ketchup)  (HS snack: 100% 8oz lactase milk, 4oz apple juice, seth crackers, corn flakes)  # Supplements Recorded: 2 (100% 2 - Ensure Max)

## 2024-10-03 NOTE — PROGRESS NOTES
Transplant Social Work Services Progress Note    Data: Jag underwent liver transplant 9/28/2024  Intervention: DONG met with pt and mother, Sania, bedside  Assessment: Sania has been in contact with Ryan at [address], she said the he has an apartment available. Jag has lodging benefits through [facility]. DONG spoke with Mary ([phone]) with the [facility] who stated that they pay up to $148 per night as well as reimbursement for meals (needs to keep itemized receipts). [address] will invoice our lodging specialist/funds coordinator directly and [facility] will reimburse us so that pt does not have to pay upfront for lodging.   Sania asked that this writer call Misha with [employer] ([phone]), they are currently paying for Sania's hotel room at the [facility], to ask they extend the room until Jag is discharged. The room is booked through [facility] morning. This writer called and left a VM.  Education provided by DONG: see above  Plan:    Discharge Plans in Progress: remain local at [address]    Barriers to d/c plan: not medically ready    Follow up Plan: SW will continue to be available for psychosocial support.     Jennifer CABALLERO, LGSW  [facility]  Liver Transplant   Phone: [phone]

## 2024-10-03 NOTE — PROGRESS NOTES
CLINICAL NUTRITION SERVICES - BRIEF NOTE      Reason for RD note: Following up on kcal cts    New Findings/Chart Review:  Kcal cts: Eating very well per kcal cts. Meeting assessed needs yesterday, 10/2. No additional nutrition interventions at this time.    9/30       Total Kcals: 1545     Total Protein: 97g  Kcals from Hospital Food: 1545                     Protein: 97g  Kcals from Outside Food (average):0            Protein: 0g  # Meals Ordered from Kitchen: 3 meals   # Meals Recorded: 2 meals (First - 100% cream of wheat w/ brown sugar, 2 servings mandarin oranges, orange juice, 2 applesauces)                                                   (Second - 100% penne w/ meat sauce, green beans, dinner roll w/ butter, pudding, mandarin oranges, lactose free milk)   # Supplements Recorded: 100% 2 Ensure Max Protein     10/1       Total Kcals: 2475     Total Protein: 161g  Kcals from Hospital Food: 2475                     Protein: 161g  Kcals from Outside Food (average):0            Protein: 0g  # Meals Ordered from Kitchen: 3 meals   # Meals Recorded: 3 meals (First - 100% honey nut cheerios w/ 4oz skim milk, 2 servings mandarin oranges, 75% orange juice, 50% 2 breakfast tacos with egg, sausage, cheese, sour cream & picante sauce)                                                   (Second - 100% small apple juice, small orange juice, chili w/ saltines, grilled cheese, 2 servings peaches, chocolate chip cookie, strawberry kiwi water)  (Third - 100% mandarin oranges, chocolate pudding; 5% penne w/chicken and angel, garlic green beans)  # Supplements Recorded: 100% 3 Ensure Max Protein     10/2       Total Kcals: 3761     Total Protein: 158g  Kcals from Hospital Food: 3761                                 Protein: 158g  Kcals from Outside Food (average):0            Protein: 0g  # Meals Ordered from Kitchen: 3  # Meals Recorded: 3 + snacks  (1: 100% oatmeal w/brown sugar, 2 [place] Leonean toast w/butter, syrup, strawberry sauce  & whipped topping, mandarin oranges, applesauce, 8oz Lactase milk, 8oz orange juice)  (2: 100% chicken tenders, tater tots, side mac & cheese, 2 mandarin oranges, 6 BBQ sauce, 4 ketchup)  (3: 100% mini corn dogs, side mac & cheese, corn, home fried potatoes, rice krispy bar, 6 ketchup)  (HS snack: 100% 8oz lactase milk, 4oz apple juice, seth crackers, corn flakes)  # Supplements Recorded: 2 (100% 2 - Ensure Max)    Interventions:  Continue Ensure Max supplements    Nutrition will continue to follow per protocol.    Kiki Stokes RD, LD  Available on Vocera - can search by name or unit Dietitian  **Clinical Nutrition is no longer available via pager

## 2024-10-04 ENCOUNTER — APPOINTMENT (OUTPATIENT)
Dept: PHYSICAL THERAPY | Facility: CLINIC | Age: 39
End: 2024-10-04
Attending: PEDIATRICS
Payer: MEDICAID

## 2024-10-04 ENCOUNTER — APPOINTMENT (OUTPATIENT)
Dept: OCCUPATIONAL THERAPY | Facility: CLINIC | Age: 39
End: 2024-10-04
Attending: PEDIATRICS
Payer: MEDICAID

## 2024-10-04 LAB
ALBUMIN SERPL BCG-MCNC: 3 G/DL (ref 3.5–5.2)
ALP SERPL-CCNC: 159 U/L (ref 40–150)
ALT SERPL W P-5'-P-CCNC: 187 U/L (ref 0–70)
ANION GAP SERPL CALCULATED.3IONS-SCNC: 8 MMOL/L (ref 7–15)
AST SERPL W P-5'-P-CCNC: 64 U/L (ref 0–45)
BILIRUB DIRECT SERPL-MCNC: 3.61 MG/DL (ref 0–0.3)
BILIRUB SERPL-MCNC: 5 MG/DL
BUN SERPL-MCNC: 23 MG/DL (ref 6–20)
CALCIUM SERPL-MCNC: 7.9 MG/DL (ref 8.8–10.4)
CHLORIDE SERPL-SCNC: 103 MMOL/L (ref 98–107)
CREAT SERPL-MCNC: 0.73 MG/DL (ref 0.67–1.17)
EGFRCR SERPLBLD CKD-EPI 2021: >90 ML/MIN/1.73M2
ERYTHROCYTE [DISTWIDTH] IN BLOOD BY AUTOMATED COUNT: 18.1 % (ref 10–15)
GLUCOSE BLDC GLUCOMTR-MCNC: 102 MG/DL (ref 70–99)
GLUCOSE BLDC GLUCOMTR-MCNC: 124 MG/DL (ref 70–99)
GLUCOSE BLDC GLUCOMTR-MCNC: 127 MG/DL (ref 70–99)
GLUCOSE BLDC GLUCOMTR-MCNC: 146 MG/DL (ref 70–99)
GLUCOSE BLDC GLUCOMTR-MCNC: 154 MG/DL (ref 70–99)
GLUCOSE BLDC GLUCOMTR-MCNC: 168 MG/DL (ref 70–99)
GLUCOSE SERPL-MCNC: 118 MG/DL (ref 70–99)
HCO3 SERPL-SCNC: 21 MMOL/L (ref 22–29)
HCT VFR BLD AUTO: 28.9 % (ref 40–53)
HGB BLD-MCNC: 9.9 G/DL (ref 13.3–17.7)
MAGNESIUM SERPL-MCNC: 1.4 MG/DL (ref 1.7–2.3)
MCH RBC QN AUTO: 31.2 PG (ref 26.5–33)
MCHC RBC AUTO-ENTMCNC: 34.3 G/DL (ref 31.5–36.5)
MCV RBC AUTO: 91 FL (ref 78–100)
PHOSPHATE SERPL-MCNC: 1.7 MG/DL (ref 2.5–4.5)
PLATELET # BLD AUTO: 84 10E3/UL (ref 150–450)
POTASSIUM SERPL-SCNC: 3.7 MMOL/L (ref 3.4–5.3)
PROT SERPL-MCNC: 5.1 G/DL (ref 6.4–8.3)
RBC # BLD AUTO: 3.17 10E6/UL (ref 4.4–5.9)
SODIUM SERPL-SCNC: 132 MMOL/L (ref 135–145)
TACROLIMUS BLD-MCNC: 6.4 UG/L (ref 5–15)
TME LAST DOSE: NORMAL H
TME LAST DOSE: NORMAL H
WBC # BLD AUTO: 8.3 10E3/UL (ref 4–11)

## 2024-10-04 PROCEDURE — 97116 GAIT TRAINING THERAPY: CPT | Mod: GP

## 2024-10-04 PROCEDURE — 36415 COLL VENOUS BLD VENIPUNCTURE: CPT | Performed by: STUDENT IN AN ORGANIZED HEALTH CARE EDUCATION/TRAINING PROGRAM

## 2024-10-04 PROCEDURE — 83735 ASSAY OF MAGNESIUM: CPT | Performed by: STUDENT IN AN ORGANIZED HEALTH CARE EDUCATION/TRAINING PROGRAM

## 2024-10-04 PROCEDURE — 82374 ASSAY BLOOD CARBON DIOXIDE: CPT | Performed by: STUDENT IN AN ORGANIZED HEALTH CARE EDUCATION/TRAINING PROGRAM

## 2024-10-04 PROCEDURE — 250N000011 HC RX IP 250 OP 636: Performed by: PHYSICIAN ASSISTANT

## 2024-10-04 PROCEDURE — 80197 ASSAY OF TACROLIMUS: CPT | Performed by: STUDENT IN AN ORGANIZED HEALTH CARE EDUCATION/TRAINING PROGRAM

## 2024-10-04 PROCEDURE — 250N000013 HC RX MED GY IP 250 OP 250 PS 637: Performed by: PHYSICIAN ASSISTANT

## 2024-10-04 PROCEDURE — 82435 ASSAY OF BLOOD CHLORIDE: CPT | Performed by: STUDENT IN AN ORGANIZED HEALTH CARE EDUCATION/TRAINING PROGRAM

## 2024-10-04 PROCEDURE — 84155 ASSAY OF PROTEIN SERUM: CPT | Performed by: STUDENT IN AN ORGANIZED HEALTH CARE EDUCATION/TRAINING PROGRAM

## 2024-10-04 PROCEDURE — 250N000013 HC RX MED GY IP 250 OP 250 PS 637: Performed by: NURSE PRACTITIONER

## 2024-10-04 PROCEDURE — 250N000012 HC RX MED GY IP 250 OP 636 PS 637: Performed by: PHYSICIAN ASSISTANT

## 2024-10-04 PROCEDURE — 97530 THERAPEUTIC ACTIVITIES: CPT | Mod: GP

## 2024-10-04 PROCEDURE — 250N000012 HC RX MED GY IP 250 OP 636 PS 637: Performed by: STUDENT IN AN ORGANIZED HEALTH CARE EDUCATION/TRAINING PROGRAM

## 2024-10-04 PROCEDURE — 80053 COMPREHEN METABOLIC PANEL: CPT | Performed by: STUDENT IN AN ORGANIZED HEALTH CARE EDUCATION/TRAINING PROGRAM

## 2024-10-04 PROCEDURE — 250N000012 HC RX MED GY IP 250 OP 636 PS 637: Performed by: NURSE PRACTITIONER

## 2024-10-04 PROCEDURE — 97535 SELF CARE MNGMENT TRAINING: CPT | Mod: GO | Performed by: OCCUPATIONAL THERAPIST

## 2024-10-04 PROCEDURE — 85014 HEMATOCRIT: CPT | Performed by: NURSE PRACTITIONER

## 2024-10-04 PROCEDURE — 84100 ASSAY OF PHOSPHORUS: CPT | Performed by: STUDENT IN AN ORGANIZED HEALTH CARE EDUCATION/TRAINING PROGRAM

## 2024-10-04 PROCEDURE — 250N000013 HC RX MED GY IP 250 OP 250 PS 637: Performed by: STUDENT IN AN ORGANIZED HEALTH CARE EDUCATION/TRAINING PROGRAM

## 2024-10-04 PROCEDURE — 97530 THERAPEUTIC ACTIVITIES: CPT | Mod: GO | Performed by: OCCUPATIONAL THERAPIST

## 2024-10-04 PROCEDURE — 250N000013 HC RX MED GY IP 250 OP 250 PS 637: Performed by: HOSPITALIST

## 2024-10-04 PROCEDURE — 250N000013 HC RX MED GY IP 250 OP 250 PS 637

## 2024-10-04 PROCEDURE — 120N000011 HC R&B TRANSPLANT UMMC

## 2024-10-04 RX ORDER — OXYCODONE HYDROCHLORIDE 10 MG/1
10 TABLET ORAL ONCE
Status: COMPLETED | OUTPATIENT
Start: 2024-10-04 | End: 2024-10-04

## 2024-10-04 RX ORDER — NYSTATIN 100000/ML
500000 SUSPENSION, ORAL (FINAL DOSE FORM) ORAL 4 TIMES DAILY
Status: DISPENSED | OUTPATIENT
Start: 2024-10-05

## 2024-10-04 RX ORDER — NYSTATIN 100000/ML
500000 SUSPENSION, ORAL (FINAL DOSE FORM) ORAL 4 TIMES DAILY
Status: DISCONTINUED | OUTPATIENT
Start: 2024-10-04 | End: 2024-10-04

## 2024-10-04 RX ORDER — MAGNESIUM SULFATE HEPTAHYDRATE 40 MG/ML
4 INJECTION, SOLUTION INTRAVENOUS ONCE
Status: COMPLETED | OUTPATIENT
Start: 2024-10-04 | End: 2024-10-04

## 2024-10-04 RX ORDER — MAGNESIUM OXIDE 400 MG/1
800 TABLET ORAL 2 TIMES DAILY
Status: DISPENSED | OUTPATIENT
Start: 2024-10-04

## 2024-10-04 RX ORDER — TACROLIMUS 5 MG/1
5 CAPSULE ORAL
Status: DISCONTINUED | OUTPATIENT
Start: 2024-10-04 | End: 2024-10-06

## 2024-10-04 RX ADMIN — INSULIN ASPART 2 UNITS: 100 INJECTION, SOLUTION INTRAVENOUS; SUBCUTANEOUS at 14:44

## 2024-10-04 RX ADMIN — OXYCODONE HYDROCHLORIDE 10 MG: 10 TABLET ORAL at 17:43

## 2024-10-04 RX ADMIN — ASPIRIN 81 MG CHEWABLE TABLET 81 MG: 81 TABLET CHEWABLE at 09:24

## 2024-10-04 RX ADMIN — TAMSULOSIN HYDROCHLORIDE 0.4 MG: 0.4 CAPSULE ORAL at 09:23

## 2024-10-04 RX ADMIN — FOLIC ACID 1 MG: 1 TABLET ORAL at 09:24

## 2024-10-04 RX ADMIN — MAGNESIUM OXIDE TAB 400 MG (241.3 MG ELEMENTAL MG) 800 MG: 400 (241.3 MG) TAB at 20:43

## 2024-10-04 RX ADMIN — SULFAMETHOXAZOLE AND TRIMETHOPRIM 1 TABLET: 400; 80 TABLET ORAL at 09:21

## 2024-10-04 RX ADMIN — Medication 1 DROP: at 17:48

## 2024-10-04 RX ADMIN — VALGANCICLOVIR HYDROCHLORIDE 450 MG: 450 TABLET ORAL at 09:24

## 2024-10-04 RX ADMIN — SENNOSIDES 2 TABLET: 8.6 TABLET, FILM COATED ORAL at 09:38

## 2024-10-04 RX ADMIN — OXYCODONE HYDROCHLORIDE 10 MG: 10 TABLET ORAL at 02:20

## 2024-10-04 RX ADMIN — POLYETHYLENE GLYCOL 3350 17 G: 17 POWDER, FOR SOLUTION ORAL at 09:38

## 2024-10-04 RX ADMIN — OXYCODONE HYDROCHLORIDE 10 MG: 10 TABLET ORAL at 11:41

## 2024-10-04 RX ADMIN — METHOCARBAMOL 1000 MG: 500 TABLET ORAL at 05:34

## 2024-10-04 RX ADMIN — MYCOPHENOLATE MOFETIL 750 MG: 250 CAPSULE ORAL at 09:22

## 2024-10-04 RX ADMIN — MYCOPHENOLATE MOFETIL 750 MG: 250 CAPSULE ORAL at 17:44

## 2024-10-04 RX ADMIN — SODIUM PHOSPHATE, DIBASIC, ANHYDROUS, POTASSIUM PHOSPHATE, MONOBASIC, AND SODIUM PHOSPHATE, MONOBASIC, MONOHYDRATE 500 MG: 852; 155; 130 TABLET, COATED ORAL at 14:44

## 2024-10-04 RX ADMIN — OXYCODONE HYDROCHLORIDE 10 MG: 10 TABLET ORAL at 05:34

## 2024-10-04 RX ADMIN — METHOCARBAMOL 1000 MG: 500 TABLET ORAL at 22:24

## 2024-10-04 RX ADMIN — METHOCARBAMOL 1000 MG: 500 TABLET ORAL at 10:49

## 2024-10-04 RX ADMIN — FLUCONAZOLE 400 MG: 200 TABLET ORAL at 09:18

## 2024-10-04 RX ADMIN — SODIUM PHOSPHATE, DIBASIC, ANHYDROUS, POTASSIUM PHOSPHATE, MONOBASIC, AND SODIUM PHOSPHATE, MONOBASIC, MONOHYDRATE 500 MG: 852; 155; 130 TABLET, COATED ORAL at 20:43

## 2024-10-04 RX ADMIN — PANTOPRAZOLE SODIUM 40 MG: 40 TABLET, DELAYED RELEASE ORAL at 09:24

## 2024-10-04 RX ADMIN — INSULIN ASPART 1 UNITS: 100 INJECTION, SOLUTION INTRAVENOUS; SUBCUTANEOUS at 17:59

## 2024-10-04 RX ADMIN — PREDNISONE 5 MG: 5 TABLET ORAL at 09:24

## 2024-10-04 RX ADMIN — METHOCARBAMOL 1000 MG: 500 TABLET ORAL at 17:44

## 2024-10-04 RX ADMIN — HYDROXYZINE HYDROCHLORIDE 25 MG: 25 TABLET, FILM COATED ORAL at 10:50

## 2024-10-04 RX ADMIN — TACROLIMUS 4 MG: 1 CAPSULE ORAL at 09:21

## 2024-10-04 RX ADMIN — HYDROXYZINE HYDROCHLORIDE 25 MG: 25 TABLET, FILM COATED ORAL at 22:24

## 2024-10-04 RX ADMIN — TACROLIMUS 5 MG: 5 CAPSULE ORAL at 17:44

## 2024-10-04 RX ADMIN — INSULIN ASPART 1 UNITS: 100 INJECTION, SOLUTION INTRAVENOUS; SUBCUTANEOUS at 09:44

## 2024-10-04 RX ADMIN — SODIUM PHOSPHATE, DIBASIC, ANHYDROUS, POTASSIUM PHOSPHATE, MONOBASIC, AND SODIUM PHOSPHATE, MONOBASIC, MONOHYDRATE 500 MG: 852; 155; 130 TABLET, COATED ORAL at 09:18

## 2024-10-04 RX ADMIN — Medication 1 DROP: at 10:49

## 2024-10-04 RX ADMIN — THIAMINE HCL TAB 100 MG 100 MG: 100 TAB at 09:40

## 2024-10-04 RX ADMIN — MAGNESIUM SULFATE IN WATER 4 G: 40 INJECTION, SOLUTION INTRAVENOUS at 09:21

## 2024-10-04 ASSESSMENT — ACTIVITIES OF DAILY LIVING (ADL)
ADLS_ACUITY_SCORE: 27
ADLS_ACUITY_SCORE: 30
ADLS_ACUITY_SCORE: 27
ADLS_ACUITY_SCORE: 27
ADLS_ACUITY_SCORE: 30
ADLS_ACUITY_SCORE: 27
ADLS_ACUITY_SCORE: 30
ADLS_ACUITY_SCORE: 27
ADLS_ACUITY_SCORE: 30

## 2024-10-04 NOTE — PROGRESS NOTES
Transplant Surgery  Inpatient Daily Progress Note  10/04/2024    Assessment & Plan: Jag Smith is a 39 year old male with recently diagnosed decompensated alcoholic liver cirrhosis transferred from [facility] on 9/21/24 for expedited liver transplant evaluation due to decompensated alcoholic liver cirrhosis. MELD 42. He is now s/p DDLT on 9/28/24 with Dr. Colon.     Changes today:   - Continue to check PVRs today   - Replace Mg  ___________________________________________    s/p DBD DDLT 9/28/24: POD#6. Alk phos/TB/AST/ALT slight increase today. Post-op US with patent doppler.    - Continue ASA.   - Titrate tac dose to goal    Immunosuppressed status secondary to medications:   Induction: Steroid taper per protocol.   Maintenance:   -  mg BID   - Tacrolimus goal level 8-12 (fluconazole stops 10/4).    - Prednisone 5 mg daily x3 months after taper    Neurology:  Acute post-op pain: Fair control on current regimen:    - Oxycodone 5-10 mg q6H PRN   - Robaxin 1000 mg Q6H   - Lidocaine patches   - Atarax Q6H PRN    Hematology:   Acute blood loss anemia: Hgb 8-9, stable.  Thrombocytopenia: Plt 80s, monitor.     Cardiorespiratory: No issues.     GI/Nutrition:   Severe malnutrition in the context of acute illness: Nutrition consulted. Continue Regular diet with supplements. Calorie counts demonstrating good PO intake.   Bowel regimen: Senna, PEG.     Endocrine:   Steroid induced hyperglycemia: Hgb A1c 5.4%. BG <200. Continue sliding scale insulin.    Fluid/Electrolytes/Renal:   Hyponatremia: Na 132, monitor.   Hypomagnesemia: Increase Mag-Ox to 800 mg BID.   DENI: Nephrology consulted. 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 Cr 1.4->0.9->0.7.  Hypophosphatemia: Give Phospha x 3 doses.    :   Acute urinary retention: Possibly r/t opioids. , 350.   -Continue flomax Flomax x7 days  -Bladder scan q shift & PRN  -Straight cath PRN    Infectious disease: No acute issues.     Skin:  Medical  device related mucosal pressure injury of the lips, hospital acquired: WOC consulted.    Prophylaxis: DVT, fall, GI (PPI), fungal (fluconazole per protocol), viral (valganciclovir), pneumocystis (Bactrim)    Disposition: 7A; Plan for discharge locally. Plan for discharge on Monday    AZALIA/Fellow/Resident Provider: KAREEM Varghese  #[id number]    Faculty: Russell Waters MD   __________________________________________________________________    Interval History:   Overnight events: C/o b/l eye irritation. Improved with lubricating eye drops. Ambulating. Pain control adequate with medication. Tolerating diet. +BM.    ROS:   A 10-point review of systems was negative except as noted above.    Curent Meds:  Current Facility-Administered Medications   Medication Dose Route Frequency Provider Last Rate Last Admin    aspirin (ASA) chewable tablet 81 mg  81 mg Oral or Feeding Tube Daily Maddi Culver APRN CNP   81 mg at 10/03/24 0920    fluconazole (DIFLUCAN) tablet 400 mg  400 mg Oral Daily Gretchen Patino NP   400 mg at 10/03/24 0918    folic acid (FOLVITE) tablet 1 mg  1 mg Oral Daily José Miguel Aviles MD   1 mg at 10/03/24 0918    insulin aspart (NovoLOG) injection (RAPID ACTING)  1-10 Units Subcutaneous TID AC Gretchen Patino NP   2 Units at 10/03/24 1250    insulin aspart (NovoLOG) injection (RAPID ACTING)  1-7 Units Subcutaneous At Bedtime Gretchen Patino NP        Lidocaine (LIDOCARE) 4 % Patch 2 patch  2 patch Transdermal Q24h Ashley Loyd MD   2 patch at 09/30/24 2004    magnesium oxide (MAG-OX) tablet 800 mg  800 mg Oral Daily Maddi Culver APRN CNP   800 mg at 10/03/24 0920    methocarbamol (ROBAXIN) tablet 1,000 mg  1,000 mg Oral Q6H Paresh Lara MD   1,000 mg at 10/04/24 0534    mycophenolate (GENERIC EQUIVALENT) capsule 750 mg  750 mg Oral BID IS Dominick Tejada MD   750 mg at 10/03/24 6640    pantoprazole (PROTONIX) EC tablet 40 mg  40 mg Oral Daily  Gretchen Patino NP   40 mg at 10/03/24 0919    polyethylene glycol (MIRALAX) Packet 17 g  17 g Oral Daily Maddi Culver APRN CNP   17 g at 10/01/24 0830    predniSONE (DELTASONE) tablet 5 mg  5 mg Oral Daily Abi Quiros PA-C        sennosides (SENOKOT) tablet 2 tablet  2 tablet Oral BID Maddi Culver APRN CNP   2 tablet at 10/03/24 2057    sodium chloride (PF) 0.9% PF flush 3 mL  3 mL Intravenous Q8H Dominick Tejada MD   3 mL at 10/03/24 2249    sodium chloride (PF) 0.9% PF flush 3 mL  3 mL Intracatheter Q8H José Miguel Aviles MD   3 mL at 10/04/24 0106    sulfamethoxazole-trimethoprim (BACTRIM) 400-80 MG per tablet 1 tablet  1 tablet Oral Daily Abi Quiros PA-C   1 tablet at 10/03/24 0918    tacrolimus (GENERIC EQUIVALENT) capsule 4 mg  4 mg Oral BID IS Maddi Culver APRN CNP   4 mg at 10/03/24 1823    tamsulosin (FLOMAX) capsule 0.4 mg  0.4 mg Oral Daily Maddi Culver APRN CNP   0.4 mg at 10/03/24 0919    thiamine (B-1) tablet 100 mg  100 mg Oral Daily José Miguel Aviles MD   100 mg at 10/03/24 0919    valGANciclovir (VALCYTE) tablet 450 mg  450 mg Oral Daily Dominick Tejada MD   450 mg at 10/03/24 0918       Physical Exam:     Admit Weight: 102.3 kg (225 lb 8 oz)    Current Vitals:   BP (!) 136/92 (BP Location: Left arm)   Pulse 76   Temp 98  F (36.7  C) (Oral)   Resp 16   Ht 1.829 m (6')   Wt 104.6 kg (230 lb 11.2 oz)   SpO2 100%   BMI 31.29 kg/m      Vital sign ranges:    Temp:  [98  F (36.7  C)-98.8  F (37.1  C)] 98  F (36.7  C)  Pulse:  [71-88] 76  Resp:  [16-18] 16  BP: (132-138)/(85-98) 136/92  SpO2:  [97 %-100 %] 100 %    General Appearance: in no apparent distress.   Eyes: Bilateral injection, present prior to transplant. No drainage.  Skin: normal, warm, jaundice  Heart: Perfused  Lungs: Unlabored on RA  Abdomen:  Soft, non distended.  The wound is intact, no signs of infection. SHENA x 2 serosanguinous, abdominal bruising  :   no mckeon  Extremities: generalized edema, legs wrapped  Neurologic: A&Ox4      Frailty Scores           No data to display                Data:   CMP  Recent Labs   Lab 10/04/24  0602 10/04/24  0414 10/03/24  0849 10/03/24  0601 09/29/24  0505 09/29/24  0353 09/28/24  1419 09/28/24  1308   *  --   --  132*   < > 139  139   < >  --    POTASSIUM 3.7  --   --  3.5   < > 4.1  4.1   < >  --    CHLORIDE 103  --   --  102   < > 100  100   < >  --    CO2 21*  --   --  21*   < > 25  25   < >  --    * 102*   < > 128*   < > 161*  165*  165*   < >  --    BUN 23.0*  --   --  34.4*   < > 27.4*  27.4*   < >  --    CR 0.73  --   --  0.91   < > 1.56*  1.56*   < >  --    GFRESTIMATED >90  --   --  >90   < > 58*  58*   < >  --    AMAIRANI 7.9*  --   --  7.9*   < > 9.2  9.2   < >  --    ICAW  --   --   --   --   --  4.8  --  5.4*   MAG 1.4*  --   --  1.5*   < > 1.4*   < >  --    PHOS 1.7*  --   --  1.8*   < > 5.1*   < >  --    AMYLASE  --   --   --   --   --  26*  --   --    LIPASE  --   --   --   --   --  17  --   --    ALBUMIN 3.0*  --   --  2.9*   < > 3.2*  --   --    BILITOTAL 5.0*  --   --  4.9*   < > 9.9*  --   --    ALKPHOS 159*  --   --  126   < > 55  --   --    AST 64*  --   --  59*   < > 261*  --   --    *  --   --  176*   < > 249*  --   --     < > = values in this interval not displayed.     CBC  Recent Labs   Lab 10/04/24  0602 10/03/24  0601 09/28/24  1215 09/28/24  1207   HGB 9.9* 9.1*   < >  --    WBC 8.3 6.8   < >  --    PLT 84* 82*   < > 59*   A1C  --   --   --  5.4    < > = values in this interval not displayed.

## 2024-10-04 NOTE — PROVIDER NOTIFICATION
"\" Pt is not having pain relief from current meds, is there anything else we could add? Also, FYI I had to straight cath him again.\"  "

## 2024-10-04 NOTE — PROGRESS NOTES
CARE MANAGEMENT FOLLOW UP    Additional Information:  10/04:CHW delegated by RNCC has scheduled Tx Lab appts for this pt as follows:  10/10/2024 at 7:30 AM  10/14/2024 at 10:30 AM  10/17/2024 at 7:30 AM     Shaji Galicia   Community Health Worker, 7A&7B [facility].  [place], [place]   P [phone]   Fax: [phone]  Email: [email or web address]

## 2024-10-04 NOTE — PLAN OF CARE
Outcome Evaluation: Pain managed with medications and improved with binder, worked with PT and OT  Plan of Care Reviewed With: patient  Overall Patient Progress: improving               /89 (BP Location: Left arm)   Pulse 78   Temp 98  F (36.7  C) (Oral)   Resp 18   Ht 1.829 m (6')   Wt 104.6 kg (230 lb 11.2 oz)   SpO2 100%   BMI 31.29 kg/m      Shift: 3524-5340  Isolation Status: none  VS: stable on room air, afebrile  Neuro: Aox4  Behaviors: calm, cooperative, flat  BG: achs  Labs: mag=1.4, phos=1.7 (mag replaced IV, phos PO dose increased to 4 times daily)  Respiratory: WDL  Cardiac: WDL  Pain/Nausea: pain moderate to severe/denies nausea  PRN: Oxycodone 10 mg x1  Diet: regular  IV Access: right PIV x2  Infusion(s): magnesium   Lines/Drains: PIVx2, right JPx2  GI/: voiding and saving, PVR was negative/BM 10/3  Skin: skin tears, lip/oral care orders, Jps, clamshell incision abdominal stapled and LACHO  Mobility: SBA  Plan: Discharge to local apartment on Monday

## 2024-10-04 NOTE — PLAN OF CARE
Goal Outcome Evaluation:      Plan of Care Reviewed With: patient    Overall Patient Progress: no changeOverall Patient Progress: no change    Outcome Evaluation: Pain still an issue    BP (!) 133/95 (BP Location: Left arm)   Pulse 88   Temp 98.7  F (37.1  C) (Oral)   Resp 18   Ht 1.829 m (6')   Wt 105 kg (231 lb 8 oz)   SpO2 100%   BMI 31.40 kg/m      Shift: 0490-8715  Isolation Status: none  VS: stable on room air, afebrile  Neuro: Aox4  Behaviors: calm and cooperative   BG: ACHS  Labs: AM Labs reviewed, lactic: 1.3  Respiratory: WDL, denies SOB  Cardiac: WDL, regular rate and rhythm   Pain/Nausea: denies nausea, 9-10/10 pain  PRN: Oxy x2, scheduled robaxin and atrax x1  Diet: regular, good appetite   IV Access: 2 PIV's SL  Infusion(s): none  Lines/Drains: 2 R JPs  GI/: 1 loose BM, voiding with retention. Bladder scanned for 250 mL post void residual   Skin: very bruised with skin tears, no adhesive use, leaking at SHENA sites covered with gauze and ABD pad  Mobility: up with assist 1 gait belt and walker  Events/Education: triggered sepsis protocol, lactic came back 1.3  Plan: continue plan of care and notify team of any changes

## 2024-10-04 NOTE — PROGRESS NOTES
Transplant Social Work Services Progress Note    Data: Jag underwent liver transplant 9/28/2024  Intervention: DONG met with pt and mother, Sania, bedside   Assessment: DONG and Sania called [facility] ([phone]) and spoke with Karlee to extend Sania's hotel stay until Wednesday 10/9.  Education provided by DONG: deferred  Plan:    Discharge Plans in Progress: home, will stay locally [address]    Barriers to d/c plan: not medically ready    Follow up Plan: SW will continue to be available for psychosocial support..    Jennifer CABALLERO, SW  [facility]  Liver Transplant   Phone: [phone]

## 2024-10-04 NOTE — PROGRESS NOTES
CLINICAL NUTRITION SERVICES - DISCHARGE NOTE    Patient s discharge needs assessed and discharge planning has been conducted with the multidisciplinary transplant care team including physicians, pharmacy, social work and transplant coordinator.    Follow up/Monitoring:  Once discharged, place outpatient nutrition consult via the transplant team if nutrition concerns arise.    Kiki Stokes RD, LD  Available on Vocera - can search by name or unit Dietitian  **Clinical Nutrition is no longer available via pager

## 2024-10-04 NOTE — PROGRESS NOTES
Care Management Follow Up    Length of Stay (days): 13    Expected Discharge Date: 10/07/2024     Concerns to be Addressed: discharge planning   (Patient's mom, Sania, reported she is the only family member that would be included in a family conference)  Patient plan of care discussed at interdisciplinary rounds: Yes    Anticipated Discharge Disposition:  (Local Apartment - [address] [facility])              Anticipated Discharge Services: None  Anticipated Discharge DME: None    Patient/family educated on Medicare website which has current facility and service quality ratings: no  Education Provided on the Discharge Plan: Yes  Patient/Family in Agreement with the Plan: yes    Referrals Placed by CM/SW: Homecare  Private pay costs discussed: Not applicable    Discussed  Partnership in Safe Discharge Planning  document with patient/family: No     Handoff Completed: No, handoff not indicated or clinically appropriate    Additional Information:  Pt s/p liver transplant.  Per care team rounds anticipate discharge early next week.  [facility] unable to secure local home care agency.       Met with pt and his Mom.  Pt and his Mom plan to stay locally at  on [facility].  Reviewed inability to secure home care.  Discussed OP Transplant Labs M/TH.  Pt and Mom note no concerns or questions.    Local Address  [address] [address]     Next Steps:   Transplant SW following for psychosocial needs  CHW coordinating initial OP Transplant lab appointments.    Lynnette Choi RN BSN, PHN, ACM-RN  October 4, 2024  7A Nurse Coordinator    Phone: [phone]  Available on [place] 7A SOT RNCC  Weekend/Holiday 7A SOT RNCC    10/4/2024

## 2024-10-04 NOTE — PLAN OF CARE
Goal Outcome Evaluation:      Plan of Care Reviewed With: patient, parent    Overall Patient Progress: improvingOverall Patient Progress: improving       /89 (BP Location: Left arm)   Pulse 78   Temp 98  F (36.7  C) (Oral)   Resp 18   Ht 1.829 m (6')   Wt 104.6 kg (230 lb 11.2 oz)   SpO2 100%   BMI 31.29 kg/m         [id number]  AVSS on RA. Alert and oriented x4; calls appropriately. Intermittent incisional discomfort managed with scheduled robaxin and PRN oxy given x1. Patient has PRN atarax available at anytime if need something for breakthrough this evening/tonight. Denies nausea. Regular diet- good appetite and PO intake. Adequate urine out- bladder scan (every shift)- this evening was 97 mL. BM yesterday 10/3. Denies nausea. SHENA's remain leaky- dressing changed this evening. Per order please no tape. Right PIV(s) saline locked. BG ACHS; 146. Phosphorus (PO replacement) and magnesium (IV replacement) replaced during day shift- recheck tomorrow with AM labs. Anticipated to discharge Monday 10/7 with mum to nearby [facility]. Liver numbers slightly elevated this morning; will continue to monitor. Continue plan of care; please notify MD with any changes.

## 2024-10-04 NOTE — PLAN OF CARE
Goal Outcome Evaluation:      Plan of Care Reviewed With: patient    Overall Patient Progress: improvingOverall Patient Progress: improving    Outcome Evaluation: Pain still an issue, pt mobility improved. Straight cathed for 550 mL after +PVR.    BP (!) 136/92 (BP Location: Left arm)   Pulse 76   Temp 98  F (36.7  C) (Oral)   Resp 16   Ht 1.829 m (6')   Wt 104.6 kg (230 lb 11.2 oz)   SpO2 100%   BMI 31.29 kg/m      Shift: 4354-4720  Isolation Status: N/A  VS: stable on RA, afebrile  Neuro: A&O x4  Behaviors: receptive to cares  BG: ACHS with 0200: 127  Labs/Imaging: Mag 1.5, Phos 1.8, Na 132; pending AM labs  Respiratory: WDL  Cardiac: WDL  Pain/Nausea: C/O occasional intermittent nausea. C/O L sided pain  PRN: Oxycodone x3  Diet: regular  LDA: R PIV x2, R SHENA x2 with minimal serosang output, very leaky at site with dressing changed multiple times  Infusion(s): N/A  GI/: No BM on shift, BM 10/3. Voiding spontaneously, AUO but having retention, straight cathed for 550 mL  Skin: significant abdominal and back bruising, clamshell incision stapled and LACHO with minimal serous drainage on R side, skin tears/tape burns on R abdomen, chest scabbing  Mobility: Ax1/SBA with walker  Events/Education: encourage double voiding to empty bladder  Plan: Notify MD of any significant changes, continue plan of care. Hand off to be given to oncoming RN.

## 2024-10-05 ENCOUNTER — APPOINTMENT (OUTPATIENT)
Dept: OCCUPATIONAL THERAPY | Facility: CLINIC | Age: 39
End: 2024-10-05
Attending: PEDIATRICS
Payer: MEDICAID

## 2024-10-05 LAB
ALBUMIN SERPL BCG-MCNC: 2.9 G/DL (ref 3.5–5.2)
ALP SERPL-CCNC: 171 U/L (ref 40–150)
ALT SERPL W P-5'-P-CCNC: 174 U/L (ref 0–70)
ANION GAP SERPL CALCULATED.3IONS-SCNC: 7 MMOL/L (ref 7–15)
AST SERPL W P-5'-P-CCNC: 58 U/L (ref 0–45)
BILIRUB DIRECT SERPL-MCNC: 3.51 MG/DL (ref 0–0.3)
BILIRUB SERPL-MCNC: 4.7 MG/DL
BUN SERPL-MCNC: 18.7 MG/DL (ref 6–20)
CALCIUM SERPL-MCNC: 8 MG/DL (ref 8.8–10.4)
CHLORIDE SERPL-SCNC: 104 MMOL/L (ref 98–107)
CREAT SERPL-MCNC: 0.65 MG/DL (ref 0.67–1.17)
EGFRCR SERPLBLD CKD-EPI 2021: >90 ML/MIN/1.73M2
ERYTHROCYTE [DISTWIDTH] IN BLOOD BY AUTOMATED COUNT: 18.6 % (ref 10–15)
GLUCOSE BLDC GLUCOMTR-MCNC: 102 MG/DL (ref 70–99)
GLUCOSE BLDC GLUCOMTR-MCNC: 126 MG/DL (ref 70–99)
GLUCOSE BLDC GLUCOMTR-MCNC: 133 MG/DL (ref 70–99)
GLUCOSE BLDC GLUCOMTR-MCNC: 166 MG/DL (ref 70–99)
GLUCOSE SERPL-MCNC: 144 MG/DL (ref 70–99)
HCO3 SERPL-SCNC: 21 MMOL/L (ref 22–29)
HCT VFR BLD AUTO: 29.9 % (ref 40–53)
HGB BLD-MCNC: 10 G/DL (ref 13.3–17.7)
MAGNESIUM SERPL-MCNC: 1.7 MG/DL (ref 1.7–2.3)
MCH RBC QN AUTO: 30.8 PG (ref 26.5–33)
MCHC RBC AUTO-ENTMCNC: 33.4 G/DL (ref 31.5–36.5)
MCV RBC AUTO: 92 FL (ref 78–100)
PHOSPHATE SERPL-MCNC: 2.1 MG/DL (ref 2.5–4.5)
PLATELET # BLD AUTO: 125 10E3/UL (ref 150–450)
POTASSIUM SERPL-SCNC: 4.2 MMOL/L (ref 3.4–5.3)
PROT SERPL-MCNC: 5.2 G/DL (ref 6.4–8.3)
RBC # BLD AUTO: 3.25 10E6/UL (ref 4.4–5.9)
SODIUM SERPL-SCNC: 132 MMOL/L (ref 135–145)
TACROLIMUS BLD-MCNC: 11.1 UG/L (ref 5–15)
TME LAST DOSE: NORMAL H
TME LAST DOSE: NORMAL H
WBC # BLD AUTO: 10.1 10E3/UL (ref 4–11)

## 2024-10-05 PROCEDURE — 250N000013 HC RX MED GY IP 250 OP 250 PS 637

## 2024-10-05 PROCEDURE — 83735 ASSAY OF MAGNESIUM: CPT | Performed by: STUDENT IN AN ORGANIZED HEALTH CARE EDUCATION/TRAINING PROGRAM

## 2024-10-05 PROCEDURE — 99232 SBSQ HOSP IP/OBS MODERATE 35: CPT | Mod: 24 | Performed by: PHYSICIAN ASSISTANT

## 2024-10-05 PROCEDURE — 250N000013 HC RX MED GY IP 250 OP 250 PS 637: Performed by: STUDENT IN AN ORGANIZED HEALTH CARE EDUCATION/TRAINING PROGRAM

## 2024-10-05 PROCEDURE — 250N000013 HC RX MED GY IP 250 OP 250 PS 637: Performed by: PHYSICIAN ASSISTANT

## 2024-10-05 PROCEDURE — 250N000011 HC RX IP 250 OP 636: Performed by: HOSPITALIST

## 2024-10-05 PROCEDURE — 250N000013 HC RX MED GY IP 250 OP 250 PS 637: Performed by: HOSPITALIST

## 2024-10-05 PROCEDURE — 250N000013 HC RX MED GY IP 250 OP 250 PS 637: Performed by: NURSE PRACTITIONER

## 2024-10-05 PROCEDURE — 82310 ASSAY OF CALCIUM: CPT | Performed by: STUDENT IN AN ORGANIZED HEALTH CARE EDUCATION/TRAINING PROGRAM

## 2024-10-05 PROCEDURE — 250N000012 HC RX MED GY IP 250 OP 636 PS 637: Performed by: STUDENT IN AN ORGANIZED HEALTH CARE EDUCATION/TRAINING PROGRAM

## 2024-10-05 PROCEDURE — 84100 ASSAY OF PHOSPHORUS: CPT | Performed by: STUDENT IN AN ORGANIZED HEALTH CARE EDUCATION/TRAINING PROGRAM

## 2024-10-05 PROCEDURE — 36415 COLL VENOUS BLD VENIPUNCTURE: CPT | Performed by: PHYSICIAN ASSISTANT

## 2024-10-05 PROCEDURE — 80053 COMPREHEN METABOLIC PANEL: CPT | Performed by: STUDENT IN AN ORGANIZED HEALTH CARE EDUCATION/TRAINING PROGRAM

## 2024-10-05 PROCEDURE — 97530 THERAPEUTIC ACTIVITIES: CPT | Mod: GO

## 2024-10-05 PROCEDURE — 120N000011 HC R&B TRANSPLANT UMMC

## 2024-10-05 PROCEDURE — 82947 ASSAY GLUCOSE BLOOD QUANT: CPT | Performed by: STUDENT IN AN ORGANIZED HEALTH CARE EDUCATION/TRAINING PROGRAM

## 2024-10-05 PROCEDURE — 80197 ASSAY OF TACROLIMUS: CPT | Performed by: PHYSICIAN ASSISTANT

## 2024-10-05 PROCEDURE — 84155 ASSAY OF PROTEIN SERUM: CPT | Performed by: STUDENT IN AN ORGANIZED HEALTH CARE EDUCATION/TRAINING PROGRAM

## 2024-10-05 PROCEDURE — 99222 1ST HOSP IP/OBS MODERATE 55: CPT | Mod: 4UV | Performed by: OPHTHALMOLOGY

## 2024-10-05 PROCEDURE — 250N000012 HC RX MED GY IP 250 OP 636 PS 637: Performed by: PHYSICIAN ASSISTANT

## 2024-10-05 PROCEDURE — 85014 HEMATOCRIT: CPT | Performed by: NURSE PRACTITIONER

## 2024-10-05 PROCEDURE — 97140 MANUAL THERAPY 1/> REGIONS: CPT | Mod: GO

## 2024-10-05 RX ORDER — FUROSEMIDE 40 MG
40 TABLET ORAL ONCE
Status: COMPLETED | OUTPATIENT
Start: 2024-10-05 | End: 2024-10-05

## 2024-10-05 RX ORDER — CARBOXYMETHYLCELLULOSE SODIUM 5 MG/ML
1 SOLUTION/ DROPS OPHTHALMIC
Status: DISPENSED | OUTPATIENT
Start: 2024-10-05

## 2024-10-05 RX ORDER — ACETAMINOPHEN 325 MG/1
650 TABLET ORAL EVERY 4 HOURS PRN
Status: DISPENSED | OUTPATIENT
Start: 2024-10-05

## 2024-10-05 RX ADMIN — MAGNESIUM OXIDE TAB 400 MG (241.3 MG ELEMENTAL MG) 800 MG: 400 (241.3 MG) TAB at 07:48

## 2024-10-05 RX ADMIN — NYSTATIN 500000 UNITS: 100000 SUSPENSION ORAL at 07:48

## 2024-10-05 RX ADMIN — TACROLIMUS 5 MG: 5 CAPSULE ORAL at 18:20

## 2024-10-05 RX ADMIN — OXYCODONE HYDROCHLORIDE 5 MG: 5 TABLET ORAL at 02:24

## 2024-10-05 RX ADMIN — OXYCODONE HYDROCHLORIDE 5 MG: 5 TABLET ORAL at 02:29

## 2024-10-05 RX ADMIN — Medication 1 DROP: at 16:43

## 2024-10-05 RX ADMIN — METHOCARBAMOL 1000 MG: 500 TABLET ORAL at 09:56

## 2024-10-05 RX ADMIN — METHOCARBAMOL 1000 MG: 500 TABLET ORAL at 21:39

## 2024-10-05 RX ADMIN — METHOCARBAMOL 1000 MG: 500 TABLET ORAL at 04:31

## 2024-10-05 RX ADMIN — Medication 1 DROP: at 20:28

## 2024-10-05 RX ADMIN — FUROSEMIDE 40 MG: 40 TABLET ORAL at 09:03

## 2024-10-05 RX ADMIN — NYSTATIN 500000 UNITS: 100000 SUSPENSION ORAL at 20:25

## 2024-10-05 RX ADMIN — Medication 1 DROP: at 02:33

## 2024-10-05 RX ADMIN — INSULIN ASPART 2 UNITS: 100 INJECTION, SOLUTION INTRAVENOUS; SUBCUTANEOUS at 18:16

## 2024-10-05 RX ADMIN — HYDROXYZINE HYDROCHLORIDE 25 MG: 25 TABLET, FILM COATED ORAL at 06:36

## 2024-10-05 RX ADMIN — Medication 1 DROP: at 14:00

## 2024-10-05 RX ADMIN — OXYCODONE HYDROCHLORIDE 10 MG: 10 TABLET ORAL at 20:26

## 2024-10-05 RX ADMIN — METHOCARBAMOL 1000 MG: 500 TABLET ORAL at 16:42

## 2024-10-05 RX ADMIN — SULFAMETHOXAZOLE AND TRIMETHOPRIM 1 TABLET: 400; 80 TABLET ORAL at 07:48

## 2024-10-05 RX ADMIN — MYCOPHENOLATE MOFETIL 750 MG: 250 CAPSULE ORAL at 07:47

## 2024-10-05 RX ADMIN — ACETAMINOPHEN 650 MG: 325 TABLET, FILM COATED ORAL at 20:23

## 2024-10-05 RX ADMIN — SODIUM PHOSPHATE, DIBASIC, ANHYDROUS, POTASSIUM PHOSPHATE, MONOBASIC, AND SODIUM PHOSPHATE, MONOBASIC, MONOHYDRATE 500 MG: 852; 155; 130 TABLET, COATED ORAL at 13:59

## 2024-10-05 RX ADMIN — NYSTATIN 500000 UNITS: 100000 SUSPENSION ORAL at 16:42

## 2024-10-05 RX ADMIN — ONDANSETRON 4 MG: 4 TABLET, ORALLY DISINTEGRATING ORAL at 02:24

## 2024-10-05 RX ADMIN — OXYCODONE HYDROCHLORIDE 10 MG: 10 TABLET ORAL at 14:28

## 2024-10-05 RX ADMIN — SENNOSIDES 2 TABLET: 8.6 TABLET, FILM COATED ORAL at 20:22

## 2024-10-05 RX ADMIN — PANTOPRAZOLE SODIUM 40 MG: 40 TABLET, DELAYED RELEASE ORAL at 07:48

## 2024-10-05 RX ADMIN — MYCOPHENOLATE MOFETIL 750 MG: 250 CAPSULE ORAL at 18:18

## 2024-10-05 RX ADMIN — Medication 1 DROP: at 09:48

## 2024-10-05 RX ADMIN — SODIUM PHOSPHATE, DIBASIC, ANHYDROUS, POTASSIUM PHOSPHATE, MONOBASIC, AND SODIUM PHOSPHATE, MONOBASIC, MONOHYDRATE 500 MG: 852; 155; 130 TABLET, COATED ORAL at 07:48

## 2024-10-05 RX ADMIN — TAMSULOSIN HYDROCHLORIDE 0.4 MG: 0.4 CAPSULE ORAL at 07:49

## 2024-10-05 RX ADMIN — HYDROXYZINE HYDROCHLORIDE 25 MG: 25 TABLET, FILM COATED ORAL at 18:56

## 2024-10-05 RX ADMIN — ACETAMINOPHEN 650 MG: 325 TABLET, FILM COATED ORAL at 09:03

## 2024-10-05 RX ADMIN — SODIUM PHOSPHATE, DIBASIC, ANHYDROUS, POTASSIUM PHOSPHATE, MONOBASIC, AND SODIUM PHOSPHATE, MONOBASIC, MONOHYDRATE 500 MG: 852; 155; 130 TABLET, COATED ORAL at 20:24

## 2024-10-05 RX ADMIN — MAGNESIUM OXIDE TAB 400 MG (241.3 MG ELEMENTAL MG) 800 MG: 400 (241.3 MG) TAB at 20:25

## 2024-10-05 RX ADMIN — TACROLIMUS 5 MG: 5 CAPSULE ORAL at 07:48

## 2024-10-05 RX ADMIN — NYSTATIN 500000 UNITS: 100000 SUSPENSION ORAL at 12:41

## 2024-10-05 RX ADMIN — Medication 1 DROP: at 21:40

## 2024-10-05 RX ADMIN — ASPIRIN 81 MG CHEWABLE TABLET 81 MG: 81 TABLET CHEWABLE at 07:48

## 2024-10-05 RX ADMIN — VALGANCICLOVIR HYDROCHLORIDE 450 MG: 450 TABLET ORAL at 07:48

## 2024-10-05 RX ADMIN — THIAMINE HCL TAB 100 MG 100 MG: 100 TAB at 07:49

## 2024-10-05 RX ADMIN — OXYCODONE HYDROCHLORIDE 10 MG: 10 TABLET ORAL at 07:47

## 2024-10-05 RX ADMIN — PREDNISONE 5 MG: 5 TABLET ORAL at 07:48

## 2024-10-05 RX ADMIN — FOLIC ACID 1 MG: 1 TABLET ORAL at 07:49

## 2024-10-05 RX ADMIN — ONDANSETRON 4 MG: 4 TABLET, ORALLY DISINTEGRATING ORAL at 07:47

## 2024-10-05 ASSESSMENT — ACTIVITIES OF DAILY LIVING (ADL)
ADLS_ACUITY_SCORE: 26

## 2024-10-05 NOTE — CONSULTS
"  OPHTHALMOLOGY CONSULT NOTE  10/05/24    Patient: Jag Smith      ASSESSMENT/PLAN:      Jag Smith is a 39 year old male with recently diagnosed decompensated alcoholic liver cirrhosis transferred from [facility] on 9/21/24 for expedited liver transplant evaluation due to decompensated alcoholic liver cirrhosis. MELD 42. He is now s/p DDLT on 9/28/24 with Dr. Colon. Ophthalmology consulted for bilateral eye pain and redness.    #Subconjunctival hemorrhage, both eyes  Va 20/20, IOP 13/10, No APD, EOMI. Anterior segment exam notable for bilateral non-360 subconjunctival hemorrhages. No corneal pathology appreciated on fluorescein staining of both eyes. Anterior chamber, with portable slit lamp, appeared deep and quiet. Dilated exam with healthy appearing optic nerves, maculae, and peripheral retina. No recent trauma concerning for open globe/traumatic etiology.  Likely suspect underlying coagulopathy as a result of liver disease is playing a role in development of bilateral subconjunctival hemorrhages.    PLAN:  -Artificial tears 4-6x a day, both eyes  -Follow up with ophthalmology clinic PRN for complete dilated eye exam  -Please contact ophthalmologist on call with any questions or concerns. We appreciate your care of this patient.    Thank you for entrusting us with your care  Mary Infante MD, PGY2  Ophthalmology Resident  [facility]    HISTORY OF PRESENTING ILLNESS:     Jag Smith is a 39 year old male who is currently admitted following a liver transplant. Patient reports that on POD#1 he noticed redness and irritation in both eyes. Denies any trauma to the eyes. Irritation has become more persistent over the last 24 hours. Also, developed a headache \"in between the eyes\" last night. Received refresh tears and PRN tylenol with improvement in symptoms. Denies any flashes, floaters, drainage, or photophobia.       10+ review of systems were otherwise negative " except for that which has been stated above.      OCULAR/MEDICAL/SURGICAL HISTORIES:     Past Ocular History:   Last eye exam: Unknown   Previously diagnosed ocular conditions: Epiphora, NLD obstruction?  Prior eye surgery/laser: NLD reconstruction   Contact lens wear: None  Glasses: Yes  Eyedrops: None    Pertinent Systemic Medications:   Current Facility-Administered Medications   Medication Dose Route Frequency Provider Last Rate Last Admin    acetaminophen (TYLENOL) tablet 650 mg  650 mg Oral Q4H PRN Abi Quiros PA-C   650 mg at 10/05/24 0903    aspirin (ASA) chewable tablet 81 mg  81 mg Oral or Feeding Tube Daily Maddi Culver APRN CNP   81 mg at 10/05/24 0748    calcium carbonate (TUMS) chewable tablet 1,000 mg  1,000 mg Oral 4x Daily PRN José Miguel Aviles MD        carboxymethylcellulose PF (REFRESH PLUS) 0.5 % ophthalmic solution 1 drop  1 drop Both Eyes Q1H PRN Maddi Culver APRN CNP   1 drop at 10/05/24 0948    dextrose 10% infusion   Intravenous Continuous PRN Bon Pino PA-C        glucose gel 15-30 g  15-30 g Oral Q15 Min PRN Bon Pino PA-C        Or    dextrose 50 % injection 25-50 mL  25-50 mL Intravenous Q15 Min PRN Bon Pino PA-C        Or    glucagon injection 1 mg  1 mg Subcutaneous Q15 Min PRN Bon Pino PA-C        folic acid (FOLVITE) tablet 1 mg  1 mg Oral Daily José Miguel Aviles MD   1 mg at 10/05/24 0749    hydrOXYzine HCl (ATARAX) tablet 25 mg  25 mg Oral Q6H PRN Maddi Culver APRN CNP   25 mg at 10/05/24 0636    Or    hydrOXYzine HCl (ATARAX) tablet 50 mg  50 mg Oral Q6H PRN Maddi Culver APRN CNP   50 mg at 10/03/24 2153    insulin aspart (NovoLOG) injection (RAPID ACTING)  1-10 Units Subcutaneous TID Gretchen Albarado NP   1 Units at 10/04/24 1759    insulin aspart (NovoLOG) injection (RAPID ACTING)  1-7 Units Subcutaneous At Bedtime Gretchen Patino, NP        Lidocaine (LIDOCARE) 4 % Patch 2 patch   2 patch Transdermal Q24h Ashley Loyd MD   2 patch at 09/30/24 2004    lidocaine (LMX4) cream   Topical Q1H PRN José Miguel Aviles MD        lidocaine 1 % 0.1-1 mL  0.1-1 mL Other Q1H PRN José Miguel Aviles MD        magnesium oxide (MAG-OX) tablet 800 mg  800 mg Oral BID Abi Quiros PA-C   800 mg at 10/05/24 0748    melatonin tablet 5 mg  5 mg Oral At Bedtime PRN José Miguel Aviles MD   5 mg at 09/29/24 0131    methocarbamol (ROBAXIN) tablet 1,000 mg  1,000 mg Oral Q6H Paresh Lara MD   1,000 mg at 10/05/24 0956    mycophenolate (GENERIC EQUIVALENT) capsule 750 mg  750 mg Oral BID IS Dominick Tejada MD   750 mg at 10/05/24 0747    naloxone (NARCAN) injection 0.2 mg  0.2 mg Intravenous Q2 Min PRN Lizette Mayer MD        Or    naloxone (NARCAN) injection 0.4 mg  0.4 mg Intravenous Q2 Min PRN Lizette Mayer MD        Or    naloxone (NARCAN) injection 0.2 mg  0.2 mg Intramuscular Q2 Min PRN Lizette Mayer MD        Or    naloxone (NARCAN) injection 0.4 mg  0.4 mg Intramuscular Q2 Min PRN Lizette Mayer MD        nystatin (MYCOSTATIN) suspension 500,000 Units  500,000 Units Oral 4x Daily Abi Quiros PA-C   500,000 Units at 10/05/24 0748    ondansetron (ZOFRAN ODT) ODT tab 4 mg  4 mg Oral Q6H PRN José Miguel Aviles MD   4 mg at 10/05/24 0747    Or    ondansetron (ZOFRAN) injection 4 mg  4 mg Intravenous Q6H PRN José Miguel Aviles MD   4 mg at 09/29/24 0800    oxyCODONE (ROXICODONE) tablet 5 mg  5 mg Oral Q6H PRN Maddi Culver APRN CNP   5 mg at 10/05/24 0229    Or    oxyCODONE IR (ROXICODONE) tablet 10 mg  10 mg Oral Q6H PRN Maddi Culver APRN CNP   10 mg at 10/05/24 0747    pantoprazole (PROTONIX) EC tablet 40 mg  40 mg Oral Daily Gretchen Patino, NP   40 mg at 10/05/24 0748    phosphorus tablet 250 mg (PHOSPHA 250 NEUTRAL) per tablet 500 mg  500 mg Oral TID Abi Quiros PA-C   500  mg at 10/05/24 0748    polyethylene glycol (MIRALAX) Packet 17 g  17 g Oral Daily PalomoMaddi, APRN CNP   17 g at 10/04/24 0938    predniSONE (DELTASONE) tablet 5 mg  5 mg Oral Daily Abi Quiros PA-C   5 mg at 10/05/24 0748    prochlorperazine (COMPAZINE) injection 10 mg  10 mg Intravenous Q6H PRN José Miguel Aviles MD   10 mg at 09/29/24 1948    Or    prochlorperazine (COMPAZINE) tablet 10 mg  10 mg Oral Q6H PRN José Miguel Aviles MD        Or    prochlorperazine (COMPAZINE) suppository 25 mg  25 mg Rectal Q12H PRN José Miguel Aviles MD        sennosides (SENOKOT) tablet 2 tablet  2 tablet Oral BID Maddi Culver, SIL CNP   2 tablet at 10/04/24 0938    sodium chloride (PF) 0.9% PF flush 3 mL  3 mL Intravenous Q1H PRN Dominick Tejada MD        sodium chloride (PF) 0.9% PF flush 3 mL  3 mL Intravenous Q8H Dominick Tejada MD   3 mL at 10/05/24 0431    sodium chloride (PF) 0.9% PF flush 3 mL  3 mL Intravenous Q1H PRN Dominick Tejada MD   3 mL at 09/30/24 1152    sodium chloride (PF) 0.9% PF flush 3 mL  3 mL Intracatheter Q8H José Miguel Aviles MD   3 mL at 10/05/24 0948    sodium chloride (PF) 0.9% PF flush 3 mL  3 mL Intracatheter q1 min prn José Miguel Aviles MD        sulfamethoxazole-trimethoprim (BACTRIM) 400-80 MG per tablet 1 tablet  1 tablet Oral Daily Abi Quiros PA-C   1 tablet at 10/05/24 0748    tacrolimus (GENERIC EQUIVALENT) capsule 5 mg  5 mg Oral BID IS Abi Quiros PA-C   5 mg at 10/05/24 0748    tamsulosin (FLOMAX) capsule 0.4 mg  0.4 mg Oral Daily Maddi Culver APRN CNP   0.4 mg at 10/05/24 0749    thiamine (B-1) tablet 100 mg  100 mg Oral Daily José Miguel Aviles MD   100 mg at 10/05/24 0749    valGANciclovir (VALCYTE) tablet 450 mg  450 mg Oral Daily Dominick Tejada MD   450 mg at 10/05/24 0748         Past Medical History:  Past Medical History:   Diagnosis Date    Alcohol use disorder      Alcoholic hepatitis (H) 2024    Anxiety     Depression     Hypertension     Liver replaced by transplant (H) 2024       Past Surgical History:   Past Surgical History:   Procedure Laterality Date    BENCH LIVER  2024    Procedure: Bench liver;  Surgeon: Fernando Colon MD;  Location: [facility] OR    DACRYOCYSTORHINOSTOMY Left 2013    INCISION AND DRAINAGE BUTTOCKS Left 2017    TRANSPLANT LIVER RECIPIENT  DONOR N/A 2024    Procedure: Transplant liver recipient  donor;  Surgeon: Fernando Colon MD;  Location: [facility] OR       Family History:   No history of macular degeneration or glaucoma    Social History:   No tobacco use    EXAMINATION:     Slit Lamp and Fundus Exam       External Exam         Right Left    External Normal Normal              Slit Lamp Exam         Right Left    Lids/Lashes Normal Normal    Conjunctiva/Sclera non-360 subconj. heme non-360 subconj. heme    Cornea Clear Clear    Anterior Chamber Deep and quiet Deep and quiet    Iris Round and reactive Round and reactive    Lens Clear Clear    Anterior Vitreous Normal Normal              Fundus Exam         Right Left    Disc Normal Normal    Macula Normal Normal    Vessels Normal Normal    Periphery Normal Normal                    Labs/Studies/Imaging Performed  N/a     Mary Infante MD  Resident Physician, PGY2  Department of Ophthalmology  10/05/24 11:39 AM

## 2024-10-05 NOTE — PLAN OF CARE
Goal Outcome Evaluation:      Plan of Care Reviewed With: patient    Overall Patient Progress: no changeOverall Patient Progress: no change    Outcome Evaluation: Pt is reporting leg pain. SHENA site is leaking.    BP (!) 132/95 (BP Location: Left arm)   Pulse 78   Temp 98.1  F (36.7  C) (Oral)   Resp 16   Ht 1.829 m (6')   Wt 104.6 kg (230 lb 11.2 oz)   SpO2 100%   BMI 31.29 kg/m      Shift: 0365-1724  Isolation Status: N/A  VS: stable on RA, afebrile  Neuro: A&O x4  Behaviors: agitated   BG: ACHS with 0200 No insulin given   Labs/Imaging: pending AM labs  Respiratory: WDL  Cardiac: WDL  Pain/Nausea: C/O occasional intermittent nausea. C/O L sided pain  PRN: Oxycodone x1, atarax 1x   Diet: regular  LDA: R PIV x2, R SHENA x2 with serosang output, very leaky at site with dressing changed multiple times  Infusion(s): N/A  GI/: 1 BM on shift Voiding spontaneously,   Skin: abdominal and back bruising, clamshell incision stapled and LACHO with minimal serous drainage on R side, skin tears/tape burns on R abdomen, chest scabbing  Mobility: Ax1/SBA with walker  Events/Education: encourage movement and changing positions   Plan: Continue with POC and notify provider approprietly

## 2024-10-05 NOTE — PROGRESS NOTES
Immunosuppression Management Note:    Jag Smith is a 39 year old male who is seen today  for immunosuppression management     I, Russell Waters MD, I have examined the patient with our AZALIA/Fellow as part of a shared visit.   I participated in the rounds,  discussed and agree with the note and findings and  reviewed today's vital signs, medications, labs and imaging as noted in this note.  I  reviewed the  immunosuppression medications.  I personally provided a substantive portion of the care of this patient. I personally performed the immunosuppressive management of this patient, reviewed the overall  immunosuppression including drug levels, allograft function and provided the recommendations to adjust the dose to provide optimal levels to prevent rejection of the allograft and prevent toxicity to the organs. This was complex care due to the fresh allograft.   Time spent: evaluating patient, examining patient, discussion of plan, counseling and documentation: >35 min   I spoke to the patient/family and explained below clinical details and answered all the questions    Transplant Surgery  Inpatient Daily Progress Note  10/05/2024    Assessment & Plan: Jag Smith is a 39 year old male with recently diagnosed decompensated alcoholic liver cirrhosis transferred from [facility] on 9/21/24 for expedited liver transplant evaluation due to decompensated alcoholic liver cirrhosis. MELD 42. He is now s/p DDLT on 9/28/24 with Dr. Colon.     Changes today:   - Titrate tac dose  -  lasix 40 mg PO x 1   - Replace Phos   - Ophthalmology consult d/t b/l eye pain, severe hemorrhage  ___________________________________________    s/p DBD DDLT 9/28/24: POD#7. Alk phos 171, trending up. Transaminases and TB elevated, slightly decreased today. Post-op US with patent doppler.    - Continue ASA.   - Titrate tac dose to goal    Immunosuppressed status secondary to medications:   Induction: Steroid taper  per protocol.   Maintenance:   -  mg BID   - Tacrolimus goal level 8-12 (fluconazole EOT 10/4).  Increase tac dose today   - Prednisone 5 mg daily x3 months after taper    Neurology:  Acute post-op pain: Fair control on current regimen:    - Oxycodone 5-10 mg q6H PRN   - Robaxin 1000 mg Q6H   - Lidocaine patches   - Atarax Q6H PRN  HA: tylenol PRN. Cold compress    Hematology:   Acute blood loss anemia: Hgb 9-10, stable.  Thrombocytopenia: resolving, monitor.     Cardiorespiratory: No issues.     GI/Nutrition:   Severe malnutrition in the context of acute illness: Nutrition consulted. Continue Regular diet with supplements. Calorie counts demonstrating good PO intake.   Bowel regimen: Senna, PEG.     Endocrine:   Steroid induced hyperglycemia: Hgb A1c 5.4%. BG <180. Continue sliding scale insulin.    Fluid/Electrolytes/Renal:   Hyponatremia: Na 132, monitor.   Hypomagnesemia: Mag-Ox to 800 mg BID.   DENI: Nephrology consulted. 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 Cr 1.4->0.9->0.7->0.7.  Hypervolemia: BLE edema - lymphedema wraps. Lasix 40 mg PO x 1.   Hypophosphatemia: Phospha TID.    :   Acute urinary retention: Possibly r/t opioids. PVR negative for retention.   -Continue flomax Flomax x 7 days  -Bladder and straight cath PRN    Infectious disease: No acute issues.     Skin:  Medical device related mucosal pressure injury of the lips, hospital acquired: WOC consulted.    HEENT:   Subconjunctival hemorrhage: Monitor.   -Refresh tears ordered PRN dryness, irritation.  -Will call Ophthalmology     Prophylaxis: DVT, fall, GI (PPI), fungal (fluconazole per protocol), viral (valganciclovir), pneumocystis (Bactrim)    Disposition: 7A; Plan for discharge locally. Plan for discharge on Monday    AZALIA/Fellow/Resident Provider: KAREEM Varghese  #[id number]    Faculty: Russell Waters MD   __________________________________________________________________    Interval History:   Overnight events: C/o b/l eye burning  pain. No eye pain with light or movement. Now with frontal HA with visual aura. No hx of migraine. . Ambulating. Pain control adequate with medication discussed starting to decrease oxycodone dose. Tolerating diet. +BM.    ROS:   A 10-point review of systems was negative except as noted above.    Curent Meds:  Current Facility-Administered Medications   Medication Dose Route Frequency Provider Last Rate Last Admin    aspirin (ASA) chewable tablet 81 mg  81 mg Oral or Feeding Tube Daily Maddi Culver APRN CNP   81 mg at 10/04/24 0924    folic acid (FOLVITE) tablet 1 mg  1 mg Oral Daily José Miguel Aviles MD   1 mg at 10/04/24 0924    insulin aspart (NovoLOG) injection (RAPID ACTING)  1-10 Units Subcutaneous TID AC Gretchen Patino NP   1 Units at 10/04/24 1759    insulin aspart (NovoLOG) injection (RAPID ACTING)  1-7 Units Subcutaneous At Bedtime Gretchen Patino NP        Lidocaine (LIDOCARE) 4 % Patch 2 patch  2 patch Transdermal Q24h Ashley Loyd MD   2 patch at 09/30/24 2004    magnesium oxide (MAG-OX) tablet 800 mg  800 mg Oral BID Abi Quiros PA-C   800 mg at 10/04/24 2043    methocarbamol (ROBAXIN) tablet 1,000 mg  1,000 mg Oral Q6H Paresh Lara MD   1,000 mg at 10/05/24 0431    mycophenolate (GENERIC EQUIVALENT) capsule 750 mg  750 mg Oral BID IS Dominick Tejada MD   750 mg at 10/04/24 1744    nystatin (MYCOSTATIN) suspension 500,000 Units  500,000 Units Oral 4x Daily Abi Quiros PA-C        pantoprazole (PROTONIX) EC tablet 40 mg  40 mg Oral Daily Gretchen Patino NP   40 mg at 10/04/24 0924    phosphorus tablet 250 mg (PHOSPHA 250 NEUTRAL) per tablet 500 mg  500 mg Oral TID Abi Quiros PA-C        polyethylene glycol (MIRALAX) Packet 17 g  17 g Oral Daily Maddi Culver APRN CNP   17 g at 10/04/24 0938    predniSONE (DELTASONE) tablet 5 mg  5 mg Oral Daily Abi Quiros PA-C   5 mg at 10/04/24 0960    sennosides (SENOKOT)  tablet 2 tablet  2 tablet Oral BID Maddi Culver APRN CNP   2 tablet at 10/04/24 0938    sodium chloride (PF) 0.9% PF flush 3 mL  3 mL Intravenous Q8H Dominick Tejada MD   3 mL at 10/05/24 0431    sodium chloride (PF) 0.9% PF flush 3 mL  3 mL Intracatheter Q8H José Miguel Aviles MD   3 mL at 10/04/24 0940    sulfamethoxazole-trimethoprim (BACTRIM) 400-80 MG per tablet 1 tablet  1 tablet Oral Daily Abi Quiros PA-C   1 tablet at 10/04/24 0921    tacrolimus (GENERIC EQUIVALENT) capsule 5 mg  5 mg Oral BID IS Abi Quiros PA-C   5 mg at 10/04/24 1744    tamsulosin (FLOMAX) capsule 0.4 mg  0.4 mg Oral Daily Maddi Culver APRN CNP   0.4 mg at 10/04/24 0923    thiamine (B-1) tablet 100 mg  100 mg Oral Daily José Miguel Aviles MD   100 mg at 10/04/24 0940    valGANciclovir (VALCYTE) tablet 450 mg  450 mg Oral Daily Dominick Tejada MD   450 mg at 10/04/24 0924       Physical Exam:     Admit Weight: 102.3 kg (225 lb 8 oz)    Current Vitals:   BP (!) 134/93 (BP Location: Left arm)   Pulse 81   Temp 98  F (36.7  C) (Oral)   Resp 16   Ht 1.829 m (6')   Wt 106.7 kg (235 lb 3.2 oz)   SpO2 100%   BMI 31.90 kg/m      Vital sign ranges:    Temp:  [97.7  F (36.5  C)-98.3  F (36.8  C)] 98  F (36.7  C)  Pulse:  [78-86] 81  Resp:  [16-18] 16  BP: (130-138)/(88-99) 134/93  SpO2:  [98 %-100 %] 100 %    General Appearance: in no apparent distress.   Eyes: Bilateral injection, present prior to transplant. No drainage. No pain with EOM. PERRLA  Skin: normal, warm, jaundice  Heart: Perfused  Lungs: Unlabored on RA  Abdomen:  Soft, non distended.  The wound is intact, no signs of infection. SHENA x 2 serosanguinous, abdominal bruising  :  no mckeon  Extremities: generalized edema, legs wrapped  Neurologic: A&Ox4. No focal deficits. Equal strength       Frailty Scores           No data to display                Data:   CMP  Recent Labs   Lab 10/05/24  0543 10/04/24  2200 10/04/24  0958  10/04/24  0602 09/29/24  0505 09/29/24  0353 09/28/24  1419 09/28/24  1308   *  --   --  132*   < > 139  139   < >  --    POTASSIUM 4.2  --   --  3.7   < > 4.1  4.1   < >  --    CHLORIDE 104  --   --  103   < > 100  100   < >  --    CO2 21*  --   --  21*   < > 25  25   < >  --    * 124*   < > 118*   < > 161*  165*  165*   < >  --    BUN 18.7  --   --  23.0*   < > 27.4*  27.4*   < >  --    CR 0.65*  --   --  0.73   < > 1.56*  1.56*   < >  --    GFRESTIMATED >90  --   --  >90   < > 58*  58*   < >  --    AMAIRANI 8.0*  --   --  7.9*   < > 9.2  9.2   < >  --    ICAW  --   --   --   --   --  4.8  --  5.4*   MAG 1.7  --   --  1.4*   < > 1.4*   < >  --    PHOS 2.1*  --   --  1.7*   < > 5.1*   < >  --    AMYLASE  --   --   --   --   --  26*  --   --    LIPASE  --   --   --   --   --  17  --   --    ALBUMIN 2.9*  --   --  3.0*   < > 3.2*  --   --    BILITOTAL 4.7*  --   --  5.0*   < > 9.9*  --   --    ALKPHOS 171*  --   --  159*   < > 55  --   --    AST 58*  --   --  64*   < > 261*  --   --    *  --   --  187*   < > 249*  --   --     < > = values in this interval not displayed.     CBC  Recent Labs   Lab 10/05/24  0543 10/04/24  0602 09/28/24  1215 09/28/24  1207   HGB 10.0* 9.9*   < >  --    WBC 10.1 8.3   < >  --    * 84*   < > 59*   A1C  --   --   --  5.4    < > = values in this interval not displayed.

## 2024-10-05 NOTE — PLAN OF CARE
Goal Outcome Evaluation:       Overall Patient Progress: improvingOverall Patient Progress: improving    Outcome Evaluation: Encourage increasing activity improving    Vitals: BP (!) 131/90   Pulse 82   Temp 98.2  F (36.8  C)   Resp 16   Ht 1.829 m (6')   Wt 106.7 kg (235 lb 3.2 oz)   SpO2 100%   BMI 31.90 kg/m      Endocrine: Glucose 102.  Labs: Stable labs.  Pain: Moderate headache, back ache, incisional pain.  PRN's: Oxycodone 10 mg, Tylenol, Zofran ODT.  Diet: Regular diet, eating well.  LDA: R PIV saline locked, 2 R SHENA's.  GI: BM 10/5, loose, laxatives held.  : Voids spontaneously.  Skin: Abdominal incision with staples, leaking SHENA's, scattered bruising has bilateral LE lymph wraps on. Reddened eyes, Ophthalmology consulted and he will be on frequent Refresh eye drops.   Neuro: Flat affect, oriented X4.  Mobility: Up with minimal assist.  Education: Medication card and lab book updated.  Plan: Discharge locally next week.

## 2024-10-06 ENCOUNTER — APPOINTMENT (OUTPATIENT)
Dept: PHYSICAL THERAPY | Facility: CLINIC | Age: 39
End: 2024-10-06
Attending: PEDIATRICS
Payer: MEDICAID

## 2024-10-06 LAB
ALBUMIN SERPL BCG-MCNC: 2.7 G/DL (ref 3.5–5.2)
ALP SERPL-CCNC: 159 U/L (ref 40–150)
ALT SERPL W P-5'-P-CCNC: 127 U/L (ref 0–70)
ANION GAP SERPL CALCULATED.3IONS-SCNC: 9 MMOL/L (ref 7–15)
AST SERPL W P-5'-P-CCNC: 42 U/L (ref 0–45)
BILIRUB DIRECT SERPL-MCNC: 2.7 MG/DL (ref 0–0.3)
BILIRUB SERPL-MCNC: 3.8 MG/DL
BUN SERPL-MCNC: 20.7 MG/DL (ref 6–20)
CALCIUM SERPL-MCNC: 8 MG/DL (ref 8.8–10.4)
CHLORIDE SERPL-SCNC: 104 MMOL/L (ref 98–107)
CREAT SERPL-MCNC: 0.86 MG/DL (ref 0.67–1.17)
EGFRCR SERPLBLD CKD-EPI 2021: >90 ML/MIN/1.73M2
ERYTHROCYTE [DISTWIDTH] IN BLOOD BY AUTOMATED COUNT: 19.3 % (ref 10–15)
GLUCOSE BLDC GLUCOMTR-MCNC: 145 MG/DL (ref 70–99)
GLUCOSE BLDC GLUCOMTR-MCNC: 145 MG/DL (ref 70–99)
GLUCOSE BLDC GLUCOMTR-MCNC: 162 MG/DL (ref 70–99)
GLUCOSE BLDC GLUCOMTR-MCNC: 209 MG/DL (ref 70–99)
GLUCOSE BLDC GLUCOMTR-MCNC: 94 MG/DL (ref 70–99)
GLUCOSE SERPL-MCNC: 124 MG/DL (ref 70–99)
HCO3 SERPL-SCNC: 21 MMOL/L (ref 22–29)
HCT VFR BLD AUTO: 30.9 % (ref 40–53)
HGB BLD-MCNC: 10.1 G/DL (ref 13.3–17.7)
MAGNESIUM SERPL-MCNC: 1.5 MG/DL (ref 1.7–2.3)
MCH RBC QN AUTO: 30.4 PG (ref 26.5–33)
MCHC RBC AUTO-ENTMCNC: 32.7 G/DL (ref 31.5–36.5)
MCV RBC AUTO: 93 FL (ref 78–100)
PHOSPHATE SERPL-MCNC: 3.1 MG/DL (ref 2.5–4.5)
PLATELET # BLD AUTO: 125 10E3/UL (ref 150–450)
POTASSIUM SERPL-SCNC: 4.5 MMOL/L (ref 3.4–5.3)
PROT SERPL-MCNC: 4.9 G/DL (ref 6.4–8.3)
RBC # BLD AUTO: 3.32 10E6/UL (ref 4.4–5.9)
SODIUM SERPL-SCNC: 134 MMOL/L (ref 135–145)
TACROLIMUS BLD-MCNC: 15.9 UG/L (ref 5–15)
TME LAST DOSE: ABNORMAL H
TME LAST DOSE: ABNORMAL H
WBC # BLD AUTO: 9 10E3/UL (ref 4–11)

## 2024-10-06 PROCEDURE — 250N000013 HC RX MED GY IP 250 OP 250 PS 637: Performed by: STUDENT IN AN ORGANIZED HEALTH CARE EDUCATION/TRAINING PROGRAM

## 2024-10-06 PROCEDURE — 84100 ASSAY OF PHOSPHORUS: CPT | Performed by: STUDENT IN AN ORGANIZED HEALTH CARE EDUCATION/TRAINING PROGRAM

## 2024-10-06 PROCEDURE — 82248 BILIRUBIN DIRECT: CPT | Performed by: STUDENT IN AN ORGANIZED HEALTH CARE EDUCATION/TRAINING PROGRAM

## 2024-10-06 PROCEDURE — 250N000013 HC RX MED GY IP 250 OP 250 PS 637: Performed by: NURSE PRACTITIONER

## 2024-10-06 PROCEDURE — 250N000013 HC RX MED GY IP 250 OP 250 PS 637: Performed by: HOSPITALIST

## 2024-10-06 PROCEDURE — 83735 ASSAY OF MAGNESIUM: CPT | Performed by: STUDENT IN AN ORGANIZED HEALTH CARE EDUCATION/TRAINING PROGRAM

## 2024-10-06 PROCEDURE — 250N000012 HC RX MED GY IP 250 OP 636 PS 637: Performed by: NURSE PRACTITIONER

## 2024-10-06 PROCEDURE — 250N000013 HC RX MED GY IP 250 OP 250 PS 637

## 2024-10-06 PROCEDURE — 250N000012 HC RX MED GY IP 250 OP 636 PS 637: Performed by: PHYSICIAN ASSISTANT

## 2024-10-06 PROCEDURE — 250N000013 HC RX MED GY IP 250 OP 250 PS 637: Performed by: PHYSICIAN ASSISTANT

## 2024-10-06 PROCEDURE — 36415 COLL VENOUS BLD VENIPUNCTURE: CPT | Performed by: PHYSICIAN ASSISTANT

## 2024-10-06 PROCEDURE — 250N000012 HC RX MED GY IP 250 OP 636 PS 637: Performed by: STUDENT IN AN ORGANIZED HEALTH CARE EDUCATION/TRAINING PROGRAM

## 2024-10-06 PROCEDURE — 80053 COMPREHEN METABOLIC PANEL: CPT | Performed by: STUDENT IN AN ORGANIZED HEALTH CARE EDUCATION/TRAINING PROGRAM

## 2024-10-06 PROCEDURE — 99232 SBSQ HOSP IP/OBS MODERATE 35: CPT | Mod: 24 | Performed by: NURSE PRACTITIONER

## 2024-10-06 PROCEDURE — 85027 COMPLETE CBC AUTOMATED: CPT | Performed by: NURSE PRACTITIONER

## 2024-10-06 PROCEDURE — 250N000011 HC RX IP 250 OP 636: Performed by: NURSE PRACTITIONER

## 2024-10-06 PROCEDURE — 97116 GAIT TRAINING THERAPY: CPT | Mod: GP

## 2024-10-06 PROCEDURE — 120N000011 HC R&B TRANSPLANT UMMC

## 2024-10-06 PROCEDURE — 80197 ASSAY OF TACROLIMUS: CPT | Performed by: PHYSICIAN ASSISTANT

## 2024-10-06 RX ORDER — BISACODYL 10 MG
10 SUPPOSITORY, RECTAL RECTAL ONCE
Status: COMPLETED | OUTPATIENT
Start: 2024-10-06 | End: 2024-10-06

## 2024-10-06 RX ORDER — OXYCODONE HYDROCHLORIDE 5 MG/1
5 TABLET ORAL EVERY 6 HOURS PRN
Status: DISPENSED | OUTPATIENT
Start: 2024-10-06

## 2024-10-06 RX ORDER — MAGNESIUM SULFATE HEPTAHYDRATE 40 MG/ML
2 INJECTION, SOLUTION INTRAVENOUS ONCE
Status: COMPLETED | OUTPATIENT
Start: 2024-10-06 | End: 2024-10-06

## 2024-10-06 RX ORDER — CHOLECALCIFEROL (VITAMIN D3) 125 MCG
6000 CAPSULE ORAL 3 TIMES DAILY PRN
Status: DISPENSED | OUTPATIENT
Start: 2024-10-06

## 2024-10-06 RX ADMIN — OXYCODONE HYDROCHLORIDE 10 MG: 10 TABLET ORAL at 01:34

## 2024-10-06 RX ADMIN — NYSTATIN 500000 UNITS: 100000 SUSPENSION ORAL at 09:09

## 2024-10-06 RX ADMIN — Medication 1 DROP: at 10:09

## 2024-10-06 RX ADMIN — HYDROXYZINE HYDROCHLORIDE 25 MG: 25 TABLET, FILM COATED ORAL at 09:37

## 2024-10-06 RX ADMIN — PANTOPRAZOLE SODIUM 40 MG: 40 TABLET, DELAYED RELEASE ORAL at 09:09

## 2024-10-06 RX ADMIN — ACETAMINOPHEN 650 MG: 325 TABLET, FILM COATED ORAL at 13:17

## 2024-10-06 RX ADMIN — Medication 1 DROP: at 22:18

## 2024-10-06 RX ADMIN — METHOCARBAMOL 1000 MG: 500 TABLET ORAL at 23:34

## 2024-10-06 RX ADMIN — OXYCODONE HYDROCHLORIDE 7.5 MG: 5 TABLET ORAL at 22:14

## 2024-10-06 RX ADMIN — METHOCARBAMOL 1000 MG: 500 TABLET ORAL at 06:10

## 2024-10-06 RX ADMIN — FOLIC ACID 1 MG: 1 TABLET ORAL at 09:09

## 2024-10-06 RX ADMIN — METHOCARBAMOL 1000 MG: 500 TABLET ORAL at 12:18

## 2024-10-06 RX ADMIN — HYDROXYZINE HYDROCHLORIDE 25 MG: 25 TABLET, FILM COATED ORAL at 20:32

## 2024-10-06 RX ADMIN — TACROLIMUS 3.5 MG: 1 CAPSULE ORAL at 18:05

## 2024-10-06 RX ADMIN — SULFAMETHOXAZOLE AND TRIMETHOPRIM 1 TABLET: 400; 80 TABLET ORAL at 09:09

## 2024-10-06 RX ADMIN — Medication 1 DROP: at 15:57

## 2024-10-06 RX ADMIN — SODIUM PHOSPHATE, DIBASIC, ANHYDROUS, POTASSIUM PHOSPHATE, MONOBASIC, AND SODIUM PHOSPHATE, MONOBASIC, MONOHYDRATE 500 MG: 852; 155; 130 TABLET, COATED ORAL at 09:09

## 2024-10-06 RX ADMIN — OXYCODONE HYDROCHLORIDE 7.5 MG: 5 TABLET ORAL at 09:36

## 2024-10-06 RX ADMIN — THIAMINE HCL TAB 100 MG 100 MG: 100 TAB at 09:08

## 2024-10-06 RX ADMIN — SENNOSIDES 2 TABLET: 8.6 TABLET, FILM COATED ORAL at 20:22

## 2024-10-06 RX ADMIN — Medication 1 DROP: at 20:23

## 2024-10-06 RX ADMIN — SENNOSIDES 2 TABLET: 8.6 TABLET, FILM COATED ORAL at 09:11

## 2024-10-06 RX ADMIN — NYSTATIN 500000 UNITS: 100000 SUSPENSION ORAL at 20:22

## 2024-10-06 RX ADMIN — ACETAMINOPHEN 650 MG: 325 TABLET, FILM COATED ORAL at 01:34

## 2024-10-06 RX ADMIN — INSULIN ASPART 3 UNITS: 100 INJECTION, SOLUTION INTRAVENOUS; SUBCUTANEOUS at 13:19

## 2024-10-06 RX ADMIN — SODIUM PHOSPHATE, DIBASIC, ANHYDROUS, POTASSIUM PHOSPHATE, MONOBASIC, AND SODIUM PHOSPHATE, MONOBASIC, MONOHYDRATE 500 MG: 852; 155; 130 TABLET, COATED ORAL at 13:17

## 2024-10-06 RX ADMIN — PREDNISONE 5 MG: 5 TABLET ORAL at 09:09

## 2024-10-06 RX ADMIN — INSULIN ASPART 1 UNITS: 100 INJECTION, SOLUTION INTRAVENOUS; SUBCUTANEOUS at 18:55

## 2024-10-06 RX ADMIN — METHOCARBAMOL 1000 MG: 500 TABLET ORAL at 18:08

## 2024-10-06 RX ADMIN — TACROLIMUS 5 MG: 5 CAPSULE ORAL at 09:09

## 2024-10-06 RX ADMIN — ASPIRIN 81 MG CHEWABLE TABLET 81 MG: 81 TABLET CHEWABLE at 09:09

## 2024-10-06 RX ADMIN — MAGNESIUM OXIDE TAB 400 MG (241.3 MG ELEMENTAL MG) 800 MG: 400 (241.3 MG) TAB at 09:09

## 2024-10-06 RX ADMIN — MAGNESIUM OXIDE TAB 400 MG (241.3 MG ELEMENTAL MG) 800 MG: 400 (241.3 MG) TAB at 20:22

## 2024-10-06 RX ADMIN — INSULIN ASPART 1 UNITS: 100 INJECTION, SOLUTION INTRAVENOUS; SUBCUTANEOUS at 10:04

## 2024-10-06 RX ADMIN — VALGANCICLOVIR HYDROCHLORIDE 450 MG: 450 TABLET ORAL at 09:08

## 2024-10-06 RX ADMIN — OXYCODONE HYDROCHLORIDE 7.5 MG: 5 TABLET ORAL at 15:57

## 2024-10-06 RX ADMIN — MYCOPHENOLATE MOFETIL 750 MG: 250 CAPSULE ORAL at 18:07

## 2024-10-06 RX ADMIN — NYSTATIN 500000 UNITS: 100000 SUSPENSION ORAL at 15:57

## 2024-10-06 RX ADMIN — ACETAMINOPHEN 650 MG: 325 TABLET, FILM COATED ORAL at 06:21

## 2024-10-06 RX ADMIN — ACETAMINOPHEN 650 MG: 325 TABLET, FILM COATED ORAL at 20:31

## 2024-10-06 RX ADMIN — Medication 1 DROP: at 06:11

## 2024-10-06 RX ADMIN — MYCOPHENOLATE MOFETIL 750 MG: 250 CAPSULE ORAL at 09:08

## 2024-10-06 RX ADMIN — NYSTATIN 500000 UNITS: 100000 SUSPENSION ORAL at 12:18

## 2024-10-06 RX ADMIN — MAGNESIUM SULFATE HEPTAHYDRATE 2 G: 2 INJECTION, SOLUTION INTRAVENOUS at 09:09

## 2024-10-06 RX ADMIN — TAMSULOSIN HYDROCHLORIDE 0.4 MG: 0.4 CAPSULE ORAL at 09:21

## 2024-10-06 RX ADMIN — POLYETHYLENE GLYCOL 3350 17 G: 17 POWDER, FOR SOLUTION ORAL at 09:08

## 2024-10-06 ASSESSMENT — ACTIVITIES OF DAILY LIVING (ADL)
ADLS_ACUITY_SCORE: 31
ADLS_ACUITY_SCORE: 26
ADLS_ACUITY_SCORE: 26
ADLS_ACUITY_SCORE: 31
ADLS_ACUITY_SCORE: 26
ADLS_ACUITY_SCORE: 31
ADLS_ACUITY_SCORE: 26
ADLS_ACUITY_SCORE: 26
ADLS_ACUITY_SCORE: 30
ADLS_ACUITY_SCORE: 30
ADLS_ACUITY_SCORE: 26
ADLS_ACUITY_SCORE: 26
ADLS_ACUITY_SCORE: 31
ADLS_ACUITY_SCORE: 26
ADLS_ACUITY_SCORE: 31
ADLS_ACUITY_SCORE: 30
ADLS_ACUITY_SCORE: 30
ADLS_ACUITY_SCORE: 26
ADLS_ACUITY_SCORE: 30
ADLS_ACUITY_SCORE: 26
ADLS_ACUITY_SCORE: 26

## 2024-10-06 NOTE — PROGRESS NOTES
Immunosuppression Management Note:    Jag Smith is a 39 year old male who is seen today  for immunosuppression management     I, Russell Waters MD, I have examined the patient with our AZALIA/Fellow as part of a shared visit.   I participated in the rounds,  discussed and agree with the note and findings and  reviewed today's vital signs, medications, labs and imaging as noted in this note.  I  reviewed the  immunosuppression medications.  I personally provided a substantive portion of the care of this patient. I personally performed the immunosuppressive management of this patient, reviewed the overall  immunosuppression including drug levels, allograft function and provided the recommendations to adjust the dose to provide optimal levels to prevent rejection of the allograft and prevent toxicity to the organs. This was complex care due to the fresh allograft.   Time spent: evaluating patient, examining patient, discussion of plan, counseling and documentation: >35 min   I spoke to the patient/family and explained below clinical details and answered all the questions    Transplant Surgery  Inpatient Daily Progress Note  10/06/2024    Assessment & Plan: Jag Smith is a 39 year old male with recently diagnosed decompensated alcoholic liver cirrhosis transferred from [facility] on 9/21/24 for expedited liver transplant evaluation due to decompensated alcoholic liver cirrhosis. MELD 42. He is now s/p DDLT on 9/28/24 with Dr. Colon.     s/p DBD DDLT 9/28/24: POD#8. LFTs trending down. Post-op US with patent doppler.    - Continue ASA.   - Titrate tac dose to goal    Immunosuppressed status secondary to medications:   Induction: Steroid taper per protocol.   Maintenance:   -  mg BID   - Tacrolimus goal level 8-12 (fluconazole EOT 10/4).  Decrease tac dose today.   - Prednisone 5 mg daily x3 months after taper    Neurology:  Acute post-op pain: Fair control on current regimen:    -  Oxycodone 5-7.5 mg q6H PRN, continue weaning.   - Robaxin 1000 mg Q6H   - Lidocaine patches   - Atarax Q6H PRN  HA: Tylenol PRN. Cold compress. Improved today.    Hematology:   Acute blood loss anemia: Hgb 9-10, stable.  Thrombocytopenia: Improving, monitor.     Cardiorespiratory: No issues.     GI/Nutrition:   Severe malnutrition in the context of acute illness: Nutrition consulted. Continue Regular diet with supplements. Calorie counts demonstrating good PO intake.   Opioid induced constipation: Senna, PEG. Suppository today.    Endocrine:   Steroid induced hyperglycemia: Hgb A1c 5.4%. BG <180. Continue sliding scale insulin.    Fluid/Electrolytes/Renal:   Hyponatremia: Na 134, monitor.   Hypomagnesemia: Mag-Ox to 800 mg BID.   DENI: Nephrology consulted. 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 Cr 0.7->0.9. Likely up due to high tac level.  Hypervolemia: BLE edema - lymphedema wraps.   Hypophosphatemia: Phospha, reduce to BID.    :   Acute urinary retention: Possibly r/t opioids. PVR negative for retention.   -Continue flomax Flomax x 7 days  -Bladder and straight cath PRN    Infectious disease: No acute issues.     Skin:  Medical device related mucosal pressure injury of the lips, hospital acquired: WOC consulted.    HEENT:   Subconjunctival hemorrhage: Monitor. Ophthalmology consulted.  -Refresh tears ordered PRN dryness, irritation.    Prophylaxis: DVT, fall, GI (PPI), fungal (fluconazole per protocol), viral (valganciclovir), pneumocystis (Bactrim)    Disposition: 7A; Plan for discharge locally. Will be ready for discharge, however pt disagrees with this plan.    AZALIA/Fellow/Resident Provider: Maddi Culver NP     Faculty: Russell Waters MD   __________________________________________________________________    Interval History:   Overnight events: Incisional pain improving. Feels constipated. Had long discussion about readiness for discharge. Pt feels that he should be at 100% and not need assistance  before he leaves. Reviewed normal recovery expectations and that it is expected that people will need some assist from their caregiver after discharge.    ROS:   A 10-point review of systems was negative except as noted above.    Curent Meds:  Current Facility-Administered Medications   Medication Dose Route Frequency Provider Last Rate Last Admin    aspirin (ASA) chewable tablet 81 mg  81 mg Oral or Feeding Tube Daily Maddi Culver, SIL CNP   81 mg at 10/06/24 0909    carboxymethylcellulose PF (REFRESH PLUS) 0.5 % ophthalmic solution 1 drop  1 drop Both Eyes 6x Daily Mary Infante MD   1 drop at 10/06/24 1009    folic acid (FOLVITE) tablet 1 mg  1 mg Oral Daily José Miguel Aviles MD   1 mg at 10/06/24 0909    insulin aspart (NovoLOG) injection (RAPID ACTING)  1-10 Units Subcutaneous TID AC Gretchen Patino NP   1 Units at 10/06/24 1004    insulin aspart (NovoLOG) injection (RAPID ACTING)  1-7 Units Subcutaneous At Bedtime Gretchen Patino NP        Lidocaine (LIDOCARE) 4 % Patch 2 patch  2 patch Transdermal Q24h Ashley Loyd MD   2 patch at 09/30/24 2004    magnesium oxide (MAG-OX) tablet 800 mg  800 mg Oral BID Abi Quiros PA-C   800 mg at 10/06/24 0909    methocarbamol (ROBAXIN) tablet 1,000 mg  1,000 mg Oral Q6H Paresh Lara MD   1,000 mg at 10/06/24 1218    mycophenolate (GENERIC EQUIVALENT) capsule 750 mg  750 mg Oral BID IS Dominick Tejada MD   750 mg at 10/06/24 0908    nystatin (MYCOSTATIN) suspension 500,000 Units  500,000 Units Oral 4x Daily Abi Quiros PA-C   500,000 Units at 10/06/24 1218    pantoprazole (PROTONIX) EC tablet 40 mg  40 mg Oral Daily Gretchen Patino NP   40 mg at 10/06/24 0909    phosphorus tablet 250 mg (PHOSPHA 250 NEUTRAL) per tablet 500 mg  500 mg Oral TID Abi Quiros PA-C   500 mg at 10/06/24 1317    polyethylene glycol (MIRALAX) Packet 17 g  17 g Oral Daily Maddi Culver, SIL HUFFMAN   17 g at  10/06/24 0908    predniSONE (DELTASONE) tablet 5 mg  5 mg Oral Daily Abi Quiros PA-C   5 mg at 10/06/24 0909    sennosides (SENOKOT) tablet 2 tablet  2 tablet Oral BID Maddi Culver APRN CNP   2 tablet at 10/06/24 0911    sodium chloride (PF) 0.9% PF flush 3 mL  3 mL Intravenous Q8H Dominick Tejada MD   3 mL at 10/06/24 1218    sodium chloride (PF) 0.9% PF flush 3 mL  3 mL Intracatheter Q8H José Miguel Aviles MD   3 mL at 10/06/24 0921    sulfamethoxazole-trimethoprim (BACTRIM) 400-80 MG per tablet 1 tablet  1 tablet Oral Daily Abi Quiros PA-C   1 tablet at 10/06/24 0909    tacrolimus (GENERIC EQUIVALENT) capsule 3.5 mg  3.5 mg Oral BID IS Maddi Culver APRN CNP        tamsulosin (FLOMAX) capsule 0.4 mg  0.4 mg Oral Daily Maddi Culver APRN CNP   0.4 mg at 10/06/24 0921    thiamine (B-1) tablet 100 mg  100 mg Oral Daily José Miguel Aviles MD   100 mg at 10/06/24 0908    valGANciclovir (VALCYTE) tablet 450 mg  450 mg Oral Daily Dominick Tejada MD   450 mg at 10/06/24 0908       Physical Exam:     Admit Weight: 102.3 kg (225 lb 8 oz)    Current Vitals:   BP (!) 135/97 (BP Location: Left arm)   Pulse 80   Temp 97.4  F (36.3  C) (Oral)   Resp 16   Ht 1.829 m (6')   Wt 105.9 kg (233 lb 7.5 oz)   SpO2 100%   BMI 31.66 kg/m      Vital sign ranges:    Temp:  [97.4  F (36.3  C)-98.2  F (36.8  C)] 97.4  F (36.3  C)  Pulse:  [80-87] 80  Resp:  [16] 16  BP: (133-137)/(85-97) 135/97  SpO2:  [99 %-100 %] 100 %    General Appearance: in no apparent distress.   Eyes: Bilateral injection, present prior to transplant. No drainage.   Skin: normal, warm, jaundice  Heart: Perfused  Lungs: Unlabored on RA  Abdomen:  Soft, non distended.  The wound is intact, no signs of infection. SHENA x 2 serosanguinous, abdominal bruising  :  no mckeon  Extremities: generalized edema, legs wrapped  Neurologic: A&Ox4. No focal deficits. Equal strength       Frailty Scores           No  data to display                Data:   CMP  Recent Labs   Lab 10/06/24  1310 10/06/24  0958 10/06/24  0547 10/05/24  0746 10/05/24  0543   NA  --   --  134*  --  132*   POTASSIUM  --   --  4.5  --  4.2   CHLORIDE  --   --  104  --  104   CO2  --   --  21*  --  21*   * 145* 124*   < > 144*   BUN  --   --  20.7*  --  18.7   CR  --   --  0.86  --  0.65*   GFRESTIMATED  --   --  >90  --  >90   AMAIRANI  --   --  8.0*  --  8.0*   MAG  --   --  1.5*  --  1.7   PHOS  --   --  3.1  --  2.1*   ALBUMIN  --   --  2.7*  --  2.9*   BILITOTAL  --   --  3.8*  --  4.7*   ALKPHOS  --   --  159*  --  171*   AST  --   --  42  --  58*   ALT  --   --  127*  --  174*    < > = values in this interval not displayed.     CBC  Recent Labs   Lab 10/06/24  0547 10/05/24  0543   HGB 10.1* 10.0*   WBC 9.0 10.1   * 125*

## 2024-10-06 NOTE — PLAN OF CARE
Goal Outcome Evaluation:      Plan of Care Reviewed With: patient    Overall Patient Progress: improvingOverall Patient Progress: improving    Outcome Evaluation: Pain better controlled with PRNs    /88 (BP Location: Left arm)   Pulse 83   Temp 98.2  F (36.8  C) (Oral)   Resp 16   Ht 1.829 m (6')   Wt 106.7 kg (235 lb 3.2 oz)   SpO2 100%   BMI 31.90 kg/m      Shift: [id number]  Isolation Status: none  VS: stable on room air, afebrile  Neuro: Aox4  Behaviors: calm and cooperative   BG: ACHS  Labs: AM labs reviewed  Respiratory: WDL, no SOB reported  Cardiac: WDL, regular rate and rhythm  Pain/Nausea: 9/10 abdominal pain, denies nausea  PRN: Oxy 10 mg x1, tyl x1, atarax x1  Diet: regular, good appetite  IV Access: R. PIV SL  Infusion(s): none  Lines/Drains: 2 R Jps with moderate output, leaking at site is minimal   GI/: 2 BMs  Skin: bruising on abdomen, multiple skin tears, edema in lower extremities (lymph wraps on)  Mobility: up Ax1/SBA with walker  Events/Education: n/a  Plan: Continue plan of care and notify team of any changes    Comment: Patient states this area has previously bled 1 week ago without trauma. \\nNo apparent lesion today but she will return to clinic if this returns for reevaluation. Detail Level: Simple Render Risk Assessment In Note?: no

## 2024-10-06 NOTE — CARE PLAN
Vitals: Blood pressure (!) 137/97, pulse 83, temperature 97.9  F (36.6  C), temperature source Oral, resp. rate 16, height 1.829 m (6'), weight 105.9 kg (233 lb 7.5 oz), SpO2 100%.  Pt is A/O x 4, calls appropriately and makes needs known. Up with assist x1/sba, O2 > 94% on RA. Lower abdominal pain managed with oxycodone, tylenol and robaxin. Denies nausea at this time. No BM this shift, voiding spontaneously, AUOP. Generalized lower abdominal bruising and R abdominal SHENA x2 with serosanguineous output, site is CDI. No significant changes overnight, continue POC.

## 2024-10-07 ENCOUNTER — APPOINTMENT (OUTPATIENT)
Dept: OCCUPATIONAL THERAPY | Facility: CLINIC | Age: 39
End: 2024-10-07
Attending: PEDIATRICS
Payer: MEDICAID

## 2024-10-07 ENCOUNTER — TELEPHONE (OUTPATIENT)
Dept: TRANSPLANT | Facility: CLINIC | Age: 39
End: 2024-10-07
Payer: MEDICAID

## 2024-10-07 ENCOUNTER — APPOINTMENT (OUTPATIENT)
Dept: PHYSICAL THERAPY | Facility: CLINIC | Age: 39
End: 2024-10-07
Attending: PEDIATRICS
Payer: MEDICAID

## 2024-10-07 VITALS
RESPIRATION RATE: 16 BRPM | WEIGHT: 237.8 LBS | DIASTOLIC BLOOD PRESSURE: 98 MMHG | HEART RATE: 91 BPM | SYSTOLIC BLOOD PRESSURE: 133 MMHG | HEIGHT: 72 IN | BODY MASS INDEX: 32.21 KG/M2 | OXYGEN SATURATION: 100 % | TEMPERATURE: 99 F

## 2024-10-07 LAB
ALBUMIN SERPL BCG-MCNC: 2.8 G/DL (ref 3.5–5.2)
ALP SERPL-CCNC: 164 U/L (ref 40–150)
ALT SERPL W P-5'-P-CCNC: 108 U/L (ref 0–70)
ANION GAP SERPL CALCULATED.3IONS-SCNC: 9 MMOL/L (ref 7–15)
AST SERPL W P-5'-P-CCNC: 37 U/L (ref 0–45)
BILIRUB DIRECT SERPL-MCNC: 2.35 MG/DL (ref 0–0.3)
BILIRUB SERPL-MCNC: 3.5 MG/DL
BUN SERPL-MCNC: 22.9 MG/DL (ref 6–20)
CALCIUM SERPL-MCNC: 8.4 MG/DL (ref 8.8–10.4)
CHLORIDE SERPL-SCNC: 103 MMOL/L (ref 98–107)
CREAT SERPL-MCNC: 0.94 MG/DL (ref 0.67–1.17)
EGFRCR SERPLBLD CKD-EPI 2021: >90 ML/MIN/1.73M2
ERYTHROCYTE [DISTWIDTH] IN BLOOD BY AUTOMATED COUNT: 19.7 % (ref 10–15)
GLUCOSE BLDC GLUCOMTR-MCNC: 109 MG/DL (ref 70–99)
GLUCOSE BLDC GLUCOMTR-MCNC: 110 MG/DL (ref 70–99)
GLUCOSE BLDC GLUCOMTR-MCNC: 150 MG/DL (ref 70–99)
GLUCOSE BLDC GLUCOMTR-MCNC: 159 MG/DL (ref 70–99)
GLUCOSE BLDC GLUCOMTR-MCNC: 88 MG/DL (ref 70–99)
GLUCOSE BLDC GLUCOMTR-MCNC: 90 MG/DL (ref 70–99)
GLUCOSE BLDC GLUCOMTR-MCNC: 95 MG/DL (ref 70–99)
GLUCOSE BLDC GLUCOMTR-MCNC: 95 MG/DL (ref 70–99)
GLUCOSE SERPL-MCNC: 116 MG/DL (ref 70–99)
HCO3 SERPL-SCNC: 21 MMOL/L (ref 22–29)
HCT VFR BLD AUTO: 30.1 % (ref 40–53)
HGB BLD-MCNC: 9.9 G/DL (ref 13.3–17.7)
MAGNESIUM SERPL-MCNC: 1.6 MG/DL (ref 1.7–2.3)
MCH RBC QN AUTO: 31 PG (ref 26.5–33)
MCHC RBC AUTO-ENTMCNC: 32.9 G/DL (ref 31.5–36.5)
MCV RBC AUTO: 94 FL (ref 78–100)
PHOSPHATE SERPL-MCNC: 3.7 MG/DL (ref 2.5–4.5)
PLATELET # BLD AUTO: 150 10E3/UL (ref 150–450)
POTASSIUM SERPL-SCNC: 5.4 MMOL/L (ref 3.4–5.3)
POTASSIUM SERPL-SCNC: 5.5 MMOL/L (ref 3.4–5.3)
POTASSIUM SERPL-SCNC: 5.9 MMOL/L (ref 3.4–5.3)
PROT SERPL-MCNC: 5.3 G/DL (ref 6.4–8.3)
RBC # BLD AUTO: 3.19 10E6/UL (ref 4.4–5.9)
SODIUM SERPL-SCNC: 133 MMOL/L (ref 135–145)
TACROLIMUS BLD-MCNC: 11.7 UG/L (ref 5–15)
TME LAST DOSE: NORMAL H
TME LAST DOSE: NORMAL H
WBC # BLD AUTO: 8.6 10E3/UL (ref 4–11)

## 2024-10-07 PROCEDURE — 250N000011 HC RX IP 250 OP 636

## 2024-10-07 PROCEDURE — 250N000013 HC RX MED GY IP 250 OP 250 PS 637

## 2024-10-07 PROCEDURE — 80197 ASSAY OF TACROLIMUS: CPT | Performed by: PHYSICIAN ASSISTANT

## 2024-10-07 PROCEDURE — 82310 ASSAY OF CALCIUM: CPT | Performed by: STUDENT IN AN ORGANIZED HEALTH CARE EDUCATION/TRAINING PROGRAM

## 2024-10-07 PROCEDURE — 120N000011 HC R&B TRANSPLANT UMMC

## 2024-10-07 PROCEDURE — 83735 ASSAY OF MAGNESIUM: CPT | Performed by: STUDENT IN AN ORGANIZED HEALTH CARE EDUCATION/TRAINING PROGRAM

## 2024-10-07 PROCEDURE — 258N000001 HC RX 258

## 2024-10-07 PROCEDURE — 97140 MANUAL THERAPY 1/> REGIONS: CPT | Mod: GO

## 2024-10-07 PROCEDURE — 85027 COMPLETE CBC AUTOMATED: CPT | Performed by: NURSE PRACTITIONER

## 2024-10-07 PROCEDURE — 82248 BILIRUBIN DIRECT: CPT | Performed by: STUDENT IN AN ORGANIZED HEALTH CARE EDUCATION/TRAINING PROGRAM

## 2024-10-07 PROCEDURE — 250N000012 HC RX MED GY IP 250 OP 636 PS 637: Performed by: NURSE PRACTITIONER

## 2024-10-07 PROCEDURE — 97530 THERAPEUTIC ACTIVITIES: CPT | Mod: GP

## 2024-10-07 PROCEDURE — 36415 COLL VENOUS BLD VENIPUNCTURE: CPT | Performed by: PHYSICIAN ASSISTANT

## 2024-10-07 PROCEDURE — 84100 ASSAY OF PHOSPHORUS: CPT | Performed by: STUDENT IN AN ORGANIZED HEALTH CARE EDUCATION/TRAINING PROGRAM

## 2024-10-07 PROCEDURE — 99232 SBSQ HOSP IP/OBS MODERATE 35: CPT | Mod: 24 | Performed by: NURSE PRACTITIONER

## 2024-10-07 PROCEDURE — 250N000013 HC RX MED GY IP 250 OP 250 PS 637: Performed by: STUDENT IN AN ORGANIZED HEALTH CARE EDUCATION/TRAINING PROGRAM

## 2024-10-07 PROCEDURE — 250N000012 HC RX MED GY IP 250 OP 636 PS 637: Performed by: STUDENT IN AN ORGANIZED HEALTH CARE EDUCATION/TRAINING PROGRAM

## 2024-10-07 PROCEDURE — 250N000013 HC RX MED GY IP 250 OP 250 PS 637: Performed by: HOSPITALIST

## 2024-10-07 PROCEDURE — 258N000003 HC RX IP 258 OP 636

## 2024-10-07 PROCEDURE — 250N000013 HC RX MED GY IP 250 OP 250 PS 637: Performed by: NURSE PRACTITIONER

## 2024-10-07 PROCEDURE — 250N000012 HC RX MED GY IP 250 OP 636 PS 637: Performed by: PHYSICIAN ASSISTANT

## 2024-10-07 PROCEDURE — 97535 SELF CARE MNGMENT TRAINING: CPT | Mod: GO

## 2024-10-07 PROCEDURE — 97116 GAIT TRAINING THERAPY: CPT | Mod: GP

## 2024-10-07 PROCEDURE — 250N000013 HC RX MED GY IP 250 OP 250 PS 637: Performed by: PHYSICIAN ASSISTANT

## 2024-10-07 PROCEDURE — 36415 COLL VENOUS BLD VENIPUNCTURE: CPT

## 2024-10-07 PROCEDURE — 84132 ASSAY OF SERUM POTASSIUM: CPT | Performed by: NURSE PRACTITIONER

## 2024-10-07 PROCEDURE — 250N000012 HC RX MED GY IP 250 OP 636 PS 637

## 2024-10-07 PROCEDURE — 84132 ASSAY OF SERUM POTASSIUM: CPT

## 2024-10-07 PROCEDURE — 80053 COMPREHEN METABOLIC PANEL: CPT | Performed by: STUDENT IN AN ORGANIZED HEALTH CARE EDUCATION/TRAINING PROGRAM

## 2024-10-07 PROCEDURE — 36415 COLL VENOUS BLD VENIPUNCTURE: CPT | Performed by: NURSE PRACTITIONER

## 2024-10-07 PROCEDURE — 97530 THERAPEUTIC ACTIVITIES: CPT | Mod: GO

## 2024-10-07 PROCEDURE — 250N000011 HC RX IP 250 OP 636: Performed by: HOSPITALIST

## 2024-10-07 RX ORDER — FUROSEMIDE 10 MG/ML
40 INJECTION INTRAMUSCULAR; INTRAVENOUS ONCE
Status: COMPLETED | OUTPATIENT
Start: 2024-10-07 | End: 2024-10-07

## 2024-10-07 RX ORDER — METHOCARBAMOL 500 MG/1
1000 TABLET, FILM COATED ORAL EVERY 6 HOURS PRN
Status: DISPENSED | OUTPATIENT
Start: 2024-10-07

## 2024-10-07 RX ORDER — DEXTROSE MONOHYDRATE 25 G/50ML
25 INJECTION, SOLUTION INTRAVENOUS ONCE
Status: COMPLETED | OUTPATIENT
Start: 2024-10-07 | End: 2024-10-07

## 2024-10-07 RX ORDER — CALCIUM GLUCONATE 20 MG/ML
2 INJECTION, SOLUTION INTRAVENOUS ONCE
Status: COMPLETED | OUTPATIENT
Start: 2024-10-07 | End: 2024-10-07

## 2024-10-07 RX ORDER — MAGNESIUM SULFATE HEPTAHYDRATE 40 MG/ML
2 INJECTION, SOLUTION INTRAVENOUS ONCE
Status: ACTIVE | OUTPATIENT
Start: 2024-10-07

## 2024-10-07 RX ORDER — FUROSEMIDE 40 MG
40 TABLET ORAL ONCE
Status: COMPLETED | OUTPATIENT
Start: 2024-10-07 | End: 2024-10-07

## 2024-10-07 RX ADMIN — METHOCARBAMOL 1000 MG: 500 TABLET ORAL at 05:48

## 2024-10-07 RX ADMIN — MAGNESIUM OXIDE TAB 400 MG (241.3 MG ELEMENTAL MG) 800 MG: 400 (241.3 MG) TAB at 19:42

## 2024-10-07 RX ADMIN — PREDNISONE 5 MG: 5 TABLET ORAL at 08:37

## 2024-10-07 RX ADMIN — MAGNESIUM OXIDE TAB 400 MG (241.3 MG ELEMENTAL MG) 800 MG: 400 (241.3 MG) TAB at 08:44

## 2024-10-07 RX ADMIN — CALCIUM GLUCONATE 2 G: 20 INJECTION, SOLUTION INTRAVENOUS at 19:46

## 2024-10-07 RX ADMIN — HYDROXYZINE HYDROCHLORIDE 25 MG: 25 TABLET, FILM COATED ORAL at 23:57

## 2024-10-07 RX ADMIN — MYCOPHENOLATE MOFETIL 750 MG: 250 CAPSULE ORAL at 08:34

## 2024-10-07 RX ADMIN — METHOCARBAMOL 1000 MG: 500 TABLET ORAL at 11:18

## 2024-10-07 RX ADMIN — SODIUM CHLORIDE 10 UNITS: 9 INJECTION, SOLUTION INTRAVENOUS at 21:07

## 2024-10-07 RX ADMIN — NYSTATIN 500000 UNITS: 100000 SUSPENSION ORAL at 19:51

## 2024-10-07 RX ADMIN — HYDROXYZINE HYDROCHLORIDE 50 MG: 50 TABLET, FILM COATED ORAL at 18:11

## 2024-10-07 RX ADMIN — NYSTATIN 500000 UNITS: 100000 SUSPENSION ORAL at 12:54

## 2024-10-07 RX ADMIN — ACETAMINOPHEN 650 MG: 325 TABLET, FILM COATED ORAL at 19:51

## 2024-10-07 RX ADMIN — DEXTROSE MONOHYDRATE 300 ML: 100 INJECTION, SOLUTION INTRAVENOUS at 21:15

## 2024-10-07 RX ADMIN — DEXTROSE MONOHYDRATE 25 G: 25 INJECTION, SOLUTION INTRAVENOUS at 21:07

## 2024-10-07 RX ADMIN — VALGANCICLOVIR HYDROCHLORIDE 450 MG: 450 TABLET ORAL at 08:55

## 2024-10-07 RX ADMIN — MYCOPHENOLATE MOFETIL 750 MG: 250 CAPSULE ORAL at 18:09

## 2024-10-07 RX ADMIN — HYDROXYZINE HYDROCHLORIDE 25 MG: 25 TABLET, FILM COATED ORAL at 11:17

## 2024-10-07 RX ADMIN — FUROSEMIDE 40 MG: 40 TABLET ORAL at 12:53

## 2024-10-07 RX ADMIN — SODIUM PHOSPHATE, DIBASIC, ANHYDROUS, POTASSIUM PHOSPHATE, MONOBASIC, AND SODIUM PHOSPHATE, MONOBASIC, MONOHYDRATE 500 MG: 852; 155; 130 TABLET, COATED ORAL at 08:41

## 2024-10-07 RX ADMIN — TACROLIMUS 3.5 MG: 1 CAPSULE ORAL at 18:09

## 2024-10-07 RX ADMIN — Medication 1 DROP: at 11:19

## 2024-10-07 RX ADMIN — ACETAMINOPHEN 650 MG: 325 TABLET, FILM COATED ORAL at 01:21

## 2024-10-07 RX ADMIN — OXYCODONE HYDROCHLORIDE 5 MG: 5 TABLET ORAL at 19:51

## 2024-10-07 RX ADMIN — INSULIN ASPART 1 UNITS: 100 INJECTION, SOLUTION INTRAVENOUS; SUBCUTANEOUS at 13:10

## 2024-10-07 RX ADMIN — Medication 1 DROP: at 08:39

## 2024-10-07 RX ADMIN — PANTOPRAZOLE SODIUM 40 MG: 40 TABLET, DELAYED RELEASE ORAL at 08:39

## 2024-10-07 RX ADMIN — Medication 1 DROP: at 18:10

## 2024-10-07 RX ADMIN — METHOCARBAMOL 1000 MG: 500 TABLET ORAL at 18:10

## 2024-10-07 RX ADMIN — THIAMINE HCL TAB 100 MG 100 MG: 100 TAB at 08:42

## 2024-10-07 RX ADMIN — HYDROXYZINE HYDROCHLORIDE 50 MG: 50 TABLET, FILM COATED ORAL at 03:01

## 2024-10-07 RX ADMIN — ONDANSETRON 4 MG: 4 TABLET, ORALLY DISINTEGRATING ORAL at 20:21

## 2024-10-07 RX ADMIN — OXYCODONE HYDROCHLORIDE 5 MG: 5 TABLET ORAL at 12:53

## 2024-10-07 RX ADMIN — TAMSULOSIN HYDROCHLORIDE 0.4 MG: 0.4 CAPSULE ORAL at 08:41

## 2024-10-07 RX ADMIN — Medication 1 DROP: at 21:47

## 2024-10-07 RX ADMIN — FUROSEMIDE 40 MG: 10 INJECTION, SOLUTION INTRAMUSCULAR; INTRAVENOUS at 19:42

## 2024-10-07 RX ADMIN — ASPIRIN 81 MG CHEWABLE TABLET 81 MG: 81 TABLET CHEWABLE at 08:40

## 2024-10-07 RX ADMIN — NYSTATIN 500000 UNITS: 100000 SUSPENSION ORAL at 08:42

## 2024-10-07 RX ADMIN — ACETAMINOPHEN 650 MG: 325 TABLET, FILM COATED ORAL at 06:09

## 2024-10-07 RX ADMIN — NYSTATIN 500000 UNITS: 100000 SUSPENSION ORAL at 18:11

## 2024-10-07 RX ADMIN — FOLIC ACID 1 MG: 1 TABLET ORAL at 08:40

## 2024-10-07 RX ADMIN — TACROLIMUS 3.5 MG: 1 CAPSULE ORAL at 08:34

## 2024-10-07 RX ADMIN — OXYCODONE HYDROCHLORIDE 7.5 MG: 5 TABLET ORAL at 04:46

## 2024-10-07 RX ADMIN — SENNOSIDES 2 TABLET: 8.6 TABLET, FILM COATED ORAL at 19:42

## 2024-10-07 RX ADMIN — POLYETHYLENE GLYCOL 3350 17 G: 17 POWDER, FOR SOLUTION ORAL at 08:43

## 2024-10-07 RX ADMIN — Medication 1 DROP: at 12:55

## 2024-10-07 ASSESSMENT — ACTIVITIES OF DAILY LIVING (ADL)
ADLS_ACUITY_SCORE: 30

## 2024-10-07 NOTE — PROGRESS NOTES
Care Management Follow Up    Length of Stay (days): 16  Expected Discharge Date: medically ready 10/7    Anticipated Discharge Disposition: [facility]  [address] [place] [address]     Anticipated Discharge Services: No accepting HH agencies, pt/mom updated on 10/4. CHW scheduled OP Txp labs.     Anticipated Discharge DME: None    Patient/family educated on Medicare website which has current facility and service quality ratings: yes  Education Provided on the Discharge Plan: Yes  Patient/Family in Agreement with the Plan: yes  Referrals Placed by CM/SW: Homecare  Private pay costs discussed: Not applicable  Handoff Completed: No, handoff not indicated or clinically appropriate        Additional Information:  Per AZALIA Godinez new Liver Transplant is medically ready for discharge, but pt/mom stating they don't feel ready. Will have therapy re-eval     Addendum: Therapy recs continue to be Home with OP PT/OT. They will issue patient a walker and work with pt again tomorrow AM. They noted pt mom concerned they are discharging too soon. AZALIA and SW updated. SW plans to contact mother later today.          Next Steps:   [] CTS handoff to OP TXP RN[facility]  [] external hand off - Jamison Iqbal RN, [facility]  Inpatient Care Management - FLOAT

## 2024-10-07 NOTE — PLAN OF CARE
Goal Outcome Evaluation: [id number]      Plan of Care Reviewed With: patient    Overall Patient Progress: improving    Pt is alert and oriented x4. Has pain on his surgical site, all prn medications given. Pt was awake and up on his chair most of this shift. SBA with walker on mobility. Voiding well. Last BM 10/6. Denies any n/v and tolerating diet well. DBP slightly elevated but within parameters. Will continue with poc.

## 2024-10-07 NOTE — PLAN OF CARE
Goal Outcome Evaluation:      Plan of Care Reviewed With: patient, parent    Overall Patient Progress: improvingOverall Patient Progress: improving    Outcome Evaluation: SHENA's removed, planning for discharge    /88 (BP Location: Right arm)   Pulse 79   Temp 97.6  F (36.4  C) (Oral)   Resp 16   Ht 1.829 m (6')   Wt 107.9 kg (237 lb 12.8 oz)   SpO2 99%   BMI 32.25 kg/m      Endocrine: Glucose 116, potassium 5.4.  Labs: Improving and stable labs.  Pain: Abdominal discomfort.  PRN's: Scheduled Robaxin, PRN Atarax.  Diet: Regular diet, changed to 2 GM K+, eating well.  LDA: R PIV saline locked.  GI: BM 10/7.  : Voids spontaneously.  Skin: Jaundiced, scattered bruising, SHENA's removed,  dressings leaking, changed X1. Generalized and dependent edema (lymph wraps on)  Neuro: Alert and oriented.  Mobility: Minimal stand by assist, is more independent in his cares.  Education: Reviewed morning medications using med card, lab book updated.  Plan: Discharge locally patient expresses resistance with discharge. Potassium re check at 1600. Bactrim on hold.

## 2024-10-07 NOTE — PLAN OF CARE
Goal Outcome Evaluation:      Plan of Care Reviewed With: patient    Overall Patient Progress: improvingOverall Patient Progress: improving    Outcome Evaluation: Pain better controlled with PRNS    /88 (BP Location: Right arm)   Pulse 84   Temp 98.3  F (36.8  C) (Oral)   Resp 16   Ht 1.829 m (6')   Wt 105.9 kg (233 lb 7.5 oz)   SpO2 100%   BMI 31.66 kg/m      Shift: [id number]  Isolation Status: none  VS: stable on room air, afebrile  Neuro: Aox4  Behaviors: calm, seeks out staff frequently   BG: ACHS  Labs: AM Labs reviewed   Respiratory: WDL except dyspnea on exertion  Cardiac: WDL, regular rate and rhythm   Pain/Nausea: 9/10 abdominal pain, denies nausea  PRN: Oxy 7.5 mg x2, tyl x1, atarax x1  Diet: regular, good appetite   IV Access: R. PIV SL  Infusion(s): none  Lines/Drains: 2 R SHENA with moderate output  GI/: 2 BM, voiding adequately  Skin: abdominal bruising, incision stapled open to air, tape burns healing  Mobility: up with assist 1 with walker  Events/Education: n/a  Plan: continue plan of care and notify team of any changes

## 2024-10-07 NOTE — PROGRESS NOTES
Transplant Surgery  Inpatient Daily Progress Note  10/07/2024    Assessment & Plan: Jag Smith is a 39 year old male with recently diagnosed decompensated alcoholic liver cirrhosis transferred from [facility] on 9/21/24 for expedited liver transplant evaluation due to decompensated alcoholic liver cirrhosis. MELD 42. He is now s/p DDLT on 9/28/24 with Dr. Colon.     Changes today:   - Hold Bactrim   - 2 gram potassium diet   - Recheck K at 16:00   - Decrease oxycodone   - Replace Mg   - PO Lasix 40 mg x1    - Remove SHENA drains   - PT/OT to reevaluate patient given concerns regarding discharge  ___________________________________________    s/p DBD DDLT 9/28/24: POD#9. LFTs trending down. Post-op US with patent doppler.    - Continue ASA.    Immunosuppressed status secondary to medications:   Induction: Steroid taper per protocol.   Maintenance:   -  mg BID   - Tacrolimus 3.5 mg BID. Goal level 8-12.    - Prednisone 5 mg daily x3 months    Neurology:  Acute post-op pain: Fair control on current regimen:    - Tylenol 650 mg q4H PRN   - Oxycodone 5 mg q6H PRN (dose decreased)   - Robaxin 1000 mg q6H changed to PRN   - Lidocaine patches discontinued (not using)   - Atarax q6H PRN    Hematology:   Acute blood loss anemia: Hgb 9-10, stable.    Cardiorespiratory: No issues.     GI/Nutrition:   Severe malnutrition in the context of acute illness: Nutrition consulted. Continue Regular diet with supplements. Calorie counts demonstrating good PO intake.     Endocrine:   Steroid induced hyperglycemia: Hgb A1c 5.4%. BG <200. Continue sliding scale insulin, reduced to medium intensity.    Fluid/Electrolytes/Renal:   Hyponatremia: Na 133, monitor.   Hypomagnesemia: Mag-Ox to 800 mg BID.   DENI: Nephrology consulted. 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 Cr 0.7->0.9. Likely up due to high tac level.  Hypervolemia: BLE edema - lymphedema wraps.     :   Acute urinary retention: Possibly r/t opioids. PVR  negative for retention.    - Continue flomax Flomax x 7 days   - Bladder and straight cath PRN    Infectious disease: No acute issues.     Skin:  Medical device related mucosal pressure injury of the lips, hospital acquired: WOC consulted.    HEENT:   Subconjunctival hemorrhage: Monitor. Ophthalmology consulted.   - Refresh tears ordered 6 x daily.    - Follow up with Ophthalmology clinic PRN for complete eye exam.     Prophylaxis: DVT (mechanical), fall, GI (PPI), fungal (Nystatin), viral (valganciclovir), pneumocystis (Bactrim)    Disposition: 7A; Plan for discharge locally with mother as caregiver, likely tomorrow.     AZALIA/Fellow/Resident Provider: Gretchen Patino NP #[id number]     Faculty: Fernando Colon MD PhD   __________________________________________________________________    Interval History:   Overnight events: No acute events overnight. Patient and mother remain concerned about discharge due to patient's physical condition.     ROS:   A 10-point review of systems was negative except as noted above.    Curent Meds:  Current Facility-Administered Medications   Medication Dose Route Frequency Provider Last Rate Last Admin    aspirin (ASA) chewable tablet 81 mg  81 mg Oral or Feeding Tube Daily Maddi Culver, SIL CNP   81 mg at 10/07/24 0840    carboxymethylcellulose PF (REFRESH PLUS) 0.5 % ophthalmic solution 1 drop  1 drop Both Eyes 6x Daily Mary Infante MD   1 drop at 10/07/24 1255    folic acid (FOLVITE) tablet 1 mg  1 mg Oral Daily José Miguel Aviles MD   1 mg at 10/07/24 0840    insulin aspart (NovoLOG) injection (RAPID ACTING)  1-10 Units Subcutaneous TID AC Gretchen Patino NP   1 Units at 10/07/24 1310    insulin aspart (NovoLOG) injection (RAPID ACTING)  1-7 Units Subcutaneous At Bedtime Gretchen Patino NP        Lidocaine (LIDOCARE) 4 % Patch 2 patch  2 patch Transdermal Q24h Ashley Loyd MD   2 patch at 09/30/24 2004    magnesium oxide (MAG-OX)  tablet 800 mg  800 mg Oral BID Abi Quiros PA-C   800 mg at 10/07/24 0844    methocarbamol (ROBAXIN) tablet 1,000 mg  1,000 mg Oral Q6H Paresh Lara MD   1,000 mg at 10/07/24 1118    mycophenolate (GENERIC EQUIVALENT) capsule 750 mg  750 mg Oral BID IS Dominick Tejada MD   750 mg at 10/07/24 0834    nystatin (MYCOSTATIN) suspension 500,000 Units  500,000 Units Oral 4x Daily Abi Quiros PA-C   500,000 Units at 10/07/24 1254    pantoprazole (PROTONIX) EC tablet 40 mg  40 mg Oral Daily Gretchen Patino NP   40 mg at 10/07/24 0839    phosphorus tablet 250 mg (PHOSPHA 250 NEUTRAL) per tablet 500 mg  500 mg Oral BID Maddi Culver APRN CNP   500 mg at 10/07/24 0841    polyethylene glycol (MIRALAX) Packet 17 g  17 g Oral Daily Maddi Culver APRN CNP   17 g at 10/07/24 0843    predniSONE (DELTASONE) tablet 5 mg  5 mg Oral Daily Abi Quiros PA-C   5 mg at 10/07/24 0837    sennosides (SENOKOT) tablet 2 tablet  2 tablet Oral BID Maddi Culver APRN CNP   2 tablet at 10/06/24 2022    sodium chloride (PF) 0.9% PF flush 3 mL  3 mL Intravenous Q8H Dominick Tejada MD   3 mL at 10/07/24 1311    sodium chloride (PF) 0.9% PF flush 3 mL  3 mL Intracatheter Q8H José Miguel Aviles MD   3 mL at 10/06/24 1602    [Held by provider] sulfamethoxazole-trimethoprim (BACTRIM) 400-80 MG per tablet 1 tablet  1 tablet Oral Daily Abi Quiros PA-C   1 tablet at 10/06/24 0909    tacrolimus (GENERIC EQUIVALENT) capsule 3.5 mg  3.5 mg Oral BID IS Maddi Culver APRN CNP   3.5 mg at 10/07/24 0834    tamsulosin (FLOMAX) capsule 0.4 mg  0.4 mg Oral Daily Maddi Culver APRN CNP   0.4 mg at 10/07/24 0841    thiamine (B-1) tablet 100 mg  100 mg Oral Daily José Miguel Aviles MD   100 mg at 10/07/24 0842    valGANciclovir (VALCYTE) tablet 450 mg  450 mg Oral Daily Dominick Tejada MD   450 mg at 10/07/24 0855       Physical Exam:     Admit Weight: 102.3 kg (225 lb 8  oz)    Current Vitals:   /67 (BP Location: Right arm)   Pulse 77   Temp 98.3  F (36.8  C) (Oral)   Resp 16   Ht 1.829 m (6')   Wt 107.9 kg (237 lb 12.8 oz)   SpO2 100%   BMI 32.25 kg/m      Vital sign ranges:    Temp:  [97.6  F (36.4  C)-98.3  F (36.8  C)] 98.3  F (36.8  C)  Pulse:  [77-90] 77  Resp:  [16-18] 16  BP: (132-140)/() 138/67  SpO2:  [99 %-100 %] 100 %    General Appearance: in no apparent distress.   Eyes: Bilateral injection, present prior to transplant. No drainage.   Skin: normal, warm, jaundice  Heart: Perfused  Lungs: Unlabored on RA  Abdomen:  Soft, non distended.  The wound is intact, no signs of infection. SHENA x 2 serosanguinous, abdominal bruising  :  no mckeon  Extremities: generalized edema  Neurologic: A&Ox4. No focal deficits. Equal strength       Frailty Scores           No data to display                Data:   CMP  Recent Labs   Lab 10/07/24  1226 10/07/24  0753 10/07/24  0628 10/06/24  0958 10/06/24  0547   NA  --   --  133*  --  134*   POTASSIUM  --   --  5.4*  --  4.5   CHLORIDE  --   --  103  --  104   CO2  --   --  21*  --  21*   * 109* 116*   < > 124*   BUN  --   --  22.9*  --  20.7*   CR  --   --  0.94  --  0.86   GFRESTIMATED  --   --  >90  --  >90   AMAIRANI  --   --  8.4*  --  8.0*   MAG  --   --  1.6*  --  1.5*   PHOS  --   --  3.7  --  3.1   ALBUMIN  --   --  2.8*  --  2.7*   BILITOTAL  --   --  3.5*  --  3.8*   ALKPHOS  --   --  164*  --  159*   AST  --   --  37  --  42   ALT  --   --  108*  --  127*    < > = values in this interval not displayed.     CBC  Recent Labs   Lab 10/07/24  0628 10/06/24  0547   HGB 9.9* 10.1*   WBC 8.6 9.0    125*

## 2024-10-07 NOTE — PROGRESS NOTES
Transplant Social Work Services Progress Note    Data: Jag underwent liver transplant 9/28/2024   Intervention: SW met with pt and mother, Sania, bedside to discuss discharge planning.   Assessment: Sania asked to speak with SW in the freed, she expressed concerns with discharging and not feeling ready. This SW provided supportive counseling and talked through her concerns. SW reassured Sania that there will continue to be support after discharge. Sania toured an [address] at [address] and she is feeling like that will be a good place to stay post-discharge.  Education provided by SW: see above  Plan:    Discharge Plans in Progress: home, staying locally at [address]    Barriers to d/c plan: not medically ready    Follow up Plan: SW will continue to be available for psychosocial support.    Jennifer CABALLERO, LGSW  [facility]  Liver Transplant   Phone: [phone]

## 2024-10-07 NOTE — PROGRESS NOTES
"CLINICAL NUTRITION SERVICES - REASSESSMENT NOTE     Nutrition Prescription    RECOMMENDATIONS FOR MDs/PROVIDERS TO ORDER:  Recommend discontinue folic acid and thiamine supplementation.      Malnutrition Status:    Severe malnutrition in the context of acute illness    Recommendations already ordered by Registered Dietitian (RD):  Adjust to Glucerna strawberry (pt flavor preference, Glucerna lower in K+) + Ensure Max chocolate (both once daily)  - may need further adjustments if K+ continues to trend high    Provided handout \"Controlling Your Potassium\" and helped with meal ordering.        EVALUATION OF THE PROGRESS TOWARD GOALS   Diet: 2 gm K+    Intake:  Has been ordering 100% of assessed needs from room service the last several days and is eating 100% of meals per RN documentation.     10/2       Total Kcals: 3761     Total Protein: 158g   10/1       Total Kcals: 2475     Total Protein: 161g   9/30       Total Kcals: 1545     Total Protein: 97g   --------------------------------------------------------------------  3 day average = 1594 kcal (62% kcal needs) and 139 grams protein (>100% needs)     NEW FINDINGS   Weight: Weight stable/up this admission, however 20# weight loss in the last month prior to admission noted.     Labs: Na 133 (low), K+ 5.4 (elevated)    Meds: Protonix, Miralax, Senokot BID, Prednisone, Cellcept, Tacrolimus, Steroid taper, Thiamine 100 mg daily, Folic acid 1 mg daily, Lactase, MagOx 800 mg BID, High sliding scale insulin, Mycophenolate    GI: 1-4 BM daily    MALNUTRITION  % Intake: Decreased intake does not meet criteria  % Weight Loss: > 5% in 1 month (severe) - on admission  Subcutaneous Fat Loss: Facial region:  mild and Thoracic/intercostal: mild  Muscle Loss: Thoracic region (clavicle, acromium bone, deltoid, trapezius, pectoral): moderate and Upper arm (bicep, tricep):  moderate-severe  Fluid Accumulation/Edema: Moderate  Malnutrition Diagnosis: Severe malnutrition in the context " of acute illness    Previous Goals   Total avg nutritional intake to meet a minimum of 1700 kcal and 85 grams protein (~65% needs)   Evaluation: NOT MET (for calories per calorie counts), MET for protein    Previous Nutrition Diagnosis  Inadequate protein intake  Evaluation: Resolved    CURRENT NUTRITION DIAGNOSIS  Altered Nutrition related lab values (K+) related to Tacrolimus/good oral intake AEB patient K+ 5.4.     INTERVENTIONS  Implementation  Education provided on low K+ diet, adjustment in oral supplements.     Goals  Total avg nutritional intake to meet a minimum of 1700 kcal and 85 grams protein (~65% needs)    Monitoring/Evaluation  Progress toward goals will be monitored and evaluated per protocol.    Gretchen Goddard, MS, RD, LD, CCTD, CNSC  7A + 7B (beds [id number])  Available on Vocera [7A or 7B Clinical Dietitian]   Weekend/Holiday: Vocera [Weekend Clinical Dietitian]

## 2024-10-07 NOTE — PLAN OF CARE
Goal Outcome Evaluation:      Plan of Care Reviewed With: patient, parent    Overall Patient Progress: improvingOverall Patient Progress: improving       BP (!) 130/93 (BP Location: Left arm)   Pulse 83   Temp 98.5  F (36.9  C) (Oral)   Resp 16   Ht 1.829 m (6')   Wt 107.9 kg (237 lb 12.8 oz)   SpO2 100%   BMI 32.25 kg/m         2280-7225  AVSS on RA. Critical potassium- 5.9 this afternoon. Will shift this evening and have another potassium drawn 4hrs later (1130pm). BG ACHS; 157- remains on 2g potassium diet. Fair appetite and PO intake. SHENA's were removed today; dressing clean dry and intact. BM today. Adequate urine output- not always saving. PIV saline locked. Up ad ginny/ SB assist. Anticipating possible discharge tomorrow (10/8?). Patient in good spirits and motivated to get better. Continue plan of care, please notify MD with any changes.

## 2024-10-08 ENCOUNTER — TELEPHONE (OUTPATIENT)
Dept: TRANSPLANT | Facility: CLINIC | Age: 39
End: 2024-10-08
Payer: MEDICAID

## 2024-10-08 ENCOUNTER — APPOINTMENT (OUTPATIENT)
Dept: PHYSICAL THERAPY | Facility: CLINIC | Age: 39
End: 2024-10-08
Attending: PEDIATRICS
Payer: MEDICAID

## 2024-10-08 ENCOUNTER — APPOINTMENT (OUTPATIENT)
Dept: OCCUPATIONAL THERAPY | Facility: CLINIC | Age: 39
End: 2024-10-08
Attending: PEDIATRICS
Payer: MEDICAID

## 2024-10-08 DIAGNOSIS — Z94.4 LIVER REPLACED BY TRANSPLANT (H): Primary | ICD-10-CM

## 2024-10-08 LAB
ALBUMIN SERPL BCG-MCNC: 2.9 G/DL (ref 3.5–5.2)
ALP SERPL-CCNC: 182 U/L (ref 40–150)
ALT SERPL W P-5'-P-CCNC: 98 U/L (ref 0–70)
ANION GAP SERPL CALCULATED.3IONS-SCNC: 10 MMOL/L (ref 7–15)
AST SERPL W P-5'-P-CCNC: 41 U/L (ref 0–45)
BILIRUB DIRECT SERPL-MCNC: 1.96 MG/DL (ref 0–0.3)
BILIRUB SERPL-MCNC: 3 MG/DL
BUN SERPL-MCNC: 23.6 MG/DL (ref 6–20)
CALCIUM SERPL-MCNC: 8.6 MG/DL (ref 8.8–10.4)
CHLORIDE SERPL-SCNC: 100 MMOL/L (ref 98–107)
CREAT SERPL-MCNC: 1.05 MG/DL (ref 0.67–1.17)
EGFRCR SERPLBLD CKD-EPI 2021: >90 ML/MIN/1.73M2
ERYTHROCYTE [DISTWIDTH] IN BLOOD BY AUTOMATED COUNT: 19.9 % (ref 10–15)
GLUCOSE BLDC GLUCOMTR-MCNC: 100 MG/DL (ref 70–99)
GLUCOSE BLDC GLUCOMTR-MCNC: 173 MG/DL (ref 70–99)
GLUCOSE SERPL-MCNC: 142 MG/DL (ref 70–99)
HCO3 SERPL-SCNC: 22 MMOL/L (ref 22–29)
HCT VFR BLD AUTO: 27.6 % (ref 40–53)
HGB BLD-MCNC: 9.2 G/DL (ref 13.3–17.7)
MAGNESIUM SERPL-MCNC: 1.7 MG/DL (ref 1.7–2.3)
MCH RBC QN AUTO: 31.2 PG (ref 26.5–33)
MCHC RBC AUTO-ENTMCNC: 33.3 G/DL (ref 31.5–36.5)
MCV RBC AUTO: 94 FL (ref 78–100)
PHOSPHATE SERPL-MCNC: 4.2 MG/DL (ref 2.5–4.5)
PLATELET # BLD AUTO: 141 10E3/UL (ref 150–450)
POTASSIUM SERPL-SCNC: 5.5 MMOL/L (ref 3.4–5.3)
POTASSIUM SERPL-SCNC: 5.7 MMOL/L (ref 3.4–5.3)
PROT SERPL-MCNC: 5.4 G/DL (ref 6.4–8.3)
RBC # BLD AUTO: 2.95 10E6/UL (ref 4.4–5.9)
SODIUM SERPL-SCNC: 132 MMOL/L (ref 135–145)
TACROLIMUS BLD-MCNC: 9.7 UG/L (ref 5–15)
TME LAST DOSE: NORMAL H
TME LAST DOSE: NORMAL H
WBC # BLD AUTO: 8.3 10E3/UL (ref 4–11)

## 2024-10-08 PROCEDURE — 250N000012 HC RX MED GY IP 250 OP 636 PS 637: Performed by: STUDENT IN AN ORGANIZED HEALTH CARE EDUCATION/TRAINING PROGRAM

## 2024-10-08 PROCEDURE — 250N000011 HC RX IP 250 OP 636: Performed by: NURSE PRACTITIONER

## 2024-10-08 PROCEDURE — 82248 BILIRUBIN DIRECT: CPT | Performed by: STUDENT IN AN ORGANIZED HEALTH CARE EDUCATION/TRAINING PROGRAM

## 2024-10-08 PROCEDURE — 250N000013 HC RX MED GY IP 250 OP 250 PS 637

## 2024-10-08 PROCEDURE — G0463 HOSPITAL OUTPT CLINIC VISIT: HCPCS

## 2024-10-08 PROCEDURE — 250N000012 HC RX MED GY IP 250 OP 636 PS 637: Performed by: PHYSICIAN ASSISTANT

## 2024-10-08 PROCEDURE — 80053 COMPREHEN METABOLIC PANEL: CPT | Performed by: STUDENT IN AN ORGANIZED HEALTH CARE EDUCATION/TRAINING PROGRAM

## 2024-10-08 PROCEDURE — 250N000013 HC RX MED GY IP 250 OP 250 PS 637: Performed by: HOSPITALIST

## 2024-10-08 PROCEDURE — 250N000009 HC RX 250: Performed by: NURSE PRACTITIONER

## 2024-10-08 PROCEDURE — 250N000013 HC RX MED GY IP 250 OP 250 PS 637: Performed by: NURSE PRACTITIONER

## 2024-10-08 PROCEDURE — 250N000013 HC RX MED GY IP 250 OP 250 PS 637: Performed by: PHYSICIAN ASSISTANT

## 2024-10-08 PROCEDURE — 97535 SELF CARE MNGMENT TRAINING: CPT | Mod: GO

## 2024-10-08 PROCEDURE — 999N000157 HC STATISTIC RCP TIME EA 10 MIN

## 2024-10-08 PROCEDURE — 97530 THERAPEUTIC ACTIVITIES: CPT | Mod: GP

## 2024-10-08 PROCEDURE — 85027 COMPLETE CBC AUTOMATED: CPT | Performed by: NURSE PRACTITIONER

## 2024-10-08 PROCEDURE — 94642 AEROSOL INHALATION TREATMENT: CPT

## 2024-10-08 PROCEDURE — 80197 ASSAY OF TACROLIMUS: CPT | Performed by: PHYSICIAN ASSISTANT

## 2024-10-08 PROCEDURE — 36415 COLL VENOUS BLD VENIPUNCTURE: CPT | Performed by: PHYSICIAN ASSISTANT

## 2024-10-08 PROCEDURE — 250N000012 HC RX MED GY IP 250 OP 636 PS 637: Performed by: NURSE PRACTITIONER

## 2024-10-08 PROCEDURE — 250N000013 HC RX MED GY IP 250 OP 250 PS 637: Performed by: STUDENT IN AN ORGANIZED HEALTH CARE EDUCATION/TRAINING PROGRAM

## 2024-10-08 PROCEDURE — 250N000009 HC RX 250: Performed by: TRANSPLANT SURGERY

## 2024-10-08 PROCEDURE — 36415 COLL VENOUS BLD VENIPUNCTURE: CPT | Performed by: NURSE PRACTITIONER

## 2024-10-08 PROCEDURE — 97116 GAIT TRAINING THERAPY: CPT | Mod: GP

## 2024-10-08 PROCEDURE — 120N000011 HC R&B TRANSPLANT UMMC

## 2024-10-08 PROCEDURE — 94640 AIRWAY INHALATION TREATMENT: CPT

## 2024-10-08 PROCEDURE — 84100 ASSAY OF PHOSPHORUS: CPT | Performed by: STUDENT IN AN ORGANIZED HEALTH CARE EDUCATION/TRAINING PROGRAM

## 2024-10-08 PROCEDURE — 83735 ASSAY OF MAGNESIUM: CPT | Performed by: STUDENT IN AN ORGANIZED HEALTH CARE EDUCATION/TRAINING PROGRAM

## 2024-10-08 PROCEDURE — 99024 POSTOP FOLLOW-UP VISIT: CPT | Performed by: NURSE PRACTITIONER

## 2024-10-08 PROCEDURE — 84132 ASSAY OF SERUM POTASSIUM: CPT | Performed by: NURSE PRACTITIONER

## 2024-10-08 RX ORDER — ALBUTEROL SULFATE 0.83 MG/ML
2.5 SOLUTION RESPIRATORY (INHALATION)
Status: COMPLETED | OUTPATIENT
Start: 2024-10-08 | End: 2024-10-08

## 2024-10-08 RX ORDER — PENTAMIDINE ISETHIONATE 300 MG/300MG
300 INHALANT RESPIRATORY (INHALATION)
Status: COMPLETED | OUTPATIENT
Start: 2024-10-08 | End: 2024-10-08

## 2024-10-08 RX ORDER — FUROSEMIDE 40 MG/1
80 TABLET ORAL ONCE
Status: COMPLETED | OUTPATIENT
Start: 2024-10-08 | End: 2024-10-08

## 2024-10-08 RX ORDER — FUROSEMIDE 40 MG/1
80 TABLET ORAL DAILY
Status: DISCONTINUED | OUTPATIENT
Start: 2024-10-08 | End: 2024-10-08

## 2024-10-08 RX ORDER — FUROSEMIDE 40 MG/1
80 TABLET ORAL DAILY
Status: DISCONTINUED | OUTPATIENT
Start: 2024-10-09 | End: 2024-10-08

## 2024-10-08 RX ORDER — LIDOCAINE HYDROCHLORIDE 10 MG/ML
5 INJECTION, SOLUTION EPIDURAL; INFILTRATION; INTRACAUDAL; PERINEURAL ONCE
Status: COMPLETED | OUTPATIENT
Start: 2024-10-08 | End: 2024-10-08

## 2024-10-08 RX ORDER — LIDOCAINE HYDROCHLORIDE 10 MG/ML
INJECTION, SOLUTION EPIDURAL; INFILTRATION; INTRACAUDAL; PERINEURAL
Status: COMPLETED
Start: 2024-10-08 | End: 2024-10-08

## 2024-10-08 RX ORDER — OXYCODONE HYDROCHLORIDE 5 MG/1
5 TABLET ORAL EVERY 8 HOURS PRN
Status: DISCONTINUED | OUTPATIENT
Start: 2024-10-08 | End: 2024-10-09

## 2024-10-08 RX ADMIN — ACETAMINOPHEN 650 MG: 325 TABLET, FILM COATED ORAL at 14:20

## 2024-10-08 RX ADMIN — ACETAMINOPHEN 650 MG: 325 TABLET, FILM COATED ORAL at 07:59

## 2024-10-08 RX ADMIN — SODIUM ZIRCONIUM CYCLOSILICATE 10 G: 10 POWDER, FOR SUSPENSION ORAL at 16:22

## 2024-10-08 RX ADMIN — PENTAMIDINE ISETHIONATE 300 MG: 300 INHALANT RESPIRATORY (INHALATION) at 14:08

## 2024-10-08 RX ADMIN — POLYETHYLENE GLYCOL 3350 17 G: 17 POWDER, FOR SOLUTION ORAL at 08:16

## 2024-10-08 RX ADMIN — SODIUM ZIRCONIUM CYCLOSILICATE 10 G: 10 POWDER, FOR SUSPENSION ORAL at 11:29

## 2024-10-08 RX ADMIN — METHOCARBAMOL 1000 MG: 500 TABLET ORAL at 08:00

## 2024-10-08 RX ADMIN — NYSTATIN 500000 UNITS: 100000 SUSPENSION ORAL at 08:03

## 2024-10-08 RX ADMIN — PANTOPRAZOLE SODIUM 40 MG: 40 TABLET, DELAYED RELEASE ORAL at 08:12

## 2024-10-08 RX ADMIN — SODIUM ZIRCONIUM CYCLOSILICATE 10 G: 10 POWDER, FOR SUSPENSION ORAL at 22:00

## 2024-10-08 RX ADMIN — HYDROXYZINE HYDROCHLORIDE 50 MG: 50 TABLET, FILM COATED ORAL at 14:21

## 2024-10-08 RX ADMIN — MAGNESIUM SULFATE HEPTAHYDRATE 2 G: 2 INJECTION, SOLUTION INTRAVENOUS at 01:22

## 2024-10-08 RX ADMIN — SENNOSIDES 2 TABLET: 8.6 TABLET, FILM COATED ORAL at 20:11

## 2024-10-08 RX ADMIN — MAGNESIUM OXIDE TAB 400 MG (241.3 MG ELEMENTAL MG) 800 MG: 400 (241.3 MG) TAB at 11:23

## 2024-10-08 RX ADMIN — ASPIRIN 81 MG CHEWABLE TABLET 81 MG: 81 TABLET CHEWABLE at 08:11

## 2024-10-08 RX ADMIN — OXYCODONE HYDROCHLORIDE 5 MG: 5 TABLET ORAL at 21:00

## 2024-10-08 RX ADMIN — Medication 1 DROP: at 20:12

## 2024-10-08 RX ADMIN — METHOCARBAMOL 1000 MG: 500 TABLET ORAL at 02:07

## 2024-10-08 RX ADMIN — MAGNESIUM OXIDE TAB 400 MG (241.3 MG ELEMENTAL MG) 800 MG: 400 (241.3 MG) TAB at 20:11

## 2024-10-08 RX ADMIN — Medication 1 DROP: at 08:12

## 2024-10-08 RX ADMIN — HYDROXYZINE HYDROCHLORIDE 25 MG: 25 TABLET, FILM COATED ORAL at 08:00

## 2024-10-08 RX ADMIN — MYCOPHENOLATE MOFETIL 750 MG: 250 CAPSULE ORAL at 18:25

## 2024-10-08 RX ADMIN — VALGANCICLOVIR HYDROCHLORIDE 450 MG: 450 TABLET ORAL at 08:03

## 2024-10-08 RX ADMIN — FOLIC ACID 1 MG: 1 TABLET ORAL at 08:11

## 2024-10-08 RX ADMIN — TACROLIMUS 3.5 MG: 1 CAPSULE ORAL at 18:25

## 2024-10-08 RX ADMIN — LIDOCAINE HYDROCHLORIDE 4 ML: 10 INJECTION, SOLUTION EPIDURAL; INFILTRATION; INTRACAUDAL; PERINEURAL at 11:23

## 2024-10-08 RX ADMIN — OXYCODONE HYDROCHLORIDE 5 MG: 5 TABLET ORAL at 12:20

## 2024-10-08 RX ADMIN — Medication 1 DROP: at 11:22

## 2024-10-08 RX ADMIN — PREDNISONE 5 MG: 5 TABLET ORAL at 08:03

## 2024-10-08 RX ADMIN — Medication 1 DROP: at 16:23

## 2024-10-08 RX ADMIN — Medication 1 DROP: at 22:00

## 2024-10-08 RX ADMIN — METHOCARBAMOL 1000 MG: 500 TABLET ORAL at 20:11

## 2024-10-08 RX ADMIN — NYSTATIN 500000 UNITS: 100000 SUSPENSION ORAL at 16:22

## 2024-10-08 RX ADMIN — ACETAMINOPHEN 650 MG: 325 TABLET, FILM COATED ORAL at 02:07

## 2024-10-08 RX ADMIN — METHOCARBAMOL 1000 MG: 500 TABLET ORAL at 14:21

## 2024-10-08 RX ADMIN — TAMSULOSIN HYDROCHLORIDE 0.4 MG: 0.4 CAPSULE ORAL at 08:12

## 2024-10-08 RX ADMIN — OXYCODONE HYDROCHLORIDE 5 MG: 5 TABLET ORAL at 05:16

## 2024-10-08 RX ADMIN — ACETAMINOPHEN 650 MG: 325 TABLET, FILM COATED ORAL at 18:25

## 2024-10-08 RX ADMIN — Medication 1 DROP: at 12:21

## 2024-10-08 RX ADMIN — MYCOPHENOLATE MOFETIL 750 MG: 250 CAPSULE ORAL at 08:02

## 2024-10-08 RX ADMIN — THIAMINE HCL TAB 100 MG 100 MG: 100 TAB at 08:12

## 2024-10-08 RX ADMIN — FUROSEMIDE 80 MG: 40 TABLET ORAL at 20:53

## 2024-10-08 RX ADMIN — ALBUTEROL SULFATE 2.5 MG: 2.5 SOLUTION RESPIRATORY (INHALATION) at 14:08

## 2024-10-08 RX ADMIN — TACROLIMUS 3.5 MG: 1 CAPSULE ORAL at 08:03

## 2024-10-08 ASSESSMENT — ACTIVITIES OF DAILY LIVING (ADL)
ADLS_ACUITY_SCORE: 30
ADLS_ACUITY_SCORE: 30
ADLS_ACUITY_SCORE: 27
ADLS_ACUITY_SCORE: 30
ADLS_ACUITY_SCORE: 29
ADLS_ACUITY_SCORE: 30
ADLS_ACUITY_SCORE: 29
ADLS_ACUITY_SCORE: 30
ADLS_ACUITY_SCORE: 29
ADLS_ACUITY_SCORE: 29
ADLS_ACUITY_SCORE: 30
ADLS_ACUITY_SCORE: 27
ADLS_ACUITY_SCORE: 30
ADLS_ACUITY_SCORE: 29

## 2024-10-08 NOTE — TELEPHONE ENCOUNTER
Met with liver AZALIA and discharge coordinator on 7A.  Discussed pertinent health issues related to liver transplant and discharge planning. Plan for discharge tomorrow - waiting on lab results. Upon discharge, pt will be going to [address]     Met with patient and his mother. Discussed plan. Reprinted discharge education for both patient and his mother.

## 2024-10-08 NOTE — PLAN OF CARE
Goal Outcome Evaluation:  /88 (BP Location: Left arm)   Pulse 79   Temp 97.9  F (36.6  C) (Oral)   Resp 16   Ht 1.829 m (6')   Wt 104.2 kg (229 lb 11.2 oz)   SpO2 100%   BMI 31.15 kg/m      Shift: 2236-5239  Isolation Status: none  VS: stable on room air, afebrile  Neuro: Aox4  Behaviors: calm, cooperative, flat  BG: none   Labs: K+=5.6  Respiratory: WDL  Cardiac: WDL  Pain/Nausea: moderate to high pain in abdomen/denies nausea  PRN: 5mg oxy x1, robaxin x2, tylenol x2, atarax x2  Diet: 2gK  IV Access: right PIV  Infusion(s): none  Lines/Drains: PIV  GI/: BM 10/8, voiding and saving  Skin: clamshell abd incision, stapled and LACHO. Old SHENA sites x2 on right side with stiches but leaky  Mobility: SBA  Plan: Lokelma to reduce K+. Discharge home tomorrow.

## 2024-10-08 NOTE — PLAN OF CARE
Goal Outcome Evaluation:      Plan of Care Reviewed With: patient, parent    Overall Patient Progress: improvingOverall Patient Progress: improving    Outcome Evaluation: Possible discharge 10/8    BP (!) 136/97 (BP Location: Left arm)   Pulse 82   Temp 98.6  F (37  C) (Oral)   Resp 16   Ht 1.829 m (6')   Wt 107.9 kg (237 lb 12.8 oz)   SpO2 100%   BMI 32.25 kg/m      Shift: 1985-2407  VS: Vitals stable, room air, afebrile  Neuro: Alert and oriented x4  BG: ACHS 95, 100  Labs: Awaiting AM labs, K rechecks 5.5  Pain/Nausea: Pain managed by oxy x1, atarax x1, tylenol x2, robaxin x1, nausea managed by zofran x1  Diet: 2g K diet  IV Access: R PIV x1 saline locked   Infusion(s): K shifted, mag replaced IV overnight   GI/: Voiding, LBM 10/7  Skin: Clamshell incision with staples LACHO, old SHENA sites CDI, lymph wraps BLE   Mobility: Up SBA   Plan: Continue with POC and notify team with any changes

## 2024-10-08 NOTE — PROGRESS NOTES
----- Message from Lazarus Dad, MD sent at 1/19/2020 10:06 PM EST -----  Regarding: please call  Please call with normal findings. Echo 1/16/20 - LVEF 62%    Will have nurse call with stable echo findings - normal heart function.      Thanks,  SK Care Management Follow Up    Length of Stay (days): 17    Expected Discharge Date: 10/08/2024     Concerns to be Addressed: discharge planning   (Patient's mom, Sania, reported she is the only family member that would be included in a family conference)  Patient plan of care discussed at interdisciplinary rounds: Yes    Anticipated Discharge Disposition:  (Local Apartment - [address])     Anticipated Discharge Services: None  Anticipated Discharge DME: 4WW, shower chair, raise toilet seat.    Patient/family educated on Medicare website which has current facility and service quality ratings: no  Education Provided on the Discharge Plan: Yes  Patient/Family in Agreement with the Plan: yes    Referrals Placed by CM/SW: Homecare  Private pay costs discussed: Not applicable    Discussed  Partnership in Safe Discharge Planning  document with patient/family: No     Handoff Completed: No, handoff not indicated or clinically appropriate    Additional Information:  RNCC notified Pt is not medically ready for discharge. DC pending his K+ improves. RNCC spoke with Pt and his mother, DC update provided.     Pt is requesting a 4WW, shower chair and raised toilet seat at discharge. RNCC placed DME order and sent to [facility].     Next Steps:   confirm DME delivery,   Tx lab apt scheduled for 10/10, update if needed at [place]     Darcie Gregg RN  RNCC Float   [phone]

## 2024-10-08 NOTE — PHARMACY-TRANSPLANT NOTE
Solid Organ Transplant Recipient Prior to Discharge Note    39 year old male s/p  donor liver transplant on 24 d/t alcoholic liver disease.    PMH: BETTY, MDD, HTN      Immunosuppression regimen:   INDUCTION:   Methylprednisolone 1000 mg IV x1   Methylprednisolone 200 mg IV x1   Methylprednisolone 100 mg IV x1  10/1 Methylprednisolone 50 mg IV x1  10/2 Started prednisone taper    MAINTENANCE:  Mycophenolate mofetil 750 mg PO BID  Tacrolimus 3 mg PO BID with goal trough levels of 8-12 mcg/L (tacrolimus level 9.7 mcg/L (11.8 hour trough) after 3 doses of 3.5mg. Dose decreased 10/6 from 5mg bid to 3.5mg bid for a level of 15.9 mcg/L (11.5 hour trough))  Prednisone 5 mg PO once daily x3 months     Surgical prophylaxis:   piperacillin-tazobactam IV x48 hours (-)  Fluconazole IV x1 day (), followed by micafungin IV x4 days (-10/1), followed by fluconazole (10/1-10/4) for a total of 7 days fungal coverage.     Opportunistic pathogen prophylaxis:   PJP: Bactrim indefinitely  CMV (D+/R+): Valganciclovir x3 months  Fungal coverage: Nystatin x3 months     Patient is not enrolled in medication study.    Pharmacy has monitored for medication interactions and immunosuppression levels in conjunction with the multidisciplinary team. In anticipation for discharge, medication therapy needs have been addressed daily throughout the current admission via multidisciplinary rounds and/or discussions, order verification, daily clinical pharmacy review, and communication with prescribers.  Kati VasquezD

## 2024-10-08 NOTE — PROGRESS NOTES
Transplant Surgery  Inpatient Daily Progress Note  10/08/2024    Assessment & Plan: Jag Smith is a 39 year old male with recently diagnosed decompensated alcoholic liver cirrhosis transferred from [facility] on 9/21/24 for expedited liver transplant evaluation due to decompensated alcoholic liver cirrhosis. MELD 42. He is now s/p DDLT on 9/28/24 with Dr. Colon.     Changes today:   - Lokelma for hyperkalemia and repeat K   - Discontinue Bactrim; give pentamidine neb    - Suture x2 to right SHENA drain sites   - Wean oxycodone   - Stop sliding scale insulin   __________________________________    s/p DBD DDLT 9/28/24: POD#10. LFTs trending down. Post-op US with patent doppler.    - Continue ASA.    Immunosuppressed status secondary to medications:   Induction: Steroid taper per protocol.   Maintenance:   -  mg BID   - Tacrolimus 3.5 mg BID. Goal level 8-12.    - Prednisone 5 mg daily x3 months    Neurology:  Acute post-op pain: Well controlled on current regimen:    - Tylenol 650 mg q4H PRN   - Oxycodone 5 mg q8H PRN (weaning)   - Robaxin 1000 mg q6H changed to PRN   - Atarax q6H PRN    Hematology:   Acute blood loss anemia: Hgb 9-10, stable.    Cardiorespiratory: No issues.     GI/Nutrition:   Severe malnutrition in the context of acute illness: Nutrition consulted. Continue Regular diet with supplements. Calorie counts demonstrating good PO intake.     Endocrine:   Steroid induced hyperglycemia: Hgb A1c 5.4%. BG <150. Stop sliding scale insulin. Resolved.     Fluid/Electrolytes/Renal:   Hyponatremia: Na 132, monitor.   Hypomagnesemia: Mag-Ox 800 mg BID.   DENI: Nephrology consulted. 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 Cr 0.7 -> 0.9 -> 1.1. Likely up due to high tac level.  Hypervolemia: BLE edema - lymphedema wraps.     :   Acute urinary retention: Possibly r/t opioids. PVR negative for retention.    - Continue flomax Flomax x 7 days   - Bladder and straight cath PRN    Infectious  disease: No acute issues.     Skin:  Medical device related mucosal pressure injury of the lips, hospital acquired: WOC consulted.    HEENT:   Subconjunctival hemorrhage: Monitor. Ophthalmology consulted.   - Refresh tears ordered 6 x daily.    - Follow up with Ophthalmology clinic PRN for complete eye exam.     Prophylaxis: DVT (mechanical), fall, GI (PPI), fungal (Nystatin), viral (valganciclovir), pneumocystis (Bactrim discontinued d/t hyperkalemia; Pentamidine neb given 10/8)    Disposition: 7A; Plan for discharge locally with mother as caregiver, likely tomorrow.     AZALIA/Fellow/Resident Provider: Gretchen Patino NP #[id number]     Faculty: Fernando Colon MD PhD   __________________________________________________________________    Interval History:   Overnight events: No acute events overnight. Patient and mother remain concerned about discharge due to patient's physical condition, and particularly getting him in/out of the car for clinic visits.      ROS:   A 10-point review of systems was negative except as noted above.    Curent Meds:  Current Facility-Administered Medications   Medication Dose Route Frequency Provider Last Rate Last Admin    aspirin (ASA) chewable tablet 81 mg  81 mg Oral or Feeding Tube Daily Maddi Culver, SIL CNP   81 mg at 10/08/24 0811    carboxymethylcellulose PF (REFRESH PLUS) 0.5 % ophthalmic solution 1 drop  1 drop Both Eyes 6x Daily Mary Infante MD   1 drop at 10/08/24 1221    folic acid (FOLVITE) tablet 1 mg  1 mg Oral Daily José Miguel Aviles MD   1 mg at 10/08/24 0811    magnesium oxide (MAG-OX) tablet 800 mg  800 mg Oral BID Abi Quiros PA-C   800 mg at 10/08/24 1123    mycophenolate (GENERIC EQUIVALENT) capsule 750 mg  750 mg Oral BID IS Dominick Tejada MD   750 mg at 10/08/24 0802    nystatin (MYCOSTATIN) suspension 500,000 Units  500,000 Units Oral 4x Daily Abi Quiros PA-C   500,000 Units at 10/08/24 0803    pantoprazole (PROTONIX) EC  tablet 40 mg  40 mg Oral Daily Gretchen Patino NP   40 mg at 10/08/24 0812    polyethylene glycol (MIRALAX) Packet 17 g  17 g Oral Daily Maddi Culver APRN CNP   17 g at 10/08/24 0816    predniSONE (DELTASONE) tablet 5 mg  5 mg Oral Daily Abi Quiros PA-C   5 mg at 10/08/24 0803    sennosides (SENOKOT) tablet 2 tablet  2 tablet Oral BID Maddi Culver APRN CNP   2 tablet at 10/07/24 1942    sodium chloride (PF) 0.9% PF flush 3 mL  3 mL Intravenous Q8H Dominick Tejada MD   3 mL at 10/08/24 1221    sodium chloride (PF) 0.9% PF flush 3 mL  3 mL Intracatheter Q8H José Miguel Aviles MD   3 mL at 10/08/24 0818    sodium zirconium cyclosilicate (LOKELMA) packet 10 g  10 g Oral TID Gretchen Patino NP        Followed by    [START ON 10/10/2024] sodium zirconium cyclosilicate (LOKELMA) packet 10 g  10 g Oral Daily Gretchen Patino NP        tacrolimus (GENERIC EQUIVALENT) capsule 3.5 mg  3.5 mg Oral BID IS Maddi Culver APRN CNP   3.5 mg at 10/08/24 0803    tamsulosin (FLOMAX) capsule 0.4 mg  0.4 mg Oral Daily Maddi Culver APRN CNP   0.4 mg at 10/08/24 0812    thiamine (B-1) tablet 100 mg  100 mg Oral Daily José Miguel Aviles MD   100 mg at 10/08/24 0812    valGANciclovir (VALCYTE) tablet 450 mg  450 mg Oral Daily Dominick Tejada MD   450 mg at 10/08/24 0803       Physical Exam:     Admit Weight: 102.3 kg (225 lb 8 oz)    Current Vitals:   /88 (BP Location: Left arm)   Pulse 79   Temp 97.9  F (36.6  C) (Oral)   Resp 16   Ht 1.829 m (6')   Wt 104.2 kg (229 lb 11.2 oz)   SpO2 100%   BMI 31.15 kg/m      Vital sign ranges:    Temp:  [97.9  F (36.6  C)-99  F (37.2  C)] 97.9  F (36.6  C)  Pulse:  [79-91] 79  Resp:  [14-16] 16  BP: (118-136)/(75-98) 127/88  SpO2:  [100 %] 100 %    General Appearance: in no apparent distress.   Eyes: Bilateral injection, present prior to transplant. No drainage.   Skin: normal, warm, jaundice  Heart:  Perfused  Lungs: Unlabored on RA  Abdomen:  Soft, non distended. The wound is intact, no signs of infection. SHENA sites leaking; sutured.   :  no mckeon  Extremities: generalized BLE edema  Neurologic: A&Ox4. No focal deficits. Equal strength       Frailty Scores           No data to display                Data:   CMP  Recent Labs   Lab 10/08/24  1330 10/08/24  1252 10/08/24  0536 10/07/24  0753 10/07/24  0628   NA  --   --  132*  --  133*   POTASSIUM 5.7*  --  5.5*  5.5*   < > 5.4*   CHLORIDE  --   --  100  --  103   CO2  --   --  22  --  21*   GLC  --  173* 142*   < > 116*   BUN  --   --  23.6*  --  22.9*   CR  --   --  1.05  --  0.94   GFRESTIMATED  --   --  >90  --  >90   AMAIRANI  --   --  8.6*  --  8.4*   MAG  --   --  1.7  --  1.6*   PHOS  --   --  4.2  --  3.7   ALBUMIN  --   --  2.9*  --  2.8*   BILITOTAL  --   --  3.0*  --  3.5*   ALKPHOS  --   --  182*  --  164*   AST  --   --  41  --  37   ALT  --   --  98*  --  108*    < > = values in this interval not displayed.     CBC  Recent Labs   Lab 10/08/24  0536 10/07/24  0628   HGB 9.2* 9.9*   WBC 8.3 8.6   * 150

## 2024-10-08 NOTE — PROGRESS NOTES
[facility]  WOC Nurse Inpatient Assessment     Consulted for: lip wound  10/1: abdomen skin tear    Summary: 10/8 WOC signed off lip injury, healed. WOC to sign off abd skin tear, healing and effective plan in place, continue to avoid excess tape directly on skin    Patient History (according to provider note(s):    Jag Smith is a 39 year old male with PMHx of BETTY/MDD and HTN and recently diagnosed decompensated alcoholic liver cirrhosis transferred from  [facility] on 9/21/24 for expedited liver  transplant evaluation due to decompensated alcoholic liver cirrhosis.  Per EMR,  patient presented to OSH in June 2024 presented with  jaundice and was diagnosed with acute alcoholic hepatitis was started on Prednisolone. He was discharged with GI follow-up, the admitted in July for bilateral pneumonia. Readmitted again on 9/5/24 for abdominal distention.  During this stay from 9/5-9/21, he was treated for hyponatremia, HRS, SBP and anemia.  - Recently diagnosed in June 2024 with etiology related to alcohol,s/p steroid course. Admitted at OSH 9/5-9/21 for HRS, SBP from paracetntesos 9/6 (s/p treatment with ceftriaxone and Flagyl). Last paracentesis on 9/13 negative for SBP. Diagnostic and therapeutic para 9/25 with 2 L removed, negative for SBP. EGD 9/25/24 with banding x2. Now Admitted to SICU 9/28/24 s/p DDLT with Dr Colon.     lip laceration   - local wound cares   - WO consult      Assessment:      Areas visualized during today's visit: Focused: and lips    Pressure Injury Location: lips       Last photo: 10/1/2024  Wound type: Pressure Injury and Trauma     Pressure Injury Stage: Mucosal, hospital acquired      This is a Medical Device Related Pressure Injury (MDRPI) due to ETT  Wound history/plan of care:   x4 areas of mucosal pressure injury to upper and lower inner lips. Small blistered area that is deep maroon to lower right lip. Related to  pressure likely from ETT tubing. Patient recently had liver transplant and parents in room feel this is was not present prior to surgery. On assessment 10/8 lips are healed , WOC to sign off this area 10/8.     Wound base:100 % Moist, mucosa     Palpation of the wound bed: normal      Drainage: none     Description of drainage: none     Measurements (length x width x depth, in cm) no longer open      Periwound skin: Intact      Color: normal and consistent with surrounding tissue      Temperature: normal   Odor: none  Pain: denies  and mild,  reports dry lips  Pain intervention prior to dressing change: no significant pain present  and slow and gentle cares   Treatment goal: Heal , Infection control/prevention, Maintain (prevention of deterioration), and Protection  STATUS: healed  Supplies ordered: gathered, supplies stored on unit, and discussed with patient     Wound location: right adomen       Last photo: 10/8/24  Wound due to: Medical Adhesive Related Skin Injury (MARSI)  Wound history/plan of care: medical adhesive related skin injury to right abdomen near previous drain sites. Drains have been removed. Abdomen is distended, but improving.  Family at bedside on 10/1 reports her son has fragile skin and is prone to tears from tape as this happened at OSH in recent past. Recommend to avoid excess tape directly on skin. Healing, effective plan in place. WOC to sign off at this time.   Wound base: 100 % Epidermis, Dermis, Moist, Pink, and Smooth ,       Palpation of the wound bed: normal      Drainage: scant     Description of drainage: serous     Measurements (length x width x depth, in cm): 3.2  x 3.5  x  0.2 cm      Periwound skin: Intact, Scar tissue, and swollen      Color:  mottling      Temperature: normal   Odor: none  Pain: mild, tender  Pain interventions prior to dressing change: patient tolerated well and slow and gentle cares   Treatment goal: Heal , Infection control/prevention, Maintain (prevention  of deterioration), Protection, Promote epidermal migration, and Simplify plan of care  STATUS: healing and improving  Supplies ordered: at bedside, supplies stored on unit, discussed with RN, and discussed with patient        Treatment Plan:     lips  BID and as needed  Perform oral cares per unit protocols  Apply Mouth moisturizer or small amount of vaseline to lips     Abdomen: Daily and as needed  Cleanse with normal saline and gently pat dry  Apply vaseline guaze to cover open areas  Cover with ABD or Mepilex/Optifoam  DO NOT apply tape to secure, change ABD pad as needed if this is preferred dressing    General body: AVOID tape directly on skin as much as possible. As needed wrap with a soft kerlex or davon and use tape on the dressing and not the skin. If needing to remove tape that is already applied, soak with normal saline to loosen adhesive and gently remove avoiding unnecessary trauma to the area    Orders: Reviewed    RECOMMEND PRIMARY TEAM ORDER: None, at this time  Education provided: importance of repositioning, plan of care, wound progress, Moisture management, Hygiene, and Off-loading pressure  Discussed plan of care with: Patient and Family  WOC nurse follow-up plan: signing off  Notify WOC if wound(s) deteriorate.  Nursing to notify the Provider(s) and re-consult the WOC Nurse if new skin concern.    DATA:     Current support surface: Standard  Standard gel mattress (Isoflex)  Containment of urine/stool: Incontinence Protocol and Incontinent pad in bed  BMI: Body mass index is 31.15 kg/m .   Active diet order: Orders Placed This Encounter      2 Gram K Diet     Output: I/O last 3 completed shifts:  In: 1130 [P.O.:1130]  Out: 4760 [Urine:4760]     Labs:   Recent Labs   Lab 10/08/24  0536   ALBUMIN 2.9*   HGB 9.2*   WBC 8.3     Pressure injury risk assessment:   Sensory Perception: 3-->slightly limited  Moisture: 3-->occasionally moist  Activity: 3-->walks occasionally  Mobility: 3-->slightly  limited  Nutrition: 3-->adequate  Friction and Shear: 3-->no apparent problem  Gary Score: 18    Irene Samuel RN, BSN, CWOCN   Pager no longer is use, please contact through in3Depth group: [facility] Nurse [place]  Dept. Office Number: [phone]

## 2024-10-09 ENCOUNTER — APPOINTMENT (OUTPATIENT)
Dept: OCCUPATIONAL THERAPY | Facility: CLINIC | Age: 39
End: 2024-10-09
Attending: PEDIATRICS
Payer: MEDICAID

## 2024-10-09 ENCOUNTER — APPOINTMENT (OUTPATIENT)
Dept: PHYSICAL THERAPY | Facility: CLINIC | Age: 39
End: 2024-10-09
Attending: PEDIATRICS
Payer: MEDICAID

## 2024-10-09 VITALS
DIASTOLIC BLOOD PRESSURE: 74 MMHG | SYSTOLIC BLOOD PRESSURE: 122 MMHG | TEMPERATURE: 98.3 F | BODY MASS INDEX: 31.31 KG/M2 | WEIGHT: 231.2 LBS | OXYGEN SATURATION: 98 % | HEART RATE: 98 BPM | RESPIRATION RATE: 16 BRPM | HEIGHT: 72 IN

## 2024-10-09 PROBLEM — E87.5 HYPERKALEMIA: Status: ACTIVE | Noted: 2024-10-09

## 2024-10-09 PROBLEM — D62 ANEMIA DUE TO BLOOD LOSS, ACUTE: Status: ACTIVE | Noted: 2024-10-09

## 2024-10-09 PROBLEM — H11.30 SUBCONJUNCTIVAL HEMORRHAGE: Status: ACTIVE | Noted: 2024-10-09

## 2024-10-09 PROBLEM — E87.1 HYPONATREMIA: Status: ACTIVE | Noted: 2024-10-09

## 2024-10-09 PROBLEM — E83.42 HYPOMAGNESEMIA: Status: ACTIVE | Noted: 2024-10-09

## 2024-10-09 PROBLEM — E43 SEVERE MALNUTRITION (H): Status: ACTIVE | Noted: 2024-10-09

## 2024-10-09 PROBLEM — R60.0 BILATERAL LOWER EXTREMITY EDEMA: Status: ACTIVE | Noted: 2024-10-09

## 2024-10-09 LAB
ALBUMIN SERPL BCG-MCNC: 2.9 G/DL (ref 3.5–5.2)
ALP SERPL-CCNC: 181 U/L (ref 40–150)
ALT SERPL W P-5'-P-CCNC: 86 U/L (ref 0–70)
ANION GAP SERPL CALCULATED.3IONS-SCNC: 10 MMOL/L (ref 7–15)
AST SERPL W P-5'-P-CCNC: 37 U/L (ref 0–45)
BILIRUB DIRECT SERPL-MCNC: 1.74 MG/DL (ref 0–0.3)
BILIRUB SERPL-MCNC: 2.7 MG/DL
BUN SERPL-MCNC: 22.7 MG/DL (ref 6–20)
CALCIUM SERPL-MCNC: 8.7 MG/DL (ref 8.8–10.4)
CHLORIDE SERPL-SCNC: 101 MMOL/L (ref 98–107)
CREAT SERPL-MCNC: 0.98 MG/DL (ref 0.67–1.17)
EGFRCR SERPLBLD CKD-EPI 2021: >90 ML/MIN/1.73M2
ERYTHROCYTE [DISTWIDTH] IN BLOOD BY AUTOMATED COUNT: 19.9 % (ref 10–15)
GLUCOSE SERPL-MCNC: 137 MG/DL (ref 70–99)
HCO3 SERPL-SCNC: 23 MMOL/L (ref 22–29)
HCT VFR BLD AUTO: 27.6 % (ref 40–53)
HGB BLD-MCNC: 9 G/DL (ref 13.3–17.7)
MAGNESIUM SERPL-MCNC: 1.5 MG/DL (ref 1.7–2.3)
MCH RBC QN AUTO: 30.9 PG (ref 26.5–33)
MCHC RBC AUTO-ENTMCNC: 32.6 G/DL (ref 31.5–36.5)
MCV RBC AUTO: 95 FL (ref 78–100)
PHOSPHATE SERPL-MCNC: 4.7 MG/DL (ref 2.5–4.5)
PLATELET # BLD AUTO: 170 10E3/UL (ref 150–450)
POTASSIUM SERPL-SCNC: 5.3 MMOL/L (ref 3.4–5.3)
PROT SERPL-MCNC: 5.4 G/DL (ref 6.4–8.3)
RBC # BLD AUTO: 2.91 10E6/UL (ref 4.4–5.9)
SODIUM SERPL-SCNC: 134 MMOL/L (ref 135–145)
TACROLIMUS BLD-MCNC: 9.6 UG/L (ref 5–15)
TME LAST DOSE: NORMAL H
TME LAST DOSE: NORMAL H
WBC # BLD AUTO: 8.3 10E3/UL (ref 4–11)

## 2024-10-09 PROCEDURE — 82310 ASSAY OF CALCIUM: CPT | Performed by: STUDENT IN AN ORGANIZED HEALTH CARE EDUCATION/TRAINING PROGRAM

## 2024-10-09 PROCEDURE — 99238 HOSP IP/OBS DSCHRG MGMT 30/<: CPT | Mod: 24 | Performed by: NURSE PRACTITIONER

## 2024-10-09 PROCEDURE — 250N000012 HC RX MED GY IP 250 OP 636 PS 637: Performed by: STUDENT IN AN ORGANIZED HEALTH CARE EDUCATION/TRAINING PROGRAM

## 2024-10-09 PROCEDURE — 84100 ASSAY OF PHOSPHORUS: CPT | Performed by: STUDENT IN AN ORGANIZED HEALTH CARE EDUCATION/TRAINING PROGRAM

## 2024-10-09 PROCEDURE — 80053 COMPREHEN METABOLIC PANEL: CPT | Performed by: STUDENT IN AN ORGANIZED HEALTH CARE EDUCATION/TRAINING PROGRAM

## 2024-10-09 PROCEDURE — 250N000013 HC RX MED GY IP 250 OP 250 PS 637: Performed by: STUDENT IN AN ORGANIZED HEALTH CARE EDUCATION/TRAINING PROGRAM

## 2024-10-09 PROCEDURE — 250N000012 HC RX MED GY IP 250 OP 636 PS 637: Performed by: NURSE PRACTITIONER

## 2024-10-09 PROCEDURE — 250N000012 HC RX MED GY IP 250 OP 636 PS 637: Performed by: PHYSICIAN ASSISTANT

## 2024-10-09 PROCEDURE — 250N000013 HC RX MED GY IP 250 OP 250 PS 637: Performed by: NURSE PRACTITIONER

## 2024-10-09 PROCEDURE — 83735 ASSAY OF MAGNESIUM: CPT | Performed by: STUDENT IN AN ORGANIZED HEALTH CARE EDUCATION/TRAINING PROGRAM

## 2024-10-09 PROCEDURE — 36415 COLL VENOUS BLD VENIPUNCTURE: CPT | Performed by: PHYSICIAN ASSISTANT

## 2024-10-09 PROCEDURE — 82248 BILIRUBIN DIRECT: CPT | Performed by: STUDENT IN AN ORGANIZED HEALTH CARE EDUCATION/TRAINING PROGRAM

## 2024-10-09 PROCEDURE — 250N000013 HC RX MED GY IP 250 OP 250 PS 637: Performed by: HOSPITALIST

## 2024-10-09 PROCEDURE — 85027 COMPLETE CBC AUTOMATED: CPT | Performed by: NURSE PRACTITIONER

## 2024-10-09 PROCEDURE — 97110 THERAPEUTIC EXERCISES: CPT | Mod: GP

## 2024-10-09 PROCEDURE — 97140 MANUAL THERAPY 1/> REGIONS: CPT | Mod: GO | Performed by: OCCUPATIONAL THERAPIST

## 2024-10-09 PROCEDURE — 250N000013 HC RX MED GY IP 250 OP 250 PS 637: Performed by: PHYSICIAN ASSISTANT

## 2024-10-09 PROCEDURE — 97530 THERAPEUTIC ACTIVITIES: CPT | Mod: GO | Performed by: OCCUPATIONAL THERAPIST

## 2024-10-09 PROCEDURE — 250N000011 HC RX IP 250 OP 636: Performed by: HOSPITALIST

## 2024-10-09 PROCEDURE — 97116 GAIT TRAINING THERAPY: CPT | Mod: GP

## 2024-10-09 PROCEDURE — 97530 THERAPEUTIC ACTIVITIES: CPT | Mod: GP

## 2024-10-09 PROCEDURE — 250N000013 HC RX MED GY IP 250 OP 250 PS 637

## 2024-10-09 PROCEDURE — 97535 SELF CARE MNGMENT TRAINING: CPT | Mod: GO | Performed by: OCCUPATIONAL THERAPIST

## 2024-10-09 PROCEDURE — 80197 ASSAY OF TACROLIMUS: CPT | Performed by: PHYSICIAN ASSISTANT

## 2024-10-09 PROCEDURE — 97110 THERAPEUTIC EXERCISES: CPT | Mod: GO | Performed by: OCCUPATIONAL THERAPIST

## 2024-10-09 RX ORDER — FUROSEMIDE 40 MG/1
40 TABLET ORAL ONCE
Status: COMPLETED | OUTPATIENT
Start: 2024-10-09 | End: 2024-10-09

## 2024-10-09 RX ORDER — HYDROXYZINE HYDROCHLORIDE 25 MG/1
25 TABLET, FILM COATED ORAL EVERY 6 HOURS PRN
Qty: 20 TABLET | Refills: 0 | Status: SHIPPED | OUTPATIENT
Start: 2024-10-09 | End: 2024-10-25

## 2024-10-09 RX ORDER — TACROLIMUS 1 MG/1
3 CAPSULE ORAL
Status: DISCONTINUED | OUTPATIENT
Start: 2024-10-09 | End: 2024-10-09 | Stop reason: HOSPADM

## 2024-10-09 RX ORDER — TACROLIMUS 1 MG/1
3 CAPSULE ORAL 2 TIMES DAILY
Qty: 180 CAPSULE | Refills: 11 | Status: ACTIVE | OUTPATIENT
Start: 2024-10-09 | End: 2024-10-11

## 2024-10-09 RX ORDER — SENNOSIDES 8.6 MG
2 TABLET ORAL 2 TIMES DAILY PRN
Qty: 60 TABLET | Refills: 0 | Status: SHIPPED | OUTPATIENT
Start: 2024-10-09

## 2024-10-09 RX ORDER — CARBOXYMETHYLCELLULOSE SODIUM 5 MG/ML
1 SOLUTION/ DROPS OPHTHALMIC
Qty: 1 EACH | Refills: 0 | Status: SHIPPED | OUTPATIENT
Start: 2024-10-09 | End: 2024-10-28

## 2024-10-09 RX ORDER — TACROLIMUS 0.5 MG/1
0.5 CAPSULE ORAL 2 TIMES DAILY
Qty: 60 CAPSULE | Refills: 11 | Status: ACTIVE | OUTPATIENT
Start: 2024-10-09 | End: 2024-10-11

## 2024-10-09 RX ORDER — ACETAMINOPHEN 325 MG/1
650 TABLET ORAL EVERY 4 HOURS PRN
Qty: 60 TABLET | Refills: 0 | Status: SHIPPED | OUTPATIENT
Start: 2024-10-09 | End: 2024-10-25

## 2024-10-09 RX ORDER — OXYCODONE HYDROCHLORIDE 5 MG/1
2.5-5 TABLET ORAL EVERY 8 HOURS PRN
Qty: 5 TABLET | Refills: 0 | Status: SHIPPED | OUTPATIENT
Start: 2024-10-09 | End: 2024-10-28

## 2024-10-09 RX ORDER — MAGNESIUM SULFATE HEPTAHYDRATE 40 MG/ML
2 INJECTION, SOLUTION INTRAVENOUS ONCE
Status: DISCONTINUED | OUTPATIENT
Start: 2024-10-09 | End: 2024-10-09

## 2024-10-09 RX ORDER — ASPIRIN 81 MG/1
81 TABLET, CHEWABLE ORAL DAILY
Qty: 30 TABLET | Refills: 5 | Status: SHIPPED | OUTPATIENT
Start: 2024-10-10 | End: 2024-10-11

## 2024-10-09 RX ORDER — POLYETHYLENE GLYCOL 3350 17 G/17G
17 POWDER, FOR SOLUTION ORAL 2 TIMES DAILY PRN
Qty: 510 G | Refills: 0 | Status: SHIPPED | OUTPATIENT
Start: 2024-10-09

## 2024-10-09 RX ORDER — MAGNESIUM OXIDE 400 MG/1
800 TABLET ORAL 2 TIMES DAILY
Qty: 120 TABLET | Refills: 2 | Status: SHIPPED | OUTPATIENT
Start: 2024-10-09 | End: 2024-10-11

## 2024-10-09 RX ORDER — MYCOPHENOLATE MOFETIL 250 MG/1
750 CAPSULE ORAL 2 TIMES DAILY
Qty: 180 CAPSULE | Refills: 11 | Status: ACTIVE | OUTPATIENT
Start: 2024-10-09 | End: 2024-10-11

## 2024-10-09 RX ORDER — GAUZE BANDAGE 2" X 2"
100 BANDAGE TOPICAL DAILY
Qty: 30 TABLET | Refills: 0 | Status: SHIPPED | OUTPATIENT
Start: 2024-10-09 | End: 2024-10-10

## 2024-10-09 RX ORDER — VALGANCICLOVIR 450 MG/1
450 TABLET, FILM COATED ORAL DAILY
Qty: 90 TABLET | Refills: 0 | Status: ACTIVE | OUTPATIENT
Start: 2024-10-10 | End: 2024-10-11

## 2024-10-09 RX ORDER — METHOCARBAMOL 1000 MG/1
1000 TABLET, FILM COATED ORAL EVERY 6 HOURS PRN
Qty: 20 TABLET | Refills: 0 | Status: SHIPPED | OUTPATIENT
Start: 2024-10-09 | End: 2024-10-25

## 2024-10-09 RX ORDER — PREDNISONE 5 MG/1
5 TABLET ORAL DAILY
Qty: 90 TABLET | Refills: 0 | Status: SHIPPED | OUTPATIENT
Start: 2024-10-10 | End: 2024-10-11

## 2024-10-09 RX ORDER — FUROSEMIDE 20 MG/1
20 TABLET ORAL DAILY
Qty: 7 TABLET | Refills: 0 | Status: SHIPPED | OUTPATIENT
Start: 2024-10-09 | End: 2024-10-28

## 2024-10-09 RX ADMIN — HYDROXYZINE HYDROCHLORIDE 50 MG: 50 TABLET, FILM COATED ORAL at 16:16

## 2024-10-09 RX ADMIN — MYCOPHENOLATE MOFETIL 750 MG: 250 CAPSULE ORAL at 07:52

## 2024-10-09 RX ADMIN — TAMSULOSIN HYDROCHLORIDE 0.4 MG: 0.4 CAPSULE ORAL at 07:54

## 2024-10-09 RX ADMIN — ONDANSETRON 4 MG: 4 TABLET, ORALLY DISINTEGRATING ORAL at 02:10

## 2024-10-09 RX ADMIN — NYSTATIN 500000 UNITS: 100000 SUSPENSION ORAL at 11:06

## 2024-10-09 RX ADMIN — TACROLIMUS 3.5 MG: 1 CAPSULE ORAL at 07:51

## 2024-10-09 RX ADMIN — NYSTATIN 500000 UNITS: 100000 SUSPENSION ORAL at 16:17

## 2024-10-09 RX ADMIN — PANTOPRAZOLE SODIUM 40 MG: 40 TABLET, DELAYED RELEASE ORAL at 07:52

## 2024-10-09 RX ADMIN — METHOCARBAMOL 1000 MG: 500 TABLET ORAL at 04:48

## 2024-10-09 RX ADMIN — Medication 1 DROP: at 06:11

## 2024-10-09 RX ADMIN — POLYETHYLENE GLYCOL 3350 17 G: 17 POWDER, FOR SOLUTION ORAL at 08:01

## 2024-10-09 RX ADMIN — ACETAMINOPHEN 650 MG: 325 TABLET, FILM COATED ORAL at 08:14

## 2024-10-09 RX ADMIN — SODIUM ZIRCONIUM CYCLOSILICATE 10 G: 10 POWDER, FOR SUSPENSION ORAL at 06:11

## 2024-10-09 RX ADMIN — HYDROXYZINE HYDROCHLORIDE 50 MG: 50 TABLET, FILM COATED ORAL at 01:58

## 2024-10-09 RX ADMIN — ACETAMINOPHEN 650 MG: 325 TABLET, FILM COATED ORAL at 16:17

## 2024-10-09 RX ADMIN — ASPIRIN 81 MG CHEWABLE TABLET 81 MG: 81 TABLET CHEWABLE at 07:52

## 2024-10-09 RX ADMIN — Medication 1 DROP: at 11:05

## 2024-10-09 RX ADMIN — ACETAMINOPHEN 650 MG: 325 TABLET, FILM COATED ORAL at 01:58

## 2024-10-09 RX ADMIN — OXYCODONE HYDROCHLORIDE 2.5 MG: 5 TABLET ORAL at 13:20

## 2024-10-09 RX ADMIN — TACROLIMUS 3 MG: 1 CAPSULE ORAL at 18:30

## 2024-10-09 RX ADMIN — NYSTATIN 500000 UNITS: 100000 SUSPENSION ORAL at 07:54

## 2024-10-09 RX ADMIN — HYDROXYZINE HYDROCHLORIDE 50 MG: 50 TABLET, FILM COATED ORAL at 08:19

## 2024-10-09 RX ADMIN — SODIUM ZIRCONIUM CYCLOSILICATE 10 G: 10 POWDER, FOR SUSPENSION ORAL at 18:30

## 2024-10-09 RX ADMIN — VALGANCICLOVIR HYDROCHLORIDE 450 MG: 450 TABLET ORAL at 07:52

## 2024-10-09 RX ADMIN — Medication 1 DROP: at 16:16

## 2024-10-09 RX ADMIN — THIAMINE HCL TAB 100 MG 100 MG: 100 TAB at 07:54

## 2024-10-09 RX ADMIN — MYCOPHENOLATE MOFETIL 750 MG: 250 CAPSULE ORAL at 18:30

## 2024-10-09 RX ADMIN — FUROSEMIDE 40 MG: 40 TABLET ORAL at 13:20

## 2024-10-09 RX ADMIN — PREDNISONE 5 MG: 5 TABLET ORAL at 07:52

## 2024-10-09 RX ADMIN — MAGNESIUM OXIDE TAB 400 MG (241.3 MG ELEMENTAL MG) 800 MG: 400 (241.3 MG) TAB at 11:06

## 2024-10-09 RX ADMIN — METHOCARBAMOL 1000 MG: 500 TABLET ORAL at 11:06

## 2024-10-09 RX ADMIN — SODIUM ZIRCONIUM CYCLOSILICATE 10 G: 10 POWDER, FOR SUSPENSION ORAL at 11:36

## 2024-10-09 RX ADMIN — FOLIC ACID 1 MG: 1 TABLET ORAL at 07:54

## 2024-10-09 ASSESSMENT — ACTIVITIES OF DAILY LIVING (ADL)
ADLS_ACUITY_SCORE: 28
ADLS_ACUITY_SCORE: 28
ADLS_ACUITY_SCORE: 27
ADLS_ACUITY_SCORE: 27
ADLS_ACUITY_SCORE: 28
ADLS_ACUITY_SCORE: 27
ADLS_ACUITY_SCORE: 28
ADLS_ACUITY_SCORE: 27
ADLS_ACUITY_SCORE: 27
ADLS_ACUITY_SCORE: 28
ADLS_ACUITY_SCORE: 27
ADLS_ACUITY_SCORE: 28

## 2024-10-09 NOTE — PLAN OF CARE
Goal Outcome Evaluation:      Plan of Care Reviewed With: patient    Overall Patient Progress: improvingOverall Patient Progress: improving    Outcome Evaluation: ready for discharge today. Walker, toilet riser, and shower chair delievered today.    BP (!) 145/93 (BP Location: Left arm)   Pulse 93   Temp 97.8  F (36.6  C) (Oral)   Resp 16   Ht 1.829 m (6')   Wt 104.9 kg (231 lb 3.2 oz)   SpO2 100%   BMI 31.36 kg/m      Shift: 1546-5432  Isolation Status: none  VS: stable on room air, afebrile  Neuro: Aox4  Behaviors: calm, cooperative, flat  BG: none   Labs: K+=5.3  Respiratory: WDL  Cardiac: WDL  Pain/Nausea: moderate to high pain in abdomen/denies nausea  PRN: 2.5mg oxy x1, robaxin x1, tylenol x1, atarax x1  Diet: 2gK  IV Access: right PIV  Infusion(s): none  Lines/Drains: PIV  GI/: BM 10/9x2, voiding and saving  Skin: shayne abd incision, stapled and LACHO. Old SHENA sites x2 on right side with stiches   Mobility: SBA  Plan: PO Lasix given to reduce K+. Discharge to local apartment this evening.

## 2024-10-09 NOTE — PLAN OF CARE
Goal Outcome Evaluation:      Plan of Care Reviewed With: patient, parent    Overall Patient Progress: improvingOverall Patient Progress: improving    Outcome Evaluation: evening K+ 5.5    Vitally stable on room air. Abdominal pain treated with PRN Tylenol and Robaxin each x1. Up independently in room to bathroom. Passing flatus, no BM this shift. Good appetite on 2g potassium diet, no complaints of nausea. Evening potassium lab 5.5, Lasix given per orderes as well as scheduled Lokelma. Med card and lab book updated with patient. Mother at bedside supportive of patient.

## 2024-10-09 NOTE — DISCHARGE SUMMARY
[facility]    Discharge Summary  Transplant Surgery    Date of Admission:  9/21/2024  Date of Discharge:  10/9/2024  Discharging Provider: Gretchen Patino NP / Fernando Colon MD PhD    Discharge Diagnoses   Principal Problem:    Liver replaced by transplant (H)  Active Problems:    Alcohol use disorder, severe, in early remission (H)    DENI (acute kidney injury) (H)    Immunosuppressed status (H)    Acute post-operative pain    Steroid-induced hyperglycemia    Acute urinary retention    Anemia due to blood loss, acute    Severe malnutrition (H)    Hyponatremia    Hypomagnesemia    Bilateral lower extremity edema    Hyperkalemia    Subconjunctival hemorrhage    Procedure/Surgery Information   Procedure date:  9/28/24    Preoperative diagnosis:  End Stage Liver Disease due to Laennec's    Postoperative diagnosis:  Same,     Procedure:  1. Donation after Brain Death Piggyback liver transplant   2. End-to-end Choledochocholedochostomy        Surgeon  Surgeons and Role:     * Fernando Colon MD - Primary     * Dominick Tejada MD - Fellow - Assisting     * Jericho Orr MD - Fellow - Assisting    Co-Surgeon:  Fernando Colon    Fellow/Assistant:  Dr Luis Daniel Tejada served as first assistant as there was no qualified resident available.  Dr Tejada assisted with the hepatectomy and performed the vascular and biliary anastomoses    Anesthesia:  General    Specimen:  Liver, gallbladder    Drains:  3     Non-operative procedures 9/21/24 Diagnostic/Therapeutic paracentesis     History of Present Illness   Jag Smith is a 39 year old male with recently diagnosed decompensated alcoholic liver cirrhosis transferred from [facility] on 9/21/24 for expedited liver transplant evaluation due to decompensated alcoholic liver cirrhosis. MELD 42. He is now s/p DDLT on 9/28/24 with Dr. Colon.     Hospital Course   Jag Smith was admitted on 9/21/2024.  The following problems  were addressed during his hospitalization:    s/p DBD DDLT 9/28/24: POD#11. LFTs trending down. Post-op US with patent doppler.    - Continue ASA.     Immunosuppressed status secondary to medications:   Induction: Steroid taper per protocol.   Maintenance:   -  mg BID   - Tacrolimus 3 mg BID. Goal level 8-10 d/t DENI with hyperkalemia.    - Prednisone 5 mg daily x3 months  Infection prophylaxis: viral (Valcyte x 3 months), PJP (Bactrim on hold d/t hyperkalemia, received Pentamidine neb 10/8/24).    Transplant coordinator: Karena Villegas [phone].  Biliary stent:  No  Donor status:  DBD  CMV D + / R +  EBV D + / R +  Anticoagulation plan: ASA 81 mg daily x 6 months    Neurology:  Acute post-op pain: Well controlled on current regimen:    - Tylenol 650 mg q4H PRN   - Oxycodone 2.5-5 mg q8H PRN (weaning)   - Robaxin 1000 mg q6H PRN   - Atarax q6H PRN     Hematology:   Acute blood loss anemia: Hgb 9-10, stable.     GI/Nutrition:   Severe malnutrition in the context of acute illness: Nutrition consulted. Continue Regular diet with supplements. Calorie counts demonstrating good PO intake.      Fluid/Electrolytes/Renal:   DENI: Nephrology consulted. Likely 2/2 HRS vs bile cast nephropathy + CNI. Cr 1, trending back down to baseline 0.6-0.8.   Hyponatremia: Na 134, improving with diuresis.   Hypomagnesemia: Continue Mag-Ox 800 mg BID.   BLE edema: Continue lymphedema wraps.   Hyperkalemia: r/t DENI, supratherapeutic tacrolimus level. Bactrim held and pentamidine neb given. Received Lasix, fluids, and Lokelma and was shifted. Plan:   - Continue low potassium diet (education provided) and Lasix daily on discharge (Lokelma not covered by insurance). Recheck labs tomorrow.      HEENT:   Subconjunctival hemorrhage: Monitor. Ophthalmology consulted.   - Refresh tears ordered 6 x daily.    - Follow up with Ophthalmology clinic PRN for complete eye exam.         Discharge Disposition   Discharged to local apartment with  mother   Condition at discharge: Stable    Pending Results   These results will be followed up by Transplant team  Unresulted Labs Ordered in the Past 30 Days of this Admission       Date and Time Order Name Status Description    9/28/2024  5:45 AM Prepare plasma (unit) Preliminary     9/28/2024  5:45 AM Prepare red blood cells (unit) Preliminary     9/24/2024  8:33 AM Prepare red blood cells (unit) Preliminary           Final pathology results:   Case Report   Surgical Pathology Report                         Case: [id number]                                   Authorizing Provider:  Fernando Colon MD          Collected:           09/28/2024 09:46 AM           Ordering Location:     [facility]                 Received:            09/30/2024 08:30 AM           Pathologist:           Hector Coffey MD                                                           Specimens:   A) - Gallbladder, Donor Gallbladder                                                                  B) - Liver, Native Liver and Gallbladder                                                  Final Diagnosis   A. GALLBLADDER, DONOR CHOLECYSTECTOMY:  No significant morphologic abnormality     B. LIVER (2245 gm)/ GALLBLADDER; EXPLANTED AT TRANSPLANTATION:  Advanced cirrhosis, inactive (Laennec fibrosis stage 4C):  -Compatible with alcohol etiology, now quiescent from abstinence   -With marked hepatocanalicular cholestasis    -Negative for hepatocellular or other malignancy   -Bile ducts are present in normal numbers and architecture  Gallbladder: no significant morphologic abnormality     Primary Care Physician   ELMER HUNTLEY    Physical Exam   Temp: 98.3  F (36.8  C) Temp src: Oral BP: 127/88 Pulse: 92   Resp: 16 SpO2: 99 % O2 Device: None (Room air)    Vitals:    10/07/24 0032 10/08/24 0505 10/09/24 0327   Weight: 107.9 kg (237 lb 12.8 oz) 104.2 kg (229 lb 11.2 oz) 104.9 kg (231 lb 3.2 oz)     Vital Signs with Ranges  Temp:  [98  F (36.7  C)-99.1   F (37.3  C)] 98.3  F (36.8  C)  Pulse:  [79-92] 92  Resp:  [16] 16  BP: (127-141)/(88-95) 127/88  SpO2:  [99 %-100 %] 99 %  I/O last 3 completed shifts:  In: 2190 [P.O.:2190]  Out: 3800 [Urine:3800]    Constitutional: resting comfortably in bed  Eyes: EOMI  Respiratory: unlabored on RA  Cardiovascular: perfused  GI: abdomen soft. Incision closed with staples and open to air. SHENA drain sites right side sutured; left side covered (JPs now removed)  Genitourinary: no Lima  Skin: warm, dry  Musculoskeletal: BLE edema; lymph wraps in place  Neurologic: A&Ox4  Neuropsychiatric: calm; withdrawn    Consultations This Hospital Stay   GI HEPATOLOGY ADULT IP CONSULT  NEPHROLOGY GENERAL ADULT IP CONSULT  SOCIAL WORK IP CONSULT  NUTRITION SERVICES ADULT IP CONSULT  TRANSPLANT SURGERY LIVER ADULT IP CONSULT  PHYSICAL THERAPY ADULT IP CONSULT  OCCUPATIONAL THERAPY ADULT IP CONSULT  CHEMICAL DEPENDENCY IP CONSULT  INTERNAL MEDICINE PROCEDURE TEAM ADULT IP CONSULT [facility] - PARACENTESIS  INTERNAL MEDICINE PROCEDURE TEAM ADULT IP CONSULT [facility] - PARACENTESIS  CARE MANAGEMENT / SOCIAL WORK IP CONSULT  SOCIAL WORK IP CONSULT  NUTRITION SERVICES ADULT IP CONSULT  PHYSICAL THERAPY ADULT IP CONSULT  OCCUPATIONAL THERAPY ADULT IP CONSULT  NUTRITION SERVICES ADULT IP CONSULT  OCCUPATIONAL THERAPY ADULT IP CONSULT  PHYSICAL THERAPY ADULT IP CONSULT  PHARMACY IP CONSULT  SOT MEDICATION HISTORY IP PHARMACY CONSULT  WOUND OSTOMY CONTINENCE NURSE  IP CONSULT  LYMPHEDEMA THERAPY IP CONSULT  WOUND OSTOMY CONTINENCE NURSE  IP CONSULT  OPHTHALMOLOGY IP CONSULT  OPHTHALMOLOGY IP CONSULT    Time Spent on this Encounter   I have spent greater than 30 minutes on this discharge.    Discharge Orders   Discharge Medications   Current Discharge Medication List        START taking these medications    Details   acetaminophen (TYLENOL) 325 MG tablet Take 2 tablets (650 mg) by mouth every 4 hours as needed for mild pain or fever.  Qty: 60 tablet,  Refills: 0    Associated Diagnoses: Acute post-operative pain      aspirin (ASA) 81 MG chewable tablet Take 1 tablet (81 mg) by mouth or Feeding Tube daily.  Qty: 30 tablet, Refills: 5    Associated Diagnoses: Liver replaced by transplant (H)      carboxymethylcellulose PF (REFRESH PLUS) 0.5 % ophthalmic solution Place 1 drop into both eyes 6 times daily.  Qty: 1 each, Refills: 0    Associated Diagnoses: Subconjunctival hemorrhage of both eyes      hydrOXYzine HCl (ATARAX) 25 MG tablet Take 1 tablet (25 mg) by mouth every 6 hours as needed for other or anxiety (adjuvant pain).  Qty: 20 tablet, Refills: 0    Associated Diagnoses: Acute post-operative pain      magnesium oxide (MAG-OX) 400 MG tablet Take 2 tablets (800 mg) by mouth 2 times daily.  Qty: 120 tablet, Refills: 2    Associated Diagnoses: Hypomagnesemia      methocarbamol 1000 MG TABS Take 1,000 mg by mouth every 6 hours as needed for muscle spasms.  Qty: 20 tablet, Refills: 0    Associated Diagnoses: Acute post-operative pain      mycophenolate (GENERIC EQUIVALENT) 250 MG capsule Take 3 capsules (750 mg) by mouth 2 times daily.  Qty: 180 capsule, Refills: 11    Associated Diagnoses: Liver replaced by transplant (H)      oxyCODONE (ROXICODONE) 5 MG tablet Take 0.5-1 tablets (2.5-5 mg) by mouth every 8 hours as needed for moderate pain or severe pain.  Qty: 5 tablet, Refills: 0    Associated Diagnoses: Liver replaced by transplant (H); Acute post-operative pain      polyethylene glycol (MIRALAX) 17 GM/Dose powder Take 17 g by mouth 2 times daily as needed for constipation.  Qty: 510 g, Refills: 0    Associated Diagnoses: Liver replaced by transplant (H)      predniSONE (DELTASONE) 5 MG tablet Take 1 tablet (5 mg) by mouth daily.  Qty: 90 tablet, Refills: 0    Associated Diagnoses: Liver replaced by transplant (H)      sennosides (SENOKOT) 8.6 MG tablet Take 2 tablets by mouth 2 times daily as needed for constipation.  Qty: 60 tablet, Refills: 0    Associated  Diagnoses: Liver replaced by transplant (H)      sodium zirconium cyclosilicate (LOKELMA) 10 g PACK packet Take 1 packet (10 g) by mouth daily.  Qty: 11 each, Refills: 0    Associated Diagnoses: Hyperkalemia      !! tacrolimus (GENERIC EQUIVALENT) 0.5 MG capsule Take 1 capsule (0.5 mg) by mouth 2 times daily. To have available for dose adjustments per transplant team. Discharge dose = 3 mg twice daily.  Qty: 60 capsule, Refills: 11    Associated Diagnoses: Liver replaced by transplant (H)      !! tacrolimus (GENERIC EQUIVALENT) 1 MG capsule Take 3 capsules (3 mg) by mouth 2 times daily.  Qty: 180 capsule, Refills: 11    Associated Diagnoses: Liver replaced by transplant (H)      valGANciclovir (VALCYTE) 450 MG tablet Take 1 tablet (450 mg) by mouth daily.  Qty: 90 tablet, Refills: 0    Associated Diagnoses: Liver replaced by transplant (H)       !! - Potential duplicate medications found. Please discuss with provider.        CONTINUE these medications which have CHANGED    Details   vitamin B1 (THIAMINE) 100 MG tablet Take 1 tablet (100 mg) by mouth daily.  Qty: 30 tablet, Refills: 0    Associated Diagnoses: Alcohol use disorder, severe, in early remission (H); Alcoholic hepatitis, unspecified whether ascites present (H)           CONTINUE these medications which have NOT CHANGED    Details   folic acid (FOLVITE) 1 MG tablet Take 1 mg by mouth daily.      multivitamin w/minerals (CERTAVITE/ANTIOXIDANTS) tablet Take 1 tablet by mouth daily.      omeprazole (PRILOSEC) 20 MG DR capsule Take 20 mg by mouth daily.           STOP taking these medications       calcium citrate-vitamin D (CALCIUM CITRATE-VITAMIN D3) 315-6.25 MG-MCG TABS per tablet Comments:   Reason for Stopping:         furosemide (LASIX) 40 MG tablet Comments:   Reason for Stopping:         spironolactone (ALDACTONE) 100 MG tablet Comments:   Reason for Stopping:                  Physical Therapy  Referral      Occupational Therapy   Referral      Reason for your hospital stay    You were admitted for a liver transplant. You are discharging locally in stable condition with close follow up.     Activity    Your activity upon discharge: Walk at least four times a day, lift no greater than 10 pounds for 8 weeks from the time of surgery.  No driving while taking narcotics or 3 weeks after surgery.     [facility]ult [facility] Follow-up and recommended labs and tests    [facility] FOLLOW UP:     1. Follow up in Transplant Clinic weekly for the next 5 weeks with Dr. Colon (or any available liver transplant surgeon).     2. Follow up with Transplant Hepatology in 3 months.     3. Follow up with Ophthalmology as needed for ongoing eye issues.     Call your Transplant Coordinator ([phone]) with questions about Transplant Center appointment scheduling.     LABS:     CBC, BMP, magnesium, phosphorus, hepatic panel, tacrolimus level every Monday and Thursday at an outpatient lab.     Monitor and record    Monitor weight daily.     When to contact your care team    WHEN TO CONTACT YOUR  COORDINATOR:     Transplant Coordinator [phone]     Notify your coordinator if you have pain over your liver, increased redness or drainage from your incision, fever greater than 100F, or yellowing of skin or eyes.     Notify your coordinator immediately if you are ever unable to take your immunosuppressive medications for any reason.     If you have URGENT concerns after office hours, please call the hospital switchboard at [phone] and ask to have the organ transplant nurse on-call paged. If you have a life-threatening emergency, go to the nearest emergency room.     Wound care and dressings    Instructions to care for your wound at home: If you have staples in place, they will be removed in 3 weeks after operation. Wash incision daily with soap and water. Do not soak or scrub.     Walker     Miscellaneous DME Order    DME Documentation:   Describe the  reason for need to support medical necessity: Post Liver Transplant .     I, the undersigned, certify that the above prescribed supplies are medically necessary for this patient and is both reasonable and necessary in reference to accepted standards of medical and necessary in reference to accepted standards of medical practice in the treatment of this patient's condition and is not prescribed as a convenience.     Miscellaneous DME Order    DME Documentation:   Describe the reason for need to support medical necessity: Post Liver Transplant.     I, the undersigned, certify that the above prescribed supplies are medically necessary for this patient and is both reasonable and necessary in reference to accepted standards of medical and necessary in reference to accepted standards of medical practice in the treatment of this patient's condition and is not prescribed as a convenience.     Diet    Diet recommendations post-transplant: Please follow low potassium diet until your potassium stabilizes. High protein diet x 8 weeks.    Long term: Heart healthy dietary habits long term (low saturated/trans fat, low sodium). Practice food safety precautions.         Data   Most Recent 3 CBC's:  Recent Labs   Lab Test 10/09/24  0548 10/08/24  0536 10/07/24  0628   WBC 8.3 8.3 8.6   HGB 9.0* 9.2* 9.9*   MCV 95 94 94    141* 150      Most Recent 3 BMP's:  Recent Labs   Lab Test 10/09/24  0548 10/08/24  1744 10/08/24  1330 10/08/24  1252 10/08/24  0536 10/07/24  0753 10/07/24  0628   *  --   --   --  132*  --  133*   POTASSIUM 5.3 5.5* 5.7*  --  5.5*  5.5*   < > 5.4*   CHLORIDE 101  --   --   --  100  --  103   CO2 23  --   --   --  22  --  21*   BUN 22.7*  --   --   --  23.6*  --  22.9*   CR 0.98  --   --   --  1.05  --  0.94   ANIONGAP 10  --   --   --  10  --  9   AMAIRANI 8.7*  --   --   --  8.6*  --  8.4*   *  --   --  173* 142*   < > 116*    < > = values in this interval not displayed.     Most Recent 2  LFT's:  Recent Labs   Lab Test 10/09/24  0548 10/08/24  0536   AST 37 41   ALT 86* 98*   ALKPHOS 181* 182*   BILITOTAL 2.7* 3.0*     Most Recent INR's and Anticoagulation Dosing History:  Anticoagulation Dose History  More data exists         Latest Ref Rng & Units 9/25/2024 9/26/2024 9/27/2024 9/28/2024 9/29/2024 9/30/2024 10/1/2024   Recent Dosing and Labs   INR 0.85 - 1.15 3.36  3.19  3.14  1.26  1.37  1.57  1.86  2.44  1.14  1.21  1.26  1.14  1.03       Details          Multiple values from one day are sorted in reverse-chronological order             Most Recent 3 Troponin's:No lab results found.  Most Recent Cholesterol Panel:  Recent Labs   Lab Test 09/23/24  1605   HDL 10*     Most Recent 6 Bacteria Isolates From Any Culture (See EPIC Reports for Culture Details):No lab results found.  Most Recent TSH, T4 and A1c Labs:  Recent Labs   Lab Test 09/28/24  1207 09/23/24  1716 09/23/24  0659   TSH  --   --  2.17   A1C 5.4   < >  --     < > = values in this interval not displayed.     Results for orders placed or performed during the hospital encounter of 09/21/24   XR Chest 2 Views    Narrative    Exam: XR CHEST 2 VIEWS, 9/23/2024 4:23 PM    Comparison: None    History: LT Evaluation    Findings:  2 views of the chest. Heart is normal size. Low lung volumes. No focal  airspace opacity. No pleural effusion or pneumothorax. Mild asymmetric  elevation of the right hemidiaphragm. No acute osseous abnormality.  Visualized upper abdomen is unremarkable.      Impression    Impression: No acute airspace disease.    I have personally reviewed the examination and initial interpretation  and I agree with the findings.    SANDHYA PYLE DO         SYSTEM ID:  [id number]   POC US GUIDE FOR PARACENTESIS    Narrative    US Indication: abdominal distension    Limited abdominal ultrasound was performed and demonstrated an adequate fluid collection on the left side of the abdomen.     Doppler of the skin demonstrated an area at  this site without significant vasculature.  A paracentesis at this site was subsequently performed. Lidocaine and catheter needle visualized entering the ascites fluid.     Morgan Holcomb MD     XR Chest 2 Views    Narrative    Exam: XR CHEST 2 VIEWS, 9/27/2024 5:43 PM    Indication: Pre-op Liver transplant per protocol; evaluate for  infection/other pathologies    Comparison: 9/23/2024    Findings:   The cardiomediastinal silhouette and pulmonary vasculature are within  normal limits. No pleural effusion or pneumothorax. No focal airspace  opacity. Stable mild asymmetric elevation of the right hemidiaphragm.      Impression    Impression: No acute airspace disease.    SANDHYA PYLE DO         SYSTEM ID:  [id number]   XR Chest Port 1 View     Value    Radiologist flags Right mainstem intubation (AA)    Narrative    Exam: XR CHEST PORT 1 VIEW, 9/28/2024 1:32 PM    Comparison: 9/27/2024    History: central line, et tube    Findings:  Single AP portable supine view the chest. Endotracheal tube with tip  in the right mainstem bronchus. Right IJ central venous catheter with  tip in the SVC. Enteric tube is present in the stomach. Postsurgical  changes of liver transplant with abdominal drain and right upper  quadrant staples.    Trachea is midline. Stable cardiac mediastinal silhouette.  Retrocardiac streaky opacities.. There is no pneumothorax or pleural  effusion. Postsurgical changes with drains in the right upper quadrant  from liver transplant. Please see separate abdominal radiograph.      Impression    Impression:   1. New endotracheal tube with right mainstem intubation. Recommend  slight retraction.  2. Right IJ central catheter with tip projecting over the SVC.  3. New enteric tube in the stomach.  4. New retrocardiac atelectasis.  5. Postsurgical changes of liver transplant.    [Critical Result: Right mainstem intubation]    Finding was identified on 9/28/2024 1:37 PM.     Dr. Tejada was contacted by   Nitza at 9/28/2024 1:46 PM and  verbalized understanding of the critical finding.     I have personally reviewed the examination and initial interpretation  and I agree with the findings.    MERCEDES OSBORNE MD         SYSTEM ID:  [id number]   XR Abdomen Port 1 View    Narrative    EXAM: XR ABDOMEN PORT 1 VIEW, 9/28/2024 1:40 PM    INDICATION: assess OG tube location    COMPARISON: CT abdomen 9/20/2024    FINDINGS/    Impression    IMPRESSION:  1.  Postsurgical changes related to liver transplantation including  numerous surgical drains, surgical clips, and cutaneous surgical  staples.  2.  Enteric tube terminating in the stomach with sidehole past the GE  junction.    I have personally reviewed the examination and initial interpretation  and I agree with the findings.    MERCEDES OSBORNE MD         SYSTEM ID:  [id number]   US Liver Transplant    Narrative    EXAMINATION: US LIVER TRANSPLANT, 9/28/2024 2:56 PM     COMPARISON: None.    HISTORY: Liver ultrasound, standard anatomy    TECHNIQUE:  Gray-scale, color Doppler and spectral flow analysis.    FINDINGS:   There is small ascites. Small peritransplant fluid collection along  the posterior right lobe measuring 3.3 x 3.1 x 1.0 cm.    Liver:   The liver demonstrates normal homogeneous echotexture. No  evidence of a focal hepatic mass.     Bile Ducts: Both the intra- and extrahepatic biliary system are of  normal caliber.  The common bile duct measures 4 mm in diameter.    Gallbladder: The gallbladder is surgically absent.    Kidneys:   Right kidney:  The right kidney demonstrates normal echotexture with  no evidence of a shadowing stone, focal mass or hydronephrosis.   12.6  cm in long axis dimension.  Left kidney: Poorly visualized    Pancreas: Visualized portions of the pancreas are normal in  appearance.    Spleen: Poorly visualized, approximately 15.8 cm in craniocaudal  dimension.    Visualized portions of the aorta are unremarkable.    LIVER  DOPPLER:  Splenic vein: Not visualized  Extrahepatic portal vein:  Patent continuous antegrade flow, 66  cm/sec.  Portal vein at anastomosis: Patent continuous antegrade flow, 84  cm/sec.  Intrahepatic portal vein:  Patent continuous antegrade flow, 136  cm/sec.  Right portal vein flow is antegrade, measuring 158 cm/sec.  Left portal vein flow is antegrade, measuring 118 cm/sec.    Inferior vena cava: patent with flow toward the heart throughout..  IVC above anastomosis: Not seen  IVC at anastomosis:  110 cm/sec.  Intrahepatic IVC:  106 cm/sec.  Extrahepatic IVC:  58 cm/sec.    Right, mid, left hepatic veins: Patent with flow towards the inferior  vena cava.    Extrahepatic hepatic artery: Low resistance waveform with flow towards  the liver. 37 cm/sec with resistive index 1.0.  Right hepatic artery: 39 cm/sec with resistive index 1.0.  Left hepatic artery: 25 cm/sec.      Impression    Impression:   1.  Liver transplant with patent hepatic vasculature. Elevated  resistive indices in the hepatic arterial system likely due to  immediate postoperative setting. Continued attention on follow-up.  2.  Small peritransplant fluid collection.  3.  Splenomegaly.  4.  Trace ascites.    I have personally reviewed the examination and initial interpretation  and I agree with the findings.    MERCEDES OSBORNE MD         SYSTEM ID:  [id number]   Echocardiogram Complete     Value    LVEF  55-60%    Narrative    [id number]  [facility]  [id number]  [id number]^PIOTR^TIFFANIE     [facility],[place]  Echocardiography Laboratory  [address] [place] [id number]     Name: TRACEY BOWERS  MRN: [id number]  : 1985  Study Date: 2024 03:12 PM  Age: 39 yrs  Gender: Male  Patient Location: [facility]  Reason For Study: Pulmonary Hypertension  Ordering Physician: TIFFANIE SALDAÑA  Referring Physician: SYSTEM, PROVIDER NOT IN  Performed By: Michael Bennett     BSA: 2.2 m2  Height: 72 in  Weight: 225  lb  BP: 100/68 mmHg  ______________________________________________________________________________  Procedure  Complete Portable Echo Adult. Contrast Definity. Definity (NDC #72434-939-90)  given intravenously. Patient was given 6ml mixture of 1.5ml Definity and 8.5ml  saline. 4 ml wasted.  ______________________________________________________________________________  Interpretation Summary  Mild left ventricular dilation is present.  Global and regional left ventricular function is normal with an EF of 55-60%.  Right ventricular function, chamber size, wall motion, and thickness are  normal.  Pulmonary artery systolic pressure is normal.  The inferior vena cava is normal.  No significant valvular abnormalities present.  No pericardial effusion is present.  There is no prior study for direct comparison.  ______________________________________________________________________________  Left Ventricle  Global and regional left ventricular function is normal with an EF of 55-60%.  Left ventricular wall thickness is normal. Mild left ventricular dilation is  present. Left ventricular diastolic function is normal. No regional wall  motion abnormalities are seen.     Right Ventricle  Right ventricular function, chamber size, wall motion, and thickness are  normal.     Atria  Both atria appear normal.     Mitral Valve  The mitral valve is normal. Trace mitral insufficiency is present.     Aortic Valve  Aortic valve is normal in structure and function.     Tricuspid Valve  The tricuspid valve is normal. Trace tricuspid insufficiency is present.  Pulmonary artery systolic pressure is normal.     Pulmonic Valve  The pulmonic valve is normal.     Vessels  The aorta root is normal. The thoracic aorta is normal. The pulmonary artery  is normal. The inferior vena cava is normal.     Pericardium  No pericardial effusion is present.     Miscellaneous  No significant valvular abnormalities present.     Compared to Previous  Study  There is no prior study for direct comparison.  ______________________________________________________________________________  MMode/2D Measurements & Calculations  IVSd: 1.1 cm  LVIDd: 6.1 cm  LVIDs: 3.8 cm  LVPWd: 1.1 cm  FS: 37.5 %  LV mass(C)d: 279.1 grams  LV mass(C)dI: 124.6 grams/m2  Ao root diam: 4.0 cm  asc Aorta Diam: 3.0 cm  LVOT diam: 2.3 cm  LVOT area: 4.2 cm2  Ao root diam index Ht(cm/m): 2.2  Ao root diam index BSA (cm/m2): 1.8  Asc Ao diam index BSA (cm/m2): 1.3  Asc Ao diam index Ht(cm/m): 1.6  LA Volume (BP): 48.8 ml     LA Volume Index (BP): 21.8 ml/m2  RV Base: 3.3 cm  RWT: 0.37  TAPSE: 3.5 cm     Doppler Measurements & Calculations  MV E max umang: 118.0 cm/sec  MV A max umang: 67.3 cm/sec  MV E/A: 1.8  MV dec slope: 724.0 cm/sec2  MV dec time: 0.16 sec  Ao V2 max: 154.0 cm/sec  Ao max P.5 mmHg  Ao V2 mean: 107.0 cm/sec  Ao mean P.0 mmHg  Ao V2 VTI: 29.7 cm  KENAN(I,D): 3.5 cm2  KENAN(V,D): 3.7 cm2  LV V1 max P.4 mmHg  LV V1 max: 136.0 cm/sec  LV V1 VTI: 24.7 cm  SV(LVOT): 102.6 ml  SI(LVOT): 45.8 ml/m2     PA V2 max: 109.0 cm/sec  PA max P.8 mmHg  PA acc time: 0.11 sec  AV Umang Ratio (DI): 0.88  KENAN Index (cm2/m2): 1.5  E/E' av.1  Lateral E/e': 6.7  Medial E/e': 9.5  RV S Umang: 15.3 cm/sec     ______________________________________________________________________________  Report approved by: Frank Hanson 2024 04:12 PM

## 2024-10-09 NOTE — PROGRESS NOTES
Care Management Follow Up    Length of Stay (days): 18    Expected Discharge Date: 10/08/2024     Concerns to be Addressed: discharge planning   (Patient's mom, Sania, reported she is the only family member that would be included in a family conference)  Patient plan of care discussed at interdisciplinary rounds: Yes    [facility]              Anticipated Discharge Services: None  Anticipated Discharge DME: None    Patient/family educated on Medicare website which has current facility and service quality ratings: no  Education Provided on the Discharge Plan: Yes  Patient/Family in Agreement with the Plan: yes    Referrals Placed by CM/SW: Homecare  Private pay costs discussed: Not applicable    Discussed  Partnership in Safe Discharge Planning  document with patient/family: No     Handoff Completed: No, handoff not indicated or clinically appropriate    Additional Information:  Anticipate discharge pending medical clearance.  Spoke with [facility] rep who confirmed referral received for 4WW, shower chair and raised toilet seat.   Anticipate deliver to [facility]A pending  Home Medical review of  DME request.    1200:  Home Medical will deliver equipment to bedside around 1600 today.  Updated BRENNA Man.      Local Address   on [address] [address]     Next Steps:   Confirm DME deliver  OP Transplant lab appointment scheduled for 10/10, may need to move pending ALEX.    Care Management Discharge Note    Discharge Date: 10/09/2024       Discharge Disposition:  ([facility])    Discharge Services: None    Discharge DME: None    Discharge Transportation: family or friend will provide    Private pay costs discussed: Not applicable    Does the patient's insurance plan have a 3 day qualifying hospital stay waiver?  No    PAS Confirmation Code:    Patient/family educated on Medicare website  which has current facility and service quality ratings: no    Education Provided on the Discharge Plan: Yes  Persons Notified of Discharge Plans: patient and spouse  Patient/Family in Agreement with the Plan: yes    Handoff Referral Completed: Yes, non-[facility] PCP: External handoff communication completed      Lynnette Choi, RN BSN, PHN, ACM-RN  October 9, 2024  7A Nurse Coordinator    Phone: [phone]  Available on [employer] 7A SOT RNCC  Weekend/Holiday 7A SOT RNCC    10/9/2024

## 2024-10-09 NOTE — PLAN OF CARE
Goal Outcome Evaluation:      Plan of Care Reviewed With: patient    Overall Patient Progress: improvingOverall Patient Progress: improving    Outcome Evaluation: AVVS on RA, SHENA dressings C/D/I, Lokelma x1 for high K    BP (!) 132/95 (BP Location: Left arm)   Pulse 81   Temp 98.3  F (36.8  C) (Oral)   Resp 16   Ht 1.829 m (6')   Wt 104.9 kg (231 lb 3.2 oz)   SpO2 100%   BMI 31.36 kg/m      Shift: 6576-4488  Isolation Status: N/A  VS: stable on RA, afebrile  Neuro: A&O x4  Behaviors: receptive to cares  BG: N/A  Labs/Imaging: K 5.5, LFTs improving; pending AM labs  Respiratory: WDL  Cardiac: WDL  Pain/Nausea: C/O intermittent nausea. C/O abdominal and back pain  PRN: Tylenol 650 mg x1, Atarax 50 mg x1, and Robaxin x1 for pain. Zofran 4 mg x1 for nausea  Diet: regular  LDA: L PIV  Infusion(s): N/A  GI/: LBM 10/8. Voiding spontaneously, AUO, uses urinal to measure  Skin: clamshell incision stapled and LACHO  Mobility: SBA/UAL with walker  Events/Education: Lokelma x1 for high K  Plan: Dishcarge today pending team assessment. Notify MD of any significant changes, continue plan of care. Hand off to be given to oncoming RN.

## 2024-10-09 NOTE — PROGRESS NOTES
"CLINICAL NUTRITION SERVICES - BRIEF/DISCHARGE NOTE     Nutrition Prescription    Recommendations already ordered by Registered Dietitian (RD):  Low K+ diet education - provided handout: Potassium Content of Foods  Modified discharge instructions    Future/Additional Recommendations:  Minimize diet restrictions as able d/t high calorie/protein needs post-transplant.  Oral supplements as needed to help meet nutritional needs.     High protein food choices with meals to help meet high needs post-transplant over the next 6-8 weeks.     Heart-healthy diet (low saturated fat, low sodium, high fiber) and food safety precautions long term due to immunosuppression regimen post-transplant         EVALUATION OF THE PROGRESS TOWARD GOALS   Diet: 2 g K     NEW FINDINGS   Reviewed low potassium diet with pt per provider request.    Patient s discharge needs assessed and discharge planning has been conducted with the multidisciplinary transplant care team including physicians, pharmacy, social work and transplant coordinator.     Monitoring/Evaluation  Progress toward goals will be monitored and evaluated per protocol.    Once discharged, place outpatient nutrition consult via the transplant team if nutrition concerns arise.    Arnoldo Pizarro RD, LD  Available on [employer]  Weekend/Holiday VASHTI Sorensen - \"Weekend Clinical Dietitian\"  No longer available by paging   "

## 2024-10-09 NOTE — PROGRESS NOTES
Care Management Discharge Note    Discharge Date: 10/09/2024       Discharge Disposition:  ([address])    Discharge Services: None    Discharge DME: None    Discharge Transportation: family or friend will provide    Private pay costs discussed: Not applicable    Does the patient's insurance plan have a 3 day qualifying hospital stay waiver?  No    PAS Confirmation Code:    Patient/family educated on Medicare website which has current facility and service quality ratings: no    Education Provided on the Discharge Plan: Yes  Persons Notified of Discharge Plans: pt and motherSania  Patient/Family in Agreement with the Plan: yes    Handoff Referral Completed: No, handoff not indicated or clinically appropriate    Additional Information:  SW met with pt and mother at bedside to discuss discharge planning. They will be staying local at [address], they did not have any questions for this writer. SW will continue to be available for psychosocial support.     Jennifer CABALLERO, [facility]  Liver Transplant   Phone: [phone]

## 2024-10-10 ENCOUNTER — LAB (OUTPATIENT)
Dept: LAB | Facility: CLINIC | Age: 39
End: 2024-10-10
Payer: MEDICAID

## 2024-10-10 ENCOUNTER — OFFICE VISIT (OUTPATIENT)
Dept: PHARMACY | Facility: CLINIC | Age: 39
End: 2024-10-10
Payer: MEDICAID

## 2024-10-10 ENCOUNTER — TELEPHONE (OUTPATIENT)
Dept: PHARMACY | Facility: CLINIC | Age: 39
End: 2024-10-10

## 2024-10-10 ENCOUNTER — TELEPHONE (OUTPATIENT)
Dept: LAB | Facility: CLINIC | Age: 39
End: 2024-10-10

## 2024-10-10 DIAGNOSIS — Z94.4 LIVER REPLACED BY TRANSPLANT (H): Primary | ICD-10-CM

## 2024-10-10 DIAGNOSIS — Z78.9 TAKES DIETARY SUPPLEMENTS: ICD-10-CM

## 2024-10-10 DIAGNOSIS — R60.9 EDEMA, UNSPECIFIED TYPE: ICD-10-CM

## 2024-10-10 DIAGNOSIS — Z94.4 LIVER REPLACED BY TRANSPLANT (H): ICD-10-CM

## 2024-10-10 DIAGNOSIS — G89.18 ACUTE POST-OPERATIVE PAIN: ICD-10-CM

## 2024-10-10 DIAGNOSIS — K21.9 GASTROESOPHAGEAL REFLUX DISEASE, UNSPECIFIED WHETHER ESOPHAGITIS PRESENT: ICD-10-CM

## 2024-10-10 DIAGNOSIS — K59.00 CONSTIPATION, UNSPECIFIED CONSTIPATION TYPE: ICD-10-CM

## 2024-10-10 LAB
ALBUMIN SERPL BCG-MCNC: 3.1 G/DL (ref 3.5–5.2)
ALP SERPL-CCNC: 185 U/L (ref 40–150)
ALT SERPL W P-5'-P-CCNC: 74 U/L (ref 0–70)
ANION GAP SERPL CALCULATED.3IONS-SCNC: 9 MMOL/L (ref 7–15)
AST SERPL W P-5'-P-CCNC: 31 U/L (ref 0–45)
BILIRUB DIRECT SERPL-MCNC: 1.69 MG/DL (ref 0–0.3)
BILIRUB SERPL-MCNC: 2.7 MG/DL
BUN SERPL-MCNC: 16.5 MG/DL (ref 6–20)
CALCIUM SERPL-MCNC: 8.9 MG/DL (ref 8.8–10.4)
CHLORIDE SERPL-SCNC: 104 MMOL/L (ref 98–107)
CREAT SERPL-MCNC: 0.9 MG/DL (ref 0.67–1.17)
EGFRCR SERPLBLD CKD-EPI 2021: >90 ML/MIN/1.73M2
ERYTHROCYTE [DISTWIDTH] IN BLOOD BY AUTOMATED COUNT: 19.5 % (ref 10–15)
GLUCOSE SERPL-MCNC: 155 MG/DL (ref 70–99)
HCO3 SERPL-SCNC: 24 MMOL/L (ref 22–29)
HCT VFR BLD AUTO: 27.7 % (ref 40–53)
HGB BLD-MCNC: 9.3 G/DL (ref 13.3–17.7)
MAGNESIUM SERPL-MCNC: 1.5 MG/DL (ref 1.7–2.3)
MCH RBC QN AUTO: 31.2 PG (ref 26.5–33)
MCHC RBC AUTO-ENTMCNC: 33.6 G/DL (ref 31.5–36.5)
MCV RBC AUTO: 93 FL (ref 78–100)
PHOSPHATE SERPL-MCNC: 4.3 MG/DL (ref 2.5–4.5)
PLATELET # BLD AUTO: 212 10E3/UL (ref 150–450)
POTASSIUM SERPL-SCNC: 4.6 MMOL/L (ref 3.4–5.3)
PROT SERPL-MCNC: 5.7 G/DL (ref 6.4–8.3)
RBC # BLD AUTO: 2.98 10E6/UL (ref 4.4–5.9)
SODIUM SERPL-SCNC: 137 MMOL/L (ref 135–145)
TACROLIMUS BLD-MCNC: 8.2 UG/L (ref 5–15)
TME LAST DOSE: NORMAL H
TME LAST DOSE: NORMAL H
WBC # BLD AUTO: 8.4 10E3/UL (ref 4–11)

## 2024-10-10 PROCEDURE — 82248 BILIRUBIN DIRECT: CPT | Performed by: PATHOLOGY

## 2024-10-10 PROCEDURE — 99605 MTMS BY PHARM NP 15 MIN: CPT | Performed by: PHARMACIST

## 2024-10-10 PROCEDURE — 99607 MTMS BY PHARM ADDL 15 MIN: CPT | Performed by: PHARMACIST

## 2024-10-10 PROCEDURE — 80197 ASSAY OF TACROLIMUS: CPT | Performed by: TRANSPLANT SURGERY

## 2024-10-10 PROCEDURE — 83735 ASSAY OF MAGNESIUM: CPT | Performed by: PATHOLOGY

## 2024-10-10 PROCEDURE — 85027 COMPLETE CBC AUTOMATED: CPT | Performed by: PATHOLOGY

## 2024-10-10 PROCEDURE — 36415 COLL VENOUS BLD VENIPUNCTURE: CPT | Performed by: PATHOLOGY

## 2024-10-10 PROCEDURE — 84100 ASSAY OF PHOSPHORUS: CPT | Performed by: PATHOLOGY

## 2024-10-10 PROCEDURE — 99000 SPECIMEN HANDLING OFFICE-LAB: CPT | Performed by: PATHOLOGY

## 2024-10-10 PROCEDURE — 80053 COMPREHEN METABOLIC PANEL: CPT | Performed by: PATHOLOGY

## 2024-10-10 RX ORDER — LIDOCAINE 4 G/G
2 PATCH TOPICAL EVERY 24 HOURS
Qty: 20 PATCH | Refills: 0 | Status: SHIPPED | OUTPATIENT
Start: 2024-10-10 | End: 2024-10-28

## 2024-10-10 RX ORDER — FOLIC ACID/MV,IRON,MIN/LUTEIN 0.4-18-25
1 TABLET ORAL DAILY
Qty: 30 TABLET | Refills: 11 | Status: SHIPPED | OUTPATIENT
Start: 2024-10-10 | End: 2024-10-11

## 2024-10-10 NOTE — TELEPHONE ENCOUNTER
Spoke with patient and his mother. They state his medications were not reviewed prior to discharge. Pillbox was not done.     Patient took his scheduled morning medications after lab this morning. Patient has an appt today at 1100 with transplant pharmacist.

## 2024-10-10 NOTE — PROGRESS NOTES
"Medication Therapy Management (MTM) Encounter    ASSESSMENT:                            Medication Adherence/Access: patient struggling with med set up, we set up his pill box today and numbered his med card and bottles to simplify everything.     Liver Transplant:    Stable.     Supplements:   Per dietician 10/4, recommendation to discontinue B1 and folic acid. These were stopped.     Pain:   Recommended patient start taking hydroxyzine with oxycodone to increase efficacy. Will schedule Acetaminophen as well to help with pain as well as start lidocaine patches overnight for back and incisional pain.     Edema:   Stable.    GERD    Reordered omeprazole for patient as he did not have it, put it in pill box.    Constipation:   Stable.   PLAN:                            Stop Folic acid and B1 per dietician.   Put in Omeprazole and Multivitamin when you pick them up from the pharmacy. Both in the morning.   Take Oxycodone 2.5mg (1/2 tablet) twice daily with Hydroxyzine 25mg twice daily  Take Acetaminophen 2 tablets (650mg) 3 times daily. Max dose is 2000mg per day.  Start lidocaine patches 4% apply one to your back and one near the incision, but not over it. Use for 12 hours then remove for 12 hours.   [facility] mail order will call you three weeks post discharge, but if your dose changes, call the number on the back of the pill box to talk to them earlier, [phone]. If your doctor sends over a new prescription to the mail order pharmacy you will have to call them to set up the order. They have an Dolores called \"[facility]\" as well.     Follow-up: 1/6    SUBJECTIVE/OBJECTIVE:                          Jag Smith is a 39 year old male seen for a transitions of care visit. He was discharged from [facility] on 10/9 for liver txp. Patient was accompanied by Sania.     Reason for visit: initial post txp.  Mycophenolate Mofetil   Tacrolimus "   1mg  0.5mg  Prednisone  Valcyte  Omeprazole  Aspirin  Magnesium  MVI    Allergies/ADRs: Reviewed in chart  Past Medical History: Reviewed in chart  Tobacco: He reports that he has never smoked. He has never used smokeless tobacco.  Alcohol: not currently using  THC/CBD: none    Medication Adherence/Access: Patient uses pill box(es) and has assistance with medication administration: family member.  Patient takes medications 2 time(s) per day. 8am and 8pm  Per patient, misses medication 0 time(s) per week.   The patient fills medications at [place]: YES.    Liver Transplant:    Tacrolimus 3.5 mg twice daily  Mycophenolate Mofetil 750 mg twice daily  Prednisone 5 mg every morning.    Pt reports no side effects  Transplant date: 9/28/24  Vascular Prophylaxis: Aspirin 81mg daily. Denies gastrointestinal bleed   CMV prophylaxis: CrCl >40 mL/minute: Valcyte 450 mg once daily Treat 3 months post tx   PJP prophylaxis: Inhaled Pentamidine 300mg q 4 weeks for 6 months post txp last dose 10/8  Tx Coordinator: Kateryna Blank MD: Dr. Colon, Dr. Blum, Using Med Card: Yes  Immunizations: annual flu shot unknown; [id number]:  NA; Prevnar 20: 2024; TDaP:  2019; Shingrix: unknown, HBV: unknown, COVID: Primary x 3 and Bivalent x 1    Estimated Creatinine Clearance: 140.7 mL/min (based on SCr of 0.9 mg/dL).    Supplements:   Mag Oxide 800mg twice daily  B1 and Folic acid 1mg .  Per Dietician Darlene Goddard on 10/7, recommendation to discontinue folic and and B1.   Lab Results   Component Value Date    MAG 1.5 (L) 10/10/2024     Pain:   Oxycodone 5mg twice daily prn  Methocarbamol 1000mg twice daily   Acetaminophen 650mg once daily prn headaches.   Pain is 7-8/10, incisional, back pain, tail bone.    Edema:   Furosemide 20mg daily x 7 days    GERD    Omeprazole 20 mg once daily did not get this at discharge.   Patient reports no current symptoms.   Patient feels that current regimen is effective.     Constipation:   Miralax 17g daily  took this morning with Oxycodone.  Had a BM yesterday.  Has not needed Senna yet.     Today's Vitals: There were no vitals taken for this visit.  ----------------  Post Discharge Medication Reconciliation Status: discharge medications reconciled and changed, per note/orders.    I spent 60 minutes with this patient today. All changes were made via collaborative practice agreement with Dr. Colon. A copy of the visit note was provided to the patient's provider(s).    A summary of these recommendations was sent via [email or web address].    Issa Hubbard, PharmD  [facility] Pharmacist    Phone: [phone]      Medication Therapy Recommendations  Acute post-operative pain    Current Medication: acetaminophen (TYLENOL) 325 MG tablet   Rationale: Dose too low - Dosage too low - Effectiveness   Recommendation: Increase Frequency   Status: Accepted - no CPA Needed          Current Medication: hydrOXYzine HCl (ATARAX) 25 MG tablet   Rationale: Does not understand instructions - Adherence - Adherence   Recommendation: Provide Education   Status: Patient Agreed - Adherence/Education          Rationale: Synergistic therapy - Needs additional medication therapy - Indication   Recommendation: Start Medication - Lidocaine 4 % Patch   Status: Accepted per CPA         Takes dietary supplements    Current Medication: folic acid (FOLVITE) 1 MG tablet (Discontinued)   Rationale: No medical indication at this time - Unnecessary medication therapy - Indication   Recommendation: Discontinue Medication - stop b1 and folic acid   Status: Accepted per CPA

## 2024-10-10 NOTE — PLAN OF CARE
Physical Therapy Discharge Summary    Reason for therapy discharge:    Discharged to home with outpatient therapy.    Progress towards therapy goal(s). See goals on Care Plan in [facility] electronic health record for goal details.  Goals partially met.  Barriers to achieving goals:   limited tolerance for therapy and discharge from facility.    Therapy recommendation(s):    Continued therapy is recommended.  Rationale/Recommendations:  to improve functional mobility.

## 2024-10-10 NOTE — PLAN OF CARE
Occupational Therapy Discharge Summary    Reason for therapy discharge:    Discharged to home.    Progress towards therapy goal(s). See goals on Care Plan in [facility] electronic health record for goal details.  Goals partially met.  Barriers to achieving goals:   discharge from facility.    Therapy recommendation(s):    Continued therapy is recommended.  Rationale/Recommendations:  decreased ADL I.    Goal Outcome Evaluation:

## 2024-10-10 NOTE — Clinical Note
Set up his pill box for the week with him and numbered his med card. I think pt should be able to navigate the med set up, not sure about the mother though. She was confused throughout

## 2024-10-10 NOTE — PATIENT INSTRUCTIONS
"Recommendations from today's MTM visit:                                                      Stop Folic acid and B1 per dietician.   Put in Omeprazole and Multivitamin when you pick them up from the pharmacy. Both in the morning.   Take Oxycodone 2.5mg (1/2 tablet) twice daily with Hydroxyzine 25mg twice daily  Take Acetaminophen 2 tablets (650mg) 3 times daily. Max dose is 2000mg per day.  [facility] mail order will call you three weeks post discharge, but if your dose changes, call the number on the back of the pill box to talk to them earlier, [phone]. If your doctor sends over a new prescription to the mail order pharmacy you will have to call them to set up the order. They have an Dolores called \"My [facility]\" as well.     Follow-up: 1/6    It was great speaking with you today.  I value your experience and would be very thankful for your time in providing feedback in our clinic survey. In the next few days, you may receive an email or text message from [employer] with a link to a survey related to your  clinical pharmacist.\"     To schedule another MTM appointment, please call the clinic directly or you may call the MTM scheduling line at [phone] or toll-free at [phone].     My Clinical Pharmacist's contact information:                                                      Please feel free to contact me with any questions or concerns you have.      Issa Hubbard, PharmD  MTM Pharmacist    Phone: [phone]     "

## 2024-10-10 NOTE — TELEPHONE ENCOUNTER
Tracey Smith is a post-liver transplant patient who discharged on 10/09/24. Patient chooses to receive medications from [facility] specialty pharmacy. The patient and family member (simi) will be responsible for managing medications.    SOT*LIAISON (TRACEY) IS A (LIVER) TRANSPLANT PATIENT  (DATE OF TRANSPLANT: (DATE: 9/28/2024) )     HEALTH BENEFIT: ([place] MEDICAID)  ID#[id number] (EFFECTIVE (DATE: 6/1/2022) )      PHARMACY BENEFIT: ([place] MEDICA[place])  PROCESSING INFO: ID# [id number] BIN# [id number] (EFFECTIVE (DATE: 6/1/2022) ).   NO DEDUCTIBLE & NO MAX OUT-OF-POCKET   COPAY STRUCTURE:  $ (0) FOR GENERIC  $ (0) FOR BRAND  (PA NEEDED) FOR NON-FORMULARY MEDICATIONS      TEST CLAIM SPECIALTY #28  MYCOPHENOLATE 250MG (#240/30DS)=$0   PROGRAF 1MG (#120/30DS)= PA NEEDED  TACROLIMUS 1MG (#120/30DS)=$0  CYCLOSPORINE 100MG (#60/30DS)=$0  VALGANCICLOVIR 450MG (#60/30DS)=$0  VALACYCLOVIR 1GM (#90/30DS)=$0    TEST CLAIM DISCHARGE #27 (18 YEARS AND OLDER):  MYCOPHENOLATE 250MG (#240/30DS)=$0   PROGRAF 1MG (#120/30DS)= PA NEEDED  TACROLIMUS 1MG (#120/30DS)=$0  CYCLOSPORINE 100MG (#60/30DS)=$0  VALGANCICLOVIR 450MG (#60/30DS)=$0  VALACYCLOVIR 1GM (#90/30DS)=$0      **CAN PATIENT FILL AT [facility] FOR MEDICATIONS LISTED? YES    Carlos Liu, [facility]    
Patient/Caregiver provided printed discharge information.

## 2024-10-11 ENCOUNTER — TELEPHONE (OUTPATIENT)
Dept: TRANSPLANT | Facility: CLINIC | Age: 39
End: 2024-10-11
Payer: MEDICAID

## 2024-10-11 ENCOUNTER — OFFICE VISIT (OUTPATIENT)
Dept: TRANSPLANT | Facility: CLINIC | Age: 39
End: 2024-10-11
Attending: NURSE PRACTITIONER
Payer: MEDICAID

## 2024-10-11 VITALS
DIASTOLIC BLOOD PRESSURE: 77 MMHG | WEIGHT: 228 LBS | BODY MASS INDEX: 30.92 KG/M2 | HEART RATE: 79 BPM | OXYGEN SATURATION: 98 % | SYSTOLIC BLOOD PRESSURE: 115 MMHG

## 2024-10-11 DIAGNOSIS — R60.0 BILATERAL LOWER EXTREMITY EDEMA: ICD-10-CM

## 2024-10-11 DIAGNOSIS — Z94.4 LIVER REPLACED BY TRANSPLANT (H): Primary | ICD-10-CM

## 2024-10-11 DIAGNOSIS — Z78.9 TAKES DIETARY SUPPLEMENTS: ICD-10-CM

## 2024-10-11 DIAGNOSIS — E83.42 HYPOMAGNESEMIA: ICD-10-CM

## 2024-10-11 DIAGNOSIS — Z94.4 LIVER REPLACED BY TRANSPLANT (H): ICD-10-CM

## 2024-10-11 DIAGNOSIS — K21.9 GASTROESOPHAGEAL REFLUX DISEASE, UNSPECIFIED WHETHER ESOPHAGITIS PRESENT: ICD-10-CM

## 2024-10-11 DIAGNOSIS — E87.5 HYPERKALEMIA: ICD-10-CM

## 2024-10-11 PROCEDURE — 99213 OFFICE O/P EST LOW 20 MIN: CPT | Mod: 24 | Performed by: NURSE PRACTITIONER

## 2024-10-11 PROCEDURE — G0463 HOSPITAL OUTPT CLINIC VISIT: HCPCS | Performed by: NURSE PRACTITIONER

## 2024-10-11 RX ORDER — TACROLIMUS 0.5 MG/1
0.5 CAPSULE ORAL 2 TIMES DAILY
Qty: 60 CAPSULE | Refills: 1 | Status: SHIPPED | OUTPATIENT
Start: 2024-10-11 | End: 2024-10-15

## 2024-10-11 RX ORDER — VALGANCICLOVIR 450 MG/1
450 TABLET, FILM COATED ORAL DAILY
Qty: 30 TABLET | Refills: 1 | Status: SHIPPED | OUTPATIENT
Start: 2024-10-11 | End: 2024-11-01

## 2024-10-11 RX ORDER — ASPIRIN 81 MG/1
81 TABLET, CHEWABLE ORAL DAILY
Qty: 30 TABLET | Refills: 1 | Status: SHIPPED | OUTPATIENT
Start: 2024-10-11 | End: 2024-11-01

## 2024-10-11 RX ORDER — MAGNESIUM OXIDE 400 MG/1
800 TABLET ORAL 2 TIMES DAILY
Qty: 120 TABLET | Refills: 1 | Status: SHIPPED | OUTPATIENT
Start: 2024-10-11 | End: 2024-11-01

## 2024-10-11 RX ORDER — FOLIC ACID/MV,IRON,MIN/LUTEIN 0.4-18-25
1 TABLET ORAL DAILY
Qty: 30 TABLET | Refills: 1 | Status: SHIPPED | OUTPATIENT
Start: 2024-10-11 | End: 2024-11-01

## 2024-10-11 RX ORDER — TACROLIMUS 1 MG/1
3 CAPSULE ORAL 2 TIMES DAILY
Qty: 180 CAPSULE | Refills: 1 | Status: SHIPPED | OUTPATIENT
Start: 2024-10-11 | End: 2024-10-15

## 2024-10-11 RX ORDER — PREDNISONE 5 MG/1
5 TABLET ORAL DAILY
Qty: 30 TABLET | Refills: 1 | Status: SHIPPED | OUTPATIENT
Start: 2024-10-11 | End: 2024-11-01

## 2024-10-11 RX ORDER — MYCOPHENOLATE MOFETIL 250 MG/1
750 CAPSULE ORAL 2 TIMES DAILY
Qty: 180 CAPSULE | Refills: 1 | Status: SHIPPED | OUTPATIENT
Start: 2024-10-11 | End: 2024-11-01

## 2024-10-11 NOTE — LETTER
10/11/2024      Jag Smith  [address]  [facility] [address]      Dear Colleague,    Thank you for referring your patient, Jag Smith, to the [facility] TRANSPLANT [facility]. Please see a copy of my visit note below.    Transplant Surgery -OUTPATIENT IMMUNOSUPPRESSION PROGRESS NOTE    Date of Visit: 10/11/2024    Transplants:  9/28/2024 (Liver); Postoperative day:  13  ASSESMENT AND PLAN:  1.Graft Function:LFTs stable from 10/10/24 labs  2.Immunosuppression Management:No changes today.   3.Hypertension:stable. Ensure good hydration.   4.Renal Function:Cr wnl   5.Lab frequency:as scheduled twice a week   6.Other:   Reviewed case with Dr. Colon, monitor drainage for now. Reassured patient and provided him with gauze and tape to keep area covered and dry. Report back changes in drainage color or increase of amount, F/C or increased pain.   Lymphedema: wearing wraps. Due for change - will inquire with coordinator to set up.     Date: October 11, 2024    Transplant:  [x]                             Liver [x]                              Kidney []                             Pancreas []                              Other:             Chief Complaint:No chief complaint on file.    History of Present Illness:  Jag Smith is a 39 year old male with recently diagnosed decompensated alcoholic liver cirrhosis transferred from [facility] on 9/21/24 for expedited liver transplant evaluation due to decompensated alcoholic liver cirrhosis. MELD 42. He is now s/p DDLT on 9/28/24 with Dr. Colon     C/O new incisional drainage noticed this AM. Small amount, serous. Drain in RLQ  was removed while inpatient and was sutured close. No issues with drain site.   No new abdominal pains. No N/V/D.   Urinating with no issues.   Appetite is ok. Drinking fluids.   No F/C.   Needs lymphedema clinic scheduled as outpatient.     Patient Active Problem List   Diagnosis     Alcohol use disorder, severe, in early  remission (H)     Alcoholic hepatitis (H)     Epiphora due to insufficient drainage of left side     Hyperbilirubinemia     Hypokalemia     Jaundice     Pneumonia     Liver replaced by transplant (H)     DENI (acute kidney injury) (H)     Immunosuppressed status (H)     Acute post-operative pain     Steroid-induced hyperglycemia     Acute urinary retention     Anemia due to blood loss, acute     Severe malnutrition (H)     Hyponatremia     Hypomagnesemia     Bilateral lower extremity edema     Hyperkalemia     Subconjunctival hemorrhage     SOCIAL /FAMILY HISTORY: [x]                  No recent change    Past Medical History:   Diagnosis Date     Alcohol use disorder      Alcoholic hepatitis (H) 2024     Anxiety      Depression      Hypertension      Liver replaced by transplant (H) 2024     Past Surgical History:   Procedure Laterality Date     BENCH LIVER  2024    Procedure: Bench liver;  Surgeon: Fernando Colon MD;  Location: [facility] OR     DACRYOCYSTORHINOSTOMY Left 2013     INCISION AND DRAINAGE BUTTOCKS Left 2017     TRANSPLANT LIVER RECIPIENT  DONOR N/A 2024    Procedure: Transplant liver recipient  donor;  Surgeon: Fernando Colon MD;  Location: [facility] OR     Social History     Socioeconomic History     Marital status: Single     Spouse name: Not on file     Number of children: Not on file     Years of education: Not on file     Highest education level: Not on file   Occupational History     Not on file   Tobacco Use     Smoking status: Never     Smokeless tobacco: Never   Substance and Sexual Activity     Alcohol use: Not Currently     Comment: last drink 2024     Drug use: Never     Sexual activity: Not on file   Other Topics Concern     Not on file   Social History Narrative     Not on file     Social Determinants of Health     Financial Resource Strain: Low Risk  (2024)    Financial Resource Strain      Within the past 12 months, have you or your  family members you live with been unable to get utilities (heat, electricity) when it was really needed?: No   Food Insecurity: Low Risk  (9/21/2024)    Food Insecurity      Within the past 12 months, did you worry that your food would run out before you got money to buy more?: No      Within the past 12 months, did the food you bought just not last and you didn t have money to get more?: No   Transportation Needs: Low Risk  (9/21/2024)    Transportation Needs      Within the past 12 months, has lack of transportation kept you from medical appointments, getting your medicines, non-medical meetings or appointments, work, or from getting things that you need?: No   Physical Activity: Not on file   Stress: Not on file   Social Connections: Not on file   Interpersonal Safety: Low Risk  (9/28/2024)    Interpersonal Safety      Do you feel physically and emotionally safe where you currently live?: Yes      Within the past 12 months, have you been hit, slapped, kicked or otherwise physically hurt by someone?: No      Within the past 12 months, have you been humiliated or emotionally abused in other ways by your partner or ex-partner?: No   Recent Concern: Interpersonal Safety - High Risk (9/22/2024)    Interpersonal Safety      Do you feel physically and emotionally safe where you currently live?: No      Within the past 12 months, have you been hit, slapped, kicked or otherwise physically hurt by someone?: No      Within the past 12 months, have you been humiliated or emotionally abused in other ways by your partner or ex-partner?: No   Housing Stability: Low Risk  (9/21/2024)    Housing Stability      Do you have housing? : Yes      Are you worried about losing your housing?: No     Prescription Medications as of 10/11/2024         Rx Number Disp Refills Start End Last Dispensed Date Next Fill Date Owning Pharmacy    acetaminophen (TYLENOL) 325 MG tablet  60 tablet 0 10/9/2024 --   [facility] -  [address]    Sig: Take 2 tablets (650 mg) by mouth every 4 hours as needed for mild pain or fever.    Class: E-Prescribe    Route: Oral    aspirin (ASA) 81 MG chewable tablet  30 tablet 1 10/11/2024 --   [address]    Sig: Take 1 tablet (81 mg) by mouth or Feeding Tube daily.    Class: E-Prescribe    Route: Oral or Feeding Tube    carboxymethylcellulose PF (REFRESH PLUS) 0.5 % ophthalmic solution  1 each 0 10/9/2024 --   [address]    Sig: Place 1 drop into both eyes 6 times daily.    Class: E-Prescribe    Route: Both Eyes    furosemide (LASIX) 20 MG tablet  7 tablet 0 10/9/2024 --   [address]    Sig: Take 1 tablet (20 mg) by mouth daily.    Class: E-Prescribe    Route: Oral    hydrOXYzine HCl (ATARAX) 25 MG tablet  20 tablet 0 10/9/2024 --   [address]    Sig: Take 1 tablet (25 mg) by mouth every 6 hours as needed for other or anxiety (adjuvant pain).    Class: E-Prescribe    Route: Oral    Lidocaine (LIDOCARE) 4 % Patch  20 patch 0 10/10/2024 --   [address]    Sig: Place 2 patches over 12 hours onto the skin every 24 hours. To prevent lidocaine toxicity, patient should be patch free for 12 hrs daily. Apply near incision, not on, can apply 1 patch to back as well.    Class: E-Prescribe    Route: Transdermal    magnesium oxide (MAG-OX) 400 MG tablet  120 tablet 1 10/11/2024 --   [address]    Sig: Take 2 tablets (800 mg) by mouth 2 times daily.    Class: E-Prescribe    Route: Oral    methocarbamol 1000 MG TABS  20 tablet 0 10/9/2024 --   [place] [address]  [facility]    Sig: Take 1,000 mg by mouth every 6 hours as needed for muscle spasms.    Class: E-Prescribe    Route: Oral    multivitamin w/minerals (CERTAVITE/ANTIOXIDANTS) tablet  30 tablet 1 10/11/2024 --   [address]    Sig: Take 1 tablet by mouth daily.    Class: E-Prescribe    Route: Oral    mycophenolate (GENERIC EQUIVALENT) 250 MG capsule  180 capsule 1 10/11/2024 --   [address]    Sig: Take 3 capsules (750 mg) by mouth 2 times daily.    Class: E-Prescribe    Notes to Pharmacy: TXP DT 9/28/2024 (Liver) TXP Dischg DT 10/9/2024 DX Liver replaced by transplant Z94.4 [facility] ([place], [place])    Route: Oral    omeprazole (PRILOSEC) 20 MG DR capsule  30 capsule 2 10/11/2024 --   [address]    Sig: Take 1 capsule (20 mg) by mouth daily.    Class: E-Prescribe    Route: Oral    oxyCODONE (ROXICODONE) 5 MG tablet  5 tablet 0 10/9/2024 --   [address]    Sig: Take 0.5-1 tablets (2.5-5 mg) by mouth every 8 hours as needed for moderate pain or severe pain.    Class: E-Prescribe    Earliest Fill Date: 10/9/2024    Route: Oral    polyethylene glycol (MIRALAX) 17 GM/Dose powder  510 g 0 10/9/2024 --   [place], [place] - [address] [place] [address]    Sig: Take 17 g by mouth 2 times daily as needed for constipation.    Class: E-Prescribe    Route: Oral    predniSONE (DELTASONE) 5 MG tablet  30 tablet 1 10/11/2024 --   [address]    Sig: Take 1 tablet (5 mg) by mouth daily.    Class: E-Prescribe    Notes to Pharmacy: TXP DT 9/28/2024 (Liver) TXP Dischg DT 10/9/2024 DX Liver replaced by transplant Z94.4 [place]  [facility] ([place], [place])    Route: Oral    sennosides (SENOKOT) 8.6 MG tablet  60 tablet 0 10/9/2024 --   [facility] - [place], [place] - [address]    Sig: Take 2 tablets by mouth 2 times daily as needed for constipation.    Class: E-Prescribe    Route: Oral    tacrolimus (GENERIC EQUIVALENT) 0.5 MG capsule  60 capsule 1 10/11/2024 --   [place], [place] - [address] [place] [address] [address]    Sig: Take 1 capsule (0.5 mg) by mouth 2 times daily. Total dose 3.5 mg BID    Class: E-Prescribe    Notes to Pharmacy: TXP DT 9/28/2024 (Liver) TXP Dischg DT 10/9/2024 DX Liver replaced by transplant Z94.4 [place] [facility] ([place], [place])    Route: Oral    tacrolimus (GENERIC EQUIVALENT) 1 MG capsule  180 capsule 1 10/11/2024 --   [place], [place] - [phone]    Sig: Take 3 capsules (3 mg) by mouth 2 times daily. Total dose 3.5 mg BID    Class: E-Prescribe    Notes to Pharmacy: TXP DT 9/28/2024 (Liver) TXP Dischg DT 10/9/2024 DX Liver replaced by transplant Z94.4 [place] [facility] ([place], [place])    Route: Oral    valGANciclovir (VALCYTE) 450 MG tablet  30 tablet 1 10/11/2024 --   [place], [place] - [address] [place] [address] [phone]    Sig: Take 1 tablet (450 mg) by mouth daily.    Class: E-Prescribe    Route: Oral          Azithromycin   REVIEW OF SYSTEMS (check box if normal)  [x]               GENERAL  [x]                 PULMONARY [x]                GENITOURINARY  [x]                CNS                 [x]                 CARDIAC  [x]                 ENDOCRINE  [x]                EARS,NOSE,THROAT [x]                 GASTROINTESTINAL [x]                 NEUROLOGIC    [x]                MUSCLOSKELTAL  [x]                  HEMATOLOGY      PHYSICAL EXAM  (check box if normal)/77   Pulse 79   Wt 103.4 kg (228 lb)   SpO2 98%   BMI 30.92 kg/m          [x]            GENERAL:    [x]       EYES:  ICTERIC   []        YES  []                    NO  [x]           EXTREMITIES: Clubbing []       Y     [x]           N    [x]           EARS, NOSE, THROAT: Membranes Moist    YES   [x]                   NO []                  [x]           LUNGS:  CLEAR    YES       [x]                  NO    []                                [x]           SKIN: Jaundice           YES       []                  NO    [x]                   Rash: YES       []                  NO    [x]                                     [x]             HEART: Regular Rate          YES       [x]                  NO    []                   Incision Clean:  YES       [x]                  NO    []                                [x]                    ABDOMEN: Organomegaly YES       []                  NO    [x]                       [x]                    NEUROLOGICAL:  Nonfocal  YES       [x]                  NO    []                       [x]                    Hernia YES       []                  NO    [x]                   PSYCHIATRIC:  Appropriate  YES       [x]                  NO    []                       OTHER: Bilat legs wrapped.   Upper abdominal incision intact with staples, small amount of serous drainage noted mid on R side incision. No surrounding erythema.   RLQ x2 sutured area CDI - no erythema or drainage.   Abd soft throughout. Non-tender. No rebound or rigidity.                                                                                                   PAIN SCALE:: 4       Again, thank you for allowing me to participate in the care of your patient.        Sincerely,        SIL Blmu CNP

## 2024-10-11 NOTE — TELEPHONE ENCOUNTER
Patient states his incision is leaking. States he was not discharged with any dressings.     Message sent to Marycruz. She will see patient in the clinic at 1pm.     Patient informed and verbalized understanding.

## 2024-10-11 NOTE — TELEPHONE ENCOUNTER
Post Discharge Liver Transplant Phone Call (within 1-3 business days post discharge)      Reviewed with the patient the Transplant Center contact information:  Best phone contact is [phone] Monday-Friday from 8am-5pm.   *You may have to leave a message and get a call back.    Good alternative communication method is via [employer] message.    Coordinators are available 8am-5pm M-F, otherwise there will be an on-call RN available 24/7  *If calling after hours, please call the same number, [phone] and ask the  to page the on-call Transplant Coordinator    When to contact the Transplant Center:  Fever > 100.5F  Dizziness, lightheadedness  Severe pain  If you are unable to take your immunosuppression medications.  Unable to obtain refill on immunosuppressive medications    Medications:  Reviewed medications with patient.   - confirmed pt has supply of meds  - MAR is up to date   - Verify preferred pharmacy in [place]  - Reminded patient to always contact the pharmacy first for refills    Confirmed the patient has an up to date medication card at home and is knowledgeable in updating their med card (or has someone to assist in this).   - Reinforced patient always bring med card to appointment      Labs:  Labs are expected to be drawn on Monday and Thursday until you are told differently by your transplant team.   - confirmed patient's preferred lab and updated EPIC   - Take a copy of your lab letter with to each lab draw   - Lab order sent directly to patient via [employer] or mail.    - Confirmed patient has a copy of lab letter  - Review how to review lab results on [employer] or obtaining and recording lab values in handbook    Vitals:  Encouraged the patient to record the following:  BP - daily unless light headed  Temperature - daily  Weight - daily    Follow Up:  Role of the Transplant Coordinator vs LPN was reviewed. Contact information provided for both.   Reviewed current scheduled follow up appointments  with surgeon.    Reminded the patient to follow up with PCP within 1 -2 weeks for general follow-up and management of diabetes, pain, and blood pressure  STENT REMOVAL - reminded patient that if stent was placed at time of transplant (this is indicated on the check list) this will need to come out 2-3 mos post transplant.  Asked patient to notify transplant coordinator if sees stent in stool.  Has the patient been contacted with initial visit from HOME CARE? No  If not, Transplant Coordinator to be notified to follow up with Home Care  Pt will use [facility].     Magda Gant LPN

## 2024-10-11 NOTE — TELEPHONE ENCOUNTER
Pt called stated he is leaking from his incisions and has nothing to clean the area it leaking at. Caller would like to know if he should go to the ER or the clinic?

## 2024-10-11 NOTE — PROGRESS NOTES
Transplant Surgery -OUTPATIENT IMMUNOSUPPRESSION PROGRESS NOTE    Date of Visit: 10/11/2024    Transplants:  9/28/2024 (Liver); Postoperative day:  13  ASSESMENT AND PLAN:  1.Graft Function:LFTs stable from 10/10/24 labs  2.Immunosuppression Management:No changes today.   3.Hypertension:stable. Ensure good hydration.   4.Renal Function:Cr wnl   5.Lab frequency:as scheduled twice a week   6.Other:   Reviewed case with Dr. Colon, monitor drainage for now. Reassured patient and provided him with gauze and tape to keep area covered and dry. Report back changes in drainage color or increase of amount, F/C or increased pain.   Lymphedema: wearing wraps. Due for change - will inquire with coordinator to set up.     Date: October 11, 2024    Transplant:  [x]                             Liver [x]                              Kidney []                             Pancreas []                              Other:             Chief Complaint:No chief complaint on file.    History of Present Illness:  Jag Smith is a 39 year old male with recently diagnosed decompensated alcoholic liver cirrhosis transferred from [facility] on 9/21/24 for expedited liver transplant evaluation due to decompensated alcoholic liver cirrhosis. MELD 42. He is now s/p DDLT on 9/28/24 with Dr. Colon     C/O new incisional drainage noticed this AM. Small amount, serous. Drain in RLQ  was removed while inpatient and was sutured close. No issues with drain site.   No new abdominal pains. No N/V/D.   Urinating with no issues.   Appetite is ok. Drinking fluids.   No F/C.   Needs lymphedema clinic scheduled as outpatient.     Patient Active Problem List   Diagnosis    Alcohol use disorder, severe, in early remission (H)    Alcoholic hepatitis (H)    Epiphora due to insufficient drainage of left side    Hyperbilirubinemia    Hypokalemia    Jaundice    Pneumonia    Liver replaced by transplant (H)    DENI (acute kidney injury) (H)     Immunosuppressed status (H)    Acute post-operative pain    Steroid-induced hyperglycemia    Acute urinary retention    Anemia due to blood loss, acute    Severe malnutrition (H)    Hyponatremia    Hypomagnesemia    Bilateral lower extremity edema    Hyperkalemia    Subconjunctival hemorrhage     SOCIAL /FAMILY HISTORY: [x]                  No recent change    Past Medical History:   Diagnosis Date    Alcohol use disorder     Alcoholic hepatitis (H) 2024    Anxiety     Depression     Hypertension     Liver replaced by transplant (H) 2024     Past Surgical History:   Procedure Laterality Date    BENCH LIVER  2024    Procedure: Bench liver;  Surgeon: Fernando Colon MD;  Location: [facility] OR    DACRYOCYSTORHINOSTOMY Left 2013    INCISION AND DRAINAGE BUTTOCKS Left 2017    TRANSPLANT LIVER RECIPIENT  DONOR N/A 2024    Procedure: Transplant liver recipient  donor;  Surgeon: Fernando Colon MD;  Location: [facility] OR     Social History     Socioeconomic History    Marital status: Single     Spouse name: Not on file    Number of children: Not on file    Years of education: Not on file    Highest education level: Not on file   Occupational History    Not on file   Tobacco Use    Smoking status: Never    Smokeless tobacco: Never   Substance and Sexual Activity    Alcohol use: Not Currently     Comment: last drink 2024    Drug use: Never    Sexual activity: Not on file   Other Topics Concern    Not on file   Social History Narrative    Not on file     Social Determinants of Health     Financial Resource Strain: Low Risk  (2024)    Financial Resource Strain     Within the past 12 months, have you or your family members you live with been unable to get utilities (heat, electricity) when it was really needed?: No   Food Insecurity: Low Risk  (2024)    Food Insecurity     Within the past 12 months, did you worry that your food would run out before you got money to buy  more?: No     Within the past 12 months, did the food you bought just not last and you didn t have money to get more?: No   Transportation Needs: Low Risk  (9/21/2024)    Transportation Needs     Within the past 12 months, has lack of transportation kept you from medical appointments, getting your medicines, non-medical meetings or appointments, work, or from getting things that you need?: No   Physical Activity: Not on file   Stress: Not on file   Social Connections: Not on file   Interpersonal Safety: Low Risk  (9/28/2024)    Interpersonal Safety     Do you feel physically and emotionally safe where you currently live?: Yes     Within the past 12 months, have you been hit, slapped, kicked or otherwise physically hurt by someone?: No     Within the past 12 months, have you been humiliated or emotionally abused in other ways by your partner or ex-partner?: No   Recent Concern: Interpersonal Safety - High Risk (9/22/2024)    Interpersonal Safety     Do you feel physically and emotionally safe where you currently live?: No     Within the past 12 months, have you been hit, slapped, kicked or otherwise physically hurt by someone?: No     Within the past 12 months, have you been humiliated or emotionally abused in other ways by your partner or ex-partner?: No   Housing Stability: Low Risk  (9/21/2024)    Housing Stability     Do you have housing? : Yes     Are you worried about losing your housing?: No     Prescription Medications as of 10/11/2024         Rx Number Disp Refills Start End Last Dispensed Date Next Fill Date Owning Pharmacy    acetaminophen (TYLENOL) 325 MG tablet  60 tablet 0 10/9/2024 --   [place], [place] - [address] [facility]    Sig: Take 2 tablets (650 mg) by mouth every 4 hours as needed for mild pain or fever.    Class: E-Prescribe    Route: Oral    aspirin (ASA) 81 MG chewable tablet  30 tablet 1 10/11/2024 --   [place], [place] -  [address] [place] [address] [address]    Sig: Take 1 tablet (81 mg) by mouth or Feeding Tube daily.    Class: E-Prescribe    Route: Oral or Feeding Tube    carboxymethylcellulose PF (REFRESH PLUS) 0.5 % ophthalmic solution  1 each 0 10/9/2024 --   [address]    Sig: Place 1 drop into both eyes 6 times daily.    Class: E-Prescribe    Route: Both Eyes    furosemide (LASIX) 20 MG tablet  7 tablet 0 10/9/2024 --   [address]    Sig: Take 1 tablet (20 mg) by mouth daily.    Class: E-Prescribe    Route: Oral    hydrOXYzine HCl (ATARAX) 25 MG tablet  20 tablet 0 10/9/2024 --   [address]    Sig: Take 1 tablet (25 mg) by mouth every 6 hours as needed for other or anxiety (adjuvant pain).    Class: E-Prescribe    Route: Oral    Lidocaine (LIDOCARE) 4 % Patch  20 patch 0 10/10/2024 --   [address]    Sig: Place 2 patches over 12 hours onto the skin every 24 hours. To prevent lidocaine toxicity, patient should be patch free for 12 hrs daily. Apply near incision, not on, can apply 1 patch to back as well.    Class: E-Prescribe    Route: Transdermal    magnesium oxide (MAG-OX) 400 MG tablet  120 tablet 1 10/11/2024 --   [address]    Sig: Take 2 tablets (800 mg) by mouth 2 times daily.    Class: E-Prescribe    Route: Oral    methocarbamol 1000 MG TABS  20 tablet 0 10/9/2024 --   [address]    Sig: Take 1,000 mg by mouth every 6 hours as needed for muscle spasms.    Class: E-Prescribe    Route: Oral    multivitamin w/minerals (CERTAVITE/ANTIOXIDANTS) tablet  30 tablet 1 10/11/2024 --   [address]  [address]    Sig: Take 1 tablet by mouth daily.    Class: E-Prescribe    Route: Oral    mycophenolate (GENERIC EQUIVALENT) 250 MG capsule  180 capsule 1 10/11/2024 --   [address]    Sig: Take 3 capsules (750 mg) by mouth 2 times daily.    Class: E-Prescribe    Notes to Pharmacy: TXP DT 9/28/2024 (Liver) TXP Dischg DT 10/9/2024 DX Liver replaced by transplant Z94.4 [facility] ([place], [place])    Route: Oral    omeprazole (PRILOSEC) 20 MG DR capsule  30 capsule 2 10/11/2024 --   [address]    Sig: Take 1 capsule (20 mg) by mouth daily.    Class: E-Prescribe    Route: Oral    oxyCODONE (ROXICODONE) 5 MG tablet  5 tablet 0 10/9/2024 --   [address]    Sig: Take 0.5-1 tablets (2.5-5 mg) by mouth every 8 hours as needed for moderate pain or severe pain.    Class: E-Prescribe    Earliest Fill Date: 10/9/2024    Route: Oral    polyethylene glycol (MIRALAX) 17 GM/Dose powder  510 g 0 10/9/2024 --   [place], [place] - [address] [place] [address]    Sig: Take 17 g by mouth 2 times daily as needed for constipation.    Class: E-Prescribe    Route: Oral    predniSONE (DELTASONE) 5 MG tablet  30 tablet 1 10/11/2024 --   [address]    Sig: Take 1 tablet (5 mg) by mouth daily.    Class: E-Prescribe    Notes to Pharmacy: TXP DT 9/28/2024 (Liver) TXP Dischg DT 10/9/2024 DX Liver replaced by transplant Z94.4 [facility] ([place], [place])    Route: Oral    sennosides (SENOKOT) 8.6 MG tablet  60 tablet 0 10/9/2024 --   [address]    Sig: Take 2 tablets by mouth 2 times daily as needed for  constipation.    Class: E-Prescribe    Route: Oral    tacrolimus (GENERIC EQUIVALENT) 0.5 MG capsule  60 capsule 1 10/11/2024 --   [place], [place] - [address] [place] [address] [address]    Sig: Take 1 capsule (0.5 mg) by mouth 2 times daily. Total dose 3.5 mg BID    Class: E-Prescribe    Notes to Pharmacy: TXP DT 9/28/2024 (Liver) TXP Dischg DT 10/9/2024 DX Liver replaced by transplant Z94.4 [facility] ([place], [place])    Route: Oral    tacrolimus (GENERIC EQUIVALENT) 1 MG capsule  180 capsule 1 10/11/2024 --   [phone]    Sig: Take 3 capsules (3 mg) by mouth 2 times daily. Total dose 3.5 mg BID    Class: E-Prescribe    Notes to Pharmacy: TXP DT 9/28/2024 (Liver) TXP Dischg DT 10/9/2024 DX Liver replaced by transplant Z94.4 [facility] ([place], [place])    Route: Oral    valGANciclovir (VALCYTE) 450 MG tablet  30 tablet 1 10/11/2024 --   [phone]    Sig: Take 1 tablet (450 mg) by mouth daily.    Class: E-Prescribe    Route: Oral          Azithromycin   REVIEW OF SYSTEMS (check box if normal)  [x]               GENERAL  [x]                 PULMONARY [x]                GENITOURINARY  [x]                CNS                 [x]                 CARDIAC  [x]                 ENDOCRINE  [x]                EARS,NOSE,THROAT [x]                 GASTROINTESTINAL [x]                 NEUROLOGIC    [x]                MUSCLOSKELTAL  [x]                  HEMATOLOGY      PHYSICAL EXAM (check box if normal)/77   Pulse 79   Wt 103.4 kg (228 lb)   SpO2 98%   BMI 30.92 kg/m          [x]            GENERAL:    [x]       EYES:  ICTERIC   []        YES  []                    NO  [x]           EXTREMITIES: Clubbing []       Y     [x]           N    [x]            EARS, NOSE, THROAT: Membranes Moist    YES   [x]                   NO []                  [x]           LUNGS:  CLEAR    YES       [x]                  NO    []                                [x]           SKIN: Jaundice           YES       []                  NO    [x]                   Rash: YES       []                  NO    [x]                                     [x]             HEART: Regular Rate          YES       [x]                  NO    []                   Incision Clean:  YES       [x]                  NO    []                                [x]                    ABDOMEN: Organomegaly YES       []                  NO    [x]                       [x]                    NEUROLOGICAL:  Nonfocal  YES       [x]                  NO    []                       [x]                    Hernia YES       []                  NO    [x]                   PSYCHIATRIC:  Appropriate  YES       [x]                  NO    []                       OTHER: Bilat legs wrapped.   Upper abdominal incision intact with staples, small amount of serous drainage noted mid on R side incision. No surrounding erythema.   RLQ x2 sutured area CDI - no erythema or drainage.   Abd soft throughout. Non-tender. No rebound or rigidity.                                                                                                   PAIN SCALE:: 4

## 2024-10-14 NOTE — PROGRESS NOTES
Lymphedema consult ordered. Appt scheduled on 10/16. Message sent to OT for recommendations on lymphedema wraps.

## 2024-10-15 ENCOUNTER — TELEPHONE (OUTPATIENT)
Dept: LAB | Facility: CLINIC | Age: 39
End: 2024-10-15

## 2024-10-15 ENCOUNTER — OFFICE VISIT (OUTPATIENT)
Dept: TRANSPLANT | Facility: CLINIC | Age: 39
End: 2024-10-15
Attending: TRANSPLANT SURGERY
Payer: MEDICAID

## 2024-10-15 ENCOUNTER — LAB (OUTPATIENT)
Dept: LAB | Facility: CLINIC | Age: 39
End: 2024-10-15
Attending: TRANSPLANT SURGERY
Payer: MEDICAID

## 2024-10-15 VITALS
HEART RATE: 84 BPM | OXYGEN SATURATION: 100 % | WEIGHT: 219.6 LBS | TEMPERATURE: 98 F | SYSTOLIC BLOOD PRESSURE: 145 MMHG | BODY MASS INDEX: 29.78 KG/M2 | DIASTOLIC BLOOD PRESSURE: 89 MMHG

## 2024-10-15 DIAGNOSIS — Z94.4 LIVER REPLACED BY TRANSPLANT (H): ICD-10-CM

## 2024-10-15 LAB
ALBUMIN SERPL BCG-MCNC: 3.6 G/DL (ref 3.5–5.2)
ALP SERPL-CCNC: 188 U/L (ref 40–150)
ALT SERPL W P-5'-P-CCNC: 36 U/L (ref 0–70)
ANION GAP SERPL CALCULATED.3IONS-SCNC: 12 MMOL/L (ref 7–15)
AST SERPL W P-5'-P-CCNC: 21 U/L (ref 0–45)
BILIRUB DIRECT SERPL-MCNC: 1.5 MG/DL (ref 0–0.3)
BILIRUB SERPL-MCNC: 2.4 MG/DL
BUN SERPL-MCNC: 13 MG/DL (ref 6–20)
CALCIUM SERPL-MCNC: 9 MG/DL (ref 8.8–10.4)
CHLORIDE SERPL-SCNC: 100 MMOL/L (ref 98–107)
CREAT SERPL-MCNC: 0.77 MG/DL (ref 0.67–1.17)
EGFRCR SERPLBLD CKD-EPI 2021: >90 ML/MIN/1.73M2
ERYTHROCYTE [DISTWIDTH] IN BLOOD BY AUTOMATED COUNT: 18.1 % (ref 10–15)
GLUCOSE SERPL-MCNC: 127 MG/DL (ref 70–99)
HCO3 SERPL-SCNC: 21 MMOL/L (ref 22–29)
HCT VFR BLD AUTO: 28.8 % (ref 40–53)
HGB BLD-MCNC: 9.6 G/DL (ref 13.3–17.7)
MAGNESIUM SERPL-MCNC: 1.2 MG/DL (ref 1.7–2.3)
MCH RBC QN AUTO: 31 PG (ref 26.5–33)
MCHC RBC AUTO-ENTMCNC: 33.3 G/DL (ref 31.5–36.5)
MCV RBC AUTO: 93 FL (ref 78–100)
PHOSPHATE SERPL-MCNC: 4.6 MG/DL (ref 2.5–4.5)
PLATELET # BLD AUTO: 296 10E3/UL (ref 150–450)
POTASSIUM SERPL-SCNC: 4.4 MMOL/L (ref 3.4–5.3)
PROT SERPL-MCNC: 6.6 G/DL (ref 6.4–8.3)
RBC # BLD AUTO: 3.1 10E6/UL (ref 4.4–5.9)
SODIUM SERPL-SCNC: 133 MMOL/L (ref 135–145)
TACROLIMUS BLD-MCNC: 5.5 UG/L (ref 5–15)
TME LAST DOSE: NORMAL H
TME LAST DOSE: NORMAL H
WBC # BLD AUTO: 5.9 10E3/UL (ref 4–11)

## 2024-10-15 PROCEDURE — 80053 COMPREHEN METABOLIC PANEL: CPT | Performed by: PATHOLOGY

## 2024-10-15 PROCEDURE — 84100 ASSAY OF PHOSPHORUS: CPT | Performed by: PATHOLOGY

## 2024-10-15 PROCEDURE — 99000 SPECIMEN HANDLING OFFICE-LAB: CPT | Performed by: PATHOLOGY

## 2024-10-15 PROCEDURE — 85027 COMPLETE CBC AUTOMATED: CPT | Performed by: PATHOLOGY

## 2024-10-15 PROCEDURE — 36415 COLL VENOUS BLD VENIPUNCTURE: CPT | Performed by: PATHOLOGY

## 2024-10-15 PROCEDURE — 82248 BILIRUBIN DIRECT: CPT | Performed by: PATHOLOGY

## 2024-10-15 PROCEDURE — 80197 ASSAY OF TACROLIMUS: CPT | Performed by: TRANSPLANT SURGERY

## 2024-10-15 PROCEDURE — 83735 ASSAY OF MAGNESIUM: CPT | Performed by: PATHOLOGY

## 2024-10-15 PROCEDURE — 99213 OFFICE O/P EST LOW 20 MIN: CPT | Mod: 24 | Performed by: NURSE PRACTITIONER

## 2024-10-15 PROCEDURE — G0463 HOSPITAL OUTPT CLINIC VISIT: HCPCS | Performed by: NURSE PRACTITIONER

## 2024-10-15 RX ORDER — TACROLIMUS 1 MG/1
4 CAPSULE ORAL EVERY 12 HOURS
Qty: 240 CAPSULE | Refills: 1 | Status: SHIPPED | OUTPATIENT
Start: 2024-10-15 | End: 2024-10-24

## 2024-10-15 RX ORDER — TACROLIMUS 0.5 MG/1
0.5 CAPSULE ORAL 2 TIMES DAILY
Qty: 60 CAPSULE | Refills: 1 | Status: SHIPPED | OUTPATIENT
Start: 2024-10-15 | End: 2024-10-24

## 2024-10-15 ASSESSMENT — PAIN SCALES - GENERAL: PAINLEVEL: SEVERE PAIN (6)

## 2024-10-15 NOTE — LETTER
10/15/2024      Jag Smith  [address]  [facility] [address]      Dear Colleague,    Thank you for referring your patient, Jag Smith, to the [facility]PL[facility]. Please see a copy of my visit note below.    Transplant Surgery -OUTPATIENT IMMUNOSUPPRESSION PROGRESS NOTE    Date of Visit: 10/15/2024    Transplants:  9/28/2024 (Liver); Postoperative day:  17  ASSESMENT AND PLAN:  1.Graft Function: Alk phos up slightly. Trend for now.   2.Immunosuppression Management: No changes.  Continue tac, MMF, prednisone 5mg.   3.Hypertension:stable   4.Renal Function:Cr stable   5.Lab frequency:twice weekly   6.Remove staples next week.   Hypomagnesemia: continue mag ox 800mg BID. High mag foods. Trend for now.   Soft stools: increase fiber in diet or fiber supplement. Report back worsening watery stools.     Case reviewed with Dr. Waters.   Marycruz Alcala, FARRAH     Date: October 15, 2024    Transplant:  [x]                             Liver [x]                              Kidney []                             Pancreas []                              Other:             Chief Complaint:Post-op Visit (2 week f/up)    History of Present Illness:  Jag Smith is a 39 year old male with recently diagnosed decompensated alcoholic liver cirrhosis transferred from [facility] on 9/21/24 for expedited liver transplant evaluation due to decompensated alcoholic liver cirrhosis. MELD 42. He is now s/p DDLT on 9/28/24 with Dr. Colon.   Doing well. No major complaints.   Seeing lymphedema clinic tomorrow. Leg edema mostly resolved today. Will shower today.   BPs at home: 137/87, 137/88, 145/89  No N/V.   Stools alternate between soft and watery x 2 days. About 3 stools per day.   Appetite is good, eating 3 meals per day. Drinking adequate fluids.   Incisional pain improving. No drainage over weekend.   On lasix until 10/17.     Patient Active Problem List   Diagnosis     Alcohol use disorder,  severe, in early remission (H)     Alcoholic hepatitis (H)     Epiphora due to insufficient drainage of left side     Hyperbilirubinemia     Hypokalemia     Jaundice     Pneumonia     Liver replaced by transplant (H)     DENI (acute kidney injury) (H)     Immunosuppressed status (H)     Acute post-operative pain     Steroid-induced hyperglycemia     Acute urinary retention     Anemia due to blood loss, acute     Severe malnutrition (H)     Hyponatremia     Hypomagnesemia     Bilateral lower extremity edema     Hyperkalemia     Subconjunctival hemorrhage     SOCIAL /FAMILY HISTORY: [x]                  No recent change    Past Medical History:   Diagnosis Date     Alcohol use disorder      Alcoholic hepatitis (H) 2024     Anxiety      Depression      Hypertension      Liver replaced by transplant (H) 2024     Past Surgical History:   Procedure Laterality Date     BENCH LIVER  2024    Procedure: Bench liver;  Surgeon: Fernando Colon MD;  Location: [facility] OR     DACRYOCYSTORHINOSTOMY Left 2013     INCISION AND DRAINAGE BUTTOCKS Left 2017     TRANSPLANT LIVER RECIPIENT  DONOR N/A 2024    Procedure: Transplant liver recipient  donor;  Surgeon: Fernando Colon MD;  Location: [facility] OR     Social History     Socioeconomic History     Marital status: Single     Spouse name: Not on file     Number of children: Not on file     Years of education: Not on file     Highest education level: Not on file   Occupational History     Not on file   Tobacco Use     Smoking status: Never     Smokeless tobacco: Never   Substance and Sexual Activity     Alcohol use: Not Currently     Comment: last drink 2024     Drug use: Never     Sexual activity: Not on file   Other Topics Concern     Not on file   Social History Narrative     Not on file     Social Determinants of Health     Financial Resource Strain: Low Risk  (2024)    Financial Resource Strain      Within the past 12 months, have  you or your family members you live with been unable to get utilities (heat, electricity) when it was really needed?: No   Food Insecurity: Low Risk  (9/21/2024)    Food Insecurity      Within the past 12 months, did you worry that your food would run out before you got money to buy more?: No      Within the past 12 months, did the food you bought just not last and you didn t have money to get more?: No   Transportation Needs: Low Risk  (9/21/2024)    Transportation Needs      Within the past 12 months, has lack of transportation kept you from medical appointments, getting your medicines, non-medical meetings or appointments, work, or from getting things that you need?: No   Physical Activity: Not on file   Stress: Not on file   Social Connections: Not on file   Interpersonal Safety: Low Risk  (9/28/2024)    Interpersonal Safety      Do you feel physically and emotionally safe where you currently live?: Yes      Within the past 12 months, have you been hit, slapped, kicked or otherwise physically hurt by someone?: No      Within the past 12 months, have you been humiliated or emotionally abused in other ways by your partner or ex-partner?: No   Recent Concern: Interpersonal Safety - High Risk (9/22/2024)    Interpersonal Safety      Do you feel physically and emotionally safe where you currently live?: No      Within the past 12 months, have you been hit, slapped, kicked or otherwise physically hurt by someone?: No      Within the past 12 months, have you been humiliated or emotionally abused in other ways by your partner or ex-partner?: No   Housing Stability: Low Risk  (9/21/2024)    Housing Stability      Do you have housing? : Yes      Are you worried about losing your housing?: No     Prescription Medications as of 10/15/2024         Rx Number Disp Refills Start End Last Dispensed Date Next Fill Date Owning Pharmacy    acetaminophen (TYLENOL) 325 MG tablet  60 tablet 0 10/9/2024 --   [facility]  [address]    Sig: Take 2 tablets (650 mg) by mouth every 4 hours as needed for mild pain or fever.    Class: E-Prescribe    Route: Oral    aspirin (ASA) 81 MG chewable tablet  30 tablet 1 10/11/2024 --   [address]    Sig: Take 1 tablet (81 mg) by mouth or Feeding Tube daily.    Class: E-Prescribe    Route: Oral or Feeding Tube    carboxymethylcellulose PF (REFRESH PLUS) 0.5 % ophthalmic solution  1 each 0 10/9/2024 --   [address]    Sig: Place 1 drop into both eyes 6 times daily.    Class: E-Prescribe    Route: Both Eyes    furosemide (LASIX) 20 MG tablet  7 tablet 0 10/9/2024 --   [address]    Sig: Take 1 tablet (20 mg) by mouth daily.    Class: E-Prescribe    Route: Oral    hydrOXYzine HCl (ATARAX) 25 MG tablet  20 tablet 0 10/9/2024 --   [address]    Sig: Take 1 tablet (25 mg) by mouth every 6 hours as needed for other or anxiety (adjuvant pain).    Class: E-Prescribe    Route: Oral    Lidocaine (LIDOCARE) 4 % Patch  20 patch 0 10/10/2024 --   [address]    Sig: Place 2 patches over 12 hours onto the skin every 24 hours. To prevent lidocaine toxicity, patient should be patch free for 12 hrs daily. Apply near incision, not on, can apply 1 patch to back as well.    Class: E-Prescribe    Route: Transdermal    magnesium oxide (MAG-OX) 400 MG tablet  120 tablet 1 10/11/2024 --   [address]    Sig: Take 2 tablets (800 mg) by mouth 2 times daily.    Class: E-Prescribe    Route: Oral    methocarbamol 1000 MG TABS  20 tablet 0 10/9/2024 --   [place], [place]  - [address] [facility]    Sig: Take 1,000 mg by mouth every 6 hours as needed for muscle spasms.    Class: E-Prescribe    Route: Oral    multivitamin w/minerals (CERTAVITE/ANTIOXIDANTS) tablet  30 tablet 1 10/11/2024 --   [address]    Sig: Take 1 tablet by mouth daily.    Class: E-Prescribe    Route: Oral    mycophenolate (GENERIC EQUIVALENT) 250 MG capsule  180 capsule 1 10/11/2024 --   [address]    Sig: Take 3 capsules (750 mg) by mouth 2 times daily.    Class: E-Prescribe    Notes to Pharmacy: TXP DT 9/28/2024 (Liver) TXP Dischg DT 10/9/2024 DX Liver replaced by transplant Z94.4 TX [facility] ([place], [place])    Route: Oral    omeprazole (PRILOSEC) 20 MG DR capsule  30 capsule 2 10/11/2024 --   [address]    Sig: Take 1 capsule (20 mg) by mouth daily.    Class: E-Prescribe    Route: Oral    oxyCODONE (ROXICODONE) 5 MG tablet  5 tablet 0 10/9/2024 --   [address]    Sig: Take 0.5-1 tablets (2.5-5 mg) by mouth every 8 hours as needed for moderate pain or severe pain.    Class: E-Prescribe    Earliest Fill Date: 10/9/2024    Route: Oral    polyethylene glycol (MIRALAX) 17 GM/Dose powder  510 g 0 10/9/2024 --   [place], [place] - [address] [place] [address]    Sig: Take 17 g by mouth 2 times daily as needed for constipation.    Class: E-Prescribe    Route: Oral    predniSONE (DELTASONE) 5 MG tablet  30 tablet 1 10/11/2024 --   [address]    Sig: Take 1 tablet (5 mg) by mouth daily.    Class: E-Prescribe    Notes to Pharmacy: TXP DT 9/28/2024 (Liver) TXP Dischg DT 10/9/2024 DX Liver replaced by transplant  Z94.4 TX [facility] ([place], [place])    Route: Oral    sennosides (SENOKOT) 8.6 MG tablet  60 tablet 0 10/9/2024 --   [place], [place] - [address] [facility] [address]    Sig: Take 2 tablets by mouth 2 times daily as needed for constipation.    Class: E-Prescribe    Route: Oral    tacrolimus (GENERIC EQUIVALENT) 0.5 MG capsule  60 capsule 1 10/11/2024 --   [place], [place] - [address] [place] [address] [address]    Sig: Take 1 capsule (0.5 mg) by mouth 2 times daily. Total dose 3.5 mg BID    Class: E-Prescribe    Notes to Pharmacy: TXP DT 9/28/2024 (Liver) TXP Dischg DT 10/9/2024 DX Liver replaced by transplant Z94.4 TX [facility] ([place], [place])    Route: Oral    tacrolimus (GENERIC EQUIVALENT) 1 MG capsule  180 capsule 1 10/11/2024 --   [place], [place] - [address] [place] [address] [address]    Sig: Take 3 capsules (3 mg) by mouth 2 times daily. Total dose 3.5 mg BID    Class: E-Prescribe    Notes to Pharmacy: TXP DT 9/28/2024 (Liver) TXP Dischg DT 10/9/2024 DX Liver replaced by transplant Z94.4 TX [facility] ([place], [place])    Route: Oral    valGANciclovir (VALCYTE) 450 MG tablet  30 tablet 1 10/11/2024 --   [place], [place] - [address] [place] [address] [address]    Sig: Take 1 tablet (450 mg) by mouth daily.    Class: E-Prescribe    Route: Oral          Azithromycin   REVIEW OF SYSTEMS (check box if normal)  [x]               GENERAL  [x]                 PULMONARY [x]                GENITOURINARY  [x]                CNS                 [x]                 CARDIAC  [x]                 ENDOCRINE  [x]                EARS,NOSE,THROAT [x]                 GASTROINTESTINAL [x]                 NEUROLOGIC    [x]                MUSCLOSKELTAL  [x]                   HEMATOLOGY      PHYSICAL EXAM (check box if normal)BP (!) 145/89   Pulse 84   Temp 98  F (36.7  C) (Oral)   Wt 99.6 kg (219 lb 9.6 oz)   SpO2 100%   BMI 29.78 kg/m          [x]            GENERAL:    [x]       EYES:  ICTERIC   []        YES  []                    NO  [x]           EXTREMITIES: Clubbing []       Y     [x]           N    [x]           EARS, NOSE, THROAT: Membranes Moist    YES   [x]                   NO []                  [x]           LUNGS:  CLEAR    YES       [x]                  NO    []                                [x]           SKIN: Jaundice           YES       []                  NO    [x]                   Rash: YES       []                  NO    [x]                                     [x]             HEART: Regular Rate          YES       [x]                  NO    []                   Incision Clean:  YES       [x]                  NO    []                                [x]                    ABDOMEN: Organomegaly YES       []                  NO    [x]                       [x]                    NEUROLOGICAL:  Nonfocal  YES       [x]                  NO    []                       [x]                    Hernia YES       []                  NO    [x]                   PSYCHIATRIC:  Appropriate  YES       [x]                  NO    []                       OTHER:    Upper abdomen incision CDI with staples. No erythema or drainage.                                                                                                  RLQ sutures intact. L drain site healing well.     PAIN SCALE:: 4       Again, thank you for allowing me to participate in the care of your patient.        Sincerely,        SIL Blum CNP

## 2024-10-15 NOTE — TELEPHONE ENCOUNTER
ISSUE:   Tacrolimus IR level 5.5 on 10/15/24, goal 8-10, dose 3.5 mg BID. Confirmed this was a 11-hour trough.     PLAN:   Call Patient and verify Tacrolimus IR dose 3.5 mg BID.   Confirm no new medications or illness.   Confirm no missed doses.     If accurate dose, increase Tacrolimus IR dose to 4.5 mg BID.    Repeat labs on Thursday.    Karena Villegas RN     OUTCOME:   Spoke with Patient, they confirm accurate trough level and current dose 3.5 mg BID.   Patient confirmed dose change to 4.5 mg BID.  Patient agreed to repeat labs in 2 days.   Orders sent to preferred pharmacy for dose change and lab for repeat labs.   Patient voiced understanding of plan.     Discuss with pt about having an accurate tacro drawn.     Magda Gant LPN

## 2024-10-15 NOTE — PROGRESS NOTES
Transplant Surgery -OUTPATIENT IMMUNOSUPPRESSION PROGRESS NOTE    Date of Visit: 10/15/2024    Transplants:  9/28/2024 (Liver); Postoperative day:  17  ASSESMENT AND PLAN:  1.Graft Function: Alk phos up slightly. Trend for now.   2.Immunosuppression Management: No changes.  Continue tac, MMF, prednisone 5mg.   3.Hypertension:stable   4.Renal Function:Cr stable   5.Lab frequency:twice weekly   6.Remove staples next week.   Hypomagnesemia: continue mag ox 800mg BID. High mag foods. Trend for now.   Soft stools: increase fiber in diet or fiber supplement. Report back worsening watery stools.     Case reviewed with Dr. Waters.   Marycruz Alcala, NP     Date: October 15, 2024    Transplant:  [x]                             Liver [x]                              Kidney []                             Pancreas []                              Other:             Chief Complaint:Post-op Visit (2 week f/up)    History of Present Illness:  Jag Smith is a 39 year old male with recently diagnosed decompensated alcoholic liver cirrhosis transferred from [facility] on 9/21/24 for expedited liver transplant evaluation due to decompensated alcoholic liver cirrhosis. MELD 42. He is now s/p DDLT on 9/28/24 with Dr. Colon.   Doing well. No major complaints.   Seeing lymphedema clinic tomorrow. Leg edema mostly resolved today. Will shower today.   BPs at home: 137/87, 137/88, 145/89  No N/V.   Stools alternate between soft and watery x 2 days. About 3 stools per day.   Appetite is good, eating 3 meals per day. Drinking adequate fluids.   Incisional pain improving. No drainage over weekend.   On lasix until 10/17.     Patient Active Problem List   Diagnosis    Alcohol use disorder, severe, in early remission (H)    Alcoholic hepatitis (H)    Epiphora due to insufficient drainage of left side    Hyperbilirubinemia    Hypokalemia    Jaundice    Pneumonia    Liver replaced by transplant (H)    DENI (acute kidney  injury) (H)    Immunosuppressed status (H)    Acute post-operative pain    Steroid-induced hyperglycemia    Acute urinary retention    Anemia due to blood loss, acute    Severe malnutrition (H)    Hyponatremia    Hypomagnesemia    Bilateral lower extremity edema    Hyperkalemia    Subconjunctival hemorrhage     SOCIAL /FAMILY HISTORY: [x]                  No recent change    Past Medical History:   Diagnosis Date    Alcohol use disorder     Alcoholic hepatitis (H) 2024    Anxiety     Depression     Hypertension     Liver replaced by transplant (H) 2024     Past Surgical History:   Procedure Laterality Date    BENCH LIVER  2024    Procedure: Bench liver;  Surgeon: Fernando Colon MD;  Location: [facility] OR    DACRYOCYSTORHINOSTOMY Left 2013    INCISION AND DRAINAGE BUTTOCKS Left 2017    TRANSPLANT LIVER RECIPIENT  DONOR N/A 2024    Procedure: Transplant liver recipient  donor;  Surgeon: Fernando Colon MD;  Location: [facility] OR     Social History     Socioeconomic History    Marital status: Single     Spouse name: Not on file    Number of children: Not on file    Years of education: Not on file    Highest education level: Not on file   Occupational History    Not on file   Tobacco Use    Smoking status: Never    Smokeless tobacco: Never   Substance and Sexual Activity    Alcohol use: Not Currently     Comment: last drink 2024    Drug use: Never    Sexual activity: Not on file   Other Topics Concern    Not on file   Social History Narrative    Not on file     Social Determinants of Health     Financial Resource Strain: Low Risk  (2024)    Financial Resource Strain     Within the past 12 months, have you or your family members you live with been unable to get utilities (heat, electricity) when it was really needed?: No   Food Insecurity: Low Risk  (2024)    Food Insecurity     Within the past 12 months, did you worry that your food would run out before you got  money to buy more?: No     Within the past 12 months, did the food you bought just not last and you didn t have money to get more?: No   Transportation Needs: Low Risk  (9/21/2024)    Transportation Needs     Within the past 12 months, has lack of transportation kept you from medical appointments, getting your medicines, non-medical meetings or appointments, work, or from getting things that you need?: No   Physical Activity: Not on file   Stress: Not on file   Social Connections: Not on file   Interpersonal Safety: Low Risk  (9/28/2024)    Interpersonal Safety     Do you feel physically and emotionally safe where you currently live?: Yes     Within the past 12 months, have you been hit, slapped, kicked or otherwise physically hurt by someone?: No     Within the past 12 months, have you been humiliated or emotionally abused in other ways by your partner or ex-partner?: No   Recent Concern: Interpersonal Safety - High Risk (9/22/2024)    Interpersonal Safety     Do you feel physically and emotionally safe where you currently live?: No     Within the past 12 months, have you been hit, slapped, kicked or otherwise physically hurt by someone?: No     Within the past 12 months, have you been humiliated or emotionally abused in other ways by your partner or ex-partner?: No   Housing Stability: Low Risk  (9/21/2024)    Housing Stability     Do you have housing? : Yes     Are you worried about losing your housing?: No     Prescription Medications as of 10/15/2024         Rx Number Disp Refills Start End Last Dispensed Date Next Fill Date Owning Pharmacy    acetaminophen (TYLENOL) 325 MG tablet  60 tablet 0 10/9/2024 --   [place], [place] - [address] [facility]    Sig: Take 2 tablets (650 mg) by mouth every 4 hours as needed for mild pain or fever.    Class: E-Prescribe    Route: Oral    aspirin (ASA) 81 MG chewable tablet  30 tablet 1 10/11/2024 --   [facility]  [address]    Sig: Take 1 tablet (81 mg) by mouth or Feeding Tube daily.    Class: E-Prescribe    Route: Oral or Feeding Tube    carboxymethylcellulose PF (REFRESH PLUS) 0.5 % ophthalmic solution  1 each 0 10/9/2024 --   [address]    Sig: Place 1 drop into both eyes 6 times daily.    Class: E-Prescribe    Route: Both Eyes    furosemide (LASIX) 20 MG tablet  7 tablet 0 10/9/2024 --   [address]    Sig: Take 1 tablet (20 mg) by mouth daily.    Class: E-Prescribe    Route: Oral    hydrOXYzine HCl (ATARAX) 25 MG tablet  20 tablet 0 10/9/2024 --   [address]    Sig: Take 1 tablet (25 mg) by mouth every 6 hours as needed for other or anxiety (adjuvant pain).    Class: E-Prescribe    Route: Oral    Lidocaine (LIDOCARE) 4 % Patch  20 patch 0 10/10/2024 --   [address]    Sig: Place 2 patches over 12 hours onto the skin every 24 hours. To prevent lidocaine toxicity, patient should be patch free for 12 hrs daily. Apply near incision, not on, can apply 1 patch to back as well.    Class: E-Prescribe    Route: Transdermal    magnesium oxide (MAG-OX) 400 MG tablet  120 tablet 1 10/11/2024 --   [address]    Sig: Take 2 tablets (800 mg) by mouth 2 times daily.    Class: E-Prescribe    Route: Oral    methocarbamol 1000 MG TABS  20 tablet 0 10/9/2024 --   [address]    Sig: Take 1,000 mg by mouth every 6 hours as needed for muscle spasms.    Class: E-Prescribe    Route: Oral    multivitamin w/minerals (CERTAVITE/ANTIOXIDANTS) tablet  30 tablet 1 10/11/2024 --   [place] [address]  [facility] [phone]    Sig: Take 1 tablet by mouth daily.    Class: E-Prescribe    Route: Oral    mycophenolate (GENERIC EQUIVALENT) 250 MG capsule  180 capsule 1 10/11/2024 --   [address]    Sig: Take 3 capsules (750 mg) by mouth 2 times daily.    Class: E-Prescribe    Notes to Pharmacy: TXP DT 9/28/2024 (Liver) TXP Dischg DT 10/9/2024 DX Liver replaced by transplant Z94.4 [facility] ([place], [place])    Route: Oral    omeprazole (PRILOSEC) 20 MG DR capsule  30 capsule 2 10/11/2024 --   [address]    Sig: Take 1 capsule (20 mg) by mouth daily.    Class: E-Prescribe    Route: Oral    oxyCODONE (ROXICODONE) 5 MG tablet  5 tablet 0 10/9/2024 --   [address]    Sig: Take 0.5-1 tablets (2.5-5 mg) by mouth every 8 hours as needed for moderate pain or severe pain.    Class: E-Prescribe    Earliest Fill Date: 10/9/2024    Route: Oral    polyethylene glycol (MIRALAX) 17 GM/Dose powder  510 g 0 10/9/2024 --   [address]    Sig: Take 17 g by mouth 2 times daily as needed for constipation.    Class: E-Prescribe    Route: Oral    predniSONE (DELTASONE) 5 MG tablet  30 tablet 1 10/11/2024 --   [address]    Sig: Take 1 tablet (5 mg) by mouth daily.    Class: E-Prescribe    Notes to Pharmacy: TXP DT 9/28/2024 (Liver) TXP Dischg DT 10/9/2024 DX Liver replaced by transplant Z94.4 [facility] ([place], [place])    Route: Oral    sennosides (SENOKOT) 8.6 MG tablet  60 tablet 0 10/9/2024 --   [address]    Sig: Take 2 tablets by mouth 2 times daily  as needed for constipation.    Class: E-Prescribe    Route: Oral    tacrolimus (GENERIC EQUIVALENT) 0.5 MG capsule  60 capsule 1 10/11/2024 --   [place], [place] - [address] [place] [address] [phone]    Sig: Take 1 capsule (0.5 mg) by mouth 2 times daily. Total dose 3.5 mg BID    Class: E-Prescribe    Notes to Pharmacy: TXP DT 9/28/2024 (Liver) TXP Dischg DT 10/9/2024 DX Liver replaced by transplant Z94.4 [facility] ([place], [place])    Route: Oral    tacrolimus (GENERIC EQUIVALENT) 1 MG capsule  180 capsule 1 10/11/2024 --   [address]    Sig: Take 3 capsules (3 mg) by mouth 2 times daily. Total dose 3.5 mg BID    Class: E-Prescribe    Notes to Pharmacy: TXP DT 9/28/2024 (Liver) TXP Dischg DT 10/9/2024 DX Liver replaced by transplant Z94.4 [facility] ([place], [place])    Route: Oral    valGANciclovir (VALCYTE) 450 MG tablet  30 tablet 1 10/11/2024 --   [address]    Sig: Take 1 tablet (450 mg) by mouth daily.    Class: E-Prescribe    Route: Oral          Azithromycin   REVIEW OF SYSTEMS (check box if normal)  [x]               GENERAL  [x]                 PULMONARY [x]                GENITOURINARY  [x]                CNS                 [x]                 CARDIAC  [x]                 ENDOCRINE  [x]                EARS,NOSE,THROAT [x]                 GASTROINTESTINAL [x]                 NEUROLOGIC    [x]                MUSCLOSKELTAL  [x]                  HEMATOLOGY      PHYSICAL EXAM (check box if normal)BP (!) 145/89   Pulse 84   Temp 98  F (36.7  C) (Oral)   Wt 99.6 kg (219 lb 9.6 oz)   SpO2 100%   BMI 29.78 kg/m          [x]            GENERAL:    [x]       EYES:  ICTERIC   []        YES  []                    NO  [x]           EXTREMITIES:  Clubbing []       Y     [x]           N    [x]           EARS, NOSE, THROAT: Membranes Moist    YES   [x]                   NO []                  [x]           LUNGS:  CLEAR    YES       [x]                  NO    []                                [x]           SKIN: Jaundice           YES       []                  NO    [x]                   Rash: YES       []                  NO    [x]                                     [x]             HEART: Regular Rate          YES       [x]                  NO    []                   Incision Clean:  YES       [x]                  NO    []                                [x]                    ABDOMEN: Organomegaly YES       []                  NO    [x]                       [x]                    NEUROLOGICAL:  Nonfocal  YES       [x]                  NO    []                       [x]                    Hernia YES       []                  NO    [x]                   PSYCHIATRIC:  Appropriate  YES       [x]                  NO    []                       OTHER:    Upper abdomen incision CDI with staples. No erythema or drainage.                                                                                                  RLQ sutures intact. L drain site healing well.     PAIN SCALE:: 4

## 2024-10-16 ENCOUNTER — VIRTUAL VISIT (OUTPATIENT)
Dept: TRANSPLANT | Facility: CLINIC | Age: 39
End: 2024-10-16
Attending: TRANSPLANT SURGERY
Payer: MEDICAID

## 2024-10-16 ENCOUNTER — THERAPY VISIT (OUTPATIENT)
Dept: PHYSICAL THERAPY | Facility: CLINIC | Age: 39
End: 2024-10-16
Attending: TRANSPLANT SURGERY
Payer: MEDICAID

## 2024-10-16 DIAGNOSIS — Z94.4 LIVER REPLACED BY TRANSPLANT (H): ICD-10-CM

## 2024-10-16 DIAGNOSIS — R60.0 LOCALIZED EDEMA: Primary | ICD-10-CM

## 2024-10-16 PROCEDURE — 97110 THERAPEUTIC EXERCISES: CPT | Mod: GP | Performed by: PHYSICAL THERAPIST

## 2024-10-16 PROCEDURE — 97535 SELF CARE MNGMENT TRAINING: CPT | Mod: GP | Performed by: PHYSICAL THERAPIST

## 2024-10-16 PROCEDURE — 97161 PT EVAL LOW COMPLEX 20 MIN: CPT | Mod: GP | Performed by: PHYSICAL THERAPIST

## 2024-10-16 NOTE — LETTER
10/16/2024      Jag Smith  [address]  [facility] [address]      Dear Colleague,    Thank you for referring your patient, Jag Smith, to the [facility] TRANSPLANT [facility]. Please see a copy of my visit note below.    Transplant Social Work Services Progress Note        Collaborated with: Liver Transplant Team     Data: Jag is s/p  donor Liver Transplant and this writer contacted Jag and Sania via phone for a routine follow up.       Intervention/Education: I spoke with Jag and Sania and we reviewed the followin. Writing to the Donor Family process. He confirmed that he has the information        2. Liver Transplant Support Group and social events        3. Immunosuppression copays-no current concerns.  I reminded Jag and Sania to contact me if they have future concerns and we can evaluate for financial assistance programs, grants, etc. They did not report any surprises or concerns when picking up their medications.         4. Adjustment to illness - He reported that everyday is getting better and he is feeling stronger.         5. Importance of maintaining abstinence from all non-prescribed drugs and alcohol. He voiced understanding.     He is still agreeable to completing substance use treatment. It was recommended that he engages in an inpatient substance use treatment program. He is open to this. I will continue to support him in engagement in a treatment program. We did discuss potential barrier to inpatient treatment with his labs.     Assessment: Jag and Sania voiced understanding to the topics reviewed. They decline any concerns and continue to have adequate support.      Plan: I will remain available for the psychosocial needs of this patient.      ADA Palumbo, [facility]  Liver Transplant   [facility]  Phone: [phone]     Again, thank you for allowing me to participate in the care of your patient.        Sincerely,        Muriel GRAHAM  Ana María, WILDASW

## 2024-10-16 NOTE — PROGRESS NOTES
Transplant Social Work Services Progress Note        Collaborated with: Liver Transplant Team     Data: Jag is s/p  donor Liver Transplant and this writer contacted Jag and Sania via phone for a routine follow up.       Intervention/Education: I spoke with Jag and Sania and we reviewed the followin. Writing to the Donor Family process. He confirmed that he has the information        2. Liver Transplant Support Group and social events        3. Immunosuppression copays-no current concerns.  I reminded Jag and Sania to contact me if they have future concerns and we can evaluate for financial assistance programs, grants, etc. They did not report any surprises or concerns when picking up their medications.         4. Adjustment to illness - He reported that everyday is getting better and he is feeling stronger.         5. Importance of maintaining abstinence from all non-prescribed drugs and alcohol. He voiced understanding.     He is still agreeable to completing substance use treatment. It was recommended that he engages in an inpatient substance use treatment program. He is open to this. I will continue to support him in engagement in a treatment program. We did discuss potential barrier to inpatient treatment with his labs.     Assessment: Jag and Sania voiced understanding to the topics reviewed. They decline any concerns and continue to have adequate support.      Plan: I will remain available for the psychosocial needs of this patient.      ADA Palumbo, [facility]  Liver Transplant    [facility]  Phone: [phone]

## 2024-10-16 NOTE — PROGRESS NOTES
"PHYSICAL THERAPY EVALUATION  Type of Visit: Evaluation     Fall Risk Screen:  Fall screen completed by: PT  Have you fallen 2 or more times in the past year?: No  Have you fallen and had an injury in the past year?: No  Is patient a fall risk?: No    Subjective       Presenting condition or subjective complaint: follow up after hospital stay and leg wraps  Date of onset: 09/28/24    Relevant medical history:     Dates & types of surgery:      Prior diagnostic imaging/testing results: CT scan; X-ray     Prior therapy history for the same diagnosis, illness or injury:        Prior Level of Function  Transfers: Independent  Ambulation: Independent  ADL: Independent    Living Environment  Social support: With family members , Staying with mother on [place] currently  Type of home: House; Apartment/condo; 1 level; Basement   Stairs to enter the home: Yes       Ramp: No   Stairs inside the home: Yes   Is there a railing: Yes     Help at home: Self Cares (home health aide/personal care attendant, family, etc); Home management tasks (cooking, cleaning); Medication and/or finances; Assist for driving and community activities  Equipment owned:       Employment: No      Patient goals for therapy: normal movements and walking    Pain assessment: Pain present, Low back pain, some pain from incision     Objective       EDEMA EVALUATION  Additional history:  Body part affected by edema: legs were swollen  If cancer related, treatment:    If not cancer related, problems with veins or cause of swelling: liver transplant  Distance able to walk: half block  Time able to stand: 5 minutes  Sensation problems in hands/feet: Yes restless legs at night or bedtime  Edema etiology:  liver disease likely causing venous hypertension    FUNCTIONAL SCALES   FACIT-F: 19  LLIS: not issued on intake because pt answered \"no\" to do you have swelling    Cognitive Status Examination  Orientation: Oriented to person, place and time   Level of " "Consciousness: Alert  Follows Commands and Answers Questions: 100% of the time  Personal Safety and Judgement: Intact  Memory: Intact    EDEMA  Skin Condition: Dryness, Intact, Pt's swelling as resolved for most part. Swelling did not get worse by end of day yesterday without compression. No pitting but pt does have line from tight ankle sock.  Scar:  not observed  Capillary Refill: Symmetrical  Stemmer Sign: -  Ulceration: No    GIRTH MEASUREMENTS: Refer to separate girth measurement flowsheet for specific measurements.    VOLUME LE 10-50cm  Right LE Total Volume (mL): 3100.54  Left LE Total Volume (mL): 2895.61    RANGE OF MOTION: LE ROM WFL  STRENGTH:  Impaired, deconditioning. Pt with decreased core strength and leg strength noted with functional mobility  POSTURE:  Forward shoulders  PALPATION: no tenderness  ACTIVITIES OF DAILY LIVING: self care independence for the most part now  BED MOBILITY:  log rolling, slow and pain  TRANSFERS: SBA Sit to stand from 19\" chair without arms very challenging, with arms pt is at mod I  GAIT/LOCOMOTION: SBA, using walker for longer distance outside for low back pain and balance  BALANCE:  Today on formally assess  SENSATION:  no deficits  VASCULAR:  no concern  COORDINATION: Gross Motor Coordination: intact  MUSCLE TONE: WNL    6MWT: 222.1m    Assessment & Plan   CLINICAL IMPRESSIONS  Medical Diagnosis: Liver Replaced by Transplant    Treatment Diagnosis: Edema, Force production deficty, decreased activity tolerance   Impression/Assessment: Patient is a 39 year old male with mobility, walking and past edema complaints.  The following significant findings have been identified: Pain, Decreased strength, Impaired balance, Impaired gait, Impaired muscle performance, Decreased activity tolerance, and Impaired posture. These impairments interfere with their ability to perform self care tasks, work tasks, recreational activities, household chores, household mobility, community " mobility, and risk for worsening swelling (pt nervous about stopping Lasix this week)  as compared to previous level of function.     Clinical Decision Making (Complexity):  Clinical Presentation: Stable/Uncomplicated  Clinical Presentation Rationale: based on medical and personal factors listed in PT evaluation  Clinical Decision Making (Complexity): Low complexity    PLAN OF CARE  Treatment Interventions:  Interventions: Gait Training, Manual Therapy, Neuromuscular Re-education, Therapeutic Activity, Therapeutic Exercise, Self-Care/Home Management    Long Term Goals     PT Goal 1  Goal Identifier: Edema Management  Goal Description: Pt will report 3 things that he can do (elevate, compression, walking, etc) to improve and prevent progression of LE edema as evidence by no more than 3% increase in LE volume.  Target Date: 11/14/24  PT Goal 2  Goal Identifier: HEP  Goal Description: Pt will be independent with progressive home exercises program to improve strength and aerobic capacity to return to prior level of function caring for daily needs and return to work.  Target Date: 12/14/24  PT Goal 3  Goal Identifier: Fatigue  Goal Description: Pt will score greater than 34 to reflect improved fatigue and level that allows pt to get through daily activity.  Goal Progress: 19 initial  Target Date: 12/14/24  PT Goal 4  Goal Identifier: Activity Tolerance  Goal Description: Pt will have 50% increase in his 6MWT score to 333m  to reflect improved activity tolerance and greater participation in community.  Goal Progress: 222.1m initial  Target Date: 12/14/24      Frequency of Treatment: 1x/week  Duration of Treatment: 8 weeks    Recommended Referrals to Other Professionals: None at this time  Education Assessment:   Learner/Method: Patient;Family  Education Comments: plan, check edema but work on conditioning    Risks and benefits of evaluation/treatment have been explained.   Patient/Family/caregiver agrees with Plan of  Care.     Evaluation Time:     PT Eval, Low Complexity Minutes (83157): 25       Signing Clinician: Alaina Mckinney, PT        [facility]                                                                                   OUTPATIENT PHYSICAL THERAPY      PLAN OF TREATMENT FOR OUTPATIENT REHABILITATION   Patient's Last Name, First Name, TAMARAMAGGY SmithJag  Jasbir YOB: 1985   Provider's Name   [facility]   Medical Record No.  [id number]     Onset Date: 09/28/24  Start of Care Date: 10/16/24     Medical Diagnosis:  Liver Replaced by Transplant      PT Treatment Diagnosis:  Edema, Force production deficty, decreased activity tolerance Plan of Treatment  Frequency/Duration: 1x/week/ 8 weeks    Certification date from 10/16/24 to 12/14/24         See note for plan of treatment details and functional goals     Alaina Mckinney, PT                         I CERTIFY THE NEED FOR THESE SERVICES FURNISHED UNDER        THIS PLAN OF TREATMENT AND WHILE UNDER MY CARE     (Physician attestation of this document indicates review and certification of the therapy plan).              Referring Provider:  Fernando Colon    Initial Assessment  See Epic Evaluation- Start of Care Date: 10/16/24

## 2024-10-17 ENCOUNTER — LAB (OUTPATIENT)
Dept: LAB | Facility: CLINIC | Age: 39
End: 2024-10-17
Payer: MEDICAID

## 2024-10-17 DIAGNOSIS — K70.10 ALCOHOLIC HEPATITIS (H): ICD-10-CM

## 2024-10-17 DIAGNOSIS — Z94.4 LIVER REPLACED BY TRANSPLANT (H): ICD-10-CM

## 2024-10-17 LAB
ALBUMIN SERPL BCG-MCNC: 3.8 G/DL (ref 3.5–5.2)
ALP SERPL-CCNC: 187 U/L (ref 40–150)
ALT SERPL W P-5'-P-CCNC: 29 U/L (ref 0–70)
ANION GAP SERPL CALCULATED.3IONS-SCNC: 11 MMOL/L (ref 7–15)
AST SERPL W P-5'-P-CCNC: 18 U/L (ref 0–45)
BILIRUB DIRECT SERPL-MCNC: 1.4 MG/DL (ref 0–0.3)
BILIRUB SERPL-MCNC: 2.2 MG/DL
BUN SERPL-MCNC: 16.6 MG/DL (ref 6–20)
CALCIUM SERPL-MCNC: 9.3 MG/DL (ref 8.8–10.4)
CHLORIDE SERPL-SCNC: 104 MMOL/L (ref 98–107)
CREAT SERPL-MCNC: 0.9 MG/DL (ref 0.67–1.17)
EGFRCR SERPLBLD CKD-EPI 2021: >90 ML/MIN/1.73M2
ERYTHROCYTE [DISTWIDTH] IN BLOOD BY AUTOMATED COUNT: 17.9 % (ref 10–15)
GLUCOSE SERPL-MCNC: 141 MG/DL (ref 70–99)
HCO3 SERPL-SCNC: 21 MMOL/L (ref 22–29)
HCT VFR BLD AUTO: 29.2 % (ref 40–53)
HGB BLD-MCNC: 9.8 G/DL (ref 13.3–17.7)
MAGNESIUM SERPL-MCNC: 1.3 MG/DL (ref 1.7–2.3)
MCH RBC QN AUTO: 31.2 PG (ref 26.5–33)
MCHC RBC AUTO-ENTMCNC: 33.6 G/DL (ref 31.5–36.5)
MCV RBC AUTO: 93 FL (ref 78–100)
PHOSPHATE SERPL-MCNC: 4.4 MG/DL (ref 2.5–4.5)
PLATELET # BLD AUTO: 330 10E3/UL (ref 150–450)
POTASSIUM SERPL-SCNC: 4.6 MMOL/L (ref 3.4–5.3)
PROT SERPL-MCNC: 6.8 G/DL (ref 6.4–8.3)
RBC # BLD AUTO: 3.14 10E6/UL (ref 4.4–5.9)
SODIUM SERPL-SCNC: 136 MMOL/L (ref 135–145)
TACROLIMUS BLD-MCNC: 8.2 UG/L (ref 5–15)
TME LAST DOSE: NORMAL H
TME LAST DOSE: NORMAL H
WBC # BLD AUTO: 6.1 10E3/UL (ref 4–11)

## 2024-10-17 PROCEDURE — 83735 ASSAY OF MAGNESIUM: CPT | Performed by: PATHOLOGY

## 2024-10-17 PROCEDURE — 84100 ASSAY OF PHOSPHORUS: CPT | Performed by: PATHOLOGY

## 2024-10-17 PROCEDURE — 85027 COMPLETE CBC AUTOMATED: CPT | Performed by: PATHOLOGY

## 2024-10-17 PROCEDURE — 99000 SPECIMEN HANDLING OFFICE-LAB: CPT | Performed by: PATHOLOGY

## 2024-10-17 PROCEDURE — 87521 HEPATITIS C PROBE&RVRS TRNSC: CPT | Performed by: TRANSPLANT SURGERY

## 2024-10-17 PROCEDURE — 80053 COMPREHEN METABOLIC PANEL: CPT | Performed by: PATHOLOGY

## 2024-10-17 PROCEDURE — G0480 DRUG TEST DEF 1-7 CLASSES: HCPCS | Mod: 90 | Performed by: PATHOLOGY

## 2024-10-17 PROCEDURE — 82248 BILIRUBIN DIRECT: CPT | Performed by: PATHOLOGY

## 2024-10-17 PROCEDURE — 80197 ASSAY OF TACROLIMUS: CPT | Performed by: TRANSPLANT SURGERY

## 2024-10-17 PROCEDURE — 36415 COLL VENOUS BLD VENIPUNCTURE: CPT | Performed by: PATHOLOGY

## 2024-10-18 LAB
HBV DNA SERPL QL NAA+PROBE: NORMAL
HCV RNA SERPL QL NAA+PROBE: NORMAL
HIV1+2 RNA SERPL QL NAA+PROBE: NORMAL

## 2024-10-20 LAB
LABORATORY REPORT: NORMAL
PETH INTERPRETATION: NORMAL
PLPETH BLD-MCNC: <10 NG/ML
POPETH BLD-MCNC: 15 NG/ML

## 2024-10-21 ENCOUNTER — LAB (OUTPATIENT)
Dept: LAB | Facility: CLINIC | Age: 39
End: 2024-10-21
Payer: MEDICAID

## 2024-10-21 DIAGNOSIS — Z94.4 LIVER REPLACED BY TRANSPLANT (H): ICD-10-CM

## 2024-10-21 LAB
ALBUMIN SERPL BCG-MCNC: 3.9 G/DL (ref 3.5–5.2)
ALP SERPL-CCNC: 165 U/L (ref 40–150)
ALT SERPL W P-5'-P-CCNC: 20 U/L (ref 0–70)
ANION GAP SERPL CALCULATED.3IONS-SCNC: 10 MMOL/L (ref 7–15)
AST SERPL W P-5'-P-CCNC: 14 U/L (ref 0–45)
BILIRUB DIRECT SERPL-MCNC: 1.15 MG/DL (ref 0–0.3)
BILIRUB SERPL-MCNC: 1.9 MG/DL
BUN SERPL-MCNC: 19.4 MG/DL (ref 6–20)
CALCIUM SERPL-MCNC: 9.5 MG/DL (ref 8.8–10.4)
CHLORIDE SERPL-SCNC: 104 MMOL/L (ref 98–107)
CREAT SERPL-MCNC: 1.06 MG/DL (ref 0.67–1.17)
EGFRCR SERPLBLD CKD-EPI 2021: >90 ML/MIN/1.73M2
ERYTHROCYTE [DISTWIDTH] IN BLOOD BY AUTOMATED COUNT: 17.6 % (ref 10–15)
GLUCOSE SERPL-MCNC: 103 MG/DL (ref 70–99)
HCO3 SERPL-SCNC: 21 MMOL/L (ref 22–29)
HCT VFR BLD AUTO: 30 % (ref 40–53)
HGB BLD-MCNC: 9.7 G/DL (ref 13.3–17.7)
MAGNESIUM SERPL-MCNC: 1.3 MG/DL (ref 1.7–2.3)
MCH RBC QN AUTO: 30.3 PG (ref 26.5–33)
MCHC RBC AUTO-ENTMCNC: 32.3 G/DL (ref 31.5–36.5)
MCV RBC AUTO: 94 FL (ref 78–100)
PHOSPHATE SERPL-MCNC: 4.7 MG/DL (ref 2.5–4.5)
PLATELET # BLD AUTO: 310 10E3/UL (ref 150–450)
POTASSIUM SERPL-SCNC: 5 MMOL/L (ref 3.4–5.3)
PROT SERPL-MCNC: 6.7 G/DL (ref 6.4–8.3)
RBC # BLD AUTO: 3.2 10E6/UL (ref 4.4–5.9)
SODIUM SERPL-SCNC: 135 MMOL/L (ref 135–145)
TACROLIMUS BLD-MCNC: 8.7 UG/L (ref 5–15)
TME LAST DOSE: NORMAL H
TME LAST DOSE: NORMAL H
WBC # BLD AUTO: 6.5 10E3/UL (ref 4–11)

## 2024-10-21 PROCEDURE — 80053 COMPREHEN METABOLIC PANEL: CPT | Performed by: PATHOLOGY

## 2024-10-21 PROCEDURE — 83735 ASSAY OF MAGNESIUM: CPT | Performed by: PATHOLOGY

## 2024-10-21 PROCEDURE — 99000 SPECIMEN HANDLING OFFICE-LAB: CPT | Performed by: PATHOLOGY

## 2024-10-21 PROCEDURE — 82248 BILIRUBIN DIRECT: CPT | Performed by: PATHOLOGY

## 2024-10-21 PROCEDURE — 80197 ASSAY OF TACROLIMUS: CPT | Performed by: TRANSPLANT SURGERY

## 2024-10-21 PROCEDURE — 85027 COMPLETE CBC AUTOMATED: CPT | Performed by: PATHOLOGY

## 2024-10-21 PROCEDURE — 36415 COLL VENOUS BLD VENIPUNCTURE: CPT | Performed by: PATHOLOGY

## 2024-10-21 PROCEDURE — 84100 ASSAY OF PHOSPHORUS: CPT | Performed by: PATHOLOGY

## 2024-10-23 ENCOUNTER — THERAPY VISIT (OUTPATIENT)
Dept: PHYSICAL THERAPY | Facility: CLINIC | Age: 39
End: 2024-10-23
Attending: TRANSPLANT SURGERY
Payer: MEDICAID

## 2024-10-23 DIAGNOSIS — Z94.4 LIVER REPLACED BY TRANSPLANT (H): Primary | ICD-10-CM

## 2024-10-23 PROCEDURE — 97110 THERAPEUTIC EXERCISES: CPT | Mod: GP | Performed by: PHYSICAL THERAPIST

## 2024-10-24 ENCOUNTER — DOCUMENTATION ONLY (OUTPATIENT)
Dept: TRANSPLANT | Facility: CLINIC | Age: 39
End: 2024-10-24

## 2024-10-24 ENCOUNTER — OFFICE VISIT (OUTPATIENT)
Dept: TRANSPLANT | Facility: CLINIC | Age: 39
End: 2024-10-24
Attending: TRANSPLANT SURGERY
Payer: MEDICAID

## 2024-10-24 ENCOUNTER — TELEPHONE (OUTPATIENT)
Dept: TRANSPLANT | Facility: CLINIC | Age: 39
End: 2024-10-24

## 2024-10-24 ENCOUNTER — LAB (OUTPATIENT)
Dept: LAB | Facility: CLINIC | Age: 39
End: 2024-10-24
Attending: TRANSPLANT SURGERY
Payer: MEDICAID

## 2024-10-24 VITALS
OXYGEN SATURATION: 100 % | DIASTOLIC BLOOD PRESSURE: 87 MMHG | WEIGHT: 220.7 LBS | BODY MASS INDEX: 29.93 KG/M2 | TEMPERATURE: 98.6 F | HEART RATE: 91 BPM | SYSTOLIC BLOOD PRESSURE: 138 MMHG

## 2024-10-24 DIAGNOSIS — Z94.4 LIVER REPLACED BY TRANSPLANT (H): ICD-10-CM

## 2024-10-24 LAB
ALBUMIN SERPL BCG-MCNC: 4.1 G/DL (ref 3.5–5.2)
ALP SERPL-CCNC: 163 U/L (ref 40–150)
ALT SERPL W P-5'-P-CCNC: 16 U/L (ref 0–70)
ANION GAP SERPL CALCULATED.3IONS-SCNC: 11 MMOL/L (ref 7–15)
AST SERPL W P-5'-P-CCNC: 17 U/L (ref 0–45)
BILIRUB DIRECT SERPL-MCNC: 1.02 MG/DL (ref 0–0.3)
BILIRUB SERPL-MCNC: 1.7 MG/DL
BUN SERPL-MCNC: 17.3 MG/DL (ref 6–20)
CALCIUM SERPL-MCNC: 9.6 MG/DL (ref 8.8–10.4)
CHLORIDE SERPL-SCNC: 105 MMOL/L (ref 98–107)
CREAT SERPL-MCNC: 0.94 MG/DL (ref 0.67–1.17)
EGFRCR SERPLBLD CKD-EPI 2021: >90 ML/MIN/1.73M2
ERYTHROCYTE [DISTWIDTH] IN BLOOD BY AUTOMATED COUNT: 17.5 % (ref 10–15)
GLUCOSE SERPL-MCNC: 131 MG/DL (ref 70–99)
HCO3 SERPL-SCNC: 21 MMOL/L (ref 22–29)
HCT VFR BLD AUTO: 31 % (ref 40–53)
HGB BLD-MCNC: 10.1 G/DL (ref 13.3–17.7)
MAGNESIUM SERPL-MCNC: 1.4 MG/DL (ref 1.7–2.3)
MCH RBC QN AUTO: 30.6 PG (ref 26.5–33)
MCHC RBC AUTO-ENTMCNC: 32.6 G/DL (ref 31.5–36.5)
MCV RBC AUTO: 94 FL (ref 78–100)
PHOSPHATE SERPL-MCNC: 4.6 MG/DL (ref 2.5–4.5)
PLATELET # BLD AUTO: 302 10E3/UL (ref 150–450)
POTASSIUM SERPL-SCNC: 4.6 MMOL/L (ref 3.4–5.3)
PROT SERPL-MCNC: 7 G/DL (ref 6.4–8.3)
RBC # BLD AUTO: 3.3 10E6/UL (ref 4.4–5.9)
SODIUM SERPL-SCNC: 137 MMOL/L (ref 135–145)
TACROLIMUS BLD-MCNC: 7.7 UG/L (ref 5–15)
TME LAST DOSE: NORMAL H
TME LAST DOSE: NORMAL H
WBC # BLD AUTO: 7.3 10E3/UL (ref 4–11)

## 2024-10-24 PROCEDURE — G0463 HOSPITAL OUTPT CLINIC VISIT: HCPCS | Performed by: TRANSPLANT SURGERY

## 2024-10-24 PROCEDURE — 36415 COLL VENOUS BLD VENIPUNCTURE: CPT | Performed by: PATHOLOGY

## 2024-10-24 PROCEDURE — 84100 ASSAY OF PHOSPHORUS: CPT | Performed by: PATHOLOGY

## 2024-10-24 PROCEDURE — 83735 ASSAY OF MAGNESIUM: CPT | Performed by: PATHOLOGY

## 2024-10-24 PROCEDURE — 80053 COMPREHEN METABOLIC PANEL: CPT | Performed by: PATHOLOGY

## 2024-10-24 PROCEDURE — 82248 BILIRUBIN DIRECT: CPT | Performed by: PATHOLOGY

## 2024-10-24 PROCEDURE — 99000 SPECIMEN HANDLING OFFICE-LAB: CPT | Performed by: PATHOLOGY

## 2024-10-24 PROCEDURE — 80197 ASSAY OF TACROLIMUS: CPT | Performed by: TRANSPLANT SURGERY

## 2024-10-24 PROCEDURE — 85027 COMPLETE CBC AUTOMATED: CPT | Performed by: PATHOLOGY

## 2024-10-24 PROCEDURE — 99213 OFFICE O/P EST LOW 20 MIN: CPT | Mod: 24 | Performed by: TRANSPLANT SURGERY

## 2024-10-24 RX ORDER — TACROLIMUS 1 MG/1
1 CAPSULE ORAL 2 TIMES DAILY PRN
Qty: 240 CAPSULE | Refills: 1 | Status: SHIPPED | OUTPATIENT
Start: 2024-10-24 | End: 2024-11-05

## 2024-10-24 RX ORDER — TACROLIMUS 0.5 MG/1
0.5 CAPSULE ORAL 2 TIMES DAILY PRN
Qty: 60 CAPSULE | Refills: 1 | Status: SHIPPED | OUTPATIENT
Start: 2024-10-24 | End: 2024-11-05

## 2024-10-24 RX ORDER — TACROLIMUS 5 MG/1
5 CAPSULE ORAL EVERY 12 HOURS
Qty: 180 CAPSULE | Refills: 11 | Status: SHIPPED | OUTPATIENT
Start: 2024-10-24 | End: 2024-11-05

## 2024-10-24 ASSESSMENT — PAIN SCALES - GENERAL: PAINLEVEL_OUTOF10: SEVERE PAIN (6)

## 2024-10-24 NOTE — LETTER
10/24/2024      Jag Smith  [address]  [facility] [address]      Dear Colleague,    Thank you for referring your patient, Jag Smith, to the [facility] TRANSPLANT [facility]. Please see a copy of my visit note below.    Transplant Surgery Progress Note    Transplants:  9/28/2024 (Liver);   S: overall doing well, appetite and strength improving, wound is an issue  Transplant History:    Transplant Type:  Liver Tx  Donor Type:     Transplant Date:  9/28/2024 (Liver)   Ureteral Stent:  No    Acute Rejection Hx:  No    Present Maintenance Immunosuppression:  Tacrolimus, Mycophenolate mofetil, and Prednisone    CMV IgG Ab Discordance:  No  EBV IgG Ab Discordance:  No    Transplant Coordinator: Karena Villegas     Transplant Office Phone Number: [phone]     Immunosuppressant Medications       Immunosuppressive Agents Disp Start End     mycophenolate (GENERIC EQUIVALENT) 250 MG capsule 180 capsule 11/1/2024 --    Sig - Route: Take 3 capsules (750 mg) by mouth 2 times daily. - Oral    Class: E-Prescribe    Notes to Pharmacy: TXP DT 9/28/2024 (Liver) TXP Dischg DT 10/9/2024 DX Liver replaced by transplant Z94.4 [facility] ([place], [place])     tacrolimus (GENERIC EQUIVALENT) 0.5 MG capsule 60 capsule 11/5/2024 --    Sig - Route: Take 1 capsule (0.5 mg) by mouth every 12 hours. - Oral    Class: E-Prescribe    Notes to Pharmacy: TXP DT 9/28/2024 (Liver) TXP Dischg DT 10/9/2024 DX Liver replaced by transplant Z94.4 [facility] ([place], [place])     tacrolimus (GENERIC EQUIVALENT) 1 MG capsule 240 capsule 11/5/2024 --    Sig - Route: Take 4 capsules (4 mg) by mouth every 12 hours. Total dose 4.5mg BID - Oral    Class: E-Prescribe    Notes to Pharmacy: TXP DT 9/28/2024 (Liver) TXP Dischg DT 10/9/2024 DX Liver replaced by transplant Z94.4 [facility] ([place], [place])      tacrolimus (GENERIC EQUIVALENT) 5 MG capsule -- 11/5/2024 --    Sig - Route: Take 1 capsule (5 mg) by mouth 2 times daily as needed (for dose changes). - Oral    Class: Historical    Notes to Pharmacy: TXP DT 9/28/2024 (Liver) TXP Dischg DT 10/9/2024 DX Liver replaced by transplant Z94.4 [facility] ([place], [place])            Possible Immunosuppression-related side effects:   []             headache  []             vivid dreams  []             irritability  []             cognitive difficuties  []             fine tremor  []             nausea  []             diarrhea  []             neuropathy      []             edema  []             renal calcineurin toxicity  []             hyperkalemia  []             post-transplant diabetes  []             decreased appetite  []             increased appetite  []             other:  []             none    Prescription Medications as of 11/6/2024         Rx Number Disp Refills Start End Last Dispensed Date Next Fill Date Owning Pharmacy    acetaminophen (TYLENOL) 325 MG tablet  60 tablet 0 11/4/2024 --   [address] [phone]    Sig: Take 2 tablets (650 mg) by mouth every 8 hours as needed for mild pain or fever.    Class: E-Prescribe    Route: Oral    aspirin (ASA) 81 MG chewable tablet  30 tablet 1 11/1/2024 --   [address] [phone]    Sig: Take 1 tablet (81 mg) by mouth or Feeding Tube daily.    Class: E-Prescribe    Route: Oral or Feeding Tube    hydrOXYzine HCl (ATARAX) 25 MG tablet  20 tablet 0 11/4/2024 --   [address] [phone]    Sig: Take 1 tablet (25 mg) by mouth every 8 hours as needed for anxiety or other (adjuvant pain).    Class: E-Prescribe    Route: Oral    magnesium oxide (MAG-OX) 400 MG tablet  120 tablet 1 11/1/2024 --    [address] 1-049    Sig: Take 2 tablets (800 mg) by mouth 2 times daily.    Class: E-Prescribe    Route: Oral    methocarbamol (ROBAXIN) 750 MG tablet  30 tablet 0 11/4/2024 --   [address] 1-171    Sig: Take 1 tablet (750 mg) by mouth 3 times daily as needed for muscle spasms.    Class: E-Prescribe    Route: Oral    multivitamin w/minerals (CERTAVITE/ANTIOXIDANTS) tablet  30 tablet 1 11/1/2024 --   [address] 1-105    Sig: Take 1 tablet by mouth daily.    Class: E-Prescribe    Route: Oral    mycophenolate (GENERIC EQUIVALENT) 250 MG capsule  180 capsule 1 11/1/2024 --   [address] 1-875    Sig: Take 3 capsules (750 mg) by mouth 2 times daily.    Class: E-Prescribe    Notes to Pharmacy: TXP DT 9/28/2024 (Liver) TXP Dischg DT 10/9/2024 DX Liver replaced by transplant Z94.4 TX Center [facility] ([place], [place])    Route: Oral    omeprazole (PRILOSEC) 20 MG DR capsule  30 capsule 2 11/1/2024 --   [address] 1[address]314    Sig: Take 1 capsule (20 mg) by mouth daily.    Class: E-Prescribe    Route: Oral    polyethylene glycol (MIRALAX) 17 GM/Dose powder  510 g 0 10/9/2024 --   [place], [place] - [address]    Sig: Take 17 g by mouth 2 times daily as needed for constipation.    Class: E-Prescribe    Route: Oral    predniSONE (DELTASONE) 5 MG tablet  30 tablet 1 11/1/2024 --   [address] 1-113    Sig: Take 1 tablet (5 mg) by mouth daily.    Class: E-Prescribe    Notes to Pharmacy: TXP DT 9/28/2024 (Liver) TXP Dischg DT 10/9/2024 DX Liver replaced by transplant  Z94.4 [facility] ([place], [place])    Route: Oral    sennosides (SENOKOT) 8.6 MG tablet  60 tablet 0 10/9/2024 --   [place], [place] - [address] [facility] [address]    Sig: Take 2 tablets by mouth 2 times daily as needed for constipation.    Class: E-Prescribe    Route: Oral    tacrolimus (GENERIC EQUIVALENT) 0.5 MG capsule  60 capsule 1 11/5/2024 --   [address] [phone]    Sig: Take 1 capsule (0.5 mg) by mouth every 12 hours.    Class: E-Prescribe    Notes to Pharmacy: TXP DT 9/28/2024 (Liver) TXP Dischg DT 10/9/2024 DX Liver replaced by transplant Z94.4 [facility] ([place], [place])    Route: Oral    tacrolimus (GENERIC EQUIVALENT) 1 MG capsule  240 capsule 1 11/5/2024 --   [address] 1[address]014    Sig: Take 4 capsules (4 mg) by mouth every 12 hours. Total dose 4.5mg BID    Class: E-Prescribe    Notes to Pharmacy: TXP DT 9/28/2024 (Liver) TXP Dischg DT 10/9/2024 DX Liver replaced by transplant Z94.4 TX [facility] ([place], [place])    Route: Oral    tacrolimus (GENERIC EQUIVALENT) 5 MG capsule  -- -- 11/5/2024 --       Sig: Take 1 capsule (5 mg) by mouth 2 times daily as needed (for dose changes).    Class: Historical    Notes to Pharmacy: TXP DT 9/28/2024 (Liver) TXP Dischg DT 10/9/2024 DX Liver replaced by transplant Z94.4 [facility] ([place], [place])    Route: Oral    traMADol (ULTRAM) 50 MG tablet  8 tablet 0 11/4/2024 11/8/2024   [address] 1[address]732    Sig: Take 1 tablet (50 mg) by mouth 2 times daily as needed for severe pain.    Class: E-Prescribe    Route: Oral    valGANciclovir (VALCYTE) 450 MG tablet  30 tablet 1  "11/1/2024 --   [facility] [place], [place] - [address] [place] [address] [phone]    Sig: Take 1 tablet (450 mg) by mouth daily.    Class: E-Prescribe    Route: Oral            O:   [unfilled]        Latest Ref Rng & Units 11/4/2024     8:14 AM 10/31/2024     7:12 AM 10/28/2024     7:13 AM 10/24/2024     8:18 AM 10/21/2024     7:30 AM   Transplant Immunosuppression Labs   Creat 0.67 - 1.17 mg/dL 1.22  1.18  1.15  0.94  1.06    Urea Nitrogen 6.0 - 20.0 mg/dL 21.7  22.9  22.0  17.3  19.4    WBC 4.0 - 11.0 10e3/uL 10.0  7.0  7.2  7.3  6.5        Chemistries:   Recent Labs   Lab Test 11/04/24  0814   BUN 21.7*   CR 1.22*   GFRESTIMATED 78   *     Lab Results   Component Value Date    A1C 5.4 09/28/2024     Recent Labs   Lab Test 11/04/24  0814   ALBUMIN 4.1   BILITOTAL 1.4*   ALKPHOS 135   AST 25   ALT 29     Urine Studies:  Recent Labs   Lab Test 09/27/24 2113   COLOR Dark Yellow*   APPEARANCE Slightly Cloudy*   URINEGLC Negative   URINEBILI Large*   URINEKETONE Negative   SG 1.011   UBLD Negative   URINEPH 6.0   PROTEIN Negative   NITRITE Negative   LEUKEST Negative   RBCU 1   WBCU 5     No lab results found.  Hematology:   Recent Labs   Lab Test 11/04/24  0814 10/31/24  0712 10/28/24  0713   HGB 10.0* 10.4* 10.1*    249 269   WBC 10.0 7.0 7.2     Coags:   Recent Labs   Lab Test 10/01/24  0437 09/30/24  0529   INR 1.03 1.14     HLA antibodies:   No results found for: \"XP7QIQQZO\", \"TO6PRYGTAJ\", \"FC4QYAPPN\", \"OA4HVXIRTS\"    Assessment: Jag Smith is doing well s/p Liver Tx:  Issues we addressed during his visit include:    Plan:    1. Graft function: LFTs improving  2. Immunosuppression Management: No change tac 8-12   .  Complexity of management:Low.  Contributing factors:  wound infection  3. Wound care- wet to dry BID, will see AZALIA for close follow-up:    Followup: 1 week            Fernando Colon MD/PhD  Professor of Surgery    Again, thank you for allowing me to " participate in the care of your patient.        Sincerely,        Fernando Colon MD

## 2024-10-24 NOTE — PROGRESS NOTES
Saw patient and his mother today in clinic. Patient is doing well. Patient will need dressings changes twice a day until infection clears. Patient will see Marycruz tomorrow and Monday for education on dressing changes. Patient and his mother do not feel great about doing the dressing changes. Provided encouragement and informed them a nurse is available if they have questions over the weekend.

## 2024-10-24 NOTE — TELEPHONE ENCOUNTER
ISSUE:   Tacrolimus IR level 7.7 on 10/24/24, goal 8-10, dose 4.5 mg BID.    PLAN:   Call Patient and confirm this was an accurate 12-hour trough.   Verify Tacrolimus IR dose 4.5 mg BID.   Confirm no new medications or illness.   Confirm no missed doses.     If accurate trough and accurate dose, increase Tacrolimus IR dose to 5 mg BID    Repeat labs on Monday.    Karena Villegas RN     OUTCOME:   Spoke with Patient, they confirm accurate trough level and current dose 4.5 mg BID.   Patient confirmed dose change to 5 mg BID.  Patient agreed to repeat labs in 3 days.   Orders sent to preferred pharmacy for dose change and lab for repeat labs.   Patient voiced understanding of plan.     Magda Gant LPN

## 2024-10-25 ENCOUNTER — TELEPHONE (OUTPATIENT)
Dept: TRANSPLANT | Facility: CLINIC | Age: 39
End: 2024-10-25
Payer: MEDICAID

## 2024-10-25 ENCOUNTER — OFFICE VISIT (OUTPATIENT)
Dept: TRANSPLANT | Facility: CLINIC | Age: 39
End: 2024-10-25
Attending: TRANSPLANT SURGERY
Payer: MEDICAID

## 2024-10-25 VITALS
DIASTOLIC BLOOD PRESSURE: 82 MMHG | WEIGHT: 222.5 LBS | OXYGEN SATURATION: 99 % | HEART RATE: 104 BPM | SYSTOLIC BLOOD PRESSURE: 127 MMHG | BODY MASS INDEX: 30.18 KG/M2 | TEMPERATURE: 98.5 F

## 2024-10-25 DIAGNOSIS — G89.18 ACUTE POST-OPERATIVE PAIN: ICD-10-CM

## 2024-10-25 LAB
BLD PROD TYP BPU: NORMAL
BLOOD COMPONENT TYPE: NORMAL
CODING SYSTEM: NORMAL
CROSSMATCH: NORMAL
ISSUE DATE AND TIME: NORMAL
UNIT ABO/RH: NORMAL
UNIT NUMBER: NORMAL
UNIT STATUS: NORMAL
UNIT TYPE ISBT: 5100

## 2024-10-25 PROCEDURE — G0463 HOSPITAL OUTPT CLINIC VISIT: HCPCS | Performed by: NURSE PRACTITIONER

## 2024-10-25 PROCEDURE — 99212 OFFICE O/P EST SF 10 MIN: CPT | Performed by: NURSE PRACTITIONER

## 2024-10-25 RX ORDER — HYDROXYZINE HYDROCHLORIDE 25 MG/1
25 TABLET, FILM COATED ORAL EVERY 6 HOURS PRN
Qty: 20 TABLET | Refills: 0 | Status: SHIPPED | OUTPATIENT
Start: 2024-10-25 | End: 2024-11-04

## 2024-10-25 RX ORDER — METHOCARBAMOL 750 MG/1
750 TABLET, FILM COATED ORAL 3 TIMES DAILY PRN
Qty: 18 TABLET | Refills: 0 | Status: SHIPPED | OUTPATIENT
Start: 2024-10-25 | End: 2024-11-04

## 2024-10-25 RX ORDER — ACETAMINOPHEN 325 MG/1
650 TABLET ORAL EVERY 8 HOURS PRN
Qty: 60 TABLET | Refills: 0 | Status: SHIPPED | OUTPATIENT
Start: 2024-10-25 | End: 2024-11-04

## 2024-10-25 ASSESSMENT — PAIN SCALES - GENERAL: PAINLEVEL_OUTOF10: SEVERE PAIN (7)

## 2024-10-25 NOTE — PROGRESS NOTES
Here today for wound care and requested med refills for pain.     Some staples removed yesterday during surgeon visit and several incisional openings present midline incision and left side. No purulence. Skin around stapled area pink but no erythema or warmth. Scant drainage.     Openings packed with 4x4 gauze and covered.   Patient's mom to do dressing changes. She voices concerns with her own health issues regarding her vision affecting her ability and confidence but verbalizes she will try. This provider demonstrated dressing change and had mom redress again after with supervision. Dressing change went well.   Advised patient to shower daily and mom to change dressing twice a day. Supplies given in clinic.   Will see them again on Monday.       Marycruz Alcala NP

## 2024-10-25 NOTE — LETTER
10/25/2024      Jag Smith  [address]  [facility] [address]      Dear Colleague,    Thank you for referring your patient, Jag Smith, to the [facility] TRANSPLANT CLINIC. Please see a copy of my visit note below.    Here today for wound care and requested med refills for pain.     Some staples removed yesterday during surgeon visit and several incisional openings present midline incision and left side. No purulence. Skin around stapled area pink but no erythema or warmth. Scant drainage.     Openings packed with 4x4 gauze and covered.   Patient's mom to do dressing changes. She voices concerns with her own health issues regarding her vision affecting her ability and confidence but verbalizes she will try. This provider demonstrated dressing change and had mom redress again after with supervision. Dressing change went well.   Advised patient to shower daily and mom to change dressing twice a day. Supplies given in clinic.   Will see them again on Monday.       Marycruz Alcala NP       Again, thank you for allowing me to participate in the care of your patient.        Sincerely,        SIL Blum CNP

## 2024-10-28 ENCOUNTER — OFFICE VISIT (OUTPATIENT)
Dept: TRANSPLANT | Facility: CLINIC | Age: 39
End: 2024-10-28
Attending: NURSE PRACTITIONER
Payer: MEDICAID

## 2024-10-28 ENCOUNTER — LAB (OUTPATIENT)
Dept: LAB | Facility: CLINIC | Age: 39
End: 2024-10-28
Payer: MEDICAID

## 2024-10-28 VITALS
TEMPERATURE: 98.3 F | OXYGEN SATURATION: 99 % | BODY MASS INDEX: 31.02 KG/M2 | DIASTOLIC BLOOD PRESSURE: 92 MMHG | WEIGHT: 228.7 LBS | HEART RATE: 93 BPM | SYSTOLIC BLOOD PRESSURE: 135 MMHG

## 2024-10-28 DIAGNOSIS — Z94.4 LIVER REPLACED BY TRANSPLANT (H): Primary | ICD-10-CM

## 2024-10-28 DIAGNOSIS — T14.8XXA SKIN WOUND FROM SURGICAL INCISION: ICD-10-CM

## 2024-10-28 DIAGNOSIS — Z94.4 LIVER REPLACED BY TRANSPLANT (H): ICD-10-CM

## 2024-10-28 LAB
ALBUMIN SERPL BCG-MCNC: 4.1 G/DL (ref 3.5–5.2)
ALP SERPL-CCNC: 156 U/L (ref 40–150)
ALT SERPL W P-5'-P-CCNC: 22 U/L (ref 0–70)
ANION GAP SERPL CALCULATED.3IONS-SCNC: 10 MMOL/L (ref 7–15)
AST SERPL W P-5'-P-CCNC: 22 U/L (ref 0–45)
BILIRUB DIRECT SERPL-MCNC: 0.88 MG/DL (ref 0–0.3)
BILIRUB SERPL-MCNC: 1.5 MG/DL
BUN SERPL-MCNC: 22 MG/DL (ref 6–20)
CALCIUM SERPL-MCNC: 9.5 MG/DL (ref 8.8–10.4)
CHLORIDE SERPL-SCNC: 104 MMOL/L (ref 98–107)
CREAT SERPL-MCNC: 1.15 MG/DL (ref 0.67–1.17)
EGFRCR SERPLBLD CKD-EPI 2021: 84 ML/MIN/1.73M2
ERYTHROCYTE [DISTWIDTH] IN BLOOD BY AUTOMATED COUNT: 17.1 % (ref 10–15)
GLUCOSE SERPL-MCNC: 92 MG/DL (ref 70–99)
HCO3 SERPL-SCNC: 23 MMOL/L (ref 22–29)
HCT VFR BLD AUTO: 30.8 % (ref 40–53)
HGB BLD-MCNC: 10.1 G/DL (ref 13.3–17.7)
MAGNESIUM SERPL-MCNC: 1.4 MG/DL (ref 1.7–2.3)
MCH RBC QN AUTO: 31.2 PG (ref 26.5–33)
MCHC RBC AUTO-ENTMCNC: 32.8 G/DL (ref 31.5–36.5)
MCV RBC AUTO: 95 FL (ref 78–100)
PHOSPHATE SERPL-MCNC: 5.3 MG/DL (ref 2.5–4.5)
PLATELET # BLD AUTO: 269 10E3/UL (ref 150–450)
POTASSIUM SERPL-SCNC: 5.2 MMOL/L (ref 3.4–5.3)
PROT SERPL-MCNC: 6.9 G/DL (ref 6.4–8.3)
RBC # BLD AUTO: 3.24 10E6/UL (ref 4.4–5.9)
SODIUM SERPL-SCNC: 137 MMOL/L (ref 135–145)
TACROLIMUS BLD-MCNC: 10.1 UG/L (ref 5–15)
TME LAST DOSE: NORMAL H
TME LAST DOSE: NORMAL H
WBC # BLD AUTO: 7.2 10E3/UL (ref 4–11)

## 2024-10-28 PROCEDURE — 99000 SPECIMEN HANDLING OFFICE-LAB: CPT | Performed by: PATHOLOGY

## 2024-10-28 PROCEDURE — 36415 COLL VENOUS BLD VENIPUNCTURE: CPT | Performed by: PATHOLOGY

## 2024-10-28 PROCEDURE — 82248 BILIRUBIN DIRECT: CPT | Performed by: PATHOLOGY

## 2024-10-28 PROCEDURE — 99024 POSTOP FOLLOW-UP VISIT: CPT | Mod: 24 | Performed by: NURSE PRACTITIONER

## 2024-10-28 PROCEDURE — 84100 ASSAY OF PHOSPHORUS: CPT | Performed by: PATHOLOGY

## 2024-10-28 PROCEDURE — 83735 ASSAY OF MAGNESIUM: CPT | Performed by: PATHOLOGY

## 2024-10-28 PROCEDURE — 85027 COMPLETE CBC AUTOMATED: CPT | Performed by: PATHOLOGY

## 2024-10-28 PROCEDURE — 80197 ASSAY OF TACROLIMUS: CPT | Performed by: TRANSPLANT SURGERY

## 2024-10-28 PROCEDURE — G0463 HOSPITAL OUTPT CLINIC VISIT: HCPCS | Performed by: NURSE PRACTITIONER

## 2024-10-28 PROCEDURE — 80053 COMPREHEN METABOLIC PANEL: CPT | Performed by: PATHOLOGY

## 2024-10-28 RX ORDER — TRAMADOL HYDROCHLORIDE 50 MG/1
50 TABLET ORAL 2 TIMES DAILY PRN
Qty: 10 TABLET | Refills: 0 | Status: SHIPPED | OUTPATIENT
Start: 2024-10-28 | End: 2024-11-02

## 2024-10-28 ASSESSMENT — PAIN SCALES - GENERAL: PAINLEVEL_OUTOF10: SEVERE PAIN (7)

## 2024-10-28 NOTE — LETTER
10/28/2024      Jag Smith  [address]  [facility] [address]      Dear Colleague,    Thank you for referring your patient, Jag Smith, to the [facility] TRANSPLANT CLINIC. Please see a copy of my visit note below.    Transplant Surgery -OUTPATIENT PROGRESS NOTE    Date of Visit: 10/28/2024    Transplants:  9/28/2024 (Liver); Postoperative day:  30  ASSESMENT AND PLAN:  Incisional wound:   Requiring BID packing. Patient's mother is helping but is reluctant. She is unable to use iodoform gauze in tunneling she is using 4x4 gauze.   Tramadol for PRN severe pain.     Date: October 28, 2024    Transplant:  [x]                             Liver [x]                              Kidney []                             Pancreas []                              Other:             Chief Complaint:Post-op Visit (Wound care)    History of Present Illness:  Here today for incisional wound care.   Eating and drinking well.   Worsening incisional pain. Not sleeping well.   Mom packed wound once daily in evening. Notes more bloody drainage.   Stools are soft. LBM last night.   Finished lasix course, leg edema much improved.       Patient Active Problem List   Diagnosis     Alcohol use disorder, severe, in early remission (H)     Alcoholic hepatitis (H)     Epiphora due to insufficient drainage of left side     Hyperbilirubinemia     Hypokalemia     Jaundice     Pneumonia     Liver replaced by transplant (H)     DENI (acute kidney injury) (H)     Immunosuppressed status (H)     Acute post-operative pain     Steroid-induced hyperglycemia     Acute urinary retention     Anemia due to blood loss, acute     Severe malnutrition (H)     Hyponatremia     Hypomagnesemia     Bilateral lower extremity edema     Hyperkalemia     Subconjunctival hemorrhage     SOCIAL /FAMILY HISTORY: [x]                  No recent change    Past Medical History:   Diagnosis Date     Alcohol use disorder      Alcoholic hepatitis (H) 06/07/2024      Anxiety      Depression      Hypertension      Liver replaced by transplant (H) 2024     Past Surgical History:   Procedure Laterality Date     BENCH LIVER  2024    Procedure: Bench liver;  Surgeon: Fernando Colon MD;  Location: [facility] OR     DACRYOCYSTORHINOSTOMY Left 2013     INCISION AND DRAINAGE BUTTOCKS Left 2017     TRANSPLANT LIVER RECIPIENT  DONOR N/A 2024    Procedure: Transplant liver recipient  donor;  Surgeon: Fernando Colon MD;  Location: [facility] OR     Social History     Socioeconomic History     Marital status: Single     Spouse name: Not on file     Number of children: Not on file     Years of education: Not on file     Highest education level: Not on file   Occupational History     Not on file   Tobacco Use     Smoking status: Never     Smokeless tobacco: Never   Substance and Sexual Activity     Alcohol use: Not Currently     Comment: last drink 2024     Drug use: Never     Sexual activity: Not on file   Other Topics Concern     Not on file   Social History Narrative     Not on file     Social Drivers of Health     Financial Resource Strain: Low Risk  (2024)    Financial Resource Strain      Within the past 12 months, have you or your family members you live with been unable to get utilities (heat, electricity) when it was really needed?: No   Food Insecurity: Low Risk  (2024)    Food Insecurity      Within the past 12 months, did you worry that your food would run out before you got money to buy more?: No      Within the past 12 months, did the food you bought just not last and you didn t have money to get more?: No   Transportation Needs: Low Risk  (2024)    Transportation Needs      Within the past 12 months, has lack of transportation kept you from medical appointments, getting your medicines, non-medical meetings or appointments, work, or from getting things that you need?: No   Physical Activity: Not on file   Stress: Not on file    Social Connections: Not on file   Interpersonal Safety: Low Risk  (10/16/2024)    Interpersonal Safety      Do you feel physically and emotionally safe where you currently live?: Yes      Within the past 12 months, have you been hit, slapped, kicked or otherwise physically hurt by someone?: No      Within the past 12 months, have you been humiliated or emotionally abused in other ways by your partner or ex-partner?: No   Recent Concern: Interpersonal Safety - High Risk (9/22/2024)    Interpersonal Safety      Do you feel physically and emotionally safe where you currently live?: No      Within the past 12 months, have you been hit, slapped, kicked or otherwise physically hurt by someone?: No      Within the past 12 months, have you been humiliated or emotionally abused in other ways by your partner or ex-partner?: No   Housing Stability: Low Risk  (9/21/2024)    Housing Stability      Do you have housing? : Yes      Are you worried about losing your housing?: No     Prescription Medications as of 10/28/2024         Rx Number Disp Refills Start End Last Dispensed Date Next Fill Date Owning Pharmacy    acetaminophen (TYLENOL) 325 MG tablet  60 tablet 0 10/25/2024 --   [address] [phone]    Sig: Take 2 tablets (650 mg) by mouth every 8 hours as needed for mild pain or fever.    Class: E-Prescribe    Route: Oral    aspirin (ASA) 81 MG chewable tablet  30 tablet 1 10/11/2024 --   [address] 6-302    Sig: Take 1 tablet (81 mg) by mouth or Feeding Tube daily.    Class: E-Prescribe    Route: Oral or Feeding Tube    carboxymethylcellulose PF (REFRESH PLUS) 0.5 % ophthalmic solution  1 each 0 10/9/2024 --   [place], [place] - [address] [facility] [facility] [address]    Sig: Place 1 drop into both eyes 6 times daily.    Class: E-Prescribe    Route: Both Eyes    furosemide (LASIX) 20 MG  tablet  7 tablet 0 10/9/2024 --   [address]    Sig: Take 1 tablet (20 mg) by mouth daily.    Class: E-Prescribe    Route: Oral    hydrOXYzine HCl (ATARAX) 25 MG tablet  20 tablet 0 10/25/2024 --   [address] 1-273    Sig: Take 1 tablet (25 mg) by mouth every 6 hours as needed for other or anxiety (adjuvant pain).    Class: E-Prescribe    Route: Oral    Lidocaine (LIDOCARE) 4 % Patch  20 patch 0 10/10/2024 --   [address] 1[address]273    Sig: Place 2 patches over 12 hours onto the skin every 24 hours. To prevent lidocaine toxicity, patient should be patch free for 12 hrs daily. Apply near incision, not on, can apply 1 patch to back as well.    Class: E-Prescribe    Route: Transdermal    magnesium oxide (MAG-OX) 400 MG tablet  120 tablet 1 10/11/2024 --   [address] 1-273    Sig: Take 2 tablets (800 mg) by mouth 2 times daily.    Class: E-Prescribe    Route: Oral    methocarbamol (ROBAXIN) 750 MG tablet  18 tablet 0 10/25/2024 --   [address] 1-273    Sig: Take 1 tablet (750 mg) by mouth 3 times daily as needed for muscle spasms.    Class: E-Prescribe    Route: Oral    multivitamin w/minerals (CERTAVITE/ANTIOXIDANTS) tablet  30 tablet 1 10/11/2024 --   [address] 1-273    Sig: Take 1 tablet by mouth daily.    Class: E-Prescribe    Route: Oral    mycophenolate (GENERIC EQUIVALENT) 250 MG capsule  180 capsule 1 10/11/2024 --   [address] 1-273    Sig: Take 3 capsules (750 mg) by mouth 2 times daily.    Class: E-Prescribe    Notes to Pharmacy: TXP DT 9/28/2024 (Liver) TXP Dischg  DT 10/9/2024 DX Liver replaced by transplant Z94.4 TX [facility] ([place], [place])    Route: Oral    omeprazole (PRILOSEC) 20 MG DR capsule  30 capsule 2 10/11/2024 --   [address] 1-492    Sig: Take 1 capsule (20 mg) by mouth daily.    Class: E-Prescribe    Route: Oral    oxyCODONE (ROXICODONE) 5 MG tablet  5 tablet 0 10/9/2024 --   [address]    Sig: Take 0.5-1 tablets (2.5-5 mg) by mouth every 8 hours as needed for moderate pain or severe pain.    Class: E-Prescribe    Earliest Fill Date: 10/9/2024    Route: Oral    polyethylene glycol (MIRALAX) 17 GM/Dose powder  510 g 0 10/9/2024 --   [address]    Sig: Take 17 g by mouth 2 times daily as needed for constipation.    Class: E-Prescribe    Route: Oral    predniSONE (DELTASONE) 5 MG tablet  30 tablet 1 10/11/2024 --   [address] 1-392    Sig: Take 1 tablet (5 mg) by mouth daily.    Class: E-Prescribe    Notes to Pharmacy: TXP DT 9/28/2024 (Liver) TXP Dischg DT 10/9/2024 DX Liver replaced by transplant Z94.4 TX [facility] ([place], [place])    Route: Oral    sennosides (SENOKOT) 8.6 MG tablet  60 tablet 0 10/9/2024 --   [address]    Sig: Take 2 tablets by mouth 2 times daily as needed for constipation.    Class: E-Prescribe    Route: Oral    tacrolimus (GENERIC EQUIVALENT) 0.5 MG capsule  60 capsule 1 10/24/2024 --   [address] 1-978    Sig: Take 1 capsule (0.5 mg) by mouth 2 times daily as needed (for dose changes).    Class: E-Prescribe    Notes to Pharmacy: TXP DT 9/28/2024 (Liver) TXP Dischg DT  10/9/2024 DX Liver replaced by transplant Z94.4 TX [facility] ([place], [place])    Route: Oral    tacrolimus (GENERIC EQUIVALENT) 1 MG capsule  240 capsule 1 10/24/2024 --   [address] 6-857    Sig: Take 1 capsule (1 mg) by mouth 2 times daily as needed (for dose changes).    Class: E-Prescribe    Notes to Pharmacy: TXP DT 9/28/2024 (Liver) TXP Dischg DT 10/9/2024 DX Liver replaced by transplant Z94.4 TX [facility] ([place], [place])    Route: Oral    tacrolimus (GENERIC EQUIVALENT) 5 MG capsule  180 capsule 11 10/24/2024 --   [address] 5-601    Sig: Take 1 capsule (5 mg) by mouth every 12 hours.    Class: E-Prescribe    Notes to Pharmacy: Pt needs a fill as soon as possible    Route: Oral    valGANciclovir (VALCYTE) 450 MG tablet  30 tablet 1 10/11/2024 --   [address] 7-409    Sig: Take 1 tablet (450 mg) by mouth daily.    Class: E-Prescribe    Route: Oral          Azithromycin   REVIEW OF SYSTEMS (check box if normal)  [x]               GENERAL  [x]                 PULMONARY [x]                GENITOURINARY  [x]                CNS                 [x]                 CARDIAC  [x]                 ENDOCRINE  [x]                EARS,NOSE,THROAT [x]                 GASTROINTESTINAL [x]                 NEUROLOGIC    [x]                MUSCLOSKELTAL  [x]                  HEMATOLOGY      PHYSICAL EXAM (check box if normal)BP (!) 135/92   Pulse 93   Temp 98.3  F (36.8  C) (Oral)   Wt 103.7 kg (228 lb 11.2 oz)   SpO2 99%   BMI 31.02 kg/m          [x]            GENERAL: NAD    [x]            Incision: few staples intact throughout. Left side of incision mostly open, tunneling present midline and R distal incision. Tunneling packed with  iodoform gauze and rest of incision packed with 4x4 gauze. No surrounding erythema. Small bloody drainage present.      Abd soft throughout. Non-tender.                                                                                 PAIN SCALE:: 8       Again, thank you for allowing me to participate in the care of your patient.        Sincerely,        SIL Blum CNP

## 2024-10-28 NOTE — PROGRESS NOTES
Transplant Surgery -OUTPATIENT PROGRESS NOTE    Date of Visit: 10/28/2024    Transplants:  9/28/2024 (Liver); Postoperative day:  30  ASSESMENT AND PLAN:  Incisional wound:   Requiring BID packing. Patient's mother is helping but is reluctant. She is unable to use iodoform gauze in tunneling she is using 4x4 gauze.   Tramadol for PRN severe pain.     Date: October 28, 2024    Transplant:  [x]                             Liver [x]                              Kidney []                             Pancreas []                              Other:             Chief Complaint:Post-op Visit (Wound care)    History of Present Illness:  Here today for incisional wound care.   Eating and drinking well.   Worsening incisional pain. Not sleeping well.   Mom packed wound once daily in evening. Notes more bloody drainage.   Stools are soft. LBM last night.   Finished lasix course, leg edema much improved.       Patient Active Problem List   Diagnosis    Alcohol use disorder, severe, in early remission (H)    Alcoholic hepatitis (H)    Epiphora due to insufficient drainage of left side    Hyperbilirubinemia    Hypokalemia    Jaundice    Pneumonia    Liver replaced by transplant (H)    DENI (acute kidney injury) (H)    Immunosuppressed status (H)    Acute post-operative pain    Steroid-induced hyperglycemia    Acute urinary retention    Anemia due to blood loss, acute    Severe malnutrition (H)    Hyponatremia    Hypomagnesemia    Bilateral lower extremity edema    Hyperkalemia    Subconjunctival hemorrhage     SOCIAL /FAMILY HISTORY: [x]                  No recent change    Past Medical History:   Diagnosis Date    Alcohol use disorder     Alcoholic hepatitis (H) 06/07/2024    Anxiety     Depression     Hypertension     Liver replaced by transplant (H) 09/28/2024     Past Surgical History:   Procedure Laterality Date    BENCH LIVER  9/28/2024    Procedure: Bench liver;  Surgeon: Fernando Colon MD;  Location:  OR     DACRYOCYSTORHINOSTOMY Left 2013    INCISION AND DRAINAGE BUTTOCKS Left 2017    TRANSPLANT LIVER RECIPIENT  DONOR N/A 2024    Procedure: Transplant liver recipient  donor;  Surgeon: Fernando Colon MD;  Location:  OR     Social History     Socioeconomic History    Marital status: Single     Spouse name: Not on file    Number of children: Not on file    Years of education: Not on file    Highest education level: Not on file   Occupational History    Not on file   Tobacco Use    Smoking status: Never    Smokeless tobacco: Never   Substance and Sexual Activity    Alcohol use: Not Currently     Comment: last drink 2024    Drug use: Never    Sexual activity: Not on file   Other Topics Concern    Not on file   Social History Narrative    Not on file     Social Drivers of Health     Financial Resource Strain: Low Risk  (2024)    Financial Resource Strain     Within the past 12 months, have you or your family members you live with been unable to get utilities (heat, electricity) when it was really needed?: No   Food Insecurity: Low Risk  (2024)    Food Insecurity     Within the past 12 months, did you worry that your food would run out before you got money to buy more?: No     Within the past 12 months, did the food you bought just not last and you didn t have money to get more?: No   Transportation Needs: Low Risk  (2024)    Transportation Needs     Within the past 12 months, has lack of transportation kept you from medical appointments, getting your medicines, non-medical meetings or appointments, work, or from getting things that you need?: No   Physical Activity: Not on file   Stress: Not on file   Social Connections: Not on file   Interpersonal Safety: Low Risk  (10/16/2024)    Interpersonal Safety     Do you feel physically and emotionally safe where you currently live?: Yes     Within the past 12 months, have you been hit, slapped, kicked or otherwise  physically hurt by someone?: No     Within the past 12 months, have you been humiliated or emotionally abused in other ways by your partner or ex-partner?: No   Recent Concern: Interpersonal Safety - High Risk (9/22/2024)    Interpersonal Safety     Do you feel physically and emotionally safe where you currently live?: No     Within the past 12 months, have you been hit, slapped, kicked or otherwise physically hurt by someone?: No     Within the past 12 months, have you been humiliated or emotionally abused in other ways by your partner or ex-partner?: No   Housing Stability: Low Risk  (9/21/2024)    Housing Stability     Do you have housing? : Yes     Are you worried about losing your housing?: No     Prescription Medications as of 10/28/2024         Rx Number Disp Refills Start End Last Dispensed Date Next Fill Date Owning Pharmacy    acetaminophen (TYLENOL) 325 MG tablet  60 tablet 0 10/25/2024 --   [address] 3-810    Sig: Take 2 tablets (650 mg) by mouth every 8 hours as needed for mild pain or fever.    Class: E-Prescribe    Route: Oral    aspirin (ASA) 81 MG chewable tablet  30 tablet 1 10/11/2024 --   [address] [phone]    Sig: Take 1 tablet (81 mg) by mouth or Feeding Tube daily.    Class: E-Prescribe    Route: Oral or Feeding Tube    carboxymethylcellulose PF (REFRESH PLUS) 0.5 % ophthalmic solution  1 each 0 10/9/2024 --   [address]    Sig: Place 1 drop into both eyes 6 times daily.    Class: E-Prescribe    Route: Both Eyes    furosemide (LASIX) 20 MG tablet  7 tablet 0 10/9/2024 --   [address]    Sig: Take 1 tablet (20 mg) by mouth daily.    Class: E-Prescribe    Route: Oral    hydrOXYzine HCl (ATARAX) 25 MG tablet  20 tablet 0 10/25/2024 --   [facility] [address] 1-374    Sig: Take 1 tablet (25 mg) by mouth every 6 hours as needed for other or anxiety (adjuvant pain).    Class: E-Prescribe    Route: Oral    Lidocaine (LIDOCARE) 4 % Patch  20 patch 0 10/10/2024 --   [address] 1[address]614    Sig: Place 2 patches over 12 hours onto the skin every 24 hours. To prevent lidocaine toxicity, patient should be patch free for 12 hrs daily. Apply near incision, not on, can apply 1 patch to back as well.    Class: E-Prescribe    Route: Transdermal    magnesium oxide (MAG-OX) 400 MG tablet  120 tablet 1 10/11/2024 --   [address] 1-277    Sig: Take 2 tablets (800 mg) by mouth 2 times daily.    Class: E-Prescribe    Route: Oral    methocarbamol (ROBAXIN) 750 MG tablet  18 tablet 0 10/25/2024 --   [address] 1-623    Sig: Take 1 tablet (750 mg) by mouth 3 times daily as needed for muscle spasms.    Class: E-Prescribe    Route: Oral    multivitamin w/minerals (CERTAVITE/ANTIOXIDANTS) tablet  30 tablet 1 10/11/2024 --   [address] 1-367    Sig: Take 1 tablet by mouth daily.    Class: E-Prescribe    Route: Oral    mycophenolate (GENERIC EQUIVALENT) 250 MG capsule  180 capsule 1 10/11/2024 --   [address] 1-577    Sig: Take 3 capsules (750 mg) by mouth 2 times daily.    Class: E-Prescribe    Notes to Pharmacy: TXP DT 9/28/2024 (Liver) TXP Dischg DT 10/9/2024 DX Liver replaced by transplant Z94.4 [facility] ([place], [place])    Route: Oral    omeprazole (PRILOSEC) 20 MG DR capsule  30 capsule 2 10/11/2024 --   [facility]  [address] 1[address]239    Sig: Take 1 capsule (20 mg) by mouth daily.    Class: E-Prescribe    Route: Oral    oxyCODONE (ROXICODONE) 5 MG tablet  5 tablet 0 10/9/2024 --   [address]    Sig: Take 0.5-1 tablets (2.5-5 mg) by mouth every 8 hours as needed for moderate pain or severe pain.    Class: E-Prescribe    Earliest Fill Date: 10/9/2024    Route: Oral    polyethylene glycol (MIRALAX) 17 GM/Dose powder  510 g 0 10/9/2024 --   [address]    Sig: Take 17 g by mouth 2 times daily as needed for constipation.    Class: E-Prescribe    Route: Oral    predniSONE (DELTASONE) 5 MG tablet  30 tablet 1 10/11/2024 --   [address] 1-934    Sig: Take 1 tablet (5 mg) by mouth daily.    Class: E-Prescribe    Notes to Pharmacy: TXP DT 9/28/2024 (Liver) TXP Dischg DT 10/9/2024 DX Liver replaced by transplant Z94.4 [facility] ([place], [place])    Route: Oral    sennosides (SENOKOT) 8.6 MG tablet  60 tablet 0 10/9/2024 --   [address]    Sig: Take 2 tablets by mouth 2 times daily as needed for constipation.    Class: E-Prescribe    Route: Oral    tacrolimus (GENERIC EQUIVALENT) 0.5 MG capsule  60 capsule 1 10/24/2024 --   [address] 1[address]859    Sig: Take 1 capsule (0.5 mg) by mouth 2 times daily as needed (for dose changes).    Class: E-Prescribe    Notes to Pharmacy: TXP DT 9/28/2024 (Liver) TXP Dischg DT 10/9/2024 DX Liver replaced by transplant Z94.4 [facility] ([place], [place])    Route: Oral    tacrolimus (GENERIC EQUIVALENT) 1 MG capsule  240 capsule 1 10/24/2024 --   [place], [place]  - [address] [place] [address] 1-314    Sig: Take 1 capsule (1 mg) by mouth 2 times daily as needed (for dose changes).    Class: E-Prescribe    Notes to Pharmacy: TXP DT 9/28/2024 (Liver) TXP Dischg DT 10/9/2024 DX Liver [facility] Z94.[facility] [place]    Route: Oral    tacrolimus (GENERIC EQUIVALENT) 5 MG capsule  180 capsule 11 10/24/2024 --   [address] 1-803    Sig: Take 1 capsule (5 mg) by mouth every 12 hours.    Class: E-Prescribe    Notes to Pharmacy: Pt needs a fill as soon as possible    Route: Oral    valGANciclovir (VALCYTE) 450 MG tablet  30 tablet 1 10/11/2024 --   [address] 1-911    Sig: Take 1 tablet (450 mg) by mouth daily.    Class: E-Prescribe    Route: Oral          Azithromycin   REVIEW OF SYSTEMS (check box if normal)  [x]               GENERAL  [x]                 PULMONARY [x]                GENITOURINARY  [x]                CNS                 [x]                 CARDIAC  [x]                 ENDOCRINE  [x]                EARS,NOSE,THROAT [x]                 GASTROINTESTINAL [x]                 NEUROLOGIC    [x]                MUSCLOSKELTAL  [x]                  HEMATOLOGY      PHYSICAL EXAM (check box if normal)BP (!) 135/92   Pulse 93   Temp 98.3  F (36.8  C) (Oral)   Wt 103.7 kg (228 lb 11.2 oz)   SpO2 99%   BMI 31.02 kg/m          [x]            GENERAL: NAD    [x]            Incision: few staples intact throughout. Left side of incision mostly open, tunneling present midline and R distal incision. Tunneling packed with iodoform gauze and rest of incision packed with 4x4 gauze. No surrounding erythema. Small bloody drainage present.      Abd soft throughout. Non-tender.                                                                                 PAIN SCALE:: 8

## 2024-10-29 ENCOUNTER — TELEPHONE (OUTPATIENT)
Dept: TRANSPLANT | Facility: CLINIC | Age: 39
End: 2024-10-29
Payer: MEDICAID

## 2024-10-29 NOTE — TELEPHONE ENCOUNTER
Transplant Social Work Services Phone Call    Data: Jag is s/p  donor liver transplant  Intervention: I received a call from Sania requesting a call back. She noted that they received a bill from [employer] [address] [employer] [address]. It indicated that we paid $750. She spoke with Rosalio Brenner through S and they indicated that they would cover the remaining cost of lodging for them. We called Rosalio together (ph: [phone]). Rosalio reported that they will cover the cost. They need a letter and the invoice. This writer sent the letter and Sania requested that [employer] [facility] send the invoice to [email or web address].   Assessment: No new assessment  Education provided by SW: See above  Plan:This writer will be available for ongoing support.     ADA Palumbo, [facility]  Liver Transplant   [facility]  Phone: [phone]

## 2024-10-30 ENCOUNTER — OFFICE VISIT (OUTPATIENT)
Dept: TRANSPLANT | Facility: CLINIC | Age: 39
End: 2024-10-30
Attending: NURSE PRACTITIONER
Payer: MEDICAID

## 2024-10-30 ENCOUNTER — THERAPY VISIT (OUTPATIENT)
Dept: PHYSICAL THERAPY | Facility: CLINIC | Age: 39
End: 2024-10-30
Attending: TRANSPLANT SURGERY
Payer: MEDICAID

## 2024-10-30 VITALS
BODY MASS INDEX: 31.15 KG/M2 | DIASTOLIC BLOOD PRESSURE: 86 MMHG | HEART RATE: 92 BPM | RESPIRATION RATE: 16 BRPM | TEMPERATURE: 98.5 F | OXYGEN SATURATION: 99 % | SYSTOLIC BLOOD PRESSURE: 128 MMHG | WEIGHT: 229.7 LBS

## 2024-10-30 DIAGNOSIS — Z94.4 LIVER REPLACED BY TRANSPLANT (H): Primary | ICD-10-CM

## 2024-10-30 DIAGNOSIS — T14.8XXA SKIN WOUND FROM SURGICAL INCISION: Primary | ICD-10-CM

## 2024-10-30 PROCEDURE — 97530 THERAPEUTIC ACTIVITIES: CPT | Mod: GP | Performed by: PHYSICAL THERAPIST

## 2024-10-30 PROCEDURE — G0463 HOSPITAL OUTPT CLINIC VISIT: HCPCS | Performed by: NURSE PRACTITIONER

## 2024-10-30 PROCEDURE — 97535 SELF CARE MNGMENT TRAINING: CPT | Mod: GP | Performed by: PHYSICAL THERAPIST

## 2024-10-30 PROCEDURE — 99024 POSTOP FOLLOW-UP VISIT: CPT | Performed by: NURSE PRACTITIONER

## 2024-10-30 PROCEDURE — 97110 THERAPEUTIC EXERCISES: CPT | Mod: GP | Performed by: PHYSICAL THERAPIST

## 2024-10-30 ASSESSMENT — PAIN SCALES - GENERAL: PAINLEVEL_OUTOF10: SEVERE PAIN (7)

## 2024-10-30 NOTE — TELEPHONE ENCOUNTER
Call to patient to set him up with a YUSEF assessment. He would like to complete an assessment while he is here. We called scheduling together to schedule a substance use assessment.     Jag is scheduled on 11/14 at 1500 for a substance use assessment. This assessment will need to be completed to be referred/access to treatment in [place].

## 2024-10-30 NOTE — NURSING NOTE
Chief Complaint   Patient presents with    Follow Up     Wound care post transplant     Vital signs:  Temp: 98.5  F (36.9  C) Temp src: Oral BP: 128/86 Pulse: 92   Resp: 16 SpO2: 99 %       Weight: 104.2 kg (229 lb 11.2 oz)  Estimated body mass index is 31.15 kg/m  as calculated from the following:    Height as of 9/21/24: 1.829 m (6').    Weight as of this encounter: 104.2 kg (229 lb 11.2 oz).      Aniket Ray RN on 10/30/2024 at 11:48 AM

## 2024-10-30 NOTE — PATIENT INSTRUCTIONS
Abdominal Precautions:   BLT  Avoid Bending sideways or forward, as well as bending back stretching the stomach: Move your body to reach for items rather than bending to the side, bend your knees or use a reacher to pick items up from floor.    Avoid heavy Lifting of more than 10#  Avoid Twisting: Move your feet so that you are not twisting at your torso,  When getting into the car sit first and then move your whole body from side to forward together or in/out of bed make sure to log roll.    Pressure On your stomach:  Give yourself a hug or use a pillow to brace your abdomen if you feel a cough or sneeze coming on.  You could ask the transplant team if you could/should return to using an abdominal binder.    Safety:  Avoid the treadmill, focus on over ground walking with your walker. We don't want you to lose your balance because you might twist or reach when trying to recover causing strain to your incision area.  DON'T FALL.  Stand up slowly, make sure  you aren't dizzy before moving.  Avoid long hot showers as this can cause your blood pressure to drop and you will get dizzy/nauseous.  Take a warm, not hot shower, and try to keep to 5 minutes.  Sit if able. If you feel dizzy or nausea, turn the water to cold right away.

## 2024-10-30 NOTE — LETTER
10/30/2024      Jag Smith  [phone]  [facility] [address]      Dear Colleague,    Thank you for referring your patient, Jag Simth, to the [facility]. Please see a copy of my visit note below.    Transplant Surgery -OUTPATIENT PROGRESS NOTE    Date of Visit: 11/03/2024    Transplants:  9/28/2024 (Liver); Postoperative day:  36  ASSESMENT AND PLAN:  Incisional wound:   Stable. No s/s infection. Continue packing with 4x4 gauze BID.     Date: October 28, 2024    Transplant:  [x]                             Liver [x]                              Kidney []                             Pancreas []                              Other:             Chief Complaint:Follow Up (Wound care post transplant)    History of Present Illness:  Here today for incisional wound care.   No fever or N/V/D.       Patient Active Problem List   Diagnosis     Alcohol use disorder, severe, in early remission (H)     Alcoholic hepatitis (H)     Epiphora due to insufficient drainage of left side     Hyperbilirubinemia     Hypokalemia     Jaundice     Pneumonia     Liver replaced by transplant (H)     DENI (acute kidney injury) (H)     Immunosuppressed status (H)     Acute post-operative pain     Steroid-induced hyperglycemia     Acute urinary retention     Anemia due to blood loss, acute     Severe malnutrition (H)     Hyponatremia     Hypomagnesemia     Bilateral lower extremity edema     Hyperkalemia     Subconjunctival hemorrhage     SOCIAL /FAMILY HISTORY: [x]                  No recent change    Past Medical History:   Diagnosis Date     Alcohol use disorder      Alcoholic hepatitis (H) 06/07/2024     Anxiety      Depression      Hypertension      Liver replaced by transplant (H) 09/28/2024     Past Surgical History:   Procedure Laterality Date     BENCH LIVER  9/28/2024    Procedure: Bench liver;  Surgeon: Fernando Colon MD;  Location: [facility] OR     DACRYOCYSTORHINOSTOMY Left 06/14/2013     INCISION AND  DRAINAGE BUTTOCKS Left 2017     TRANSPLANT LIVER RECIPIENT  DONOR N/A 2024    Procedure: Transplant liver recipient  donor;  Surgeon: Fernando Colon MD;  Location:  OR     Social History     Socioeconomic History     Marital status: Single     Spouse name: Not on file     Number of children: Not on file     Years of education: Not on file     Highest education level: Not on file   Occupational History     Not on file   Tobacco Use     Smoking status: Never     Smokeless tobacco: Never   Substance and Sexual Activity     Alcohol use: Not Currently     Comment: last drink 2024     Drug use: Never     Sexual activity: Not on file   Other Topics Concern     Not on file   Social History Narrative     Not on file     Social Drivers of Health     Financial Resource Strain: Low Risk  (2024)    Financial Resource Strain      Within the past 12 months, have you or your family members you live with been unable to get utilities (heat, electricity) when it was really needed?: No   Food Insecurity: Low Risk  (2024)    Food Insecurity      Within the past 12 months, did you worry that your food would run out before you got money to buy more?: No      Within the past 12 months, did the food you bought just not last and you didn t have money to get more?: No   Transportation Needs: Low Risk  (2024)    Transportation Needs      Within the past 12 months, has lack of transportation kept you from medical appointments, getting your medicines, non-medical meetings or appointments, work, or from getting things that you need?: No   Physical Activity: Not on file   Stress: Not on file   Social Connections: Not on file   Interpersonal Safety: Low Risk  (10/16/2024)    Interpersonal Safety      Do you feel physically and emotionally safe where you currently live?: Yes      Within the past 12 months, have you been hit, slapped, kicked or otherwise physically hurt by someone?: No      Within  the past 12 months, have you been humiliated or emotionally abused in other ways by your partner or ex-partner?: No   Recent Concern: Interpersonal Safety - High Risk (9/22/2024)    Interpersonal Safety      Do you feel physically and emotionally safe where you currently live?: No      Within the past 12 months, have you been hit, slapped, kicked or otherwise physically hurt by someone?: No      Within the past 12 months, have you been humiliated or emotionally abused in other ways by your partner or ex-partner?: No   Housing Stability: Low Risk  (9/21/2024)    Housing Stability      Do you have housing? : Yes      Are you worried about losing your housing?: No     Prescription Medications as of 11/3/2024         Rx Number Disp Refills Start End Last Dispensed Date Next Fill Date Owning Pharmacy    acetaminophen (TYLENOL) 325 MG tablet  60 tablet 0 10/25/2024 --   [address] 1[address]133    Sig: Take 2 tablets (650 mg) by mouth every 8 hours as needed for mild pain or fever.    Class: E-Prescribe    Route: Oral    aspirin (ASA) 81 MG chewable tablet  30 tablet 1 11/1/2024 --   [address] 1-552    Sig: Take 1 tablet (81 mg) by mouth or Feeding Tube daily.    Class: E-Prescribe    Route: Oral or Feeding Tube    hydrOXYzine HCl (ATARAX) 25 MG tablet  20 tablet 0 10/25/2024 --   [address] 1-892    Sig: Take 1 tablet (25 mg) by mouth every 6 hours as needed for other or anxiety (adjuvant pain).    Class: E-Prescribe    Route: Oral    magnesium oxide (MAG-OX) 400 MG tablet  120 tablet 1 11/1/2024 --   [address] 2-745    Sig: Take 2 tablets (800 mg) by mouth 2 times daily.    Class: E-Prescribe    Route: Oral    methocarbamol (ROBAXIN) 750 MG tablet  18 tablet 0  10/25/2024 --   [address] 1-472    Sig: Take 1 tablet (750 mg) by mouth 3 times daily as needed for muscle spasms.    Class: E-Prescribe    Route: Oral    multivitamin w/minerals (CERTAVITE/ANTIOXIDANTS) tablet  30 tablet 1 11/1/2024 --   [address] 1-060    Sig: Take 1 tablet by mouth daily.    Class: E-Prescribe    Route: Oral    mycophenolate (GENERIC EQUIVALENT) 250 MG capsule  180 capsule 1 11/1/2024 --   [address] 1-653    Sig: Take 3 capsules (750 mg) by mouth 2 times daily.    Class: E-Prescribe    Notes to Pharmacy: TXP DT 9/28/2024 (Liver) TXP Dischg DT 10/9/2024 DX Liver replaced by transplant Z94.4 [facility] ([place], [place])    Route: Oral    omeprazole (PRILOSEC) 20 MG DR capsule  30 capsule 2 11/1/2024 --   [address] 1[address]461    Sig: Take 1 capsule (20 mg) by mouth daily.    Class: E-Prescribe    Route: Oral    polyethylene glycol (MIRALAX) 17 GM/Dose powder  510 g 0 10/9/2024 --   [place], [place] - [address] [place] [address]    Sig: Take 17 g by mouth 2 times daily as needed for constipation.    Class: E-Prescribe    Route: Oral    predniSONE (DELTASONE) 5 MG tablet  30 tablet 1 11/1/2024 --   [address] 1-990    Sig: Take 1 tablet (5 mg) by mouth daily.    Class: E-Prescribe    Notes to Pharmacy: TXP DT 9/28/2024 (Liver) TXP Dischg DT 10/9/2024 DX Liver replaced by transplant Z94.4 [facility] ([place], [place])    Route: Oral    sennosides (SENOKOT) 8.6 MG tablet  60 tablet 0 10/9/2024 --   [address]    Sig: Take 2 tablets by mouth 2 times daily as needed for constipation.    Class: E-Prescribe    Route: Oral    tacrolimus (GENERIC EQUIVALENT) 0.5 MG capsule  60 capsule 1 10/24/2024 --   [address] 1-937    Sig: Take 1 capsule (0.5 mg) by mouth 2 times daily as needed (for dose changes).    Class: E-Prescribe    Notes to Pharmacy: TXP DT 9/28/2024 (Liver) TXP Dischg DT 10/9/2024 DX Liver replaced by transplant Z94.4 TX [facility] ([place], [place])    Route: Oral    tacrolimus (GENERIC EQUIVALENT) 1 MG capsule  240 capsule 1 10/24/2024 --   [address] 1-578    Sig: Take 1 capsule (1 mg) by mouth 2 times daily as needed (for dose changes).    Class: E-Prescribe    Notes to Pharmacy: TXP DT 9/28/2024 (Liver) TXP Dischg DT 10/9/2024 DX Liver replaced by transplant Z94.4 TX [facility] ([place], [place])    Route: Oral    tacrolimus (GENERIC EQUIVALENT) 5 MG capsule  180 capsule 11 10/24/2024 --   [address] 1-646    Sig: Take 1 capsule (5 mg) by mouth every 12 hours.    Class: E-Prescribe    Notes to Pharmacy: Pt needs a fill as soon as possible    Route: Oral    traMADol (ULTRAM) 50 MG tablet (Ended)  10 tablet 0 10/28/2024 11/2/2024   [address] 1[address]561    Sig: Take 1 tablet (50 mg) by mouth 2 times daily as needed for severe pain.    Class: E-Prescribe    Route: Oral    valGANciclovir (VALCYTE) 450 MG tablet  30 tablet 1 11/1/2024 --   [address] 1[address]224    Sig: Take 1 tablet (450 mg) by mouth daily.    Class: E-Prescribe    Route: Oral          Azithromycin   REVIEW OF SYSTEMS (check box if  normal)  [x]               GENERAL  [x]                 PULMONARY [x]                GENITOURINARY  [x]                CNS                 [x]                 CARDIAC  [x]                 ENDOCRINE  [x]                EARS,NOSE,THROAT [x]                 GASTROINTESTINAL [x]                 NEUROLOGIC    [x]                MUSCLOSKELTAL  [x]                  HEMATOLOGY      PHYSICAL EXAM (check box if normal)/86 (BP Location: Right arm, Patient Position: Chair, Cuff Size: Adult Regular)   Pulse 92   Temp 98.5  F (36.9  C) (Oral)   Resp 16   Wt 104.2 kg (229 lb 11.2 oz)   SpO2 99%   BMI 31.15 kg/m          [x]            GENERAL: NAD    [x]            Incision: Staples removed with no issues. Left side of incision mostly open, tunneling present midline and R distal incision. Tunneling packed with iodoform gauze and rest of incision packed with 4x4 gauze. No surrounding erythema. Small bloody drainage present.      Abd soft throughout. Non-tender.                                                                                 PAIN SCALE:: 8       Again, thank you for allowing me to participate in the care of your patient.        Sincerely,        SIL Blum CNP

## 2024-10-31 ENCOUNTER — DOCUMENTATION ONLY (OUTPATIENT)
Dept: TRANSPLANT | Facility: CLINIC | Age: 39
End: 2024-10-31

## 2024-10-31 ENCOUNTER — LAB (OUTPATIENT)
Dept: LAB | Facility: CLINIC | Age: 39
End: 2024-10-31
Payer: MEDICAID

## 2024-10-31 ENCOUNTER — OFFICE VISIT (OUTPATIENT)
Dept: TRANSPLANT | Facility: CLINIC | Age: 39
End: 2024-10-31
Attending: TRANSPLANT SURGERY
Payer: MEDICAID

## 2024-10-31 VITALS
BODY MASS INDEX: 31.17 KG/M2 | WEIGHT: 229.8 LBS | OXYGEN SATURATION: 100 % | HEART RATE: 88 BPM | SYSTOLIC BLOOD PRESSURE: 133 MMHG | DIASTOLIC BLOOD PRESSURE: 88 MMHG | RESPIRATION RATE: 16 BRPM

## 2024-10-31 DIAGNOSIS — Z94.4 LIVER REPLACED BY TRANSPLANT (H): Primary | ICD-10-CM

## 2024-10-31 DIAGNOSIS — Z94.4 LIVER REPLACED BY TRANSPLANT (H): ICD-10-CM

## 2024-10-31 DIAGNOSIS — D84.9 IMMUNOSUPPRESSION (H): Primary | ICD-10-CM

## 2024-10-31 LAB
ALBUMIN SERPL BCG-MCNC: 4.3 G/DL (ref 3.5–5.2)
ALP SERPL-CCNC: 170 U/L (ref 40–150)
ALT SERPL W P-5'-P-CCNC: 28 U/L (ref 0–70)
ANION GAP SERPL CALCULATED.3IONS-SCNC: 11 MMOL/L (ref 7–15)
AST SERPL W P-5'-P-CCNC: 25 U/L (ref 0–45)
BILIRUB DIRECT SERPL-MCNC: 0.84 MG/DL (ref 0–0.3)
BILIRUB SERPL-MCNC: 1.4 MG/DL
BUN SERPL-MCNC: 22.9 MG/DL (ref 6–20)
CALCIUM SERPL-MCNC: 9.6 MG/DL (ref 8.8–10.4)
CHLORIDE SERPL-SCNC: 107 MMOL/L (ref 98–107)
CREAT SERPL-MCNC: 1.18 MG/DL (ref 0.67–1.17)
EGFRCR SERPLBLD CKD-EPI 2021: 82 ML/MIN/1.73M2
ERYTHROCYTE [DISTWIDTH] IN BLOOD BY AUTOMATED COUNT: 17 % (ref 10–15)
GLUCOSE SERPL-MCNC: 95 MG/DL (ref 70–99)
HCO3 SERPL-SCNC: 22 MMOL/L (ref 22–29)
HCT VFR BLD AUTO: 33.1 % (ref 40–53)
HGB BLD-MCNC: 10.4 G/DL (ref 13.3–17.7)
MAGNESIUM SERPL-MCNC: 1.5 MG/DL (ref 1.7–2.3)
MCH RBC QN AUTO: 30.4 PG (ref 26.5–33)
MCHC RBC AUTO-ENTMCNC: 31.4 G/DL (ref 31.5–36.5)
MCV RBC AUTO: 97 FL (ref 78–100)
PHOSPHATE SERPL-MCNC: 5.9 MG/DL (ref 2.5–4.5)
PLATELET # BLD AUTO: 249 10E3/UL (ref 150–450)
POTASSIUM SERPL-SCNC: 5 MMOL/L (ref 3.4–5.3)
PROT SERPL-MCNC: 7.2 G/DL (ref 6.4–8.3)
RBC # BLD AUTO: 3.42 10E6/UL (ref 4.4–5.9)
SODIUM SERPL-SCNC: 140 MMOL/L (ref 135–145)
TACROLIMUS BLD-MCNC: 9.5 UG/L (ref 5–15)
TME LAST DOSE: NORMAL H
TME LAST DOSE: NORMAL H
WBC # BLD AUTO: 7 10E3/UL (ref 4–11)

## 2024-10-31 PROCEDURE — 84100 ASSAY OF PHOSPHORUS: CPT | Performed by: PATHOLOGY

## 2024-10-31 PROCEDURE — 99213 OFFICE O/P EST LOW 20 MIN: CPT | Mod: 24 | Performed by: TRANSPLANT SURGERY

## 2024-10-31 PROCEDURE — 85027 COMPLETE CBC AUTOMATED: CPT | Performed by: PATHOLOGY

## 2024-10-31 PROCEDURE — 83735 ASSAY OF MAGNESIUM: CPT | Performed by: PATHOLOGY

## 2024-10-31 PROCEDURE — 99000 SPECIMEN HANDLING OFFICE-LAB: CPT | Performed by: PATHOLOGY

## 2024-10-31 PROCEDURE — 80053 COMPREHEN METABOLIC PANEL: CPT | Performed by: PATHOLOGY

## 2024-10-31 PROCEDURE — 80197 ASSAY OF TACROLIMUS: CPT | Performed by: TRANSPLANT SURGERY

## 2024-10-31 PROCEDURE — G0463 HOSPITAL OUTPT CLINIC VISIT: HCPCS | Performed by: TRANSPLANT SURGERY

## 2024-10-31 PROCEDURE — 36415 COLL VENOUS BLD VENIPUNCTURE: CPT | Performed by: PATHOLOGY

## 2024-10-31 PROCEDURE — 82248 BILIRUBIN DIRECT: CPT | Performed by: PATHOLOGY

## 2024-10-31 NOTE — NURSING NOTE
Chief Complaint   Patient presents with    Follow Up     Wound check       /88 (BP Location: Right arm, Patient Position: Sitting, Cuff Size: Adult Large)   Pulse 88   Resp 16   Wt 104.2 kg (229 lb 12.8 oz)   SpO2 100%   BMI 31.17 kg/m      MARIANNA MORRISON RN on 10/31/2024 at 9:00 AM

## 2024-10-31 NOTE — LETTER
10/31/2024      Jag Smith  [address]  [facility] [address]      Dear Colleague,    Thank you for referring your patient, Jag Smiht, to the [facility] TRANSPLANT [facility]. Please see a copy of my visit note below.    Transplant Surgery Progress Note    Transplants:  9/28/2024 (Liver);   S: overall doing better, wound still an issue, mom is in the ER this AM, appetite and activity improving    Transplant History:    Transplant Type:  Liver Tx  Donor Type:     Transplant Date:  9/28/2024 (Liver)   Biliary Stent:  No       Acute Rejection Hx:  No    Present Maintenance Immunosuppression:  Tacrolimus, Mycophenolate mofetil, and Prednisone    CMV IgG Ab Discordance:  No  EBV IgG Ab Discordance:  No    Transplant Coordinator: Karena Villegas     Transplant Office Phone Number: [phone]     Immunosuppressant Medications       Immunosuppressive Agents Disp Start End     mycophenolate (GENERIC EQUIVALENT) 250 MG capsule 180 capsule 11/1/2024 --    Sig - Route: Take 3 capsules (750 mg) by mouth 2 times daily. - Oral    Class: E-Prescribe    Notes to Pharmacy: TXP DT 9/28/2024 (Liver) TXP Dischg DT 10/9/2024 DX Liver replaced by transplant Z94.4 TX [facility] ([place], [place])     tacrolimus (GENERIC EQUIVALENT) 0.5 MG capsule 60 capsule 11/5/2024 --    Sig - Route: Take 1 capsule (0.5 mg) by mouth every 12 hours. - Oral    Class: E-Prescribe    Notes to Pharmacy: TXP DT 9/28/2024 (Liver) TXP Dischg DT 10/9/2024 DX Liver replaced by transplant Z94.4 [facility] ([place], [place])     tacrolimus (GENERIC EQUIVALENT) 1 MG capsule 240 capsule 11/5/2024 --    Sig - Route: Take 4 capsules (4 mg) by mouth every 12 hours. Total dose 4.5mg BID - Oral    Class: E-Prescribe    Notes to Pharmacy: TXP DT 9/28/2024 (Liver) TXP Dischg DT 10/9/2024 DX Liver replaced by transplant Z94.4 [facility]  [facility] ([place], [place])     tacrolimus (GENERIC EQUIVALENT) 5 MG capsule -- 11/5/2024 --    Sig - Route: Take 1 capsule (5 mg) by mouth 2 times daily as needed (for dose changes). - Oral    Class: Historical    Notes to Pharmacy: TXP DT 9/28/2024 (Liver) TXP Dischg DT 10/9/2024 DX Liver replaced by transplant Z94.4 TX [facility] ([place], [place])            Possible Immunosuppression-related side effects:   []             headache  []             vivid dreams  []             irritability  []             cognitive difficuties  []             fine tremor  []             nausea  []             diarrhea  []             neuropathy      []             edema  []             renal calcineurin toxicity  []             hyperkalemia  []             post-transplant diabetes  []             decreased appetite  []             increased appetite  []             other:  []             none    Prescription Medications as of 11/10/2024         Rx Number Disp Refills Start End Last Dispensed Date Next Fill Date Owning Pharmacy    acetaminophen (TYLENOL) 325 MG tablet  60 tablet 0 11/4/2024 --   [address] 6-800    Sig: Take 2 tablets (650 mg) by mouth every 8 hours as needed for mild pain or fever.    Class: E-Prescribe    Route: Oral    aspirin (ASA) 81 MG chewable tablet  30 tablet 1 11/1/2024 --   [address] 2-604    Sig: Take 1 tablet (81 mg) by mouth or Feeding Tube daily.    Class: E-Prescribe    Route: Oral or Feeding Tube    hydrOXYzine HCl (ATARAX) 25 MG tablet  20 tablet 0 11/4/2024 --   [address] 6-384    Sig: Take 1 tablet (25 mg) by mouth every 8 hours as needed for anxiety or other (adjuvant pain).    Class: E-Prescribe    Route: Oral    magnesium oxide (MAG-OX) 400 MG  tablet  120 tablet 1 11/1/2024 --   [address] 1-051    Sig: Take 2 tablets (800 mg) by mouth 2 times daily.    Class: E-Prescribe    Route: Oral    methocarbamol (ROBAXIN) 750 MG tablet  30 tablet 0 11/4/2024 --   [address] 1-747    Sig: Take 1 tablet (750 mg) by mouth 3 times daily as needed for muscle spasms.    Class: E-Prescribe    Route: Oral    multivitamin w/minerals (CERTAVITE/ANTIOXIDANTS) tablet  30 tablet 1 11/1/2024 --   [address] 1-722    Sig: Take 1 tablet by mouth daily.    Class: E-Prescribe    Route: Oral    mycophenolate (GENERIC EQUIVALENT) 250 MG capsule  180 capsule 1 11/1/2024 --   [address] 1-869    Sig: Take 3 capsules (750 mg) by mouth 2 times daily.    Class: E-Prescribe    Notes to Pharmacy: TXP DT 9/28/2024 (Liver) TXP Dischg DT 10/9/2024 DX Liver replaced by transplant Z94.4 TX [facility] ([place], [place])    Route: Oral    omeprazole (PRILOSEC) 20 MG DR capsule  30 capsule 2 11/1/2024 --   [address] 1-932    Sig: Take 1 capsule (20 mg) by mouth daily.    Class: E-Prescribe    Route: Oral    polyethylene glycol (MIRALAX) 17 GM/Dose powder  510 g 0 10/9/2024 --   [place], [place] - [facility]    Sig: Take 17 g by mouth 2 times daily as needed for constipation.    Class: E-Prescribe    Route: Oral    predniSONE (DELTASONE) 5 MG tablet  30 tablet 1 11/1/2024 --   [address] 1-077    Sig: Take 1 tablet (5 mg) by mouth daily.    Class: E-Prescribe    Notes to Pharmacy: TXP DT 9/28/2024 (Liver) TXP Dischg DT  10/9/2024 DX Liver replaced by transplant Z94.4 TX [facility] ([place], [place])    Route: Oral    sennosides (SENOKOT) 8.6 MG tablet  60 tablet 0 10/9/2024 --   [place], [place] - [address] [facility] [address]    Sig: Take 2 tablets by mouth 2 times daily as needed for constipation.    Class: E-Prescribe    Route: Oral    tacrolimus (GENERIC EQUIVALENT) 0.5 MG capsule  60 capsule 1 11/5/2024 --   [address] 1[address]142    Sig: Take 1 capsule (0.5 mg) by mouth every 12 hours.    Class: E-Prescribe    Notes to Pharmacy: TXP DT 9/28/2024 (Liver) TXP Dischg DT 10/9/2024 DX Liver replaced by transplant Z94.4 [facility] ([place], [place])    Route: Oral    tacrolimus (GENERIC EQUIVALENT) 1 MG capsule  240 capsule 1 11/5/2024 --   [address] 1[address]336    Sig: Take 4 capsules (4 mg) by mouth every 12 hours. Total dose 4.5mg BID    Class: E-Prescribe    Notes to Pharmacy: TXP DT 9/28/2024 (Liver) TXP Dischg DT 10/9/2024 DX Liver replaced by transplant Z94.4 TX [facility] ([place], [place])    Route: Oral    tacrolimus (GENERIC EQUIVALENT) 5 MG capsule  -- -- 11/5/2024 --       Sig: Take 1 capsule (5 mg) by mouth 2 times daily as needed (for dose changes).    Class: Historical    Notes to Pharmacy: TXP DT 9/28/2024 (Liver) TXP Dischg DT 10/9/2024 DX Liver replaced by transplant Z94.4 [facility] ([place], [place])    Route: Oral    traZODone (DESYREL) 50 MG tablet  30 tablet 0 11/7/2024 --   [address] 1-839    Sig: Take 1 tablet (50 mg) by mouth at bedtime.    Class: E-Prescribe    Route: Oral    valGANciclovir (VALCYTE) 450 MG tablet  30  "tablet 1 11/1/2024 --   [facility] [place], [place] - [address] [place] [address] [phone]    Sig: Take 1 tablet (450 mg) by mouth daily.    Class: E-Prescribe    Route: Oral            O:   [unfilled]        Latest Ref Rng & Units 11/7/2024     7:28 AM 11/4/2024     8:14 AM 10/31/2024     7:12 AM 10/28/2024     7:13 AM 10/24/2024     8:18 AM   Transplant Immunosuppression Labs   Creat 0.67 - 1.17 mg/dL 1.16  1.22  1.18  1.15  0.94    Urea Nitrogen 6.0 - 20.0 mg/dL 21.2  21.7  22.9  22.0  17.3    WBC 4.0 - 11.0 10e3/uL 8.5  10.0  7.0  7.2  7.3        Chemistries:   Recent Labs   Lab Test 11/07/24  0728   BUN 21.2*   CR 1.16   GFRESTIMATED 83   GLC 90     Lab Results   Component Value Date    A1C 5.4 09/28/2024     Recent Labs   Lab Test 11/07/24  0728   ALBUMIN 4.0   BILITOTAL 1.0   ALKPHOS 128   AST 14   ALT 19     Urine Studies:  Recent Labs   Lab Test 09/27/24 2113   COLOR Dark Yellow*   APPEARANCE Slightly Cloudy*   URINEGLC Negative   URINEBILI Large*   URINEKETONE Negative   SG 1.011   UBLD Negative   URINEPH 6.0   PROTEIN Negative   NITRITE Negative   LEUKEST Negative   RBCU 1   WBCU 5     No lab results found.  Hematology:   Recent Labs   Lab Test 11/07/24  0728 11/04/24  0814 10/31/24  0712   HGB 10.2* 10.0* 10.4*    191 249   WBC 8.5 10.0 7.0     Coags:   Recent Labs   Lab Test 10/01/24  0437 09/30/24  0529   INR 1.03 1.14     HLA antibodies:   No results found for: \"XB5UYQJZD\", \"SN0MNWEOBI\", \"PW8JZPPRM\", \"GK3RDSEOUX\"    Assessment: Jag Smith is doing well s/p Liver Tx:  Issues we addressed during his visit include:    Plan:    1. Graft function: LFTs stable  2. Immunosuppression Management: No change tac 8-12   .  Complexity of management:Low.  Contributing factors:  wound infection, packing twice daily    Followup: 1-2 weeks for visit but seeing dusty for wound checks frequently, would benefit from wound clinic visit, may need vac              Fernando Colon, " MD/PhD  Professor of Surgery      Again, thank you for allowing me to participate in the care of your patient.        Sincerely,        Fernando Colon MD

## 2024-11-01 ENCOUNTER — OFFICE VISIT (OUTPATIENT)
Dept: TRANSPLANT | Facility: CLINIC | Age: 39
End: 2024-11-01
Attending: NURSE PRACTITIONER
Payer: MEDICAID

## 2024-11-01 VITALS
SYSTOLIC BLOOD PRESSURE: 123 MMHG | WEIGHT: 230.1 LBS | BODY MASS INDEX: 31.21 KG/M2 | TEMPERATURE: 98 F | DIASTOLIC BLOOD PRESSURE: 79 MMHG | HEART RATE: 91 BPM | OXYGEN SATURATION: 100 %

## 2024-11-01 DIAGNOSIS — Z78.9 TAKES DIETARY SUPPLEMENTS: ICD-10-CM

## 2024-11-01 DIAGNOSIS — Z94.4 LIVER REPLACED BY TRANSPLANT (H): ICD-10-CM

## 2024-11-01 DIAGNOSIS — E83.42 HYPOMAGNESEMIA: ICD-10-CM

## 2024-11-01 DIAGNOSIS — K21.9 GASTROESOPHAGEAL REFLUX DISEASE, UNSPECIFIED WHETHER ESOPHAGITIS PRESENT: ICD-10-CM

## 2024-11-01 DIAGNOSIS — F32.9 CURRENT EPISODE OF MAJOR DEPRESSIVE DISORDER WITHOUT PRIOR EPISODE, UNSPECIFIED DEPRESSION EPISODE SEVERITY: Primary | ICD-10-CM

## 2024-11-01 PROCEDURE — G0463 HOSPITAL OUTPT CLINIC VISIT: HCPCS | Performed by: NURSE PRACTITIONER

## 2024-11-01 PROCEDURE — 99024 POSTOP FOLLOW-UP VISIT: CPT | Performed by: NURSE PRACTITIONER

## 2024-11-01 RX ORDER — FOLIC ACID/MV,IRON,MIN/LUTEIN 0.4-18-25
1 TABLET ORAL DAILY
Qty: 30 TABLET | Refills: 1 | Status: SHIPPED | OUTPATIENT
Start: 2024-11-01

## 2024-11-01 RX ORDER — PREDNISONE 5 MG/1
5 TABLET ORAL DAILY
Qty: 30 TABLET | Refills: 1 | Status: SHIPPED | OUTPATIENT
Start: 2024-11-01

## 2024-11-01 RX ORDER — MYCOPHENOLATE MOFETIL 250 MG/1
750 CAPSULE ORAL 2 TIMES DAILY
Qty: 180 CAPSULE | Refills: 1 | Status: SHIPPED | OUTPATIENT
Start: 2024-11-01

## 2024-11-01 RX ORDER — ASPIRIN 81 MG/1
81 TABLET, CHEWABLE ORAL DAILY
Qty: 30 TABLET | Refills: 1 | Status: SHIPPED | OUTPATIENT
Start: 2024-11-01

## 2024-11-01 RX ORDER — MAGNESIUM OXIDE 400 MG/1
800 TABLET ORAL 2 TIMES DAILY
Qty: 120 TABLET | Refills: 1 | Status: SHIPPED | OUTPATIENT
Start: 2024-11-01

## 2024-11-01 RX ORDER — VALGANCICLOVIR 450 MG/1
450 TABLET, FILM COATED ORAL DAILY
Qty: 30 TABLET | Refills: 1 | Status: SHIPPED | OUTPATIENT
Start: 2024-11-01

## 2024-11-01 ASSESSMENT — PAIN SCALES - GENERAL: PAINLEVEL_OUTOF10: SEVERE PAIN (7)

## 2024-11-01 NOTE — LETTER
11/1/2024      Jag Smith  [address]  [facility] [address]      Dear Colleague,    Thank you for referring your patient, Jag Smith, to the [facility] TRANSPL[facility]. Please see a copy of my visit note below.    Transplant Surgery -OUTPATIENT PROGRESS NOTE    Date of Visit: 11/03/2024    Transplants:  9/28/2024 (Liver); Postoperative day:  36  ASSESMENT AND PLAN:  Incisional wound:   Stable. No s/s infection. Continue packing with 4x4 gauze BID.     Depression: refer to mental health.     Date: October 28, 2024    Transplant:  [x]                             Liver [x]                              Kidney []                             Pancreas []                              Other:             Chief Complaint:Post-op Visit (Wound care)    History of Present Illness:  Here today for incisional wound care.   No fever or N/V/D.   Reports feeling depressed. No SI/HI.   Believes he was on lexapro and gabapentin prior to tx. Would consider meeting with therapist.     Patient Active Problem List   Diagnosis     Alcohol use disorder, severe, in early remission (H)     Alcoholic hepatitis (H)     Epiphora due to insufficient drainage of left side     Hyperbilirubinemia     Hypokalemia     Jaundice     Pneumonia     Liver replaced by transplant (H)     DENI (acute kidney injury) (H)     Immunosuppressed status (H)     Acute post-operative pain     Steroid-induced hyperglycemia     Acute urinary retention     Anemia due to blood loss, acute     Severe malnutrition (H)     Hyponatremia     Hypomagnesemia     Bilateral lower extremity edema     Hyperkalemia     Subconjunctival hemorrhage     SOCIAL /FAMILY HISTORY: [x]                  No recent change    Past Medical History:   Diagnosis Date     Alcohol use disorder      Alcoholic hepatitis (H) 06/07/2024     Anxiety      Depression      Hypertension      Liver replaced by transplant (H) 09/28/2024     Past Surgical History:   Procedure Laterality Date      BENCH LIVER  2024    Procedure: Bench liver;  Surgeon: Fernando Colon MD;  Location: [facility] OR     DACRYOCYSTORHINOSTOMY Left 2013     INCISION AND DRAINAGE BUTTOCKS Left 2017     TRANSPLANT LIVER RECIPIENT  DONOR N/A 2024    Procedure: Transplant liver recipient  donor;  Surgeon: Fernando Colon MD;  Location: [facility] OR     Social History     Socioeconomic History     Marital status: Single     Spouse name: Not on file     Number of children: Not on file     Years of education: Not on file     Highest education level: Not on file   Occupational History     Not on file   Tobacco Use     Smoking status: Never     Smokeless tobacco: Never   Substance and Sexual Activity     Alcohol use: Not Currently     Comment: last drink 2024     Drug use: Never     Sexual activity: Not on file   Other Topics Concern     Not on file   Social History Narrative     Not on file     Social Drivers of Health     Financial Resource Strain: Low Risk  (2024)    Financial Resource Strain      Within the past 12 months, have you or your family members you live with been unable to get utilities (heat, electricity) when it was really needed?: No   Food Insecurity: Low Risk  (2024)    Food Insecurity      Within the past 12 months, did you worry that your food would run out before you got money to buy more?: No      Within the past 12 months, did the food you bought just not last and you didn t have money to get more?: No   Transportation Needs: Low Risk  (2024)    Transportation Needs      Within the past 12 months, has lack of transportation kept you from medical appointments, getting your medicines, non-medical meetings or appointments, work, or from getting things that you need?: No   Physical Activity: Not on file   Stress: Not on file   Social Connections: Not on file   Interpersonal Safety: Low Risk  (10/16/2024)    Interpersonal Safety      Do you feel physically and emotionally safe  where you currently live?: Yes      Within the past 12 months, have you been hit, slapped, kicked or otherwise physically hurt by someone?: No      Within the past 12 months, have you been humiliated or emotionally abused in other ways by your partner or ex-partner?: No   Recent Concern: Interpersonal Safety - High Risk (9/22/2024)    Interpersonal Safety      Do you feel physically and emotionally safe where you currently live?: No      Within the past 12 months, have you been hit, slapped, kicked or otherwise physically hurt by someone?: No      Within the past 12 months, have you been humiliated or emotionally abused in other ways by your partner or ex-partner?: No   Housing Stability: Low Risk  (9/21/2024)    Housing Stability      Do you have housing? : Yes      Are you worried about losing your housing?: No     Prescription Medications as of 11/3/2024         Rx Number Disp Refills Start End Last Dispensed Date Next Fill Date Owning Pharmacy    acetaminophen (TYLENOL) 325 MG tablet  60 tablet 0 10/25/2024 --   [address] 1-769    Sig: Take 2 tablets (650 mg) by mouth every 8 hours as needed for mild pain or fever.    Class: E-Prescribe    Route: Oral    aspirin (ASA) 81 MG chewable tablet  30 tablet 1 11/1/2024 --   [address] 1-166    Sig: Take 1 tablet (81 mg) by mouth or Feeding Tube daily.    Class: E-Prescribe    Route: Oral or Feeding Tube    hydrOXYzine HCl (ATARAX) 25 MG tablet  20 tablet 0 10/25/2024 --   [address] 1-269    Sig: Take 1 tablet (25 mg) by mouth every 6 hours as needed for other or anxiety (adjuvant pain).    Class: E-Prescribe    Route: Oral    magnesium oxide (MAG-OX) 400 MG tablet  120 tablet 1 11/1/2024 --   [address]  1-273    Sig: Take 2 tablets (800 mg) by mouth 2 times daily.    Class: E-Prescribe    Route: Oral    methocarbamol (ROBAXIN) 750 MG tablet  18 tablet 0 10/25/2024 --   [address] 1-273    Sig: Take 1 tablet (750 mg) by mouth 3 times daily as needed for muscle spasms.    Class: E-Prescribe    Route: Oral    multivitamin w/minerals (CERTAVITE/ANTIOXIDANTS) tablet  30 tablet 1 11/1/2024 --   [address] 1-273    Sig: Take 1 tablet by mouth daily.    Class: E-Prescribe    Route: Oral    mycophenolate (GENERIC EQUIVALENT) 250 MG capsule  180 capsule 1 11/1/2024 --   [address] 1-273    Sig: Take 3 capsules (750 mg) by mouth 2 times daily.    Class: E-Prescribe    Notes to Pharmacy: TXP DT 9/28/2024 (Liver) TXP Dischg DT 10/9/2024 DX Liver replaced by transplant Z94.4 [facility] ([place], [place])    Route: Oral    omeprazole (PRILOSEC) 20 MG DR capsule  30 capsule 2 11/1/2024 --   [address] 1-273    Sig: Take 1 capsule (20 mg) by mouth daily.    Class: E-Prescribe    Route: Oral    polyethylene glycol (MIRALAX) 17 GM/Dose powder  510 g 0 10/9/2024 --   [place], [place] - [address] [facility] [address]    Sig: Take 17 g by mouth 2 times daily as needed for constipation.    Class: E-Prescribe    Route: Oral    predniSONE (DELTASONE) 5 MG tablet  30 tablet 1 11/1/2024 --   [address] 1-273    Sig: Take 1 tablet (5 mg) by mouth daily.    Class: E-Prescribe    Notes to Pharmacy: TXP DT 9/28/2024 (Liver) TXP Dischg DT 10/9/2024 DX Liver replaced by transplant Z94.4 [facility]  ([place], [place])    Route: Oral    sennosides (SENOKOT) 8.6 MG tablet  60 tablet 0 10/9/2024 --   [facility] [place], [place] - [address] [facility]    Sig: Take 2 tablets by mouth 2 times daily as needed for constipation.    Class: E-Prescribe    Route: Oral    tacrolimus (GENERIC EQUIVALENT) 0.5 MG capsule  60 capsule 1 10/24/2024 --   [address] 1-179    Sig: Take 1 capsule (0.5 mg) by mouth 2 times daily as needed (for dose changes).    Class: E-Prescribe    Notes to Pharmacy: TXP DT 9/28/2024 (Liver) TXP Dischg DT 10/9/2024 DX Liver replaced by transplant Z94.4 TX [facility] ([place], [place])    Route: Oral    tacrolimus (GENERIC EQUIVALENT) 1 MG capsule  240 capsule 1 10/24/2024 --   [address] 9-131    Sig: Take 1 capsule (1 mg) by mouth 2 times daily as needed (for dose changes).    Class: E-Prescribe    Notes to Pharmacy: TXP DT 9/28/2024 (Liver) TXP Dischg DT 10/9/2024 DX Liver replaced by transplant Z94.4 TX [facility] ([place], [place])    Route: Oral    tacrolimus (GENERIC EQUIVALENT) 5 MG capsule  180 capsule 11 10/24/2024 --   [address] 1-020    Sig: Take 1 capsule (5 mg) by mouth every 12 hours.    Class: E-Prescribe    Notes to Pharmacy: Pt needs a fill as soon as possible    Route: Oral    traMADol (ULTRAM) 50 MG tablet (Ended)  10 tablet 0 10/28/2024 11/2/2024   [address] 1-083    Sig: Take 1 tablet (50 mg) by mouth 2 times daily as needed for severe pain.    Class: E-Prescribe    Route: Oral    valGANciclovir (VALCYTE) 450 MG tablet  30 tablet 1 11/1/2024 --   [address] 1-836    Sig: Take 1 tablet (450 mg) by mouth daily.    Class: E-Prescribe    Route: Oral          Azithromycin   REVIEW OF SYSTEMS (check box if normal)  [x]               GENERAL  [x]                 PULMONARY [x]                GENITOURINARY  [x]                CNS                 [x]                 CARDIAC  [x]                 ENDOCRINE  [x]                EARS,NOSE,THROAT [x]                 GASTROINTESTINAL [x]                 NEUROLOGIC    [x]                MUSCLOSKELTAL  [x]                  HEMATOLOGY      PHYSICAL EXAM (check box if normal)/79   Pulse 91   Temp 98  F (36.7  C) (Oral)   Wt 104.4 kg (230 lb 1.6 oz)   SpO2 100%   BMI 31.21 kg/m          [x]            GENERAL: NAD    [x]            Incision:  Left side of incision mostly open, tunneling present midline and R distal incision. Tunneling packed with iodoform gauze and rest of incision packed with 4x4 gauze with no issues. No surrounding erythema. Small bloody drainage present.      Abd soft throughout. Non-tender.                                                                                 PAIN SCALE:: 8       Again, thank you for allowing me to participate in the care of your patient.        Sincerely,        SIL Blum CNP

## 2024-11-02 ENCOUNTER — HEALTH MAINTENANCE LETTER (OUTPATIENT)
Age: 39
End: 2024-11-02

## 2024-11-04 ENCOUNTER — TELEPHONE (OUTPATIENT)
Dept: PHARMACY | Facility: CLINIC | Age: 39
End: 2024-11-04

## 2024-11-04 ENCOUNTER — OFFICE VISIT (OUTPATIENT)
Dept: TRANSPLANT | Facility: CLINIC | Age: 39
End: 2024-11-04
Attending: TRANSPLANT SURGERY
Payer: MEDICAID

## 2024-11-04 ENCOUNTER — LAB (OUTPATIENT)
Dept: LAB | Facility: CLINIC | Age: 39
End: 2024-11-04
Attending: TRANSPLANT SURGERY
Payer: MEDICAID

## 2024-11-04 ENCOUNTER — TELEPHONE (OUTPATIENT)
Dept: TRANSPLANT | Facility: CLINIC | Age: 39
End: 2024-11-04

## 2024-11-04 VITALS
BODY MASS INDEX: 31.97 KG/M2 | TEMPERATURE: 98.4 F | SYSTOLIC BLOOD PRESSURE: 131 MMHG | OXYGEN SATURATION: 97 % | WEIGHT: 235.7 LBS | RESPIRATION RATE: 16 BRPM | DIASTOLIC BLOOD PRESSURE: 88 MMHG | HEART RATE: 98 BPM

## 2024-11-04 DIAGNOSIS — Z94.4 LIVER REPLACED BY TRANSPLANT (H): ICD-10-CM

## 2024-11-04 DIAGNOSIS — T14.8XXA SKIN WOUND FROM SURGICAL INCISION: Primary | ICD-10-CM

## 2024-11-04 DIAGNOSIS — G89.18 ACUTE POST-OPERATIVE PAIN: ICD-10-CM

## 2024-11-04 LAB
ALBUMIN SERPL BCG-MCNC: 4.1 G/DL (ref 3.5–5.2)
ALP SERPL-CCNC: 135 U/L (ref 40–150)
ALT SERPL W P-5'-P-CCNC: 29 U/L (ref 0–70)
ANION GAP SERPL CALCULATED.3IONS-SCNC: 10 MMOL/L (ref 7–15)
AST SERPL W P-5'-P-CCNC: 25 U/L (ref 0–45)
BILIRUB DIRECT SERPL-MCNC: 0.8 MG/DL (ref 0–0.3)
BILIRUB SERPL-MCNC: 1.4 MG/DL
BUN SERPL-MCNC: 21.7 MG/DL (ref 6–20)
CALCIUM SERPL-MCNC: 9.5 MG/DL (ref 8.8–10.4)
CHLORIDE SERPL-SCNC: 106 MMOL/L (ref 98–107)
CREAT SERPL-MCNC: 1.22 MG/DL (ref 0.67–1.17)
EGFRCR SERPLBLD CKD-EPI 2021: 78 ML/MIN/1.73M2
ERYTHROCYTE [DISTWIDTH] IN BLOOD BY AUTOMATED COUNT: 16.7 % (ref 10–15)
GLUCOSE SERPL-MCNC: 118 MG/DL (ref 70–99)
HCO3 SERPL-SCNC: 22 MMOL/L (ref 22–29)
HCT VFR BLD AUTO: 30.5 % (ref 40–53)
HGB BLD-MCNC: 10 G/DL (ref 13.3–17.7)
MAGNESIUM SERPL-MCNC: 1.6 MG/DL (ref 1.7–2.3)
MCH RBC QN AUTO: 30.6 PG (ref 26.5–33)
MCHC RBC AUTO-ENTMCNC: 32.8 G/DL (ref 31.5–36.5)
MCV RBC AUTO: 93 FL (ref 78–100)
PHOSPHATE SERPL-MCNC: 5.8 MG/DL (ref 2.5–4.5)
PLATELET # BLD AUTO: 191 10E3/UL (ref 150–450)
POTASSIUM SERPL-SCNC: 4.8 MMOL/L (ref 3.4–5.3)
PROT SERPL-MCNC: 7 G/DL (ref 6.4–8.3)
RBC # BLD AUTO: 3.27 10E6/UL (ref 4.4–5.9)
SODIUM SERPL-SCNC: 138 MMOL/L (ref 135–145)
TACROLIMUS BLD-MCNC: 11.2 UG/L (ref 5–15)
TME LAST DOSE: NORMAL H
TME LAST DOSE: NORMAL H
WBC # BLD AUTO: 10 10E3/UL (ref 4–11)

## 2024-11-04 PROCEDURE — 85027 COMPLETE CBC AUTOMATED: CPT | Performed by: PATHOLOGY

## 2024-11-04 PROCEDURE — 80053 COMPREHEN METABOLIC PANEL: CPT | Performed by: PATHOLOGY

## 2024-11-04 PROCEDURE — G0463 HOSPITAL OUTPT CLINIC VISIT: HCPCS | Performed by: NURSE PRACTITIONER

## 2024-11-04 PROCEDURE — 84100 ASSAY OF PHOSPHORUS: CPT | Performed by: PATHOLOGY

## 2024-11-04 PROCEDURE — 36415 COLL VENOUS BLD VENIPUNCTURE: CPT | Performed by: PATHOLOGY

## 2024-11-04 PROCEDURE — 99024 POSTOP FOLLOW-UP VISIT: CPT | Performed by: NURSE PRACTITIONER

## 2024-11-04 PROCEDURE — 80197 ASSAY OF TACROLIMUS: CPT | Performed by: TRANSPLANT SURGERY

## 2024-11-04 PROCEDURE — 99000 SPECIMEN HANDLING OFFICE-LAB: CPT | Performed by: PATHOLOGY

## 2024-11-04 PROCEDURE — 82248 BILIRUBIN DIRECT: CPT | Performed by: PATHOLOGY

## 2024-11-04 PROCEDURE — 83735 ASSAY OF MAGNESIUM: CPT | Performed by: PATHOLOGY

## 2024-11-04 RX ORDER — HYDROXYZINE HYDROCHLORIDE 25 MG/1
25 TABLET, FILM COATED ORAL EVERY 8 HOURS PRN
Qty: 20 TABLET | Refills: 0 | Status: SHIPPED | OUTPATIENT
Start: 2024-11-04

## 2024-11-04 RX ORDER — METHOCARBAMOL 750 MG/1
750 TABLET, FILM COATED ORAL 3 TIMES DAILY PRN
Qty: 30 TABLET | Refills: 0 | Status: SHIPPED | OUTPATIENT
Start: 2024-11-04

## 2024-11-04 RX ORDER — TRAMADOL HYDROCHLORIDE 50 MG/1
50 TABLET ORAL 2 TIMES DAILY PRN
Qty: 8 TABLET | Refills: 0 | Status: SHIPPED | OUTPATIENT
Start: 2024-11-04 | End: 2024-11-08

## 2024-11-04 RX ORDER — ACETAMINOPHEN 325 MG/1
650 TABLET ORAL EVERY 8 HOURS PRN
Qty: 60 TABLET | Refills: 0 | Status: SHIPPED | OUTPATIENT
Start: 2024-11-04

## 2024-11-04 NOTE — TELEPHONE ENCOUNTER
Clinical Pharmacy Consult:                                                      Transplant Specific: 1 month post transplant pharmacy review  Date of Transplant: 9/28/2024  Type of Transplant: liver  First Transplant: yes  History of rejection: no    Immunosuppression Regimen   Tacrolimus 5 mg qAM & 5 mg qPM, Prednisone 5 mg once daily, and Mycophenolate  mg qAM & 750 mg qPM  Patient specific goal: 8 to 10  Most recent level: 9.5 on 10/30/2024  Immunosuppressant Levels:  Therapeutic  Pt adherent to lab draws: yes  Scr:   Lab Results   Component Value Date    CR 2.05 10/01/2024     Side effects: no side effects    Prophylactic Medications  PJP Prophylactic: Bactrim SS daily  Scheduled Discontinue Date: 6 months Anticipated date 3/28/2025    CMV Prophylactic: Max dose in Liver transplant is Valcyte 450mg once daily   Scheduled Discontinue Date: 3 months Anticipated date 12/28/2024    Acid Reducer: Protonix (pantoprazole)  Scheduled Reviewed Date:  PCP to evaluate    Vascular Prophylactic: Aspirin 81 mg PO daily  Scheduled Discontinue Date:  PCP to evaluate    Blood Pressure Management:   Patient blood pressure goal: <140/90  Frequency of home blood pressure checks: once daily.   Most recent home BP: 131/88.   Patient blood pressure at goal: yes  Hospitalizations/ER visits since last assessment: 0.     Med rec/DUR performed: yes.   Med rec discrepancies: no.    Spoke with Jag today via phone, overall feeling okay. Appears to be tolerating his immunosuppression regimen well. Denies N/V/D, brain fog, tremors. Last tacrolimus level at goal.     He is using the medication card and keeping it up to date. He is using a pill organizer. His mother is arranging it for him.  He takes medications at 0800/2000. Denies missed doses. Uses phone alarms as a reminder aide. No problems obtaining medication.     BP: at goal, not on medication.   GERD: stable, no complaints from patient.     No questions for care team today. Next  pharmacy review in 1 month.     Time Spent: 15 minutes.     Georges Abebe, PharmD, BCACP  [facility]  [address] [place] [id number]  Specialty - [phone]  Transplant - [phone]  Mail - [phone]

## 2024-11-04 NOTE — TELEPHONE ENCOUNTER
ISSUE:   Tacrolimus IR level 11.2 on 11/04/24, goal 8-10, dose 5 mg BID.    PLAN:   Call Patient and confirm this was an accurate 12-hour trough.   Verify Tacrolimus IR dose 5 mg BID.   Confirm no new medications or illness.   Confirm no missed doses.     If accurate trough and accurate dose, decrease Tacrolimus IR dose to 4.5 mg BID.    Repeat labs on Thursday.    Karena Villegas RN     OUTCOME:   Spoke with Patient, they confirm accurate trough level and current dose 5 mg BID.   Patient confirmed dose change to 4.5 mg BID.  Patient agreed to repeat labs in 3 days.   Orders sent to preferred pharmacy for dose change and lab for repeat labs.   Patient voiced understanding of plan.     Magda Gant LPN

## 2024-11-04 NOTE — PROGRESS NOTES
Transplant Surgery -OUTPATIENT PROGRESS NOTE    Date of Visit: 2024    Transplants:  2024 (Liver); Postoperative day:  37  ASSESMENT AND PLAN:  Incisional wound:   Stable. No s/s infection. Continue packing with 4x4 gauze BID.   Discussed pain control.       Date: 2024    Transplant:  [x]                             Liver [x]                              Kidney []                             Pancreas []                              Other:             Chief Complaint:Follow Up (Kidney transplant follow-up)    History of Present Illness:  Here today for incisional wound care. Mother is changed BID. Still with moderate pain.   No fever or N/V/D.         Patient Active Problem List   Diagnosis    Alcohol use disorder, severe, in early remission (H)    Alcoholic hepatitis (H)    Epiphora due to insufficient drainage of left side    Hyperbilirubinemia    Hypokalemia    Jaundice    Pneumonia    Liver replaced by transplant (H)    DENI (acute kidney injury) (H)    Immunosuppressed status (H)    Acute post-operative pain    Steroid-induced hyperglycemia    Acute urinary retention    Anemia due to blood loss, acute    Severe malnutrition (H)    Hyponatremia    Hypomagnesemia    Bilateral lower extremity edema    Hyperkalemia    Subconjunctival hemorrhage     SOCIAL /FAMILY HISTORY: [x]                  No recent change    Past Medical History:   Diagnosis Date    Alcohol use disorder     Alcoholic hepatitis (H) 2024    Anxiety     Depression     Hypertension     Liver replaced by transplant (H) 2024     Past Surgical History:   Procedure Laterality Date    BENCH LIVER  2024    Procedure: Bench liver;  Surgeon: Fernando Colon MD;  Location: [facility] OR    DACRYOCYSTORHINOSTOMY Left 2013    INCISION AND DRAINAGE BUTTOCKS Left 2017    TRANSPLANT LIVER RECIPIENT  DONOR N/A 2024    Procedure: Transplant liver recipient  donor;  Surgeon: Fernando Colon MD;  Location:  [facility] OR     Social History     Socioeconomic History    Marital status: Single     Spouse name: Not on file    Number of children: Not on file    Years of education: Not on file    Highest education level: Not on file   Occupational History    Not on file   Tobacco Use    Smoking status: Never    Smokeless tobacco: Never   Substance and Sexual Activity    Alcohol use: Not Currently     Comment: last drink june 7th, 2024    Drug use: Never    Sexual activity: Not on file   Other Topics Concern    Not on file   Social History Narrative    Not on file     Social Drivers of Health     Financial Resource Strain: Low Risk  (9/21/2024)    Financial Resource Strain     Within the past 12 months, have you or your family members you live with been unable to get utilities (heat, electricity) when it was really needed?: No   Food Insecurity: Low Risk  (9/21/2024)    Food Insecurity     Within the past 12 months, did you worry that your food would run out before you got money to buy more?: No     Within the past 12 months, did the food you bought just not last and you didn t have money to get more?: No   Transportation Needs: Low Risk  (9/21/2024)    Transportation Needs     Within the past 12 months, has lack of transportation kept you from medical appointments, getting your medicines, non-medical meetings or appointments, work, or from getting things that you need?: No   Physical Activity: Not on file   Stress: Not on file   Social Connections: Not on file   Interpersonal Safety: Low Risk  (10/16/2024)    Interpersonal Safety     Do you feel physically and emotionally safe where you currently live?: Yes     Within the past 12 months, have you been hit, slapped, kicked or otherwise physically hurt by someone?: No     Within the past 12 months, have you been humiliated or emotionally abused in other ways by your partner or ex-partner?: No   Recent Concern: Interpersonal Safety - High Risk (9/22/2024)    Interpersonal Safety     Do  you feel physically and emotionally safe where you currently live?: No     Within the past 12 months, have you been hit, slapped, kicked or otherwise physically hurt by someone?: No     Within the past 12 months, have you been humiliated or emotionally abused in other ways by your partner or ex-partner?: No   Housing Stability: Low Risk  (9/21/2024)    Housing Stability     Do you have housing? : Yes     Are you worried about losing your housing?: No     Prescription Medications as of 11/4/2024         Rx Number Disp Refills Start End Last Dispensed Date Next Fill Date Owning Pharmacy    acetaminophen (TYLENOL) 325 MG tablet  60 tablet 0 10/25/2024 --   [address] 1-814    Sig: Take 2 tablets (650 mg) by mouth every 8 hours as needed for mild pain or fever.    Class: E-Prescribe    Route: Oral    aspirin (ASA) 81 MG chewable tablet  30 tablet 1 11/1/2024 --   [address] 1-083    Sig: Take 1 tablet (81 mg) by mouth or Feeding Tube daily.    Class: E-Prescribe    Route: Oral or Feeding Tube    hydrOXYzine HCl (ATARAX) 25 MG tablet  20 tablet 0 10/25/2024 --   [address] 1-662    Sig: Take 1 tablet (25 mg) by mouth every 6 hours as needed for other or anxiety (adjuvant pain).    Class: E-Prescribe    Route: Oral    magnesium oxide (MAG-OX) 400 MG tablet  120 tablet 1 11/1/2024 --   [address] 1-215    Sig: Take 2 tablets (800 mg) by mouth 2 times daily.    Class: E-Prescribe    Route: Oral    methocarbamol (ROBAXIN) 750 MG tablet  18 tablet 0 10/25/2024 --   [address] 1-724    Sig: Take 1 tablet (750 mg) by mouth 3 times daily as needed for muscle spasms.    Class: E-Prescribe    Route: Oral     multivitamin w/minerals (CERTAVITE/ANTIOXIDANTS) tablet  30 tablet 1 11/1/2024 --   [address] 1-752    Sig: Take 1 tablet by mouth daily.    Class: E-Prescribe    Route: Oral    mycophenolate (GENERIC EQUIVALENT) 250 MG capsule  180 capsule 1 11/1/2024 --   [address] 1-104    Sig: Take 3 capsules (750 mg) by mouth 2 times daily.    Class: E-Prescribe    Notes to Pharmacy: TXP DT 9/28/2024 (Liver) TXP Dischg DT 10/9/2024 DX Liver replaced by transplant Z94.4 TX [facility] ([place], [place])    Route: Oral    omeprazole (PRILOSEC) 20 MG DR capsule  30 capsule 2 11/1/2024 --   [address] 1-419    Sig: Take 1 capsule (20 mg) by mouth daily.    Class: E-Prescribe    Route: Oral    polyethylene glycol (MIRALAX) 17 GM/Dose powder  510 g 0 10/9/2024 --   [place], [place] - [address] [place] [address]    Sig: Take 17 g by mouth 2 times daily as needed for constipation.    Class: E-Prescribe    Route: Oral    predniSONE (DELTASONE) 5 MG tablet  30 tablet 1 11/1/2024 --   [address] 1-756    Sig: Take 1 tablet (5 mg) by mouth daily.    Class: E-Prescribe    Notes to Pharmacy: TXP DT 9/28/2024 (Liver) TXP Dischg DT 10/9/2024 DX Liver replaced by transplant Z94.4 [facility] ([place], [place])    Route: Oral    sennosides (SENOKOT) 8.6 MG tablet  60 tablet 0 10/9/2024 --   [place], [place] - [address] [place] [address] St     Sig: Take 2 tablets by mouth 2 times daily as needed for constipation.    Class: E-Prescribe    Route: Oral    tacrolimus (GENERIC EQUIVALENT) 0.5 MG capsule  60 capsule 1 10/24/2024 --   [facility]  [address] [phone]    Sig: Take 1 capsule (0.5 mg) by mouth 2 times daily as needed (for dose changes).    Class: E-Prescribe    Notes to Pharmacy: TXP DT 9/28/2024 (Liver) TXP Dischg DT 10/9/2024 DX Liver replaced by transplant Z94.4 TX [facility] ([place], [place])    Route: Oral    tacrolimus (GENERIC EQUIVALENT) 1 MG capsule  240 capsule 1 10/24/2024 --   [address] 5-513    Sig: Take 1 capsule (1 mg) by mouth 2 times daily as needed (for dose changes).    Class: E-Prescribe    Notes to Pharmacy: TXP DT 9/28/2024 (Liver) TXP Dischg DT 10/9/2024 DX Liver replaced by transplant Z94.4 [facility] ([place], [place])    Route: Oral    tacrolimus (GENERIC EQUIVALENT) 5 MG capsule  180 capsule 11 10/24/2024 --   [address] 1-418    Sig: Take 1 capsule (5 mg) by mouth every 12 hours.    Class: E-Prescribe    Notes to Pharmacy: Pt needs a fill as soon as possible    Route: Oral    valGANciclovir (VALCYTE) 450 MG tablet  30 tablet 1 11/1/2024 --   [address] 3-357    Sig: Take 1 tablet (450 mg) by mouth daily.    Class: E-Prescribe    Route: Oral          Azithromycin   REVIEW OF SYSTEMS (check box if normal)  [x]               GENERAL  [x]                 PULMONARY [x]                GENITOURINARY  [x]                CNS                 [x]                 CARDIAC  [x]                 ENDOCRINE  [x]                EARS,NOSE,THROAT [x]                 GASTROINTESTINAL [x]                 NEUROLOGIC    [x]                MUSCLOSKELTAL  [x]                  HEMATOLOGY      PHYSICAL EXAM (check box if normal)/88 (BP Location: Right arm, Patient Position: Chair, Cuff Size: Adult Regular)    Pulse 98   Temp 98.4  F (36.9  C) (Oral)   Resp 16   Wt 106.9 kg (235 lb 11.2 oz)   SpO2 97%   BMI 31.97 kg/m          [x]            GENERAL: NAD    [x]            Incision:  Left side of incision mostly open, tunneling present midline and R distal incision. Tunneling packed with iodoform gauze and rest of incision packed with 4x4 gauze with no issues. No surrounding erythema. Small bloody drainage present.      Abd soft throughout. Non-tender.                                                                                 PAIN SCALE:: 8

## 2024-11-04 NOTE — PROGRESS NOTES
Transplant Surgery -OUTPATIENT PROGRESS NOTE    Date of Visit: 2024    Transplants:  2024 (Liver); Postoperative day:  36  ASSESMENT AND PLAN:  Incisional wound:   Stable. No s/s infection. Continue packing with 4x4 gauze BID.     Depression: refer to mental health.     Date: 2024    Transplant:  [x]                             Liver [x]                              Kidney []                             Pancreas []                              Other:             Chief Complaint:Post-op Visit (Wound care)    History of Present Illness:  Here today for incisional wound care.   No fever or N/V/D.   Reports feeling depressed. No SI/HI.   Believes he was on lexapro and gabapentin prior to tx. Would consider meeting with therapist.     Patient Active Problem List   Diagnosis    Alcohol use disorder, severe, in early remission (H)    Alcoholic hepatitis (H)    Epiphora due to insufficient drainage of left side    Hyperbilirubinemia    Hypokalemia    Jaundice    Pneumonia    Liver replaced by transplant (H)    DENI (acute kidney injury) (H)    Immunosuppressed status (H)    Acute post-operative pain    Steroid-induced hyperglycemia    Acute urinary retention    Anemia due to blood loss, acute    Severe malnutrition (H)    Hyponatremia    Hypomagnesemia    Bilateral lower extremity edema    Hyperkalemia    Subconjunctival hemorrhage     SOCIAL /FAMILY HISTORY: [x]                  No recent change    Past Medical History:   Diagnosis Date    Alcohol use disorder     Alcoholic hepatitis (H) 2024    Anxiety     Depression     Hypertension     Liver replaced by transplant (H) 2024     Past Surgical History:   Procedure Laterality Date    BENCH LIVER  2024    Procedure: Bench liver;  Surgeon: Fernando Colon MD;  Location: [facility] OR    DACRYOCYSTORHINOSTOMY Left 2013    INCISION AND DRAINAGE BUTTOCKS Left 2017    TRANSPLANT LIVER RECIPIENT  DONOR N/A 2024    Procedure:  Transplant liver recipient  donor;  Surgeon: Fernando Colon MD;  Location:  OR     Social History     Socioeconomic History    Marital status: Single     Spouse name: Not on file    Number of children: Not on file    Years of education: Not on file    Highest education level: Not on file   Occupational History    Not on file   Tobacco Use    Smoking status: Never    Smokeless tobacco: Never   Substance and Sexual Activity    Alcohol use: Not Currently     Comment: last drink 2024    Drug use: Never    Sexual activity: Not on file   Other Topics Concern    Not on file   Social History Narrative    Not on file     Social Drivers of Health     Financial Resource Strain: Low Risk  (2024)    Financial Resource Strain     Within the past 12 months, have you or your family members you live with been unable to get utilities (heat, electricity) when it was really needed?: No   Food Insecurity: Low Risk  (2024)    Food Insecurity     Within the past 12 months, did you worry that your food would run out before you got money to buy more?: No     Within the past 12 months, did the food you bought just not last and you didn t have money to get more?: No   Transportation Needs: Low Risk  (2024)    Transportation Needs     Within the past 12 months, has lack of transportation kept you from medical appointments, getting your medicines, non-medical meetings or appointments, work, or from getting things that you need?: No   Physical Activity: Not on file   Stress: Not on file   Social Connections: Not on file   Interpersonal Safety: Low Risk  (10/16/2024)    Interpersonal Safety     Do you feel physically and emotionally safe where you currently live?: Yes     Within the past 12 months, have you been hit, slapped, kicked or otherwise physically hurt by someone?: No     Within the past 12 months, have you been humiliated or emotionally abused in other ways by your partner or ex-partner?: No   Recent  Concern: Interpersonal Safety - High Risk (9/22/2024)    Interpersonal Safety     Do you feel physically and emotionally safe where you currently live?: No     Within the past 12 months, have you been hit, slapped, kicked or otherwise physically hurt by someone?: No     Within the past 12 months, have you been humiliated or emotionally abused in other ways by your partner or ex-partner?: No   Housing Stability: Low Risk  (9/21/2024)    Housing Stability     Do you have housing? : Yes     Are you worried about losing your housing?: No     Prescription Medications as of 11/3/2024         Rx Number Disp Refills Start End Last Dispensed Date Next Fill Date Owning Pharmacy    acetaminophen (TYLENOL) 325 MG tablet  60 tablet 0 10/25/2024 --   [address] 1-787    Sig: Take 2 tablets (650 mg) by mouth every 8 hours as needed for mild pain or fever.    Class: E-Prescribe    Route: Oral    aspirin (ASA) 81 MG chewable tablet  30 tablet 1 11/1/2024 --   [address] 1-733    Sig: Take 1 tablet (81 mg) by mouth or Feeding Tube daily.    Class: E-Prescribe    Route: Oral or Feeding Tube    hydrOXYzine HCl (ATARAX) 25 MG tablet  20 tablet 0 10/25/2024 --   [address] 1-022    Sig: Take 1 tablet (25 mg) by mouth every 6 hours as needed for other or anxiety (adjuvant pain).    Class: E-Prescribe    Route: Oral    magnesium oxide (MAG-OX) 400 MG tablet  120 tablet 1 11/1/2024 --   [address] 1-009    Sig: Take 2 tablets (800 mg) by mouth 2 times daily.    Class: E-Prescribe    Route: Oral    methocarbamol (ROBAXIN) 750 MG tablet  18 tablet 0 10/25/2024 --   [address] 1-952    Sig: Take 1 tablet (750 mg) by  mouth 3 times daily as needed for muscle spasms.    Class: E-Prescribe    Route: Oral    multivitamin w/minerals (CERTAVITE/ANTIOXIDANTS) tablet  30 tablet 1 11/1/2024 --   [address] 1-579    Sig: Take 1 tablet by mouth daily.    Class: E-Prescribe    Route: Oral    mycophenolate (GENERIC EQUIVALENT) 250 MG capsule  180 capsule 1 11/1/2024 --   [address] 1-391    Sig: Take 3 capsules (750 mg) by mouth 2 times daily.    Class: E-Prescribe    Notes to Pharmacy: TXP DT 9/28/2024 (Liver) TXP Dischg DT 10/9/2024 DX Liver replaced by transplant Z94.4 [facility] ([place], [place])    Route: Oral    omeprazole (PRILOSEC) 20 MG DR capsule  30 capsule 2 11/1/2024 --   [address] 1-621    Sig: Take 1 capsule (20 mg) by mouth daily.    Class: E-Prescribe    Route: Oral    polyethylene glycol (MIRALAX) 17 GM/Dose powder  510 g 0 10/9/2024 --   [place], [place] - [address] [place] [address]    Sig: Take 17 g by mouth 2 times daily as needed for constipation.    Class: E-Prescribe    Route: Oral    predniSONE (DELTASONE) 5 MG tablet  30 tablet 1 11/1/2024 --   [address] 1-206    Sig: Take 1 tablet (5 mg) by mouth daily.    Class: E-Prescribe    Notes to Pharmacy: TXP DT 9/28/2024 (Liver) TXP Dischg DT 10/9/2024 DX Liver replaced by transplant Z94.4 [facility] ([place], [place])    Route: Oral    sennosides (SENOKOT) 8.6 MG tablet  60 tablet 0 10/9/2024 --   [place], [place] - [address] [place] [address]    Sig: Take 2 tablets by mouth 2 times daily as needed for constipation.    Class: E-Prescribe    Route: Oral    tacrolimus  (GENERIC EQUIVALENT) 0.5 MG capsule  60 capsule 1 10/24/2024 --   [address] 1-581    Sig: Take 1 capsule (0.5 mg) by mouth 2 times daily as needed (for dose changes).    Class: E-Prescribe    Notes to Pharmacy: TXP DT 9/28/2024 (Liver) TXP Dischg DT 10/9/2024 DX Liver replaced by transplant Z94.4 TX [facility] ([place], [place])    Route: Oral    tacrolimus (GENERIC EQUIVALENT) 1 MG capsule  240 capsule 1 10/24/2024 --   [address] 1-385    Sig: Take 1 capsule (1 mg) by mouth 2 times daily as needed (for dose changes).    Class: E-Prescribe    Notes to Pharmacy: TXP DT 9/28/2024 (Liver) TXP Dischg DT 10/9/2024 DX Liver replaced by transplant Z94.4 TX [facility] ([place], [place])    Route: Oral    tacrolimus (GENERIC EQUIVALENT) 5 MG capsule  180 capsule 11 10/24/2024 --   [address] 1-800    Sig: Take 1 capsule (5 mg) by mouth every 12 hours.    Class: E-Prescribe    Notes to Pharmacy: Pt needs a fill as soon as possible    Route: Oral    traMADol (ULTRAM) 50 MG tablet (Ended)  10 tablet 0 10/28/2024 11/2/2024   [address] 5-906    Sig: Take 1 tablet (50 mg) by mouth 2 times daily as needed for severe pain.    Class: E-Prescribe    Route: Oral    valGANciclovir (VALCYTE) 450 MG tablet  30 tablet 1 11/1/2024 --   [address] 7-884    Sig: Take 1 tablet (450 mg) by mouth daily.    Class: E-Prescribe    Route: Oral          Azithromycin   REVIEW OF SYSTEMS (check box if normal)  [x]               GENERAL  [x]                 PULMONARY [x]                GENITOURINARY  [x]                CNS                  [x]                 CARDIAC  [x]                 ENDOCRINE  [x]                EARS,NOSE,THROAT [x]                 GASTROINTESTINAL [x]                 NEUROLOGIC    [x]                MUSCLOSKELTAL  [x]                  HEMATOLOGY      PHYSICAL EXAM (check box if normal)/79   Pulse 91   Temp 98  F (36.7  C) (Oral)   Wt 104.4 kg (230 lb 1.6 oz)   SpO2 100%   BMI 31.21 kg/m          [x]            GENERAL: NAD    [x]            Incision:  Left side of incision mostly open, tunneling present midline and R distal incision. Tunneling packed with iodoform gauze and rest of incision packed with 4x4 gauze with no issues. No surrounding erythema. Small bloody drainage present.      Abd soft throughout. Non-tender.                                                                                 PAIN SCALE:: 8

## 2024-11-04 NOTE — NURSING NOTE
Chief Complaint   Patient presents with    Follow Up     Kidney transplant follow-up     Vital signs:  Temp: 98.4  F (36.9  C) Temp src: Oral BP: 131/88 Pulse: 98   Resp: 16 SpO2: 97 %       Weight: 106.9 kg (235 lb 11.2 oz)  Estimated body mass index is 31.97 kg/m  as calculated from the following:    Height as of 9/21/24: 1.829 m (6').    Weight as of this encounter: 106.9 kg (235 lb 11.2 oz).      Aniket Ray RN on 11/4/2024 at 9:51 AM

## 2024-11-04 NOTE — PROGRESS NOTES
Transplant Surgery -OUTPATIENT PROGRESS NOTE    Date of Visit: 2024    Transplants:  2024 (Liver); Postoperative day:  36  ASSESMENT AND PLAN:  Incisional wound:   Stable. No s/s infection. Continue packing with 4x4 gauze BID.     Date: 2024    Transplant:  [x]                             Liver [x]                              Kidney []                             Pancreas []                              Other:             Chief Complaint:Follow Up (Wound care post transplant)    History of Present Illness:  Here today for incisional wound care.   No fever or N/V/D.       Patient Active Problem List   Diagnosis    Alcohol use disorder, severe, in early remission (H)    Alcoholic hepatitis (H)    Epiphora due to insufficient drainage of left side    Hyperbilirubinemia    Hypokalemia    Jaundice    Pneumonia    Liver replaced by transplant (H)    DENI (acute kidney injury) (H)    Immunosuppressed status (H)    Acute post-operative pain    Steroid-induced hyperglycemia    Acute urinary retention    Anemia due to blood loss, acute    Severe malnutrition (H)    Hyponatremia    Hypomagnesemia    Bilateral lower extremity edema    Hyperkalemia    Subconjunctival hemorrhage     SOCIAL /FAMILY HISTORY: [x]                  No recent change    Past Medical History:   Diagnosis Date    Alcohol use disorder     Alcoholic hepatitis (H) 2024    Anxiety     Depression     Hypertension     Liver replaced by transplant (H) 2024     Past Surgical History:   Procedure Laterality Date    BENCH LIVER  2024    Procedure: Bench liver;  Surgeon: Fernando Colon MD;  Location: [facility] OR    DACRYOCYSTORHINOSTOMY Left 2013    INCISION AND DRAINAGE BUTTOCKS Left 2017    TRANSPLANT LIVER RECIPIENT  DONOR N/A 2024    Procedure: Transplant liver recipient  donor;  Surgeon: Fernando Colon MD;  Location:  OR     Social History     Socioeconomic History    Marital status: Single      Spouse name: Not on file    Number of children: Not on file    Years of education: Not on file    Highest education level: Not on file   Occupational History    Not on file   Tobacco Use    Smoking status: Never    Smokeless tobacco: Never   Substance and Sexual Activity    Alcohol use: Not Currently     Comment: last drink june 7th, 2024    Drug use: Never    Sexual activity: Not on file   Other Topics Concern    Not on file   Social History Narrative    Not on file     Social Drivers of Health     Financial Resource Strain: Low Risk  (9/21/2024)    Financial Resource Strain     Within the past 12 months, have you or your family members you live with been unable to get utilities (heat, electricity) when it was really needed?: No   Food Insecurity: Low Risk  (9/21/2024)    Food Insecurity     Within the past 12 months, did you worry that your food would run out before you got money to buy more?: No     Within the past 12 months, did the food you bought just not last and you didn t have money to get more?: No   Transportation Needs: Low Risk  (9/21/2024)    Transportation Needs     Within the past 12 months, has lack of transportation kept you from medical appointments, getting your medicines, non-medical meetings or appointments, work, or from getting things that you need?: No   Physical Activity: Not on file   Stress: Not on file   Social Connections: Not on file   Interpersonal Safety: Low Risk  (10/16/2024)    Interpersonal Safety     Do you feel physically and emotionally safe where you currently live?: Yes     Within the past 12 months, have you been hit, slapped, kicked or otherwise physically hurt by someone?: No     Within the past 12 months, have you been humiliated or emotionally abused in other ways by your partner or ex-partner?: No   Recent Concern: Interpersonal Safety - High Risk (9/22/2024)    Interpersonal Safety     Do you feel physically and emotionally safe where you currently live?: No      Within the past 12 months, have you been hit, slapped, kicked or otherwise physically hurt by someone?: No     Within the past 12 months, have you been humiliated or emotionally abused in other ways by your partner or ex-partner?: No   Housing Stability: Low Risk  (9/21/2024)    Housing Stability     Do you have housing? : Yes     Are you worried about losing your housing?: No     Prescription Medications as of 11/3/2024         Rx Number Disp Refills Start End Last Dispensed Date Next Fill Date Owning Pharmacy    acetaminophen (TYLENOL) 325 MG tablet  60 tablet 0 10/25/2024 --   [address] 1-530    Sig: Take 2 tablets (650 mg) by mouth every 8 hours as needed for mild pain or fever.    Class: E-Prescribe    Route: Oral    aspirin (ASA) 81 MG chewable tablet  30 tablet 1 11/1/2024 --   [address] 1-073    Sig: Take 1 tablet (81 mg) by mouth or Feeding Tube daily.    Class: E-Prescribe    Route: Oral or Feeding Tube    hydrOXYzine HCl (ATARAX) 25 MG tablet  20 tablet 0 10/25/2024 --   [address] 1-149    Sig: Take 1 tablet (25 mg) by mouth every 6 hours as needed for other or anxiety (adjuvant pain).    Class: E-Prescribe    Route: Oral    magnesium oxide (MAG-OX) 400 MG tablet  120 tablet 1 11/1/2024 --   [address] 1-086    Sig: Take 2 tablets (800 mg) by mouth 2 times daily.    Class: E-Prescribe    Route: Oral    methocarbamol (ROBAXIN) 750 MG tablet  18 tablet 0 10/25/2024 --   [address] 1-046    Sig: Take 1 tablet (750 mg) by mouth 3 times daily as needed for muscle spasms.    Class: E-Prescribe    Route: Oral    multivitamin w/minerals (CERTAVITE/ANTIOXIDANTS) tablet  30 tablet 1  11/1/2024 --   [address] 1-911    Sig: Take 1 tablet by mouth daily.    Class: E-Prescribe    Route: Oral    mycophenolate (GENERIC EQUIVALENT) 250 MG capsule  180 capsule 1 11/1/2024 --   [address] 1-452    Sig: Take 3 capsules (750 mg) by mouth 2 times daily.    Class: E-Prescribe    Notes to Pharmacy: TXP DT 9/28/2024 (Liver) TXP Dischg DT 10/9/2024 DX Liver replaced by transplant Z94.4 TX [facility] ([place], [place])    Route: Oral    omeprazole (PRILOSEC) 20 MG DR capsule  30 capsule 2 11/1/2024 --   [address] 1-814    Sig: Take 1 capsule (20 mg) by mouth daily.    Class: E-Prescribe    Route: Oral    polyethylene glycol (MIRALAX) 17 GM/Dose powder  510 g 0 10/9/2024 --   [place], [place] - [address] [place] [address]    Sig: Take 17 g by mouth 2 times daily as needed for constipation.    Class: E-Prescribe    Route: Oral    predniSONE (DELTASONE) 5 MG tablet  30 tablet 1 11/1/2024 --   [address] 1-854    Sig: Take 1 tablet (5 mg) by mouth daily.    Class: E-Prescribe    Notes to Pharmacy: TXP DT 9/28/2024 (Liver) TXP Dischg DT 10/9/2024 DX Liver replaced by transplant Z94.4 [facility] ([place], [place])    Route: Oral    sennosides (SENOKOT) 8.6 MG tablet  60 tablet 0 10/9/2024 --   [place], [place] - [address] [place] [address]    Sig: Take 2 tablets by mouth 2 times daily as needed for constipation.    Class: E-Prescribe    Route: Oral    tacrolimus (GENERIC EQUIVALENT) 0.5 MG capsule  60 capsule 1 10/24/2024 --   [address] 1-590    Sig: Take  1 capsule (0.5 mg) by mouth 2 times daily as needed (for dose changes).    Class: E-Prescribe    Notes to Pharmacy: TXP DT 9/28/2024 (Liver) TXP Dischg DT 10/9/2024 DX Liver replaced by transplant Z94.4 TX [facility] ([place], [place])    Route: Oral    tacrolimus (GENERIC EQUIVALENT) 1 MG capsule  240 capsule 1 10/24/2024 --   [address] 1-400    Sig: Take 1 capsule (1 mg) by mouth 2 times daily as needed (for dose changes).    Class: E-Prescribe    Notes to Pharmacy: TXP DT 9/28/2024 (Liver) TXP Dischg DT 10/9/2024 DX Liver replaced by transplant Z94.4 TX [facility] ([place], [place])    Route: Oral    tacrolimus (GENERIC EQUIVALENT) 5 MG capsule  180 capsule 11 10/24/2024 --   [address] 1-322    Sig: Take 1 capsule (5 mg) by mouth every 12 hours.    Class: E-Prescribe    Notes to Pharmacy: Pt needs a fill as soon as possible    Route: Oral    traMADol (ULTRAM) 50 MG tablet (Ended)  10 tablet 0 10/28/2024 11/2/2024   [address] 5-276    Sig: Take 1 tablet (50 mg) by mouth 2 times daily as needed for severe pain.    Class: E-Prescribe    Route: Oral    valGANciclovir (VALCYTE) 450 MG tablet  30 tablet 1 11/1/2024 --   [address] [phone]    Sig: Take 1 tablet (450 mg) by mouth daily.    Class: E-Prescribe    Route: Oral          Azithromycin   REVIEW OF SYSTEMS (check box if normal)  [x]               GENERAL  [x]                 PULMONARY [x]                GENITOURINARY  [x]                CNS                 [x]                 CARDIAC  [x]                 ENDOCRINE  [x]                EARS,NOSE,THROAT [x]                 GASTROINTESTINAL [x]                 NEUROLOGIC     [x]                MUSCLOSKELTAL  [x]                  HEMATOLOGY      PHYSICAL EXAM (check box if normal)/86 (BP Location: Right arm, Patient Position: Chair, Cuff Size: Adult Regular)   Pulse 92   Temp 98.5  F (36.9  C) (Oral)   Resp 16   Wt 104.2 kg (229 lb 11.2 oz)   SpO2 99%   BMI 31.15 kg/m          [x]            GENERAL: NAD    [x]            Incision: Staples removed with no issues. Left side of incision mostly open, tunneling present midline and R distal incision. Tunneling packed with iodoform gauze and rest of incision packed with 4x4 gauze. No surrounding erythema. Small bloody drainage present.      Abd soft throughout. Non-tender.                                                                                 PAIN SCALE:: 8

## 2024-11-04 NOTE — LETTER
11/4/2024      Jag Smith  [address]  [facility] [address]      Dear Colleague,    Thank you for referring your patient, Jag Smith, to the [facility]. Please see a copy of my visit note below.    Transplant Surgery -OUTPATIENT PROGRESS NOTE    Date of Visit: 11/04/2024    Transplants:  9/28/2024 (Liver); Postoperative day:  37  ASSESMENT AND PLAN:  Incisional wound:   Stable. No s/s infection. Continue packing with 4x4 gauze BID.   Discussed pain control.       Date: October 28, 2024    Transplant:  [x]                             Liver [x]                              Kidney []                             Pancreas []                              Other:             Chief Complaint:Follow Up (Kidney transplant follow-up)    History of Present Illness:  Here today for incisional wound care. Mother is changed BID. Still with moderate pain.   No fever or N/V/D.         Patient Active Problem List   Diagnosis     Alcohol use disorder, severe, in early remission (H)     Alcoholic hepatitis (H)     Epiphora due to insufficient drainage of left side     Hyperbilirubinemia     Hypokalemia     Jaundice     Pneumonia     Liver replaced by transplant (H)     DENI (acute kidney injury) (H)     Immunosuppressed status (H)     Acute post-operative pain     Steroid-induced hyperglycemia     Acute urinary retention     Anemia due to blood loss, acute     Severe malnutrition (H)     Hyponatremia     Hypomagnesemia     Bilateral lower extremity edema     Hyperkalemia     Subconjunctival hemorrhage     SOCIAL /FAMILY HISTORY: [x]                  No recent change    Past Medical History:   Diagnosis Date     Alcohol use disorder      Alcoholic hepatitis (H) 06/07/2024     Anxiety      Depression      Hypertension      Liver replaced by transplant (H) 09/28/2024     Past Surgical History:   Procedure Laterality Date     BENCH LIVER  9/28/2024    Procedure: Bench liver;  Surgeon: Fernando Colon MD;   Location: [facility] OR     DACRYOCYSTORHINOSTOMY Left 2013     INCISION AND DRAINAGE BUTTOCKS Left 2017     TRANSPLANT LIVER RECIPIENT  DONOR N/A 2024    Procedure: Transplant liver recipient  donor;  Surgeon: Fernando Colon MD;  Location: [facility] OR     Social History     Socioeconomic History     Marital status: Single     Spouse name: Not on file     Number of children: Not on file     Years of education: Not on file     Highest education level: Not on file   Occupational History     Not on file   Tobacco Use     Smoking status: Never     Smokeless tobacco: Never   Substance and Sexual Activity     Alcohol use: Not Currently     Comment: last drink 2024     Drug use: Never     Sexual activity: Not on file   Other Topics Concern     Not on file   Social History Narrative     Not on file     Social Drivers of Health     Financial Resource Strain: Low Risk  (2024)    Financial Resource Strain      Within the past 12 months, have you or your family members you live with been unable to get utilities (heat, electricity) when it was really needed?: No   Food Insecurity: Low Risk  (2024)    Food Insecurity      Within the past 12 months, did you worry that your food would run out before you got money to buy more?: No      Within the past 12 months, did the food you bought just not last and you didn t have money to get more?: No   Transportation Needs: Low Risk  (2024)    Transportation Needs      Within the past 12 months, has lack of transportation kept you from medical appointments, getting your medicines, non-medical meetings or appointments, work, or from getting things that you need?: No   Physical Activity: Not on file   Stress: Not on file   Social Connections: Not on file   Interpersonal Safety: Low Risk  (10/16/2024)    Interpersonal Safety      Do you feel physically and emotionally safe where you currently live?: Yes      Within the past 12 months, have you been  hit, slapped, kicked or otherwise physically hurt by someone?: No      Within the past 12 months, have you been humiliated or emotionally abused in other ways by your partner or ex-partner?: No   Recent Concern: Interpersonal Safety - High Risk (9/22/2024)    Interpersonal Safety      Do you feel physically and emotionally safe where you currently live?: No      Within the past 12 months, have you been hit, slapped, kicked or otherwise physically hurt by someone?: No      Within the past 12 months, have you been humiliated or emotionally abused in other ways by your partner or ex-partner?: No   Housing Stability: Low Risk  (9/21/2024)    Housing Stability      Do you have housing? : Yes      Are you worried about losing your housing?: No     Prescription Medications as of 11/4/2024         Rx Number Disp Refills Start End Last Dispensed Date Next Fill Date Owning Pharmacy    acetaminophen (TYLENOL) 325 MG tablet  60 tablet 0 10/25/2024 --   [place] [id number]    Sig: Take 2 tablets (650 mg) by mouth every 8 hours as needed for mild pain or fever.    Class: E-Prescribe    Route: Oral    aspirin (ASA) 81 MG chewable tablet  30 tablet 1 11/1/2024 --   [place] [phone]    Sig: Take 1 tablet (81 mg) by mouth or Feeding Tube daily.    Class: E-Prescribe    Route: Oral or Feeding Tube    hydrOXYzine HCl (ATARAX) 25 MG tablet  20 tablet 0 10/25/2024 --   [address] 6-365    Sig: Take 1 tablet (25 mg) by mouth every 6 hours as needed for other or anxiety (adjuvant pain).    Class: E-Prescribe    Route: Oral    magnesium oxide (MAG-OX) 400 MG tablet  120 tablet 1 11/1/2024 --   [address] 1-904    Sig: Take 2 tablets (800 mg) by mouth 2 times daily.    Class:  E-Prescribe    Route: Oral    methocarbamol (ROBAXIN) 750 MG tablet  18 tablet 0 10/25/2024 --   [address] 1-843    Sig: Take 1 tablet (750 mg) by mouth 3 times daily as needed for muscle spasms.    Class: E-Prescribe    Route: Oral    multivitamin w/minerals (CERTAVITE/ANTIOXIDANTS) tablet  30 tablet 1 11/1/2024 --   [address] 1-158    Sig: Take 1 tablet by mouth daily.    Class: E-Prescribe    Route: Oral    mycophenolate (GENERIC EQUIVALENT) 250 MG capsule  180 capsule 1 11/1/2024 --   [address] 1-730    Sig: Take 3 capsules (750 mg) by mouth 2 times daily.    Class: E-Prescribe    Notes to Pharmacy: TXP DT 9/28/2024 (Liver) TXP Dischg DT 10/9/2024 DX Liver replaced by transplant Z94.4 [facility] ([place], [place])    Route: Oral    omeprazole (PRILOSEC) 20 MG DR capsule  30 capsule 2 11/1/2024 --   [address] 1-153    Sig: Take 1 capsule (20 mg) by mouth daily.    Class: E-Prescribe    Route: Oral    polyethylene glycol (MIRALAX) 17 GM/Dose powder  510 g 0 10/9/2024 --   [place], [place] - [address]    Sig: Take 17 g by mouth 2 times daily as needed for constipation.    Class: E-Prescribe    Route: Oral    predniSONE (DELTASONE) 5 MG tablet  30 tablet 1 11/1/2024 --   [address] 1-653    Sig: Take 1 tablet (5 mg) by mouth daily.    Class: E-Prescribe    Notes to Pharmacy: TXP DT 9/28/2024 (Liver) TXP Dischg DT 10/9/2024 DX Liver replaced by transplant Z94.4 [facility] ([place], [place])    Route: Oral    sennosides (SENOKOT) 8.6 MG tablet  60 tablet 0  10/9/2024 --   [facility] - [place], [place] - [address] [facility] [address]    Sig: Take 2 tablets by mouth 2 times daily as needed for constipation.    Class: E-Prescribe    Route: Oral    tacrolimus (GENERIC EQUIVALENT) 0.5 MG capsule  60 capsule 1 10/24/2024 --   [place], [place] - [address] [place] [address] [phone]    Sig: Take 1 capsule (0.5 mg) by mouth 2 times daily as needed (for dose changes).    Class: E-Prescribe    Notes to Pharmacy: TXP DT 9/28/2024 (Liver) TXP Dischg DT 10/9/2024 DX Liver replaced by transplant Z94.4 TX [facility] ([place], [place])    Route: Oral    tacrolimus (GENERIC EQUIVALENT) 1 MG capsule  240 capsule 1 10/24/2024 --   [place], [place] - [address] [place] [address] [phone]    Sig: Take 1 capsule (1 mg) by mouth 2 times daily as needed (for dose changes).    Class: E-Prescribe    Notes to Pharmacy: TXP DT 9/28/2024 (Liver) TXP Dischg DT 10/9/2024 DX Liver replaced by transplant Z94.4 TX [facility] ([place], [place])    Route: Oral    tacrolimus (GENERIC EQUIVALENT) 5 MG capsule  180 capsule 11 10/24/2024 --   [place], [place] - [address] [facility] [phone]    Sig: Take 1 capsule (5 mg) by mouth every 12 hours.    Class: E-Prescribe    Notes to Pharmacy: Pt needs a fill as soon as possible    Route: Oral    valGANciclovir (VALCYTE) 450 MG tablet  30 tablet 1 11/1/2024 --   [place], [place] - [address] [place] [address] [phone]    Sig: Take 1 tablet (450 mg) by mouth daily.    Class: E-Prescribe    Route: Oral          Azithromycin   REVIEW OF SYSTEMS (check box if normal)  [x]               GENERAL  [x]                 PULMONARY [x]                GENITOURINARY  [x]                CNS                 [x]                 CARDIAC  [x]                 ENDOCRINE  [x]                 EARS,NOSE,THROAT [x]                 GASTROINTESTINAL [x]                 NEUROLOGIC    [x]                MUSCLOSKELTAL  [x]                  HEMATOLOGY      PHYSICAL EXAM (check box if normal)/88 (BP Location: Right arm, Patient Position: Chair, Cuff Size: Adult Regular)   Pulse 98   Temp 98.4  F (36.9  C) (Oral)   Resp 16   Wt 106.9 kg (235 lb 11.2 oz)   SpO2 97%   BMI 31.97 kg/m          [x]            GENERAL: NAD    [x]            Incision:  Left side of incision mostly open, tunneling present midline and R distal incision. Tunneling packed with iodoform gauze and rest of incision packed with 4x4 gauze with no issues. No surrounding erythema. Small bloody drainage present.      Abd soft throughout. Non-tender.                                                                                 PAIN SCALE:: 8       Again, thank you for allowing me to participate in the care of your patient.        Sincerely,        SIL Blum CNP

## 2024-11-05 ENCOUNTER — TELEPHONE (OUTPATIENT)
Dept: TRANSPLANT | Facility: CLINIC | Age: 39
End: 2024-11-05
Payer: MEDICAID

## 2024-11-05 RX ORDER — TACROLIMUS 0.5 MG/1
0.5 CAPSULE ORAL EVERY 12 HOURS
Qty: 60 CAPSULE | Refills: 1 | Status: SHIPPED | OUTPATIENT
Start: 2024-11-05 | End: 2024-11-11

## 2024-11-05 RX ORDER — TACROLIMUS 5 MG/1
5 CAPSULE ORAL 2 TIMES DAILY PRN
COMMUNITY
Start: 2024-11-05

## 2024-11-05 RX ORDER — TACROLIMUS 1 MG/1
4 CAPSULE ORAL EVERY 12 HOURS
Qty: 240 CAPSULE | Refills: 1 | Status: SHIPPED | OUTPATIENT
Start: 2024-11-05 | End: 2024-11-11

## 2024-11-05 NOTE — TELEPHONE ENCOUNTER
"Patient complains of increased incisional pain. Patient describes pain as \"stabbing, burning, and constant.\" Pain is worse with movement. Occasional nausea. Patient is currently taking Tylenol twice a day, Tramadol at bedtime, Hydroxyzine twice a day, and Robaxin twice a day.     Patient denies fevers, temps have been around 98. Patient states he does feel hot at night. States he has not been as active due to the pain.    Patient also complains of headaches. Started a few days ago. Patient states he hasn't been drinking enough fluids. Advised patient to increase fluids and discuss headaches with Marycruz tomorrow.    Tramadol was ordered BID PRN - Patient informed.    Message sent to Marycruz. Patient has a clinic appt tomorrow morning.  "

## 2024-11-05 NOTE — TELEPHONE ENCOUNTER
Pt states that he having constant pain at the incisional site from the center radiating to the right. Rates it 7/10. Has the wound looked at tomorrow at 10am. Pt took tylenol for pain this morning. Pt takes the ultram at night. Pt would like to discuss with  you.

## 2024-11-05 NOTE — TELEPHONE ENCOUNTER
Spoke to pt and let him know that the pharmacy will fill tacro 1mg and 0.5mg caps and will be ready in an hour.

## 2024-11-05 NOTE — TELEPHONE ENCOUNTER
Per Patient;  Called [facility] pharmacy and was told his refill was not there.  Tacrolimus 0.5MG / 1.0MG / 5MG       [place], [place] - [address] [place] [address] [phone] Phone: [phone]   Fax: [phone]          Please call Jag #[phone]

## 2024-11-06 ENCOUNTER — OFFICE VISIT (OUTPATIENT)
Dept: TRANSPLANT | Facility: CLINIC | Age: 39
End: 2024-11-06
Attending: NURSE PRACTITIONER
Payer: MEDICAID

## 2024-11-06 ENCOUNTER — THERAPY VISIT (OUTPATIENT)
Dept: PHYSICAL THERAPY | Facility: CLINIC | Age: 39
End: 2024-11-06
Attending: TRANSPLANT SURGERY
Payer: MEDICAID

## 2024-11-06 VITALS
SYSTOLIC BLOOD PRESSURE: 135 MMHG | RESPIRATION RATE: 16 BRPM | HEART RATE: 90 BPM | TEMPERATURE: 98 F | WEIGHT: 235.9 LBS | BODY MASS INDEX: 31.99 KG/M2 | OXYGEN SATURATION: 99 % | DIASTOLIC BLOOD PRESSURE: 89 MMHG

## 2024-11-06 DIAGNOSIS — T14.8XXA SKIN WOUND FROM SURGICAL INCISION: Primary | ICD-10-CM

## 2024-11-06 DIAGNOSIS — T14.8XXA SKIN WOUND FROM SURGICAL INCISION: ICD-10-CM

## 2024-11-06 DIAGNOSIS — Z94.4 LIVER REPLACED BY TRANSPLANT (H): Primary | ICD-10-CM

## 2024-11-06 PROCEDURE — G0463 HOSPITAL OUTPT CLINIC VISIT: HCPCS | Performed by: NURSE PRACTITIONER

## 2024-11-06 PROCEDURE — 99024 POSTOP FOLLOW-UP VISIT: CPT | Performed by: NURSE PRACTITIONER

## 2024-11-06 PROCEDURE — 97110 THERAPEUTIC EXERCISES: CPT | Mod: GP | Performed by: PHYSICAL THERAPIST

## 2024-11-06 NOTE — PROGRESS NOTES
Transplant Surgery Progress Note    Transplants:  9/28/2024 (Liver);   S: overall doing well, appetite and strength improving, wound is an issue  Transplant History:    Transplant Type:  Liver Tx  Donor Type:     Transplant Date:  9/28/2024 (Liver)   Ureteral Stent:  No    Acute Rejection Hx:  No    Present Maintenance Immunosuppression:  Tacrolimus, Mycophenolate mofetil, and Prednisone    CMV IgG Ab Discordance:  No  EBV IgG Ab Discordance:  No    Transplant Coordinator: Karena Villegas     Transplant Office Phone Number: [phone]     Immunosuppressant Medications       Immunosuppressive Agents Disp Start End     mycophenolate (GENERIC EQUIVALENT) 250 MG capsule 180 capsule 11/1/2024 --    Sig - Route: Take 3 capsules (750 mg) by mouth 2 times daily. - Oral    Class: E-Prescribe    Notes to Pharmacy: TXP DT 9/28/2024 (Liver) TXP Dischg DT 10/9/2024 DX Liver replaced by transplant Z94.4 TX [facility] ([place], [place])     tacrolimus (GENERIC EQUIVALENT) 0.5 MG capsule 60 capsule 11/5/2024 --    Sig - Route: Take 1 capsule (0.5 mg) by mouth every 12 hours. - Oral    Class: E-Prescribe    Notes to Pharmacy: TXP DT 9/28/2024 (Liver) TXP Dischg DT 10/9/2024 DX Liver replaced by transplant Z94.4 TX [facility] ([place], [place])     tacrolimus (GENERIC EQUIVALENT) 1 MG capsule 240 capsule 11/5/2024 --    Sig - Route: Take 4 capsules (4 mg) by mouth every 12 hours. Total dose 4.5mg BID - Oral    Class: E-Prescribe    Notes to Pharmacy: TXP DT 9/28/2024 (Liver) TXP Dischg DT 10/9/2024 DX Liver replaced by transplant Z94.4 TX [facility] ([place], [place])     tacrolimus (GENERIC EQUIVALENT) 5 MG capsule -- 11/5/2024 --    Sig - Route: Take 1 capsule (5 mg) by mouth 2 times daily as needed (for dose changes). - Oral    Class: Historical    Notes to Pharmacy: TXP DT 9/28/2024 (Liver) TXP  Dischg DT 10/9/2024 DX Liver replaced by transplant Z94.4 TX [facility] ([place], [place])            Possible Immunosuppression-related side effects:   []             headache  []             vivid dreams  []             irritability  []             cognitive difficuties  []             fine tremor  []             nausea  []             diarrhea  []             neuropathy      []             edema  []             renal calcineurin toxicity  []             hyperkalemia  []             post-transplant diabetes  []             decreased appetite  []             increased appetite  []             other:  []             none    Prescription Medications as of 11/6/2024         Rx Number Disp Refills Start End Last Dispensed Date Next Fill Date Owning Pharmacy    acetaminophen (TYLENOL) 325 MG tablet  60 tablet 0 11/4/2024 --   [address] 1-081    Sig: Take 2 tablets (650 mg) by mouth every 8 hours as needed for mild pain or fever.    Class: E-Prescribe    Route: Oral    aspirin (ASA) 81 MG chewable tablet  30 tablet 1 11/1/2024 --   [address] 1-446    Sig: Take 1 tablet (81 mg) by mouth or Feeding Tube daily.    Class: E-Prescribe    Route: Oral or Feeding Tube    hydrOXYzine HCl (ATARAX) 25 MG tablet  20 tablet 0 11/4/2024 --   [address] 1-990    Sig: Take 1 tablet (25 mg) by mouth every 8 hours as needed for anxiety or other (adjuvant pain).    Class: E-Prescribe    Route: Oral    magnesium oxide (MAG-OX) 400 MG tablet  120 tablet 1 11/1/2024 --   [address] 1-813    Sig: Take 2 tablets (800 mg) by mouth 2 times daily.    Class: E-Prescribe    Route: Oral    methocarbamol (ROBAXIN) 750 MG tablet  30 tablet 0  11/4/2024 --   [address] 1-694    Sig: Take 1 tablet (750 mg) by mouth 3 times daily as needed for muscle spasms.    Class: E-Prescribe    Route: Oral    multivitamin w/minerals (CERTAVITE/ANTIOXIDANTS) tablet  30 tablet 1 11/1/2024 --   [address] 1-273    Sig: Take 1 tablet by mouth daily.    Class: E-Prescribe    Route: Oral    mycophenolate (GENERIC EQUIVALENT) 250 MG capsule  180 capsule 1 11/1/2024 --   [address] 1-273    Sig: Take 3 capsules (750 mg) by mouth 2 times daily.    Class: E-Prescribe    Notes to Pharmacy: TXP DT 9/28/2024 (Liver) TXP Dischg DT 10/9/2024 DX Liver replaced by transplant Z94.4 [facility] ([place], [place])    Route: Oral    omeprazole (PRILOSEC) 20 MG DR capsule  30 capsule 2 11/1/2024 --   [address] 1-941    Sig: Take 1 capsule (20 mg) by mouth daily.    Class: E-Prescribe    Route: Oral    polyethylene glycol (MIRALAX) 17 GM/Dose powder  510 g 0 10/9/2024 --   [place], [place] - [facility]    Sig: Take 17 g by mouth 2 times daily as needed for constipation.    Class: E-Prescribe    Route: Oral    predniSONE (DELTASONE) 5 MG tablet  30 tablet 1 11/1/2024 --   [address] 1-273    Sig: Take 1 tablet (5 mg) by mouth daily.    Class: E-Prescribe    Notes to Pharmacy: TXP DT 9/28/2024 (Liver) TXP Dischg DT 10/9/2024 DX Liver replaced by transplant Z94.4 [facility] ([place], [place])    Route: Oral    sennosides (SENOKOT) 8.6 MG tablet  60 tablet 0 10/9/2024 --   [place], [place] - [address] [place] [address]  SE    Sig: Take 2 tablets by mouth 2 times daily as needed for constipation.    Class: E-Prescribe    Route: Oral    tacrolimus (GENERIC EQUIVALENT) 0.5 MG capsule  60 capsule 1 11/5/2024 --   [address] 1-201    Sig: Take 1 capsule (0.5 mg) by mouth every 12 hours.    Class: E-Prescribe    Notes to Pharmacy: TXP DT 9/28/2024 (Liver) TXP Dischg DT 10/9/2024 DX Liver replaced by transplant Z94.4 [facility] ([place], [place])    Route: Oral    tacrolimus (GENERIC EQUIVALENT) 1 MG capsule  240 capsule 1 11/5/2024 --   [address] 1-101    Sig: Take 4 capsules (4 mg) by mouth every 12 hours. Total dose 4.5mg BID    Class: E-Prescribe    Notes to Pharmacy: TXP DT 9/28/2024 (Liver) TXP Dischg DT 10/9/2024 DX Liver replaced by transplant Z94.4 [facility] ([place], [place])    Route: Oral    tacrolimus (GENERIC EQUIVALENT) 5 MG capsule  -- -- 11/5/2024 --       Sig: Take 1 capsule (5 mg) by mouth 2 times daily as needed (for dose changes).    Class: Historical    Notes to Pharmacy: TXP DT 9/28/2024 (Liver) TXP Dischg DT 10/9/2024 DX Liver replaced by transplant Z94.4 [facility] ([place], [place])    Route: Oral    traMADol (ULTRAM) 50 MG tablet  8 tablet 0 11/4/2024 11/8/2024   [address] 1-983    Sig: Take 1 tablet (50 mg) by mouth 2 times daily as needed for severe pain.    Class: E-Prescribe    Route: Oral    valGANciclovir (VALCYTE) 450 MG tablet  30 tablet 1 11/1/2024 --   [address] 1-527    Sig: Take 1 tablet (450 mg) by mouth daily.    Class: E-Prescribe    Route: Oral            O:   [unfilled]        Latest Ref  "Rng & Units 11/4/2024     8:14 AM 10/31/2024     7:12 AM 10/28/2024     7:13 AM 10/24/2024     8:18 AM 10/21/2024     7:30 AM   Transplant Immunosuppression Labs   Creat 0.67 - 1.17 mg/dL 1.22  1.18  1.15  0.94  1.06    Urea Nitrogen 6.0 - 20.0 mg/dL 21.7  22.9  22.0  17.3  19.4    WBC 4.0 - 11.0 10e3/uL 10.0  7.0  7.2  7.3  6.5        Chemistries:   Recent Labs   Lab Test 11/04/24  0814   BUN 21.7*   CR 1.22*   GFRESTIMATED 78   *     Lab Results   Component Value Date    A1C 5.4 09/28/2024     Recent Labs   Lab Test 11/04/24  0814   ALBUMIN 4.1   BILITOTAL 1.4*   ALKPHOS 135   AST 25   ALT 29     Urine Studies:  Recent Labs   Lab Test 09/27/24 2113   COLOR Dark Yellow*   APPEARANCE Slightly Cloudy*   URINEGLC Negative   URINEBILI Large*   URINEKETONE Negative   SG 1.011   UBLD Negative   URINEPH 6.0   PROTEIN Negative   NITRITE Negative   LEUKEST Negative   RBCU 1   WBCU 5     No lab results found.  Hematology:   Recent Labs   Lab Test 11/04/24  0814 10/31/24  0712 10/28/24  0713   HGB 10.0* 10.4* 10.1*    249 269   WBC 10.0 7.0 7.2     Coags:   Recent Labs   Lab Test 10/01/24  0437 09/30/24  0529   INR 1.03 1.14     HLA antibodies:   No results found for: \"MQ8PIMKSB\", \"CA3LZTEUOC\", \"XT0LAYCCN\", \"DB6QOGLECC\"    Assessment: Jag Smith is doing well s/p Liver Tx:  Issues we addressed during his visit include:    Plan:    1. Graft function: LFTs improving  2. Immunosuppression Management: No change tac 8-12   .  Complexity of management:Low.  Contributing factors:  wound infection  3. Wound care- wet to dry BID, will see AZALIA for close follow-up:    Followup: 1 week            Fernando Colon MD/PhD  Professor of Surgery    "

## 2024-11-06 NOTE — LETTER
11/6/2024      Jag Smith  [address]  [facility] [address]      Dear Colleague,    Thank you for referring your patient, Jag Smith, to the [facility]. Please see a copy of my visit note below.    Transplant Surgery -OUTPATIENT PROGRESS NOTE    Date of Visit: 11/10/2024    Transplants:  9/28/2024 (Liver); Postoperative day:  43  ASSESMENT AND PLAN:  Incisional wound:   Stable. X3 tunneling areas. No s/s infection. Continue packing with 4x4 gauze BID.   Discussed pain control.       Date: October 28, 2024    Transplant:  [x]                             Liver [x]                              Kidney []                             Pancreas []                              Other:             Chief Complaint:Follow Up (Wound vac changeout)    History of Present Illness:  Here today for incisional wound care. Mother is changing BID. Still with moderate pain.   No fever or N/V/D.   Issues with insomnia.         Patient Active Problem List   Diagnosis     Alcohol use disorder, severe, in early remission (H)     Alcoholic hepatitis (H)     Epiphora due to insufficient drainage of left side     Hyperbilirubinemia     Hypokalemia     Jaundice     Pneumonia     Liver replaced by transplant (H)     DENI (acute kidney injury) (H)     Immunosuppressed status (H)     Acute post-operative pain     Steroid-induced hyperglycemia     Acute urinary retention     Anemia due to blood loss, acute     Severe malnutrition (H)     Hyponatremia     Hypomagnesemia     Bilateral lower extremity edema     Hyperkalemia     Subconjunctival hemorrhage     SOCIAL /FAMILY HISTORY: [x]                  No recent change    Past Medical History:   Diagnosis Date     Alcohol use disorder      Alcoholic hepatitis (H) 06/07/2024     Anxiety      Depression      Hypertension      Liver replaced by transplant (H) 09/28/2024     Past Surgical History:   Procedure Laterality Date     BENCH LIVER  9/28/2024    Procedure: Bench  liver;  Surgeon: Fernando Colon MD;  Location: [facility] OR     DACRYOCYSTORHINOSTOMY Left 2013     INCISION AND DRAINAGE BUTTOCKS Left 2017     TRANSPLANT LIVER RECIPIENT  DONOR N/A 2024    Procedure: Transplant liver recipient  donor;  Surgeon: Fernando Colon MD;  Location: [facility] OR     Social History     Socioeconomic History     Marital status: Single     Spouse name: Not on file     Number of children: Not on file     Years of education: Not on file     Highest education level: Not on file   Occupational History     Not on file   Tobacco Use     Smoking status: Never     Smokeless tobacco: Never   Substance and Sexual Activity     Alcohol use: Not Currently     Comment: last drink 2024     Drug use: Never     Sexual activity: Not on file   Other Topics Concern     Not on file   Social History Narrative     Not on file     Social Drivers of Health     Financial Resource Strain: Low Risk  (2024)    Financial Resource Strain      Within the past 12 months, have you or your family members you live with been unable to get utilities (heat, electricity) when it was really needed?: No   Food Insecurity: Low Risk  (2024)    Food Insecurity      Within the past 12 months, did you worry that your food would run out before you got money to buy more?: No      Within the past 12 months, did the food you bought just not last and you didn t have money to get more?: No   Transportation Needs: Low Risk  (2024)    Transportation Needs      Within the past 12 months, has lack of transportation kept you from medical appointments, getting your medicines, non-medical meetings or appointments, work, or from getting things that you need?: No   Physical Activity: Not on file   Stress: Not on file   Social Connections: Not on file   Interpersonal Safety: Low Risk  (10/16/2024)    Interpersonal Safety      Do you feel physically and emotionally safe where you currently live?: Yes      Within  the past 12 months, have you been hit, slapped, kicked or otherwise physically hurt by someone?: No      Within the past 12 months, have you been humiliated or emotionally abused in other ways by your partner or ex-partner?: No   Recent Concern: Interpersonal Safety - High Risk (9/22/2024)    Interpersonal Safety      Do you feel physically and emotionally safe where you currently live?: No      Within the past 12 months, have you been hit, slapped, kicked or otherwise physically hurt by someone?: No      Within the past 12 months, have you been humiliated or emotionally abused in other ways by your partner or ex-partner?: No   Housing Stability: Low Risk  (9/21/2024)    Housing Stability      Do you have housing? : Yes      Are you worried about losing your housing?: No     Prescription Medications as of 11/10/2024         Rx Number Disp Refills Start End Last Dispensed Date Next Fill Date Owning Pharmacy    acetaminophen (TYLENOL) 325 MG tablet  60 tablet 0 11/4/2024 --   [place] [phone]    Sig: Take 2 tablets (650 mg) by mouth every 8 hours as needed for mild pain or fever.    Class: E-Prescribe    Route: Oral    aspirin (ASA) 81 MG chewable tablet  30 tablet 1 11/1/2024 --   [address] 7-870    Sig: Take 1 tablet (81 mg) by mouth or Feeding Tube daily.    Class: E-Prescribe    Route: Oral or Feeding Tube    hydrOXYzine HCl (ATARAX) 25 MG tablet  20 tablet 0 11/4/2024 --   [address] 8-304    Sig: Take 1 tablet (25 mg) by mouth every 8 hours as needed for anxiety or other (adjuvant pain).    Class: E-Prescribe    Route: Oral    magnesium oxide (MAG-OX) 400 MG tablet  120 tablet 1 11/1/2024 --   [place] [phone]    Sig: Take 2 tablets (800 mg) by mouth 2  times daily.    Class: E-Prescribe    Route: Oral    methocarbamol (ROBAXIN) 750 MG tablet  30 tablet 0 11/4/2024 --   [address] 1-320    Sig: Take 1 tablet (750 mg) by mouth 3 times daily as needed for muscle spasms.    Class: E-Prescribe    Route: Oral    multivitamin w/minerals (CERTAVITE/ANTIOXIDANTS) tablet  30 tablet 1 11/1/2024 --   [address] 1-125    Sig: Take 1 tablet by mouth daily.    Class: E-Prescribe    Route: Oral    mycophenolate (GENERIC EQUIVALENT) 250 MG capsule  180 capsule 1 11/1/2024 --   [address] 1-182    Sig: Take 3 capsules (750 mg) by mouth 2 times daily.    Class: E-Prescribe    Notes to Pharmacy: TXP DT 9/28/2024 (Liver) TXP Dischg DT 10/9/2024 DX Liver replaced by transplant Z94.4 [facility] ([place], [place])    Route: Oral    omeprazole (PRILOSEC) 20 MG DR capsule  30 capsule 2 11/1/2024 --   [address] 1-845    Sig: Take 1 capsule (20 mg) by mouth daily.    Class: E-Prescribe    Route: Oral    polyethylene glycol (MIRALAX) 17 GM/Dose powder  510 g 0 10/9/2024 --   [place], [place] - [address] [facility]    Sig: Take 17 g by mouth 2 times daily as needed for constipation.    Class: E-Prescribe    Route: Oral    predniSONE (DELTASONE) 5 MG tablet  30 tablet 1 11/1/2024 --   [address] 1-092    Sig: Take 1 tablet (5 mg) by mouth daily.    Class: E-Prescribe    Notes to Pharmacy: TXP DT 9/28/2024 (Liver) TXP Dischg DT 10/9/2024 DX Liver replaced by transplant Z94.4 [facility] ([place], [place])    Route: Oral    sennosides (SENOKOT) 8.6  MG tablet  60 tablet 0 10/9/2024 --   [facility] [place], [place] - [address] [facility]    Sig: Take 2 tablets by mouth 2 times daily as needed for constipation.    Class: E-Prescribe    Route: Oral    tacrolimus (GENERIC EQUIVALENT) 0.5 MG capsule  60 capsule 1 11/5/2024 --   [address] 1-075    Sig: Take 1 capsule (0.5 mg) by mouth every 12 hours.    Class: E-Prescribe    Notes to Pharmacy: TXP DT 9/28/2024 (Liver) TXP Dischg DT 10/9/2024 DX Liver replaced by transplant Z94.4 [facility] ([place], [place])    Route: Oral    tacrolimus (GENERIC EQUIVALENT) 1 MG capsule  240 capsule 1 11/5/2024 --   [address] 1-716    Sig: Take 4 capsules (4 mg) by mouth every 12 hours. Total dose 4.5mg BID    Class: E-Prescribe    Notes to Pharmacy: TXP DT 9/28/2024 (Liver) TXP Dischg DT 10/9/2024 DX Liver replaced by transplant Z94.4 [facility] ([place], [place])    Route: Oral    tacrolimus (GENERIC EQUIVALENT) 5 MG capsule  -- -- 11/5/2024 --       Sig: Take 1 capsule (5 mg) by mouth 2 times daily as needed (for dose changes).    Class: Historical    Notes to Pharmacy: TXP DT 9/28/2024 (Liver) TXP Dischg DT 10/9/2024 DX Liver replaced by transplant Z94.4 [facility] ([place], [place])    Route: Oral    traZODone (DESYREL) 50 MG tablet  30 tablet 0 11/7/2024 --   [address] 1-502    Sig: Take 1 tablet (50 mg) by mouth at bedtime.    Class: E-Prescribe    Route: Oral    valGANciclovir (VALCYTE) 450 MG tablet  30 tablet 1 11/1/2024 --   [address] 1-536    Sig: Take 1 tablet (450 mg) by mouth daily.    Class:  E-Prescribe    Route: Oral          Azithromycin   REVIEW OF SYSTEMS (check box if normal)  [x]               GENERAL  [x]                 PULMONARY [x]                GENITOURINARY  [x]                CNS                 [x]                 CARDIAC  [x]                 ENDOCRINE  [x]                EARS,NOSE,THROAT [x]                 GASTROINTESTINAL [x]                 NEUROLOGIC    [x]                MUSCLOSKELTAL  [x]                  HEMATOLOGY      PHYSICAL EXAM (check box if normal)/89 (BP Location: Right arm, Patient Position: Chair, Cuff Size: Adult Regular)   Pulse 90   Temp 98  F (36.7  C) (Oral)   Resp 16   Wt 107 kg (235 lb 14.4 oz)   SpO2 99%   BMI 31.99 kg/m          [x]            GENERAL: NAD    [x]            Incision:  Left side of incision mostly open, tunneling present midline x2 and R distal incision. Tunneling packed with iodoform gauze and rest of incision packed with 4x4 gauze with no issues. No surrounding erythema. Moderate bloody drainage present- mostly midline.    Abd soft throughout. Non-tender.                                                                                 PAIN SCALE:: 8       Again, thank you for allowing me to participate in the care of your patient.        Sincerely,        SIL Blum CNP

## 2024-11-06 NOTE — NURSING NOTE
Chief Complaint   Patient presents with    Follow Up     Wound vac changeout     Vital signs:  Temp: 98  F (36.7  C) Temp src: Oral BP: 135/89 Pulse: 90   Resp: 16 SpO2: 99 %       Weight: 107 kg (235 lb 14.4 oz)  Estimated body mass index is 31.99 kg/m  as calculated from the following:    Height as of 9/21/24: 1.829 m (6').    Weight as of this encounter: 107 kg (235 lb 14.4 oz).      Aniket Ray RN on 11/6/2024 at 12:26 PM

## 2024-11-07 ENCOUNTER — TELEPHONE (OUTPATIENT)
Dept: WOUND CARE | Facility: CLINIC | Age: 39
End: 2024-11-07

## 2024-11-07 ENCOUNTER — LAB (OUTPATIENT)
Dept: LAB | Facility: CLINIC | Age: 39
End: 2024-11-07
Payer: MEDICAID

## 2024-11-07 ENCOUNTER — TELEPHONE (OUTPATIENT)
Dept: TRANSPLANT | Facility: CLINIC | Age: 39
End: 2024-11-07

## 2024-11-07 DIAGNOSIS — Z94.4 LIVER REPLACED BY TRANSPLANT (H): ICD-10-CM

## 2024-11-07 DIAGNOSIS — Z94.4 LIVER REPLACED BY TRANSPLANT (H): Primary | ICD-10-CM

## 2024-11-07 LAB
ALBUMIN SERPL BCG-MCNC: 4 G/DL (ref 3.5–5.2)
ALP SERPL-CCNC: 128 U/L (ref 40–150)
ALT SERPL W P-5'-P-CCNC: 19 U/L (ref 0–70)
ANION GAP SERPL CALCULATED.3IONS-SCNC: 12 MMOL/L (ref 7–15)
AST SERPL W P-5'-P-CCNC: 14 U/L (ref 0–45)
BILIRUB DIRECT SERPL-MCNC: 0.61 MG/DL (ref 0–0.3)
BILIRUB SERPL-MCNC: 1 MG/DL
BUN SERPL-MCNC: 21.2 MG/DL (ref 6–20)
CALCIUM SERPL-MCNC: 9.5 MG/DL (ref 8.8–10.4)
CHLORIDE SERPL-SCNC: 97 MMOL/L (ref 98–107)
CREAT SERPL-MCNC: 1.16 MG/DL (ref 0.67–1.17)
EGFRCR SERPLBLD CKD-EPI 2021: 83 ML/MIN/1.73M2
ERYTHROCYTE [DISTWIDTH] IN BLOOD BY AUTOMATED COUNT: 15.1 % (ref 10–15)
GLUCOSE SERPL-MCNC: 90 MG/DL (ref 70–99)
HCO3 SERPL-SCNC: 21 MMOL/L (ref 22–29)
HCT VFR BLD AUTO: 30.8 % (ref 40–53)
HGB BLD-MCNC: 10.2 G/DL (ref 13.3–17.7)
MAGNESIUM SERPL-MCNC: 1.6 MG/DL (ref 1.7–2.3)
MCH RBC QN AUTO: 30.7 PG (ref 26.5–33)
MCHC RBC AUTO-ENTMCNC: 33.1 G/DL (ref 31.5–36.5)
MCV RBC AUTO: 93 FL (ref 78–100)
PHOSPHATE SERPL-MCNC: 6 MG/DL (ref 2.5–4.5)
PLATELET # BLD AUTO: 220 10E3/UL (ref 150–450)
POTASSIUM SERPL-SCNC: 5 MMOL/L (ref 3.4–5.3)
PROT SERPL-MCNC: 7 G/DL (ref 6.4–8.3)
RBC # BLD AUTO: 3.32 10E6/UL (ref 4.4–5.9)
SODIUM SERPL-SCNC: 130 MMOL/L (ref 135–145)
TACROLIMUS BLD-MCNC: 12.4 UG/L (ref 5–15)
TME LAST DOSE: NORMAL H
TME LAST DOSE: NORMAL H
WBC # BLD AUTO: 8.5 10E3/UL (ref 4–11)

## 2024-11-07 PROCEDURE — 84100 ASSAY OF PHOSPHORUS: CPT | Performed by: PATHOLOGY

## 2024-11-07 PROCEDURE — 80053 COMPREHEN METABOLIC PANEL: CPT | Performed by: PATHOLOGY

## 2024-11-07 PROCEDURE — 36415 COLL VENOUS BLD VENIPUNCTURE: CPT | Performed by: PATHOLOGY

## 2024-11-07 PROCEDURE — 82248 BILIRUBIN DIRECT: CPT | Performed by: PATHOLOGY

## 2024-11-07 PROCEDURE — 85027 COMPLETE CBC AUTOMATED: CPT | Performed by: PATHOLOGY

## 2024-11-07 PROCEDURE — 83735 ASSAY OF MAGNESIUM: CPT | Performed by: PATHOLOGY

## 2024-11-07 PROCEDURE — 80197 ASSAY OF TACROLIMUS: CPT | Performed by: TRANSPLANT SURGERY

## 2024-11-07 PROCEDURE — 99000 SPECIMEN HANDLING OFFICE-LAB: CPT | Performed by: PATHOLOGY

## 2024-11-07 RX ORDER — TRAZODONE HYDROCHLORIDE 50 MG/1
50 TABLET, FILM COATED ORAL AT BEDTIME
Qty: 30 TABLET | Refills: 0 | Status: SHIPPED | OUTPATIENT
Start: 2024-11-07

## 2024-11-07 NOTE — TELEPHONE ENCOUNTER
Post Liver Transplant Team Conference  Date: 11/7/2024  Transplant Coordinator: Karena Villegas  Transplant Surgeon: Fernando Colon      Attendees:  [] Dr. Trejo [x] Brad Wei, RN []       [x] Dr. Waters [x] Mgada Gant LPN []    [] Dr. Larios [x] Echo Fagan, BRENNA []    [] Dr. Suh [] Lexie Wells, FARRAH []    [] DR. Lal [] Lexie España RN []       [] Dr. Estes [x] Karena Villegas RN []       [] Dr. Forrest Bazan [x] Kelley Galvez, RN []       [] Dr. BRAYDEN Colon [x] Enedina Arana RN []       [x] Issa Hubbard, pharm [] Aicha Kwon RN []       [x] Marycruz Alcala NP [] Beni Bran RN []         Verbal Plan Read Back:   Start Trazodone 50mg daily for sleep  Would Clinic referral    Routed to RN Coordinator   Karena Villegas RN

## 2024-11-07 NOTE — TELEPHONE ENCOUNTER
Does the patient have an open and draining wound? Yes    Please schedule with Dr. Garcia, Yany Miller PA-C, or Samantha Power NP  at [facility] for next available appointment.    **For all providers, please schedule a follow up 4 weeks after initial appointment. (2-3 weeks for Dr. Desai and Dr. Griffin)**  --If unable to schedule within 2-3 weeks then please place on cancellation list--    Is the patient able to make their own medical decisions? Yes    Can the patient be scheduled on a Thursday (Jannet/Radha (starting in November))? Yes    Is patient a RISA lift? PLEASE INQUIRE WHEN MAKING THE APPOINTMENT AND PUT IN APPOINTMENT NOTES    Routing to  Wound Healing Scheduling.

## 2024-11-07 NOTE — TELEPHONE ENCOUNTER
ISSUE:   Tacrolimus IR level 12.4 on 11/07/24, goal 8-10, dose 5mg BID.    OUTCOME:   Spoke with Patient, they confirm accurate trough level and current dose 5 mg BID.   Patient confirmed dose change to 4.5 mg BID.  Patient agreed to repeat labs on Monday.    Per liver team meeting:  Start Trazodone 50mg at night to help with sleep.  Referral to wound clinic. Called clinic - awaiting return call with appt date and time.    Patient voiced understanding of plan.

## 2024-11-08 ENCOUNTER — OFFICE VISIT (OUTPATIENT)
Dept: TRANSPLANT | Facility: CLINIC | Age: 39
End: 2024-11-08
Attending: NURSE PRACTITIONER
Payer: MEDICAID

## 2024-11-08 VITALS
DIASTOLIC BLOOD PRESSURE: 79 MMHG | BODY MASS INDEX: 32.13 KG/M2 | WEIGHT: 236.9 LBS | HEART RATE: 96 BPM | OXYGEN SATURATION: 99 % | SYSTOLIC BLOOD PRESSURE: 135 MMHG

## 2024-11-08 DIAGNOSIS — T14.8XXA SKIN WOUND FROM SURGICAL INCISION: Primary | ICD-10-CM

## 2024-11-08 PROCEDURE — 99024 POSTOP FOLLOW-UP VISIT: CPT | Performed by: NURSE PRACTITIONER

## 2024-11-08 PROCEDURE — G0463 HOSPITAL OUTPT CLINIC VISIT: HCPCS | Performed by: NURSE PRACTITIONER

## 2024-11-08 ASSESSMENT — PAIN SCALES - GENERAL: PAINLEVEL_OUTOF10: SEVERE PAIN (7)

## 2024-11-08 NOTE — LETTER
11/8/2024      Jag Smith  [address]  [facility] [address]      Dear Colleague,    Thank you for referring your patient, Jag Smith, to the [facility]. Please see a copy of my visit note below.    Transplant Surgery -OUTPATIENT PROGRESS NOTE    Date of Visit: 11/10/2024    Transplants:  9/28/2024 (Liver); Postoperative day:  43  ASSESMENT AND PLAN:  Incisional wound:   Stable. X3 tunneling areas. No s/s infection. Continue packing with 4x4 gauze BID.   Discussed pain control.       Date: October 28, 2024    Transplant:  [x]                             Liver [x]                              Kidney []                             Pancreas []                              Other:             Chief Complaint:RECHECK (41 days ago)    History of Present Illness:  Here today for incisional wound care. Mother is changing BID.   No fever or N/V/D.            Patient Active Problem List   Diagnosis     Alcohol use disorder, severe, in early remission (H)     Alcoholic hepatitis (H)     Epiphora due to insufficient drainage of left side     Hyperbilirubinemia     Hypokalemia     Jaundice     Pneumonia     Liver replaced by transplant (H)     DENI (acute kidney injury) (H)     Immunosuppressed status (H)     Acute post-operative pain     Steroid-induced hyperglycemia     Acute urinary retention     Anemia due to blood loss, acute     Severe malnutrition (H)     Hyponatremia     Hypomagnesemia     Bilateral lower extremity edema     Hyperkalemia     Subconjunctival hemorrhage     SOCIAL /FAMILY HISTORY: [x]                  No recent change    Past Medical History:   Diagnosis Date     Alcohol use disorder      Alcoholic hepatitis (H) 06/07/2024     Anxiety      Depression      Hypertension      Liver replaced by transplant (H) 09/28/2024     Past Surgical History:   Procedure Laterality Date     BENCH LIVER  9/28/2024    Procedure: Bench liver;  Surgeon: Fernando Colon MD;  Location:  OR      DACRYOCYSTORHINOSTOMY Left 2013     INCISION AND DRAINAGE BUTTOCKS Left 2017     TRANSPLANT LIVER RECIPIENT  DONOR N/A 2024    Procedure: Transplant liver recipient  donor;  Surgeon: Fernando Colon MD;  Location:  OR     Social History     Socioeconomic History     Marital status: Single     Spouse name: Not on file     Number of children: Not on file     Years of education: Not on file     Highest education level: Not on file   Occupational History     Not on file   Tobacco Use     Smoking status: Never     Smokeless tobacco: Never   Substance and Sexual Activity     Alcohol use: Not Currently     Comment: last drink 2024     Drug use: Never     Sexual activity: Not on file   Other Topics Concern     Not on file   Social History Narrative     Not on file     Social Drivers of Health     Financial Resource Strain: Low Risk  (2024)    Financial Resource Strain      Within the past 12 months, have you or your family members you live with been unable to get utilities (heat, electricity) when it was really needed?: No   Food Insecurity: Low Risk  (2024)    Food Insecurity      Within the past 12 months, did you worry that your food would run out before you got money to buy more?: No      Within the past 12 months, did the food you bought just not last and you didn t have money to get more?: No   Transportation Needs: Low Risk  (2024)    Transportation Needs      Within the past 12 months, has lack of transportation kept you from medical appointments, getting your medicines, non-medical meetings or appointments, work, or from getting things that you need?: No   Physical Activity: Not on file   Stress: Not on file   Social Connections: Not on file   Interpersonal Safety: Low Risk  (10/16/2024)    Interpersonal Safety      Do you feel physically and emotionally safe where you currently live?: Yes      Within the past 12 months, have you been hit, slapped, kicked  or otherwise physically hurt by someone?: No      Within the past 12 months, have you been humiliated or emotionally abused in other ways by your partner or ex-partner?: No   Recent Concern: Interpersonal Safety - High Risk (9/22/2024)    Interpersonal Safety      Do you feel physically and emotionally safe where you currently live?: No      Within the past 12 months, have you been hit, slapped, kicked or otherwise physically hurt by someone?: No      Within the past 12 months, have you been humiliated or emotionally abused in other ways by your partner or ex-partner?: No   Housing Stability: Low Risk  (9/21/2024)    Housing Stability      Do you have housing? : Yes      Are you worried about losing your housing?: No     Prescription Medications as of 11/10/2024         Rx Number Disp Refills Start End Last Dispensed Date Next Fill Date Owning Pharmacy    acetaminophen (TYLENOL) 325 MG tablet  60 tablet 0 11/4/2024 --   [address] 1-324    Sig: Take 2 tablets (650 mg) by mouth every 8 hours as needed for mild pain or fever.    Class: E-Prescribe    Route: Oral    aspirin (ASA) 81 MG chewable tablet  30 tablet 1 11/1/2024 --   [address] 9-926    Sig: Take 1 tablet (81 mg) by mouth or Feeding Tube daily.    Class: E-Prescribe    Route: Oral or Feeding Tube    hydrOXYzine HCl (ATARAX) 25 MG tablet  20 tablet 0 11/4/2024 --   [address] [phone]    Sig: Take 1 tablet (25 mg) by mouth every 8 hours as needed for anxiety or other (adjuvant pain).    Class: E-Prescribe    Route: Oral    magnesium oxide (MAG-OX) 400 MG tablet  120 tablet 1 11/1/2024 --   [address] [phone]    Sig: Take 2 tablets (800 mg) by mouth 2 times daily.    Class: E-Prescribe    Route: Oral     methocarbamol (ROBAXIN) 750 MG tablet  30 tablet 0 11/4/2024 --   [address] 1-858    Sig: Take 1 tablet (750 mg) by mouth 3 times daily as needed for muscle spasms.    Class: E-Prescribe    Route: Oral    multivitamin w/minerals (CERTAVITE/ANTIOXIDANTS) tablet  30 tablet 1 11/1/2024 --   [address] 1-427    Sig: Take 1 tablet by mouth daily.    Class: E-Prescribe    Route: Oral    mycophenolate (GENERIC EQUIVALENT) 250 MG capsule  180 capsule 1 11/1/2024 --   [address] 1-568    Sig: Take 3 capsules (750 mg) by mouth 2 times daily.    Class: E-Prescribe    Notes to Pharmacy: TXP DT 9/28/2024 (Liver) TXP Dischg DT 10/9/2024 DX Liver replaced by transplant Z94.4 [facility] ([place], [place])    Route: Oral    omeprazole (PRILOSEC) 20 MG DR capsule  30 capsule 2 11/1/2024 --   [address] 1-554    Sig: Take 1 capsule (20 mg) by mouth daily.    Class: E-Prescribe    Route: Oral    polyethylene glycol (MIRALAX) 17 GM/Dose powder  510 g 0 10/9/2024 --   [place], [place] - [address]    Sig: Take 17 g by mouth 2 times daily as needed for constipation.    Class: E-Prescribe    Route: Oral    predniSONE (DELTASONE) 5 MG tablet  30 tablet 1 11/1/2024 --   [address] 1-000    Sig: Take 1 tablet (5 mg) by mouth daily.    Class: E-Prescribe    Notes to Pharmacy: TXP DT 9/28/2024 (Liver) TXP Dischg DT 10/9/2024 DX Liver replaced by transplant Z94.4 [facility] ([place], [place])    Route: Oral    sennosides (SENOKOT) 8.6 MG tablet  60 tablet 0 10/9/2024 --   [facility] [place], [place] - [address] [place] [address]    Sig: Take 2 tablets by mouth 2 times daily as needed for constipation.    Class: E-Prescribe    Route: Oral    tacrolimus (GENERIC EQUIVALENT) 0.5 MG capsule  60 capsule 1 11/5/2024 --   [address] 1-273    Sig: Take 1 capsule (0.5 mg) by mouth every 12 hours.    Class: E-Prescribe    Notes to Pharmacy: TXP DT 9/28/2024 (Liver) TXP Dischg DT 10/9/2024 DX Liver replaced by transplant Z94.4 [facility] ([place], [place])    Route: Oral    tacrolimus (GENERIC EQUIVALENT) 1 MG capsule  240 capsule 1 11/5/2024 --   [address] 1[address]273    Sig: Take 4 capsules (4 mg) by mouth every 12 hours. Total dose 4.5mg BID    Class: E-Prescribe    Notes to Pharmacy: TXP DT 9/28/2024 (Liver) TXP Dischg DT 10/9/2024 DX Liver replaced by transplant Z94.4 [facility] ([place], [place])    Route: Oral    tacrolimus (GENERIC EQUIVALENT) 5 MG capsule  -- -- 11/5/2024 --       Sig: Take 1 capsule (5 mg) by mouth 2 times daily as needed (for dose changes).    Class: Historical    Notes to Pharmacy: TXP DT 9/28/2024 (Liver) TXP Dischg DT 10/9/2024 DX Liver replaced by transplant Z94.4 [facility] ([place], [place])    Route: Oral    traZODone (DESYREL) 50 MG tablet  30 tablet 0 11/7/2024 --   [address] 1-401    Sig: Take 1 tablet (50 mg) by mouth at bedtime.    Class: E-Prescribe    Route: Oral    valGANciclovir (VALCYTE) 450 MG tablet  30 tablet 1 11/1/2024 --   [address] 1[address]462    Sig: Take 1 tablet (450 mg) by mouth daily.    Class: E-Prescribe    Route: Oral          Azithromycin    REVIEW OF SYSTEMS (check box if normal)  [x]               GENERAL  [x]                 PULMONARY [x]                GENITOURINARY  [x]                CNS                 [x]                 CARDIAC  [x]                 ENDOCRINE  [x]                EARS,NOSE,THROAT [x]                 GASTROINTESTINAL [x]                 NEUROLOGIC    [x]                MUSCLOSKELTAL  [x]                  HEMATOLOGY      PHYSICAL EXAM (check box if normal)/79   Pulse 96   Wt 107.5 kg (236 lb 14.4 oz)   SpO2 99%   BMI 32.13 kg/m          [x]            GENERAL: NAD    [x]            Incision:  Left side of incision mostly open, tunneling present midline x2 and x1 R distal incision. Tunneling packed with iodoform gauze and rest of incision packed with 4x4 gauze with no issues. No surrounding erythema. Moderate bloody drainage present- mostly midline.    Abd soft throughout. Non-tender.                                                                                 PAIN SCALE:: 8       Again, thank you for allowing me to participate in the care of your patient.        Sincerely,        SIL Blum CNP

## 2024-11-11 ENCOUNTER — OFFICE VISIT (OUTPATIENT)
Dept: TRANSPLANT | Facility: CLINIC | Age: 39
End: 2024-11-11
Attending: NURSE PRACTITIONER
Payer: MEDICAID

## 2024-11-11 ENCOUNTER — TELEPHONE (OUTPATIENT)
Dept: TRANSPLANT | Facility: CLINIC | Age: 39
End: 2024-11-11

## 2024-11-11 ENCOUNTER — VIRTUAL VISIT (OUTPATIENT)
Dept: TRANSPLANT | Facility: CLINIC | Age: 39
End: 2024-11-11
Attending: TRANSPLANT SURGERY
Payer: MEDICAID

## 2024-11-11 ENCOUNTER — LAB (OUTPATIENT)
Dept: LAB | Facility: CLINIC | Age: 39
End: 2024-11-11
Payer: MEDICAID

## 2024-11-11 VITALS
HEART RATE: 84 BPM | DIASTOLIC BLOOD PRESSURE: 90 MMHG | OXYGEN SATURATION: 100 % | WEIGHT: 235.1 LBS | RESPIRATION RATE: 18 BRPM | SYSTOLIC BLOOD PRESSURE: 146 MMHG | TEMPERATURE: 98.3 F | BODY MASS INDEX: 31.89 KG/M2

## 2024-11-11 DIAGNOSIS — F32.2 CURRENT SEVERE EPISODE OF MAJOR DEPRESSIVE DISORDER WITHOUT PSYCHOTIC FEATURES WITHOUT PRIOR EPISODE (H): Primary | ICD-10-CM

## 2024-11-11 DIAGNOSIS — Z94.4 LIVER REPLACED BY TRANSPLANT (H): ICD-10-CM

## 2024-11-11 DIAGNOSIS — T14.8XXA SKIN WOUND FROM SURGICAL INCISION: ICD-10-CM

## 2024-11-11 DIAGNOSIS — K70.10 ALCOHOLIC HEPATITIS (H): ICD-10-CM

## 2024-11-11 LAB
ALBUMIN SERPL BCG-MCNC: 4.1 G/DL (ref 3.5–5.2)
ALP SERPL-CCNC: 124 U/L (ref 40–150)
ALT SERPL W P-5'-P-CCNC: 14 U/L (ref 0–70)
ANION GAP SERPL CALCULATED.3IONS-SCNC: 11 MMOL/L (ref 7–15)
AST SERPL W P-5'-P-CCNC: 11 U/L (ref 0–45)
BILIRUB DIRECT SERPL-MCNC: 0.55 MG/DL (ref 0–0.3)
BILIRUB SERPL-MCNC: 0.9 MG/DL
BUN SERPL-MCNC: 21.7 MG/DL (ref 6–20)
CALCIUM SERPL-MCNC: 9.7 MG/DL (ref 8.8–10.4)
CHLORIDE SERPL-SCNC: 100 MMOL/L (ref 98–107)
CREAT SERPL-MCNC: 1.37 MG/DL (ref 0.67–1.17)
EGFRCR SERPLBLD CKD-EPI 2021: 68 ML/MIN/1.73M2
ERYTHROCYTE [DISTWIDTH] IN BLOOD BY AUTOMATED COUNT: 15.2 % (ref 10–15)
GLUCOSE SERPL-MCNC: 91 MG/DL (ref 70–99)
HCO3 SERPL-SCNC: 23 MMOL/L (ref 22–29)
HCT VFR BLD AUTO: 30 % (ref 40–53)
HGB BLD-MCNC: 9.8 G/DL (ref 13.3–17.7)
MAGNESIUM SERPL-MCNC: 1.6 MG/DL (ref 1.7–2.3)
MCH RBC QN AUTO: 30.3 PG (ref 26.5–33)
MCHC RBC AUTO-ENTMCNC: 32.7 G/DL (ref 31.5–36.5)
MCV RBC AUTO: 93 FL (ref 78–100)
PHOSPHATE SERPL-MCNC: 6 MG/DL (ref 2.5–4.5)
PLATELET # BLD AUTO: 226 10E3/UL (ref 150–450)
POTASSIUM SERPL-SCNC: 5.2 MMOL/L (ref 3.4–5.3)
PROT SERPL-MCNC: 7.2 G/DL (ref 6.4–8.3)
RBC # BLD AUTO: 3.23 10E6/UL (ref 4.4–5.9)
SODIUM SERPL-SCNC: 134 MMOL/L (ref 135–145)
TACROLIMUS BLD-MCNC: 10.9 UG/L (ref 5–15)
TME LAST DOSE: NORMAL H
TME LAST DOSE: NORMAL H
WBC # BLD AUTO: 7.7 10E3/UL (ref 4–11)

## 2024-11-11 PROCEDURE — 82248 BILIRUBIN DIRECT: CPT | Performed by: PATHOLOGY

## 2024-11-11 PROCEDURE — G0480 DRUG TEST DEF 1-7 CLASSES: HCPCS | Mod: 90 | Performed by: PATHOLOGY

## 2024-11-11 PROCEDURE — G0463 HOSPITAL OUTPT CLINIC VISIT: HCPCS | Performed by: NURSE PRACTITIONER

## 2024-11-11 PROCEDURE — 84100 ASSAY OF PHOSPHORUS: CPT | Performed by: PATHOLOGY

## 2024-11-11 PROCEDURE — 99024 POSTOP FOLLOW-UP VISIT: CPT | Performed by: NURSE PRACTITIONER

## 2024-11-11 PROCEDURE — 99000 SPECIMEN HANDLING OFFICE-LAB: CPT | Performed by: PATHOLOGY

## 2024-11-11 PROCEDURE — 80053 COMPREHEN METABOLIC PANEL: CPT | Performed by: PATHOLOGY

## 2024-11-11 PROCEDURE — 80197 ASSAY OF TACROLIMUS: CPT | Performed by: TRANSPLANT SURGERY

## 2024-11-11 PROCEDURE — 83735 ASSAY OF MAGNESIUM: CPT | Performed by: PATHOLOGY

## 2024-11-11 PROCEDURE — 36415 COLL VENOUS BLD VENIPUNCTURE: CPT | Performed by: PATHOLOGY

## 2024-11-11 PROCEDURE — 85027 COMPLETE CBC AUTOMATED: CPT | Performed by: PATHOLOGY

## 2024-11-11 RX ORDER — TACROLIMUS 1 MG/1
4 CAPSULE ORAL EVERY 12 HOURS
Qty: 240 CAPSULE | Refills: 1 | Status: SHIPPED | OUTPATIENT
Start: 2024-11-11

## 2024-11-11 RX ORDER — TACROLIMUS 0.5 MG/1
CAPSULE ORAL
Qty: 60 CAPSULE | Refills: 1 | Status: SHIPPED | OUTPATIENT
Start: 2024-11-11

## 2024-11-11 RX ORDER — TRAMADOL HYDROCHLORIDE 50 MG/1
50 TABLET ORAL DAILY PRN
Qty: 8 TABLET | Refills: 0 | Status: SHIPPED | OUTPATIENT
Start: 2024-11-11 | End: 2024-11-19

## 2024-11-11 RX ORDER — ESCITALOPRAM OXALATE 20 MG/1
20 TABLET ORAL DAILY
Qty: 30 TABLET | Refills: 0 | Status: SHIPPED | OUTPATIENT
Start: 2024-11-11

## 2024-11-11 NOTE — LETTER
11/11/2024      Jag Smith  [address]  [facility] [address]      Dear Colleague,    Thank you for referring your patient, Jag Smith, to the [facility]. Please see a copy of my visit note below.    Transplant Surgery -OUTPATIENT PROGRESS NOTE    Date of Visit: 11/11/2024    Transplants:  9/28/2024 (Liver); Postoperative day:  44  ASSESMENT AND PLAN:  Incisional wound:   Stable. X3 tunneling areas. No s/s infection. Continue packing with 4x4 gauze BID.   Discussed pain control- small refill of tramadol and advised to wean off.     Depression- mental health appt. Pending. Rx for lexapro given as he was on this prior but interaction risk with trazodone and tramadol. Advised to start when he weans off tramadol.       Date: November 11th, 2024    Transplant:  [x]                             Liver [x]                              Kidney []                             Pancreas []                              Other:             Chief Complaint:Follow Up (Liver transplant follow-up)    History of Present Illness:  Here today for incisional wound care. Mother is changing BID.   No fever or N/V/D.            Patient Active Problem List   Diagnosis     Alcohol use disorder, severe, in early remission (H)     Alcoholic hepatitis (H)     Epiphora due to insufficient drainage of left side     Hyperbilirubinemia     Hypokalemia     Jaundice     Pneumonia     Liver replaced by transplant (H)     DENI (acute kidney injury) (H)     Immunosuppressed status (H)     Acute post-operative pain     Steroid-induced hyperglycemia     Acute urinary retention     Anemia due to blood loss, acute     Severe malnutrition (H)     Hyponatremia     Hypomagnesemia     Bilateral lower extremity edema     Hyperkalemia     Subconjunctival hemorrhage     SOCIAL /FAMILY HISTORY: [x]                  No recent change    Past Medical History:   Diagnosis Date     Alcohol use disorder      Alcoholic hepatitis (H) 06/07/2024      Anxiety      Depression      Hypertension      Liver replaced by transplant (H) 2024     Past Surgical History:   Procedure Laterality Date     BENCH LIVER  2024    Procedure: Bench liver;  Surgeon: Fernando Colon MD;  Location: [facility] OR     DACRYOCYSTORHINOSTOMY Left 2013     INCISION AND DRAINAGE BUTTOCKS Left 2017     TRANSPLANT LIVER RECIPIENT  DONOR N/A 2024    Procedure: Transplant liver recipient  donor;  Surgeon: Fernando Colon MD;  Location: [facility] OR     Social History     Socioeconomic History     Marital status: Single     Spouse name: Not on file     Number of children: Not on file     Years of education: Not on file     Highest education level: Not on file   Occupational History     Not on file   Tobacco Use     Smoking status: Never     Smokeless tobacco: Never   Substance and Sexual Activity     Alcohol use: Not Currently     Comment: last drink 2024     Drug use: Never     Sexual activity: Not on file   Other Topics Concern     Not on file   Social History Narrative     Not on file     Social Drivers of Health     Financial Resource Strain: Low Risk  (2024)    Financial Resource Strain      Within the past 12 months, have you or your family members you live with been unable to get utilities (heat, electricity) when it was really needed?: No   Food Insecurity: Low Risk  (2024)    Food Insecurity      Within the past 12 months, did you worry that your food would run out before you got money to buy more?: No      Within the past 12 months, did the food you bought just not last and you didn t have money to get more?: No   Transportation Needs: Low Risk  (2024)    Transportation Needs      Within the past 12 months, has lack of transportation kept you from medical appointments, getting your medicines, non-medical meetings or appointments, work, or from getting things that you need?: No   Physical Activity: Not on file   Stress: Not on file    Social Connections: Not on file   Interpersonal Safety: Low Risk  (10/16/2024)    Interpersonal Safety      Do you feel physically and emotionally safe where you currently live?: Yes      Within the past 12 months, have you been hit, slapped, kicked or otherwise physically hurt by someone?: No      Within the past 12 months, have you been humiliated or emotionally abused in other ways by your partner or ex-partner?: No   Recent Concern: Interpersonal Safety - High Risk (9/22/2024)    Interpersonal Safety      Do you feel physically and emotionally safe where you currently live?: No      Within the past 12 months, have you been hit, slapped, kicked or otherwise physically hurt by someone?: No      Within the past 12 months, have you been humiliated or emotionally abused in other ways by your partner or ex-partner?: No   Housing Stability: Low Risk  (9/21/2024)    Housing Stability      Do you have housing? : Yes      Are you worried about losing your housing?: No     Prescription Medications as of 11/11/2024         Rx Number Disp Refills Start End Last Dispensed Date Next Fill Date Owning Pharmacy    acetaminophen (TYLENOL) 325 MG tablet  60 tablet 0 11/4/2024 --   [address] 2-841    Sig: Take 2 tablets (650 mg) by mouth every 8 hours as needed for mild pain or fever.    Class: E-Prescribe    Route: Oral    aspirin (ASA) 81 MG chewable tablet  30 tablet 1 11/1/2024 --   [address] 8-363    Sig: Take 1 tablet (81 mg) by mouth or Feeding Tube daily.    Class: E-Prescribe    Route: Oral or Feeding Tube    escitalopram (LEXAPRO) 20 MG tablet  30 tablet 0 11/11/2024 --   [address] 1-094    Sig: Take 1 tablet (20 mg) by mouth daily.    Class: E-Prescribe    Route: Oral    hydrOXYzine HCl (ATARAX) 25 MG tablet  20 tablet 0  11/4/2024 --   [address] 1-273    Sig: Take 1 tablet (25 mg) by mouth every 8 hours as needed for anxiety or other (adjuvant pain).    Class: E-Prescribe    Route: Oral    magnesium oxide (MAG-OX) 400 MG tablet  120 tablet 1 11/1/2024 --   [address] 1-273    Sig: Take 2 tablets (800 mg) by mouth 2 times daily.    Class: E-Prescribe    Route: Oral    methocarbamol (ROBAXIN) 750 MG tablet  30 tablet 0 11/4/2024 --   [address] 1-273    Sig: Take 1 tablet (750 mg) by mouth 3 times daily as needed for muscle spasms.    Class: E-Prescribe    Route: Oral    multivitamin w/minerals (CERTAVITE/ANTIOXIDANTS) tablet  30 tablet 1 11/1/2024 --   [address] 1-273    Sig: Take 1 tablet by mouth daily.    Class: E-Prescribe    Route: Oral    mycophenolate (GENERIC EQUIVALENT) 250 MG capsule  180 capsule 1 11/1/2024 --   [address] 1[address]273    Sig: Take 3 capsules (750 mg) by mouth 2 times daily.    Class: E-Prescribe    Notes to Pharmacy: TXP DT 9/28/2024 (Liver) TXP Dischg DT 10/9/2024 DX Liver replaced by transplant Z94.4 [facility] ([place], [place])    Route: Oral    omeprazole (PRILOSEC) 20 MG DR capsule  30 capsule 2 11/1/2024 --   [address] 1-273    Sig: Take 1 capsule (20 mg) by mouth daily.    Class: E-Prescribe    Route: Oral    polyethylene glycol (MIRALAX) 17 GM/Dose powder  510 g 0 10/9/2024 --   [place], [place] - [address]    Sig: Take 17 g by mouth 2 times daily as needed for constipation.    Class: E-Prescribe    Route: Oral    predniSONE  (DELTASONE) 5 MG tablet  30 tablet 1 11/1/2024 --   [address] 8-513    Sig: Take 1 tablet (5 mg) by mouth daily.    Class: E-Prescribe    Notes to Pharmacy: TXP DT 9/28/2024 (Liver) TXP Dischg DT 10/9/2024 DX Liver replaced by transplant Z94.4 [facility] ([place], [place])    Route: Oral    sennosides (SENOKOT) 8.6 MG tablet  60 tablet 0 10/9/2024 --   [place], [place] - [address]    Sig: Take 2 tablets by mouth 2 times daily as needed for constipation.    Class: E-Prescribe    Route: Oral    tacrolimus (GENERIC EQUIVALENT) 0.5 MG capsule  60 capsule 1 11/11/2024 --   [place], [place] - [address] [place] [address] [phone]    Sig: HOLD    Class: E-Prescribe    Notes to Pharmacy: TXP DT 9/28/2024 (Liver) TXP Dischg DT 10/9/2024 DX Liver replaced by transplant Z94.4 [facility] ([place], [place])    tacrolimus (GENERIC EQUIVALENT) 1 MG capsule  240 capsule 1 11/11/2024 --   [address] 9-800    Sig: Take 4 capsules (4 mg) by mouth every 12 hours.    Class: E-Prescribe    Notes to Pharmacy: TXP DT 9/28/2024 (Liver) TXP Dischg DT 10/9/2024 DX Liver replaced by transplant Z94.4 [facility] ([place], [place])    Route: Oral    tacrolimus (GENERIC EQUIVALENT) 5 MG capsule  -- -- 11/5/2024 --       Sig: Take 1 capsule (5 mg) by mouth 2 times daily as needed (for dose changes).    Class: Historical    Notes to Pharmacy: TXP DT 9/28/2024 (Liver) TXP Dischg DT 10/9/2024 DX Liver replaced by transplant Z94.4 [facility] ([place], [place])    Route: Oral    traMADol (ULTRAM) 50 MG tablet  8 tablet 0 11/11/2024 11/19/2024   [facility]  [address] 1-363    Sig: Take 1 tablet (50 mg) by mouth daily as needed for severe pain.    Class: E-Prescribe    Route: Oral    traZODone (DESYREL) 50 MG tablet  30 tablet 0 11/7/2024 --   [address] 1-273    Sig: Take 1 tablet (50 mg) by mouth at bedtime.    Class: E-Prescribe    Route: Oral    valGANciclovir (VALCYTE) 450 MG tablet  30 tablet 1 11/1/2024 --   [address] 1[address]273    Sig: Take 1 tablet (450 mg) by mouth daily.    Class: E-Prescribe    Route: Oral          Azithromycin   REVIEW OF SYSTEMS (check box if normal)  [x]               GENERAL  [x]                 PULMONARY [x]                GENITOURINARY  [x]                CNS                 [x]                 CARDIAC  [x]                 ENDOCRINE  [x]                EARS,NOSE,THROAT [x]                 GASTROINTESTINAL [x]                 NEUROLOGIC    [x]                MUSCLOSKELTAL  [x]                  HEMATOLOGY      PHYSICAL EXAM (check box if normal)BP (!) 146/90 (BP Location: Right arm, Patient Position: Chair, Cuff Size: Adult Regular)   Pulse 84   Temp 98.3  F (36.8  C) (Oral)   Resp 18   Wt 106.6 kg (235 lb 1.6 oz)   SpO2 100%   BMI 31.89 kg/m          [x]            GENERAL: NAD    [x]            Incision:  Left side of incision open but healing well, tunneling present midline x2 and x1 R distal incision. Tunneling packed with iodoform gauze and rest of incision packed with 4x4 gauze with no issues. No surrounding erythema. Moderate bloody drainage present- mostly midline.    Abd soft throughout. Non-tender.                                                                                 PAIN SCALE:: 8       Again, thank you for allowing me to participate in the care of your patient.        Sincerely,        SIL Blum CNP

## 2024-11-11 NOTE — NURSING NOTE
Chief Complaint   Patient presents with    Follow Up     Liver transplant follow-up     Vital signs:  Temp: 98.3  F (36.8  C) Temp src: Oral BP: (!) 146/90 Pulse: 84   Resp: 18 SpO2: 100 %       Weight: 106.6 kg (235 lb 1.6 oz)  Estimated body mass index is 31.89 kg/m  as calculated from the following:    Height as of 9/21/24: 1.829 m (6').    Weight as of this encounter: 106.6 kg (235 lb 1.6 oz).      Aniket Ray RN on 11/11/2024 at 2:12 PM

## 2024-11-11 NOTE — PROGRESS NOTES
Transplant Surgery -OUTPATIENT PROGRESS NOTE    Date of Visit: 11/10/2024    Transplants:  2024 (Liver); Postoperative day:  43  ASSESMENT AND PLAN:  Incisional wound:   Stable. X3 tunneling areas. No s/s infection. Continue packing with 4x4 gauze BID.   Discussed pain control.       Date: 2024    Transplant:  [x]                             Liver [x]                              Kidney []                             Pancreas []                              Other:             Chief Complaint:Follow Up (Wound vac changeout)    History of Present Illness:  Here today for incisional wound care. Mother is changing BID. Still with moderate pain.   No fever or N/V/D.   Issues with insomnia.         Patient Active Problem List   Diagnosis    Alcohol use disorder, severe, in early remission (H)    Alcoholic hepatitis (H)    Epiphora due to insufficient drainage of left side    Hyperbilirubinemia    Hypokalemia    Jaundice    Pneumonia    Liver replaced by transplant (H)    DENI (acute kidney injury) (H)    Immunosuppressed status (H)    Acute post-operative pain    Steroid-induced hyperglycemia    Acute urinary retention    Anemia due to blood loss, acute    Severe malnutrition (H)    Hyponatremia    Hypomagnesemia    Bilateral lower extremity edema    Hyperkalemia    Subconjunctival hemorrhage     SOCIAL /FAMILY HISTORY: [x]                  No recent change    Past Medical History:   Diagnosis Date    Alcohol use disorder     Alcoholic hepatitis (H) 2024    Anxiety     Depression     Hypertension     Liver replaced by transplant (H) 2024     Past Surgical History:   Procedure Laterality Date    BENCH LIVER  2024    Procedure: Bench liver;  Surgeon: Fernando Colon MD;  Location: [facility] OR    DACRYOCYSTORHINOSTOMY Left 2013    INCISION AND DRAINAGE BUTTOCKS Left 2017    TRANSPLANT LIVER RECIPIENT  DONOR N/A 2024    Procedure: Transplant liver recipient  donor;   Surgeon: Fernando Colon MD;  Location:  OR     Social History     Socioeconomic History    Marital status: Single     Spouse name: Not on file    Number of children: Not on file    Years of education: Not on file    Highest education level: Not on file   Occupational History    Not on file   Tobacco Use    Smoking status: Never    Smokeless tobacco: Never   Substance and Sexual Activity    Alcohol use: Not Currently     Comment: last drink june 7th, 2024    Drug use: Never    Sexual activity: Not on file   Other Topics Concern    Not on file   Social History Narrative    Not on file     Social Drivers of Health     Financial Resource Strain: Low Risk  (9/21/2024)    Financial Resource Strain     Within the past 12 months, have you or your family members you live with been unable to get utilities (heat, electricity) when it was really needed?: No   Food Insecurity: Low Risk  (9/21/2024)    Food Insecurity     Within the past 12 months, did you worry that your food would run out before you got money to buy more?: No     Within the past 12 months, did the food you bought just not last and you didn t have money to get more?: No   Transportation Needs: Low Risk  (9/21/2024)    Transportation Needs     Within the past 12 months, has lack of transportation kept you from medical appointments, getting your medicines, non-medical meetings or appointments, work, or from getting things that you need?: No   Physical Activity: Not on file   Stress: Not on file   Social Connections: Not on file   Interpersonal Safety: Low Risk  (10/16/2024)    Interpersonal Safety     Do you feel physically and emotionally safe where you currently live?: Yes     Within the past 12 months, have you been hit, slapped, kicked or otherwise physically hurt by someone?: No     Within the past 12 months, have you been humiliated or emotionally abused in other ways by your partner or ex-partner?: No   Recent Concern: Interpersonal Safety - High Risk  (9/22/2024)    Interpersonal Safety     Do you feel physically and emotionally safe where you currently live?: No     Within the past 12 months, have you been hit, slapped, kicked or otherwise physically hurt by someone?: No     Within the past 12 months, have you been humiliated or emotionally abused in other ways by your partner or ex-partner?: No   Housing Stability: Low Risk  (9/21/2024)    Housing Stability     Do you have housing? : Yes     Are you worried about losing your housing?: No     Prescription Medications as of 11/10/2024         Rx Number Disp Refills Start End Last Dispensed Date Next Fill Date Owning Pharmacy    acetaminophen (TYLENOL) 325 MG tablet  60 tablet 0 11/4/2024 --   [address] 1-273    Sig: Take 2 tablets (650 mg) by mouth every 8 hours as needed for mild pain or fever.    Class: E-Prescribe    Route: Oral    aspirin (ASA) 81 MG chewable tablet  30 tablet 1 11/1/2024 --   [address] 1-015    Sig: Take 1 tablet (81 mg) by mouth or Feeding Tube daily.    Class: E-Prescribe    Route: Oral or Feeding Tube    hydrOXYzine HCl (ATARAX) 25 MG tablet  20 tablet 0 11/4/2024 --   [address] 1-009    Sig: Take 1 tablet (25 mg) by mouth every 8 hours as needed for anxiety or other (adjuvant pain).    Class: E-Prescribe    Route: Oral    magnesium oxide (MAG-OX) 400 MG tablet  120 tablet 1 11/1/2024 --   [address] 1-234    Sig: Take 2 tablets (800 mg) by mouth 2 times daily.    Class: E-Prescribe    Route: Oral    methocarbamol (ROBAXIN) 750 MG tablet  30 tablet 0 11/4/2024 --   [address] 1-541    Sig: Take 1 tablet (750 mg) by mouth 3 times daily as needed for muscle  spasms.    Class: E-Prescribe    Route: Oral    multivitamin w/minerals (CERTAVITE/ANTIOXIDANTS) tablet  30 tablet 1 11/1/2024 --   [address] 1-816    Sig: Take 1 tablet by mouth daily.    Class: E-Prescribe    Route: Oral    mycophenolate (GENERIC EQUIVALENT) 250 MG capsule  180 capsule 1 11/1/2024 --   [address] 1-449    Sig: Take 3 capsules (750 mg) by mouth 2 times daily.    Class: E-Prescribe    Notes to Pharmacy: TXP DT 9/28/2024 (Liver) TXP Dischg DT 10/9/2024 DX Liver replaced by transplant Z94.4 [facility] ([place], [place])    Route: Oral    omeprazole (PRILOSEC) 20 MG DR capsule  30 capsule 2 11/1/2024 --   [address] 9-919    Sig: Take 1 capsule (20 mg) by mouth daily.    Class: E-Prescribe    Route: Oral    polyethylene glycol (MIRALAX) 17 GM/Dose powder  510 g 0 10/9/2024 --   [place], [place] - [address] [place] [address]    Sig: Take 17 g by mouth 2 times daily as needed for constipation.    Class: E-Prescribe    Route: Oral    predniSONE (DELTASONE) 5 MG tablet  30 tablet 1 11/1/2024 --   [address] 1-156    Sig: Take 1 tablet (5 mg) by mouth daily.    Class: E-Prescribe    Notes to Pharmacy: TXP DT 9/28/2024 (Liver) TXP Dischg DT 10/9/2024 DX Liver replaced by transplant Z94.4 [facility] ([place], [place])    Route: Oral    sennosides (SENOKOT) 8.6 MG tablet  60 tablet 0 10/9/2024 --   [place], [place] - [address] [place] [address] [address]    Sig: Take 2 tablets by mouth 2 times daily as needed for constipation.    Class: E-Prescribe    Route: Oral    tacrolimus (GENERIC EQUIVALENT) 0.5 MG capsule  60  capsule 1 11/5/2024 --   [address] 1-737    Sig: Take 1 capsule (0.5 mg) by mouth every 12 hours.    Class: E-Prescribe    Notes to Pharmacy: TXP DT 9/28/2024 (Liver) TXP Dischg DT 10/9/2024 DX Liver replaced by transplant Z94.4 [facility] ([place], [place])    Route: Oral    tacrolimus (GENERIC EQUIVALENT) 1 MG capsule  240 capsule 1 11/5/2024 --   [address] 1-084    Sig: Take 4 capsules (4 mg) by mouth every 12 hours. Total dose 4.5mg BID    Class: E-Prescribe    Notes to Pharmacy: TXP DT 9/28/2024 (Liver) TXP Dischg DT 10/9/2024 DX Liver replaced by transplant Z94.4 [facility] ([place], [place])    Route: Oral    tacrolimus (GENERIC EQUIVALENT) 5 MG capsule  -- -- 11/5/2024 --       Sig: Take 1 capsule (5 mg) by mouth 2 times daily as needed (for dose changes).    Class: Historical    Notes to Pharmacy: TXP DT 9/28/2024 (Liver) TXP Dischg DT 10/9/2024 DX Liver replaced by transplant Z94.4 [facility] ([place], [place])    Route: Oral    traZODone (DESYREL) 50 MG tablet  30 tablet 0 11/7/2024 --   [address] 1-780    Sig: Take 1 tablet (50 mg) by mouth at bedtime.    Class: E-Prescribe    Route: Oral    valGANciclovir (VALCYTE) 450 MG tablet  30 tablet 1 11/1/2024 --   [address] 1[address]898    Sig: Take 1 tablet (450 mg) by mouth daily.    Class: E-Prescribe    Route: Oral          Azithromycin   REVIEW OF SYSTEMS (check box if normal)  [x]               GENERAL  [x]                 PULMONARY [x]                GENITOURINARY  [x]                CNS                 [x]                 CARDIAC  [x]                  ENDOCRINE  [x]                EARS,NOSE,THROAT [x]                 GASTROINTESTINAL [x]                 NEUROLOGIC    [x]                MUSCLOSKELTAL  [x]                  HEMATOLOGY      PHYSICAL EXAM (check box if normal)/89 (BP Location: Right arm, Patient Position: Chair, Cuff Size: Adult Regular)   Pulse 90   Temp 98  F (36.7  C) (Oral)   Resp 16   Wt 107 kg (235 lb 14.4 oz)   SpO2 99%   BMI 31.99 kg/m          [x]            GENERAL: NAD    [x]            Incision:  Left side of incision mostly open, tunneling present midline x2 and R distal incision. Tunneling packed with iodoform gauze and rest of incision packed with 4x4 gauze with no issues. No surrounding erythema. Moderate bloody drainage present- mostly midline.    Abd soft throughout. Non-tender.                                                                                 PAIN SCALE:: 8

## 2024-11-11 NOTE — PROGRESS NOTES
Transplant Surgery -OUTPATIENT PROGRESS NOTE    Date of Visit: 11/11/2024    Transplants:  9/28/2024 (Liver); Postoperative day:  44  ASSESMENT AND PLAN:  Incisional wound:   Stable. X3 tunneling areas. No s/s infection. Continue packing with 4x4 gauze BID.   Discussed pain control- small refill of tramadol and advised to wean off.     Depression- mental health appt. Pending. Rx for lexapro given as he was on this prior but interaction risk with trazodone and tramadol. Advised to start when he weans off tramadol.       Date: November 11th, 2024    Transplant:  [x]                             Liver [x]                              Kidney []                             Pancreas []                              Other:             Chief Complaint:Follow Up (Liver transplant follow-up)    History of Present Illness:  Here today for incisional wound care. Mother is changing BID.   No fever or N/V/D.            Patient Active Problem List   Diagnosis    Alcohol use disorder, severe, in early remission (H)    Alcoholic hepatitis (H)    Epiphora due to insufficient drainage of left side    Hyperbilirubinemia    Hypokalemia    Jaundice    Pneumonia    Liver replaced by transplant (H)    DENI (acute kidney injury) (H)    Immunosuppressed status (H)    Acute post-operative pain    Steroid-induced hyperglycemia    Acute urinary retention    Anemia due to blood loss, acute    Severe malnutrition (H)    Hyponatremia    Hypomagnesemia    Bilateral lower extremity edema    Hyperkalemia    Subconjunctival hemorrhage     SOCIAL /FAMILY HISTORY: [x]                  No recent change    Past Medical History:   Diagnosis Date    Alcohol use disorder     Alcoholic hepatitis (H) 06/07/2024    Anxiety     Depression     Hypertension     Liver replaced by transplant (H) 09/28/2024     Past Surgical History:   Procedure Laterality Date    BENCH LIVER  9/28/2024    Procedure: Bench liver;  Surgeon: Fernando Colon MD;  Location:  OR     DACRYOCYSTORHINOSTOMY Left 2013    INCISION AND DRAINAGE BUTTOCKS Left 2017    TRANSPLANT LIVER RECIPIENT  DONOR N/A 2024    Procedure: Transplant liver recipient  donor;  Surgeon: Fernando Colon MD;  Location:  OR     Social History     Socioeconomic History    Marital status: Single     Spouse name: Not on file    Number of children: Not on file    Years of education: Not on file    Highest education level: Not on file   Occupational History    Not on file   Tobacco Use    Smoking status: Never    Smokeless tobacco: Never   Substance and Sexual Activity    Alcohol use: Not Currently     Comment: last drink 2024    Drug use: Never    Sexual activity: Not on file   Other Topics Concern    Not on file   Social History Narrative    Not on file     Social Drivers of Health     Financial Resource Strain: Low Risk  (2024)    Financial Resource Strain     Within the past 12 months, have you or your family members you live with been unable to get utilities (heat, electricity) when it was really needed?: No   Food Insecurity: Low Risk  (2024)    Food Insecurity     Within the past 12 months, did you worry that your food would run out before you got money to buy more?: No     Within the past 12 months, did the food you bought just not last and you didn t have money to get more?: No   Transportation Needs: Low Risk  (2024)    Transportation Needs     Within the past 12 months, has lack of transportation kept you from medical appointments, getting your medicines, non-medical meetings or appointments, work, or from getting things that you need?: No   Physical Activity: Not on file   Stress: Not on file   Social Connections: Not on file   Interpersonal Safety: Low Risk  (10/16/2024)    Interpersonal Safety     Do you feel physically and emotionally safe where you currently live?: Yes     Within the past 12 months, have you been hit, slapped, kicked or otherwise  physically hurt by someone?: No     Within the past 12 months, have you been humiliated or emotionally abused in other ways by your partner or ex-partner?: No   Recent Concern: Interpersonal Safety - High Risk (9/22/2024)    Interpersonal Safety     Do you feel physically and emotionally safe where you currently live?: No     Within the past 12 months, have you been hit, slapped, kicked or otherwise physically hurt by someone?: No     Within the past 12 months, have you been humiliated or emotionally abused in other ways by your partner or ex-partner?: No   Housing Stability: Low Risk  (9/21/2024)    Housing Stability     Do you have housing? : Yes     Are you worried about losing your housing?: No     Prescription Medications as of 11/11/2024         Rx Number Disp Refills Start End Last Dispensed Date Next Fill Date Owning Pharmacy    acetaminophen (TYLENOL) 325 MG tablet  60 tablet 0 11/4/2024 --   [address] 4-181    Sig: Take 2 tablets (650 mg) by mouth every 8 hours as needed for mild pain or fever.    Class: E-Prescribe    Route: Oral    aspirin (ASA) 81 MG chewable tablet  30 tablet 1 11/1/2024 --   [address] 1-264    Sig: Take 1 tablet (81 mg) by mouth or Feeding Tube daily.    Class: E-Prescribe    Route: Oral or Feeding Tube    escitalopram (LEXAPRO) 20 MG tablet  30 tablet 0 11/11/2024 --   [address] 0-798    Sig: Take 1 tablet (20 mg) by mouth daily.    Class: E-Prescribe    Route: Oral    hydrOXYzine HCl (ATARAX) 25 MG tablet  20 tablet 0 11/4/2024 --   [address] 2-299    Sig: Take 1 tablet (25 mg) by mouth every 8 hours as needed for anxiety or other (adjuvant pain).    Class: E-Prescribe    Route: Oral    magnesium oxide (MAG-OX) 400 MG  tablet  120 tablet 1 11/1/2024 --   [address] 1-395    Sig: Take 2 tablets (800 mg) by mouth 2 times daily.    Class: E-Prescribe    Route: Oral    methocarbamol (ROBAXIN) 750 MG tablet  30 tablet 0 11/4/2024 --   [address] 1-109    Sig: Take 1 tablet (750 mg) by mouth 3 times daily as needed for muscle spasms.    Class: E-Prescribe    Route: Oral    multivitamin w/minerals (CERTAVITE/ANTIOXIDANTS) tablet  30 tablet 1 11/1/2024 --   [address] 1-086    Sig: Take 1 tablet by mouth daily.    Class: E-Prescribe    Route: Oral    mycophenolate (GENERIC EQUIVALENT) 250 MG capsule  180 capsule 1 11/1/2024 --   [address] 1-493    Sig: Take 3 capsules (750 mg) by mouth 2 times daily.    Class: E-Prescribe    Notes to Pharmacy: TXP DT 9/28/2024 (Liver) TXP Dischg DT 10/9/2024 DX Liver replaced by transplant Z94.4 [facility] ([place], [place])    Route: Oral    omeprazole (PRILOSEC) 20 MG DR capsule  30 capsule 2 11/1/2024 --   [address] 1-835    Sig: Take 1 capsule (20 mg) by mouth daily.    Class: E-Prescribe    Route: Oral    polyethylene glycol (MIRALAX) 17 GM/Dose powder  510 g 0 10/9/2024 --   [place], [place] - [address] [facility]    Sig: Take 17 g by mouth 2 times daily as needed for constipation.    Class: E-Prescribe    Route: Oral    predniSONE (DELTASONE) 5 MG tablet  30 tablet 1 11/1/2024 --   [address] 1-761    Sig: Take 1 tablet (5 mg) by mouth daily.    Class: E-Prescribe    Notes to Pharmacy: TXP DT 9/28/2024 (Liver) TXP Dischg DT  10/9/2024 DX Liver replaced by transplant Z94.4 [facility] ([place], [place])    Route: Oral    sennosides (SENOKOT) 8.6 MG tablet  60 tablet 0 10/9/2024 --   [facility] - [place], [place] - [address] [facility] [address]    Sig: Take 2 tablets by mouth 2 times daily as needed for constipation.    Class: E-Prescribe    Route: Oral    tacrolimus (GENERIC EQUIVALENT) 0.5 MG capsule  60 capsule 1 11/11/2024 --   [address] 1-018    Sig: HOLD    Class: E-Prescribe    Notes to Pharmacy: TXP DT 9/28/2024 (Liver) TXP Dischg DT 10/9/2024 DX Liver replaced by transplant Z94.4 [facility] ([place], [place])    tacrolimus (GENERIC EQUIVALENT) 1 MG capsule  240 capsule 1 11/11/2024 --   [address] 1-440    Sig: Take 4 capsules (4 mg) by mouth every 12 hours.    Class: E-Prescribe    Notes to Pharmacy: TXP DT 9/28/2024 (Liver) TXP Dischg DT 10/9/2024 DX Liver replaced by transplant Z94.4 [facility] ([place], [place])    Route: Oral    tacrolimus (GENERIC EQUIVALENT) 5 MG capsule  -- -- 11/5/2024 --       Sig: Take 1 capsule (5 mg) by mouth 2 times daily as needed (for dose changes).    Class: Historical    Notes to Pharmacy: TXP DT 9/28/2024 (Liver) TXP Dischg DT 10/9/2024 DX Liver replaced by transplant Z94.4 [facility] ([place], [place])    Route: Oral    traMADol (ULTRAM) 50 MG tablet  8 tablet 0 11/11/2024 11/19/2024   [address] 1-885    Sig: Take 1 tablet (50 mg) by mouth daily as needed for severe pain.    Class: E-Prescribe    Route: Oral    traZODone (DESYREL) 50 MG tablet  30 tablet 0 11/7/2024 --   [facility]  [address] 1-144    Sig: Take 1 tablet (50 mg) by mouth at bedtime.    Class: E-Prescribe    Route: Oral    valGANciclovir (VALCYTE) 450 MG tablet  30 tablet 1 11/1/2024 --   [place], [place] - [address] [facility] [phone]    Sig: Take 1 tablet (450 mg) by mouth daily.    Class: E-Prescribe    Route: Oral          Azithromycin   REVIEW OF SYSTEMS (check box if normal)  [x]               GENERAL  [x]                 PULMONARY [x]                GENITOURINARY  [x]                CNS                 [x]                 CARDIAC  [x]                 ENDOCRINE  [x]                EARS,NOSE,THROAT [x]                 GASTROINTESTINAL [x]                 NEUROLOGIC    [x]                MUSCLOSKELTAL  [x]                  HEMATOLOGY      PHYSICAL EXAM (check box if normal)BP (!) 146/90 (BP Location: Right arm, Patient Position: Chair, Cuff Size: Adult Regular)   Pulse 84   Temp 98.3  F (36.8  C) (Oral)   Resp 18   Wt 106.6 kg (235 lb 1.6 oz)   SpO2 100%   BMI 31.89 kg/m          [x]            GENERAL: NAD    [x]            Incision:  Left side of incision open but healing well, tunneling present midline x2 and x1 R distal incision. Tunneling packed with iodoform gauze and rest of incision packed with 4x4 gauze with no issues. No surrounding erythema. Moderate bloody drainage present- mostly midline.    Abd soft throughout. Non-tender.                                                                                 PAIN SCALE:: 8

## 2024-11-11 NOTE — PROGRESS NOTES
Transplant Surgery Progress Note    Transplants:  9/28/2024 (Liver);   S: overall doing better, wound still an issue, mom is in the ER this AM, appetite and activity improving    Transplant History:    Transplant Type:  Liver Tx  Donor Type:     Transplant Date:  9/28/2024 (Liver)   Biliary Stent:  No       Acute Rejection Hx:  No    Present Maintenance Immunosuppression:  Tacrolimus, Mycophenolate mofetil, and Prednisone    CMV IgG Ab Discordance:  No  EBV IgG Ab Discordance:  No    Transplant Coordinator: Karena Villegas     Transplant Office Phone Number: [phone]     Immunosuppressant Medications       Immunosuppressive Agents Disp Start End     mycophenolate (GENERIC EQUIVALENT) 250 MG capsule 180 capsule 11/1/2024 --    Sig - Route: Take 3 capsules (750 mg) by mouth 2 times daily. - Oral    Class: E-Prescribe    Notes to Pharmacy: TXP DT 9/28/2024 (Liver) TXP Dischg DT 10/9/2024 DX Liver replaced by transplant Z94.4 TX [facility] ([place], [place])     tacrolimus (GENERIC EQUIVALENT) 0.5 MG capsule 60 capsule 11/5/2024 --    Sig - Route: Take 1 capsule (0.5 mg) by mouth every 12 hours. - Oral    Class: E-Prescribe    Notes to Pharmacy: TXP DT 9/28/2024 (Liver) TXP Dischg DT 10/9/2024 DX Liver replaced by transplant Z94.4 TX [facility] ([place], [place])     tacrolimus (GENERIC EQUIVALENT) 1 MG capsule 240 capsule 11/5/2024 --    Sig - Route: Take 4 capsules (4 mg) by mouth every 12 hours. Total dose 4.5mg BID - Oral    Class: E-Prescribe    Notes to Pharmacy: TXP DT 9/28/2024 (Liver) TXP Dischg DT 10/9/2024 DX Liver replaced by transplant Z94.4 TX [facility] ([place], [place])     tacrolimus (GENERIC EQUIVALENT) 5 MG capsule -- 11/5/2024 --    Sig - Route: Take 1 capsule (5 mg) by mouth 2 times daily as needed (for dose changes). - Oral    Class: Historical    Notes to Pharmacy:  TXP DT 9/28/2024 (Liver) TXP Dischg DT 10/9/2024 DX Liver replaced by transplant Z94.4 TX [facility] ([place], [place])            Possible Immunosuppression-related side effects:   []             headache  []             vivid dreams  []             irritability  []             cognitive difficuties  []             fine tremor  []             nausea  []             diarrhea  []             neuropathy      []             edema  []             renal calcineurin toxicity  []             hyperkalemia  []             post-transplant diabetes  []             decreased appetite  []             increased appetite  []             other:  []             none    Prescription Medications as of 11/10/2024         Rx Number Disp Refills Start End Last Dispensed Date Next Fill Date Owning Pharmacy    acetaminophen (TYLENOL) 325 MG tablet  60 tablet 0 11/4/2024 --   [address] 1-060    Sig: Take 2 tablets (650 mg) by mouth every 8 hours as needed for mild pain or fever.    Class: E-Prescribe    Route: Oral    aspirin (ASA) 81 MG chewable tablet  30 tablet 1 11/1/2024 --   [address] 1-315    Sig: Take 1 tablet (81 mg) by mouth or Feeding Tube daily.    Class: E-Prescribe    Route: Oral or Feeding Tube    hydrOXYzine HCl (ATARAX) 25 MG tablet  20 tablet 0 11/4/2024 --   [address] 1-688    Sig: Take 1 tablet (25 mg) by mouth every 8 hours as needed for anxiety or other (adjuvant pain).    Class: E-Prescribe    Route: Oral    magnesium oxide (MAG-OX) 400 MG tablet  120 tablet 1 11/1/2024 --   [address] 4-119    Sig: Take 2 tablets (800 mg) by mouth 2 times daily.    Class: E-Prescribe    Route: Oral    methocarbamol (ROBAXIN) 750  MG tablet  30 tablet 0 11/4/2024 --   [place], [place] - [address] [place] [address] 1-824    Sig: Take 1 tablet (750 mg) by mouth 3 times daily as needed for muscle spasms.    Class: E-Prescribe    Route: Oral    multivitamin w/minerals (CERTAVITE/ANTIOXIDANTS) tablet  30 tablet 1 11/1/2024 --   [address] 1-562    Sig: Take 1 tablet by mouth daily.    Class: E-Prescribe    Route: Oral    mycophenolate (GENERIC EQUIVALENT) 250 MG capsule  180 capsule 1 11/1/2024 --   [address] 1-807    Sig: Take 3 capsules (750 mg) by mouth 2 times daily.    Class: E-Prescribe    Notes to Pharmacy: TXP DT 9/28/2024 (Liver) TXP Dischg DT 10/9/2024 DX Liver replaced by transplant Z94.4 [facility] ([place], [place])    Route: Oral    omeprazole (PRILOSEC) 20 MG DR capsule  30 capsule 2 11/1/2024 --   [address] 1-271    Sig: Take 1 capsule (20 mg) by mouth daily.    Class: E-Prescribe    Route: Oral    polyethylene glycol (MIRALAX) 17 GM/Dose powder  510 g 0 10/9/2024 --   [facility] - [place], [place] - [address]    Sig: Take 17 g by mouth 2 times daily as needed for constipation.    Class: E-Prescribe    Route: Oral    predniSONE (DELTASONE) 5 MG tablet  30 tablet 1 11/1/2024 --   [address] 1-133    Sig: Take 1 tablet (5 mg) by mouth daily.    Class: E-Prescribe    Notes to Pharmacy: TXP DT 9/28/2024 (Liver) TXP Dischg DT 10/9/2024 DX Liver replaced by transplant Z94.4 [facility] ([place], [place])    Route: Oral    sennosides (SENOKOT) 8.6 MG tablet  60 tablet 0 10/9/2024 --   [facility] -  [place], [place] - [address] [facility]    Sig: Take 2 tablets by mouth 2 times daily as needed for constipation.    Class: E-Prescribe    Route: Oral    tacrolimus (GENERIC EQUIVALENT) 0.5 MG capsule  60 capsule 1 11/5/2024 --   [address] 1-273    Sig: Take 1 capsule (0.5 mg) by mouth every 12 hours.    Class: E-Prescribe    Notes to Pharmacy: TXP DT 9/28/2024 (Liver) TXP Dischg DT 10/9/2024 DX Liver replaced by transplant Z94.4 [facility] ([place], [place])    Route: Oral    tacrolimus (GENERIC EQUIVALENT) 1 MG capsule  240 capsule 1 11/5/2024 --   [address] 1-273    Sig: Take 4 capsules (4 mg) by mouth every 12 hours. Total dose 4.5mg BID    Class: E-Prescribe    Notes to Pharmacy: TXP DT 9/28/2024 (Liver) TXP Dischg DT 10/9/2024 DX Liver replaced by transplant Z94.4 [facility] ([place], [place])    Route: Oral    tacrolimus (GENERIC EQUIVALENT) 5 MG capsule  -- -- 11/5/2024 --       Sig: Take 1 capsule (5 mg) by mouth 2 times daily as needed (for dose changes).    Class: Historical    Notes to Pharmacy: TXP DT 9/28/2024 (Liver) TXP Dischg DT 10/9/2024 DX Liver replaced by transplant Z94.4 [facility] ([place], [place])    Route: Oral    traZODone (DESYREL) 50 MG tablet  30 tablet 0 11/7/2024 --   [address] 1-888    Sig: Take 1 tablet (50 mg) by mouth at bedtime.    Class: E-Prescribe    Route: Oral    valGANciclovir (VALCYTE) 450 MG tablet  30 tablet 1 11/1/2024 --   [address] 1-753    Sig: Take 1 tablet (450 mg) by mouth daily.    Class: E-Prescribe    Route: Oral            O:   [unfilled]        Latest Ref  "Rng & Units 11/7/2024     7:28 AM 11/4/2024     8:14 AM 10/31/2024     7:12 AM 10/28/2024     7:13 AM 10/24/2024     8:18 AM   Transplant Immunosuppression Labs   Creat 0.67 - 1.17 mg/dL 1.16  1.22  1.18  1.15  0.94    Urea Nitrogen 6.0 - 20.0 mg/dL 21.2  21.7  22.9  22.0  17.3    WBC 4.0 - 11.0 10e3/uL 8.5  10.0  7.0  7.2  7.3        Chemistries:   Recent Labs   Lab Test 11/07/24  0728   BUN 21.2*   CR 1.16   GFRESTIMATED 83   GLC 90     Lab Results   Component Value Date    A1C 5.4 09/28/2024     Recent Labs   Lab Test 11/07/24 0728   ALBUMIN 4.0   BILITOTAL 1.0   ALKPHOS 128   AST 14   ALT 19     Urine Studies:  Recent Labs   Lab Test 09/27/24 2113   COLOR Dark Yellow*   APPEARANCE Slightly Cloudy*   URINEGLC Negative   URINEBILI Large*   URINEKETONE Negative   SG 1.011   UBLD Negative   URINEPH 6.0   PROTEIN Negative   NITRITE Negative   LEUKEST Negative   RBCU 1   WBCU 5     No lab results found.  Hematology:   Recent Labs   Lab Test 11/07/24  0728 11/04/24  0814 10/31/24  0712   HGB 10.2* 10.0* 10.4*    191 249   WBC 8.5 10.0 7.0     Coags:   Recent Labs   Lab Test 10/01/24  0437 09/30/24  0529   INR 1.03 1.14     HLA antibodies:   No results found for: \"CM2AJNUZN\", \"LO4HXRBRKM\", \"HM4EDCSJB\", \"WG9IBIYUJD\"    Assessment: Jag Smith is doing well s/p Liver Tx:  Issues we addressed during his visit include:    Plan:    1. Graft function: LFTs stable  2. Immunosuppression Management: No change tac 8-12   .  Complexity of management:Low.  Contributing factors:  wound infection, packing twice daily    Followup: 1-2 weeks for visit but seeing dusty for wound checks frequently, would benefit from wound clinic visit, may need vac              Fernando Colon MD/PhD  Professor of Surgery  "

## 2024-11-11 NOTE — LETTER
11/11/2024      Jag Smith  [address]  [facility] [[email or web address]] [address]      Dear Colleague,    Thank you for referring your patient, Jag Smith, to the [facility] TRANSPLANT [facility]. Please see a copy of my visit note below.    Pt evaluated via billable telephone visit. Time spent: 15 min  Provider location: onsite ([facility])     [facility] SOLID ORGAN TRANSPLANT  OUTPATIENT MNT: POST LIVER TXP    TIME SPENT: 15 minutes  VISIT TYPE: Initial   REFERRING PHYSICIAN: Isaiah  PT ACCOMPANIED BY: self     Date of txp: liver 9/28/24     NUTRITION ASSESSMENT // DIET RECALL  Pt is still staying local still. He reports TID meals and has a good appetite.     B- BF sandwich + hash browns, cinn roll  L- eggs and oatmeal, beans  D- burgers & fries  Sn- cookies, fruit, chips   Lise- water, occasional juice (2 cups/day), pop (1/day), occasional Powerade     Protein Supplements: Ensure 1/day     Prev -240 lbs     LABS  Phos 6    NUTRITION DIAGNOSIS   Predicted suboptimal protein intake related to increased protein needs s/p transplant.    NUTRITION INTERVENTION   1. Discussed importance of consuming adequate calories and protein for 2 months post-txp to help heal and reduce muscle/fat wasting. Discussed sources of protein and ways to ensure he is increasing his protein intake.    2. Discussed potential for wt gain post-txp and after 2 months of eating higher calorie/higher protein foods, to return to baseline eating habits and monitor for any weight gains.     3. Reviewed importance of food safety and increased risk for food-borne illness post-txp. Also discussed potential need to monitor K+, CHO, and Na+ intakes d/t medication side effects.     4. Avoid the following post txp d/t risk for rejection, unknown effects on the organs, and/or potential interactions with immunosuppressants:  - Herbal, Chinese, holistic, chiropractic, natural, alternative medicines and supplements  - Detoxes and cleanses  - Weight loss pills  -  Protein powders or other products with extracts or herbs (ie green tea extract)    Patient Understanding: Pt verbalized understanding of education provided.  Expected Engagement: Good  Follow-Up Plans: PRN     NUTRITION GOALS   Continue with high protein diet x 2 months post txp     Yany Pulido RD, LD, CCTD                                Again, thank you for allowing me to participate in the care of your patient.        Sincerely,        Yany Pulido RD

## 2024-11-11 NOTE — PROGRESS NOTES
Transplant Surgery -OUTPATIENT PROGRESS NOTE    Date of Visit: 11/10/2024    Transplants:  2024 (Liver); Postoperative day:  43  ASSESMENT AND PLAN:  Incisional wound:   Stable. X3 tunneling areas. No s/s infection. Continue packing with 4x4 gauze BID.   Discussed pain control.       Date: 2024    Transplant:  [x]                             Liver [x]                              Kidney []                             Pancreas []                              Other:             Chief Complaint:RECHECK (41 days ago)    History of Present Illness:  Here today for incisional wound care. Mother is changing BID.   No fever or N/V/D.            Patient Active Problem List   Diagnosis    Alcohol use disorder, severe, in early remission (H)    Alcoholic hepatitis (H)    Epiphora due to insufficient drainage of left side    Hyperbilirubinemia    Hypokalemia    Jaundice    Pneumonia    Liver replaced by transplant (H)    DENI (acute kidney injury) (H)    Immunosuppressed status (H)    Acute post-operative pain    Steroid-induced hyperglycemia    Acute urinary retention    Anemia due to blood loss, acute    Severe malnutrition (H)    Hyponatremia    Hypomagnesemia    Bilateral lower extremity edema    Hyperkalemia    Subconjunctival hemorrhage     SOCIAL /FAMILY HISTORY: [x]                  No recent change    Past Medical History:   Diagnosis Date    Alcohol use disorder     Alcoholic hepatitis (H) 2024    Anxiety     Depression     Hypertension     Liver replaced by transplant (H) 2024     Past Surgical History:   Procedure Laterality Date    BENCH LIVER  2024    Procedure: Bench liver;  Surgeon: Fernando Colon MD;  Location: [facility] OR    DACRYOCYSTORHINOSTOMY Left 2013    INCISION AND DRAINAGE BUTTOCKS Left 2017    TRANSPLANT LIVER RECIPIENT  DONOR N/A 2024    Procedure: Transplant liver recipient  donor;  Surgeon: Fernando Colon MD;  Location: [facility] OR     Social  History     Socioeconomic History    Marital status: Single     Spouse name: Not on file    Number of children: Not on file    Years of education: Not on file    Highest education level: Not on file   Occupational History    Not on file   Tobacco Use    Smoking status: Never    Smokeless tobacco: Never   Substance and Sexual Activity    Alcohol use: Not Currently     Comment: last drink june 7th, 2024    Drug use: Never    Sexual activity: Not on file   Other Topics Concern    Not on file   Social History Narrative    Not on file     Social Drivers of Health     Financial Resource Strain: Low Risk  (9/21/2024)    Financial Resource Strain     Within the past 12 months, have you or your family members you live with been unable to get utilities (heat, electricity) when it was really needed?: No   Food Insecurity: Low Risk  (9/21/2024)    Food Insecurity     Within the past 12 months, did you worry that your food would run out before you got money to buy more?: No     Within the past 12 months, did the food you bought just not last and you didn t have money to get more?: No   Transportation Needs: Low Risk  (9/21/2024)    Transportation Needs     Within the past 12 months, has lack of transportation kept you from medical appointments, getting your medicines, non-medical meetings or appointments, work, or from getting things that you need?: No   Physical Activity: Not on file   Stress: Not on file   Social Connections: Not on file   Interpersonal Safety: Low Risk  (10/16/2024)    Interpersonal Safety     Do you feel physically and emotionally safe where you currently live?: Yes     Within the past 12 months, have you been hit, slapped, kicked or otherwise physically hurt by someone?: No     Within the past 12 months, have you been humiliated or emotionally abused in other ways by your partner or ex-partner?: No   Recent Concern: Interpersonal Safety - High Risk (9/22/2024)    Interpersonal Safety     Do you feel  physically and emotionally safe where you currently live?: No     Within the past 12 months, have you been hit, slapped, kicked or otherwise physically hurt by someone?: No     Within the past 12 months, have you been humiliated or emotionally abused in other ways by your partner or ex-partner?: No   Housing Stability: Low Risk  (9/21/2024)    Housing Stability     Do you have housing? : Yes     Are you worried about losing your housing?: No     Prescription Medications as of 11/10/2024         Rx Number Disp Refills Start End Last Dispensed Date Next Fill Date Owning Pharmacy    acetaminophen (TYLENOL) 325 MG tablet  60 tablet 0 11/4/2024 --   [address] 1-614    Sig: Take 2 tablets (650 mg) by mouth every 8 hours as needed for mild pain or fever.    Class: E-Prescribe    Route: Oral    aspirin (ASA) 81 MG chewable tablet  30 tablet 1 11/1/2024 --   [address] 1-162    Sig: Take 1 tablet (81 mg) by mouth or Feeding Tube daily.    Class: E-Prescribe    Route: Oral or Feeding Tube    hydrOXYzine HCl (ATARAX) 25 MG tablet  20 tablet 0 11/4/2024 --   [address] 1-075    Sig: Take 1 tablet (25 mg) by mouth every 8 hours as needed for anxiety or other (adjuvant pain).    Class: E-Prescribe    Route: Oral    magnesium oxide (MAG-OX) 400 MG tablet  120 tablet 1 11/1/2024 --   [address] 1-100    Sig: Take 2 tablets (800 mg) by mouth 2 times daily.    Class: E-Prescribe    Route: Oral    methocarbamol (ROBAXIN) 750 MG tablet  30 tablet 0 11/4/2024 --   [address] 1-133    Sig: Take 1 tablet (750 mg) by mouth 3 times daily as needed for muscle spasms.    Class: E-Prescribe    Route: Oral     multivitamin w/minerals (CERTAVITE/ANTIOXIDANTS) tablet  30 tablet 1 11/1/2024 --   [address] 1-072    Sig: Take 1 tablet by mouth daily.    Class: E-Prescribe    Route: Oral    mycophenolate (GENERIC EQUIVALENT) 250 MG capsule  180 capsule 1 11/1/2024 --   [address] 1-429    Sig: Take 3 capsules (750 mg) by mouth 2 times daily.    Class: E-Prescribe    Notes to Pharmacy: TXP DT 9/28/2024 (Liver) TXP Dischg DT 10/9/2024 DX Liver replaced by transplant Z94.4 TX [facility] ([place], [place])    Route: Oral    omeprazole (PRILOSEC) 20 MG DR capsule  30 capsule 2 11/1/2024 --   [address] 1-906    Sig: Take 1 capsule (20 mg) by mouth daily.    Class: E-Prescribe    Route: Oral    polyethylene glycol (MIRALAX) 17 GM/Dose powder  510 g 0 10/9/2024 --   [place], [place] - [address] [place] [address]    Sig: Take 17 g by mouth 2 times daily as needed for constipation.    Class: E-Prescribe    Route: Oral    predniSONE (DELTASONE) 5 MG tablet  30 tablet 1 11/1/2024 --   [address] 7-537    Sig: Take 1 tablet (5 mg) by mouth daily.    Class: E-Prescribe    Notes to Pharmacy: TXP DT 9/28/2024 (Liver) TXP Dischg DT 10/9/2024 DX Liver replaced by transplant Z94.4 [facility] ([place], [place])    Route: Oral    sennosides (SENOKOT) 8.6 MG tablet  60 tablet 0 10/9/2024 --   [place], [place] - [address] [place] [address] St     Sig: Take 2 tablets by mouth 2 times daily as needed for constipation.    Class: E-Prescribe    Route: Oral    tacrolimus (GENERIC EQUIVALENT) 0.5 MG capsule  60 capsule 1 11/5/2024 --   [facility]  [address] 1-334    Sig: Take 1 capsule (0.5 mg) by mouth every 12 hours.    Class: E-Prescribe    Notes to Pharmacy: TXP DT 9/28/2024 (Liver) TXP Dischg DT 10/9/2024 DX Liver replaced by transplant Z94.4 [facility] ([place], [place])    Route: Oral    tacrolimus (GENERIC EQUIVALENT) 1 MG capsule  240 capsule 1 11/5/2024 --   [address] 1-217    Sig: Take 4 capsules (4 mg) by mouth every 12 hours. Total dose 4.5mg BID    Class: E-Prescribe    Notes to Pharmacy: TXP DT 9/28/2024 (Liver) TXP Dischg DT 10/9/2024 DX Liver replaced by transplant Z94.4 [facility] ([place], [place])    Route: Oral    tacrolimus (GENERIC EQUIVALENT) 5 MG capsule  -- -- 11/5/2024 --       Sig: Take 1 capsule (5 mg) by mouth 2 times daily as needed (for dose changes).    Class: Historical    Notes to Pharmacy: TXP DT 9/28/2024 (Liver) TXP Dischg DT 10/9/2024 DX Liver replaced by transplant Z94.4 [facility] ([place], [place])    Route: Oral    traZODone (DESYREL) 50 MG tablet  30 tablet 0 11/7/2024 --   [address] 1-202    Sig: Take 1 tablet (50 mg) by mouth at bedtime.    Class: E-Prescribe    Route: Oral    valGANciclovir (VALCYTE) 450 MG tablet  30 tablet 1 11/1/2024 --   [address] 1-835    Sig: Take 1 tablet (450 mg) by mouth daily.    Class: E-Prescribe    Route: Oral          Azithromycin   REVIEW OF SYSTEMS (check box if normal)  [x]               GENERAL  [x]                 PULMONARY [x]                GENITOURINARY  [x]                CNS                 [x]                 CARDIAC  [x]                 ENDOCRINE  [x]                EARS,NOSE,THROAT [x]                  GASTROINTESTINAL [x]                 NEUROLOGIC    [x]                MUSCLOSKELTAL  [x]                  HEMATOLOGY      PHYSICAL EXAM (check box if normal)/79   Pulse 96   Wt 107.5 kg (236 lb 14.4 oz)   SpO2 99%   BMI 32.13 kg/m          [x]            GENERAL: NAD    [x]            Incision:  Left side of incision mostly open, tunneling present midline x2 and x1 R distal incision. Tunneling packed with iodoform gauze and rest of incision packed with 4x4 gauze with no issues. No surrounding erythema. Moderate bloody drainage present- mostly midline.    Abd soft throughout. Non-tender.                                                                                 PAIN SCALE:: 8

## 2024-11-11 NOTE — TELEPHONE ENCOUNTER
ISSUE:   Tacrolimus IR level 10.9 on 11/11/24, goal 8-10, dose 4.5 mg BID.    PLAN:   Call Patient and confirm this was an accurate 12-hour trough.   Verify Tacrolimus IR dose 4.5 mg BID.   Confirm no new medications or illness.   Confirm no missed doses.     If accurate trough and accurate dose, decrease Tacrolimus IR dose to 4 mg BID    Repeat labs on Thursday.    OUTCOME:   Spoke with Patient, they confirm accurate trough level and current dose 4.5 mg BID.   Patient confirmed dose change to 4 mg BID.  Patient agreed to repeat labs on Thursday.  Orders sent to preferred pharmacy for dose change and lab for repeat labs.   Patient voiced understanding of plan.

## 2024-11-11 NOTE — TELEPHONE ENCOUNTER
Patient has an appt with Dr. Colon on 11/14 and with the wound clinic on 11/20. Message sent to Marycruz.

## 2024-11-11 NOTE — PROGRESS NOTES
Pt evaluated via billable telephone visit. Time spent: 15 min  Provider location: onsite ([facility])     [facility]  OUTPATIENT MNT: POST LIVER TXP    TIME SPENT: 15 minutes  VISIT TYPE: Initial   REFERRING PHYSICIAN: Isaiah  PT ACCOMPANIED BY: self     Date of txp: liver 9/28/24     NUTRITION ASSESSMENT // DIET RECALL  Pt is still staying local still. He reports TID meals and has a good appetite.     B- BF sandwich + hash browns, cinn roll  L- eggs and oatmeal, beans  D- burgers & fries  Sn- cookies, fruit, chips   Lise- water, occasional juice (2 cups/day), pop (1/day), occasional Powerade     Protein Supplements: Ensure 1/day     Prev -240 lbs     LABS  Phos 6    NUTRITION DIAGNOSIS   Predicted suboptimal protein intake related to increased protein needs s/p transplant.    NUTRITION INTERVENTION   1. Discussed importance of consuming adequate calories and protein for 2 months post-txp to help heal and reduce muscle/fat wasting. Discussed sources of protein and ways to ensure he is increasing his protein intake.    2. Discussed potential for wt gain post-txp and after 2 months of eating higher calorie/higher protein foods, to return to baseline eating habits and monitor for any weight gains.     3. Reviewed importance of food safety and increased risk for food-borne illness post-txp. Also discussed potential need to monitor K+, CHO, and Na+ intakes d/t medication side effects.     4. Avoid the following post txp d/t risk for rejection, unknown effects on the organs, and/or potential interactions with immunosuppressants:  - Herbal, Chinese, holistic, chiropractic, natural, alternative medicines and supplements  - Detoxes and cleanses  - Weight loss pills  - Protein powders or other products with extracts or herbs (ie green tea extract)    Patient Understanding: Pt verbalized understanding of education provided.  Expected Engagement: Good  Follow-Up Plans: PRN     NUTRITION GOALS    Continue with high protein diet x 2 months post txp     Yany Pulido, RD, LD, CCTD

## 2024-11-11 NOTE — TELEPHONE ENCOUNTER
Patient Call: General  Route to LPN    Reason for call: patient called about wound care appointments for MWF from Marycruz Alcala. Patient mentioned they have not recieved any notifications on appointments being setup. More details with call back.       Call back needed? Yes    Return Call Needed  Same as documented in contacts section  When to return call?: Same day: Route High Priority

## 2024-11-12 ENCOUNTER — HOSPITAL ENCOUNTER (OUTPATIENT)
Dept: WOUND CARE | Facility: CLINIC | Age: 39
Discharge: HOME | End: 2024-11-12
Attending: FAMILY MEDICINE | Admitting: FAMILY MEDICINE
Payer: MEDICAID

## 2024-11-12 VITALS
WEIGHT: 234 LBS | BODY MASS INDEX: 31.69 KG/M2 | HEIGHT: 72 IN | HEART RATE: 85 BPM | TEMPERATURE: 97.7 F | DIASTOLIC BLOOD PRESSURE: 93 MMHG | SYSTOLIC BLOOD PRESSURE: 146 MMHG

## 2024-11-12 DIAGNOSIS — L98.492 SKIN ULCER OF ABDOMEN WITH FAT LAYER EXPOSED (H): ICD-10-CM

## 2024-11-12 DIAGNOSIS — Z94.4 LIVER REPLACED BY TRANSPLANT (H): ICD-10-CM

## 2024-11-12 DIAGNOSIS — T81.89XA NON-HEALING SURGICAL WOUND, INITIAL ENCOUNTER: Primary | ICD-10-CM

## 2024-11-12 PROCEDURE — G0463 HOSPITAL OUTPT CLINIC VISIT: HCPCS | Mod: 25

## 2024-11-12 PROCEDURE — 97602 WOUND(S) CARE NON-SELECTIVE: CPT

## 2024-11-12 PROCEDURE — 99215 OFFICE O/P EST HI 40 MIN: CPT | Performed by: FAMILY MEDICINE

## 2024-11-12 NOTE — PROGRESS NOTES
Patient arrived for wound care visit. Certified Wound Care Nurse time spent evaluating patient record, completed a full evaluation and documented wound(s) & scott-wound skin; provided recommendation based on treatment plan. Applied dressing, reviewed discharge instructions, patient education, and discussed plan of care with appropriate medical team staff members and patient and/or family members.

## 2024-11-12 NOTE — PROGRESS NOTES
Wound Clinic Note          Visit date: 11/12/2024       Cheif Complaint:     Jag Smith is a 39 year old  male had concerns including WOUND CARE.  He has an abdominal surgical wound.      HISTORY OF PRESENT ILLNESS:    Jag Smith reports the ulcer has been present since September 2024.  The wound began after a recent surgery and the incision did not heal normally.   He had a liver transplant on September 26, 2024, postoperatively a portion of the incision dehisced.      Recently the bandages have been changed 3 times a week at the transplant clinic.  They have been bandaging the deeper areas with packing strips followed by an ABD pad.  His mother has been changing the ABD pads once a day on the other days but she does not change the packing strips.  The packing strips and not currently moistened with anything.  There is been moderate serous drainage from the wound.      The pateint denies fevers or chills.  They report the pain from the wound has been 7/10 and has remained about the same recently.      Today the patient reports maintaining a high protein diet and taking protein supplements regularly.        He does not have diabetes and does not smoke cigarettes.  He takes tacrolimus, mycophenolate and prednisone to prevent rejection.        The patient has not had any symptoms of infection relating to the wound recently and is not currently on antibiotics.       Problem List:   Past Medical History:   Diagnosis Date    Alcohol use disorder     Alcoholic hepatitis (H) 06/07/2024    Anxiety     Depression     Hypertension     Liver replaced by transplant (H) 09/28/2024              Family Hx: family history includes Alcoholism in his brother; Alzheimer Disease (age of onset: 94) in his maternal grandmother; Diabetes in his maternal aunt.       Surgical Hx:   Past Surgical History:   Procedure Laterality Date    BENCH LIVER  9/28/2024    Procedure: Bench liver;  Surgeon: Fernando Colon MD;   Location: [facility] OR    DACRYOCYSTORHINOSTOMY Left 2013    INCISION AND DRAINAGE BUTTOCKS Left 2017    TRANSPLANT LIVER RECIPIENT  DONOR N/A 2024    Procedure: Transplant liver recipient  donor;  Surgeon: Fernando Colon MD;  Location: [facility] OR          Allergies:    Allergies   Allergen Reactions    Azithromycin Rash              Medication History:    Current Outpatient Medications   Medication Sig Dispense Refill    acetaminophen (TYLENOL) 325 MG tablet Take 2 tablets (650 mg) by mouth every 8 hours as needed for mild pain or fever. 60 tablet 0    aspirin (ASA) 81 MG chewable tablet Take 1 tablet (81 mg) by mouth or Feeding Tube daily. 30 tablet 1    escitalopram (LEXAPRO) 20 MG tablet Take 1 tablet (20 mg) by mouth daily. 30 tablet 0    hydrOXYzine HCl (ATARAX) 25 MG tablet Take 1 tablet (25 mg) by mouth every 8 hours as needed for anxiety or other (adjuvant pain). 20 tablet 0    magnesium oxide (MAG-OX) 400 MG tablet Take 2 tablets (800 mg) by mouth 2 times daily. 120 tablet 1    methocarbamol (ROBAXIN) 750 MG tablet Take 1 tablet (750 mg) by mouth 3 times daily as needed for muscle spasms. 30 tablet 0    multivitamin w/minerals (CERTAVITE/ANTIOXIDANTS) tablet Take 1 tablet by mouth daily. 30 tablet 1    mycophenolate (GENERIC EQUIVALENT) 250 MG capsule Take 3 capsules (750 mg) by mouth 2 times daily. 180 capsule 1    omeprazole (PRILOSEC) 20 MG DR capsule Take 1 capsule (20 mg) by mouth daily. 30 capsule 2    polyethylene glycol (MIRALAX) 17 GM/Dose powder Take 17 g by mouth 2 times daily as needed for constipation. 510 g 0    predniSONE (DELTASONE) 5 MG tablet Take 1 tablet (5 mg) by mouth daily. 30 tablet 1    sennosides (SENOKOT) 8.6 MG tablet Take 2 tablets by mouth 2 times daily as needed for constipation. 60 tablet 0    tacrolimus (GENERIC EQUIVALENT) 0.5 MG capsule HOLD 60 capsule 1    tacrolimus (GENERIC EQUIVALENT) 1 MG capsule Take 4 capsules (4 mg) by mouth every 12 hours.  240 capsule 1    tacrolimus (GENERIC EQUIVALENT) 5 MG capsule Take 1 capsule (5 mg) by mouth 2 times daily as needed (for dose changes).      traMADol (ULTRAM) 50 MG tablet Take 1 tablet (50 mg) by mouth daily as needed for severe pain. 8 tablet 0    traZODone (DESYREL) 50 MG tablet Take 1 tablet (50 mg) by mouth at bedtime. 30 tablet 0    valGANciclovir (VALCYTE) 450 MG tablet Take 1 tablet (450 mg) by mouth daily. 30 tablet 1     No current facility-administered medications for this encounter.         Tobacco History:  reports that he has never smoked. He has never used smokeless tobacco.       REVIEW OF SYMPTOMS:   The review of systems was negative except as noted in the HPI.           PHYSICAL EXAMINATION:     BP (!) 146/93 (BP Location: Left arm, Patient Position: Chair, Cuff Size: Adult Regular)   Pulse 85   Temp 97.7  F (36.5  C) (Temporal)   Ht 1.829 m (6')   Wt 106.1 kg (234 lb)   BMI 31.74 kg/m             GENERAL: The patient overall appears well and is no acute distress.   HEAD: normocephalic   EYES: Sclera and conjunctiva clear   NECK: no obvious masses   LUNGS: breathing is unlabored.   EXTREMITIES: No clubbing, cyanosis or edema   SKIN: No rashes or other abnormalities except as noted under the Wound section below.   NEUROLOGICAL: normal motor and sensory function       WOUND/ulcer: The wound appears healthy with no sign of infection.   Wound bed: granulation tissue  Periwound: healthy intact skin  Overall the wounds appear very healthy he has 2 areas of tunneling but the fascia seems intact.      Also see below for wound details:     Circumferential volume measures:             No data to display                Ulceration(s)/Wound(s):   Please see the media tab under the chart review for pictures of the wounds.  Nursing staff removed dressings and cleansed wound.    Wound (used by [facility] only) 11/12/24 1010 abdomen Right surgical (Active)   Thickness/Stage full thickness 11/12/24 1000   Base  granulating 11/12/24 1000   Periwound intact 11/12/24 1000   Periwound Temperature warm 11/12/24 1000   Periwound Skin Turgor soft 11/12/24 1000   Edges open 11/12/24 1000   Length (cm) 0.3 11/12/24 1000   Width (cm) 2.8 11/12/24 1000   Depth (cm) 1.8 11/12/24 1000   Wound (cm^2) 0.84 cm^2 11/12/24 1000   Wound Volume (cm^3) 1.51 cm^3 11/12/24 1000   Drainage Characteristics/Odor serosanguineous;creamy 11/12/24 1000   Drainage Amount moderate 11/12/24 1000   Care, Wound non-select wound debridement performed. 11/12/24 1000       Wound (used by [facility] only) 11/12/24 1013 abdomen upper;medial surgical (Active)   Thickness/Stage full thickness 11/12/24 1000   Base granulating 11/12/24 1000   Periwound intact 11/12/24 1000   Periwound Temperature warm 11/12/24 1000   Periwound Skin Turgor soft 11/12/24 1000   Edges open 11/12/24 1000   Length (cm) 1.4 11/12/24 1000   Width (cm) 1.6 11/12/24 1000   Depth (cm) 0.2 11/12/24 1000   Wound (cm^2) 2.24 cm^2 11/12/24 1000   Wound Volume (cm^3) 0.45 cm^3 11/12/24 1000   Drainage Characteristics/Odor serosanguineous;creamy 11/12/24 1000   Drainage Amount moderate 11/12/24 1000   Care, Wound non-select wound debridement performed. 11/12/24 1000       Wound (used by [facility] only) 11/12/24 1014 abdomen medial surgical (Active)   Thickness/Stage full thickness 11/12/24 1000   Base granulating 11/12/24 1000   Periwound intact 11/12/24 1000   Periwound Temperature warm 11/12/24 1000   Periwound Skin Turgor soft 11/12/24 1000   Edges open 11/12/24 1000   Length (cm) 1.8 11/12/24 1000   Width (cm) 14.6 11/12/24 1000   Depth (cm) 2.2 11/12/24 1000   Wound (cm^2) 26.28 cm^2 11/12/24 1000   Wound Volume (cm^3) 57.82 cm^3 11/12/24 1000   Tunneling [Depth (cm)/Location] 2.5cm/9oclock 11/12/24 1000   Undermining [Depth (cm)/Location] .6cm/10-2oclock 11/12/24 1000   Drainage Characteristics/Odor serosanguineous;creamy 11/12/24 1000   Drainage Amount moderate 11/12/24 1000   Care, Wound  non-select wound debridement performed. 11/12/24 1000           Recent Labs   Lab Test 09/28/24  1207 09/23/24  1716   A1C 5.4 4.7          Recent Labs   Lab Test 11/11/24  0734 11/07/24  0728 11/04/24  0814   ALBUMIN 4.1 4.0 4.1              No sharp debridement performed today.                  ASSESSMENT:   This is a 39 year old  male with abdominal surgical incisions.          PLAN:   We will packed the deeper areas with Vashe moistened packing strips followed by an ABD pad changed once a day.  I would like his mother to learn how to do the packing strips and we will show in the clinic today.  Ideally the packing strips will be changed once a day.  Because the tunneling areas are very narrow and the skin openings themselves are quite small I think if we used a negative pressure wound therapy the foam would need to be cut into very thin strips which I think would be at high risk for breaking and then results in a retained foreign body.  I think it is much better in this circumstance to continue with the packing strips.  I have explained to the patient the importance of protein intake to wound healing.  I have explained that increasing protein intake will speed wound healing.  We discussed several types of food that are high in protein and the wound care nurse gave the patient a handout that summarizes this information.  In addition to further speed wound healing I have encouraged the patient to take a protein supplement.   The patient will return to the wound clinic in 3 to 4 weeks to see me again.        45 minutes spent on the date of the encounter doing chart review, history and exam, documentation and further activities per the note, this time excludes any procedure time      Juan Garcia MD  11/12/2024   11:26 AM   [facility]ound  [phone]    This note was electronically signed by Juan Garcia MD      Further instructions from your care team         11/12/2024   Jag  Luis   1985    A DME order was not completed because the patient declined the need for supplies (Has supply of ABDs and packing strips from Transplant Clinic)    Dressing changes outside of clinic are being performed by Family and Provider (Transplant Clinic)    Plan 11/12/2024   VASHE can be purchased downstairs at [facility] ([address]) or online - or check with transplant clinic to see if they can provide it for you  If you are experiencing:  Fevers > 100.8 degrees Farenheit  Chills   Nausea/vomiting  New or worsening pain in or near wounds (Sudden increase in Pain)  Increased redness (fire engine red)  Increased swelling  Changes in drainage (pus)  or other symptoms of concern you should go to the Emergency Department and be evaluated as these symptoms could indicate serious infection.    Wound Dressing Change: Medial abdomen, and Left abdomen  - Wash your hands with soap and water before you begin your dressing change and prepare a clean surface for dressings.  -Cleanse with mild unscented soap and water (such as Cetaphil, Cerave or Dove)   -Primary dressing: Moisten a 1/4 inch plain packing strip with VASHE and lightly pack the depth of the wound (including the undermining and tunneling areas) using a q-tip.  Leave a tail for easy retrieval.  -Secondary dressing: Cover with 1 5x9 ABD and secure with tape  Change daily and as needed with soiling/saturation    Wound Dressing Change: Upper medial abdomen  - Wash your hands with soap and water before you begin your dressing change and prepare a clean surface for dressings.  -Cleanse with mild unscented soap and water (such as Cetaphil, Cerave or Dove)   -Primary dressing: Moisten a folded 4x4 gauze with VASHE and place over wound bed  -Secondary dressing: Cover with 1 5x9 ABD and secure with tape  Change daily and as needed with soiling/saturation    A diet high in protein is important for wound healing, we recommend getting 90 grams of protein  per day. Taking protein shakes or bars are a good way to get extra protein in your diet.     Good sources of protein:  Pork 26g per 3 oz  Whey protein powder - 24g per scoop (on average)  Greek yogurt - 23g per 8oz   Chicken or Turkey - 23g per 3oz  Fish - 20-25g per 3oz  Beef - 18-23g per 3oz  Tofu - 10g per 1/2 cup  Navy beans - 20g per cup  Cottage cheese - 14g per 1/2 cup   Lentils - 13g per 1/4 cup  Beef jerky 13g per 1oz  2% milk - 8g per cup  Peanut butter - 8g per 2 tablespoons  Eggs - 6g per egg  Mixed nuts - 6g per 2oz       Main Provider: Juan Garcia M.D. November 12, 2024    Call us at [phone] if you have any questions about your wounds, if you have redness or swelling around your wound, have a fever of 101 degrees Fahrenheit or greater or if you have any other problems or concerns. We answer the phone Monday through Friday 8 am to 4 pm, please leave a message as we check the voicemail frequently throughout the day. If you have a concern over the weekend, please leave a message and we will return your call Monday. If the need is urgent, go to the ER or urgent care.    If you had a positive experience please indicate that on your patient satisfaction survey form that [facility] will be sending you.    It was a pleasure meeting with you today.  Thank you for allowing me and my team the privilege of caring for you today.  YOU are the reason we are here, and I truly hope we provided you with the excellent service you deserve.  Please let us know if there is anything else we can do for you so that we can be sure you are leaving completely satisfied with your care experience.      If you have any billing related questions please call the [facility] Business office at [phone]. The clinic staff does not handle billing related matters.    If you are scheduled to have a follow up appointment, you will receive a reminder call the day before your visit. On the appointment day please arrive 15  minutes prior to your appointment time. If you are unable to keep that appointment, please call the clinic to cancel or reschedule. If you are more than 10 minutes late or greater for your scheduled appointment time, the clinic policy is that you may be asked to reschedule.         ,

## 2024-11-12 NOTE — DISCHARGE INSTRUCTIONS
11/12/2024   Jag Smith   1985    A DME order was not completed because the patient declined the need for supplies (Has supply of ABDs and packing strips from [facility]plant [facility])    Dressing changes outside of clinic are being performed by Family and Provider (Transplant Clinic)    Plan 11/12/2024   VASHE can be purchased downstairs at [facility] ([address]) or online - or check with transplant clinic to see if they can provide it for you  If you are experiencing:  Fevers > 100.8 degrees Farenheit  Chills   Nausea/vomiting  New or worsening pain in or near wounds (Sudden increase in Pain)  Increased redness (fire engine red)  Increased swelling  Changes in drainage (pus)  or other symptoms of concern you should go to the Emergency Department and be evaluated as these symptoms could indicate serious infection.    Wound Dressing Change: Medial abdomen, and Left abdomen  - Wash your hands with soap and water before you begin your dressing change and prepare a clean surface for dressings.  -Cleanse with mild unscented soap and water (such as Cetaphil, Cerave or Dove)   -Primary dressing: Moisten a 1/4 inch plain packing strip with VASHE and lightly pack the depth of the wound (including the undermining and tunneling areas) using a q-tip.  Leave a tail for easy retrieval.  -Secondary dressing: Cover with 1 5x9 ABD and secure with tape  Change daily and as needed with soiling/saturation    Wound Dressing Change: Upper medial abdomen  - Wash your hands with soap and water before you begin your dressing change and prepare a clean surface for dressings.  -Cleanse with mild unscented soap and water (such as Cetaphil, Cerave or Dove)   -Primary dressing: Moisten a folded 4x4 gauze with VASHE and place over wound bed  -Secondary dressing: Cover with 1 5x9 ABD and secure with tape  Change daily and as needed with soiling/saturation    A diet high in protein is important for wound healing, we recommend getting  90 grams of protein per day. Taking protein shakes or bars are a good way to get extra protein in your diet.     Good sources of protein:  Pork 26g per 3 oz  Whey protein powder - 24g per scoop (on average)  Greek yogurt - 23g per 8oz   Chicken or Turkey - 23g per 3oz  Fish - 20-25g per 3oz  Beef - 18-23g per 3oz  Tofu - 10g per 1/2 cup  Navy beans - 20g per cup  Cottage cheese - 14g per 1/2 cup   Lentils - 13g per 1/4 cup  Beef jerky 13g per 1oz  2% milk - 8g per cup  Peanut butter - 8g per 2 tablespoons  Eggs - 6g per egg  Mixed nuts - 6g per 2oz       Main Provider: Juan Garcia M.D. November 12, 2024    Call us at [phone] if you have any questions about your wounds, if you have redness or swelling around your wound, have a fever of 101 degrees Fahrenheit or greater or if you have any other problems or concerns. We answer the phone Monday through Friday 8 am to 4 pm, please leave a message as we check the voicemail frequently throughout the day. If you have a concern over the weekend, please leave a message and we will return your call Monday. If the need is urgent, go to the ER or urgent care.    If you had a positive experience please indicate that on your patient satisfaction survey form that [facility] will be sending you.    It was a pleasure meeting with you today.  Thank you for allowing me and my team the privilege of caring for you today.  YOU are the reason we are here, and I truly hope we provided you with the excellent service you deserve.  Please let us know if there is anything else we can do for you so that we can be sure you are leaving completely satisfied with your care experience.      If you have any billing related questions please call the [facility] Business office at [phone]. The clinic staff does not handle billing related matters.    If you are scheduled to have a follow up appointment, you will receive a reminder call the day before your visit. On the appointment  day please arrive 15 minutes prior to your appointment time. If you are unable to keep that appointment, please call the clinic to cancel or reschedule. If you are more than 10 minutes late or greater for your scheduled appointment time, the clinic policy is that you may be asked to reschedule.

## 2024-11-13 ENCOUNTER — THERAPY VISIT (OUTPATIENT)
Dept: PHYSICAL THERAPY | Facility: CLINIC | Age: 39
End: 2024-11-13
Attending: TRANSPLANT SURGERY
Payer: MEDICAID

## 2024-11-13 ENCOUNTER — OFFICE VISIT (OUTPATIENT)
Dept: TRANSPLANT | Facility: CLINIC | Age: 39
End: 2024-11-13
Attending: NURSE PRACTITIONER
Payer: MEDICAID

## 2024-11-13 VITALS
SYSTOLIC BLOOD PRESSURE: 138 MMHG | OXYGEN SATURATION: 99 % | BODY MASS INDEX: 31.97 KG/M2 | WEIGHT: 235.7 LBS | TEMPERATURE: 97.9 F | DIASTOLIC BLOOD PRESSURE: 86 MMHG | HEART RATE: 89 BPM

## 2024-11-13 DIAGNOSIS — Z94.4 LIVER REPLACED BY TRANSPLANT (H): Primary | ICD-10-CM

## 2024-11-13 DIAGNOSIS — T14.8XXA SKIN WOUND FROM SURGICAL INCISION: Primary | ICD-10-CM

## 2024-11-13 LAB
LABORATORY REPORT: NORMAL
PETH INTERPRETATION: NORMAL
PLPETH BLD-MCNC: <10 NG/ML
POPETH BLD-MCNC: <10 NG/ML

## 2024-11-13 PROCEDURE — G0463 HOSPITAL OUTPT CLINIC VISIT: HCPCS | Performed by: NURSE PRACTITIONER

## 2024-11-13 PROCEDURE — 97110 THERAPEUTIC EXERCISES: CPT | Mod: GP | Performed by: PHYSICAL THERAPIST

## 2024-11-13 ASSESSMENT — PAIN SCALES - GENERAL: PAINLEVEL_OUTOF10: SEVERE PAIN (6)

## 2024-11-13 NOTE — LETTER
11/13/2024      Jag Smtih  [address]  [facility] [[email or web address]] [id number]      Dear Colleague,    Thank you for referring your patient, Jag Smith, to the [facility] TRANSPLANT [facility]. Please see a copy of my visit note below.    Transplant Surgery -OUTPATIENT PROGRESS NOTE    Date of Visit: 11/15/2024    Transplants:  9/28/2024 (Liver); Postoperative day:  48  ASSESMENT AND PLAN:  Incisional wound:   Stable. X3 tunneling areas. No s/s infection. Continue packing with 4x4 gauze BID.           Date: November 11th, 2024    Transplant:  [x]                             Liver [x]                              Kidney []                             Pancreas []                              Other:             Chief Complaint:Post-op Visit (Wound care)    History of Present Illness:  Here today for incisional wound care. Mother is changing BID.   No fever or N/V/D.    No new concerns.       Patient Active Problem List   Diagnosis     Alcohol use disorder, severe, in early remission (H)     Alcoholic hepatitis (H)     Epiphora due to insufficient drainage of left side     Hyperbilirubinemia     Hypokalemia     Jaundice     Pneumonia     Liver replaced by transplant (H)     DENI (acute kidney injury) (H)     Immunosuppressed status (H)     Acute post-operative pain     Steroid-induced hyperglycemia     Acute urinary retention     Anemia due to blood loss, acute     Severe malnutrition (H)     Hyponatremia     Hypomagnesemia     Bilateral lower extremity edema     Hyperkalemia     Subconjunctival hemorrhage     Nonhealing surgical wound     Skin ulcer of abdomen with fat layer exposed (H)     SOCIAL /FAMILY HISTORY: [x]                  No recent change    Past Medical History:   Diagnosis Date     Alcohol use disorder      Alcoholic hepatitis (H) 06/07/2024     Anxiety      Depression      Hypertension      Liver replaced by transplant (H) 09/28/2024     Past Surgical History:   Procedure Laterality Date     BENCH LIVER   2024    Procedure: Bench liver;  Surgeon: Fernando Colon MD;  Location: [facility] OR     DACRYOCYSTORHINOSTOMY Left 2013     INCISION AND DRAINAGE BUTTOCKS Left 2017     TRANSPLANT LIVER RECIPIENT  DONOR N/A 2024    Procedure: Transplant liver recipient  donor;  Surgeon: Fernando Colon MD;  Location: [facility] OR     Social History     Socioeconomic History     Marital status: Single     Spouse name: Not on file     Number of children: Not on file     Years of education: Not on file     Highest education level: Not on file   Occupational History     Not on file   Tobacco Use     Smoking status: Never     Smokeless tobacco: Never   Substance and Sexual Activity     Alcohol use: Not Currently     Comment: last drink 2024     Drug use: Never     Sexual activity: Not on file   Other Topics Concern     Not on file   Social History Narrative     Not on file     Social Drivers of Health     Financial Resource Strain: Low Risk  (2024)    Financial Resource Strain      Within the past 12 months, have you or your family members you live with been unable to get utilities (heat, electricity) when it was really needed?: No   Food Insecurity: Low Risk  (2024)    Food Insecurity      Within the past 12 months, did you worry that your food would run out before you got money to buy more?: No      Within the past 12 months, did the food you bought just not last and you didn t have money to get more?: No   Transportation Needs: Low Risk  (2024)    Transportation Needs      Within the past 12 months, has lack of transportation kept you from medical appointments, getting your medicines, non-medical meetings or appointments, work, or from getting things that you need?: No   Physical Activity: Not on file   Stress: Not on file   Social Connections: Not on file   Interpersonal Safety: Low Risk  (2024)    Interpersonal Safety      Do you feel physically and emotionally safe where you  currently live?: Yes      Within the past 12 months, have you been hit, slapped, kicked or otherwise physically hurt by someone?: No      Within the past 12 months, have you been humiliated or emotionally abused in other ways by your partner or ex-partner?: No   Recent Concern: Interpersonal Safety - High Risk (9/22/2024)    Interpersonal Safety      Do you feel physically and emotionally safe where you currently live?: No      Within the past 12 months, have you been hit, slapped, kicked or otherwise physically hurt by someone?: No      Within the past 12 months, have you been humiliated or emotionally abused in other ways by your partner or ex-partner?: No   Housing Stability: Low Risk  (9/21/2024)    Housing Stability      Do you have housing? : Yes      Are you worried about losing your housing?: No     Prescription Medications as of 11/15/2024         Rx Number Disp Refills Start End Last Dispensed Date Next Fill Date Owning Pharmacy    acetaminophen (TYLENOL) 325 MG tablet  60 tablet 0 11/4/2024 --   [place] [id number]    Sig: Take 2 tablets (650 mg) by mouth every 8 hours as needed for mild pain or fever.    Class: E-Prescribe    Route: Oral    aspirin (ASA) 81 MG chewable tablet  30 tablet 1 11/1/2024 --   [address] 1[address]    Sig: Take 1 tablet (81 mg) by mouth or Feeding Tube daily.    Class: E-Prescribe    Route: Oral or Feeding Tube    escitalopram (LEXAPRO) 20 MG tablet  30 tablet 0 11/11/2024 --   [address] 1[address]493    Sig: Take 1 tablet (20 mg) by mouth daily.    Class: E-Prescribe    Route: Oral    hydrOXYzine HCl (ATARAX) 25 MG tablet  20 tablet 0 11/4/2024 --   [address] 1[address]587    Sig: Take 1 tablet (25 mg) by mouth every 8 hours as needed for  anxiety or other (adjuvant pain).    Class: E-Prescribe    Route: Oral    magnesium oxide (MAG-OX) 400 MG tablet  120 tablet 1 11/1/2024 --   [address] 1[address]273    Sig: Take 2 tablets (800 mg) by mouth 2 times daily.    Class: E-Prescribe    Route: Oral    methocarbamol (ROBAXIN) 750 MG tablet  30 tablet 0 11/4/2024 --   [address]    Sig: Take 1 tablet (750 mg) by mouth 3 times daily as needed for muscle spasms.    Class: E-Prescribe    Route: Oral    multivitamin w/minerals (CERTAVITE/ANTIOXIDANTS) tablet  30 tablet 1 11/1/2024 --   [address] 1[address]273    Sig: Take 1 tablet by mouth daily.    Class: E-Prescribe    Route: Oral    mycophenolate (GENERIC EQUIVALENT) 250 MG capsule  180 capsule 1 11/1/2024 --   [address]    Sig: Take 3 capsules (750 mg) by mouth 2 times daily.    Class: E-Prescribe    Notes to Pharmacy: TXP DT 9/28/2024 (Liver) TXP Dischg DT 10/9/2024 DX Liver replaced by transplant Z94.4 TX [facility] ([place], [place])    Route: Oral    omeprazole (PRILOSEC) 20 MG DR capsule  30 capsule 2 11/1/2024 --   [address]    Sig: Take 1 capsule (20 mg) by mouth daily.    Class: E-Prescribe    Route: Oral    polyethylene glycol (MIRALAX) 17 GM/Dose powder  510 g 0 10/9/2024 --   [place], [place] - [address] [facility] [address] [facility] [address]    Sig: Take 17 g by mouth 2 times daily as needed for constipation.    Class: E-Prescribe    Route: Oral    predniSONE (DELTASONE) 5 MG tablet  30 tablet 1 11/1/2024 --   [address]    Sig: Take 1 tablet (5 mg)  by mouth daily.    Class: E-Prescribe    Notes to Pharmacy: TXP DT 9/28/2024 (Liver) TXP Dischg DT 10/9/2024 DX Liver replaced by transplant Z94.4 [facility] ([place], [place])    Route: Oral    sennosides (SENOKOT) 8.6 MG tablet  60 tablet 0 10/9/2024 --   [place], [place] - [address] [facility] [address]    Sig: Take 2 tablets by mouth 2 times daily as needed for constipation.    Class: E-Prescribe    Route: Oral    tacrolimus (GENERIC EQUIVALENT) 0.5 MG capsule  60 capsule 1 11/11/2024 --   [place], [place] - [address] [place] [address] 1-256    Sig: HOLD    Class: E-Prescribe    Notes to Pharmacy: TXP DT 9/28/2024 (Liver) TXP Dischg DT 10/9/2024 DX Liver replaced by transplant Z94.4 [facility] ([place], [place])    tacrolimus (GENERIC EQUIVALENT) 1 MG capsule  240 capsule 1 11/11/2024 --   [address] 2[address]595    Sig: Take 4 capsules (4 mg) by mouth every 12 hours.    Class: E-Prescribe    Notes to Pharmacy: TXP DT 9/28/2024 (Liver) TXP Dischg DT 10/9/2024 DX Liver replaced by transplant Z94.4 [facility] ([place], [place])    Route: Oral    tacrolimus (GENERIC EQUIVALENT) 5 MG capsule  -- -- 11/5/2024 --       Sig: Take 1 capsule (5 mg) by mouth 2 times daily as needed (for dose changes).    Class: Historical    Notes to Pharmacy: TXP DT 9/28/2024 (Liver) TXP Dischg DT 10/9/2024 DX Liver replaced by transplant Z94.4 [facility] ([place], [place])    Route: Oral    traMADol (ULTRAM) 50 MG tablet  8 tablet 0 11/11/2024 11/19/2024   [address] 1[address]    Sig: Take 1 tablet (50 mg) by mouth daily as needed for severe pain.    Class: E-Prescribe    Route: Oral     traZODone (DESYREL) 50 MG tablet  30 tablet 0 11/7/2024 --   [place], [place] - [address] [place] [address] 2-154    Sig: Take 1 tablet (50 mg) by mouth at bedtime.    Class: E-Prescribe    Route: Oral    valGANciclovir (VALCYTE) 450 MG tablet  30 tablet 1 11/1/2024 --   [address] 6[address]257    Sig: Take 1 tablet (450 mg) by mouth daily.    Class: E-Prescribe    Route: Oral          Azithromycin   REVIEW OF SYSTEMS (check box if normal)  [x]               GENERAL  [x]                 PULMONARY [x]                GENITOURINARY  [x]                CNS                 [x]                 CARDIAC  [x]                 ENDOCRINE  [x]                EARS,NOSE,THROAT [x]                 GASTROINTESTINAL [x]                 NEUROLOGIC    [x]                MUSCLOSKELTAL  [x]                  HEMATOLOGY      PHYSICAL EXAM (check box if normal)/86   Pulse 89   Temp 97.9  F (36.6  C) (Oral)   Wt 106.9 kg (235 lb 11.2 oz)   SpO2 99%   BMI 31.97 kg/m          [x]            GENERAL: NAD    [x]            Incision:  Left side of incision open but healing well, tunneling present midline x2 and x1 R distal incision. Tunneling packed with iodoform gauze and rest of incision packed with 4x4 gauze with no issues. No surrounding erythema. Moderate drainage present- mostly midline.    Abd soft throughout. Non-tender.                                                                                 PAIN SCALE:: 8       Again, thank you for allowing me to participate in the care of your patient.        Sincerely,        SIL Blum CNP

## 2024-11-14 ENCOUNTER — LAB (OUTPATIENT)
Dept: LAB | Facility: CLINIC | Age: 39
End: 2024-11-14
Attending: TRANSPLANT SURGERY
Payer: MEDICAID

## 2024-11-14 ENCOUNTER — HOSPITAL ENCOUNTER (OUTPATIENT)
Dept: BEHAVIORAL HEALTH | Facility: CLINIC | Age: 39
Discharge: HOME | End: 2024-11-14
Attending: PSYCHIATRY & NEUROLOGY
Payer: MEDICAID

## 2024-11-14 ENCOUNTER — OFFICE VISIT (OUTPATIENT)
Dept: TRANSPLANT | Facility: CLINIC | Age: 39
End: 2024-11-14
Attending: TRANSPLANT SURGERY
Payer: MEDICAID

## 2024-11-14 VITALS — HEIGHT: 72 IN | BODY MASS INDEX: 31.97 KG/M2 | WEIGHT: 236 LBS

## 2024-11-14 VITALS
RESPIRATION RATE: 16 BRPM | HEART RATE: 92 BPM | DIASTOLIC BLOOD PRESSURE: 88 MMHG | OXYGEN SATURATION: 100 % | TEMPERATURE: 97.8 F | SYSTOLIC BLOOD PRESSURE: 138 MMHG | BODY MASS INDEX: 32.13 KG/M2 | WEIGHT: 236.9 LBS

## 2024-11-14 DIAGNOSIS — Z94.4 LIVER REPLACED BY TRANSPLANT (H): ICD-10-CM

## 2024-11-14 LAB
ALBUMIN SERPL BCG-MCNC: 4.2 G/DL (ref 3.5–5.2)
ALP SERPL-CCNC: 138 U/L (ref 40–150)
ALT SERPL W P-5'-P-CCNC: 12 U/L (ref 0–70)
ANION GAP SERPL CALCULATED.3IONS-SCNC: 10 MMOL/L (ref 7–15)
AST SERPL W P-5'-P-CCNC: 12 U/L (ref 0–45)
BILIRUB DIRECT SERPL-MCNC: 0.51 MG/DL (ref 0–0.3)
BILIRUB SERPL-MCNC: 0.8 MG/DL
BUN SERPL-MCNC: 25.3 MG/DL (ref 6–20)
CALCIUM SERPL-MCNC: 10 MG/DL (ref 8.8–10.4)
CHLORIDE SERPL-SCNC: 102 MMOL/L (ref 98–107)
CREAT SERPL-MCNC: 1.24 MG/DL (ref 0.67–1.17)
EGFRCR SERPLBLD CKD-EPI 2021: 77 ML/MIN/1.73M2
ERYTHROCYTE [DISTWIDTH] IN BLOOD BY AUTOMATED COUNT: 15 % (ref 10–15)
GLUCOSE SERPL-MCNC: 116 MG/DL (ref 70–99)
HCO3 SERPL-SCNC: 24 MMOL/L (ref 22–29)
HCT VFR BLD AUTO: 31.2 % (ref 40–53)
HGB BLD-MCNC: 10.3 G/DL (ref 13.3–17.7)
MAGNESIUM SERPL-MCNC: 1.6 MG/DL (ref 1.7–2.3)
MCH RBC QN AUTO: 30.7 PG (ref 26.5–33)
MCHC RBC AUTO-ENTMCNC: 33 G/DL (ref 31.5–36.5)
MCV RBC AUTO: 93 FL (ref 78–100)
PHOSPHATE SERPL-MCNC: 6 MG/DL (ref 2.5–4.5)
PLATELET # BLD AUTO: 232 10E3/UL (ref 150–450)
POTASSIUM SERPL-SCNC: 5.5 MMOL/L (ref 3.4–5.3)
PROT SERPL-MCNC: 7.4 G/DL (ref 6.4–8.3)
RBC # BLD AUTO: 3.36 10E6/UL (ref 4.4–5.9)
SODIUM SERPL-SCNC: 136 MMOL/L (ref 135–145)
TACROLIMUS BLD-MCNC: 9.7 UG/L (ref 5–15)
TME LAST DOSE: NORMAL H
TME LAST DOSE: NORMAL H
WBC # BLD AUTO: 8.7 10E3/UL (ref 4–11)

## 2024-11-14 PROCEDURE — 85027 COMPLETE CBC AUTOMATED: CPT | Performed by: PATHOLOGY

## 2024-11-14 PROCEDURE — 80053 COMPREHEN METABOLIC PANEL: CPT | Performed by: PATHOLOGY

## 2024-11-14 PROCEDURE — H0001 ALCOHOL AND/OR DRUG ASSESS: HCPCS

## 2024-11-14 PROCEDURE — 84100 ASSAY OF PHOSPHORUS: CPT | Performed by: PATHOLOGY

## 2024-11-14 PROCEDURE — 83735 ASSAY OF MAGNESIUM: CPT | Performed by: PATHOLOGY

## 2024-11-14 PROCEDURE — 80197 ASSAY OF TACROLIMUS: CPT | Performed by: TRANSPLANT SURGERY

## 2024-11-14 PROCEDURE — 99213 OFFICE O/P EST LOW 20 MIN: CPT | Mod: 24 | Performed by: TRANSPLANT SURGERY

## 2024-11-14 PROCEDURE — 36415 COLL VENOUS BLD VENIPUNCTURE: CPT | Performed by: PATHOLOGY

## 2024-11-14 PROCEDURE — 99000 SPECIMEN HANDLING OFFICE-LAB: CPT | Performed by: PATHOLOGY

## 2024-11-14 PROCEDURE — 82248 BILIRUBIN DIRECT: CPT | Performed by: PATHOLOGY

## 2024-11-14 PROCEDURE — G0463 HOSPITAL OUTPT CLINIC VISIT: HCPCS | Performed by: TRANSPLANT SURGERY

## 2024-11-14 ASSESSMENT — ANXIETY QUESTIONNAIRES
2. NOT BEING ABLE TO STOP OR CONTROL WORRYING: NEARLY EVERY DAY
7. FEELING AFRAID AS IF SOMETHING AWFUL MIGHT HAPPEN: NEARLY EVERY DAY
6. BECOMING EASILY ANNOYED OR IRRITABLE: MORE THAN HALF THE DAYS
GAD7 TOTAL SCORE: 16
1. FEELING NERVOUS, ANXIOUS, OR ON EDGE: MORE THAN HALF THE DAYS
5. BEING SO RESTLESS THAT IT IS HARD TO SIT STILL: SEVERAL DAYS
7. FEELING AFRAID AS IF SOMETHING AWFUL MIGHT HAPPEN: NEARLY EVERY DAY
4. TROUBLE RELAXING: MORE THAN HALF THE DAYS
IF YOU CHECKED OFF ANY PROBLEMS ON THIS QUESTIONNAIRE, HOW DIFFICULT HAVE THESE PROBLEMS MADE IT FOR YOU TO DO YOUR WORK, TAKE CARE OF THINGS AT HOME, OR GET ALONG WITH OTHER PEOPLE: SOMEWHAT DIFFICULT
8. IF YOU CHECKED OFF ANY PROBLEMS, HOW DIFFICULT HAVE THESE MADE IT FOR YOU TO DO YOUR WORK, TAKE CARE OF THINGS AT HOME, OR GET ALONG WITH OTHER PEOPLE?: SOMEWHAT DIFFICULT
3. WORRYING TOO MUCH ABOUT DIFFERENT THINGS: NEARLY EVERY DAY
GAD7 TOTAL SCORE: 16
GAD7 TOTAL SCORE: 16

## 2024-11-14 ASSESSMENT — COLUMBIA-SUICIDE SEVERITY RATING SCALE - C-SSRS
2. HAVE YOU ACTUALLY HAD ANY THOUGHTS OF KILLING YOURSELF?: NO
TOTAL  NUMBER OF ABORTED OR SELF INTERRUPTED ATTEMPTS LIFETIME: NO
ATTEMPT LIFETIME: NO
TOTAL  NUMBER OF INTERRUPTED ATTEMPTS LIFETIME: NO
6. HAVE YOU EVER DONE ANYTHING, STARTED TO DO ANYTHING, OR PREPARED TO DO ANYTHING TO END YOUR LIFE?: NO
1. HAVE YOU WISHED YOU WERE DEAD OR WISHED YOU COULD GO TO SLEEP AND NOT WAKE UP?: NO

## 2024-11-14 ASSESSMENT — PAIN SCALES - GENERAL: PAINLEVEL_OUTOF10: SEVERE PAIN (6)

## 2024-11-14 ASSESSMENT — PATIENT HEALTH QUESTIONNAIRE - PHQ9
SUM OF ALL RESPONSES TO PHQ QUESTIONS 1-9: 12
SUM OF ALL RESPONSES TO PHQ QUESTIONS 1-9: 12
10. IF YOU CHECKED OFF ANY PROBLEMS, HOW DIFFICULT HAVE THESE PROBLEMS MADE IT FOR YOU TO DO YOUR WORK, TAKE CARE OF THINGS AT HOME, OR GET ALONG WITH OTHER PEOPLE: SOMEWHAT DIFFICULT

## 2024-11-14 NOTE — NURSING NOTE
Chief Complaint   Patient presents with    Follow Up     Liver transplant follow-up     Vital signs:  Temp: 97.8  F (36.6  C) Temp src: Oral BP: 138/88 Pulse: 92   Resp: 16 SpO2: 100 %       Weight: 107.5 kg (236 lb 14.4 oz)  Estimated body mass index is 32.13 kg/m  as calculated from the following:    Height as of 11/12/24: 1.829 m (6').    Weight as of this encounter: 107.5 kg (236 lb 14.4 oz).      Aniket Ray RN on 11/14/2024 at 8:51 AM

## 2024-11-14 NOTE — LETTER
11/14/2024      Jag Smith  [address]  [facility] [address]      Dear Colleague,    Thank you for referring your patient, Jag Smith, to the [facility] TRANSPLANT [facility]INIC. Please see a copy of my visit note below.    Transplant Surgery Progress Note    Transplants:  9/28/2024 (Liver);   S: overall continues to improve, wound still an issue but improving    Transplant History:    Transplant Type:  Liver Tx  Donor Type:     Transplant Date:  9/28/2024 (Liver)   Biliary Stent:  No    Acute Rejection Hx:  No    Present Maintenance Immunosuppression:  Tacrolimus, Mycophenolate mofetil, and Prednisone    CMV IgG Ab Discordance:  No  EBV IgG Ab Discordance:  No      Transplant Coordinator: Karena Villegas     Transplant Office Phone Number: [phone]     Immunosuppressant Medications       Immunosuppressive Agents Disp Start End     mycophenolate (GENERIC EQUIVALENT) 250 MG capsule 180 capsule 11/1/2024 --    Sig - Route: Take 3 capsules (750 mg) by mouth 2 times daily. - Oral    Class: E-Prescribe    Notes to Pharmacy: TXP DT 9/28/2024 (Liver) TXP Dischg DT 10/9/2024 DX Liver replaced by transplant Z94.4 TX [facility] ([place], [place])     tacrolimus (GENERIC EQUIVALENT) 0.5 MG capsule 60 capsule 11/11/2024 --    Sig: HOLD    Class: E-Prescribe    Notes to Pharmacy: TXP DT 9/28/2024 (Liver) TXP Dischg DT 10/9/2024 DX Liver replaced by transplant Z94.4 [facility] ([place], [place])     tacrolimus (GENERIC EQUIVALENT) 1 MG capsule 240 capsule 11/11/2024 --    Sig - Route: Take 4 capsules (4 mg) by mouth every 12 hours. - Oral    Class: E-Prescribe    Notes to Pharmacy: TXP DT 9/28/2024 (Liver) TXP Dischg DT 10/9/2024 DX Liver replaced by transplant Z94.4 [facility] ([place], [place])     tacrolimus (GENERIC EQUIVALENT) 5 MG capsule -- 11/5/2024 --    Sig - Route: Take 1  capsule (5 mg) by mouth 2 times daily as needed (for dose changes). - Oral    Class: Historical    Notes to Pharmacy: TXP DT 9/28/2024 (Liver) TXP Dischg DT 10/9/2024 DX Liver replaced by transplant Z94.4 TX [facility] ([place], [place])            Possible Immunosuppression-related side effects:   []             headache  []             vivid dreams  []             irritability  []             cognitive difficuties  []             fine tremor  []             nausea  []             diarrhea  []             neuropathy      []             edema  []             renal calcineurin toxicity  []             hyperkalemia  []             post-transplant diabetes  []             decreased appetite  []             increased appetite  []             other:  []             none    Prescription Medications as of 11/16/2024         Rx Number Disp Refills Start End Last Dispensed Date Next Fill Date Owning Pharmacy    acetaminophen (TYLENOL) 325 MG tablet  60 tablet 0 11/4/2024 --   [address] 1[address]058    Sig: Take 2 tablets (650 mg) by mouth every 8 hours as needed for mild pain or fever.    Class: E-Prescribe    Route: Oral    aspirin (ASA) 81 MG chewable tablet  30 tablet 1 11/1/2024 --   [address] 1[address]310    Sig: Take 1 tablet (81 mg) by mouth or Feeding Tube daily.    Class: E-Prescribe    Route: Oral or Feeding Tube    escitalopram (LEXAPRO) 20 MG tablet  30 tablet 0 11/11/2024 --   [address] 1[address]446    Sig: Take 1 tablet (20 mg) by mouth daily.    Class: E-Prescribe    Route: Oral    hydrOXYzine HCl (ATARAX) 25 MG tablet  20 tablet 0 11/4/2024 --   [address] 1[address]407    Sig: Take 1 tablet (25 mg) by mouth every 8 hours as needed  for anxiety or other (adjuvant pain).    Class: E-Prescribe    Route: Oral    magnesium oxide (MAG-OX) 400 MG tablet  120 tablet 1 11/1/2024 --   [address] 1[address]273    Sig: Take 2 tablets (800 mg) by mouth 2 times daily.    Class: E-Prescribe    Route: Oral    methocarbamol (ROBAXIN) 750 MG tablet  30 tablet 0 11/4/2024 --   [address] 1[address]    Sig: Take 1 tablet (750 mg) by mouth 3 times daily as needed for muscle spasms.    Class: E-Prescribe    Route: Oral    multivitamin w/minerals (CERTAVITE/ANTIOXIDANTS) tablet  30 tablet 1 11/1/2024 --   [address] 1[address]273    Sig: Take 1 tablet by mouth daily.    Class: E-Prescribe    Route: Oral    mycophenolate (GENERIC EQUIVALENT) 250 MG capsule  180 capsule 1 11/1/2024 --   [address] 1[address]273    Sig: Take 3 capsules (750 mg) by mouth 2 times daily.    Class: E-Prescribe    Notes to Pharmacy: TXP DT 9/28/2024 (Liver) TXP Dischg DT 10/9/2024 DX Liver replaced by transplant Z94.4 TX [facility] ([place], [place])    Route: Oral    omeprazole (PRILOSEC) 20 MG DR capsule  30 capsule 2 11/1/2024 --   [address]    Sig: Take 1 capsule (20 mg) by mouth daily.    Class: E-Prescribe    Route: Oral    polyethylene glycol (MIRALAX) 17 GM/Dose powder  510 g 0 10/9/2024 --   [place], [place] - [address] [facility] [address]    Sig: Take 17 g by mouth 2 times daily as needed for constipation.    Class: E-Prescribe    Route: Oral    predniSONE (DELTASONE) 5 MG tablet  30 tablet 1 11/1/2024 --   [address] 1[address]273    Sig: Take 1 tablet (5  mg) by mouth daily.    Class: E-Prescribe    Notes to Pharmacy: TXP DT 9/28/2024 (Liver) TXP Dischg DT 10/9/2024 DX Liver replaced by transplant Z94.4 [facility] ([place], [place])    Route: Oral    sennosides (SENOKOT) 8.6 MG tablet  60 tablet 0 10/9/2024 --   [place], [place] - [address] [facility]    Sig: Take 2 tablets by mouth 2 times daily as needed for constipation.    Class: E-Prescribe    Route: Oral    tacrolimus (GENERIC EQUIVALENT) 0.5 MG capsule  60 capsule 1 11/11/2024 --   [place], [place] - [address] [place] [address] [phone]    Sig: HOLD    Class: E-Prescribe    Notes to Pharmacy: TXP DT 9/28/2024 (Liver) TXP Dischg DT 10/9/2024 DX Liver replaced by transplant Z94.4 [facility] ([place], [place])    tacrolimus (GENERIC EQUIVALENT) 1 MG capsule  240 capsule 1 11/11/2024 --   [address] 9[address]254    Sig: Take 4 capsules (4 mg) by mouth every 12 hours.    Class: E-Prescribe    Notes to Pharmacy: TXP DT 9/28/2024 (Liver) TXP Dischg DT 10/9/2024 DX Liver replaced by transplant Z94.4 [facility] ([place], [place])    Route: Oral    tacrolimus (GENERIC EQUIVALENT) 5 MG capsule  -- -- 11/5/2024 --       Sig: Take 1 capsule (5 mg) by mouth 2 times daily as needed (for dose changes).    Class: Historical    Notes to Pharmacy: TXP DT 9/28/2024 (Liver) TXP Dischg DT 10/9/2024 DX Liver replaced by transplant Z94.4 [facility] ([place], [place])    Route: Oral    traMADol (ULTRAM) 50 MG tablet  8 tablet 0 11/11/2024 11/19/2024   [address] [phone]    Sig: Take 1 tablet (50 mg) by mouth daily as needed for severe pain.    Class: E-Prescribe    Route:  "Oral    traZODone (DESYREL) 50 MG tablet  30 tablet 0 11/7/2024 --   [place], [place] - [address] [place] [address] [phone]    Sig: Take 1 tablet (50 mg) by mouth at bedtime.    Class: E-Prescribe    Route: Oral    valGANciclovir (VALCYTE) 450 MG tablet  30 tablet 1 11/1/2024 --   [address] [phone]    Sig: Take 1 tablet (450 mg) by mouth daily.    Class: E-Prescribe    Route: Oral            O:   [unfilled]        Latest Ref Rng & Units 11/14/2024     8:03 AM 11/11/2024     7:34 AM 11/7/2024     7:28 AM 11/4/2024     8:14 AM 10/31/2024     7:12 AM   Transplant Immunosuppression Labs   Creat 0.67 - 1.17 mg/dL 1.24  1.37  1.16  1.22  1.18    Urea Nitrogen 6.0 - 20.0 mg/dL 25.3  21.7  21.2  21.7  22.9    WBC 4.0 - 11.0 10e3/uL 8.7  7.7  8.5  10.0  7.0        Chemistries:   Recent Labs   Lab Test 11/14/24  0803   BUN 25.3*   CR 1.24*   GFRESTIMATED 77   *     Lab Results   Component Value Date    A1C 5.4 09/28/2024     Recent Labs   Lab Test 11/14/24  0803   ALBUMIN 4.2   BILITOTAL 0.8   ALKPHOS 138   AST 12   ALT 12     Urine Studies:  Recent Labs   Lab Test 09/27/24 2113   COLOR Dark Yellow*   APPEARANCE Slightly Cloudy*   URINEGLC Negative   URINEBILI Large*   URINEKETONE Negative   SG 1.011   UBLD Negative   URINEPH 6.0   PROTEIN Negative   NITRITE Negative   LEUKEST Negative   RBCU 1   WBCU 5     No lab results found.  Hematology:   Recent Labs   Lab Test 11/14/24  0803 11/11/24  0734 11/07/24  0728   HGB 10.3* 9.8* 10.2*    226 220   WBC 8.7 7.7 8.5     Coags:   Recent Labs   Lab Test 10/01/24  0437 09/30/24  0529   INR 1.03 1.14     HLA antibodies:   No results found for: \"OA3WVDVMR\", \"ZR3AJMIXHM\", \"LY3TCHQBK\", \"XT3XQMUPXU\"    Assessment: Jag Smith is doing fairly well s/p Liver Tx:  Issues we addressed during his visit include:    Plan:    1. Graft function: LFTs normalized  2. " Immunosuppression Management: No change tac 8-10   .  Complexity of management:Low.  Contributing factors:  wound infection  3. Wound care- should do WTD BID, will see Marycruz tomorrow, is improving  Followup: 1 week          Fernando Colon MD/PhD  Professor of Surgery      Again, thank you for allowing me to participate in the care of your patient.        Sincerely,        Fernando Colon MD

## 2024-11-14 NOTE — PROGRESS NOTES
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 patient's placement aligns with the assessment and placement level of care recommendation based on current ASAM Dimension scale ratings.     The patient would like the following family or other support people involved in their treatment:  None at this time.  The patient does not have any history of opioid abuse.      Clinical Substantiation for the above recommendations: The patient would benefit from continuing to develop long-term sober maintenance skills, would benefit from continuing to develop sober coping skills, would benefit from continuing to develop a sober peer support network, has dual issues of mental health and substance abuse, has mental health symptoms which are exacerbated by substance abuse, and has medical issues which were exacerbated by substance abuse.    Referral:   Joshua Miranda  Tel #: [phone]  Fax #: [phone]  [email or web address] Promotions    YUSEF consult completed by: [facility].  Phone Number: [phone]  E-mail Address: [email or web address]  [facility] Mental Health and Addiction Services Evaluation Department  [address] [place] [id number]    *Due to regulation of Title 42 of the Code of Federal Regulations (CFR) Part 2: Confidentiality laws apply to this note and the information wherein.  Thus, this note cannot be copy and pasted into any other health care staff's note nor can it be included in general medical records sent to ANY outside agency without the patient's written consent.

## 2024-11-15 ENCOUNTER — TELEPHONE (OUTPATIENT)
Dept: TRANSPLANT | Facility: CLINIC | Age: 39
End: 2024-11-15

## 2024-11-15 ENCOUNTER — OFFICE VISIT (OUTPATIENT)
Dept: TRANSPLANT | Facility: CLINIC | Age: 39
End: 2024-11-15
Attending: NURSE PRACTITIONER
Payer: MEDICAID

## 2024-11-15 VITALS
TEMPERATURE: 98.2 F | WEIGHT: 232.6 LBS | DIASTOLIC BLOOD PRESSURE: 78 MMHG | HEART RATE: 89 BPM | OXYGEN SATURATION: 98 % | SYSTOLIC BLOOD PRESSURE: 123 MMHG | BODY MASS INDEX: 31.55 KG/M2

## 2024-11-15 DIAGNOSIS — T14.8XXA SKIN WOUND FROM SURGICAL INCISION: Primary | ICD-10-CM

## 2024-11-15 ASSESSMENT — PAIN SCALES - GENERAL: PAINLEVEL_OUTOF10: SEVERE PAIN (7)

## 2024-11-15 NOTE — PROGRESS NOTES
Transplant Surgery -OUTPATIENT PROGRESS NOTE    Date of Visit: 11/15/2024    Transplants:  2024 (Liver); Postoperative day:  48  ASSESMENT AND PLAN:  Incisional wound:   Stable. X3 tunneling areas. No s/s infection. Continue packing with 4x4 gauze BID.           Date: 2024    Transplant:  [x]                             Liver [x]                              Kidney []                             Pancreas []                              Other:             Chief Complaint:Post-op Visit (Wound care)    History of Present Illness:  Here today for incisional wound care. Mother is changing BID.   No fever or N/V/D.    No new concerns.       Patient Active Problem List   Diagnosis    Alcohol use disorder, severe, in early remission (H)    Alcoholic hepatitis (H)    Epiphora due to insufficient drainage of left side    Hyperbilirubinemia    Hypokalemia    Jaundice    Pneumonia    Liver replaced by transplant (H)    DENI (acute kidney injury) (H)    Immunosuppressed status (H)    Acute post-operative pain    Steroid-induced hyperglycemia    Acute urinary retention    Anemia due to blood loss, acute    Severe malnutrition (H)    Hyponatremia    Hypomagnesemia    Bilateral lower extremity edema    Hyperkalemia    Subconjunctival hemorrhage    Nonhealing surgical wound    Skin ulcer of abdomen with fat layer exposed (H)     SOCIAL /FAMILY HISTORY: [x]                  No recent change    Past Medical History:   Diagnosis Date    Alcohol use disorder     Alcoholic hepatitis (H) 2024    Anxiety     Depression     Hypertension     Liver replaced by transplant (H) 2024     Past Surgical History:   Procedure Laterality Date    BENCH LIVER  2024    Procedure: Bench liver;  Surgeon: Fernando Colon MD;  Location: [facility] OR    DACRYOCYSTORHINOSTOMY Left 2013    INCISION AND DRAINAGE BUTTOCKS Left 2017    TRANSPLANT LIVER RECIPIENT  DONOR N/A 2024    Procedure: Transplant liver  recipient  donor;  Surgeon: Fernando Colon MD;  Location:  OR     Social History     Socioeconomic History    Marital status: Single     Spouse name: Not on file    Number of children: Not on file    Years of education: Not on file    Highest education level: Not on file   Occupational History    Not on file   Tobacco Use    Smoking status: Never    Smokeless tobacco: Never   Substance and Sexual Activity    Alcohol use: Not Currently     Comment: last drink 2024    Drug use: Never    Sexual activity: Not on file   Other Topics Concern    Not on file   Social History Narrative    Not on file     Social Drivers of Health     Financial Resource Strain: Low Risk  (2024)    Financial Resource Strain     Within the past 12 months, have you or your family members you live with been unable to get utilities (heat, electricity) when it was really needed?: No   Food Insecurity: Low Risk  (2024)    Food Insecurity     Within the past 12 months, did you worry that your food would run out before you got money to buy more?: No     Within the past 12 months, did the food you bought just not last and you didn t have money to get more?: No   Transportation Needs: Low Risk  (2024)    Transportation Needs     Within the past 12 months, has lack of transportation kept you from medical appointments, getting your medicines, non-medical meetings or appointments, work, or from getting things that you need?: No   Physical Activity: Not on file   Stress: Not on file   Social Connections: Not on file   Interpersonal Safety: Low Risk  (2024)    Interpersonal Safety     Do you feel physically and emotionally safe where you currently live?: Yes     Within the past 12 months, have you been hit, slapped, kicked or otherwise physically hurt by someone?: No     Within the past 12 months, have you been humiliated or emotionally abused in other ways by your partner or ex-partner?: No   Recent Concern:  Interpersonal Safety - High Risk (9/22/2024)    Interpersonal Safety     Do you feel physically and emotionally safe where you currently live?: No     Within the past 12 months, have you been hit, slapped, kicked or otherwise physically hurt by someone?: No     Within the past 12 months, have you been humiliated or emotionally abused in other ways by your partner or ex-partner?: No   Housing Stability: Low Risk  (9/21/2024)    Housing Stability     Do you have housing? : Yes     Are you worried about losing your housing?: No     Prescription Medications as of 11/15/2024         Rx Number Disp Refills Start End Last Dispensed Date Next Fill Date Owning Pharmacy    acetaminophen (TYLENOL) 325 MG tablet  60 tablet 0 11/4/2024 --   [address] 1-237    Sig: Take 2 tablets (650 mg) by mouth every 8 hours as needed for mild pain or fever.    Class: E-Prescribe    Route: Oral    aspirin (ASA) 81 MG chewable tablet  30 tablet 1 11/1/2024 --   [address] 1-392    Sig: Take 1 tablet (81 mg) by mouth or Feeding Tube daily.    Class: E-Prescribe    Route: Oral or Feeding Tube    escitalopram (LEXAPRO) 20 MG tablet  30 tablet 0 11/11/2024 --   [address] 1-033    Sig: Take 1 tablet (20 mg) by mouth daily.    Class: E-Prescribe    Route: Oral    hydrOXYzine HCl (ATARAX) 25 MG tablet  20 tablet 0 11/4/2024 --   [address] 1-124    Sig: Take 1 tablet (25 mg) by mouth every 8 hours as needed for anxiety or other (adjuvant pain).    Class: E-Prescribe    Route: Oral    magnesium oxide (MAG-OX) 400 MG tablet  120 tablet 1 11/1/2024 --   [address] 1-624    Sig: Take 2 tablets (800 mg) by mouth 2 times daily.     Class: E-Prescribe    Route: Oral    methocarbamol (ROBAXIN) 750 MG tablet  30 tablet 0 11/4/2024 --   [address] 1-909    Sig: Take 1 tablet (750 mg) by mouth 3 times daily as needed for muscle spasms.    Class: E-Prescribe    Route: Oral    multivitamin w/minerals (CERTAVITE/ANTIOXIDANTS) tablet  30 tablet 1 11/1/2024 --   [address] 1-694    Sig: Take 1 tablet by mouth daily.    Class: E-Prescribe    Route: Oral    mycophenolate (GENERIC EQUIVALENT) 250 MG capsule  180 capsule 1 11/1/2024 --   [address] 1-724    Sig: Take 3 capsules (750 mg) by mouth 2 times daily.    Class: E-Prescribe    Notes to Pharmacy: TXP DT 9/28/2024 (Liver) TXP Dischg DT 10/9/2024 DX Liver replaced by transplant Z94.4 [facility] ([place], [place])    Route: Oral    omeprazole (PRILOSEC) 20 MG DR capsule  30 capsule 2 11/1/2024 --   [address] 8[address]281    Sig: Take 1 capsule (20 mg) by mouth daily.    Class: E-Prescribe    Route: Oral    polyethylene glycol (MIRALAX) 17 GM/Dose powder  510 g 0 10/9/2024 --   [place], [place] - [address]    Sig: Take 17 g by mouth 2 times daily as needed for constipation.    Class: E-Prescribe    Route: Oral    predniSONE (DELTASONE) 5 MG tablet  30 tablet 1 11/1/2024 --   [address] 1-486    Sig: Take 1 tablet (5 mg) by mouth daily.    Class: E-Prescribe    Notes to Pharmacy: TXP DT 9/28/2024 (Liver) TXP Dischg DT 10/9/2024 DX Liver replaced by transplant Z94.4 [facility] ([place], [place])    Route: Oral    sennosides (SENOKOT) 8.6 MG tablet  60  tablet 0 10/9/2024 --   [place], [place] - [address] [facility] [address]    Sig: Take 2 tablets by mouth 2 times daily as needed for constipation.    Class: E-Prescribe    Route: Oral    tacrolimus (GENERIC EQUIVALENT) 0.5 MG capsule  60 capsule 1 11/11/2024 --   [place], [place] - [address] [place] [address] 1-651    Sig: HOLD    Class: E-Prescribe    Notes to Pharmacy: TXP DT 9/28/2024 (Liver) TXP Dischg DT 10/9/2024 DX Liver replaced by transplant Z94.4 [facility] ([place], [place])    tacrolimus (GENERIC EQUIVALENT) 1 MG capsule  240 capsule 1 11/11/2024 --   [address] 1-665    Sig: Take 4 capsules (4 mg) by mouth every 12 hours.    Class: E-Prescribe    Notes to Pharmacy: TXP DT 9/28/2024 (Liver) TXP Dischg DT 10/9/2024 DX Liver replaced by transplant Z94.4 [facility] ([place], [place])    Route: Oral    tacrolimus (GENERIC EQUIVALENT) 5 MG capsule  -- -- 11/5/2024 --       Sig: Take 1 capsule (5 mg) by mouth 2 times daily as needed (for dose changes).    Class: Historical    Notes to Pharmacy: TXP DT 9/28/2024 (Liver) TXP Dischg DT 10/9/2024 DX Liver replaced by transplant Z94.4 [facility] ([place], [place])    Route: Oral    traMADol (ULTRAM) 50 MG tablet  8 tablet 0 11/11/2024 11/19/2024   [address] 1-954    Sig: Take 1 tablet (50 mg) by mouth daily as needed for severe pain.    Class: E-Prescribe    Route: Oral    traZODone (DESYREL) 50 MG tablet  30 tablet 0 11/7/2024 --   [facility] [address] [place] [address] 1-785    Sig: Take 1 tablet (50 mg) by mouth at bedtime.    Class: E-Prescribe    Route: Oral    valGANciclovir (VALCYTE) 450 MG tablet  30  tablet 1 11/1/2024 --   [facility] [place], [place] - [address] [facility] [phone]    Sig: Take 1 tablet (450 mg) by mouth daily.    Class: E-Prescribe    Route: Oral          Azithromycin   REVIEW OF SYSTEMS (check box if normal)  [x]               GENERAL  [x]                 PULMONARY [x]                GENITOURINARY  [x]                CNS                 [x]                 CARDIAC  [x]                 ENDOCRINE  [x]                EARS,NOSE,THROAT [x]                 GASTROINTESTINAL [x]                 NEUROLOGIC    [x]                MUSCLOSKELTAL  [x]                  HEMATOLOGY      PHYSICAL EXAM (check box if normal)/86   Pulse 89   Temp 97.9  F (36.6  C) (Oral)   Wt 106.9 kg (235 lb 11.2 oz)   SpO2 99%   BMI 31.97 kg/m          [x]            GENERAL: NAD    [x]            Incision:  Left side of incision open but healing well, tunneling present midline x2 and x1 R distal incision. Tunneling packed with iodoform gauze and rest of incision packed with 4x4 gauze with no issues. No surrounding erythema. Moderate drainage present- mostly midline.    Abd soft throughout. Non-tender.                                                                                 PAIN SCALE:: 8

## 2024-11-15 NOTE — TELEPHONE ENCOUNTER
Transplant Social Work Services Phone Call    Data: Jag is s/p  donor liver transplant   Intervention: I called Jag, as this writer was asked to see patient in clinic today but was unavailable. Jag and his mother report that he has a warrant out for his arrest in [facility]. I inquired further, he reported that it was due to a domestic issue and the warrant has been active for about a year now. He was suppose to complete 30+ hours of anger management classes, however only completed some and indicated that's the reason for his warrant. He indicated that he just missed one last winter, and another time his mother could not drive him. I reported that unfortunately we cannot do anything about  or excuse him from any sessions, as this happened 1 year ago, prior to care with us. I advised him to contact [facility]/courts to inquire what he needs to do. I indicated that we can write a letter that indicates his hospitalization, however this writer does not believe this will be helpful as this happened prior to care with us.   Sania inquired about parking reimbursement. I provided a # for the county. [phone]. Sania and Jag will call.   Jag is scheduled to start treatment on Monday. He is excited to start.     Assessment: See above  Education provided by DONG: See above  Plan:I will continue to be available for support     ADA Palumbo, [facility]  Liver Transplant   [facility]  Phone: [phone]

## 2024-11-15 NOTE — PROGRESS NOTES
Transplant Surgery -OUTPATIENT PROGRESS NOTE    Date of Visit: 11/15/2024    Transplants:  2024 (Liver); Postoperative day:  48  ASSESMENT AND PLAN:  Incisional wound:   Stable. X3 tunneling areas. No s/s infection. Continue packing with 4x4 gauze BID.         Date: 2024    Transplant:  [x]                             Liver [x]                              Kidney []                             Pancreas []                              Other:             Chief Complaint:No chief complaint on file.    History of Present Illness:  Here today for incisional wound care. Mother is changing BID.   No fever or N/V/D.            Patient Active Problem List   Diagnosis    Alcohol use disorder, severe, in early remission (H)    Alcoholic hepatitis (H)    Epiphora due to insufficient drainage of left side    Hyperbilirubinemia    Hypokalemia    Jaundice    Pneumonia    Liver replaced by transplant (H)    DENI (acute kidney injury) (H)    Immunosuppressed status (H)    Acute post-operative pain    Steroid-induced hyperglycemia    Acute urinary retention    Anemia due to blood loss, acute    Severe malnutrition (H)    Hyponatremia    Hypomagnesemia    Bilateral lower extremity edema    Hyperkalemia    Subconjunctival hemorrhage    Nonhealing surgical wound    Skin ulcer of abdomen with fat layer exposed (H)     SOCIAL /FAMILY HISTORY: [x]                  No recent change    Past Medical History:   Diagnosis Date    Alcohol use disorder     Alcoholic hepatitis (H) 2024    Anxiety     Depression     Hypertension     Liver replaced by transplant (H) 2024     Past Surgical History:   Procedure Laterality Date    BENCH LIVER  2024    Procedure: Bench liver;  Surgeon: Fernando Colon MD;  Location: [facility] OR    DACRYOCYSTORHINOSTOMY Left 2013    INCISION AND DRAINAGE BUTTOCKS Left 2017    TRANSPLANT LIVER RECIPIENT  DONOR N/A 2024    Procedure: Transplant liver recipient   donor;  Surgeon: Fernando Colon MD;  Location:  OR     Social History     Socioeconomic History    Marital status: Single     Spouse name: Not on file    Number of children: Not on file    Years of education: Not on file    Highest education level: Not on file   Occupational History    Not on file   Tobacco Use    Smoking status: Never    Smokeless tobacco: Never   Substance and Sexual Activity    Alcohol use: Not Currently     Comment: last drink june 7th, 2024    Drug use: Never    Sexual activity: Not on file   Other Topics Concern    Not on file   Social History Narrative    Not on file     Social Drivers of Health     Financial Resource Strain: Low Risk  (9/21/2024)    Financial Resource Strain     Within the past 12 months, have you or your family members you live with been unable to get utilities (heat, electricity) when it was really needed?: No   Food Insecurity: Low Risk  (9/21/2024)    Food Insecurity     Within the past 12 months, did you worry that your food would run out before you got money to buy more?: No     Within the past 12 months, did the food you bought just not last and you didn t have money to get more?: No   Transportation Needs: Low Risk  (9/21/2024)    Transportation Needs     Within the past 12 months, has lack of transportation kept you from medical appointments, getting your medicines, non-medical meetings or appointments, work, or from getting things that you need?: No   Physical Activity: Not on file   Stress: Not on file   Social Connections: Not on file   Interpersonal Safety: Low Risk  (11/12/2024)    Interpersonal Safety     Do you feel physically and emotionally safe where you currently live?: Yes     Within the past 12 months, have you been hit, slapped, kicked or otherwise physically hurt by someone?: No     Within the past 12 months, have you been humiliated or emotionally abused in other ways by your partner or ex-partner?: No   Recent Concern: Interpersonal Safety - High  Risk (9/22/2024)    Interpersonal Safety     Do you feel physically and emotionally safe where you currently live?: No     Within the past 12 months, have you been hit, slapped, kicked or otherwise physically hurt by someone?: No     Within the past 12 months, have you been humiliated or emotionally abused in other ways by your partner or ex-partner?: No   Housing Stability: Low Risk  (9/21/2024)    Housing Stability     Do you have housing? : Yes     Are you worried about losing your housing?: No     Prescription Medications as of 11/15/2024         Rx Number Disp Refills Start End Last Dispensed Date Next Fill Date Owning Pharmacy    acetaminophen (TYLENOL) 325 MG tablet  60 tablet 0 11/4/2024 --   [address] 1-263    Sig: Take 2 tablets (650 mg) by mouth every 8 hours as needed for mild pain or fever.    Class: E-Prescribe    Route: Oral    aspirin (ASA) 81 MG chewable tablet  30 tablet 1 11/1/2024 --   [address] 1-581    Sig: Take 1 tablet (81 mg) by mouth or Feeding Tube daily.    Class: E-Prescribe    Route: Oral or Feeding Tube    escitalopram (LEXAPRO) 20 MG tablet  30 tablet 0 11/11/2024 --   [address] 1-948    Sig: Take 1 tablet (20 mg) by mouth daily.    Class: E-Prescribe    Route: Oral    hydrOXYzine HCl (ATARAX) 25 MG tablet  20 tablet 0 11/4/2024 --   [address] 1-400    Sig: Take 1 tablet (25 mg) by mouth every 8 hours as needed for anxiety or other (adjuvant pain).    Class: E-Prescribe    Route: Oral    magnesium oxide (MAG-OX) 400 MG tablet  120 tablet 1 11/1/2024 --   [address] 1-198    Sig: Take 2 tablets (800 mg) by mouth 2 times daily.    Class: E-Prescribe    Route:  Oral    methocarbamol (ROBAXIN) 750 MG tablet  30 tablet 0 11/4/2024 --   [address] 1-069    Sig: Take 1 tablet (750 mg) by mouth 3 times daily as needed for muscle spasms.    Class: E-Prescribe    Route: Oral    multivitamin w/minerals (CERTAVITE/ANTIOXIDANTS) tablet  30 tablet 1 11/1/2024 --   [address] 1-886    Sig: Take 1 tablet by mouth daily.    Class: E-Prescribe    Route: Oral    mycophenolate (GENERIC EQUIVALENT) 250 MG capsule  180 capsule 1 11/1/2024 --   [address] 1-300    Sig: Take 3 capsules (750 mg) by mouth 2 times daily.    Class: E-Prescribe    Notes to Pharmacy: TXP DT 9/28/2024 (Liver) TXP Dischg DT 10/9/2024 DX Liver replaced by transplant Z94.4 [facility] ([place], [place])    Route: Oral    omeprazole (PRILOSEC) 20 MG DR capsule  30 capsule 2 11/1/2024 --   [address] 1-258    Sig: Take 1 capsule (20 mg) by mouth daily.    Class: E-Prescribe    Route: Oral    polyethylene glycol (MIRALAX) 17 GM/Dose powder  510 g 0 10/9/2024 --   [place], [place] - [address]    Sig: Take 17 g by mouth 2 times daily as needed for constipation.    Class: E-Prescribe    Route: Oral    predniSONE (DELTASONE) 5 MG tablet  30 tablet 1 11/1/2024 --   [address] 1-138    Sig: Take 1 tablet (5 mg) by mouth daily.    Class: E-Prescribe    Notes to Pharmacy: TXP DT 9/28/2024 (Liver) TXP Dischg DT 10/9/2024 DX Liver replaced by transplant Z94.4 [facility] ([place], [place])    Route: Oral    sennosides (SENOKOT) 8.6 MG tablet  60 tablet 0 10/9/2024 --   [facility] [place], [place] - [address] [facility]    Sig: Take 2 tablets by mouth 2 times daily as needed for constipation.    Class: E-Prescribe    Route: Oral    tacrolimus (GENERIC EQUIVALENT) 0.5 MG capsule  60 capsule 1 11/11/2024 --   [address] 1-839    Sig: HOLD    Class: E-Prescribe    Notes to Pharmacy: TXP DT 9/28/2024 (Liver) TXP Dischg DT 10/9/2024 DX Liver replaced by transplant Z94.4 [facility] ([place], [place])    tacrolimus (GENERIC EQUIVALENT) 1 MG capsule  240 capsule 1 11/11/2024 --   [address] 1-940    Sig: Take 4 capsules (4 mg) by mouth every 12 hours.    Class: E-Prescribe    Notes to Pharmacy: TXP DT 9/28/2024 (Liver) TXP Dischg DT 10/9/2024 DX Liver replaced by transplant Z94.4 [facility] ([place], [place])    Route: Oral    tacrolimus (GENERIC EQUIVALENT) 5 MG capsule  -- -- 11/5/2024 --       Sig: Take 1 capsule (5 mg) by mouth 2 times daily as needed (for dose changes).    Class: Historical    Notes to Pharmacy: TXP DT 9/28/2024 (Liver) TXP Dischg DT 10/9/2024 DX Liver replaced by transplant Z94.4 [facility] ([place], [place])    Route: Oral    traMADol (ULTRAM) 50 MG tablet  8 tablet 0 11/11/2024 11/19/2024   [address] 1-772    Sig: Take 1 tablet (50 mg) by mouth daily as needed for severe pain.    Class: E-Prescribe    Route: Oral    traZODone (DESYREL) 50 MG tablet  30 tablet 0 11/7/2024 --   [address] 1-003    Sig: Take 1 tablet (50 mg) by mouth at bedtime.    Class: E-Prescribe    Route: Oral    valGANciclovir (VALCYTE) 450 MG tablet  30 tablet 1 11/1/2024 --   [facility] [place], [place] - [address] [place] [address] [phone]    Sig: Take 1 tablet (450 mg) by mouth daily.    Class: E-Prescribe    Route: Oral          Azithromycin   REVIEW OF SYSTEMS (check box if normal)  [x]               GENERAL  [x]                 PULMONARY [x]                GENITOURINARY  [x]                CNS                 [x]                 CARDIAC  [x]                 ENDOCRINE  [x]                EARS,NOSE,THROAT [x]                 GASTROINTESTINAL [x]                 NEUROLOGIC    [x]                MUSCLOSKELTAL  [x]                  HEMATOLOGY      PHYSICAL EXAM (check box if normal)/78   Pulse 89   Temp 98.2  F (36.8  C) (Oral)   Wt 105.5 kg (232 lb 9.6 oz)   SpO2 98%   BMI 31.55 kg/m          [x]            GENERAL: NAD    [x]            Incision:  Left side of incision open but healing well, tunneling present midline x2 and x1 R distal incision. Tunneling packed with i4x4 gauze with vanshe solution and rest of incision covered with 4x4 gauze with no issues. No surrounding erythema. Moderate drainage present- mostly midline.    Abd soft throughout. Non-tender.                                                                                 PAIN SCALE:: 8

## 2024-11-15 NOTE — LETTER
11/15/2024      Jag Smith  [address]  [facility] [address]      Dear Colleague,    Thank you for referring your patient, Jag Smith, to the [facility]. Please see a copy of my visit note below.    Transplant Surgery -OUTPATIENT PROGRESS NOTE    Date of Visit: 11/15/2024    Transplants:  9/28/2024 (Liver); Postoperative day:  48  ASSESMENT AND PLAN:  Incisional wound:   Stable. X3 tunneling areas. No s/s infection. Continue packing with 4x4 gauze BID.         Date: November 11th, 2024    Transplant:  [x]                             Liver [x]                              Kidney []                             Pancreas []                              Other:             Chief Complaint:No chief complaint on file.    History of Present Illness:  Here today for incisional wound care. Mother is changing BID.   No fever or N/V/D.            Patient Active Problem List   Diagnosis     Alcohol use disorder, severe, in early remission (H)     Alcoholic hepatitis (H)     Epiphora due to insufficient drainage of left side     Hyperbilirubinemia     Hypokalemia     Jaundice     Pneumonia     Liver replaced by transplant (H)     DENI (acute kidney injury) (H)     Immunosuppressed status (H)     Acute post-operative pain     Steroid-induced hyperglycemia     Acute urinary retention     Anemia due to blood loss, acute     Severe malnutrition (H)     Hyponatremia     Hypomagnesemia     Bilateral lower extremity edema     Hyperkalemia     Subconjunctival hemorrhage     Nonhealing surgical wound     Skin ulcer of abdomen with fat layer exposed (H)     SOCIAL /FAMILY HISTORY: [x]                  No recent change    Past Medical History:   Diagnosis Date     Alcohol use disorder      Alcoholic hepatitis (H) 06/07/2024     Anxiety      Depression      Hypertension      Liver replaced by transplant (H) 09/28/2024     Past Surgical History:   Procedure Laterality Date     BENCH LIVER  9/28/2024     Procedure: Bench liver;  Surgeon: Fernando Colon MD;  Location: [facility] OR     DACRYOCYSTORHINOSTOMY Left 2013     INCISION AND DRAINAGE BUTTOCKS Left 2017     TRANSPLANT LIVER RECIPIENT  DONOR N/A 2024    Procedure: Transplant liver recipient  donor;  Surgeon: Fernando Colon MD;  Location: [facility] OR     Social History     Socioeconomic History     Marital status: Single     Spouse name: Not on file     Number of children: Not on file     Years of education: Not on file     Highest education level: Not on file   Occupational History     Not on file   Tobacco Use     Smoking status: Never     Smokeless tobacco: Never   Substance and Sexual Activity     Alcohol use: Not Currently     Comment: last drink 2024     Drug use: Never     Sexual activity: Not on file   Other Topics Concern     Not on file   Social History Narrative     Not on file     Social Drivers of Health     Financial Resource Strain: Low Risk  (2024)    Financial Resource Strain      Within the past 12 months, have you or your family members you live with been unable to get utilities (heat, electricity) when it was really needed?: No   Food Insecurity: Low Risk  (2024)    Food Insecurity      Within the past 12 months, did you worry that your food would run out before you got money to buy more?: No      Within the past 12 months, did the food you bought just not last and you didn t have money to get more?: No   Transportation Needs: Low Risk  (2024)    Transportation Needs      Within the past 12 months, has lack of transportation kept you from medical appointments, getting your medicines, non-medical meetings or appointments, work, or from getting things that you need?: No   Physical Activity: Not on file   Stress: Not on file   Social Connections: Not on file   Interpersonal Safety: Low Risk  (2024)    Interpersonal Safety      Do you feel physically and emotionally safe where you currently  live?: Yes      Within the past 12 months, have you been hit, slapped, kicked or otherwise physically hurt by someone?: No      Within the past 12 months, have you been humiliated or emotionally abused in other ways by your partner or ex-partner?: No   Recent Concern: Interpersonal Safety - High Risk (9/22/2024)    Interpersonal Safety      Do you feel physically and emotionally safe where you currently live?: No      Within the past 12 months, have you been hit, slapped, kicked or otherwise physically hurt by someone?: No      Within the past 12 months, have you been humiliated or emotionally abused in other ways by your partner or ex-partner?: No   Housing Stability: Low Risk  (9/21/2024)    Housing Stability      Do you have housing? : Yes      Are you worried about losing your housing?: No     Prescription Medications as of 11/15/2024         Rx Number Disp Refills Start End Last Dispensed Date Next Fill Date Owning Pharmacy    acetaminophen (TYLENOL) 325 MG tablet  60 tablet 0 11/4/2024 --   [address] 1-319    Sig: Take 2 tablets (650 mg) by mouth every 8 hours as needed for mild pain or fever.    Class: E-Prescribe    Route: Oral    aspirin (ASA) 81 MG chewable tablet  30 tablet 1 11/1/2024 --   [address] 1-201    Sig: Take 1 tablet (81 mg) by mouth or Feeding Tube daily.    Class: E-Prescribe    Route: Oral or Feeding Tube    escitalopram (LEXAPRO) 20 MG tablet  30 tablet 0 11/11/2024 --   [address] 1-238    Sig: Take 1 tablet (20 mg) by mouth daily.    Class: E-Prescribe    Route: Oral    hydrOXYzine HCl (ATARAX) 25 MG tablet  20 tablet 0 11/4/2024 --   [address] 1-277    Sig: Take 1 tablet (25 mg) by mouth every 8 hours as needed for anxiety or  other (adjuvant pain).    Class: E-Prescribe    Route: Oral    magnesium oxide (MAG-OX) 400 MG tablet  120 tablet 1 11/1/2024 --   [address] 1-273    Sig: Take 2 tablets (800 mg) by mouth 2 times daily.    Class: E-Prescribe    Route: Oral    methocarbamol (ROBAXIN) 750 MG tablet  30 tablet 0 11/4/2024 --   [address] 1[address]273    Sig: Take 1 tablet (750 mg) by mouth 3 times daily as needed for muscle spasms.    Class: E-Prescribe    Route: Oral    multivitamin w/minerals (CERTAVITE/ANTIOXIDANTS) tablet  30 tablet 1 11/1/2024 --   [address] 1[address]273    Sig: Take 1 tablet by mouth daily.    Class: E-Prescribe    Route: Oral    mycophenolate (GENERIC EQUIVALENT) 250 MG capsule  180 capsule 1 11/1/2024 --   [address] 1[address]273    Sig: Take 3 capsules (750 mg) by mouth 2 times daily.    Class: E-Prescribe    Notes to Pharmacy: TXP DT 9/28/2024 (Liver) TXP Dischg DT 10/9/2024 DX Liver replaced by transplant Z94.4 TX [facility] ([place], [place])    Route: Oral    omeprazole (PRILOSEC) 20 MG DR capsule  30 capsule 2 11/1/2024 --   [address] 1[address]273    Sig: Take 1 capsule (20 mg) by mouth daily.    Class: E-Prescribe    Route: Oral    polyethylene glycol (MIRALAX) 17 GM/Dose powder  510 g 0 10/9/2024 --   [place], [place] - [address] [place] [address] SE    Sig: Take 17 g by mouth 2 times daily as needed for constipation.    Class: E-Prescribe    Route: Oral    predniSONE (DELTASONE) 5 MG tablet  30 tablet 1 11/1/2024 --   [address] 1[address]273    Sig: Take 1 tablet (5 mg) by mouth  daily.    Class: E-Prescribe    Notes to Pharmacy: TXP DT 9/28/2024 (Liver) TXP Dischg DT 10/9/2024 DX Liver replaced by transplant Z94.4 TX [facility] ([place], [place])    Route: Oral    sennosides (SENOKOT) 8.6 MG tablet  60 tablet 0 10/9/2024 --   [place], [place] - [address] [facility] [address]    Sig: Take 2 tablets by mouth 2 times daily as needed for constipation.    Class: E-Prescribe    Route: Oral    tacrolimus (GENERIC EQUIVALENT) 0.5 MG capsule  60 capsule 1 11/11/2024 --   [place], [place] - [address] [place] [address] 1-781    Sig: HOLD    Class: E-Prescribe    Notes to Pharmacy: TXP DT 9/28/2024 (Liver) TXP Dischg DT 10/9/2024 DX Liver replaced by transplant Z94.4 [facility] ([place], [place])    tacrolimus (GENERIC EQUIVALENT) 1 MG capsule  240 capsule 1 11/11/2024 --   [place], [place] - [address] [place] [address] 1-183    Sig: Take 4 capsules (4 mg) by mouth every 12 hours.    Class: E-Prescribe    Notes to Pharmacy: TXP DT 9/28/2024 (Liver) TXP Dischg DT 10/9/2024 DX Liver replaced by transplant Z94.4 [facility] ([place], [place])    Route: Oral    tacrolimus (GENERIC EQUIVALENT) 5 MG capsule  -- -- 11/5/2024 --       Sig: Take 1 capsule (5 mg) by mouth 2 times daily as needed (for dose changes).    Class: Historical    Notes to Pharmacy: TXP DT 9/28/2024 (Liver) TXP Dischg DT 10/9/2024 DX Liver replaced by transplant Z94.4 [facility] ([place], [place])    Route: Oral    traMADol (ULTRAM) 50 MG tablet  8 tablet 0 11/11/2024 11/19/2024   [place], [place] - [address] [place] [address] [phone]    Sig: Take 1 tablet (50 mg) by mouth daily as needed for severe pain.    Class: E-Prescribe    Route: Oral     traZODone (DESYREL) 50 MG tablet  30 tablet 0 11/7/2024 --   [place], [place] - [address] [place] [address] [phone]    Sig: Take 1 tablet (50 mg) by mouth at bedtime.    Class: E-Prescribe    Route: Oral    valGANciclovir (VALCYTE) 450 MG tablet  30 tablet 1 11/1/2024 --   [address] [phone]    Sig: Take 1 tablet (450 mg) by mouth daily.    Class: E-Prescribe    Route: Oral          Azithromycin   REVIEW OF SYSTEMS (check box if normal)  [x]               GENERAL  [x]                 PULMONARY [x]                GENITOURINARY  [x]                CNS                 [x]                 CARDIAC  [x]                 ENDOCRINE  [x]                EARS,NOSE,THROAT [x]                 GASTROINTESTINAL [x]                 NEUROLOGIC    [x]                MUSCLOSKELTAL  [x]                  HEMATOLOGY      PHYSICAL EXAM (check box if normal)/78   Pulse 89   Temp 98.2  F (36.8  C) (Oral)   Wt 105.5 kg (232 lb 9.6 oz)   SpO2 98%   BMI 31.55 kg/m          [x]            GENERAL: NAD    [x]            Incision:  Left side of incision open but healing well, tunneling present midline x2 and x1 R distal incision. Tunneling packed with i4x4 gauze with vanshe solution and rest of incision covered with 4x4 gauze with no issues. No surrounding erythema. Moderate drainage present- mostly midline.    Abd soft throughout. Non-tender.                                                                                 PAIN SCALE:: 8       Again, thank you for allowing me to participate in the care of your patient.        Sincerely,        SIL Blum CNP

## 2024-11-16 NOTE — PROGRESS NOTES
Transplant Surgery Progress Note    Transplants:  9/28/2024 (Liver);   S: overall continues to improve, wound still an issue but improving    Transplant History:    Transplant Type:  Liver Tx  Donor Type:     Transplant Date:  9/28/2024 (Liver)   Biliary Stent:  No    Acute Rejection Hx:  No    Present Maintenance Immunosuppression:  Tacrolimus, Mycophenolate mofetil, and Prednisone    CMV IgG Ab Discordance:  No  EBV IgG Ab Discordance:  No      Transplant Coordinator: Karena Bakari     Transplant Office Phone Number: [phone]     Immunosuppressant Medications       Immunosuppressive Agents Disp Start End     mycophenolate (GENERIC EQUIVALENT) 250 MG capsule 180 capsule 11/1/2024 --    Sig - Route: Take 3 capsules (750 mg) by mouth 2 times daily. - Oral    Class: E-Prescribe    Notes to Pharmacy: TXP DT 9/28/2024 (Liver) TXP Dischg DT 10/9/2024 DX Liver replaced by transplant Z94.4 TX [facility] ([place], [place])     tacrolimus (GENERIC EQUIVALENT) 0.5 MG capsule 60 capsule 11/11/2024 --    Sig: HOLD    Class: E-Prescribe    Notes to Pharmacy: TXP DT 9/28/2024 (Liver) TXP Dischg DT 10/9/2024 DX Liver replaced by transplant Z94.4 [facility] ([place], [place])     tacrolimus (GENERIC EQUIVALENT) 1 MG capsule 240 capsule 11/11/2024 --    Sig - Route: Take 4 capsules (4 mg) by mouth every 12 hours. - Oral    Class: E-Prescribe    Notes to Pharmacy: TXP DT 9/28/2024 (Liver) TXP Dischg DT 10/9/2024 DX Liver replaced by transplant Z94.4 TX [facility] ([place], [place])     tacrolimus (GENERIC EQUIVALENT) 5 MG capsule -- 11/5/2024 --    Sig - Route: Take 1 capsule (5 mg) by mouth 2 times daily as needed (for dose changes). - Oral    Class: Historical    Notes to Pharmacy: TXP DT 9/28/2024 (Liver) TXP Dischg DT 10/9/2024 DX Liver replaced by transplant Z94.4 [facility]  [facility] ([place], [place])            Possible Immunosuppression-related side effects:   []             headache  []             vivid dreams  []             irritability  []             cognitive difficuties  []             fine tremor  []             nausea  []             diarrhea  []             neuropathy      []             edema  []             renal calcineurin toxicity  []             hyperkalemia  []             post-transplant diabetes  []             decreased appetite  []             increased appetite  []             other:  []             none    Prescription Medications as of 11/16/2024         Rx Number Disp Refills Start End Last Dispensed Date Next Fill Date Owning Pharmacy    acetaminophen (TYLENOL) 325 MG tablet  60 tablet 0 11/4/2024 --   [address] 1-554    Sig: Take 2 tablets (650 mg) by mouth every 8 hours as needed for mild pain or fever.    Class: E-Prescribe    Route: Oral    aspirin (ASA) 81 MG chewable tablet  30 tablet 1 11/1/2024 --   [address] 1-226    Sig: Take 1 tablet (81 mg) by mouth or Feeding Tube daily.    Class: E-Prescribe    Route: Oral or Feeding Tube    escitalopram (LEXAPRO) 20 MG tablet  30 tablet 0 11/11/2024 --   [address] 1-497    Sig: Take 1 tablet (20 mg) by mouth daily.    Class: E-Prescribe    Route: Oral    hydrOXYzine HCl (ATARAX) 25 MG tablet  20 tablet 0 11/4/2024 --   [address] 1-732    Sig: Take 1 tablet (25 mg) by mouth every 8 hours as needed for anxiety or other (adjuvant pain).    Class: E-Prescribe    Route: Oral    magnesium oxide (MAG-OX) 400 MG tablet  120 tablet 1 11/1/2024 --   [address] 1-107     Sig: Take 2 tablets (800 mg) by mouth 2 times daily.    Class: E-Prescribe    Route: Oral    methocarbamol (ROBAXIN) 750 MG tablet  30 tablet 0 11/4/2024 --   [address] 1-968    Sig: Take 1 tablet (750 mg) by mouth 3 times daily as needed for muscle spasms.    Class: E-Prescribe    Route: Oral    multivitamin w/minerals (CERTAVITE/ANTIOXIDANTS) tablet  30 tablet 1 11/1/2024 --   [address] 1-043    Sig: Take 1 tablet by mouth daily.    Class: E-Prescribe    Route: Oral    mycophenolate (GENERIC EQUIVALENT) 250 MG capsule  180 capsule 1 11/1/2024 --   [address] 1-920    Sig: Take 3 capsules (750 mg) by mouth 2 times daily.    Class: E-Prescribe    Notes to Pharmacy: TXP DT 9/28/2024 (Liver) TXP Dischg DT 10/9/2024 DX Liver replaced by transplant Z94.4 [facility] ([place], [place])    Route: Oral    omeprazole (PRILOSEC) 20 MG DR capsule  30 capsule 2 11/1/2024 --   [address] 1-792    Sig: Take 1 capsule (20 mg) by mouth daily.    Class: E-Prescribe    Route: Oral    polyethylene glycol (MIRALAX) 17 GM/Dose powder  510 g 0 10/9/2024 --   [place], [place] - [address] [facility]    Sig: Take 17 g by mouth 2 times daily as needed for constipation.    Class: E-Prescribe    Route: Oral    predniSONE (DELTASONE) 5 MG tablet  30 tablet 1 11/1/2024 --   [address] 1-645    Sig: Take 1 tablet (5 mg) by mouth daily.    Class: E-Prescribe    Notes to Pharmacy: TXP DT 9/28/2024 (Liver) TXP Dischg DT 10/9/2024 DX Liver replaced by transplant Z94.4 [facility] ([place], [place])     Route: Oral    sennosides (SENOKOT) 8.6 MG tablet  60 tablet 0 10/9/2024 --   [facility] - [place], [place] - [address] [facility] [address]    Sig: Take 2 tablets by mouth 2 times daily as needed for constipation.    Class: E-Prescribe    Route: Oral    tacrolimus (GENERIC EQUIVALENT) 0.5 MG capsule  60 capsule 1 11/11/2024 --   [place], [place] - [address] [place] [address] 1-002    Sig: HOLD    Class: E-Prescribe    Notes to Pharmacy: TXP DT 9/28/2024 (Liver) TXP Dischg DT 10/9/2024 DX Liver replaced by transplant Z94.4 [facility] ([place], [place])    tacrolimus (GENERIC EQUIVALENT) 1 MG capsule  240 capsule 1 11/11/2024 --   [address] 1-875    Sig: Take 4 capsules (4 mg) by mouth every 12 hours.    Class: E-Prescribe    Notes to Pharmacy: TXP DT 9/28/2024 (Liver) TXP Dischg DT 10/9/2024 DX Liver replaced by transplant Z94.4 TX [facility] ([place], [place])    Route: Oral    tacrolimus (GENERIC EQUIVALENT) 5 MG capsule  -- -- 11/5/2024 --       Sig: Take 1 capsule (5 mg) by mouth 2 times daily as needed (for dose changes).    Class: Historical    Notes to Pharmacy: TXP DT 9/28/2024 (Liver) TXP Dischg DT 10/9/2024 DX Liver replaced by transplant Z94.4 [facility] ([place], [place])    Route: Oral    traMADol (ULTRAM) 50 MG tablet  8 tablet 0 11/11/2024 11/19/2024   [address] 1[address]484    Sig: Take 1 tablet (50 mg) by mouth daily as needed for severe pain.    Class: E-Prescribe    Route: Oral    traZODone (DESYREL) 50 MG tablet  30 tablet 0 11/7/2024 --   [address] 1[address]229    Sig: Take 1 tablet (50 mg) by mouth at bedtime.    Class: E-Prescribe     "Route: Oral    valGANciclovir (VALCYTE) 450 MG tablet  30 tablet 1 11/1/2024 --   [place], [place] - [address] [place] [address] [phone]    Sig: Take 1 tablet (450 mg) by mouth daily.    Class: E-Prescribe    Route: Oral            O:   [unfilled]        Latest Ref Rng & Units 11/14/2024     8:03 AM 11/11/2024     7:34 AM 11/7/2024     7:28 AM 11/4/2024     8:14 AM 10/31/2024     7:12 AM   Transplant Immunosuppression Labs   Creat 0.67 - 1.17 mg/dL 1.24  1.37  1.16  1.22  1.18    Urea Nitrogen 6.0 - 20.0 mg/dL 25.3  21.7  21.2  21.7  22.9    WBC 4.0 - 11.0 10e3/uL 8.7  7.7  8.5  10.0  7.0        Chemistries:   Recent Labs   Lab Test 11/14/24  0803   BUN 25.3*   CR 1.24*   GFRESTIMATED 77   *     Lab Results   Component Value Date    A1C 5.4 09/28/2024     Recent Labs   Lab Test 11/14/24  0803   ALBUMIN 4.2   BILITOTAL 0.8   ALKPHOS 138   AST 12   ALT 12     Urine Studies:  Recent Labs   Lab Test 09/27/24 2113   COLOR Dark Yellow*   APPEARANCE Slightly Cloudy*   URINEGLC Negative   URINEBILI Large*   URINEKETONE Negative   SG 1.011   UBLD Negative   URINEPH 6.0   PROTEIN Negative   NITRITE Negative   LEUKEST Negative   RBCU 1   WBCU 5     No lab results found.  Hematology:   Recent Labs   Lab Test 11/14/24  0803 11/11/24  0734 11/07/24  0728   HGB 10.3* 9.8* 10.2*    226 220   WBC 8.7 7.7 8.5     Coags:   Recent Labs   Lab Test 10/01/24  0437 09/30/24  0529   INR 1.03 1.14     HLA antibodies:   No results found for: \"EC5UMWSPB\", \"SO3RIUKIGK\", \"SO7EORXED\", \"WH4AFABHYY\"    Assessment: Jag Smith is doing fairly well s/p Liver Tx:  Issues we addressed during his visit include:    Plan:    1. Graft function: LFTs normalized  2. Immunosuppression Management: No change tac 8-10   .  Complexity of management:Low.  Contributing factors:  wound infection  3. Wound care- should do WTD BID, will see Marycruz tomorrow, is improving  Followup: 1 week          Fernando Colon, " MD/PhD  Professor of Surgery

## 2024-11-18 ENCOUNTER — TELEPHONE (OUTPATIENT)
Dept: TRANSPLANT | Facility: CLINIC | Age: 39
End: 2024-11-18
Payer: MEDICAID

## 2024-11-18 ENCOUNTER — OFFICE VISIT (OUTPATIENT)
Dept: TRANSPLANT | Facility: CLINIC | Age: 39
End: 2024-11-18
Attending: NURSE PRACTITIONER
Payer: MEDICAID

## 2024-11-18 VITALS
BODY MASS INDEX: 31.57 KG/M2 | SYSTOLIC BLOOD PRESSURE: 130 MMHG | DIASTOLIC BLOOD PRESSURE: 81 MMHG | TEMPERATURE: 98.4 F | HEART RATE: 80 BPM | OXYGEN SATURATION: 99 % | WEIGHT: 232.8 LBS

## 2024-11-18 DIAGNOSIS — G89.18 ACUTE POST-OPERATIVE PAIN: ICD-10-CM

## 2024-11-18 PROCEDURE — G0463 HOSPITAL OUTPT CLINIC VISIT: HCPCS | Performed by: NURSE PRACTITIONER

## 2024-11-18 RX ORDER — METHOCARBAMOL 750 MG/1
750 TABLET, FILM COATED ORAL 3 TIMES DAILY PRN
Qty: 30 TABLET | Refills: 0 | Status: SHIPPED | OUTPATIENT
Start: 2024-11-18

## 2024-11-18 RX ORDER — ACETAMINOPHEN 325 MG/1
650 TABLET ORAL EVERY 8 HOURS PRN
Qty: 60 TABLET | Refills: 0 | Status: SHIPPED | OUTPATIENT
Start: 2024-11-18

## 2024-11-18 RX ORDER — HYDROXYZINE HYDROCHLORIDE 25 MG/1
25 TABLET, FILM COATED ORAL EVERY 8 HOURS PRN
Qty: 20 TABLET | Refills: 0 | Status: SHIPPED | OUTPATIENT
Start: 2024-11-18

## 2024-11-18 ASSESSMENT — PAIN SCALES - GENERAL: PAINLEVEL_OUTOF10: SEVERE PAIN (6)

## 2024-11-18 NOTE — TELEPHONE ENCOUNTER
Starting on 12/2, can decrease labs to once a week on Mondays or Tuesdays. [employer] message sent to patient.

## 2024-11-18 NOTE — TELEPHONE ENCOUNTER
[facility] Call Center    Phone Message    May a detailed message be left on voicemail: yes     Reason for Call: Other: Pt calling in to scheduled weekly x2 lab draws. I have scheduled those through November. Pt asking if [facility] can send a MyC msg letting him know starting Dec 1 does he still need twice a week or only once a week labs? Please let pt know via MyC. Thanks!

## 2024-11-18 NOTE — LETTER
11/18/2024      Jag Smith  [address]  [facility] [address]      Dear Colleague,    Thank you for referring your patient, Jag Smith, to the [facility]PL[facility]. Please see a copy of my visit note below.    Transplant Surgery -OUTPATIENT PROGRESS NOTE    Date of Visit: 11/18/2024    Transplants:  9/28/2024 (Liver); Postoperative day:  51  ASSESMENT AND PLAN:  Incisional wound:   Stable. X3 tunneling areas. No s/s infection. Continue packing with 4x4 gauze BID.         Date: November 18th, 2024    Transplant:  [x]                             Liver [x]                              Kidney []                             Pancreas []                              Other:             Chief Complaint:Post-op Visit (Wound care)    History of Present Illness:  Here today for incisional wound care. Mother is changing BID.   No fever or N/V/D.   Weaning off of tramadol - has 3 tabs left. Requesting more robaxin, atarax and tylenol.          Patient Active Problem List   Diagnosis     Alcohol use disorder, severe, in early remission (H)     Alcoholic hepatitis (H)     Epiphora due to insufficient drainage of left side     Hyperbilirubinemia     Hypokalemia     Jaundice     Pneumonia     Liver replaced by transplant (H)     DENI (acute kidney injury) (H)     Immunosuppressed status (H)     Acute post-operative pain     Steroid-induced hyperglycemia     Acute urinary retention     Anemia due to blood loss, acute     Severe malnutrition (H)     Hyponatremia     Hypomagnesemia     Bilateral lower extremity edema     Hyperkalemia     Subconjunctival hemorrhage     Nonhealing surgical wound     Skin ulcer of abdomen with fat layer exposed (H)     SOCIAL /FAMILY HISTORY: [x]                  No recent change    Past Medical History:   Diagnosis Date     Alcohol use disorder      Alcoholic hepatitis (H) 06/07/2024     Anxiety      Depression      Hypertension      Liver replaced by transplant (H) 09/28/2024      Past Surgical History:   Procedure Laterality Date     BENCH LIVER  2024    Procedure: Bench liver;  Surgeon: Fenrando Colon MD;  Location: [facility] OR     DACRYOCYSTORHINOSTOMY Left 2013     INCISION AND DRAINAGE BUTTOCKS Left 2017     TRANSPLANT LIVER RECIPIENT  DONOR N/A 2024    Procedure: Transplant liver recipient  donor;  Surgeon: Fernando Colon MD;  Location: [facility] OR     Social History     Socioeconomic History     Marital status: Single     Spouse name: Not on file     Number of children: Not on file     Years of education: Not on file     Highest education level: Not on file   Occupational History     Not on file   Tobacco Use     Smoking status: Never     Smokeless tobacco: Never   Substance and Sexual Activity     Alcohol use: Not Currently     Comment: last drink 2024     Drug use: Never     Sexual activity: Not on file   Other Topics Concern     Not on file   Social History Narrative     Not on file     Social Drivers of Health     Financial Resource Strain: Low Risk  (2024)    Financial Resource Strain      Within the past 12 months, have you or your family members you live with been unable to get utilities (heat, electricity) when it was really needed?: No   Food Insecurity: Low Risk  (2024)    Food Insecurity      Within the past 12 months, did you worry that your food would run out before you got money to buy more?: No      Within the past 12 months, did the food you bought just not last and you didn t have money to get more?: No   Transportation Needs: Low Risk  (2024)    Transportation Needs      Within the past 12 months, has lack of transportation kept you from medical appointments, getting your medicines, non-medical meetings or appointments, work, or from getting things that you need?: No   Physical Activity: Not on file   Stress: Not on file   Social Connections: Not on file   Interpersonal Safety: Low Risk  (2024)     Interpersonal Safety      Do you feel physically and emotionally safe where you currently live?: Yes      Within the past 12 months, have you been hit, slapped, kicked or otherwise physically hurt by someone?: No      Within the past 12 months, have you been humiliated or emotionally abused in other ways by your partner or ex-partner?: No   Recent Concern: Interpersonal Safety - High Risk (9/22/2024)    Interpersonal Safety      Do you feel physically and emotionally safe where you currently live?: No      Within the past 12 months, have you been hit, slapped, kicked or otherwise physically hurt by someone?: No      Within the past 12 months, have you been humiliated or emotionally abused in other ways by your partner or ex-partner?: No   Housing Stability: Low Risk  (9/21/2024)    Housing Stability      Do you have housing? : Yes      Are you worried about losing your housing?: No     Prescription Medications as of 11/18/2024         Rx Number Disp Refills Start End Last Dispensed Date Next Fill Date Owning Pharmacy    acetaminophen (TYLENOL) 325 MG tablet  60 tablet 0 11/18/2024 --   [place] [id number]    Sig: Take 2 tablets (650 mg) by mouth every 8 hours as needed for mild pain or fever.    Class: E-Prescribe    Route: Oral    aspirin (ASA) 81 MG chewable tablet  30 tablet 1 11/1/2024 --   [address] 3-249    Sig: Take 1 tablet (81 mg) by mouth or Feeding Tube daily.    Class: E-Prescribe    Route: Oral or Feeding Tube    escitalopram (LEXAPRO) 20 MG tablet  30 tablet 0 11/11/2024 --   [address] 6-229    Sig: Take 1 tablet (20 mg) by mouth daily.    Class: E-Prescribe    Route: Oral    hydrOXYzine HCl (ATARAX) 25 MG tablet  20 tablet 0 11/18/2024 --   [address]  [facility] 1-044    Sig: Take 1 tablet (25 mg) by mouth every 8 hours as needed for anxiety or other (adjuvant pain).    Class: E-Prescribe    Route: Oral    magnesium oxide (MAG-OX) 400 MG tablet  120 tablet 1 11/1/2024 --   [address] 1-273    Sig: Take 2 tablets (800 mg) by mouth 2 times daily.    Class: E-Prescribe    Route: Oral    methocarbamol (ROBAXIN) 750 MG tablet  30 tablet 0 11/18/2024 --   [address] 1-670    Sig: Take 1 tablet (750 mg) by mouth 3 times daily as needed for muscle spasms.    Class: E-Prescribe    Route: Oral    multivitamin w/minerals (CERTAVITE/ANTIOXIDANTS) tablet  30 tablet 1 11/1/2024 --   [address] 1-825    Sig: Take 1 tablet by mouth daily.    Class: E-Prescribe    Route: Oral    mycophenolate (GENERIC EQUIVALENT) 250 MG capsule  180 capsule 1 11/1/2024 --   [address] 1-490    Sig: Take 3 capsules (750 mg) by mouth 2 times daily.    Class: E-Prescribe    Notes to Pharmacy: TXP DT 9/28/2024 (Liver) TXP Dischg DT 10/9/2024 DX Liver replaced by transplant Z94.4 [facility] ([place], [place])    Route: Oral    omeprazole (PRILOSEC) 20 MG DR capsule  30 capsule 2 11/1/2024 --   [address] 1-806    Sig: Take 1 capsule (20 mg) by mouth daily.    Class: E-Prescribe    Route: Oral    polyethylene glycol (MIRALAX) 17 GM/Dose powder  510 g 0 10/9/2024 --   [place], [place] - [address] [place] [address] SE    Sig: Take 17 g by mouth 2 times daily as needed for constipation.    Class: E-Prescribe    Route: Oral    predniSONE (DELTASONE) 5 MG tablet  30 tablet 1 11/1/2024 --   [facility]  [address] 9[address]693    Sig: Take 1 tablet (5 mg) by mouth daily.    Class: E-Prescribe    Notes to Pharmacy: TXP DT 9/28/2024 (Liver) TXP Dischg DT 10/9/2024 DX Liver replaced by transplant Z94.4 [facility] ([place], [place])    Route: Oral    sennosides (SENOKOT) 8.6 MG tablet  60 tablet 0 10/9/2024 --   [place], [place] - [address] [facility] [address]    Sig: Take 2 tablets by mouth 2 times daily as needed for constipation.    Class: E-Prescribe    Route: Oral    tacrolimus (GENERIC EQUIVALENT) 0.5 MG capsule  60 capsule 1 11/11/2024 --   [address] 5-004    Sig: HOLD    Class: E-Prescribe    Notes to Pharmacy: TXP DT 9/28/2024 (Liver) TXP Dischg DT 10/9/2024 DX Liver replaced by transplant Z94.4 [facility] ([place], [place])    tacrolimus (GENERIC EQUIVALENT) 1 MG capsule  240 capsule 1 11/11/2024 --   [address] 2[address]320    Sig: Take 4 capsules (4 mg) by mouth every 12 hours.    Class: E-Prescribe    Notes to Pharmacy: TXP DT 9/28/2024 (Liver) TXP Dischg DT 10/9/2024 DX Liver replaced by transplant Z94.4 [facility] ([place], [place])    Route: Oral    tacrolimus (GENERIC EQUIVALENT) 5 MG capsule  -- -- 11/5/2024 --       Sig: Take 1 capsule (5 mg) by mouth 2 times daily as needed (for dose changes).    Class: Historical    Notes to Pharmacy: TXP DT 9/28/2024 (Liver) TXP Dischg DT 10/9/2024 DX Liver replaced by transplant Z94.4 [facility] ([place], [place])    Route: Oral    traMADol (ULTRAM) 50 MG tablet  8 tablet 0 11/11/2024 11/19/2024   [address] 9-903    Sig: Take 1 tablet (50  mg) by mouth daily as needed for severe pain.    Class: E-Prescribe    Route: Oral    traZODone (DESYREL) 50 MG tablet  30 tablet 0 11/7/2024 --   [address] 5-360    Sig: Take 1 tablet (50 mg) by mouth at bedtime.    Class: E-Prescribe    Route: Oral    valGANciclovir (VALCYTE) 450 MG tablet  30 tablet 1 11/1/2024 --   [address] 8-613    Sig: Take 1 tablet (450 mg) by mouth daily.    Class: E-Prescribe    Route: Oral          Azithromycin   REVIEW OF SYSTEMS (check box if normal)  [x]               GENERAL  [x]                 PULMONARY [x]                GENITOURINARY  [x]                CNS                 [x]                 CARDIAC  [x]                 ENDOCRINE  [x]                EARS,NOSE,THROAT [x]                 GASTROINTESTINAL [x]                 NEUROLOGIC    [x]                MUSCLOSKELTAL  [x]                  HEMATOLOGY      PHYSICAL EXAM (check box if normal)/81   Pulse 80   Temp 98.4  F (36.9  C) (Oral)   Wt 105.6 kg (232 lb 12.8 oz)   SpO2 99%   BMI 31.57 kg/m          [x]            GENERAL: NAD    [x]            Incision:  Left side of incision open but healing well, tunneling present midline x2 and x1 R distal incision. Tunneling packed with i4x4 gauze with vanshe solution and rest of incision covered with 4x4 gauze with no issues. No surrounding erythema. Moderate drainage present- mostly midline.    Abd soft throughout. Non-tender.                                                                                 PAIN SCALE:: 8       Again, thank you for allowing me to participate in the care of your patient.        Sincerely,        SIL Blum CNP

## 2024-11-19 ENCOUNTER — TELEPHONE (OUTPATIENT)
Dept: TRANSPLANT | Facility: CLINIC | Age: 39
End: 2024-11-19

## 2024-11-19 ENCOUNTER — LAB (OUTPATIENT)
Dept: LAB | Facility: CLINIC | Age: 39
End: 2024-11-19
Attending: TRANSPLANT SURGERY
Payer: MEDICAID

## 2024-11-19 DIAGNOSIS — Z94.4 LIVER REPLACED BY TRANSPLANT (H): ICD-10-CM

## 2024-11-19 LAB
ALBUMIN SERPL BCG-MCNC: 4.3 G/DL (ref 3.5–5.2)
ALP SERPL-CCNC: 132 U/L (ref 40–150)
ALT SERPL W P-5'-P-CCNC: 14 U/L (ref 0–70)
ANION GAP SERPL CALCULATED.3IONS-SCNC: 12 MMOL/L (ref 7–15)
AST SERPL W P-5'-P-CCNC: 14 U/L (ref 0–45)
BILIRUB DIRECT SERPL-MCNC: 0.47 MG/DL (ref 0–0.3)
BILIRUB SERPL-MCNC: 0.8 MG/DL
BUN SERPL-MCNC: 31.4 MG/DL (ref 6–20)
CALCIUM SERPL-MCNC: 10 MG/DL (ref 8.8–10.4)
CHLORIDE SERPL-SCNC: 95 MMOL/L (ref 98–107)
CREAT SERPL-MCNC: 1.32 MG/DL (ref 0.67–1.17)
EGFRCR SERPLBLD CKD-EPI 2021: 71 ML/MIN/1.73M2
ERYTHROCYTE [DISTWIDTH] IN BLOOD BY AUTOMATED COUNT: 14.2 % (ref 10–15)
GLUCOSE SERPL-MCNC: 117 MG/DL (ref 70–99)
HCO3 SERPL-SCNC: 22 MMOL/L (ref 22–29)
HCT VFR BLD AUTO: 30.4 % (ref 40–53)
HGB BLD-MCNC: 10.2 G/DL (ref 13.3–17.7)
MAGNESIUM SERPL-MCNC: 1.6 MG/DL (ref 1.7–2.3)
MCH RBC QN AUTO: 30.4 PG (ref 26.5–33)
MCHC RBC AUTO-ENTMCNC: 33.6 G/DL (ref 31.5–36.5)
MCV RBC AUTO: 91 FL (ref 78–100)
PHOSPHATE SERPL-MCNC: 6.3 MG/DL (ref 2.5–4.5)
PLATELET # BLD AUTO: 279 10E3/UL (ref 150–450)
POTASSIUM SERPL-SCNC: 5.4 MMOL/L (ref 3.4–5.3)
PROT SERPL-MCNC: 7.5 G/DL (ref 6.4–8.3)
RBC # BLD AUTO: 3.36 10E6/UL (ref 4.4–5.9)
SODIUM SERPL-SCNC: 129 MMOL/L (ref 135–145)
TACROLIMUS BLD-MCNC: 8.6 UG/L (ref 5–15)
TME LAST DOSE: NORMAL H
TME LAST DOSE: NORMAL H
WBC # BLD AUTO: 8.4 10E3/UL (ref 4–11)

## 2024-11-19 PROCEDURE — 84100 ASSAY OF PHOSPHORUS: CPT | Performed by: PATHOLOGY

## 2024-11-19 PROCEDURE — 82248 BILIRUBIN DIRECT: CPT | Performed by: PATHOLOGY

## 2024-11-19 PROCEDURE — 99000 SPECIMEN HANDLING OFFICE-LAB: CPT | Performed by: PATHOLOGY

## 2024-11-19 PROCEDURE — 80197 ASSAY OF TACROLIMUS: CPT | Performed by: TRANSPLANT SURGERY

## 2024-11-19 PROCEDURE — 36415 COLL VENOUS BLD VENIPUNCTURE: CPT | Performed by: PATHOLOGY

## 2024-11-19 PROCEDURE — 80053 COMPREHEN METABOLIC PANEL: CPT | Performed by: PATHOLOGY

## 2024-11-19 PROCEDURE — 85027 COMPLETE CBC AUTOMATED: CPT | Performed by: PATHOLOGY

## 2024-11-19 PROCEDURE — 83735 ASSAY OF MAGNESIUM: CPT | Performed by: PATHOLOGY

## 2024-11-19 NOTE — TELEPHONE ENCOUNTER
[address].   Calll to Jag.  He is drinking 2 liters of water / day.  He is a little nauseated.    I suggested he cut way back on water intake.  No more than 1 liter per day.   He will do.

## 2024-11-19 NOTE — PROGRESS NOTES
Please call patient with normal results   Transplant Surgery -OUTPATIENT PROGRESS NOTE    Date of Visit: 11/18/2024    Transplants:  9/28/2024 (Liver); Postoperative day:  51  ASSESMENT AND PLAN:  Incisional wound:   Stable. X3 tunneling areas. No s/s infection. Continue packing with 4x4 gauze BID.         Date: November 18th, 2024    Transplant:  [x]                             Liver [x]                              Kidney []                             Pancreas []                              Other:             Chief Complaint:Post-op Visit (Wound care)    History of Present Illness:  Here today for incisional wound care. Mother is changing BID.   No fever or N/V/D.   Weaning off of tramadol - has 3 tabs left. Requesting more robaxin, atarax and tylenol.          Patient Active Problem List   Diagnosis    Alcohol use disorder, severe, in early remission (H)    Alcoholic hepatitis (H)    Epiphora due to insufficient drainage of left side    Hyperbilirubinemia    Hypokalemia    Jaundice    Pneumonia    Liver replaced by transplant (H)    DENI (acute kidney injury) (H)    Immunosuppressed status (H)    Acute post-operative pain    Steroid-induced hyperglycemia    Acute urinary retention    Anemia due to blood loss, acute    Severe malnutrition (H)    Hyponatremia    Hypomagnesemia    Bilateral lower extremity edema    Hyperkalemia    Subconjunctival hemorrhage    Nonhealing surgical wound    Skin ulcer of abdomen with fat layer exposed (H)     SOCIAL /FAMILY HISTORY: [x]                  No recent change    Past Medical History:   Diagnosis Date    Alcohol use disorder     Alcoholic hepatitis (H) 06/07/2024    Anxiety     Depression     Hypertension     Liver replaced by transplant (H) 09/28/2024     Past Surgical History:   Procedure Laterality Date    BENCH LIVER  9/28/2024    Procedure: Bench liver;  Surgeon: Fernando Colon MD;  Location: [facility] OR    DACRYOCYSTORHINOSTOMY Left 06/14/2013    INCISION AND DRAINAGE BUTTOCKS Left 03/03/2017    TRANSPLANT LIVER  RECIPIENT  DONOR N/A 2024    Procedure: Transplant liver recipient  donor;  Surgeon: Fernando Colon MD;  Location:  OR     Social History     Socioeconomic History    Marital status: Single     Spouse name: Not on file    Number of children: Not on file    Years of education: Not on file    Highest education level: Not on file   Occupational History    Not on file   Tobacco Use    Smoking status: Never    Smokeless tobacco: Never   Substance and Sexual Activity    Alcohol use: Not Currently     Comment: last drink 2024    Drug use: Never    Sexual activity: Not on file   Other Topics Concern    Not on file   Social History Narrative    Not on file     Social Drivers of Health     Financial Resource Strain: Low Risk  (2024)    Financial Resource Strain     Within the past 12 months, have you or your family members you live with been unable to get utilities (heat, electricity) when it was really needed?: No   Food Insecurity: Low Risk  (2024)    Food Insecurity     Within the past 12 months, did you worry that your food would run out before you got money to buy more?: No     Within the past 12 months, did the food you bought just not last and you didn t have money to get more?: No   Transportation Needs: Low Risk  (2024)    Transportation Needs     Within the past 12 months, has lack of transportation kept you from medical appointments, getting your medicines, non-medical meetings or appointments, work, or from getting things that you need?: No   Physical Activity: Not on file   Stress: Not on file   Social Connections: Not on file   Interpersonal Safety: Low Risk  (2024)    Interpersonal Safety     Do you feel physically and emotionally safe where you currently live?: Yes     Within the past 12 months, have you been hit, slapped, kicked or otherwise physically hurt by someone?: No     Within the past 12 months, have you been humiliated or emotionally abused in  other ways by your partner or ex-partner?: No   Recent Concern: Interpersonal Safety - High Risk (9/22/2024)    Interpersonal Safety     Do you feel physically and emotionally safe where you currently live?: No     Within the past 12 months, have you been hit, slapped, kicked or otherwise physically hurt by someone?: No     Within the past 12 months, have you been humiliated or emotionally abused in other ways by your partner or ex-partner?: No   Housing Stability: Low Risk  (9/21/2024)    Housing Stability     Do you have housing? : Yes     Are you worried about losing your housing?: No     Prescription Medications as of 11/18/2024         Rx Number Disp Refills Start End Last Dispensed Date Next Fill Date Owning Pharmacy    acetaminophen (TYLENOL) 325 MG tablet  60 tablet 0 11/18/2024 --   [address] 1-504    Sig: Take 2 tablets (650 mg) by mouth every 8 hours as needed for mild pain or fever.    Class: E-Prescribe    Route: Oral    aspirin (ASA) 81 MG chewable tablet  30 tablet 1 11/1/2024 --   [address] 2-028    Sig: Take 1 tablet (81 mg) by mouth or Feeding Tube daily.    Class: E-Prescribe    Route: Oral or Feeding Tube    escitalopram (LEXAPRO) 20 MG tablet  30 tablet 0 11/11/2024 --   [address] 1-642    Sig: Take 1 tablet (20 mg) by mouth daily.    Class: E-Prescribe    Route: Oral    hydrOXYzine HCl (ATARAX) 25 MG tablet  20 tablet 0 11/18/2024 --   [address] 5-323    Sig: Take 1 tablet (25 mg) by mouth every 8 hours as needed for anxiety or other (adjuvant pain).    Class: E-Prescribe    Route: Oral    magnesium oxide (MAG-OX) 400 MG tablet  120 tablet 1 11/1/2024 --   [address]  1-273    Sig: Take 2 tablets (800 mg) by mouth 2 times daily.    Class: E-Prescribe    Route: Oral    methocarbamol (ROBAXIN) 750 MG tablet  30 tablet 0 11/18/2024 --   [address] 1-273    Sig: Take 1 tablet (750 mg) by mouth 3 times daily as needed for muscle spasms.    Class: E-Prescribe    Route: Oral    multivitamin w/minerals (CERTAVITE/ANTIOXIDANTS) tablet  30 tablet 1 11/1/2024 --   [address] 1-273    Sig: Take 1 tablet by mouth daily.    Class: E-Prescribe    Route: Oral    mycophenolate (GENERIC EQUIVALENT) 250 MG capsule  180 capsule 1 11/1/2024 --   [address] 1-273    Sig: Take 3 capsules (750 mg) by mouth 2 times daily.    Class: E-Prescribe    Notes to Pharmacy: TXP DT 9/28/2024 (Liver) TXP Dischg DT 10/9/2024 DX Liver replaced by transplant Z94.4 [facility] ([place], [place])    Route: Oral    omeprazole (PRILOSEC) 20 MG DR capsule  30 capsule 2 11/1/2024 --   [address] 1-273    Sig: Take 1 capsule (20 mg) by mouth daily.    Class: E-Prescribe    Route: Oral    polyethylene glycol (MIRALAX) 17 GM/Dose powder  510 g 0 10/9/2024 --   [place], [place] - [address] [facility] [address]    Sig: Take 17 g by mouth 2 times daily as needed for constipation.    Class: E-Prescribe    Route: Oral    predniSONE (DELTASONE) 5 MG tablet  30 tablet 1 11/1/2024 --   [address] 1-273    Sig: Take 1 tablet (5 mg) by mouth daily.    Class: E-Prescribe    Notes to Pharmacy: TXP DT 9/28/2024 (Liver) TXP Dischg DT 10/9/2024 DX Liver replaced by transplant Z94.4 [facility]  ([place], [place])    Route: Oral    sennosides (SENOKOT) 8.6 MG tablet  60 tablet 0 10/9/2024 --   [facility] [place], [place] - [address] [facility]    Sig: Take 2 tablets by mouth 2 times daily as needed for constipation.    Class: E-Prescribe    Route: Oral    tacrolimus (GENERIC EQUIVALENT) 0.5 MG capsule  60 capsule 1 11/11/2024 --   [address] 1-519    Sig: HOLD    Class: E-Prescribe    Notes to Pharmacy: TXP DT 9/28/2024 (Liver) TXP Dischg DT 10/9/2024 DX Liver replaced by transplant Z94.4 [facility] ([place], [place])    tacrolimus (GENERIC EQUIVALENT) 1 MG capsule  240 capsule 1 11/11/2024 --   [address] 1-265    Sig: Take 4 capsules (4 mg) by mouth every 12 hours.    Class: E-Prescribe    Notes to Pharmacy: TXP DT 9/28/2024 (Liver) TXP Dischg DT 10/9/2024 DX Liver replaced by transplant Z94.4 [facility] ([place], [place])    Route: Oral    tacrolimus (GENERIC EQUIVALENT) 5 MG capsule  -- -- 11/5/2024 --       Sig: Take 1 capsule (5 mg) by mouth 2 times daily as needed (for dose changes).    Class: Historical    Notes to Pharmacy: TXP DT 9/28/2024 (Liver) TXP Dischg DT 10/9/2024 DX Liver replaced by transplant Z94.4 [facility] ([place], [place])    Route: Oral    traMADol (ULTRAM) 50 MG tablet  8 tablet 0 11/11/2024 11/19/2024   [address] [phone]    Sig: Take 1 tablet (50 mg) by mouth daily as needed for severe pain.    Class: E-Prescribe    Route: Oral    traZODone (DESYREL) 50 MG tablet  30 tablet 0 11/7/2024 --   [address] 9-268    Sig: Take 1 tablet (50 mg) by mouth at bedtime.     Class: E-Prescribe    Route: Oral    valGANciclovir (VALCYTE) 450 MG tablet  30 tablet 1 11/1/2024 --   [place], [place] - [address] [place] [address] [phone]    Sig: Take 1 tablet (450 mg) by mouth daily.    Class: E-Prescribe    Route: Oral          Azithromycin   REVIEW OF SYSTEMS (check box if normal)  [x]               GENERAL  [x]                 PULMONARY [x]                GENITOURINARY  [x]                CNS                 [x]                 CARDIAC  [x]                 ENDOCRINE  [x]                EARS,NOSE,THROAT [x]                 GASTROINTESTINAL [x]                 NEUROLOGIC    [x]                MUSCLOSKELTAL  [x]                  HEMATOLOGY      PHYSICAL EXAM (check box if normal)/81   Pulse 80   Temp 98.4  F (36.9  C) (Oral)   Wt 105.6 kg (232 lb 12.8 oz)   SpO2 99%   BMI 31.57 kg/m          [x]            GENERAL: NAD    [x]            Incision:  Left side of incision open but healing well, tunneling present midline x2 and x1 R distal incision. Tunneling packed with i4x4 gauze with vanshe solution and rest of incision covered with 4x4 gauze with no issues. No surrounding erythema. Moderate drainage present- mostly midline.    Abd soft throughout. Non-tender.                                                                                 PAIN SCALE:: 8

## 2024-11-20 ENCOUNTER — OFFICE VISIT (OUTPATIENT)
Dept: TRANSPLANT | Facility: CLINIC | Age: 39
End: 2024-11-20
Attending: NURSE PRACTITIONER
Payer: MEDICAID

## 2024-11-20 VITALS
WEIGHT: 231.9 LBS | BODY MASS INDEX: 31.45 KG/M2 | TEMPERATURE: 98.6 F | SYSTOLIC BLOOD PRESSURE: 125 MMHG | HEART RATE: 95 BPM | OXYGEN SATURATION: 99 % | DIASTOLIC BLOOD PRESSURE: 80 MMHG

## 2024-11-20 DIAGNOSIS — T14.8XXA SKIN WOUND FROM SURGICAL INCISION: Primary | ICD-10-CM

## 2024-11-20 PROCEDURE — G0463 HOSPITAL OUTPT CLINIC VISIT: HCPCS | Performed by: NURSE PRACTITIONER

## 2024-11-20 ASSESSMENT — PAIN SCALES - GENERAL: PAINLEVEL_OUTOF10: SEVERE PAIN (7)

## 2024-11-20 NOTE — LETTER
11/20/2024      Jag Smith  [address]  [facility] [address]      Dear Colleague,    Thank you for referring your patient, Jag Smith, to the [facility]. Please see a copy of my visit note below.    Transplant Surgery -OUTPATIENT PROGRESS NOTE    Date of Visit: 11/20/2024    Transplants:  9/28/2024 (Liver); Postoperative day:  53  ASSESMENT AND PLAN:  Incisional wound:   Stable. X4 tunneling areas. No s/s infection. Continue packing with 4x4 gauze BID.     -low K+ diet.       Date: November 20th, 2024    Transplant:  [x]                             Liver [x]                              Kidney []                             Pancreas []                              Other:             Chief Complaint:Post-op Visit (Wound care)    History of Present Illness:  Here today for incisional wound care. Mother is changing BID.   No fever or N/V/D.   No new concerns. Cut back on fluids yesterday to one liter due to low sodium.          Patient Active Problem List   Diagnosis     Alcohol use disorder, severe, in early remission (H)     Alcoholic hepatitis (H)     Epiphora due to insufficient drainage of left side     Hyperbilirubinemia     Hypokalemia     Jaundice     Pneumonia     Liver replaced by transplant (H)     DENI (acute kidney injury) (H)     Immunosuppressed status (H)     Acute post-operative pain     Steroid-induced hyperglycemia     Acute urinary retention     Anemia due to blood loss, acute     Severe malnutrition (H)     Hyponatremia     Hypomagnesemia     Bilateral lower extremity edema     Hyperkalemia     Subconjunctival hemorrhage     Nonhealing surgical wound     Skin ulcer of abdomen with fat layer exposed (H)     SOCIAL /FAMILY HISTORY: [x]                  No recent change    Past Medical History:   Diagnosis Date     Alcohol use disorder      Alcoholic hepatitis (H) 06/07/2024     Anxiety      Depression      Hypertension      Liver replaced by transplant (H) 09/28/2024      Past Surgical History:   Procedure Laterality Date     BENCH LIVER  2024    Procedure: Bench liver;  Surgeon: Fernando Colon MD;  Location: [facility] OR     DACRYOCYSTORHINOSTOMY Left 2013     INCISION AND DRAINAGE BUTTOCKS Left 2017     TRANSPLANT LIVER RECIPIENT  DONOR N/A 2024    Procedure: Transplant liver recipient  donor;  Surgeon: Fernando Colon MD;  Location: [facility] OR     Social History     Socioeconomic History     Marital status: Single     Spouse name: Not on file     Number of children: Not on file     Years of education: Not on file     Highest education level: Not on file   Occupational History     Not on file   Tobacco Use     Smoking status: Never     Smokeless tobacco: Never   Substance and Sexual Activity     Alcohol use: Not Currently     Comment: last drink 2024     Drug use: Never     Sexual activity: Not on file   Other Topics Concern     Not on file   Social History Narrative     Not on file     Social Drivers of Health     Financial Resource Strain: Low Risk  (2024)    Financial Resource Strain      Within the past 12 months, have you or your family members you live with been unable to get utilities (heat, electricity) when it was really needed?: No   Food Insecurity: Low Risk  (2024)    Food Insecurity      Within the past 12 months, did you worry that your food would run out before you got money to buy more?: No      Within the past 12 months, did the food you bought just not last and you didn t have money to get more?: No   Transportation Needs: Low Risk  (2024)    Transportation Needs      Within the past 12 months, has lack of transportation kept you from medical appointments, getting your medicines, non-medical meetings or appointments, work, or from getting things that you need?: No   Physical Activity: Not on file   Stress: Not on file   Social Connections: Not on file   Interpersonal Safety: Low Risk  (2024)     Interpersonal Safety      Do you feel physically and emotionally safe where you currently live?: Yes      Within the past 12 months, have you been hit, slapped, kicked or otherwise physically hurt by someone?: No      Within the past 12 months, have you been humiliated or emotionally abused in other ways by your partner or ex-partner?: No   Recent Concern: Interpersonal Safety - High Risk (9/22/2024)    Interpersonal Safety      Do you feel physically and emotionally safe where you currently live?: No      Within the past 12 months, have you been hit, slapped, kicked or otherwise physically hurt by someone?: No      Within the past 12 months, have you been humiliated or emotionally abused in other ways by your partner or ex-partner?: No   Housing Stability: Low Risk  (9/21/2024)    Housing Stability      Do you have housing? : Yes      Are you worried about losing your housing?: No     Prescription Medications as of 11/20/2024         Rx Number Disp Refills Start End Last Dispensed Date Next Fill Date Owning Pharmacy    acetaminophen (TYLENOL) 325 MG tablet  60 tablet 0 11/18/2024 --   [place] [id number]    Sig: Take 2 tablets (650 mg) by mouth every 8 hours as needed for mild pain or fever.    Class: E-Prescribe    Route: Oral    aspirin (ASA) 81 MG chewable tablet  30 tablet 1 11/1/2024 --   [address] 3-656    Sig: Take 1 tablet (81 mg) by mouth or Feeding Tube daily.    Class: E-Prescribe    Route: Oral or Feeding Tube    escitalopram (LEXAPRO) 20 MG tablet  30 tablet 0 11/11/2024 --   [address] 1-424    Sig: Take 1 tablet (20 mg) by mouth daily.    Class: E-Prescribe    Route: Oral    hydrOXYzine HCl (ATARAX) 25 MG tablet  20 tablet 0 11/18/2024 --   [address]  [facility] 1-995    Sig: Take 1 tablet (25 mg) by mouth every 8 hours as needed for anxiety or other (adjuvant pain).    Class: E-Prescribe    Route: Oral    magnesium oxide (MAG-OX) 400 MG tablet  120 tablet 1 11/1/2024 --   [address] 1-273    Sig: Take 2 tablets (800 mg) by mouth 2 times daily.    Class: E-Prescribe    Route: Oral    methocarbamol (ROBAXIN) 750 MG tablet  30 tablet 0 11/18/2024 --   [address] 1-189    Sig: Take 1 tablet (750 mg) by mouth 3 times daily as needed for muscle spasms.    Class: E-Prescribe    Route: Oral    multivitamin w/minerals (CERTAVITE/ANTIOXIDANTS) tablet  30 tablet 1 11/1/2024 --   [address] 1-719    Sig: Take 1 tablet by mouth daily.    Class: E-Prescribe    Route: Oral    mycophenolate (GENERIC EQUIVALENT) 250 MG capsule  180 capsule 1 11/1/2024 --   [address] 1-425    Sig: Take 3 capsules (750 mg) by mouth 2 times daily.    Class: E-Prescribe    Notes to Pharmacy: TXP DT 9/28/2024 (Liver) TXP Dischg DT 10/9/2024 DX Liver replaced by transplant Z94.4 [facility] ([place], [place])    Route: Oral    omeprazole (PRILOSEC) 20 MG DR capsule  30 capsule 2 11/1/2024 --   [address] 1-056    Sig: Take 1 capsule (20 mg) by mouth daily.    Class: E-Prescribe    Route: Oral    polyethylene glycol (MIRALAX) 17 GM/Dose powder  510 g 0 10/9/2024 --   [place], [place] - [address] [place] [address] SE    Sig: Take 17 g by mouth 2 times daily as needed for constipation.    Class: E-Prescribe    Route: Oral    predniSONE (DELTASONE) 5 MG tablet  30 tablet 1 11/1/2024 --   [facility]  [address] 7-750    Sig: Take 1 tablet (5 mg) by mouth daily.    Class: E-Prescribe    Notes to Pharmacy: TXP DT 9/28/2024 (Liver) TXP Dischg DT 10/9/2024 DX Liver replaced by transplant Z94.4 [facility] ([place], [place])    Route: Oral    sennosides (SENOKOT) 8.6 MG tablet  60 tablet 0 10/9/2024 --   [place], [place] - [address]    Sig: Take 2 tablets by mouth 2 times daily as needed for constipation.    Class: E-Prescribe    Route: Oral    tacrolimus (GENERIC EQUIVALENT) 0.5 MG capsule  60 capsule 1 11/11/2024 --   [address] 5-062    Sig: HOLD    Class: E-Prescribe    Notes to Pharmacy: TXP DT 9/28/2024 (Liver) TXP Dischg DT 10/9/2024 DX Liver replaced by transplant Z94.4 [facility] ([place], [place])    tacrolimus (GENERIC EQUIVALENT) 1 MG capsule  240 capsule 1 11/11/2024 --   [address] 5-937    Sig: Take 4 capsules (4 mg) by mouth every 12 hours.    Class: E-Prescribe    Notes to Pharmacy: TXP DT 9/28/2024 (Liver) TXP Dischg DT 10/9/2024 DX Liver replaced by transplant Z94.4 [facility] ([place], [place])    Route: Oral    tacrolimus (GENERIC EQUIVALENT) 5 MG capsule  -- -- 11/5/2024 --       Sig: Take 1 capsule (5 mg) by mouth 2 times daily as needed (for dose changes).    Class: Historical    Notes to Pharmacy: TXP DT 9/28/2024 (Liver) TXP Dischg DT 10/9/2024 DX Liver replaced by transplant Z94.4 [facility] ([place], [place])    Route: Oral    traMADol (ULTRAM) 50 MG tablet (Ended)  8 tablet 0 11/11/2024 11/19/2024   [address] 1-010    Sig: Take 1  tablet (50 mg) by mouth daily as needed for severe pain.    Class: E-Prescribe    Route: Oral    traZODone (DESYREL) 50 MG tablet  30 tablet 0 11/7/2024 --   [address] 2-751    Sig: Take 1 tablet (50 mg) by mouth at bedtime.    Class: E-Prescribe    Route: Oral    valGANciclovir (VALCYTE) 450 MG tablet  30 tablet 1 11/1/2024 --   [address] 7-496    Sig: Take 1 tablet (450 mg) by mouth daily.    Class: E-Prescribe    Route: Oral          Azithromycin   REVIEW OF SYSTEMS (check box if normal)  [x]               GENERAL  [x]                 PULMONARY [x]                GENITOURINARY  [x]                CNS                 [x]                 CARDIAC  [x]                 ENDOCRINE  [x]                EARS,NOSE,THROAT [x]                 GASTROINTESTINAL [x]                 NEUROLOGIC    [x]                MUSCLOSKELTAL  [x]                  HEMATOLOGY      PHYSICAL EXAM (check box if normal)/80   Pulse 95   Temp 98.6  F (37  C) (Oral)   Wt 105.2 kg (231 lb 14.4 oz)   SpO2 99%   BMI 31.45 kg/m          [x]            GENERAL: NAD    [x]            Incision:  Left side of incision open but healing well, tunneling present midline x3 and x1 R distal incision. Tunneling packed with 4x4 gauze with vanshe solution and rest of incision covered with 4x4 gauze with no issues. No surrounding erythema. Moderate drainage present- mostly midline.    Abd soft throughout. Non-tender.                                                                                 PAIN SCALE:: 8       Again, thank you for allowing me to participate in the care of your patient.        Sincerely,        SIL Blum CNP

## 2024-11-20 NOTE — PROGRESS NOTES
Transplant Surgery -OUTPATIENT PROGRESS NOTE    Date of Visit: 11/20/2024    Transplants:  9/28/2024 (Liver); Postoperative day:  53  ASSESMENT AND PLAN:  Incisional wound:   Stable. X4 tunneling areas. No s/s infection. Continue packing with 4x4 gauze BID.     -low K+ diet.       Date: November 20th, 2024    Transplant:  [x]                             Liver [x]                              Kidney []                             Pancreas []                              Other:             Chief Complaint:Post-op Visit (Wound care)    History of Present Illness:  Here today for incisional wound care. Mother is changing BID.   No fever or N/V/D.   No new concerns. Cut back on fluids yesterday to one liter due to low sodium.          Patient Active Problem List   Diagnosis    Alcohol use disorder, severe, in early remission (H)    Alcoholic hepatitis (H)    Epiphora due to insufficient drainage of left side    Hyperbilirubinemia    Hypokalemia    Jaundice    Pneumonia    Liver replaced by transplant (H)    DENI (acute kidney injury) (H)    Immunosuppressed status (H)    Acute post-operative pain    Steroid-induced hyperglycemia    Acute urinary retention    Anemia due to blood loss, acute    Severe malnutrition (H)    Hyponatremia    Hypomagnesemia    Bilateral lower extremity edema    Hyperkalemia    Subconjunctival hemorrhage    Nonhealing surgical wound    Skin ulcer of abdomen with fat layer exposed (H)     SOCIAL /FAMILY HISTORY: [x]                  No recent change    Past Medical History:   Diagnosis Date    Alcohol use disorder     Alcoholic hepatitis (H) 06/07/2024    Anxiety     Depression     Hypertension     Liver replaced by transplant (H) 09/28/2024     Past Surgical History:   Procedure Laterality Date    BENCH LIVER  9/28/2024    Procedure: Bench liver;  Surgeon: Fernando Colon MD;  Location: [facility] OR    DACRYOCYSTORHINOSTOMY Left 06/14/2013    INCISION AND DRAINAGE BUTTOCKS Left 03/03/2017    TRANSPLANT  LIVER RECIPIENT  DONOR N/A 2024    Procedure: Transplant liver recipient  donor;  Surgeon: Fernando Colon MD;  Location:  OR     Social History     Socioeconomic History    Marital status: Single     Spouse name: Not on file    Number of children: Not on file    Years of education: Not on file    Highest education level: Not on file   Occupational History    Not on file   Tobacco Use    Smoking status: Never    Smokeless tobacco: Never   Substance and Sexual Activity    Alcohol use: Not Currently     Comment: last drink 2024    Drug use: Never    Sexual activity: Not on file   Other Topics Concern    Not on file   Social History Narrative    Not on file     Social Drivers of Health     Financial Resource Strain: Low Risk  (2024)    Financial Resource Strain     Within the past 12 months, have you or your family members you live with been unable to get utilities (heat, electricity) when it was really needed?: No   Food Insecurity: Low Risk  (2024)    Food Insecurity     Within the past 12 months, did you worry that your food would run out before you got money to buy more?: No     Within the past 12 months, did the food you bought just not last and you didn t have money to get more?: No   Transportation Needs: Low Risk  (2024)    Transportation Needs     Within the past 12 months, has lack of transportation kept you from medical appointments, getting your medicines, non-medical meetings or appointments, work, or from getting things that you need?: No   Physical Activity: Not on file   Stress: Not on file   Social Connections: Not on file   Interpersonal Safety: Low Risk  (2024)    Interpersonal Safety     Do you feel physically and emotionally safe where you currently live?: Yes     Within the past 12 months, have you been hit, slapped, kicked or otherwise physically hurt by someone?: No     Within the past 12 months, have you been humiliated or emotionally abused  in other ways by your partner or ex-partner?: No   Recent Concern: Interpersonal Safety - High Risk (9/22/2024)    Interpersonal Safety     Do you feel physically and emotionally safe where you currently live?: No     Within the past 12 months, have you been hit, slapped, kicked or otherwise physically hurt by someone?: No     Within the past 12 months, have you been humiliated or emotionally abused in other ways by your partner or ex-partner?: No   Housing Stability: Low Risk  (9/21/2024)    Housing Stability     Do you have housing? : Yes     Are you worried about losing your housing?: No     Prescription Medications as of 11/20/2024         Rx Number Disp Refills Start End Last Dispensed Date Next Fill Date Owning Pharmacy    acetaminophen (TYLENOL) 325 MG tablet  60 tablet 0 11/18/2024 --   [address] 1-954    Sig: Take 2 tablets (650 mg) by mouth every 8 hours as needed for mild pain or fever.    Class: E-Prescribe    Route: Oral    aspirin (ASA) 81 MG chewable tablet  30 tablet 1 11/1/2024 --   [address] 8-799    Sig: Take 1 tablet (81 mg) by mouth or Feeding Tube daily.    Class: E-Prescribe    Route: Oral or Feeding Tube    escitalopram (LEXAPRO) 20 MG tablet  30 tablet 0 11/11/2024 --   [address] 0-328    Sig: Take 1 tablet (20 mg) by mouth daily.    Class: E-Prescribe    Route: Oral    hydrOXYzine HCl (ATARAX) 25 MG tablet  20 tablet 0 11/18/2024 --   [address] 5-708    Sig: Take 1 tablet (25 mg) by mouth every 8 hours as needed for anxiety or other (adjuvant pain).    Class: E-Prescribe    Route: Oral    magnesium oxide (MAG-OX) 400 MG tablet  120 tablet 1 11/1/2024 --   [address]   1-273    Sig: Take 2 tablets (800 mg) by mouth 2 times daily.    Class: E-Prescribe    Route: Oral    methocarbamol (ROBAXIN) 750 MG tablet  30 tablet 0 11/18/2024 --   [address] 1-273    Sig: Take 1 tablet (750 mg) by mouth 3 times daily as needed for muscle spasms.    Class: E-Prescribe    Route: Oral    multivitamin w/minerals (CERTAVITE/ANTIOXIDANTS) tablet  30 tablet 1 11/1/2024 --   [address] 1-273    Sig: Take 1 tablet by mouth daily.    Class: E-Prescribe    Route: Oral    mycophenolate (GENERIC EQUIVALENT) 250 MG capsule  180 capsule 1 11/1/2024 --   [address] 1-861    Sig: Take 3 capsules (750 mg) by mouth 2 times daily.    Class: E-Prescribe    Notes to Pharmacy: TXP DT 9/28/2024 (Liver) TXP Dischg DT 10/9/2024 DX Liver replaced by transplant Z94.4 [facility] ([place], [place])    Route: Oral    omeprazole (PRILOSEC) 20 MG DR capsule  30 capsule 2 11/1/2024 --   [address] 1-735    Sig: Take 1 capsule (20 mg) by mouth daily.    Class: E-Prescribe    Route: Oral    polyethylene glycol (MIRALAX) 17 GM/Dose powder  510 g 0 10/9/2024 --   [place], [place] - [address] [facility]    Sig: Take 17 g by mouth 2 times daily as needed for constipation.    Class: E-Prescribe    Route: Oral    predniSONE (DELTASONE) 5 MG tablet  30 tablet 1 11/1/2024 --   [address] 1-273    Sig: Take 1 tablet (5 mg) by mouth daily.    Class: E-Prescribe    Notes to Pharmacy: TXP DT 9/28/2024 (Liver) TXP Dischg DT 10/9/2024 DX Liver replaced by transplant Z94.4 [facility]  ([place], [place])    Route: Oral    sennosides (SENOKOT) 8.6 MG tablet  60 tablet 0 10/9/2024 --   [facility] - [place], [place] - [address] [facility]    Sig: Take 2 tablets by mouth 2 times daily as needed for constipation.    Class: E-Prescribe    Route: Oral    tacrolimus (GENERIC EQUIVALENT) 0.5 MG capsule  60 capsule 1 11/11/2024 --   [address] 1-515    Sig: HOLD    Class: E-Prescribe    Notes to Pharmacy: TXP DT 9/28/2024 (Liver) TXP Dischg DT 10/9/2024 DX Liver replaced by transplant Z94.4 [facility] ([place], [place])    tacrolimus (GENERIC EQUIVALENT) 1 MG capsule  240 capsule 1 11/11/2024 --   [address] 1-218    Sig: Take 4 capsules (4 mg) by mouth every 12 hours.    Class: E-Prescribe    Notes to Pharmacy: TXP DT 9/28/2024 (Liver) TXP Dischg DT 10/9/2024 DX Liver replaced by transplant Z94.4 [facility] ([place], [place])    Route: Oral    tacrolimus (GENERIC EQUIVALENT) 5 MG capsule  -- -- 11/5/2024 --       Sig: Take 1 capsule (5 mg) by mouth 2 times daily as needed (for dose changes).    Class: Historical    Notes to Pharmacy: TXP DT 9/28/2024 (Liver) TXP Dischg DT 10/9/2024 DX Liver replaced by transplant Z94.4 [facility] ([place], [place])    Route: Oral    traMADol (ULTRAM) 50 MG tablet (Ended)  8 tablet 0 11/11/2024 11/19/2024   [address] 3-285    Sig: Take 1 tablet (50 mg) by mouth daily as needed for severe pain.    Class: E-Prescribe    Route: Oral    traZODone (DESYREL) 50 MG tablet  30 tablet 0 11/7/2024 --   [address] 6-222    Sig: Take 1 tablet (50 mg) by mouth at bedtime.     Class: E-Prescribe    Route: Oral    valGANciclovir (VALCYTE) 450 MG tablet  30 tablet 1 11/1/2024 --   [place], [place] - [address] [place] [address] [phone]    Sig: Take 1 tablet (450 mg) by mouth daily.    Class: E-Prescribe    Route: Oral          Azithromycin   REVIEW OF SYSTEMS (check box if normal)  [x]               GENERAL  [x]                 PULMONARY [x]                GENITOURINARY  [x]                CNS                 [x]                 CARDIAC  [x]                 ENDOCRINE  [x]                EARS,NOSE,THROAT [x]                 GASTROINTESTINAL [x]                 NEUROLOGIC    [x]                MUSCLOSKELTAL  [x]                  HEMATOLOGY      PHYSICAL EXAM (check box if normal)/80   Pulse 95   Temp 98.6  F (37  C) (Oral)   Wt 105.2 kg (231 lb 14.4 oz)   SpO2 99%   BMI 31.45 kg/m          [x]            GENERAL: NAD    [x]            Incision:  Left side of incision open but healing well, tunneling present midline x3 and x1 R distal incision. Tunneling packed with 4x4 gauze with vanshe solution and rest of incision covered with 4x4 gauze with no issues. No surrounding erythema. Moderate drainage present- mostly midline.    Abd soft throughout. Non-tender.                                                                                 PAIN SCALE:: 8

## 2024-11-21 ENCOUNTER — OFFICE VISIT (OUTPATIENT)
Dept: TRANSPLANT | Facility: CLINIC | Age: 39
End: 2024-11-21
Attending: TRANSPLANT SURGERY
Payer: MEDICAID

## 2024-11-21 VITALS
BODY MASS INDEX: 31.31 KG/M2 | TEMPERATURE: 98.3 F | SYSTOLIC BLOOD PRESSURE: 118 MMHG | DIASTOLIC BLOOD PRESSURE: 74 MMHG | RESPIRATION RATE: 18 BRPM | HEART RATE: 92 BPM | OXYGEN SATURATION: 99 % | HEIGHT: 72 IN | WEIGHT: 231.2 LBS

## 2024-11-21 DIAGNOSIS — Z94.4 LIVER REPLACED BY TRANSPLANT (H): Primary | ICD-10-CM

## 2024-11-21 PROCEDURE — G0463 HOSPITAL OUTPT CLINIC VISIT: HCPCS | Performed by: TRANSPLANT SURGERY

## 2024-11-21 RX ORDER — TRAMADOL HYDROCHLORIDE 50 MG/1
50 TABLET ORAL EVERY 6 HOURS PRN
Qty: 15 TABLET | Refills: 0 | Status: SHIPPED | OUTPATIENT
Start: 2024-11-21 | End: 2024-11-24

## 2024-11-21 ASSESSMENT — PAIN SCALES - GENERAL: PAINLEVEL_OUTOF10: SEVERE PAIN (7)

## 2024-11-21 NOTE — LETTER
11/21/2024      Jag Smith  [address]  [facility] [address]      Dear Colleague,    Thank you for referring your patient, Jag Smith, to the [facility]. Please see a copy of my visit note below.    Transplant Surgery Progress Note    Transplants:  9/28/2024 (Liver); Postoperative day:  54  S: overall doing well, still has wound issue with some pain with dressing changes.  Overall appetite and strength improving, no med issues    Transplant History:    Transplant Type:  Liver Tx  Donor Type:     Transplant Date:  9/28/2024 (Liver)   Biliary Stent:  No    Acute Rejection Hx:  No    Present Maintenance Immunosuppression:  Tacrolimus, Mycophenolate mofetil, and Prednisone    CMV IgG Ab Discordance:  No  EBV IgG Ab Discordance:  No    Transplant Coordinator: Karena Villegas     Transplant Office Phone Number: [phone]     Immunosuppressant Medications       Immunosuppressive Agents Disp Start End     mycophenolate (GENERIC EQUIVALENT) 250 MG capsule 180 capsule 11/1/2024 --    Sig - Route: Take 3 capsules (750 mg) by mouth 2 times daily. - Oral    Class: E-Prescribe    Notes to Pharmacy: TXP DT 9/28/2024 (Liver) TXP Dischg DT 10/9/2024 DX Liver replaced by transplant Z94.4 [facility] ([place], [place])     tacrolimus (GENERIC EQUIVALENT) 1 MG capsule 240 capsule 11/11/2024 --    Sig - Route: Take 4 capsules (4 mg) by mouth every 12 hours. - Oral    Class: E-Prescribe    Notes to Pharmacy: TXP DT 9/28/2024 (Liver) TXP Dischg DT 10/9/2024 DX Liver replaced by transplant Z94.4 [facility] ([place], [place])     tacrolimus (GENERIC EQUIVALENT) 0.5 MG capsule 60 capsule 11/11/2024 --    Sig: HOLD    Patient not taking: Reported on 11/21/2024    Class: E-Prescribe    Notes to Pharmacy: TXP DT 9/28/2024 (Liver) TXP Dischg DT 10/9/2024 DX Liver replaced by transplant Z94.4 [facility]  [facility] ([place], [place])     tacrolimus (GENERIC EQUIVALENT) 5 MG capsule -- 11/5/2024 --    Sig - Route: Take 1 capsule (5 mg) by mouth 2 times daily as needed (for dose changes). - Oral    Patient not taking: Reported on 11/21/2024    Class: Historical    Notes to Pharmacy: TXP DT 9/28/2024 (Liver) TXP Dischg DT 10/9/2024 DX Liver replaced by transplant Z94.4 [facility] ([place], [place])            Possible Immunosuppression-related side effects:   []             headache  []             vivid dreams  []             irritability  []             cognitive difficuties  []             fine tremor  []             nausea  []             diarrhea  []             neuropathy      []             edema  []             renal calcineurin toxicity  []             hyperkalemia  []             post-transplant diabetes  []             decreased appetite  []             increased appetite  []             other:  []             none    Prescription Medications as of 11/21/2024         Rx Number Disp Refills Start End Last Dispensed Date Next Fill Date Owning Pharmacy    acetaminophen (TYLENOL) 325 MG tablet  60 tablet 0 11/18/2024 --   [address] [phone]    Sig: Take 2 tablets (650 mg) by mouth every 8 hours as needed for mild pain or fever.    Class: E-Prescribe    Route: Oral    aspirin (ASA) 81 MG chewable tablet  30 tablet 1 11/1/2024 --   [address] 1-008    Sig: Take 1 tablet (81 mg) by mouth or Feeding Tube daily.    Class: E-Prescribe    Route: Oral or Feeding Tube    hydrOXYzine HCl (ATARAX) 25 MG tablet  20 tablet 0 11/18/2024 --   [address] [phone]    Sig: Take 1 tablet (25 mg) by mouth every 8 hours as needed for anxiety or other (adjuvant pain).     Class: E-Prescribe    Route: Oral    magnesium oxide (MAG-OX) 400 MG tablet  120 tablet 1 11/1/2024 --   [address] 1-199    Sig: Take 2 tablets (800 mg) by mouth 2 times daily.    Class: E-Prescribe    Route: Oral    methocarbamol (ROBAXIN) 750 MG tablet  30 tablet 0 11/18/2024 --   [address] 1-112    Sig: Take 1 tablet (750 mg) by mouth 3 times daily as needed for muscle spasms.    Class: E-Prescribe    Route: Oral    multivitamin w/minerals (CERTAVITE/ANTIOXIDANTS) tablet  30 tablet 1 11/1/2024 --   [address] 1-938    Sig: Take 1 tablet by mouth daily.    Class: E-Prescribe    Route: Oral    mycophenolate (GENERIC EQUIVALENT) 250 MG capsule  180 capsule 1 11/1/2024 --   [address] 1-013    Sig: Take 3 capsules (750 mg) by mouth 2 times daily.    Class: E-Prescribe    Notes to Pharmacy: TXP DT 9/28/2024 (Liver) TXP Dischg DT 10/9/2024 DX Liver replaced by transplant Z94.4 [facility] ([place], [place])    Route: Oral    omeprazole (PRILOSEC) 20 MG DR capsule  30 capsule 2 11/1/2024 --   [address] 1-866    Sig: Take 1 capsule (20 mg) by mouth daily.    Class: E-Prescribe    Route: Oral    polyethylene glycol (MIRALAX) 17 GM/Dose powder  510 g 0 10/9/2024 --   [place], [place] - [address] [facility]    Sig: Take 17 g by mouth 2 times daily as needed for constipation.    Class: E-Prescribe    Route: Oral    predniSONE (DELTASONE) 5 MG tablet  30 tablet 1 11/1/2024 --   [address] 1-292    Sig: Take 1 tablet (5 mg) by mouth daily.    Class:  E-Prescribe    Notes to Pharmacy: TXP DT 9/28/2024 (Liver) TXP Dischg DT 10/9/2024 DX Liver replaced by transplant Z94.4 [facility] ([place], [place])    Route: Oral    sennosides (SENOKOT) 8.6 MG tablet  60 tablet 0 10/9/2024 --   [place], [place] - [address] [facility]    Sig: Take 2 tablets by mouth 2 times daily as needed for constipation.    Class: E-Prescribe    Route: Oral    tacrolimus (GENERIC EQUIVALENT) 1 MG capsule  240 capsule 1 11/11/2024 --   [address] 1-984    Sig: Take 4 capsules (4 mg) by mouth every 12 hours.    Class: E-Prescribe    Notes to Pharmacy: TXP DT 9/28/2024 (Liver) TXP Dischg DT 10/9/2024 DX Liver replaced by transplant Z94.4 [facility] ([place], [place])    Route: Oral    traMADol (ULTRAM) 50 MG tablet  15 tablet 0 11/21/2024 11/24/2024   [address] 1-544    Sig: Take 1 tablet (50 mg) by mouth every 6 hours as needed for severe pain.    Class: E-Prescribe    Route: Oral    traZODone (DESYREL) 50 MG tablet  30 tablet 0 11/7/2024 --   [address] 1-318    Sig: Take 1 tablet (50 mg) by mouth at bedtime.    Class: E-Prescribe    Route: Oral    valGANciclovir (VALCYTE) 450 MG tablet  30 tablet 1 11/1/2024 --   [address] 1-708    Sig: Take 1 tablet (450 mg) by mouth daily.    Class: E-Prescribe    Route: Oral    escitalopram (LEXAPRO) 20 MG tablet  30 tablet 0 11/11/2024 --   [address] 1-671    Sig: Take 1 tablet (20 mg) by mouth daily.    Class: E-Prescribe    Route: Oral    tacrolimus (GENERIC EQUIVALENT) 0.5 MG capsule  60 capsule 1  "11/11/2024 --   [facility] [place], [place] - [address] [place] [address] [phone]    Sig: HOLD    Class: E-Prescribe    Notes to Pharmacy: TXP DT 9/28/2024 (Liver) TXP Dischg DT 10/9/2024 DX Liver replaced by transplant Z94.4 TX [facility] ([place], [place])    tacrolimus (GENERIC EQUIVALENT) 5 MG capsule  -- -- 11/5/2024 --       Sig: Take 1 capsule (5 mg) by mouth 2 times daily as needed (for dose changes).    Class: Historical    Notes to Pharmacy: TXP DT 9/28/2024 (Liver) TXP Dischg DT 10/9/2024 DX Liver replaced by transplant Z94.4 [facility] ([place], [place])    Route: Oral            O:   [unfilled]        Latest Ref Rng & Units 11/19/2024     8:13 AM 11/14/2024     8:03 AM 11/11/2024     7:34 AM 11/7/2024     7:28 AM 11/4/2024     8:14 AM   Transplant Immunosuppression Labs   Creat 0.67 - 1.17 mg/dL 1.32  1.24  1.37  1.16  1.22    Urea Nitrogen 6.0 - 20.0 mg/dL 31.4  25.3  21.7  21.2  21.7    WBC 4.0 - 11.0 10e3/uL 8.4  8.7  7.7  8.5  10.0        Chemistries:   Recent Labs   Lab Test 11/19/24  0813   BUN 31.4*   CR 1.32*   GFRESTIMATED 71   *     Lab Results   Component Value Date    A1C 5.4 09/28/2024     Recent Labs   Lab Test 11/19/24  0813   ALBUMIN 4.3   BILITOTAL 0.8   ALKPHOS 132   AST 14   ALT 14     Urine Studies:  Recent Labs   Lab Test 09/27/24 2113   COLOR Dark Yellow*   APPEARANCE Slightly Cloudy*   URINEGLC Negative   URINEBILI Large*   URINEKETONE Negative   SG 1.011   UBLD Negative   URINEPH 6.0   PROTEIN Negative   NITRITE Negative   LEUKEST Negative   RBCU 1   WBCU 5     No lab results found.  Hematology:   Recent Labs   Lab Test 11/19/24  0813 11/14/24  0803 11/11/24  0734   HGB 10.2* 10.3* 9.8*    232 226   WBC 8.4 8.7 7.7     Coags:   Recent Labs   Lab Test 10/01/24  0437 09/30/24  0529   INR 1.03 1.14     HLA antibodies:   No results found for: \"FF4MHLNPY\", " "\"JA8GMYZQNS\", \"GR0YMMVNO\", \"HO8EEXCPYH\"    Assessment: Jag Smith is doing fairly well s/p Liver Tx:  Issues we addressed during his visit include:    Plan:    1. Graft function: LFTs normalizing  2. Immunosuppression Management: No change tac 8-12   .  Complexity of management:Low.  Contributing factors:  wound issues   3. Wound is improving slowly, needs to do twice daily dressing changes with byron LEWIS  Followup: will continue to see Marycruz frequently, will have follow-up appointment in 2-3 weeks            Fernando Colon MD/PhD  Professor of Surgery      Again, thank you for allowing me to participate in the care of your patient.        Sincerely,        Fernando Colon MD  "

## 2024-11-21 NOTE — NURSING NOTE
"Chief Complaint   Patient presents with    Surgical Followup     54 days post liver transplant 9/28/2024     Vital signs:  Temp: 98.3  F (36.8  C) Temp src: Oral BP: 118/74 Pulse: 92   Resp: 18 SpO2: 99 %     Height: 182.9 cm (6' 0.01\") Weight: 104.9 kg (231 lb 3.2 oz)  Estimated body mass index is 31.35 kg/m  as calculated from the following:    Height as of this encounter: 1.829 m (6' 0.01\").    Weight as of this encounter: 104.9 kg (231 lb 3.2 oz).      Hetal De La Garza, [facility]  11/21/2024 10:01 AM    "

## 2024-11-21 NOTE — PROGRESS NOTES
Transplant Surgery Progress Note    Transplants:  9/28/2024 (Liver); Postoperative day:  54  S: overall doing well, still has wound issue with some pain with dressing changes.  Overall appetite and strength improving, no med issues    Transplant History:    Transplant Type:  Liver Tx  Donor Type:     Transplant Date:  9/28/2024 (Liver)   Biliary Stent:  No    Acute Rejection Hx:  No    Present Maintenance Immunosuppression:  Tacrolimus, Mycophenolate mofetil, and Prednisone    CMV IgG Ab Discordance:  No  EBV IgG Ab Discordance:  No    Transplant Coordinator: Karena Villegas     Transplant Office Phone Number: [phone]     Immunosuppressant Medications       Immunosuppressive Agents Disp Start End     mycophenolate (GENERIC EQUIVALENT) 250 MG capsule 180 capsule 11/1/2024 --    Sig - Route: Take 3 capsules (750 mg) by mouth 2 times daily. - Oral    Class: E-Prescribe    Notes to Pharmacy: TXP DT 9/28/2024 (Liver) TXP Dischg DT 10/9/2024 DX Liver replaced by transplant Z94.4 TX [facility] ([place], [place])     tacrolimus (GENERIC EQUIVALENT) 1 MG capsule 240 capsule 11/11/2024 --    Sig - Route: Take 4 capsules (4 mg) by mouth every 12 hours. - Oral    Class: E-Prescribe    Notes to Pharmacy: TXP DT 9/28/2024 (Liver) TXP Dischg DT 10/9/2024 DX Liver replaced by transplant Z94.4 TX [facility] ([place], [place])     tacrolimus (GENERIC EQUIVALENT) 0.5 MG capsule 60 capsule 11/11/2024 --    Sig: HOLD    Patient not taking: Reported on 11/21/2024    Class: E-Prescribe    Notes to Pharmacy: TXP DT 9/28/2024 (Liver) TXP Dischg DT 10/9/2024 DX Liver replaced by transplant Z94.4 TX [facility] ([place], [place])     tacrolimus (GENERIC EQUIVALENT) 5 MG capsule -- 11/5/2024 --    Sig - Route: Take 1 capsule (5 mg) by mouth 2 times daily as needed (for dose changes). - Oral    Patient not  taking: Reported on 11/21/2024    Class: Historical    Notes to Pharmacy: TXP DT 9/28/2024 (Liver) TXP Dischg DT 10/9/2024 DX Liver replaced by transplant Z94.4 TX Center [facility] ([place], [place])            Possible Immunosuppression-related side effects:   []             headache  []             vivid dreams  []             irritability  []             cognitive difficuties  []             fine tremor  []             nausea  []             diarrhea  []             neuropathy      []             edema  []             renal calcineurin toxicity  []             hyperkalemia  []             post-transplant diabetes  []             decreased appetite  []             increased appetite  []             other:  []             none    Prescription Medications as of 11/21/2024         Rx Number Disp Refills Start End Last Dispensed Date Next Fill Date Owning Pharmacy    acetaminophen (TYLENOL) 325 MG tablet  60 tablet 0 11/18/2024 --   [address] [phone]    Sig: Take 2 tablets (650 mg) by mouth every 8 hours as needed for mild pain or fever.    Class: E-Prescribe    Route: Oral    aspirin (ASA) 81 MG chewable tablet  30 tablet 1 11/1/2024 --   [address] 1-173    Sig: Take 1 tablet (81 mg) by mouth or Feeding Tube daily.    Class: E-Prescribe    Route: Oral or Feeding Tube    hydrOXYzine HCl (ATARAX) 25 MG tablet  20 tablet 0 11/18/2024 --   [address] 1-549    Sig: Take 1 tablet (25 mg) by mouth every 8 hours as needed for anxiety or other (adjuvant pain).    Class: E-Prescribe    Route: Oral    magnesium oxide (MAG-OX) 400 MG tablet  120 tablet 1 11/1/2024 --   [address] 1-227    Sig: Take 2 tablets (800 mg) by mouth 2  times daily.    Class: E-Prescribe    Route: Oral    methocarbamol (ROBAXIN) 750 MG tablet  30 tablet 0 11/18/2024 --   [address] 1-753    Sig: Take 1 tablet (750 mg) by mouth 3 times daily as needed for muscle spasms.    Class: E-Prescribe    Route: Oral    multivitamin w/minerals (CERTAVITE/ANTIOXIDANTS) tablet  30 tablet 1 11/1/2024 --   [address] 1-678    Sig: Take 1 tablet by mouth daily.    Class: E-Prescribe    Route: Oral    mycophenolate (GENERIC EQUIVALENT) 250 MG capsule  180 capsule 1 11/1/2024 --   [address] 1-739    Sig: Take 3 capsules (750 mg) by mouth 2 times daily.    Class: E-Prescribe    Notes to Pharmacy: TXP DT 9/28/2024 (Liver) TXP Dischg DT 10/9/2024 DX Liver replaced by transplant Z94.4 [facility] ([place], [place])    Route: Oral    omeprazole (PRILOSEC) 20 MG DR capsule  30 capsule 2 11/1/2024 --   [address] 1-763    Sig: Take 1 capsule (20 mg) by mouth daily.    Class: E-Prescribe    Route: Oral    polyethylene glycol (MIRALAX) 17 GM/Dose powder  510 g 0 10/9/2024 --   [place], [place] - [address] [facility]    Sig: Take 17 g by mouth 2 times daily as needed for constipation.    Class: E-Prescribe    Route: Oral    predniSONE (DELTASONE) 5 MG tablet  30 tablet 1 11/1/2024 --   [address] 1-440    Sig: Take 1 tablet (5 mg) by mouth daily.    Class: E-Prescribe    Notes to Pharmacy: TXP DT 9/28/2024 (Liver) TXP Dischg DT 10/9/2024 DX Liver replaced by transplant Z94.4 [facility] ([place], [place])    Route: Oral    sennosides (SENOKOT) 8.6  MG tablet  60 tablet 0 10/9/2024 --   [place], [place] - [address] [facility]    Sig: Take 2 tablets by mouth 2 times daily as needed for constipation.    Class: E-Prescribe    Route: Oral    tacrolimus (GENERIC EQUIVALENT) 1 MG capsule  240 capsule 1 11/11/2024 --   [address] 1-378    Sig: Take 4 capsules (4 mg) by mouth every 12 hours.    Class: E-Prescribe    Notes to Pharmacy: TXP DT 9/28/2024 (Liver) TXP Dischg DT 10/9/2024 DX Liver replaced by transplant Z94.4 TX [facility] ([place], [place])    Route: Oral    traMADol (ULTRAM) 50 MG tablet  15 tablet 0 11/21/2024 11/24/2024   [address] 9-878    Sig: Take 1 tablet (50 mg) by mouth every 6 hours as needed for severe pain.    Class: E-Prescribe    Route: Oral    traZODone (DESYREL) 50 MG tablet  30 tablet 0 11/7/2024 --   [address] 1-569    Sig: Take 1 tablet (50 mg) by mouth at bedtime.    Class: E-Prescribe    Route: Oral    valGANciclovir (VALCYTE) 450 MG tablet  30 tablet 1 11/1/2024 --   [address] 1-662    Sig: Take 1 tablet (450 mg) by mouth daily.    Class: E-Prescribe    Route: Oral    escitalopram (LEXAPRO) 20 MG tablet  30 tablet 0 11/11/2024 --   [address] 1-704    Sig: Take 1 tablet (20 mg) by mouth daily.    Class: E-Prescribe    Route: Oral    tacrolimus (GENERIC EQUIVALENT) 0.5 MG capsule  60 capsule 1 11/11/2024 --   [address] 1-449    Sig: HOLD    Class: E-Prescribe    Notes to Pharmacy: TXP DT 9/28/2024 (Liver) TXP Dischg DT 10/9/2024 DX Liver replaced by transplant Z94.4 [place]  "[facility] ([place], [place])    tacrolimus (GENERIC EQUIVALENT) 5 MG capsule  -- -- 11/5/2024 --       Sig: Take 1 capsule (5 mg) by mouth 2 times daily as needed (for dose changes).    Class: Historical    Notes to Pharmacy: TXP DT 9/28/2024 (Liver) TXP Dischg DT 10/9/2024 DX Liver replaced by transplant Z94.4 TX [facility] ([place], [place])    Route: Oral            O:   [unfilled]        Latest Ref Rng & Units 11/19/2024     8:13 AM 11/14/2024     8:03 AM 11/11/2024     7:34 AM 11/7/2024     7:28 AM 11/4/2024     8:14 AM   Transplant Immunosuppression Labs   Creat 0.67 - 1.17 mg/dL 1.32  1.24  1.37  1.16  1.22    Urea Nitrogen 6.0 - 20.0 mg/dL 31.4  25.3  21.7  21.2  21.7    WBC 4.0 - 11.0 10e3/uL 8.4  8.7  7.7  8.5  10.0        Chemistries:   Recent Labs   Lab Test 11/19/24  0813   BUN 31.4*   CR 1.32*   GFRESTIMATED 71   *     Lab Results   Component Value Date    A1C 5.4 09/28/2024     Recent Labs   Lab Test 11/19/24  0813   ALBUMIN 4.3   BILITOTAL 0.8   ALKPHOS 132   AST 14   ALT 14     Urine Studies:  Recent Labs   Lab Test 09/27/24 2113   COLOR Dark Yellow*   APPEARANCE Slightly Cloudy*   URINEGLC Negative   URINEBILI Large*   URINEKETONE Negative   SG 1.011   UBLD Negative   URINEPH 6.0   PROTEIN Negative   NITRITE Negative   LEUKEST Negative   RBCU 1   WBCU 5     No lab results found.  Hematology:   Recent Labs   Lab Test 11/19/24  0813 11/14/24  0803 11/11/24  0734   HGB 10.2* 10.3* 9.8*    232 226   WBC 8.4 8.7 7.7     Coags:   Recent Labs   Lab Test 10/01/24  0437 09/30/24  0529   INR 1.03 1.14     HLA antibodies:   No results found for: \"FX9BOXWUH\", \"OH1NTCEKHE\", \"ES7WBCXNP\", \"LT3QHSVHQT\"    Assessment: Jag Smith is doing fairly well s/p Liver Tx:  Issues we addressed during his visit include:    Plan:    1. Graft function: LFTs normalizing  2. Immunosuppression Management: No change " tac 8-12   .  Complexity of management:Low.  Contributing factors:  wound issues   3. Wound is improving slowly, needs to do twice daily dressing changes with byron LEWIS  Followup: will continue to see Marycruz frequently, will have follow-up appointment in 2-3 weeks            Fernando Colon MD/PhD  Professor of Surgery

## 2024-11-22 ENCOUNTER — ANCILLARY PROCEDURE (OUTPATIENT)
Dept: ULTRASOUND IMAGING | Facility: CLINIC | Age: 39
End: 2024-11-22
Attending: NURSE PRACTITIONER
Payer: MEDICAID

## 2024-11-22 ENCOUNTER — LAB (OUTPATIENT)
Dept: LAB | Facility: CLINIC | Age: 39
End: 2024-11-22
Attending: TRANSPLANT SURGERY
Payer: MEDICAID

## 2024-11-22 DIAGNOSIS — Z94.4 LIVER TRANSPLANTED (H): ICD-10-CM

## 2024-11-22 DIAGNOSIS — Z94.4 LIVER REPLACED BY TRANSPLANT (H): ICD-10-CM

## 2024-11-22 LAB
ALBUMIN SERPL BCG-MCNC: 4.5 G/DL (ref 3.5–5.2)
ALP SERPL-CCNC: 155 U/L (ref 40–150)
ALT SERPL W P-5'-P-CCNC: 23 U/L (ref 0–70)
ANION GAP SERPL CALCULATED.3IONS-SCNC: 11 MMOL/L (ref 7–15)
AST SERPL W P-5'-P-CCNC: 21 U/L (ref 0–45)
BILIRUB DIRECT SERPL-MCNC: 0.48 MG/DL (ref 0–0.3)
BILIRUB SERPL-MCNC: 0.8 MG/DL
BUN SERPL-MCNC: 40.7 MG/DL (ref 6–20)
CALCIUM SERPL-MCNC: 10.4 MG/DL (ref 8.8–10.4)
CHLORIDE SERPL-SCNC: 101 MMOL/L (ref 98–107)
CREAT SERPL-MCNC: 1.41 MG/DL (ref 0.67–1.17)
EGFRCR SERPLBLD CKD-EPI 2021: 66 ML/MIN/1.73M2
ERYTHROCYTE [DISTWIDTH] IN BLOOD BY AUTOMATED COUNT: 14.5 % (ref 10–15)
GLUCOSE SERPL-MCNC: 114 MG/DL (ref 70–99)
HCO3 SERPL-SCNC: 22 MMOL/L (ref 22–29)
HCT VFR BLD AUTO: 32.2 % (ref 40–53)
HGB BLD-MCNC: 10.3 G/DL (ref 13.3–17.7)
MAGNESIUM SERPL-MCNC: 1.8 MG/DL (ref 1.7–2.3)
MCH RBC QN AUTO: 29.2 PG (ref 26.5–33)
MCHC RBC AUTO-ENTMCNC: 32 G/DL (ref 31.5–36.5)
MCV RBC AUTO: 91 FL (ref 78–100)
PHOSPHATE SERPL-MCNC: 6.3 MG/DL (ref 2.5–4.5)
PLATELET # BLD AUTO: 273 10E3/UL (ref 150–450)
POTASSIUM SERPL-SCNC: 5.6 MMOL/L (ref 3.4–5.3)
PROT SERPL-MCNC: 8.1 G/DL (ref 6.4–8.3)
RBC # BLD AUTO: 3.53 10E6/UL (ref 4.4–5.9)
SODIUM SERPL-SCNC: 134 MMOL/L (ref 135–145)
TACROLIMUS BLD-MCNC: 8.6 UG/L (ref 5–15)
TME LAST DOSE: NORMAL H
TME LAST DOSE: NORMAL H
WBC # BLD AUTO: 9.5 10E3/UL (ref 4–11)

## 2024-11-22 PROCEDURE — 80053 COMPREHEN METABOLIC PANEL: CPT | Performed by: PATHOLOGY

## 2024-11-22 PROCEDURE — 83735 ASSAY OF MAGNESIUM: CPT | Performed by: PATHOLOGY

## 2024-11-22 PROCEDURE — 84100 ASSAY OF PHOSPHORUS: CPT | Performed by: PATHOLOGY

## 2024-11-22 PROCEDURE — 99000 SPECIMEN HANDLING OFFICE-LAB: CPT | Performed by: PATHOLOGY

## 2024-11-22 PROCEDURE — 80197 ASSAY OF TACROLIMUS: CPT | Performed by: TRANSPLANT SURGERY

## 2024-11-22 PROCEDURE — 82248 BILIRUBIN DIRECT: CPT | Performed by: PATHOLOGY

## 2024-11-22 PROCEDURE — 36415 COLL VENOUS BLD VENIPUNCTURE: CPT | Performed by: PATHOLOGY

## 2024-11-22 PROCEDURE — 93975 VASCULAR STUDY: CPT | Mod: GC | Performed by: RADIOLOGY

## 2024-11-22 PROCEDURE — 85027 COMPLETE CBC AUTOMATED: CPT | Performed by: PATHOLOGY

## 2024-11-25 ENCOUNTER — LAB (OUTPATIENT)
Dept: LAB | Facility: CLINIC | Age: 39
End: 2024-11-25
Attending: TRANSPLANT SURGERY
Payer: MEDICAID

## 2024-11-25 ENCOUNTER — OFFICE VISIT (OUTPATIENT)
Dept: TRANSPLANT | Facility: CLINIC | Age: 39
End: 2024-11-25
Attending: INTERNAL MEDICINE
Payer: MEDICAID

## 2024-11-25 ENCOUNTER — TELEPHONE (OUTPATIENT)
Dept: TRANSPLANT | Facility: CLINIC | Age: 39
End: 2024-11-25

## 2024-11-25 VITALS
HEART RATE: 93 BPM | OXYGEN SATURATION: 98 % | WEIGHT: 228.2 LBS | BODY MASS INDEX: 30.94 KG/M2 | DIASTOLIC BLOOD PRESSURE: 78 MMHG | SYSTOLIC BLOOD PRESSURE: 121 MMHG | TEMPERATURE: 97.8 F

## 2024-11-25 DIAGNOSIS — Z94.4 LIVER REPLACED BY TRANSPLANT (H): ICD-10-CM

## 2024-11-25 DIAGNOSIS — E87.5 HYPERKALEMIA: Primary | ICD-10-CM

## 2024-11-25 LAB
ALBUMIN SERPL BCG-MCNC: 4.5 G/DL (ref 3.5–5.2)
ALP SERPL-CCNC: 173 U/L (ref 40–150)
ALT SERPL W P-5'-P-CCNC: 37 U/L (ref 0–70)
ANION GAP SERPL CALCULATED.3IONS-SCNC: 12 MMOL/L (ref 7–15)
AST SERPL W P-5'-P-CCNC: 29 U/L (ref 0–45)
BILIRUB DIRECT SERPL-MCNC: 0.42 MG/DL (ref 0–0.3)
BILIRUB SERPL-MCNC: 0.7 MG/DL
BUN SERPL-MCNC: 50 MG/DL (ref 6–20)
CALCIUM SERPL-MCNC: 10.5 MG/DL (ref 8.8–10.4)
CHLORIDE SERPL-SCNC: 107 MMOL/L (ref 98–107)
CREAT SERPL-MCNC: 1.46 MG/DL (ref 0.67–1.17)
EGFRCR SERPLBLD CKD-EPI 2021: 63 ML/MIN/1.73M2
ERYTHROCYTE [DISTWIDTH] IN BLOOD BY AUTOMATED COUNT: 14.5 % (ref 10–15)
GLUCOSE SERPL-MCNC: 102 MG/DL (ref 70–99)
HCO3 SERPL-SCNC: 20 MMOL/L (ref 22–29)
HCT VFR BLD AUTO: 32.9 % (ref 40–53)
HGB BLD-MCNC: 10.3 G/DL (ref 13.3–17.7)
MAGNESIUM SERPL-MCNC: 1.7 MG/DL (ref 1.7–2.3)
MCH RBC QN AUTO: 29.4 PG (ref 26.5–33)
MCHC RBC AUTO-ENTMCNC: 31.3 G/DL (ref 31.5–36.5)
MCV RBC AUTO: 94 FL (ref 78–100)
PHOSPHATE SERPL-MCNC: 6.8 MG/DL (ref 2.5–4.5)
PLATELET # BLD AUTO: 309 10E3/UL (ref 150–450)
POTASSIUM SERPL-SCNC: 6.1 MMOL/L (ref 3.4–5.3)
PROT SERPL-MCNC: 8.1 G/DL (ref 6.4–8.3)
RBC # BLD AUTO: 3.5 10E6/UL (ref 4.4–5.9)
SODIUM SERPL-SCNC: 139 MMOL/L (ref 135–145)
TACROLIMUS BLD-MCNC: 9.4 UG/L (ref 5–15)
TME LAST DOSE: NORMAL H
TME LAST DOSE: NORMAL H
WBC # BLD AUTO: 7.7 10E3/UL (ref 4–11)

## 2024-11-25 PROCEDURE — 85027 COMPLETE CBC AUTOMATED: CPT | Performed by: PATHOLOGY

## 2024-11-25 PROCEDURE — 99000 SPECIMEN HANDLING OFFICE-LAB: CPT | Performed by: PATHOLOGY

## 2024-11-25 PROCEDURE — 80197 ASSAY OF TACROLIMUS: CPT | Performed by: TRANSPLANT SURGERY

## 2024-11-25 PROCEDURE — 82248 BILIRUBIN DIRECT: CPT | Performed by: PATHOLOGY

## 2024-11-25 PROCEDURE — G0463 HOSPITAL OUTPT CLINIC VISIT: HCPCS | Performed by: NURSE PRACTITIONER

## 2024-11-25 PROCEDURE — 80053 COMPREHEN METABOLIC PANEL: CPT | Performed by: PATHOLOGY

## 2024-11-25 PROCEDURE — 84100 ASSAY OF PHOSPHORUS: CPT | Performed by: PATHOLOGY

## 2024-11-25 PROCEDURE — 83735 ASSAY OF MAGNESIUM: CPT | Performed by: PATHOLOGY

## 2024-11-25 PROCEDURE — 36415 COLL VENOUS BLD VENIPUNCTURE: CPT | Performed by: PATHOLOGY

## 2024-11-25 ASSESSMENT — PAIN SCALES - GENERAL: PAINLEVEL_OUTOF10: SEVERE PAIN (7)

## 2024-11-25 NOTE — TELEPHONE ENCOUNTER
DATE:  11/25/2024     TIME OF RECEIPT FROM LAB:  9:09    ORDERING PROVIDER: stephanie    LAB TEST:  K+    LAB VALUE:  6.1    RESULTS GIVEN WITH READ-BACK TO (PROVIDER):  Karena Villegas    TIME LAB VALUE REPORTED TO PROVIDER:   9:16

## 2024-11-26 NOTE — PROGRESS NOTES
Transplant Surgery -OUTPATIENT PROGRESS NOTE    Date of Visit: 11/26/2024    Transplants:  9/28/2024 (Liver); Postoperative day:  59  ASSESMENT AND PLAN:  Incisional wound:   Stable. X3 tunneling areas. No s/s infection. Continue packing with 4x4 gauze BID.   Alk phos still up trending. Dr. Colon aware. Monitor for now.     Hyperkalemia: Lokelma x 3 days. STOP premiere protein drinks for now. Repeat Thursday.     Updated Dr. Colon.       Date: November 25th, 2024    Transplant:  [x]                             Liver [x]                              Kidney []                             Pancreas []                              Other:             Chief Complaint:Post-op Visit (Liver txp)    History of Present Illness:  Here today for incisional wound care. Mother is changing BID.   No fever or N/V/D.   Incisional pain improving with gabapentin at night.   Liver US from last week with no acute changes.            Patient Active Problem List   Diagnosis    Alcohol use disorder, severe, in early remission (H)    Alcoholic hepatitis (H)    Epiphora due to insufficient drainage of left side    Hyperbilirubinemia    Hypokalemia    Jaundice    Pneumonia    Liver replaced by transplant (H)    DENI (acute kidney injury) (H)    Immunosuppressed status (H)    Acute post-operative pain    Steroid-induced hyperglycemia    Acute urinary retention    Anemia due to blood loss, acute    Severe malnutrition (H)    Hyponatremia    Hypomagnesemia    Bilateral lower extremity edema    Hyperkalemia    Subconjunctival hemorrhage    Nonhealing surgical wound    Skin ulcer of abdomen with fat layer exposed (H)     SOCIAL /FAMILY HISTORY: [x]                  No recent change    Past Medical History:   Diagnosis Date    Alcohol use disorder     Alcoholic hepatitis (H) 06/07/2024    Anxiety     Depression     Hypertension     Liver replaced by transplant (H) 09/28/2024     Past Surgical History:   Procedure Laterality Date    BENCH LIVER   2024    Procedure: Bench liver;  Surgeon: Fernando Colon MD;  Location: [facility] OR    DACRYOCYSTORHINOSTOMY Left 2013    INCISION AND DRAINAGE BUTTOCKS Left 2017    TRANSPLANT LIVER RECIPIENT  DONOR N/A 2024    Procedure: Transplant liver recipient  donor;  Surgeon: Fernando Colon MD;  Location: [facility] OR     Social History     Socioeconomic History    Marital status: Single     Spouse name: Not on file    Number of children: Not on file    Years of education: Not on file    Highest education level: Not on file   Occupational History    Not on file   Tobacco Use    Smoking status: Never    Smokeless tobacco: Never   Substance and Sexual Activity    Alcohol use: Not Currently     Comment: last drink 2024    Drug use: Never    Sexual activity: Not on file   Other Topics Concern    Not on file   Social History Narrative    Not on file     Social Drivers of Health     Financial Resource Strain: Low Risk  (2024)    Financial Resource Strain     Within the past 12 months, have you or your family members you live with been unable to get utilities (heat, electricity) when it was really needed?: No   Food Insecurity: Low Risk  (2024)    Food Insecurity     Within the past 12 months, did you worry that your food would run out before you got money to buy more?: No     Within the past 12 months, did the food you bought just not last and you didn t have money to get more?: No   Transportation Needs: Low Risk  (2024)    Transportation Needs     Within the past 12 months, has lack of transportation kept you from medical appointments, getting your medicines, non-medical meetings or appointments, work, or from getting things that you need?: No   Physical Activity: Not on file   Stress: Not on file   Social Connections: Not on file   Interpersonal Safety: Low Risk  (2024)    Interpersonal Safety     Do you feel physically and emotionally safe where you currently live?: Yes      Within the past 12 months, have you been hit, slapped, kicked or otherwise physically hurt by someone?: No     Within the past 12 months, have you been humiliated or emotionally abused in other ways by your partner or ex-partner?: No   Recent Concern: Interpersonal Safety - High Risk (9/22/2024)    Interpersonal Safety     Do you feel physically and emotionally safe where you currently live?: No     Within the past 12 months, have you been hit, slapped, kicked or otherwise physically hurt by someone?: No     Within the past 12 months, have you been humiliated or emotionally abused in other ways by your partner or ex-partner?: No   Housing Stability: Low Risk  (9/21/2024)    Housing Stability     Do you have housing? : Yes     Are you worried about losing your housing?: No     Prescription Medications as of 11/26/2024         Rx Number Disp Refills Start End Last Dispensed Date Next Fill Date Owning Pharmacy    acetaminophen (TYLENOL) 325 MG tablet  60 tablet 0 11/18/2024 --   [address] 2-565    Sig: Take 2 tablets (650 mg) by mouth every 8 hours as needed for mild pain or fever.    Class: E-Prescribe    Route: Oral    aspirin (ASA) 81 MG chewable tablet  30 tablet 1 11/1/2024 --   [address] 1-027    Sig: Take 1 tablet (81 mg) by mouth or Feeding Tube daily.    Class: E-Prescribe    Route: Oral or Feeding Tube    escitalopram (LEXAPRO) 20 MG tablet  30 tablet 0 11/11/2024 --   [address] 4-781    Sig: Take 1 tablet (20 mg) by mouth daily.    Class: E-Prescribe    Route: Oral    gabapentin (NEURONTIN) 300 MG capsule  30 capsule 0 11/22/2024 12/22/2024   [address] 6-030    Sig: Take 1 capsule (300 mg) by mouth at bedtime.    Class: E-Prescribe    Route:  Oral    hydrOXYzine HCl (ATARAX) 25 MG tablet  20 tablet 0 11/18/2024 --   [address] 1-273    Sig: Take 1 tablet (25 mg) by mouth every 8 hours as needed for anxiety or other (adjuvant pain).    Class: E-Prescribe    Route: Oral    magnesium oxide (MAG-OX) 400 MG tablet  120 tablet 1 11/1/2024 --   [address] 1-273    Sig: Take 2 tablets (800 mg) by mouth 2 times daily.    Class: E-Prescribe    Route: Oral    methocarbamol (ROBAXIN) 750 MG tablet  30 tablet 0 11/18/2024 --   [address] 1-273    Sig: Take 1 tablet (750 mg) by mouth 3 times daily as needed for muscle spasms.    Class: E-Prescribe    Route: Oral    multivitamin w/minerals (CERTAVITE/ANTIOXIDANTS) tablet  30 tablet 1 11/1/2024 --   [address] 1-273    Sig: Take 1 tablet by mouth daily.    Class: E-Prescribe    Route: Oral    mycophenolate (GENERIC EQUIVALENT) 250 MG capsule  180 capsule 1 11/1/2024 --   [address] 1[address]273    Sig: Take 3 capsules (750 mg) by mouth 2 times daily.    Class: E-Prescribe    Notes to Pharmacy: TXP DT 9/28/2024 (Liver) TXP Dischg DT 10/9/2024 DX Liver replaced by transplant Z94.4 TX [facility] ([place], [place])    Route: Oral    omeprazole (PRILOSEC) 20 MG DR capsule  30 capsule 2 11/1/2024 --   [address] 1[address]273    Sig: Take 1 capsule (20 mg) by mouth daily.    Class: E-Prescribe    Route: Oral    polyethylene glycol (MIRALAX) 17 GM/Dose powder  510 g 0 10/9/2024 --   [place], [place] - [address] [facility] [address]    Sig: Take 17 g by mouth 2 times daily as needed for  constipation.    Class: E-Prescribe    Route: Oral    predniSONE (DELTASONE) 5 MG tablet  30 tablet 1 11/1/2024 --   [address] 4-611    Sig: Take 1 tablet (5 mg) by mouth daily.    Class: E-Prescribe    Notes to Pharmacy: TXP DT 9/28/2024 (Liver) TXP Dischg DT 10/9/2024 DX Liver replaced by transplant Z94.4 [facility] ([place], [place])    Route: Oral    sennosides (SENOKOT) 8.6 MG tablet  60 tablet 0 10/9/2024 --   [place], [place] - [address] [facility]    Sig: Take 2 tablets by mouth 2 times daily as needed for constipation.    Class: E-Prescribe    Route: Oral    sodium zirconium cyclosilicate (LOKELMA) 10 g PACK packet  3 packet 0 11/25/2024 11/28/2024   [address] 9-802    Sig: Take 1 packet (10 g) by mouth daily for 3 days.    Class: E-Prescribe    Route: Oral    tacrolimus (GENERIC EQUIVALENT) 0.5 MG capsule  60 capsule 1 11/11/2024 --   [address] 2-669    Sig: HOLD    Class: E-Prescribe    Notes to Pharmacy: TXP DT 9/28/2024 (Liver) TXP Dischg DT 10/9/2024 DX Liver replaced by transplant Z94.4 [facility] ([place], [place])    tacrolimus (GENERIC EQUIVALENT) 1 MG capsule  240 capsule 1 11/11/2024 --   [address] 2-040    Sig: Take 4 capsules (4 mg) by mouth every 12 hours.    Class: E-Prescribe    Notes to Pharmacy: TXP DT 9/28/2024 (Liver) TXP Dischg DT 10/9/2024 DX Liver replaced by transplant Z94.4 [facility] ([place], [place])    Route: Oral    tacrolimus (GENERIC EQUIVALENT) 5 MG capsule  -- -- 11/5/2024 --       Sig: Take 1 capsule (5 mg) by mouth 2 times daily as needed  (for dose changes).    Class: Historical    Notes to Pharmacy: TXP DT 9/28/2024 (Liver) TXP Dischg DT 10/9/2024 DX Liver replaced by transplant Z94.4 TX Center [facility] ([place], [place])    Route: Oral    traZODone (DESYREL) 50 MG tablet  30 tablet 0 11/7/2024 --   [address] 7-577    Sig: Take 1 tablet (50 mg) by mouth at bedtime.    Class: E-Prescribe    Route: Oral    valGANciclovir (VALCYTE) 450 MG tablet  30 tablet 1 11/1/2024 --   [place] [id number] [facility] 3-499    Sig: Take 1 tablet (450 mg) by mouth daily.    Class: E-Prescribe    Route: Oral          Azithromycin   REVIEW OF SYSTEMS (check box if normal)  [x]               GENERAL  [x]                 PULMONARY [x]                GENITOURINARY  [x]                CNS                 [x]                 CARDIAC  [x]                 ENDOCRINE  [x]                EARS,NOSE,THROAT [x]                 GASTROINTESTINAL [x]                 NEUROLOGIC    [x]                MUSCLOSKELTAL  [x]                  HEMATOLOGY      PHYSICAL EXAM (check box if normal)/78   Pulse 93   Temp 97.8  F (36.6  C) (Oral)   Wt 103.5 kg (228 lb 3.2 oz)   SpO2 98%   BMI 30.94 kg/m          [x]            GENERAL: NAD    [x]            Incision:  Left side of incision open but healing well, tunneling present midline x2 and x1 R distal incision. Tunneling packed with 4x4 gauze with vanshe solution and rest of incision covered with 4x4 gauze with no issues. No surrounding erythema. Moderate drainage present- mostly midline.    Abd soft throughout. Non-tender.                                                                                 PAIN SCALE:: 8

## 2024-11-27 ENCOUNTER — OFFICE VISIT (OUTPATIENT)
Dept: TRANSPLANT | Facility: CLINIC | Age: 39
End: 2024-11-27
Attending: NURSE PRACTITIONER
Payer: MEDICAID

## 2024-11-27 VITALS
SYSTOLIC BLOOD PRESSURE: 119 MMHG | DIASTOLIC BLOOD PRESSURE: 71 MMHG | RESPIRATION RATE: 16 BRPM | WEIGHT: 230.3 LBS | OXYGEN SATURATION: 98 % | BODY MASS INDEX: 31.23 KG/M2 | TEMPERATURE: 98.2 F | HEART RATE: 101 BPM

## 2024-11-27 DIAGNOSIS — Z94.4 LIVER REPLACED BY TRANSPLANT (H): ICD-10-CM

## 2024-11-27 DIAGNOSIS — G89.18 ACUTE POST-OPERATIVE PAIN: ICD-10-CM

## 2024-11-27 PROCEDURE — G0463 HOSPITAL OUTPT CLINIC VISIT: HCPCS | Performed by: NURSE PRACTITIONER

## 2024-11-27 RX ORDER — HYDROXYZINE HYDROCHLORIDE 25 MG/1
25 TABLET, FILM COATED ORAL EVERY 8 HOURS PRN
Qty: 20 TABLET | Refills: 0 | Status: SHIPPED | OUTPATIENT
Start: 2024-11-27

## 2024-11-27 NOTE — PROGRESS NOTES
Transplant Surgery -OUTPATIENT PROGRESS NOTE    Date of Visit: 2024    Transplants:  2024 (Liver); Postoperative day:  60  ASSESMENT AND PLAN:  Incisional wound:   Stable. X3 tunneling areas. New shallow opening mid R incision. No s/s infection. Continue packing with 4x4 gauze BID.     Labs scheduled for Friday.       Date: 2024    Transplant:  [x]                             Liver [x]                              Kidney []                             Pancreas []                              Other:             Chief Complaint:Follow Up    History of Present Illness:  Here today for incisional wound care. Mother is changing BID.   No fever or N/V/D.            Patient Active Problem List   Diagnosis    Alcohol use disorder, severe, in early remission (H)    Alcoholic hepatitis (H)    Epiphora due to insufficient drainage of left side    Hyperbilirubinemia    Hypokalemia    Jaundice    Pneumonia    Liver replaced by transplant (H)    DENI (acute kidney injury) (H)    Immunosuppressed status (H)    Acute post-operative pain    Steroid-induced hyperglycemia    Acute urinary retention    Anemia due to blood loss, acute    Severe malnutrition (H)    Hyponatremia    Hypomagnesemia    Bilateral lower extremity edema    Hyperkalemia    Subconjunctival hemorrhage    Nonhealing surgical wound    Skin ulcer of abdomen with fat layer exposed (H)     SOCIAL /FAMILY HISTORY: [x]                  No recent change    Past Medical History:   Diagnosis Date    Alcohol use disorder     Alcoholic hepatitis (H) 2024    Anxiety     Depression     Hypertension     Liver replaced by transplant (H) 2024     Past Surgical History:   Procedure Laterality Date    BENCH LIVER  2024    Procedure: Bench liver;  Surgeon: Fernando Colon MD;  Location: [facility] OR    DACRYOCYSTORHINOSTOMY Left 2013    INCISION AND DRAINAGE BUTTOCKS Left 2017    TRANSPLANT LIVER RECIPIENT  DONOR N/A 2024     Procedure: Transplant liver recipient  donor;  Surgeon: Fernando Colon MD;  Location:  OR     Social History     Socioeconomic History    Marital status: Single     Spouse name: Not on file    Number of children: Not on file    Years of education: Not on file    Highest education level: Not on file   Occupational History    Not on file   Tobacco Use    Smoking status: Never    Smokeless tobacco: Never   Substance and Sexual Activity    Alcohol use: Not Currently     Comment: last drink 2024    Drug use: Never    Sexual activity: Not on file   Other Topics Concern    Not on file   Social History Narrative    Not on file     Social Drivers of Health     Financial Resource Strain: Low Risk  (2024)    Financial Resource Strain     Within the past 12 months, have you or your family members you live with been unable to get utilities (heat, electricity) when it was really needed?: No   Food Insecurity: Low Risk  (2024)    Food Insecurity     Within the past 12 months, did you worry that your food would run out before you got money to buy more?: No     Within the past 12 months, did the food you bought just not last and you didn t have money to get more?: No   Transportation Needs: Low Risk  (2024)    Transportation Needs     Within the past 12 months, has lack of transportation kept you from medical appointments, getting your medicines, non-medical meetings or appointments, work, or from getting things that you need?: No   Physical Activity: Not on file   Stress: Not on file   Social Connections: Not on file   Interpersonal Safety: Low Risk  (2024)    Interpersonal Safety     Do you feel physically and emotionally safe where you currently live?: Yes     Within the past 12 months, have you been hit, slapped, kicked or otherwise physically hurt by someone?: No     Within the past 12 months, have you been humiliated or emotionally abused in other ways by your partner or ex-partner?: No    Recent Concern: Interpersonal Safety - High Risk (9/22/2024)    Interpersonal Safety     Do you feel physically and emotionally safe where you currently live?: No     Within the past 12 months, have you been hit, slapped, kicked or otherwise physically hurt by someone?: No     Within the past 12 months, have you been humiliated or emotionally abused in other ways by your partner or ex-partner?: No   Housing Stability: Low Risk  (9/21/2024)    Housing Stability     Do you have housing? : Yes     Are you worried about losing your housing?: No     Prescription Medications as of 11/27/2024         Rx Number Disp Refills Start End Last Dispensed Date Next Fill Date Owning Pharmacy    acetaminophen (TYLENOL) 325 MG tablet  60 tablet 0 11/18/2024 --   [address] 1-496    Sig: Take 2 tablets (650 mg) by mouth every 8 hours as needed for mild pain or fever.    Class: E-Prescribe    Route: Oral    aspirin (ASA) 81 MG chewable tablet  30 tablet 1 11/1/2024 --   [address] 1-686    Sig: Take 1 tablet (81 mg) by mouth or Feeding Tube daily.    Class: E-Prescribe    Route: Oral or Feeding Tube    escitalopram (LEXAPRO) 20 MG tablet  30 tablet 0 11/11/2024 --   [address] 8-386    Sig: Take 1 tablet (20 mg) by mouth daily.    Class: E-Prescribe    Route: Oral    gabapentin (NEURONTIN) 300 MG capsule  30 capsule 0 11/22/2024 12/22/2024   [address] 1-794    Sig: Take 1 capsule (300 mg) by mouth at bedtime.    Class: E-Prescribe    Route: Oral    hydrOXYzine HCl (ATARAX) 25 MG tablet  20 tablet 0 11/18/2024 --   [address] 1-104    Sig: Take 1 tablet (25 mg) by mouth every 8 hours as needed for anxiety or  other (adjuvant pain).    Class: E-Prescribe    Route: Oral    magnesium oxide (MAG-OX) 400 MG tablet  120 tablet 1 11/1/2024 --   [address] 1-273    Sig: Take 2 tablets (800 mg) by mouth 2 times daily.    Class: E-Prescribe    Route: Oral    methocarbamol (ROBAXIN) 750 MG tablet  30 tablet 0 11/18/2024 --   [address] 1-273    Sig: Take 1 tablet (750 mg) by mouth 3 times daily as needed for muscle spasms.    Class: E-Prescribe    Route: Oral    multivitamin w/minerals (CERTAVITE/ANTIOXIDANTS) tablet  30 tablet 1 11/1/2024 --   [address] 1-273    Sig: Take 1 tablet by mouth daily.    Class: E-Prescribe    Route: Oral    mycophenolate (GENERIC EQUIVALENT) 250 MG capsule  180 capsule 1 11/1/2024 --   [address] 1[address]273    Sig: Take 3 capsules (750 mg) by mouth 2 times daily.    Class: E-Prescribe    Notes to Pharmacy: TXP DT 9/28/2024 (Liver) TXP Dischg DT 10/9/2024 DX Liver replaced by transplant Z94.4 TX Center [facility] ([place], [place])    Route: Oral    omeprazole (PRILOSEC) 20 MG DR capsule  30 capsule 2 11/1/2024 --   [address] 1-273    Sig: Take 1 capsule (20 mg) by mouth daily.    Class: E-Prescribe    Route: Oral    polyethylene glycol (MIRALAX) 17 GM/Dose powder  510 g 0 10/9/2024 --   [place], [place] - [address] [place] [address] SE    Sig: Take 17 g by mouth 2 times daily as needed for constipation.    Class: E-Prescribe    Route: Oral    predniSONE (DELTASONE) 5 MG tablet  30 tablet 1 11/1/2024 --   [address] 1-273    Sig: Take 1 tablet (5 mg) by mouth  daily.    Class: E-Prescribe    Notes to Pharmacy: TXP DT 9/28/2024 (Liver) TXP Dischg DT 10/9/2024 DX Liver replaced by transplant Z94.4 [facility] ([place], [place])    Route: Oral    sennosides (SENOKOT) 8.6 MG tablet  60 tablet 0 10/9/2024 --   [place], [place] - [address] [facility] [address]    Sig: Take 2 tablets by mouth 2 times daily as needed for constipation.    Class: E-Prescribe    Route: Oral    sodium zirconium cyclosilicate (LOKELMA) 10 g PACK packet  3 packet 0 11/25/2024 11/28/2024   [address] 1-919    Sig: Take 1 packet (10 g) by mouth daily for 3 days.    Class: E-Prescribe    Route: Oral    tacrolimus (GENERIC EQUIVALENT) 0.5 MG capsule  60 capsule 1 11/11/2024 --   [address] 8-795    Sig: HOLD    Class: E-Prescribe    Notes to Pharmacy: TXP DT 9/28/2024 (Liver) TXP Dischg DT 10/9/2024 DX Liver replaced by transplant Z94.4 [facility] ([place], [place])    tacrolimus (GENERIC EQUIVALENT) 1 MG capsule  240 capsule 1 11/11/2024 --   [address] 1-355    Sig: Take 4 capsules (4 mg) by mouth every 12 hours.    Class: E-Prescribe    Notes to Pharmacy: TXP DT 9/28/2024 (Liver) TXP Dischg DT 10/9/2024 DX Liver replaced by transplant Z94.4 TX [facility] ([place], [place])    Route: Oral    tacrolimus (GENERIC EQUIVALENT) 5 MG capsule  -- -- 11/5/2024 --       Sig: Take 1 capsule (5 mg) by mouth 2 times daily as needed (for dose changes).    Class: Historical    Notes to Pharmacy: TXP DT 9/28/2024 (Liver) TXP Dischg DT 10/9/2024 DX Liver replaced by transplant Z94.4 [facility] ([place], [place])    Route:  Oral    traZODone (DESYREL) 50 MG tablet  30 tablet 0 11/7/2024 --   [address] 4-789    Sig: Take 1 tablet (50 mg) by mouth at bedtime.    Class: E-Prescribe    Route: Oral    valGANciclovir (VALCYTE) 450 MG tablet  30 tablet 1 11/1/2024 --   [address] 0-751    Sig: Take 1 tablet (450 mg) by mouth daily.    Class: E-Prescribe    Route: Oral          Azithromycin   REVIEW OF SYSTEMS (check box if normal)  [x]               GENERAL  [x]                 PULMONARY [x]                GENITOURINARY  [x]                CNS                 [x]                 CARDIAC  [x]                 ENDOCRINE  [x]                EARS,NOSE,THROAT [x]                 GASTROINTESTINAL [x]                 NEUROLOGIC    [x]                MUSCLOSKELTAL  [x]                  HEMATOLOGY      PHYSICAL EXAM (check box if normal)/71 (BP Location: Right arm, Patient Position: Sitting, Cuff Size: Adult Regular)   Pulse 101   Temp 98.2  F (36.8  C) (Oral)   Resp 16   Wt 104.5 kg (230 lb 4.8 oz)   SpO2 98%   BMI 31.23 kg/m          [x]            GENERAL: NAD    [x]            Incision:  Left side of incision open but healing well, tunneling present midline x2 and x1 R distal incision. New 1cm opening mid R incision - no tunneling. Tunneling areas packed with 4x4 gauze with vanshe solution and rest of incision covered with 4x4 gauze with no issues. No surrounding erythema. Moderate drainage present- mostly midline.    Abd soft throughout. Non-tender.                                                                                 PAIN SCALE:: 8

## 2024-11-27 NOTE — LETTER
11/27/2024      Jag Smith  [address]  [facility] [address]      Dear Colleague,    Thank you for referring your patient, Jag Smith, to the [facility]. Please see a copy of my visit note below.    Transplant Surgery -OUTPATIENT PROGRESS NOTE    Date of Visit: 11/27/2024    Transplants:  9/28/2024 (Liver); Postoperative day:  60  ASSESMENT AND PLAN:  Incisional wound:   Stable. X3 tunneling areas. New shallow opening mid R incision. No s/s infection. Continue packing with 4x4 gauze BID.     Labs scheduled for Friday.       Date: November 27th, 2024    Transplant:  [x]                             Liver [x]                              Kidney []                             Pancreas []                              Other:             Chief Complaint:Follow Up    History of Present Illness:  Here today for incisional wound care. Mother is changing BID.   No fever or N/V/D.            Patient Active Problem List   Diagnosis     Alcohol use disorder, severe, in early remission (H)     Alcoholic hepatitis (H)     Epiphora due to insufficient drainage of left side     Hyperbilirubinemia     Hypokalemia     Jaundice     Pneumonia     Liver replaced by transplant (H)     DENI (acute kidney injury) (H)     Immunosuppressed status (H)     Acute post-operative pain     Steroid-induced hyperglycemia     Acute urinary retention     Anemia due to blood loss, acute     Severe malnutrition (H)     Hyponatremia     Hypomagnesemia     Bilateral lower extremity edema     Hyperkalemia     Subconjunctival hemorrhage     Nonhealing surgical wound     Skin ulcer of abdomen with fat layer exposed (H)     SOCIAL /FAMILY HISTORY: [x]                  No recent change    Past Medical History:   Diagnosis Date     Alcohol use disorder      Alcoholic hepatitis (H) 06/07/2024     Anxiety      Depression      Hypertension      Liver replaced by transplant (H) 09/28/2024     Past Surgical History:   Procedure  Laterality Date     BENCH LIVER  2024    Procedure: Bench liver;  Surgeon: Fernando Colon MD;  Location: [facility] OR     DACRYOCYSTORHINOSTOMY Left 2013     INCISION AND DRAINAGE BUTTOCKS Left 2017     TRANSPLANT LIVER RECIPIENT  DONOR N/A 2024    Procedure: Transplant liver recipient  donor;  Surgeon: Fernando Colon MD;  Location: [facility] OR     Social History     Socioeconomic History     Marital status: Single     Spouse name: Not on file     Number of children: Not on file     Years of education: Not on file     Highest education level: Not on file   Occupational History     Not on file   Tobacco Use     Smoking status: Never     Smokeless tobacco: Never   Substance and Sexual Activity     Alcohol use: Not Currently     Comment: last drink 2024     Drug use: Never     Sexual activity: Not on file   Other Topics Concern     Not on file   Social History Narrative     Not on file     Social Drivers of Health     Financial Resource Strain: Low Risk  (2024)    Financial Resource Strain      Within the past 12 months, have you or your family members you live with been unable to get utilities (heat, electricity) when it was really needed?: No   Food Insecurity: Low Risk  (2024)    Food Insecurity      Within the past 12 months, did you worry that your food would run out before you got money to buy more?: No      Within the past 12 months, did the food you bought just not last and you didn t have money to get more?: No   Transportation Needs: Low Risk  (2024)    Transportation Needs      Within the past 12 months, has lack of transportation kept you from medical appointments, getting your medicines, non-medical meetings or appointments, work, or from getting things that you need?: No   Physical Activity: Not on file   Stress: Not on file   Social Connections: Not on file   Interpersonal Safety: Low Risk  (2024)    Interpersonal Safety      Do you feel physically  and emotionally safe where you currently live?: Yes      Within the past 12 months, have you been hit, slapped, kicked or otherwise physically hurt by someone?: No      Within the past 12 months, have you been humiliated or emotionally abused in other ways by your partner or ex-partner?: No   Recent Concern: Interpersonal Safety - High Risk (9/22/2024)    Interpersonal Safety      Do you feel physically and emotionally safe where you currently live?: No      Within the past 12 months, have you been hit, slapped, kicked or otherwise physically hurt by someone?: No      Within the past 12 months, have you been humiliated or emotionally abused in other ways by your partner or ex-partner?: No   Housing Stability: Low Risk  (9/21/2024)    Housing Stability      Do you have housing? : Yes      Are you worried about losing your housing?: No     Prescription Medications as of 11/27/2024         Rx Number Disp Refills Start End Last Dispensed Date Next Fill Date Owning Pharmacy    acetaminophen (TYLENOL) 325 MG tablet  60 tablet 0 11/18/2024 --   [address] 7-842    Sig: Take 2 tablets (650 mg) by mouth every 8 hours as needed for mild pain or fever.    Class: E-Prescribe    Route: Oral    aspirin (ASA) 81 MG chewable tablet  30 tablet 1 11/1/2024 --   [address] 6-869    Sig: Take 1 tablet (81 mg) by mouth or Feeding Tube daily.    Class: E-Prescribe    Route: Oral or Feeding Tube    escitalopram (LEXAPRO) 20 MG tablet  30 tablet 0 11/11/2024 --   [address] 9-255    Sig: Take 1 tablet (20 mg) by mouth daily.    Class: E-Prescribe    Route: Oral    gabapentin (NEURONTIN) 300 MG capsule  30 capsule 0 11/22/2024 12/22/2024   [address] 5-121    Sig: Take 1 capsule  (300 mg) by mouth at bedtime.    Class: E-Prescribe    Route: Oral    hydrOXYzine HCl (ATARAX) 25 MG tablet  20 tablet 0 11/18/2024 --   [address] 1-273    Sig: Take 1 tablet (25 mg) by mouth every 8 hours as needed for anxiety or other (adjuvant pain).    Class: E-Prescribe    Route: Oral    magnesium oxide (MAG-OX) 400 MG tablet  120 tablet 1 11/1/2024 --   [address] 1[address]273    Sig: Take 2 tablets (800 mg) by mouth 2 times daily.    Class: E-Prescribe    Route: Oral    methocarbamol (ROBAXIN) 750 MG tablet  30 tablet 0 11/18/2024 --   [address] 1[address]273    Sig: Take 1 tablet (750 mg) by mouth 3 times daily as needed for muscle spasms.    Class: E-Prescribe    Route: Oral    multivitamin w/minerals (CERTAVITE/ANTIOXIDANTS) tablet  30 tablet 1 11/1/2024 --   [address] 1[address]273    Sig: Take 1 tablet by mouth daily.    Class: E-Prescribe    Route: Oral    mycophenolate (GENERIC EQUIVALENT) 250 MG capsule  180 capsule 1 11/1/2024 --   [address] 1[address]273    Sig: Take 3 capsules (750 mg) by mouth 2 times daily.    Class: E-Prescribe    Notes to Pharmacy: TXP DT 9/28/2024 (Liver) TXP Dischg DT 10/9/2024 DX Liver replaced by transplant Z94.4 [facility] ([place], [place])    Route: Oral    omeprazole (PRILOSEC) 20 MG DR capsule  30 capsule 2 11/1/2024 --   [address] 1[address]273    Sig: Take 1 capsule (20 mg) by mouth daily.    Class: E-Prescribe    Route: Oral    polyethylene glycol (MIRALAX) 17 GM/Dose powder  510 g 0 10/9/2024 --   [place], [place] - [address] [place] [address]  SE    Sig: Take 17 g by mouth 2 times daily as needed for constipation.    Class: E-Prescribe    Route: Oral    predniSONE (DELTASONE) 5 MG tablet  30 tablet 1 11/1/2024 --   [address] 1[address]340    Sig: Take 1 tablet (5 mg) by mouth daily.    Class: E-Prescribe    Notes to Pharmacy: TXP DT 9/28/2024 (Liver) TXP Dischg DT 10/9/2024 DX Liver replaced by transplant Z94.4 [facility] ([place], [place])    Route: Oral    sennosides (SENOKOT) 8.6 MG tablet  60 tablet 0 10/9/2024 --   [place], [place] - [address] [facility] [address]    Sig: Take 2 tablets by mouth 2 times daily as needed for constipation.    Class: E-Prescribe    Route: Oral    sodium zirconium cyclosilicate (LOKELMA) 10 g PACK packet  3 packet 0 11/25/2024 11/28/2024   [address] 6[address]275    Sig: Take 1 packet (10 g) by mouth daily for 3 days.    Class: E-Prescribe    Route: Oral    tacrolimus (GENERIC EQUIVALENT) 0.5 MG capsule  60 capsule 1 11/11/2024 --   [address] 4-556    Sig: HOLD    Class: E-Prescribe    Notes to Pharmacy: TXP DT 9/28/2024 (Liver) TXP Dischg DT 10/9/2024 DX Liver replaced by transplant Z94.4 [facility] ([place], [place])    tacrolimus (GENERIC EQUIVALENT) 1 MG capsule  240 capsule 1 11/11/2024 --   [address] 1-835    Sig: Take 4 capsules (4 mg) by mouth every 12 hours.    Class: E-Prescribe    Notes to Pharmacy: TXP DT 9/28/2024 (Liver) TXP Dischg DT 10/9/2024 DX Liver replaced by transplant Z94.4 [facility] ([place], [place])    Route: Oral    tacrolimus (GENERIC EQUIVALENT) 5 MG capsule  -- -- 11/5/2024 --        Sig: Take 1 capsule (5 mg) by mouth 2 times daily as needed (for dose changes).    Class: Historical    Notes to Pharmacy: TXP DT 9/28/2024 (Liver) TXP Dischg DT 10/9/2024 DX Liver replaced by transplant Z94.4 TX [facility] ([place], [place])    Route: Oral    traZODone (DESYREL) 50 MG tablet  30 tablet 0 11/7/2024 --   [address] 6[address]370    Sig: Take 1 tablet (50 mg) by mouth at bedtime.    Class: E-Prescribe    Route: Oral    valGANciclovir (VALCYTE) 450 MG tablet  30 tablet 1 11/1/2024 --   [address] 7[address]434    Sig: Take 1 tablet (450 mg) by mouth daily.    Class: E-Prescribe    Route: Oral          Azithromycin   REVIEW OF SYSTEMS (check box if normal)  [x]               GENERAL  [x]                 PULMONARY [x]                GENITOURINARY  [x]                CNS                 [x]                 CARDIAC  [x]                 ENDOCRINE  [x]                EARS,NOSE,THROAT [x]                 GASTROINTESTINAL [x]                 NEUROLOGIC    [x]                MUSCLOSKELTAL  [x]                  HEMATOLOGY      PHYSICAL EXAM (check box if normal)/71 (BP Location: Right arm, Patient Position: Sitting, Cuff Size: Adult Regular)   Pulse 101   Temp 98.2  F (36.8  C) (Oral)   Resp 16   Wt 104.5 kg (230 lb 4.8 oz)   SpO2 98%   BMI 31.23 kg/m          [x]            GENERAL: NAD    [x]            Incision:  Left side of incision open but healing well, tunneling present midline x2 and x1 R distal incision. New 1cm opening mid R incision - no tunneling. Tunneling areas packed with 4x4 gauze with vanshe solution and rest of incision covered with 4x4 gauze with no issues. No surrounding erythema. Moderate drainage present- mostly midline.    Abd soft throughout. Non-tender.                                                                                  PAIN SCALE:: 8       Again, thank you for allowing me to participate in the care of your patient.        Sincerely,        SIL Blum CNP

## 2024-11-27 NOTE — NURSING NOTE
Chief Complaint   Patient presents with    Follow Up       /71 (BP Location: Right arm, Patient Position: Sitting, Cuff Size: Adult Regular)   Pulse 101   Temp 98.2  F (36.8  C) (Oral)   Resp 16   Wt 104.5 kg (230 lb 4.8 oz)   SpO2 98%   BMI 31.23 kg/m      MARIANNA MORRISON RN on 11/27/2024 at 10:07 AM

## 2024-11-29 ENCOUNTER — LAB (OUTPATIENT)
Dept: LAB | Facility: CLINIC | Age: 39
End: 2024-11-29
Payer: MEDICAID

## 2024-11-29 ENCOUNTER — OFFICE VISIT (OUTPATIENT)
Dept: TRANSPLANT | Facility: CLINIC | Age: 39
End: 2024-11-29
Attending: NURSE PRACTITIONER
Payer: MEDICAID

## 2024-11-29 ENCOUNTER — LAB (OUTPATIENT)
Dept: LAB | Facility: CLINIC | Age: 39
End: 2024-11-29
Attending: TRANSPLANT SURGERY
Payer: MEDICAID

## 2024-11-29 VITALS
HEART RATE: 95 BPM | TEMPERATURE: 97.7 F | WEIGHT: 232 LBS | DIASTOLIC BLOOD PRESSURE: 79 MMHG | BODY MASS INDEX: 31.46 KG/M2 | OXYGEN SATURATION: 100 % | SYSTOLIC BLOOD PRESSURE: 125 MMHG

## 2024-11-29 DIAGNOSIS — Z94.4 LIVER REPLACED BY TRANSPLANT (H): ICD-10-CM

## 2024-11-29 DIAGNOSIS — E87.5 HYPERKALEMIA: ICD-10-CM

## 2024-11-29 DIAGNOSIS — R79.89 ELEVATED SERUM CREATININE: Primary | ICD-10-CM

## 2024-11-29 LAB
ALBUMIN SERPL BCG-MCNC: 4.3 G/DL (ref 3.5–5.2)
ALP SERPL-CCNC: 221 U/L (ref 40–150)
ALT SERPL W P-5'-P-CCNC: 64 U/L (ref 0–70)
ANION GAP SERPL CALCULATED.3IONS-SCNC: 12 MMOL/L (ref 7–15)
AST SERPL W P-5'-P-CCNC: 42 U/L (ref 0–45)
BILIRUB DIRECT SERPL-MCNC: 0.36 MG/DL (ref 0–0.3)
BILIRUB SERPL-MCNC: 0.6 MG/DL
BUN SERPL-MCNC: 50.2 MG/DL (ref 6–20)
CALCIUM SERPL-MCNC: 10.3 MG/DL (ref 8.8–10.4)
CHLORIDE SERPL-SCNC: 107 MMOL/L (ref 98–107)
CREAT SERPL-MCNC: 1.7 MG/DL (ref 0.67–1.17)
EGFRCR SERPLBLD CKD-EPI 2021: 53 ML/MIN/1.73M2
ERYTHROCYTE [DISTWIDTH] IN BLOOD BY AUTOMATED COUNT: 14.5 % (ref 10–15)
GLUCOSE SERPL-MCNC: 115 MG/DL (ref 70–99)
HCO3 SERPL-SCNC: 20 MMOL/L (ref 22–29)
HCT VFR BLD AUTO: 31.1 % (ref 40–53)
HGB BLD-MCNC: 10.1 G/DL (ref 13.3–17.7)
MAGNESIUM SERPL-MCNC: 1.9 MG/DL (ref 1.7–2.3)
MCH RBC QN AUTO: 30 PG (ref 26.5–33)
MCHC RBC AUTO-ENTMCNC: 32.5 G/DL (ref 31.5–36.5)
MCV RBC AUTO: 92 FL (ref 78–100)
PHOSPHATE SERPL-MCNC: 6.8 MG/DL (ref 2.5–4.5)
PLATELET # BLD AUTO: 283 10E3/UL (ref 150–450)
POTASSIUM SERPL-SCNC: 5.5 MMOL/L (ref 3.4–5.3)
POTASSIUM SERPL-SCNC: 6.1 MMOL/L (ref 3.4–5.3)
PROT SERPL-MCNC: 8 G/DL (ref 6.4–8.3)
RBC # BLD AUTO: 3.37 10E6/UL (ref 4.4–5.9)
SODIUM SERPL-SCNC: 139 MMOL/L (ref 135–145)
TACROLIMUS BLD-MCNC: 11.8 UG/L (ref 5–15)
TME LAST DOSE: NORMAL H
TME LAST DOSE: NORMAL H
WBC # BLD AUTO: 8.7 10E3/UL (ref 4–11)

## 2024-11-29 PROCEDURE — 99024 POSTOP FOLLOW-UP VISIT: CPT | Performed by: NURSE PRACTITIONER

## 2024-11-29 PROCEDURE — 84075 ASSAY ALKALINE PHOSPHATASE: CPT | Performed by: PATHOLOGY

## 2024-11-29 PROCEDURE — 84460 ALANINE AMINO (ALT) (SGPT): CPT | Performed by: PATHOLOGY

## 2024-11-29 PROCEDURE — 84155 ASSAY OF PROTEIN SERUM: CPT | Performed by: PATHOLOGY

## 2024-11-29 PROCEDURE — 80197 ASSAY OF TACROLIMUS: CPT | Performed by: TRANSPLANT SURGERY

## 2024-11-29 PROCEDURE — 84450 TRANSFERASE (AST) (SGOT): CPT | Performed by: PATHOLOGY

## 2024-11-29 PROCEDURE — 83735 ASSAY OF MAGNESIUM: CPT | Performed by: PATHOLOGY

## 2024-11-29 PROCEDURE — 80069 RENAL FUNCTION PANEL: CPT | Performed by: PATHOLOGY

## 2024-11-29 PROCEDURE — G0463 HOSPITAL OUTPT CLINIC VISIT: HCPCS | Performed by: NURSE PRACTITIONER

## 2024-11-29 PROCEDURE — 99000 SPECIMEN HANDLING OFFICE-LAB: CPT | Performed by: PATHOLOGY

## 2024-11-29 PROCEDURE — 82248 BILIRUBIN DIRECT: CPT | Performed by: PATHOLOGY

## 2024-11-29 PROCEDURE — 36415 COLL VENOUS BLD VENIPUNCTURE: CPT | Performed by: PATHOLOGY

## 2024-11-29 PROCEDURE — 82247 BILIRUBIN TOTAL: CPT | Performed by: PATHOLOGY

## 2024-11-29 PROCEDURE — 85027 COMPLETE CBC AUTOMATED: CPT | Performed by: PATHOLOGY

## 2024-11-29 RX ORDER — DIPHENHYDRAMINE HYDROCHLORIDE 50 MG/ML
50 INJECTION INTRAMUSCULAR; INTRAVENOUS
Status: CANCELLED
Start: 2024-11-29

## 2024-11-29 RX ORDER — HEPARIN SODIUM (PORCINE) LOCK FLUSH IV SOLN 100 UNIT/ML 100 UNIT/ML
5 SOLUTION INTRAVENOUS
Status: CANCELLED | OUTPATIENT
Start: 2024-11-29

## 2024-11-29 RX ORDER — MEPERIDINE HYDROCHLORIDE 25 MG/ML
25 INJECTION INTRAMUSCULAR; INTRAVENOUS; SUBCUTANEOUS
Status: CANCELLED | OUTPATIENT
Start: 2024-11-29

## 2024-11-29 RX ORDER — HEPARIN SODIUM,PORCINE 10 UNIT/ML
5-20 VIAL (ML) INTRAVENOUS DAILY PRN
Status: CANCELLED | OUTPATIENT
Start: 2024-11-29

## 2024-11-29 RX ORDER — DIBASIC SODIUM PHOSPHATE, MONOBASIC POTASSIUM PHOSPHATE AND MONOBASIC SODIUM PHOSPHATE 852; 155; 130 MG/1; MG/1; MG/1
TABLET ORAL
COMMUNITY
Start: 2024-08-06

## 2024-11-29 RX ORDER — DIPHENHYDRAMINE HYDROCHLORIDE 50 MG/ML
25 INJECTION INTRAMUSCULAR; INTRAVENOUS
Status: CANCELLED
Start: 2024-11-29

## 2024-11-29 RX ORDER — ALBUTEROL SULFATE 90 UG/1
1-2 INHALANT RESPIRATORY (INHALATION)
Status: CANCELLED
Start: 2024-11-29

## 2024-11-29 RX ORDER — EPINEPHRINE 1 MG/ML
0.3 INJECTION, SOLUTION, CONCENTRATE INTRAVENOUS EVERY 5 MIN PRN
Status: CANCELLED | OUTPATIENT
Start: 2024-11-29

## 2024-11-29 RX ORDER — ALBUTEROL SULFATE 0.83 MG/ML
2.5 SOLUTION RESPIRATORY (INHALATION)
Status: CANCELLED | OUTPATIENT
Start: 2024-11-29

## 2024-11-29 RX ORDER — METHYLPREDNISOLONE SODIUM SUCCINATE 40 MG/ML
40 INJECTION INTRAMUSCULAR; INTRAVENOUS
Status: CANCELLED
Start: 2024-11-29

## 2024-11-29 ASSESSMENT — PAIN SCALES - GENERAL: PAINLEVEL_OUTOF10: EXTREME PAIN (8)

## 2024-11-29 NOTE — LETTER
11/29/2024      Jag Smith  [address]  [facility] [address]      Dear Colleague,    Thank you for referring your patient, Jag Smith, to the [facility]. Please see a copy of my visit note below.    Transplant Surgery -OUTPATIENT PROGRESS NOTE    Date of Visit: 11/29/24    Transplants:  9/28/2024 (Liver); Postoperative day:  63  ASSESMENT AND PLAN:  Incisional wound:   Stable X5 tunneling areas.  Continue packing with 4x4 gauze BID.      -elevated Cr: has been limiting fluid intake due to low sodium. Arrange for 1 liter NS today. Push oral fluids to 2liters.   -Alk phos increased. Trend for now per Dr. Colon. Stay local as we may need to biopsy if continues to trend up.         Date: November 29th, 2024    Transplant:  [x]                             Liver [x]                              Kidney []                             Pancreas []                              Other:             Chief Complaint:RECHECK (LIVER POST RETURN TXP SURG//)    History of Present Illness:  Here today for incisional wound care. Mother is changing BID.   No fever or N/V/D.   Cr elevated. Hasn't been drinking a lot of fluids lately. No CP or SOB.       Patient Active Problem List   Diagnosis     Alcohol use disorder, severe, in early remission (H)     Alcoholic hepatitis (H)     Epiphora due to insufficient drainage of left side     Hyperbilirubinemia     Hypokalemia     Jaundice     Pneumonia     Liver replaced by transplant (H)     DENI (acute kidney injury) (H)     Immunosuppressed status (H)     Acute post-operative pain     Steroid-induced hyperglycemia     Acute urinary retention     Anemia due to blood loss, acute     Severe malnutrition (H)     Hyponatremia     Hypomagnesemia     Bilateral lower extremity edema     Hyperkalemia     Subconjunctival hemorrhage     Nonhealing surgical wound     Skin ulcer of abdomen with fat layer exposed (H)     Elevated serum creatinine     SOCIAL /FAMILY HISTORY:  [x]                  No recent change    Past Medical History:   Diagnosis Date     Alcohol use disorder      Alcoholic hepatitis (H) 2024     Anxiety      Depression      Hypertension      Liver replaced by transplant (H) 2024     Past Surgical History:   Procedure Laterality Date     BENCH LIVER  2024    Procedure: Bench liver;  Surgeon: Fernando Colon MD;  Location: [facility] OR     DACRYOCYSTORHINOSTOMY Left 2013     INCISION AND DRAINAGE BUTTOCKS Left 2017     TRANSPLANT LIVER RECIPIENT  DONOR N/A 2024    Procedure: Transplant liver recipient  donor;  Surgeon: Fernando Colon MD;  Location: [facility] OR     Social History     Socioeconomic History     Marital status: Single     Spouse name: Not on file     Number of children: Not on file     Years of education: Not on file     Highest education level: Not on file   Occupational History     Not on file   Tobacco Use     Smoking status: Never     Smokeless tobacco: Never   Vaping Use     Vaping status: Never Used   Substance and Sexual Activity     Alcohol use: Not Currently     Comment: last drink 2024     Drug use: Not Currently     Types: Marijuana     Comment: smoking a long time ago     Sexual activity: Not on file   Other Topics Concern     Not on file   Social History Narrative     Not on file     Social Drivers of Health     Financial Resource Strain: Low Risk  (2024)    Financial Resource Strain      Within the past 12 months, have you or your family members you live with been unable to get utilities (heat, electricity) when it was really needed?: No   Food Insecurity: Low Risk  (2024)    Food Insecurity      Within the past 12 months, did you worry that your food would run out before you got money to buy more?: No      Within the past 12 months, did the food you bought just not last and you didn t have money to get more?: No   Transportation Needs: Low Risk  (2024)    Transportation Needs       Within the past 12 months, has lack of transportation kept you from medical appointments, getting your medicines, non-medical meetings or appointments, work, or from getting things that you need?: No   Physical Activity: Not on file   Stress: Not on file   Social Connections: Not on file   Interpersonal Safety: Low Risk  (11/12/2024)    Interpersonal Safety      Do you feel physically and emotionally safe where you currently live?: Yes      Within the past 12 months, have you been hit, slapped, kicked or otherwise physically hurt by someone?: No      Within the past 12 months, have you been humiliated or emotionally abused in other ways by your partner or ex-partner?: No   Recent Concern: Interpersonal Safety - High Risk (9/22/2024)    Interpersonal Safety      Do you feel physically and emotionally safe where you currently live?: No      Within the past 12 months, have you been hit, slapped, kicked or otherwise physically hurt by someone?: No      Within the past 12 months, have you been humiliated or emotionally abused in other ways by your partner or ex-partner?: No   Housing Stability: Low Risk  (9/21/2024)    Housing Stability      Do you have housing? : Yes      Are you worried about losing your housing?: No     Prescription Medications as of 11/30/2024         Rx Number Disp Refills Start End Last Dispensed Date Next Fill Date Owning Pharmacy    acetaminophen (TYLENOL) 325 MG tablet  60 tablet 0 11/18/2024 --   [address] 9-741    Sig: Take 2 tablets (650 mg) by mouth every 8 hours as needed for mild pain or fever.    Class: E-Prescribe    Route: Oral    aspirin (ASA) 81 MG chewable tablet  30 tablet 1 11/1/2024 --   [address] 4-219    Sig: Take 1 tablet (81 mg) by mouth or Feeding Tube daily.    Class: E-Prescribe    Route: Oral or Feeding Tube    escitalopram (LEXAPRO) 20 MG  tablet  30 tablet 0 11/11/2024 --   [address] 1[address]273    Sig: Take 1 tablet (20 mg) by mouth daily.    Class: E-Prescribe    Route: Oral    gabapentin (NEURONTIN) 300 MG capsule  30 capsule 0 11/22/2024 12/22/2024   [address] 1[address]273    Sig: Take 1 capsule (300 mg) by mouth at bedtime.    Class: E-Prescribe    Route: Oral    hydrOXYzine HCl (ATARAX) 25 MG tablet  20 tablet 0 11/27/2024 --   [address] 1[address]273    Sig: Take 1 tablet (25 mg) by mouth every 8 hours as needed for anxiety or other (adjuvant pain).    Class: E-Prescribe    Route: Oral    magnesium oxide (MAG-OX) 400 MG tablet  120 tablet 1 11/1/2024 --   [address] 1[address]273    Sig: Take 2 tablets (800 mg) by mouth 2 times daily.    Class: E-Prescribe    Route: Oral    methocarbamol (ROBAXIN) 750 MG tablet  30 tablet 0 11/18/2024 --   [address] 1-571    Sig: Take 1 tablet (750 mg) by mouth 3 times daily as needed for muscle spasms.    Class: E-Prescribe    Route: Oral    multivitamin w/minerals (CERTAVITE/ANTIOXIDANTS) tablet  30 tablet 1 11/1/2024 --   [address] 1[address]273    Sig: Take 1 tablet by mouth daily.    Class: E-Prescribe    Route: Oral    mycophenolate (GENERIC EQUIVALENT) 250 MG capsule  180 capsule 1 11/1/2024 --   [address] 1-139    Sig: Take 3 capsules (750 mg) by mouth 2 times daily.    Class: E-Prescribe    Notes to Pharmacy: TXP DT 9/28/2024 (Liver) TXP Dischg DT 10/9/2024 DX Liver replaced by transplant Z94.4 TX [facility] ([place], [place])     Route: Oral    omeprazole (PRILOSEC) 20 MG DR capsule  30 capsule 2 11/1/2024 --   [address] 1[address]273    Sig: Take 1 capsule (20 mg) by mouth daily.    Class: E-Prescribe    Route: Oral    PHOSPHORUS TABLET 250  MG per tablet  -- -- 8/6/2024 --       Sig: Take by mouth.    Class: Historical    Route: Oral    polyethylene glycol (MIRALAX) 17 GM/Dose powder  510 g 0 10/9/2024 --   [address]    Sig: Take 17 g by mouth 2 times daily as needed for constipation.    Class: E-Prescribe    Route: Oral    predniSONE (DELTASONE) 5 MG tablet  30 tablet 1 11/1/2024 --   [address] 1-043    Sig: Take 1 tablet (5 mg) by mouth daily.    Class: E-Prescribe    Notes to Pharmacy: TXP DT 9/28/2024 (Liver) TXP Dischg DT 10/9/2024 DX Liver replaced by transplant Z94.4 TX [facility] ([place], [place])    Route: Oral    sennosides (SENOKOT) 8.6 MG tablet  60 tablet 0 10/9/2024 --   [address]    Sig: Take 2 tablets by mouth 2 times daily as needed for constipation.    Class: E-Prescribe    Route: Oral    sodium zirconium cyclosilicate (LOKELMA) 10 g PACK packet  3 packet 0 11/29/2024 12/2/2024   [address] 1-565    Sig: Take 1 packet (10 g) by mouth daily for 3 doses.    Class: E-Prescribe    Route: Oral    tacrolimus (GENERIC EQUIVALENT) 0.5 MG capsule  60 capsule 1 11/11/2024 --   [address] 1-797    Sig: HOLD    Class: E-Prescribe    Notes to Pharmacy: TXP DT 9/28/2024 (Liver) TXP Dischg DT 10/9/2024 DX Liver replaced by transplant Z94.4 TX [facility]  [place])    tacrolimus (GENERIC EQUIVALENT) 1 MG capsule  540 capsule 0 11/29/2024 --   [address] 7[address]128    Sig: Take 3 capsules (3 mg) by mouth every 12 hours.    Class: E-Prescribe    Notes to Pharmacy: TXP DT 9/28/2024 (Liver) TXP Dischg DT 10/9/2024 DX Liver replaced by transplant Z94.4 TX [facility] ([place], [place])    Route: Oral    tacrolimus (GENERIC EQUIVALENT) 5 MG capsule  -- -- 11/5/2024 --       Sig: Take 1 capsule (5 mg) by mouth 2 times daily as needed (for dose changes).    Class: Historical    Notes to Pharmacy: TXP DT 9/28/2024 (Liver) TXP Dischg DT 10/9/2024 DX Liver replaced by transplant Z94.4 [facility] ([place], [place])    Route: Oral    traZODone (DESYREL) 50 MG tablet  30 tablet 0 11/7/2024 --   [address] 9-881    Sig: Take 1 tablet (50 mg) by mouth at bedtime.    Class: E-Prescribe    Route: Oral    valGANciclovir (VALCYTE) 450 MG tablet  30 tablet 1 11/1/2024 --   [address] 3[address]903    Sig: Take 1 tablet (450 mg) by mouth daily.    Class: E-Prescribe    Route: Oral          Azithromycin   REVIEW OF SYSTEMS (check box if normal)  [x]               GENERAL  [x]                 PULMONARY [x]                GENITOURINARY  [x]                CNS                 [x]                 CARDIAC  [x]                 ENDOCRINE  [x]                EARS,NOSE,THROAT [x]                 GASTROINTESTINAL [x]                 NEUROLOGIC    [x]                MUSCLOSKELTAL  [x]                  HEMATOLOGY      PHYSICAL EXAM (check box if normal)/79 (BP Location: Right arm, Patient Position: Sitting, Cuff Size: Adult Regular)   Pulse 95   Temp 97.7  F (36.5  C) (Oral)   Wt 105.2 kg (232 lb)   SpO2 100%   BMI 31.46 kg/m           [x]            GENERAL: NAD    [x]            Incision:  Left side of incision open but healing well, L side with 3mm opening with 0.5cm tunneling. tunneling present midline x2 and x2 R distal incision. Both with tunneling. Tunneling areas packed with 4x4 gauze dampened vanshe solution and rest of incision covered with 4x4 gauze with no issues. No surrounding erythema. Moderate drainage present- mostly midline.    Abd soft throughout. Non-tender.                                                                                 PAIN SCALE:: 8       Again, thank you for allowing me to participate in the care of your patient.        Sincerely,        SIL Blum CNP

## 2024-11-29 NOTE — NURSING NOTE
Chief Complaint   Patient presents with    RECHECK     LIVER POST RETURN TXP SURG         /79 (BP Location: Right arm, Patient Position: Sitting, Cuff Size: Adult Regular)   Pulse 95   Temp 97.7  F (36.5  C) (Oral)   Wt 105.2 kg (232 lb)   SpO2 100%   BMI 31.46 kg/m      Fernando Adam CMA on 11/29/2024 at 9:59 AM

## 2024-12-01 NOTE — PROGRESS NOTES
Transplant Surgery -OUTPATIENT PROGRESS NOTE    Date of Visit: 11/29/24    Transplants:  9/28/2024 (Liver); Postoperative day:  63  ASSESMENT AND PLAN:  Incisional wound:   Stable X5 tunneling areas.  Continue packing with 4x4 gauze BID.      -elevated Cr: has been limiting fluid intake due to low sodium. Arrange for 1 liter NS today. Push oral fluids to 2liters.   -Alk phos increased. Trend for now per Dr. Colon. Stay local as we may need to biopsy if continues to trend up.         Date: November 29th, 2024    Transplant:  [x]                             Liver [x]                              Kidney []                             Pancreas []                              Other:             Chief Complaint:RECHECK (LIVER POST RETURN TXP SURG//)    History of Present Illness:  Here today for incisional wound care. Mother is changing BID.   No fever or N/V/D.   Cr elevated. Hasn't been drinking a lot of fluids lately. No CP or SOB.       Patient Active Problem List   Diagnosis    Alcohol use disorder, severe, in early remission (H)    Alcoholic hepatitis (H)    Epiphora due to insufficient drainage of left side    Hyperbilirubinemia    Hypokalemia    Jaundice    Pneumonia    Liver replaced by transplant (H)    DENI (acute kidney injury) (H)    Immunosuppressed status (H)    Acute post-operative pain    Steroid-induced hyperglycemia    Acute urinary retention    Anemia due to blood loss, acute    Severe malnutrition (H)    Hyponatremia    Hypomagnesemia    Bilateral lower extremity edema    Hyperkalemia    Subconjunctival hemorrhage    Nonhealing surgical wound    Skin ulcer of abdomen with fat layer exposed (H)    Elevated serum creatinine     SOCIAL /FAMILY HISTORY: [x]                  No recent change    Past Medical History:   Diagnosis Date    Alcohol use disorder     Alcoholic hepatitis (H) 06/07/2024    Anxiety     Depression     Hypertension     Liver replaced by transplant (H) 09/28/2024     Past Surgical  History:   Procedure Laterality Date    BENCH LIVER  2024    Procedure: Bench liver;  Surgeon: Fernando Colon MD;  Location: [facility] OR    DACRYOCYSTORHINOSTOMY Left 2013    INCISION AND DRAINAGE BUTTOCKS Left 2017    TRANSPLANT LIVER RECIPIENT  DONOR N/A 2024    Procedure: Transplant liver recipient  donor;  Surgeon: Fernando Colon MD;  Location: [facility] OR     Social History     Socioeconomic History    Marital status: Single     Spouse name: Not on file    Number of children: Not on file    Years of education: Not on file    Highest education level: Not on file   Occupational History    Not on file   Tobacco Use    Smoking status: Never    Smokeless tobacco: Never   Vaping Use    Vaping status: Never Used   Substance and Sexual Activity    Alcohol use: Not Currently     Comment: last drink 2024    Drug use: Not Currently     Types: Marijuana     Comment: smoking a long time ago    Sexual activity: Not on file   Other Topics Concern    Not on file   Social History Narrative    Not on file     Social Drivers of Health     Financial Resource Strain: Low Risk  (2024)    Financial Resource Strain     Within the past 12 months, have you or your family members you live with been unable to get utilities (heat, electricity) when it was really needed?: No   Food Insecurity: Low Risk  (2024)    Food Insecurity     Within the past 12 months, did you worry that your food would run out before you got money to buy more?: No     Within the past 12 months, did the food you bought just not last and you didn t have money to get more?: No   Transportation Needs: Low Risk  (2024)    Transportation Needs     Within the past 12 months, has lack of transportation kept you from medical appointments, getting your medicines, non-medical meetings or appointments, work, or from getting things that you need?: No   Physical Activity: Not on file   Stress: Not on file   Social Connections:  Not on file   Interpersonal Safety: Low Risk  (11/12/2024)    Interpersonal Safety     Do you feel physically and emotionally safe where you currently live?: Yes     Within the past 12 months, have you been hit, slapped, kicked or otherwise physically hurt by someone?: No     Within the past 12 months, have you been humiliated or emotionally abused in other ways by your partner or ex-partner?: No   Recent Concern: Interpersonal Safety - High Risk (9/22/2024)    Interpersonal Safety     Do you feel physically and emotionally safe where you currently live?: No     Within the past 12 months, have you been hit, slapped, kicked or otherwise physically hurt by someone?: No     Within the past 12 months, have you been humiliated or emotionally abused in other ways by your partner or ex-partner?: No   Housing Stability: Low Risk  (9/21/2024)    Housing Stability     Do you have housing? : Yes     Are you worried about losing your housing?: No     Prescription Medications as of 11/30/2024         Rx Number Disp Refills Start End Last Dispensed Date Next Fill Date Owning Pharmacy    acetaminophen (TYLENOL) 325 MG tablet  60 tablet 0 11/18/2024 --   [address] [phone]    Sig: Take 2 tablets (650 mg) by mouth every 8 hours as needed for mild pain or fever.    Class: E-Prescribe    Route: Oral    aspirin (ASA) 81 MG chewable tablet  30 tablet 1 11/1/2024 --   [address] 1-392    Sig: Take 1 tablet (81 mg) by mouth or Feeding Tube daily.    Class: E-Prescribe    Route: Oral or Feeding Tube    escitalopram (LEXAPRO) 20 MG tablet  30 tablet 0 11/11/2024 --   [address] 1-264    Sig: Take 1 tablet (20 mg) by mouth daily.    Class: E-Prescribe    Route: Oral    gabapentin (NEURONTIN) 300 MG capsule  30 capsule 0 11/22/2024 12/22/2024    [address] 1-863    Sig: Take 1 capsule (300 mg) by mouth at bedtime.    Class: E-Prescribe    Route: Oral    hydrOXYzine HCl (ATARAX) 25 MG tablet  20 tablet 0 11/27/2024 --   [address] 1-516    Sig: Take 1 tablet (25 mg) by mouth every 8 hours as needed for anxiety or other (adjuvant pain).    Class: E-Prescribe    Route: Oral    magnesium oxide (MAG-OX) 400 MG tablet  120 tablet 1 11/1/2024 --   [address] 1-273    Sig: Take 2 tablets (800 mg) by mouth 2 times daily.    Class: E-Prescribe    Route: Oral    methocarbamol (ROBAXIN) 750 MG tablet  30 tablet 0 11/18/2024 --   [address] 1-609    Sig: Take 1 tablet (750 mg) by mouth 3 times daily as needed for muscle spasms.    Class: E-Prescribe    Route: Oral    multivitamin w/minerals (CERTAVITE/ANTIOXIDANTS) tablet  30 tablet 1 11/1/2024 --   [address] 1-597    Sig: Take 1 tablet by mouth daily.    Class: E-Prescribe    Route: Oral    mycophenolate (GENERIC EQUIVALENT) 250 MG capsule  180 capsule 1 11/1/2024 --   [address] 1-213    Sig: Take 3 capsules (750 mg) by mouth 2 times daily.    Class: E-Prescribe    Notes to Pharmacy: TXP DT 9/28/2024 (Liver) TXP Dischg DT 10/9/2024 DX Liver replaced by transplant Z94.4 TX [facility] ([place], [place])    Route: Oral    omeprazole (PRILOSEC) 20 MG DR capsule  30 capsule 2 11/1/2024 --   [address] 1-183    Sig: Take 1 capsule (20 mg) by mouth daily.    Class: E-Prescribe    Route: Oral    PHOSPHORUS TABLET 250  MG  per tablet  -- -- 8/6/2024 --       Sig: Take by mouth.    Class: Historical    Route: Oral    polyethylene glycol (MIRALAX) 17 GM/Dose powder  510 g 0 10/9/2024 --   [address]    Sig: Take 17 g by mouth 2 times daily as needed for constipation.    Class: E-Prescribe    Route: Oral    predniSONE (DELTASONE) 5 MG tablet  30 tablet 1 11/1/2024 --   [address] 1-318    Sig: Take 1 tablet (5 mg) by mouth daily.    Class: E-Prescribe    Notes to Pharmacy: TXP DT 9/28/2024 (Liver) TXP Dischg DT 10/9/2024 DX Liver replaced by transplant Z94.4 [facility] ([place], [place])    Route: Oral    sennosides (SENOKOT) 8.6 MG tablet  60 tablet 0 10/9/2024 --   [address]    Sig: Take 2 tablets by mouth 2 times daily as needed for constipation.    Class: E-Prescribe    Route: Oral    sodium zirconium cyclosilicate (LOKELMA) 10 g PACK packet  3 packet 0 11/29/2024 12/2/2024   [address] 1-285    Sig: Take 1 packet (10 g) by mouth daily for 3 doses.    Class: E-Prescribe    Route: Oral    tacrolimus (GENERIC EQUIVALENT) 0.5 MG capsule  60 capsule 1 11/11/2024 --   [address] 1-822    Sig: HOLD    Class: E-Prescribe    Notes to Pharmacy: TXP DT 9/28/2024 (Liver) TXP Dischg DT 10/9/2024 DX Liver replaced by transplant Z94.4 [facility] ([place], [place])    tacrolimus (GENERIC EQUIVALENT) 1 MG capsule  540 capsule 0 11/29/2024 --   [address] 1-114    Sig: Take 3 capsules (3 mg) by mouth every 12 hours.    Class: E-Prescribe    Notes to Pharmacy: TXP DT 9/28/2024  (Liver) TXP Dischg DT 10/9/2024 DX Liver replaced by transplant Z94.4 TX [facility] ([place], [place])    Route: Oral    tacrolimus (GENERIC EQUIVALENT) 5 MG capsule  -- -- 11/5/2024 --       Sig: Take 1 capsule (5 mg) by mouth 2 times daily as needed (for dose changes).    Class: Historical    Notes to Pharmacy: TXP DT 9/28/2024 (Liver) TXP Dischg DT 10/9/2024 DX Liver replaced by transplant Z94.4 TX [facility] ([place], [place])    Route: Oral    traZODone (DESYREL) 50 MG tablet  30 tablet 0 11/7/2024 --   [address] 0-205    Sig: Take 1 tablet (50 mg) by mouth at bedtime.    Class: E-Prescribe    Route: Oral    valGANciclovir (VALCYTE) 450 MG tablet  30 tablet 1 11/1/2024 --   [address] [phone]    Sig: Take 1 tablet (450 mg) by mouth daily.    Class: E-Prescribe    Route: Oral          Azithromycin   REVIEW OF SYSTEMS (check box if normal)  [x]               GENERAL  [x]                 PULMONARY [x]                GENITOURINARY  [x]                CNS                 [x]                 CARDIAC  [x]                 ENDOCRINE  [x]                EARS,NOSE,THROAT [x]                 GASTROINTESTINAL [x]                 NEUROLOGIC    [x]                MUSCLOSKELTAL  [x]                  HEMATOLOGY      PHYSICAL EXAM (check box if normal)/79 (BP Location: Right arm, Patient Position: Sitting, Cuff Size: Adult Regular)   Pulse 95   Temp 97.7  F (36.5  C) (Oral)   Wt 105.2 kg (232 lb)   SpO2 100%   BMI 31.46 kg/m          [x]            GENERAL: NAD    [x]            Incision:  Left side of incision open but healing well, L side with 3mm opening with 0.5cm tunneling. tunneling present midline x2 and x2 R distal incision. Both with tunneling. Tunneling areas packed with 4x4 gauze dampened  vanshe solution and rest of incision covered with 4x4 gauze with no issues. No surrounding erythema. Moderate drainage present- mostly midline.    Abd soft throughout. Non-tender.                                                                                 PAIN SCALE:: 8

## 2024-12-02 ENCOUNTER — MYC MEDICAL ADVICE (OUTPATIENT)
Dept: TRANSPLANT | Facility: CLINIC | Age: 39
End: 2024-12-02

## 2024-12-02 ENCOUNTER — LAB (OUTPATIENT)
Dept: LAB | Facility: CLINIC | Age: 39
End: 2024-12-02
Payer: MEDICAID

## 2024-12-02 ENCOUNTER — OFFICE VISIT (OUTPATIENT)
Dept: TRANSPLANT | Facility: CLINIC | Age: 39
End: 2024-12-02
Attending: TRANSPLANT SURGERY
Payer: MEDICAID

## 2024-12-02 VITALS
RESPIRATION RATE: 18 BRPM | OXYGEN SATURATION: 100 % | WEIGHT: 234 LBS | DIASTOLIC BLOOD PRESSURE: 84 MMHG | SYSTOLIC BLOOD PRESSURE: 127 MMHG | BODY MASS INDEX: 31.73 KG/M2 | HEART RATE: 85 BPM

## 2024-12-02 DIAGNOSIS — Z94.4 LIVER REPLACED BY TRANSPLANT (H): ICD-10-CM

## 2024-12-02 DIAGNOSIS — E83.42 HYPOMAGNESEMIA: ICD-10-CM

## 2024-12-02 DIAGNOSIS — G89.18 ACUTE POST-OPERATIVE PAIN: ICD-10-CM

## 2024-12-02 DIAGNOSIS — K21.9 GASTROESOPHAGEAL REFLUX DISEASE, UNSPECIFIED WHETHER ESOPHAGITIS PRESENT: ICD-10-CM

## 2024-12-02 DIAGNOSIS — T14.8XXA SKIN WOUND FROM SURGICAL INCISION: Primary | ICD-10-CM

## 2024-12-02 LAB
ALBUMIN SERPL BCG-MCNC: 4.1 G/DL (ref 3.5–5.2)
ALP SERPL-CCNC: 224 U/L (ref 40–150)
ALT SERPL W P-5'-P-CCNC: 84 U/L (ref 0–70)
ANION GAP SERPL CALCULATED.3IONS-SCNC: 12 MMOL/L (ref 7–15)
AST SERPL W P-5'-P-CCNC: 36 U/L (ref 0–45)
BILIRUB DIRECT SERPL-MCNC: 0.35 MG/DL (ref 0–0.3)
BILIRUB SERPL-MCNC: 0.6 MG/DL
BUN SERPL-MCNC: 29.6 MG/DL (ref 6–20)
CALCIUM SERPL-MCNC: 10 MG/DL (ref 8.8–10.4)
CHLORIDE SERPL-SCNC: 105 MMOL/L (ref 98–107)
CREAT SERPL-MCNC: 1.35 MG/DL (ref 0.67–1.17)
EGFRCR SERPLBLD CKD-EPI 2021: 69 ML/MIN/1.73M2
ERYTHROCYTE [DISTWIDTH] IN BLOOD BY AUTOMATED COUNT: 14.2 % (ref 10–15)
GLUCOSE SERPL-MCNC: 105 MG/DL (ref 70–99)
HCO3 SERPL-SCNC: 20 MMOL/L (ref 22–29)
HCT VFR BLD AUTO: 30.9 % (ref 40–53)
HGB BLD-MCNC: 9.9 G/DL (ref 13.3–17.7)
MAGNESIUM SERPL-MCNC: 1.4 MG/DL (ref 1.7–2.3)
MCH RBC QN AUTO: 29.5 PG (ref 26.5–33)
MCHC RBC AUTO-ENTMCNC: 32 G/DL (ref 31.5–36.5)
MCV RBC AUTO: 92 FL (ref 78–100)
PHOSPHATE SERPL-MCNC: 6 MG/DL (ref 2.5–4.5)
PLATELET # BLD AUTO: 280 10E3/UL (ref 150–450)
POTASSIUM SERPL-SCNC: 5.2 MMOL/L (ref 3.4–5.3)
PROT SERPL-MCNC: 7.7 G/DL (ref 6.4–8.3)
RBC # BLD AUTO: 3.36 10E6/UL (ref 4.4–5.9)
SODIUM SERPL-SCNC: 137 MMOL/L (ref 135–145)
TACROLIMUS BLD-MCNC: 8.6 UG/L (ref 5–15)
TME LAST DOSE: NORMAL H
TME LAST DOSE: NORMAL H
WBC # BLD AUTO: 9.6 10E3/UL (ref 4–11)

## 2024-12-02 PROCEDURE — 36415 COLL VENOUS BLD VENIPUNCTURE: CPT | Performed by: PATHOLOGY

## 2024-12-02 PROCEDURE — 84100 ASSAY OF PHOSPHORUS: CPT | Performed by: PATHOLOGY

## 2024-12-02 PROCEDURE — G0463 HOSPITAL OUTPT CLINIC VISIT: HCPCS | Performed by: NURSE PRACTITIONER

## 2024-12-02 PROCEDURE — 82248 BILIRUBIN DIRECT: CPT | Performed by: PATHOLOGY

## 2024-12-02 PROCEDURE — 99024 POSTOP FOLLOW-UP VISIT: CPT | Performed by: NURSE PRACTITIONER

## 2024-12-02 PROCEDURE — 99000 SPECIMEN HANDLING OFFICE-LAB: CPT | Performed by: PATHOLOGY

## 2024-12-02 PROCEDURE — 85027 COMPLETE CBC AUTOMATED: CPT | Performed by: PATHOLOGY

## 2024-12-02 PROCEDURE — 80197 ASSAY OF TACROLIMUS: CPT | Performed by: TRANSPLANT SURGERY

## 2024-12-02 PROCEDURE — 83735 ASSAY OF MAGNESIUM: CPT | Performed by: PATHOLOGY

## 2024-12-02 PROCEDURE — 80053 COMPREHEN METABOLIC PANEL: CPT | Performed by: PATHOLOGY

## 2024-12-02 RX ORDER — MYCOPHENOLATE MOFETIL 250 MG/1
750 CAPSULE ORAL 2 TIMES DAILY
Qty: 180 CAPSULE | Refills: 1 | Status: SHIPPED | OUTPATIENT
Start: 2024-12-02

## 2024-12-02 RX ORDER — HYDROXYZINE HYDROCHLORIDE 25 MG/1
25 TABLET, FILM COATED ORAL EVERY 8 HOURS PRN
Qty: 20 TABLET | Refills: 0 | Status: SHIPPED | OUTPATIENT
Start: 2024-12-02

## 2024-12-02 RX ORDER — TRAMADOL HYDROCHLORIDE 50 MG/1
50 TABLET ORAL 3 TIMES DAILY PRN
Qty: 15 TABLET | Refills: 0 | Status: SHIPPED | OUTPATIENT
Start: 2024-12-02 | End: 2024-12-07

## 2024-12-02 RX ORDER — VALGANCICLOVIR 450 MG/1
450 TABLET, FILM COATED ORAL DAILY
Qty: 30 TABLET | Refills: 1 | Status: SHIPPED | OUTPATIENT
Start: 2024-12-02

## 2024-12-02 RX ORDER — MAGNESIUM OXIDE 400 MG/1
800 TABLET ORAL 2 TIMES DAILY
Qty: 120 TABLET | Refills: 1 | Status: SHIPPED | OUTPATIENT
Start: 2024-12-02

## 2024-12-02 RX ORDER — TRAZODONE HYDROCHLORIDE 50 MG/1
50 TABLET, FILM COATED ORAL AT BEDTIME
Qty: 30 TABLET | Refills: 0 | Status: SHIPPED | OUTPATIENT
Start: 2024-12-02

## 2024-12-02 RX ORDER — ACETAMINOPHEN 325 MG/1
650 TABLET ORAL EVERY 8 HOURS PRN
Qty: 60 TABLET | Refills: 0 | Status: SHIPPED | OUTPATIENT
Start: 2024-12-02

## 2024-12-02 RX ORDER — ASPIRIN 81 MG/1
81 TABLET, CHEWABLE ORAL DAILY
Qty: 30 TABLET | Refills: 1 | Status: SHIPPED | OUTPATIENT
Start: 2024-12-02

## 2024-12-02 RX ORDER — METHOCARBAMOL 750 MG/1
750 TABLET, FILM COATED ORAL 3 TIMES DAILY PRN
Qty: 30 TABLET | Refills: 0 | Status: SHIPPED | OUTPATIENT
Start: 2024-12-02

## 2024-12-02 NOTE — NURSING NOTE
Chief Complaint   Patient presents with    Follow Up     Wound care       /84 (BP Location: Right arm, Patient Position: Sitting, Cuff Size: Adult Large)   Pulse 85   Resp 18   Wt 106.1 kg (234 lb)   SpO2 100%   BMI 31.73 kg/m      MARIANNA MORRISON, RN on 12/2/2024 at 9:45 AM

## 2024-12-02 NOTE — PROGRESS NOTES
Transplant Surgery -OUTPATIENT PROGRESS NOTE    Date of Visit: 12/2/24    Transplants:  9/28/2024 (Liver); Postoperative day:  65  ASSESMENT AND PLAN:  Incisional wound:   Stable X5 tunneling areas.  Continue packing with 4x4 gauze BID.      -elevated Cr: much improved with increased oral hydration     -Alk phos increased. Continue to hold gabapentin for now and repeat labs Thursday. Stay local for now.     -Incisional pain: reviewed options. Will continue with tramadol, robaxin, tylenol and atarax. Referred to pain management.       Dr. Colon updated.     Date: 12/2/24    Transplant:  [x]                             Liver [x]                              Kidney []                             Pancreas []                              Other:             Chief Complaint:Follow Up (Wound care)    History of Present Illness:  Here today for incisional wound care. Mother is changing BID.   No fever or N/V/D.   Mom recalls elevated LFTs in June while admitted in [place] pre transplant. He was started on gabapentin around that time and then was taken off and never resumed. Wondering if gabapentin contributing to elevated alk phos. Patient started holding gabapentin this weekend but now with increased incisional pain, especially with dressing changes.       Patient Active Problem List   Diagnosis    Alcohol use disorder, severe, in early remission (H)    Alcoholic hepatitis (H)    Epiphora due to insufficient drainage of left side    Hyperbilirubinemia    Hypokalemia    Jaundice    Pneumonia    Liver replaced by transplant (H)    DENI (acute kidney injury) (H)    Immunosuppressed status (H)    Acute post-operative pain    Steroid-induced hyperglycemia    Acute urinary retention    Anemia due to blood loss, acute    Severe malnutrition (H)    Hyponatremia    Hypomagnesemia    Bilateral lower extremity edema    Hyperkalemia    Subconjunctival hemorrhage    Nonhealing surgical wound    Skin ulcer of abdomen with fat layer  exposed (H)    Elevated serum creatinine     SOCIAL /FAMILY HISTORY: [x]                  No recent change    Past Medical History:   Diagnosis Date    Alcohol use disorder     Alcoholic hepatitis (H) 2024    Anxiety     Depression     Hypertension     Liver replaced by transplant (H) 2024     Past Surgical History:   Procedure Laterality Date    BENCH LIVER  2024    Procedure: Bench liver;  Surgeon: Fernando Colon MD;  Location: [facility] OR    DACRYOCYSTORHINOSTOMY Left 2013    INCISION AND DRAINAGE BUTTOCKS Left 2017    TRANSPLANT LIVER RECIPIENT  DONOR N/A 2024    Procedure: Transplant liver recipient  donor;  Surgeon: Fernando Colon MD;  Location: [facility] OR     Social History     Socioeconomic History    Marital status: Single     Spouse name: Not on file    Number of children: Not on file    Years of education: Not on file    Highest education level: Not on file   Occupational History    Not on file   Tobacco Use    Smoking status: Never    Smokeless tobacco: Never   Vaping Use    Vaping status: Never Used   Substance and Sexual Activity    Alcohol use: Not Currently     Comment: last drink 2024    Drug use: Not Currently     Types: Marijuana     Comment: smoking a long time ago    Sexual activity: Not on file   Other Topics Concern    Not on file   Social History Narrative    Not on file     Social Drivers of Health     Financial Resource Strain: Low Risk  (2024)    Financial Resource Strain     Within the past 12 months, have you or your family members you live with been unable to get utilities (heat, electricity) when it was really needed?: No   Food Insecurity: Low Risk  (2024)    Food Insecurity     Within the past 12 months, did you worry that your food would run out before you got money to buy more?: No     Within the past 12 months, did the food you bought just not last and you didn t have money to get more?: No   Transportation Needs: Low Risk   (9/21/2024)    Transportation Needs     Within the past 12 months, has lack of transportation kept you from medical appointments, getting your medicines, non-medical meetings or appointments, work, or from getting things that you need?: No   Physical Activity: Not on file   Stress: Not on file   Social Connections: Not on file   Interpersonal Safety: Low Risk  (11/12/2024)    Interpersonal Safety     Do you feel physically and emotionally safe where you currently live?: Yes     Within the past 12 months, have you been hit, slapped, kicked or otherwise physically hurt by someone?: No     Within the past 12 months, have you been humiliated or emotionally abused in other ways by your partner or ex-partner?: No   Recent Concern: Interpersonal Safety - High Risk (9/22/2024)    Interpersonal Safety     Do you feel physically and emotionally safe where you currently live?: No     Within the past 12 months, have you been hit, slapped, kicked or otherwise physically hurt by someone?: No     Within the past 12 months, have you been humiliated or emotionally abused in other ways by your partner or ex-partner?: No   Housing Stability: Low Risk  (9/21/2024)    Housing Stability     Do you have housing? : Yes     Are you worried about losing your housing?: No     Prescription Medications as of 12/2/2024         Rx Number Disp Refills Start End Last Dispensed Date Next Fill Date Owning Pharmacy    acetaminophen (TYLENOL) 325 MG tablet  60 tablet 0 12/2/2024 --   [place], [place] - [address] [place] [address] [phone]    Sig: Take 2 tablets (650 mg) by mouth every 8 hours as needed for mild pain or fever.    Class: E-Prescribe    Route: Oral    aspirin (ASA) 81 MG chewable tablet  30 tablet 1 12/2/2024 --   [place], [place] - [address] [place] [address] [phone]    Sig: Take 1 tablet (81 mg) by mouth or Feeding Tube daily.    Class: E-Prescribe    Route: Oral or Feeding Tube     escitalopram (LEXAPRO) 20 MG tablet  30 tablet 0 11/11/2024 --   [address] 1-273    Sig: Take 1 tablet (20 mg) by mouth daily.    Class: E-Prescribe    Route: Oral    gabapentin (NEURONTIN) 300 MG capsule  30 capsule 0 11/22/2024 12/22/2024   [address] 1-374    Sig: Take 1 capsule (300 mg) by mouth at bedtime.    Class: E-Prescribe    Route: Oral    hydrOXYzine HCl (ATARAX) 25 MG tablet  20 tablet 0 12/2/2024 --   [address] 1-273    Sig: Take 1 tablet (25 mg) by mouth every 8 hours as needed for anxiety or other (adjuvant pain).    Class: E-Prescribe    Route: Oral    magnesium oxide (MAG-OX) 400 MG tablet  120 tablet 1 12/2/2024 --   [address] 1-273    Sig: Take 2 tablets (800 mg) by mouth 2 times daily.    Class: E-Prescribe    Route: Oral    methocarbamol (ROBAXIN) 750 MG tablet  30 tablet 0 12/2/2024 --   [address] 1-034    Sig: Take 1 tablet (750 mg) by mouth 3 times daily as needed for muscle spasms.    Class: E-Prescribe    Route: Oral    multivitamin w/minerals (CERTAVITE/ANTIOXIDANTS) tablet  30 tablet 1 11/1/2024 --   [address] 1-842    Sig: Take 1 tablet by mouth daily.    Class: E-Prescribe    Route: Oral    mycophenolate (GENERIC EQUIVALENT) 250 MG capsule  180 capsule 1 12/2/2024 --   [address] 1-955    Sig: Take 3 capsules (750 mg) by mouth 2 times daily.    Class: E-Prescribe    Notes to Pharmacy: TXP DT 9/28/2024 (Liver) TXP Dischg DT 10/9/2024 DX Liver replaced by transplant Z94.4 [facility]  ([place], [place])    Route: Oral    omeprazole (PRILOSEC) 20 MG DR capsule  30 capsule 2 12/2/2024 --   [address] 1-026    Sig: Take 1 capsule (20 mg) by mouth daily.    Class: E-Prescribe    Route: Oral    PHOSPHORUS TABLET 250  MG per tablet  -- -- 8/6/2024 --       Sig: Take by mouth.    Class: Historical    Route: Oral    polyethylene glycol (MIRALAX) 17 GM/Dose powder  510 g 0 10/9/2024 --   [place], [place] - [address] [facility]    Sig: Take 17 g by mouth 2 times daily as needed for constipation.    Class: E-Prescribe    Route: Oral    predniSONE (DELTASONE) 5 MG tablet  30 tablet 1 11/1/2024 --   [address] 1-713    Sig: Take 1 tablet (5 mg) by mouth daily.    Class: E-Prescribe    Notes to Pharmacy: TXP DT 9/28/2024 (Liver) TXP Dischg DT 10/9/2024 DX Liver replaced by transplant Z94.4 [facility] ([place], [place])    Route: Oral    sodium zirconium cyclosilicate (LOKELMA) 10 g PACK packet  3 packet 0 11/29/2024 12/2/2024   [address] 1-493    Sig: Take 1 packet (10 g) by mouth daily for 3 doses.    Class: E-Prescribe    Route: Oral    tacrolimus (GENERIC EQUIVALENT) 0.5 MG capsule  60 capsule 1 11/11/2024 --   [address] 1-994    Sig: HOLD    Class: E-Prescribe    Notes to Pharmacy: TXP DT 9/28/2024 (Liver) TXP Dischg DT 10/9/2024 DX Liver replaced by transplant Z94.4 [facility] ([place], [place])    tacrolimus (GENERIC EQUIVALENT) 1 MG capsule  540 capsule 0 11/29/2024 --   [address] 1-418    Sig: Take 3 capsules (3 mg) by mouth every 12 hours.     Class: E-Prescribe    Notes to Pharmacy: TXP DT 9/28/2024 (Liver) TXP Dischg DT 10/9/2024 DX Liver replaced by transplant Z94.4 TX [facility] ([place], [place])    Route: Oral    tacrolimus (GENERIC EQUIVALENT) 5 MG capsule  -- -- 11/5/2024 --       Sig: Take 1 capsule (5 mg) by mouth 2 times daily as needed (for dose changes).    Class: Historical    Notes to Pharmacy: TXP DT 9/28/2024 (Liver) TXP Dischg DT 10/9/2024 DX Liver replaced by transplant Z94.4 TX [facility] ([place], [place])    Route: Oral    traMADol (ULTRAM) 50 MG tablet  15 tablet 0 12/2/2024 12/7/2024   [address] [phone]    Sig: Take 1 tablet (50 mg) by mouth 3 times daily as needed for severe pain.    Class: E-Prescribe    Route: Oral    traZODone (DESYREL) 50 MG tablet  30 tablet 0 12/2/2024 --   [address] 7-807    Sig: Take 1 tablet (50 mg) by mouth at bedtime.    Class: E-Prescribe    Route: Oral    valGANciclovir (VALCYTE) 450 MG tablet  30 tablet 1 12/2/2024 --   [address] 5-120    Sig: Take 1 tablet (450 mg) by mouth daily.    Class: E-Prescribe    Route: Oral          Azithromycin   REVIEW OF SYSTEMS (check box if normal)  [x]               GENERAL  [x]                 PULMONARY [x]                GENITOURINARY  [x]                CNS                 [x]                 CARDIAC  [x]                 ENDOCRINE  [x]                EARS,NOSE,THROAT [x]                 GASTROINTESTINAL [x]                 NEUROLOGIC    [x]                MUSCLOSKELTAL  [x]                  HEMATOLOGY      PHYSICAL EXAM (check box if normal)/84 (BP Location: Right arm, Patient Position: Sitting, Cuff Size: Adult Large)   Pulse 85   Resp 18   Wt 106.1 kg (234 lb)   SpO2  100%   BMI 31.73 kg/m          [x]            GENERAL: NAD    [x]            Incision:  Left side of incision open but healing well, L side with 3mm opening with 0.5cm tunneling. tunneling present midline x2 and x2 R distal incision. Tunneling areas packed with 4x4 gauze dampened vanshe solution and rest of incision covered with 4x4 gauze with no issues. No surrounding erythema. Moderate drainage present- mostly midline.    Abd soft throughout. Non-tender.                                                                                 PAIN SCALE:: 8

## 2024-12-02 NOTE — LETTER
12/2/2024      Jag Smith  [address]  [facility] [address]      Dear Colleague,    Thank you for referring your patient, Jag Smith, to the [facility] TRANSPLANT [facility]. Please see a copy of my visit note below.    Transplant Surgery -OUTPATIENT PROGRESS NOTE    Date of Visit: 12/2/24    Transplants:  9/28/2024 (Liver); Postoperative day:  65  ASSESMENT AND PLAN:  Incisional wound:   Stable X5 tunneling areas.  Continue packing with 4x4 gauze BID.      -elevated Cr: much improved with increased oral hydration     -Alk phos increased. Continue to hold gabapentin for now and repeat labs Thursday. Stay local for now.     -Incisional pain: reviewed options. Will continue with tramadol, robaxin, tylenol and atarax. Referred to pain management.       Dr. Colon updated.     Date: 12/2/24    Transplant:  [x]                             Liver [x]                              Kidney []                             Pancreas []                              Other:             Chief Complaint:Follow Up (Wound care)    History of Present Illness:  Here today for incisional wound care. Mother is changing BID.   No fever or N/V/D.   Mom recalls elevated LFTs in June while admitted in [place] pre transplant. He was started on gabapentin around that time and then was taken off and never resumed. Wondering if gabapentin contributing to elevated alk phos. Patient started holding gabapentin this weekend but now with increased incisional pain, especially with dressing changes.       Patient Active Problem List   Diagnosis     Alcohol use disorder, severe, in early remission (H)     Alcoholic hepatitis (H)     Epiphora due to insufficient drainage of left side     Hyperbilirubinemia     Hypokalemia     Jaundice     Pneumonia     Liver replaced by transplant (H)     DENI (acute kidney injury) (H)     Immunosuppressed status (H)     Acute post-operative pain     Steroid-induced hyperglycemia     Acute urinary retention      Anemia due to blood loss, acute     Severe malnutrition (H)     Hyponatremia     Hypomagnesemia     Bilateral lower extremity edema     Hyperkalemia     Subconjunctival hemorrhage     Nonhealing surgical wound     Skin ulcer of abdomen with fat layer exposed (H)     Elevated serum creatinine     SOCIAL /FAMILY HISTORY: [x]                  No recent change    Past Medical History:   Diagnosis Date     Alcohol use disorder      Alcoholic hepatitis (H) 2024     Anxiety      Depression      Hypertension      Liver replaced by transplant (H) 2024     Past Surgical History:   Procedure Laterality Date     BENCH LIVER  2024    Procedure: Bench liver;  Surgeon: Fernando Colon MD;  Location: [facility] OR     DACRYOCYSTORHINOSTOMY Left 2013     INCISION AND DRAINAGE BUTTOCKS Left 2017     TRANSPLANT LIVER RECIPIENT  DONOR N/A 2024    Procedure: Transplant liver recipient  donor;  Surgeon: Fernando Colon MD;  Location: [facility] OR     Social History     Socioeconomic History     Marital status: Single     Spouse name: Not on file     Number of children: Not on file     Years of education: Not on file     Highest education level: Not on file   Occupational History     Not on file   Tobacco Use     Smoking status: Never     Smokeless tobacco: Never   Vaping Use     Vaping status: Never Used   Substance and Sexual Activity     Alcohol use: Not Currently     Comment: last drink 2024     Drug use: Not Currently     Types: Marijuana     Comment: smoking a long time ago     Sexual activity: Not on file   Other Topics Concern     Not on file   Social History Narrative     Not on file     Social Drivers of Health     Financial Resource Strain: Low Risk  (2024)    Financial Resource Strain      Within the past 12 months, have you or your family members you live with been unable to get utilities (heat, electricity) when it was really needed?: No   Food Insecurity: Low Risk  (2024)     Food Insecurity      Within the past 12 months, did you worry that your food would run out before you got money to buy more?: No      Within the past 12 months, did the food you bought just not last and you didn t have money to get more?: No   Transportation Needs: Low Risk  (9/21/2024)    Transportation Needs      Within the past 12 months, has lack of transportation kept you from medical appointments, getting your medicines, non-medical meetings or appointments, work, or from getting things that you need?: No   Physical Activity: Not on file   Stress: Not on file   Social Connections: Not on file   Interpersonal Safety: Low Risk  (11/12/2024)    Interpersonal Safety      Do you feel physically and emotionally safe where you currently live?: Yes      Within the past 12 months, have you been hit, slapped, kicked or otherwise physically hurt by someone?: No      Within the past 12 months, have you been humiliated or emotionally abused in other ways by your partner or ex-partner?: No   Recent Concern: Interpersonal Safety - High Risk (9/22/2024)    Interpersonal Safety      Do you feel physically and emotionally safe where you currently live?: No      Within the past 12 months, have you been hit, slapped, kicked or otherwise physically hurt by someone?: No      Within the past 12 months, have you been humiliated or emotionally abused in other ways by your partner or ex-partner?: No   Housing Stability: Low Risk  (9/21/2024)    Housing Stability      Do you have housing? : Yes      Are you worried about losing your housing?: No     Prescription Medications as of 12/2/2024         Rx Number Disp Refills Start End Last Dispensed Date Next Fill Date Owning Pharmacy    acetaminophen (TYLENOL) 325 MG tablet  60 tablet 0 12/2/2024 --   [facility] [place], [place] - [address] [place] [address] [phone]    Sig: Take 2 tablets (650 mg) by mouth every 8 hours as needed for mild pain or fever.    Class:  E-Prescribe    Route: Oral    aspirin (ASA) 81 MG chewable tablet  30 tablet 1 12/2/2024 --   [address]    Sig: Take 1 tablet (81 mg) by mouth or Feeding Tube daily.    Class: E-Prescribe    Route: Oral or Feeding Tube    escitalopram (LEXAPRO) 20 MG tablet  30 tablet 0 11/11/2024 --   [address]    Sig: Take 1 tablet (20 mg) by mouth daily.    Class: E-Prescribe    Route: Oral    gabapentin (NEURONTIN) 300 MG capsule  30 capsule 0 11/22/2024 12/22/2024   [address]    Sig: Take 1 capsule (300 mg) by mouth at bedtime.    Class: E-Prescribe    Route: Oral    hydrOXYzine HCl (ATARAX) 25 MG tablet  20 tablet 0 12/2/2024 --   [address]    Sig: Take 1 tablet (25 mg) by mouth every 8 hours as needed for anxiety or other (adjuvant pain).    Class: E-Prescribe    Route: Oral    magnesium oxide (MAG-OX) 400 MG tablet  120 tablet 1 12/2/2024 --   [address]    Sig: Take 2 tablets (800 mg) by mouth 2 times daily.    Class: E-Prescribe    Route: Oral    methocarbamol (ROBAXIN) 750 MG tablet  30 tablet 0 12/2/2024 --   [address]    Sig: Take 1 tablet (750 mg) by mouth 3 times daily as needed for muscle spasms.    Class: E-Prescribe    Route: Oral    multivitamin w/minerals (CERTAVITE/ANTIOXIDANTS) tablet  30 tablet 1 11/1/2024 --   [address]    Sig: Take 1 tablet by mouth daily.    Class: E-Prescribe    Route: Oral    mycophenolate (GENERIC EQUIVALENT) 250 MG capsule  180 capsule 1 12/2/2024 --   [facility]  [address]    Sig: Take 3 capsules (750 mg) by mouth 2 times daily.    Class: E-Prescribe    Notes to Pharmacy: TXP DT 9/28/2024 (Liver) TXP Dischg DT 10/9/2024 DX Liver replaced by transplant Z94.4 TX [facility] ([place], [place])    Route: Oral    omeprazole (PRILOSEC) 20 MG DR capsule  30 capsule 2 12/2/2024 --   [address]    Sig: Take 1 capsule (20 mg) by mouth daily.    Class: E-Prescribe    Route: Oral    PHOSPHORUS TABLET 250  MG per tablet  -- -- 8/6/2024 --       Sig: Take by mouth.    Class: Historical    Route: Oral    polyethylene glycol (MIRALAX) 17 GM/Dose powder  510 g 0 10/9/2024 --   [place], [place] - [address] [facility]    Sig: Take 17 g by mouth 2 times daily as needed for constipation.    Class: E-Prescribe    Route: Oral    predniSONE (DELTASONE) 5 MG tablet  30 tablet 1 11/1/2024 --   [address]    Sig: Take 1 tablet (5 mg) by mouth daily.    Class: E-Prescribe    Notes to Pharmacy: TXP DT 9/28/2024 (Liver) TXP Dischg DT 10/9/2024 DX Liver replaced by transplant Z94.4 [facility] ([place], [place])    Route: Oral    sodium zirconium cyclosilicate (LOKELMA) 10 g PACK packet  3 packet 0 11/29/2024 12/2/2024   [address]    Sig: Take 1 packet (10 g) by mouth daily for 3 doses.    Class: E-Prescribe    Route: Oral    tacrolimus (GENERIC EQUIVALENT) 0.5 MG capsule  60 capsule 1 11/11/2024 --   [address]    Sig: HOLD    Class: E-Prescribe    Notes to Pharmacy: TXP DT 9/28/2024 (Liver) TXP Dischg DT 10/9/2024 DX Liver replaced by transplant Z94.4  [place] [facility] ([place], [place])    tacrolimus (GENERIC EQUIVALENT) 1 MG capsule  540 capsule 0 11/29/2024 --   [address]    Sig: Take 3 capsules (3 mg) by mouth every 12 hours.    Class: E-Prescribe    Notes to Pharmacy: TXP DT 9/28/2024 (Liver) TXP Dischg DT 10/9/2024 DX Liver replaced by transplant Z94.4 TX [facility] ([place], [place])    Route: Oral    tacrolimus (GENERIC EQUIVALENT) 5 MG capsule  -- -- 11/5/2024 --       Sig: Take 1 capsule (5 mg) by mouth 2 times daily as needed (for dose changes).    Class: Historical    Notes to Pharmacy: TXP DT 9/28/2024 (Liver) TXP Dischg DT 10/9/2024 DX Liver replaced by transplant Z94.4 [facility] ([place], [place])    Route: Oral    traMADol (ULTRAM) 50 MG tablet  15 tablet 0 12/2/2024 12/7/2024   [address]    Sig: Take 1 tablet (50 mg) by mouth 3 times daily as needed for severe pain.    Class: E-Prescribe    Route: Oral    traZODone (DESYREL) 50 MG tablet  30 tablet 0 12/2/2024 --   [address]    Sig: Take 1 tablet (50 mg) by mouth at bedtime.    Class: E-Prescribe    Route: Oral    valGANciclovir (VALCYTE) 450 MG tablet  30 tablet 1 12/2/2024 --   [address]    Sig: Take 1 tablet (450 mg) by mouth daily.    Class: E-Prescribe    Route: Oral          Azithromycin   REVIEW OF SYSTEMS (check box if normal)  [x]               GENERAL  [x]                 PULMONARY [x]                GENITOURINARY  [x]                CNS                 [x]                 CARDIAC  [x]                 ENDOCRINE  [x]                EARS,NOSE,THROAT [x]                  GASTROINTESTINAL [x]                 NEUROLOGIC    [x]                MUSCLOSKELTAL  [x]                  HEMATOLOGY      PHYSICAL EXAM (check box if normal)/84 (BP Location: Right arm, Patient Position: Sitting, Cuff Size: Adult Large)   Pulse 85   Resp 18   Wt 106.1 kg (234 lb)   SpO2 100%   BMI 31.73 kg/m          [x]            GENERAL: NAD    [x]            Incision:  Left side of incision open but healing well, L side with 3mm opening with 0.5cm tunneling. tunneling present midline x2 and x2 R distal incision. Tunneling areas packed with 4x4 gauze dampened vanshe solution and rest of incision covered with 4x4 gauze with no issues. No surrounding erythema. Moderate drainage present- mostly midline.    Abd soft throughout. Non-tender.                                                                                 PAIN SCALE:: 8       Again, thank you for allowing me to participate in the care of your patient.        Sincerely,        SIL Blum CNP

## 2024-12-03 DIAGNOSIS — Z94.4 LIVER REPLACED BY TRANSPLANT (H): Primary | ICD-10-CM

## 2024-12-04 NOTE — TELEPHONE ENCOUNTER
Per Marycruz, dressing change appts today and Friday at 1000. Lab appt scheduled for tomorrow at 0815. Patient informed and verbalized understanding.     Will discuss extending his stay at [address] once we get his lab results. Will contact his home clinic if he is able to return home.

## 2024-12-05 ENCOUNTER — TELEPHONE (OUTPATIENT)
Dept: LAB | Facility: CLINIC | Age: 39
End: 2024-12-05

## 2024-12-05 ENCOUNTER — LAB (OUTPATIENT)
Dept: LAB | Facility: CLINIC | Age: 39
End: 2024-12-05
Payer: MEDICAID

## 2024-12-05 DIAGNOSIS — Z94.4 LIVER REPLACED BY TRANSPLANT (H): Primary | ICD-10-CM

## 2024-12-05 DIAGNOSIS — Z94.4 LIVER REPLACED BY TRANSPLANT (H): ICD-10-CM

## 2024-12-05 LAB
ALBUMIN SERPL BCG-MCNC: 4 G/DL (ref 3.5–5.2)
ALP SERPL-CCNC: 194 U/L (ref 40–150)
ALT SERPL W P-5'-P-CCNC: 45 U/L (ref 0–70)
ANION GAP SERPL CALCULATED.3IONS-SCNC: 12 MMOL/L (ref 7–15)
AST SERPL W P-5'-P-CCNC: 24 U/L (ref 0–45)
BILIRUB DIRECT SERPL-MCNC: 0.34 MG/DL (ref 0–0.3)
BILIRUB SERPL-MCNC: 0.6 MG/DL
BUN SERPL-MCNC: 33.2 MG/DL (ref 6–20)
CALCIUM SERPL-MCNC: 9.9 MG/DL (ref 8.8–10.4)
CHLORIDE SERPL-SCNC: 99 MMOL/L (ref 98–107)
CREAT SERPL-MCNC: 1.37 MG/DL (ref 0.67–1.17)
EGFRCR SERPLBLD CKD-EPI 2021: 68 ML/MIN/1.73M2
ERYTHROCYTE [DISTWIDTH] IN BLOOD BY AUTOMATED COUNT: 13.7 % (ref 10–15)
GLUCOSE SERPL-MCNC: 95 MG/DL (ref 70–99)
HCO3 SERPL-SCNC: 21 MMOL/L (ref 22–29)
HCT VFR BLD AUTO: 29.8 % (ref 40–53)
HGB BLD-MCNC: 9.7 G/DL (ref 13.3–17.7)
MAGNESIUM SERPL-MCNC: 1.4 MG/DL (ref 1.7–2.3)
MCH RBC QN AUTO: 29.5 PG (ref 26.5–33)
MCHC RBC AUTO-ENTMCNC: 32.6 G/DL (ref 31.5–36.5)
MCV RBC AUTO: 91 FL (ref 78–100)
PHOSPHATE SERPL-MCNC: 5.5 MG/DL (ref 2.5–4.5)
PLATELET # BLD AUTO: 283 10E3/UL (ref 150–450)
POTASSIUM SERPL-SCNC: 5 MMOL/L (ref 3.4–5.3)
PROT SERPL-MCNC: 7.6 G/DL (ref 6.4–8.3)
RBC # BLD AUTO: 3.29 10E6/UL (ref 4.4–5.9)
SODIUM SERPL-SCNC: 132 MMOL/L (ref 135–145)
TACROLIMUS BLD-MCNC: 9.3 UG/L (ref 5–15)
TME LAST DOSE: NORMAL H
TME LAST DOSE: NORMAL H
WBC # BLD AUTO: 7.6 10E3/UL (ref 4–11)

## 2024-12-05 PROCEDURE — 83735 ASSAY OF MAGNESIUM: CPT | Performed by: PATHOLOGY

## 2024-12-05 PROCEDURE — 99000 SPECIMEN HANDLING OFFICE-LAB: CPT | Performed by: PATHOLOGY

## 2024-12-05 PROCEDURE — 84100 ASSAY OF PHOSPHORUS: CPT | Performed by: PATHOLOGY

## 2024-12-05 PROCEDURE — 36415 COLL VENOUS BLD VENIPUNCTURE: CPT | Performed by: PATHOLOGY

## 2024-12-05 PROCEDURE — 80053 COMPREHEN METABOLIC PANEL: CPT | Performed by: PATHOLOGY

## 2024-12-05 PROCEDURE — 85027 COMPLETE CBC AUTOMATED: CPT | Performed by: PATHOLOGY

## 2024-12-05 PROCEDURE — 80197 ASSAY OF TACROLIMUS: CPT | Performed by: TRANSPLANT SURGERY

## 2024-12-05 PROCEDURE — 82248 BILIRUBIN DIRECT: CPT | Performed by: PATHOLOGY

## 2024-12-05 NOTE — TELEPHONE ENCOUNTER
----- Message from Marycruz Alcala sent at 12/5/2024  2:19 PM CST -----  Regarding: update  Hello!   Dr. Colon would like Jag to stay local another week. He'd like to see Jag next Thursday - if you could schedule that. I can see him Mon/wed next week.   If labs and wound are ok next week then he'd be clear to go home.     -Marycruz

## 2024-12-09 ENCOUNTER — LAB (OUTPATIENT)
Dept: LAB | Facility: CLINIC | Age: 39
End: 2024-12-09
Attending: TRANSPLANT SURGERY
Payer: MEDICAID

## 2024-12-09 ENCOUNTER — OFFICE VISIT (OUTPATIENT)
Dept: TRANSPLANT | Facility: CLINIC | Age: 39
End: 2024-12-09
Attending: TRANSPLANT SURGERY
Payer: MEDICAID

## 2024-12-09 VITALS
TEMPERATURE: 98.4 F | WEIGHT: 228.7 LBS | DIASTOLIC BLOOD PRESSURE: 73 MMHG | HEART RATE: 102 BPM | HEIGHT: 72 IN | SYSTOLIC BLOOD PRESSURE: 113 MMHG | OXYGEN SATURATION: 100 % | BODY MASS INDEX: 30.98 KG/M2

## 2024-12-09 DIAGNOSIS — H00.12 CHALAZION OF RIGHT LOWER EYELID: Primary | ICD-10-CM

## 2024-12-09 DIAGNOSIS — Z94.4 LIVER REPLACED BY TRANSPLANT (H): ICD-10-CM

## 2024-12-09 DIAGNOSIS — G89.18 ACUTE POST-OPERATIVE PAIN: ICD-10-CM

## 2024-12-09 DIAGNOSIS — K70.10 ALCOHOLIC HEPATITIS (H): ICD-10-CM

## 2024-12-09 LAB
ALBUMIN SERPL BCG-MCNC: 4.3 G/DL (ref 3.5–5.2)
ALP SERPL-CCNC: 175 U/L (ref 40–150)
ALT SERPL W P-5'-P-CCNC: 26 U/L (ref 0–70)
ANION GAP SERPL CALCULATED.3IONS-SCNC: 13 MMOL/L (ref 7–15)
AST SERPL W P-5'-P-CCNC: 16 U/L (ref 0–45)
BILIRUB DIRECT SERPL-MCNC: 0.3 MG/DL (ref 0–0.3)
BILIRUB SERPL-MCNC: 0.6 MG/DL
BUN SERPL-MCNC: 25.8 MG/DL (ref 6–20)
CALCIUM SERPL-MCNC: 10.2 MG/DL (ref 8.8–10.4)
CHLORIDE SERPL-SCNC: 98 MMOL/L (ref 98–107)
CREAT SERPL-MCNC: 1.3 MG/DL (ref 0.67–1.17)
EGFRCR SERPLBLD CKD-EPI 2021: 73 ML/MIN/1.73M2
ERYTHROCYTE [DISTWIDTH] IN BLOOD BY AUTOMATED COUNT: 13.9 % (ref 10–15)
GLUCOSE SERPL-MCNC: 134 MG/DL (ref 70–99)
HCO3 SERPL-SCNC: 21 MMOL/L (ref 22–29)
HCT VFR BLD AUTO: 30.4 % (ref 40–53)
HGB BLD-MCNC: 9.9 G/DL (ref 13.3–17.7)
MAGNESIUM SERPL-MCNC: 1.5 MG/DL (ref 1.7–2.3)
MCH RBC QN AUTO: 29.4 PG (ref 26.5–33)
MCHC RBC AUTO-ENTMCNC: 32.6 G/DL (ref 31.5–36.5)
MCV RBC AUTO: 90 FL (ref 78–100)
PHOSPHATE SERPL-MCNC: 5.5 MG/DL (ref 2.5–4.5)
PLATELET # BLD AUTO: 312 10E3/UL (ref 150–450)
POTASSIUM SERPL-SCNC: 5.3 MMOL/L (ref 3.4–5.3)
PROT SERPL-MCNC: 8 G/DL (ref 6.4–8.3)
RBC # BLD AUTO: 3.37 10E6/UL (ref 4.4–5.9)
SODIUM SERPL-SCNC: 132 MMOL/L (ref 135–145)
WBC # BLD AUTO: 10.7 10E3/UL (ref 4–11)

## 2024-12-09 PROCEDURE — 82248 BILIRUBIN DIRECT: CPT | Performed by: PATHOLOGY

## 2024-12-09 PROCEDURE — 99000 SPECIMEN HANDLING OFFICE-LAB: CPT | Performed by: PATHOLOGY

## 2024-12-09 PROCEDURE — 85027 COMPLETE CBC AUTOMATED: CPT | Performed by: PATHOLOGY

## 2024-12-09 PROCEDURE — 80053 COMPREHEN METABOLIC PANEL: CPT | Performed by: PATHOLOGY

## 2024-12-09 PROCEDURE — 80197 ASSAY OF TACROLIMUS: CPT | Performed by: TRANSPLANT SURGERY

## 2024-12-09 PROCEDURE — 84100 ASSAY OF PHOSPHORUS: CPT | Performed by: PATHOLOGY

## 2024-12-09 PROCEDURE — G0463 HOSPITAL OUTPT CLINIC VISIT: HCPCS | Performed by: NURSE PRACTITIONER

## 2024-12-09 PROCEDURE — 36415 COLL VENOUS BLD VENIPUNCTURE: CPT | Performed by: PATHOLOGY

## 2024-12-09 PROCEDURE — 99213 OFFICE O/P EST LOW 20 MIN: CPT | Mod: 24 | Performed by: NURSE PRACTITIONER

## 2024-12-09 PROCEDURE — G0480 DRUG TEST DEF 1-7 CLASSES: HCPCS | Mod: 90 | Performed by: PATHOLOGY

## 2024-12-09 PROCEDURE — 83735 ASSAY OF MAGNESIUM: CPT | Performed by: PATHOLOGY

## 2024-12-09 RX ORDER — HYDROXYZINE HYDROCHLORIDE 25 MG/1
25 TABLET, FILM COATED ORAL EVERY 8 HOURS PRN
Qty: 20 TABLET | Refills: 0 | Status: SHIPPED | OUTPATIENT
Start: 2024-12-09

## 2024-12-09 RX ORDER — TRAMADOL HYDROCHLORIDE 50 MG/1
50 TABLET ORAL 2 TIMES DAILY PRN
Qty: 10 TABLET | Refills: 0 | Status: SHIPPED | OUTPATIENT
Start: 2024-12-09 | End: 2024-12-12

## 2024-12-09 RX ORDER — METHOCARBAMOL 750 MG/1
750 TABLET, FILM COATED ORAL 3 TIMES DAILY PRN
Qty: 30 TABLET | Refills: 0 | Status: SHIPPED | OUTPATIENT
Start: 2024-12-09 | End: 2024-12-12

## 2024-12-09 ASSESSMENT — PAIN SCALES - GENERAL: PAINLEVEL_OUTOF10: EXTREME PAIN (8)

## 2024-12-09 NOTE — LETTER
12/9/2024      Jag Smith  [address]  [facility] [address]      Dear Colleague,    Thank you for referring your patient, Jag Smith, to the [facility] TRANSPLANT CLINIC. Please see a copy of my visit note below.    Transplant Surgery -OUTPATIENT PROGRESS NOTE    Date of Visit: 12/10/24    Transplants:  9/28/2024 (Liver); Postoperative day:  73  ASSESMENT AND PLAN:  Incisional wound:   Stable X5 tunneling areas.  Continue packing with 4x4 gauze BID.      -Alk phos down trending. Stay local for now. Seeing surgeon this week    -Incisional pain: Referred to pain management. On tylenol, robaxin, atarax and tramadol.   -R lower lid chalazion - referred to [facility] for potential I/D. Continue warm compress QID.         Date: 12/4/24    Transplant:  [x]                             Liver [x]                              Kidney []                             Pancreas []                              Other:             Chief Complaint:RECHECK (Liver post return/)    History of Present Illness:  Here today for incisional wound care. Mother is changing BID.   No fever or N/V/D.   R eye lower lid lump - x 1 week. Not better but not worse. No drainage or vision changes. No pain.     Patient Active Problem List   Diagnosis     Alcohol use disorder, severe, in early remission (H)     Alcoholic hepatitis (H)     Epiphora due to insufficient drainage of left side     Hyperbilirubinemia     Hypokalemia     Jaundice     Pneumonia     Liver replaced by transplant (H)     DENI (acute kidney injury) (H)     Immunosuppressed status (H)     Acute post-operative pain     Steroid-induced hyperglycemia     Acute urinary retention     Anemia due to blood loss, acute     Severe malnutrition (H)     Hyponatremia     Hypomagnesemia     Bilateral lower extremity edema     Hyperkalemia     Subconjunctival hemorrhage     Nonhealing surgical wound     Skin ulcer of abdomen with fat layer exposed (H)     Elevated serum creatinine     SOCIAL  /FAMILY HISTORY: [x]                  No recent change    Past Medical History:   Diagnosis Date     Alcohol use disorder      Alcoholic hepatitis (H) 2024     Anxiety      Depression      Hypertension      Liver replaced by transplant (H) 2024     Past Surgical History:   Procedure Laterality Date     BENCH LIVER  2024    Procedure: Bench liver;  Surgeon: Fernando Colon MD;  Location: [facility] OR     DACRYOCYSTORHINOSTOMY Left 2013     INCISION AND DRAINAGE BUTTOCKS Left 2017     TRANSPLANT LIVER RECIPIENT  DONOR N/A 2024    Procedure: Transplant liver recipient  donor;  Surgeon: Fernando Colon MD;  Location:  OR     Social History     Socioeconomic History     Marital status: Single     Spouse name: Not on file     Number of children: Not on file     Years of education: Not on file     Highest education level: Not on file   Occupational History     Not on file   Tobacco Use     Smoking status: Never     Smokeless tobacco: Never   Vaping Use     Vaping status: Never Used   Substance and Sexual Activity     Alcohol use: Not Currently     Comment: last drink 2024     Drug use: Not Currently     Types: Marijuana     Comment: smoking a long time ago     Sexual activity: Not on file   Other Topics Concern     Not on file   Social History Narrative     Not on file     Social Drivers of Health     Financial Resource Strain: Low Risk  (2024)    Financial Resource Strain      Within the past 12 months, have you or your family members you live with been unable to get utilities (heat, electricity) when it was really needed?: No   Food Insecurity: Low Risk  (2024)    Food Insecurity      Within the past 12 months, did you worry that your food would run out before you got money to buy more?: No      Within the past 12 months, did the food you bought just not last and you didn t have money to get more?: No   Transportation Needs: Low Risk  (2024)     Transportation Needs      Within the past 12 months, has lack of transportation kept you from medical appointments, getting your medicines, non-medical meetings or appointments, work, or from getting things that you need?: No   Physical Activity: Not on file   Stress: Not on file   Social Connections: Not on file   Interpersonal Safety: Low Risk  (11/12/2024)    Interpersonal Safety      Do you feel physically and emotionally safe where you currently live?: Yes      Within the past 12 months, have you been hit, slapped, kicked or otherwise physically hurt by someone?: No      Within the past 12 months, have you been humiliated or emotionally abused in other ways by your partner or ex-partner?: No   Recent Concern: Interpersonal Safety - High Risk (9/22/2024)    Interpersonal Safety      Do you feel physically and emotionally safe where you currently live?: No      Within the past 12 months, have you been hit, slapped, kicked or otherwise physically hurt by someone?: No      Within the past 12 months, have you been humiliated or emotionally abused in other ways by your partner or ex-partner?: No   Housing Stability: Low Risk  (9/21/2024)    Housing Stability      Do you have housing? : Yes      Are you worried about losing your housing?: No     Prescription Medications as of 12/10/2024         Rx Number Disp Refills Start End Last Dispensed Date Next Fill Date Owning Pharmacy    acetaminophen (TYLENOL) 325 MG tablet  60 tablet 0 12/2/2024 --   [address]    Sig: Take 2 tablets (650 mg) by mouth every 8 hours as needed for mild pain or fever.    Class: E-Prescribe    Route: Oral    aspirin (ASA) 81 MG chewable tablet  30 tablet 1 12/2/2024 --   [address]    Sig: Take 1 tablet (81 mg) by mouth or Feeding Tube daily.    Class: E-Prescribe    Route: Oral or Feeding Tube     escitalopram (LEXAPRO) 20 MG tablet  30 tablet 0 11/11/2024 --   [address]    Sig: Take 1 tablet (20 mg) by mouth daily.    Class: E-Prescribe    Route: Oral    hydrOXYzine HCl (ATARAX) 25 MG tablet  20 tablet 0 12/9/2024 --   [address]    Sig: Take 1 tablet (25 mg) by mouth every 8 hours as needed for anxiety or other (adjuvant pain).    Class: E-Prescribe    Route: Oral    magnesium oxide (MAG-OX) 400 MG tablet  120 tablet 1 12/2/2024 --   [address]    Sig: Take 2 tablets (800 mg) by mouth 2 times daily.    Class: E-Prescribe    Route: Oral    methocarbamol (ROBAXIN) 750 MG tablet  30 tablet 0 12/9/2024 --   [address]    Sig: Take 1 tablet (750 mg) by mouth 3 times daily as needed for muscle spasms.    Class: E-Prescribe    Route: Oral    multivitamin w/minerals (CERTAVITE/ANTIOXIDANTS) tablet  30 tablet 1 11/1/2024 --   [address]    Sig: Take 1 tablet by mouth daily.    Class: E-Prescribe    Route: Oral    mycophenolate (GENERIC EQUIVALENT) 250 MG capsule  180 capsule 1 12/2/2024 --   [address]    Sig: Take 3 capsules (750 mg) by mouth 2 times daily.    Class: E-Prescribe    Notes to Pharmacy: TXP DT 9/28/2024 (Liver) TXP Dischg DT 10/9/2024 DX Liver replaced by transplant Z94.4 [facility] ([place], [place])    Route: Oral    omeprazole (PRILOSEC) 20 MG DR capsule  30 capsule 2 12/2/2024 --   [address]    Sig: Take 1 capsule (20 mg) by mouth daily.    Class:  E-Prescribe    Route: Oral    PHOSPHORUS TABLET 250  MG per tablet  -- -- 8/6/2024 --       Sig: Take by mouth.    Class: Historical    Route: Oral    polyethylene glycol (MIRALAX) 17 GM/Dose powder  510 g 0 10/9/2024 --   [place], [place] - [address] [facility]    Sig: Take 17 g by mouth 2 times daily as needed for constipation.    Class: E-Prescribe    Route: Oral    predniSONE (DELTASONE) 5 MG tablet  30 tablet 1 11/1/2024 --   [address]    Sig: Take 1 tablet (5 mg) by mouth daily.    Class: E-Prescribe    Notes to Pharmacy: TXP DT 9/28/2024 (Liver) TXP Dischg DT 10/9/2024 DX Liver replaced by transplant Z94.4 [facility] ([place], [place])    Route: Oral    tacrolimus (GENERIC EQUIVALENT) 0.5 MG capsule  60 capsule 1 11/11/2024 --   [address]    Sig: HOLD    Class: E-Prescribe    Notes to Pharmacy: TXP DT 9/28/2024 (Liver) TXP Dischg DT 10/9/2024 DX Liver replaced by transplant Z94.4 [facility] ([place], [place])    tacrolimus (GENERIC EQUIVALENT) 1 MG capsule  540 capsule 0 11/29/2024 --   [address]    Sig: Take 3 capsules (3 mg) by mouth every 12 hours.    Class: E-Prescribe    Notes to Pharmacy: TXP DT 9/28/2024 (Liver) TXP Dischg DT 10/9/2024 DX Liver replaced by transplant Z94.4 [facility] ([place], [place])    Route: Oral    tacrolimus (GENERIC EQUIVALENT) 5 MG capsule  -- -- 11/5/2024 --       Sig: Take 1 capsule (5 mg) by mouth 2 times daily as needed (for dose changes).    Class: Historical    Notes to Pharmacy: TXP DT 9/28/2024 (Liver) TXP Dischg DT 10/9/2024 DX Liver replaced by transplant Z94.4 [facility] ([place], [place])    Route: Oral    traMADol (ULTRAM) 50 MG tablet  10 tablet 0 12/9/2024 12/14/2024   [address]    Sig: Take 1 tablet (50 mg) by mouth 2 times daily as needed for severe pain.    Class: E-Prescribe    Route: Oral    traZODone (DESYREL) 50 MG tablet  30 tablet 0 12/2/2024 --   [address]    Sig: Take 1 tablet (50 mg) by mouth at bedtime.    Class: E-Prescribe    Route: Oral    valGANciclovir (VALCYTE) 450 MG tablet  30 tablet 1 12/2/2024 --   [address]    Sig: Take 1 tablet (450 mg) by mouth daily.    Class: E-Prescribe    Route: Oral          Azithromycin   REVIEW OF SYSTEMS (check box if normal)  [x]               GENERAL  [x]                 PULMONARY [x]                GENITOURINARY  [x]                CNS                 [x]                 CARDIAC  [x]                 ENDOCRINE  [x]                EARS,NOSE,THROAT [x]                 GASTROINTESTINAL [x]                 NEUROLOGIC    [x]                MUSCLOSKELTAL  [x]                  HEMATOLOGY      PHYSICAL EXAM (check box if normal)/73   Pulse 102   Temp 98.4  F (36.9  C) (Oral)   Ht 1.829 m (6')   Wt 103.7 kg (228 lb 11.2 oz)   SpO2 100%   BMI 31.02 kg/m          [x]            GENERAL: NAD  R eye lower lid with 4mm mass, non tender. No drainage. Conjunctive wnl.     [x]            Incision:  L side with 1cm  opening with tunneling. Tunneling present midline x2 and x2 R distal incision. Tunneling areas packed with 4x4 gauze  and rest of incision covered with 4x4 gauze with no issues. No surrounding erythema. Moderate drainage present- mostly midline and R mid opening.    Abd soft throughout. Non-tender.                                                                                    PAIN SCALE:: 8       Again, thank you for allowing me to participate in the care of your patient.        Sincerely,        SIL Blum CNP

## 2024-12-09 NOTE — NURSING NOTE
Chief Complaint   Patient presents with    RECHECK     Liver post return         /73   Pulse 102   Temp 98.4  F (36.9  C) (Oral)   Ht 1.829 m (6')   Wt 103.7 kg (228 lb 11.2 oz)   SpO2 100%   BMI 31.02 kg/m      Danilo Miller on 12/9/2024 at 1:23 PM

## 2024-12-10 LAB
TACROLIMUS BLD-MCNC: 6.6 UG/L (ref 5–15)
TME LAST DOSE: NORMAL H
TME LAST DOSE: NORMAL H

## 2024-12-10 NOTE — PROGRESS NOTES
Transplant Surgery -OUTPATIENT PROGRESS NOTE    Date of Visit: 12/10/24    Transplants:  9/28/2024 (Liver); Postoperative day:  73  ASSESMENT AND PLAN:  Incisional wound:   Stable X5 tunneling areas.  Continue packing with 4x4 gauze BID.      -Alk phos down trending. Stay local for now. Seeing surgeon this week    -Incisional pain: Referred to pain management. On tylenol, robaxin, atarax and tramadol.   -R lower lid chalazion - referred to [facility] for potential I/D. Continue warm compress QID.         Date: 12/4/24    Transplant:  [x]                             Liver [x]                              Kidney []                             Pancreas []                              Other:             Chief Complaint:RECHECK (Liver post return/)    History of Present Illness:  Here today for incisional wound care. Mother is changing BID.   No fever or N/V/D.   R eye lower lid lump - x 1 week. Not better but not worse. No drainage or vision changes. No pain.     Patient Active Problem List   Diagnosis    Alcohol use disorder, severe, in early remission (H)    Alcoholic hepatitis (H)    Epiphora due to insufficient drainage of left side    Hyperbilirubinemia    Hypokalemia    Jaundice    Pneumonia    Liver replaced by transplant (H)    DENI (acute kidney injury) (H)    Immunosuppressed status (H)    Acute post-operative pain    Steroid-induced hyperglycemia    Acute urinary retention    Anemia due to blood loss, acute    Severe malnutrition (H)    Hyponatremia    Hypomagnesemia    Bilateral lower extremity edema    Hyperkalemia    Subconjunctival hemorrhage    Nonhealing surgical wound    Skin ulcer of abdomen with fat layer exposed (H)    Elevated serum creatinine     SOCIAL /FAMILY HISTORY: [x]                  No recent change    Past Medical History:   Diagnosis Date    Alcohol use disorder     Alcoholic hepatitis (H) 06/07/2024    Anxiety     Depression     Hypertension     Liver replaced by transplant (H) 09/28/2024      Past Surgical History:   Procedure Laterality Date    BENCH LIVER  2024    Procedure: Bench liver;  Surgeon: Fernando Colon MD;  Location: [facility] OR    DACRYOCYSTORHINOSTOMY Left 2013    INCISION AND DRAINAGE BUTTOCKS Left 2017    TRANSPLANT LIVER RECIPIENT  DONOR N/A 2024    Procedure: Transplant liver recipient  donor;  Surgeon: Fernando Colon MD;  Location: [facility] OR     Social History     Socioeconomic History    Marital status: Single     Spouse name: Not on file    Number of children: Not on file    Years of education: Not on file    Highest education level: Not on file   Occupational History    Not on file   Tobacco Use    Smoking status: Never    Smokeless tobacco: Never   Vaping Use    Vaping status: Never Used   Substance and Sexual Activity    Alcohol use: Not Currently     Comment: last drink 2024    Drug use: Not Currently     Types: Marijuana     Comment: smoking a long time ago    Sexual activity: Not on file   Other Topics Concern    Not on file   Social History Narrative    Not on file     Social Drivers of Health     Financial Resource Strain: Low Risk  (2024)    Financial Resource Strain     Within the past 12 months, have you or your family members you live with been unable to get utilities (heat, electricity) when it was really needed?: No   Food Insecurity: Low Risk  (2024)    Food Insecurity     Within the past 12 months, did you worry that your food would run out before you got money to buy more?: No     Within the past 12 months, did the food you bought just not last and you didn t have money to get more?: No   Transportation Needs: Low Risk  (2024)    Transportation Needs     Within the past 12 months, has lack of transportation kept you from medical appointments, getting your medicines, non-medical meetings or appointments, work, or from getting things that you need?: No   Physical Activity: Not on file   Stress: Not on file    Social Connections: Not on file   Interpersonal Safety: Low Risk  (11/12/2024)    Interpersonal Safety     Do you feel physically and emotionally safe where you currently live?: Yes     Within the past 12 months, have you been hit, slapped, kicked or otherwise physically hurt by someone?: No     Within the past 12 months, have you been humiliated or emotionally abused in other ways by your partner or ex-partner?: No   Recent Concern: Interpersonal Safety - High Risk (9/22/2024)    Interpersonal Safety     Do you feel physically and emotionally safe where you currently live?: No     Within the past 12 months, have you been hit, slapped, kicked or otherwise physically hurt by someone?: No     Within the past 12 months, have you been humiliated or emotionally abused in other ways by your partner or ex-partner?: No   Housing Stability: Low Risk  (9/21/2024)    Housing Stability     Do you have housing? : Yes     Are you worried about losing your housing?: No     Prescription Medications as of 12/10/2024         Rx Number Disp Refills Start End Last Dispensed Date Next Fill Date Owning Pharmacy    acetaminophen (TYLENOL) 325 MG tablet  60 tablet 0 12/2/2024 --   [address]    Sig: Take 2 tablets (650 mg) by mouth every 8 hours as needed for mild pain or fever.    Class: E-Prescribe    Route: Oral    aspirin (ASA) 81 MG chewable tablet  30 tablet 1 12/2/2024 --   [address] 1[address]736    Sig: Take 1 tablet (81 mg) by mouth or Feeding Tube daily.    Class: E-Prescribe    Route: Oral or Feeding Tube    escitalopram (LEXAPRO) 20 MG tablet  30 tablet 0 11/11/2024 --   [phone]    Sig: Take 1 tablet (20 mg) by mouth daily.    Class: E-Prescribe    Route: Oral    hydrOXYzine HCl (ATARAX) 25 MG tablet  20 tablet 0 12/9/2024  --   [address]    Sig: Take 1 tablet (25 mg) by mouth every 8 hours as needed for anxiety or other (adjuvant pain).    Class: E-Prescribe    Route: Oral    magnesium oxide (MAG-OX) 400 MG tablet  120 tablet 1 12/2/2024 --   [address]    Sig: Take 2 tablets (800 mg) by mouth 2 times daily.    Class: E-Prescribe    Route: Oral    methocarbamol (ROBAXIN) 750 MG tablet  30 tablet 0 12/9/2024 --   [address]    Sig: Take 1 tablet (750 mg) by mouth 3 times daily as needed for muscle spasms.    Class: E-Prescribe    Route: Oral    multivitamin w/minerals (CERTAVITE/ANTIOXIDANTS) tablet  30 tablet 1 11/1/2024 --   [address]    Sig: Take 1 tablet by mouth daily.    Class: E-Prescribe    Route: Oral    mycophenolate (GENERIC EQUIVALENT) 250 MG capsule  180 capsule 1 12/2/2024 --   [address]    Sig: Take 3 capsules (750 mg) by mouth 2 times daily.    Class: E-Prescribe    Notes to Pharmacy: TXP DT 9/28/2024 (Liver) TXP Dischg DT 10/9/2024 DX Liver replaced by transplant Z94.4 [facility] ([place], [place])    Route: Oral    omeprazole (PRILOSEC) 20 MG DR capsule  30 capsule 2 12/2/2024 --   [address]    Sig: Take 1 capsule (20 mg) by mouth daily.    Class: E-Prescribe    Route: Oral    PHOSPHORUS TABLET 250  MG per tablet  -- -- 8/6/2024 --       Sig: Take by mouth.    Class: Historical    Route: Oral    polyethylene glycol (MIRALAX) 17 GM/Dose powder  510 g 0 10/9/2024 --   [facility] [place], [place] - [address] [facility]     Sig: Take 17 g by mouth 2 times daily as needed for constipation.    Class: E-Prescribe    Route: Oral    predniSONE (DELTASONE) 5 MG tablet  30 tablet 1 11/1/2024 --   [address] 1[address]90    Sig: Take 1 tablet (5 mg) by mouth daily.    Class: E-Prescribe    Notes to Pharmacy: TXP DT 9/28/2024 (Liver) TXP Dischg DT 10/9/2024 DX Liver replaced by transplant Z94.4 [facility] ([place], [place])    Route: Oral    tacrolimus (GENERIC EQUIVALENT) 0.5 MG capsule  60 capsule 1 11/11/2024 --   [address]    Sig: HOLD    Class: E-Prescribe    Notes to Pharmacy: TXP DT 9/28/2024 (Liver) TXP Dischg DT 10/9/2024 DX Liver replaced by transplant Z94.4 [facility] ([place], [place])    tacrolimus (GENERIC EQUIVALENT) 1 MG capsule  540 capsule 0 11/29/2024 --   [address]    Sig: Take 3 capsules (3 mg) by mouth every 12 hours.    Class: E-Prescribe    Notes to Pharmacy: TXP DT 9/28/2024 (Liver) TXP Dischg DT 10/9/2024 DX Liver replaced by transplant Z94.4 [facility] ([place], [place])    Route: Oral    tacrolimus (GENERIC EQUIVALENT) 5 MG capsule  -- -- 11/5/2024 --       Sig: Take 1 capsule (5 mg) by mouth 2 times daily as needed (for dose changes).    Class: Historical    Notes to Pharmacy: TXP DT 9/28/2024 (Liver) TXP Dischg DT 10/9/2024 DX Liver replaced by transplant Z94.4 [facility] ([place], [place])    Route: Oral    traMADol (ULTRAM) 50 MG tablet  10 tablet 0 12/9/2024 12/14/2024   [address] 1[address]043    Sig: Take 1 tablet (50 mg) by mouth 2 times daily as needed for severe  pain.    Class: E-Prescribe    Route: Oral    traZODone (DESYREL) 50 MG tablet  30 tablet 0 12/2/2024 --   [address]    Sig: Take 1 tablet (50 mg) by mouth at bedtime.    Class: E-Prescribe    Route: Oral    valGANciclovir (VALCYTE) 450 MG tablet  30 tablet 1 12/2/2024 --   [address]    Sig: Take 1 tablet (450 mg) by mouth daily.    Class: E-Prescribe    Route: Oral          Azithromycin   REVIEW OF SYSTEMS (check box if normal)  [x]               GENERAL  [x]                 PULMONARY [x]                GENITOURINARY  [x]                CNS                 [x]                 CARDIAC  [x]                 ENDOCRINE  [x]                EARS,NOSE,THROAT [x]                 GASTROINTESTINAL [x]                 NEUROLOGIC    [x]                MUSCLOSKELTAL  [x]                  HEMATOLOGY      PHYSICAL EXAM (check box if normal)/73   Pulse 102   Temp 98.4  F (36.9  C) (Oral)   Ht 1.829 m (6')   Wt 103.7 kg (228 lb 11.2 oz)   SpO2 100%   BMI 31.02 kg/m          [x]            GENERAL: NAD  R eye lower lid with 4mm mass, non tender. No drainage. Conjunctive wnl.     [x]            Incision:  L side with 1cm  opening with tunneling. Tunneling present midline x2 and x2 R distal incision. Tunneling areas packed with 4x4 gauze  and rest of incision covered with 4x4 gauze with no issues. No surrounding erythema. Moderate drainage present- mostly midline and R mid opening.    Abd soft throughout. Non-tender.                                                                                   PAIN SCALE:: 8

## 2024-12-11 ENCOUNTER — TELEPHONE (OUTPATIENT)
Dept: TRANSPLANT | Facility: CLINIC | Age: 39
End: 2024-12-11

## 2024-12-11 ENCOUNTER — HOSPITAL ENCOUNTER (OUTPATIENT)
Dept: WOUND CARE | Facility: CLINIC | Age: 39
Discharge: HOME | End: 2024-12-11
Attending: FAMILY MEDICINE | Admitting: FAMILY MEDICINE
Payer: MEDICAID

## 2024-12-11 VITALS — DIASTOLIC BLOOD PRESSURE: 72 MMHG | HEART RATE: 85 BPM | SYSTOLIC BLOOD PRESSURE: 125 MMHG | TEMPERATURE: 98 F

## 2024-12-11 DIAGNOSIS — T81.89XD NON-HEALING SURGICAL WOUND, SUBSEQUENT ENCOUNTER: Primary | ICD-10-CM

## 2024-12-11 DIAGNOSIS — Z94.4 LIVER REPLACED BY TRANSPLANT (H): ICD-10-CM

## 2024-12-11 DIAGNOSIS — L98.492 SKIN ULCER OF ABDOMEN WITH FAT LAYER EXPOSED (H): ICD-10-CM

## 2024-12-11 PROCEDURE — 97602 WOUND(S) CARE NON-SELECTIVE: CPT

## 2024-12-11 RX ORDER — TACROLIMUS 1 MG/1
4 CAPSULE ORAL EVERY 12 HOURS
Qty: 240 CAPSULE | Refills: 1 | Status: SHIPPED | OUTPATIENT
Start: 2024-12-11

## 2024-12-11 RX ORDER — AMOXICILLIN AND CLAVULANATE POTASSIUM 500; 125 MG/1; MG/1
1 TABLET, FILM COATED ORAL 2 TIMES DAILY
Qty: 28 TABLET | Refills: 0 | Status: SHIPPED | OUTPATIENT
Start: 2024-12-11 | End: 2024-12-25

## 2024-12-11 NOTE — TELEPHONE ENCOUNTER
ISSUE:   Tacrolimus IR level 6.6 on 12/09/24, goal 8-10, dose 3 mg BID.    PLAN:   Call Patient and confirm this was an accurate 12-hour trough.   Verify Tacrolimus IR dose 3 mg BID.   Confirm no new medications or illness.   Confirm no missed doses.     If accurate trough and accurate dose, increase Tacrolimus IR dose to 4 mg BID.    Repeat labs tomorrow.    Karena Villegas RN     OUTCOME:   Spoke with Patient, they confirm accurate trough level and current dose 3 mg BID.   Patient confirmed dose change to 4 mg BID.  Patient agreed to repeat labs tomorrow.  .   Orders sent to preferred pharmacy for dose change and lab for repeat labs.   Patient voiced understanding of plan.     Instructed pt to take an extra 1mg now then resume 4mg BID.     Magda Gant LPN

## 2024-12-11 NOTE — PROGRESS NOTES
Wound Clinic Note          Visit date: 12/11/2024       Cheif Complaint:     Jag Smith is a 39 year old  male had concerns including WOUND CARE.  He has an abdominal surgical wound.      HISTORY OF PRESENT ILLNESS:    Jag Smith reports the ulcer has been present since September 2024.  The wound began after a recent surgery and the incision did not heal normally.   He had a liver transplant on September 26, 2024, postoperatively a portion of the incision dehisced.      At his last clinic visit I had recommended that they manage the wound with Vashe moistened packing strips followed by an ABD pad changed once a day.  The patient reports today that shortly after that visit they were seen by the transplant surgeon, Dr. Colon who advised him not to use the packing strips and to rather use a corner of a 4 x 4 tucked into the wounds instead.  They have been using this bandage regimen along with ABD pads.  The patient reports he has been feeling poorly the last week with increasing pain from the wounds.      The pateint denies fevers or chills.  They report the pain from the wound has been 9/10 and has increased recently.      Today the patient reports maintaining a high protein diet and taking protein supplements regularly.        He does not have diabetes and does not smoke cigarettes.  He takes tacrolimus, mycophenolate and prednisone to prevent rejection.  He does not take any antibiotics for prophylaxis.        The patient has not had any symptoms of infection relating to the wound recently and is not currently on antibiotics.       Problem List:   Past Medical History:   Diagnosis Date    Alcohol use disorder     Alcoholic hepatitis (H) 06/07/2024    Anxiety     Depression     Hypertension     Liver replaced by transplant (H) 09/28/2024              Family Hx: family history includes Alcoholism in his brother; Alzheimer Disease (age of onset: 94) in his maternal grandmother; Anxiety Disorder in  his brother; Depression in his brother; Diabetes in his maternal aunt; Substance Abuse in his brother.       Surgical Hx:   Past Surgical History:   Procedure Laterality Date    BENCH LIVER  2024    Procedure: Bench liver;  Surgeon: Fernando Colon MD;  Location: [facility] OR    DACRYOCYSTORHINOSTOMY Left 2013    INCISION AND DRAINAGE BUTTOCKS Left 2017    TRANSPLANT LIVER RECIPIENT  DONOR N/A 2024    Procedure: Transplant liver recipient  donor;  Surgeon: Fernando Colon MD;  Location: [facility]          Allergies:    Allergies   Allergen Reactions    Azithromycin Rash              Medication History:    Current Outpatient Medications   Medication Sig Dispense Refill    amoxicillin-clavulanate (AUGMENTIN) 500-125 MG tablet Take 1 tablet by mouth 2 times daily for 14 days. 28 tablet 0    acetaminophen (TYLENOL) 325 MG tablet Take 2 tablets (650 mg) by mouth every 8 hours as needed for mild pain or fever. 60 tablet 0    aspirin (ASA) 81 MG chewable tablet Take 1 tablet (81 mg) by mouth or Feeding Tube daily. 30 tablet 1    escitalopram (LEXAPRO) 20 MG tablet Take 1 tablet (20 mg) by mouth daily. 30 tablet 0    hydrOXYzine HCl (ATARAX) 25 MG tablet Take 1 tablet (25 mg) by mouth every 8 hours as needed for anxiety or other (adjuvant pain). 20 tablet 0    magnesium oxide (MAG-OX) 400 MG tablet Take 2 tablets (800 mg) by mouth 2 times daily. 120 tablet 1    methocarbamol (ROBAXIN) 750 MG tablet Take 1 tablet (750 mg) by mouth 3 times daily as needed for muscle spasms. 30 tablet 0    multivitamin w/minerals (CERTAVITE/ANTIOXIDANTS) tablet Take 1 tablet by mouth daily. 30 tablet 1    mycophenolate (GENERIC EQUIVALENT) 250 MG capsule Take 3 capsules (750 mg) by mouth 2 times daily. 180 capsule 1    omeprazole (PRILOSEC) 20 MG DR capsule Take 1 capsule (20 mg) by mouth daily. 30 capsule 2    PHOSPHORUS TABLET 250  MG per tablet Take by mouth.      polyethylene glycol (MIRALAX) 17 GM/Dose  powder Take 17 g by mouth 2 times daily as needed for constipation. (Patient not taking: Reported on 11/29/2024) 510 g 0    predniSONE (DELTASONE) 5 MG tablet Take 1 tablet (5 mg) by mouth daily. 30 tablet 1    tacrolimus (GENERIC EQUIVALENT) 0.5 MG capsule HOLD (Patient not taking: Reported on 12/9/2024) 60 capsule 1    tacrolimus (GENERIC EQUIVALENT) 1 MG capsule Take 4 capsules (4 mg) by mouth every 12 hours. 240 capsule 1    tacrolimus (GENERIC EQUIVALENT) 5 MG capsule Take 1 capsule (5 mg) by mouth 2 times daily as needed (for dose changes). (Patient not taking: Reported on 12/9/2024)      traMADol (ULTRAM) 50 MG tablet Take 1 tablet (50 mg) by mouth 2 times daily as needed for severe pain. 10 tablet 0    traZODone (DESYREL) 50 MG tablet Take 1 tablet (50 mg) by mouth at bedtime. 30 tablet 0    valGANciclovir (VALCYTE) 450 MG tablet Take 1 tablet (450 mg) by mouth daily. 30 tablet 1     No current facility-administered medications for this encounter.         Tobacco History:  reports that he has never smoked. He has never used smokeless tobacco.       REVIEW OF SYMPTOMS:   The review of systems was negative except as noted in the HPI.           PHYSICAL EXAMINATION:     /72 (BP Location: Right arm, Patient Position: Supine, Cuff Size: Adult Regular)   Pulse 85   Temp 98  F (36.7  C) (Temporal)            GENERAL: The patient overall appears well and is no acute distress.   HEAD: normocephalic   EYES: Sclera and conjunctiva clear   NECK: no obvious masses   LUNGS: breathing is unlabored.   EXTREMITIES: No clubbing, cyanosis or edema   SKIN: No rashes or other abnormalities except as noted under the Wound section below.   NEUROLOGICAL: normal motor and sensory function       WOUND/ulcer: The wound appears healthy with no sign of infection.   Wound bed: granulation tissue  Periwound: healthy intact skin  The upper midline wound is smaller compared with his last clinic visit and some of the other skin  openings are smaller but there are still significant areas of tunneling here from any of the wound areas.  I am able to express some purulent material from some of the tunneling areas.  As noted above there are no signs of erythema or other signs on physical exam of any deeper abscess cavity.      Also see below for wound details:     Circumferential volume measures:             No data to display                Ulceration(s)/Wound(s):   Please see the media tab under the chart review for pictures of the wounds.  Nursing staff removed dressings and cleansed wound.    Wound (used by [facility] only) 11/12/24 1010 abdomen proximal;midline surgical (Active)   Thickness/Stage full thickness 12/11/24 1449   Base hypergranulation 12/11/24 1449   Periwound intact 12/11/24 1449   Periwound Temperature warm 12/11/24 1449   Periwound Skin Turgor soft 12/11/24 1449   Edges open 12/11/24 1449   Length (cm) 0.7 12/11/24 1449   Width (cm) 0.8 12/11/24 1449   Depth (cm) 0.1 12/11/24 1449   Wound (cm^2) 0.56 cm^2 12/11/24 1449   Wound Volume (cm^3) 0.06 cm^3 12/11/24 1449   Wound healing % 33.33 12/11/24 1449   Drainage Characteristics/Odor serosanguineous 12/11/24 1449   Drainage Amount moderate 12/11/24 1449   Care, Wound non-select wound debridement performed. 12/11/24 1449       Wound (used by [facility] only) 11/12/24 1013 abdomen Right surgical (Active)   Thickness/Stage full thickness 12/11/24 1449   Base granulating;slough 12/11/24 1449   Periwound intact 12/11/24 1449   Periwound Temperature warm 12/11/24 1449   Periwound Skin Turgor soft 12/11/24 1449   Edges open 12/11/24 1449   Length (cm) 1 12/11/24 1449   Width (cm) 21 12/11/24 1449   Depth (cm) 3.5 12/11/24 1449   Wound (cm^2) 21 cm^2 12/11/24 1449   Wound Volume (cm^3) 73.5 cm^3 12/11/24 1449   Wound healing % -837.5 12/11/24 1449   Tunneling [Depth (cm)/Location] 9o'/ 4.5cm 12/11/24 1449   Undermining [Depth (cm)/Location] 3-9cm 5cm 12/11/24 1449   Drainage  Characteristics/Odor serosanguineous 12/11/24 1449   Drainage Amount copious 12/11/24 1449   Care, Wound non-select wound debridement performed. 12/11/24 1449       Wound (used by [facility] only) 11/12/24 1014 abdomen Left surgical (Active)   Thickness/Stage full thickness 12/11/24 1449   Base granulating 12/11/24 1449   Periwound intact 12/11/24 1449   Periwound Temperature warm 12/11/24 1449   Periwound Skin Turgor soft 12/11/24 1449   Edges open 12/11/24 1449   Length (cm) 0.5 12/11/24 1449   Width (cm) 8 12/11/24 1449   Depth (cm) 2 12/11/24 1449   Wound (cm^2) 4 cm^2 12/11/24 1449   Wound Volume (cm^3) 8 cm^3 12/11/24 1449   Wound healing % 84.78 12/11/24 1449   Tunneling [Depth (cm)/Location] 3o'/ 3cm 12/11/24 1449   Undermining [Depth (cm)/Location] 3-9o'/ 5cm 12/11/24 1449   Drainage Characteristics/Odor serosanguineous 12/11/24 1449   Drainage Amount copious 12/11/24 1449   Care, Wound non-select wound debridement performed. 12/11/24 1449             Recent Labs   Lab Test 09/28/24  1207 09/23/24  1716   A1C 5.4 4.7          Recent Labs   Lab Test 11/11/24  0734 11/07/24  0728 11/04/24  0814   ALBUMIN 4.1 4.0 4.1              No sharp debridement performed today.                  ASSESSMENT:   This is a 39 year old  male with abdominal surgical incisions.          PLAN:   Initially we planned to pack the deeper areas with Vashe moistened packing strips followed by an ABD pad changed once a day.   However when the wound care nurse here in the clinic attempted to show the mother how to do the packing she was quite resistant to this.  We will make arrangements to start a wound VAC with white foam change 3 times a week by home health nurses.    Because of his increased pain and the purulence expressed from some of the wounds I will start him on an antibiotic, Augmentin which I will send to his pharmacy.  I have specifically asked him to contact the transplant clinic and let them know that I have recommended this  "antibiotic if they strongly prefer a different antibiotic that would be acceptable to me.      If Dr. Colon or anybody else from the transplant clinic has any objection to our wound care recommendations I would request that they call our clinic to speak with me about this.  I feel strongly that the tunneling areas need to be packed either with packing strips or the white foam with a wound VAC.  The corners of the 4 x 4 gauze only getting to the tunneling areas about a centimeter.  The tunneling areas go about 3 cm.    I have encouraged the patient to continue on their high protein diet to aid in wound healing.   The patient will return to the wound clinic in 3 to 4 weeks to see me again.        30 minutes spent on the date of the encounter doing chart review, history and exam, documentation and further activities per the note, this time excludes any procedure time      Juan Garcia MD  12/11/2024   4:03 PM   [facility] V[facility]/Wound  [phone]    This note was electronically signed by Juan Garcia MD      Further instructions from your care team         12/11/2024   Jag Smith   1985  A DME order was not completed because the patient declined the need for supplies  Dressing changes performed by [facility] according to Wound Clinic ORDERS any issues please call Wound Clinic [phone]     Plan 12/11/2024   Order placed for Wound VAC and Home Care  Continue diet high in protein as tolerated  Antibiotic ordered today; take until completed  Need to continue Strip gauze into DEPTH of wound to prevent infection pockets    Until wound vac arrives please follow these orders...  Wound Dressing Change: Right abdomen, and Left abdomen  - Wash your hands with soap and water and prepare a clean surface for dressings.  -wash the outside of wound with mild unscented soap and water (such as Cetaphil, Cerave or Dove)   -Primary dressing: Cut varying lengths of plain 1/2\" " "plain strip gauze lightly moistened with VASHE then gently tuck into each open well to fill the depth of wound and ribbon to fill the wound defect, use Cotton tip applicator and leave a tail outside for easy retrieval  -Secondary dressing: Cover with 2 8x10 ABD and secure with Medipore 2\" tape  Change daily and as needed for soiling/saturation     Wound Dressing Change: Upper Proximal Midline abdomen  - Wash your hands with soap and water and prepare a clean surface for dressings.  -Cleanse with mild unscented soap and water (such as Cetaphil, Cerave or Dove)   -Primary dressing: cut 1/20 piece of 4x4 Melgisorb to cover wound  -Secondary dressing: Cover with 1 5x9 ABD and secure with Medipore 2\" tape  Change daily and as needed for soiling/saturation      Once your wound vac has been delivered, please follow these orders...  Please call [facility]/  for educational questions if needed    Wound Care to Left and Right Abdomen  Remove old dressing and ensure all white/ black foam is removed  Cleanse wound with Vashe moist gauze, leave in place for 10 minutes; remove   Cleanse periwound skin with wound spray, pat dry and apply No Sting Barrier film.  Picture frame the wound or lay a long piece of Drape Tape across the abdomen, snip open each wound opening for access to mouth of wound.     Cut individual pieces of WHITE foam like a cinnamon roll and then snip each tip into a point; cut each piece at least the full length of the depth of each wound plus 2 cm for easy retrieval. There are at least 6 or 7 open areas with varying levels of depth and undermine area across the abdomen;    Fill each open area completely the FIRST WEEK of dressing changes; then after first week start to pull back white foam 1cm from the base to allow the bottom of wound to knit closed. Leave at least 2cm OUTSIDE of the wound edge for easy retrieval every time.     Cut one long strip of BLACK foam to bridge across the abdomen to connect ALL of the " White pieces of foam and fill ALL shallow areas of the wound as they heal until skin.  Cover entire area of foam with VAC dressing tape to seal the foam, cut appropriate 50 cent sized opening for the TRAC pad.  Connect TRAC pad and connect to pump; NPWT -150 mmHg Continuous, ensure no leaks  Change canister when alarms full or weekly  Change dressing three times a week    Count and document on the outside of the dressing the number of foam pieces used and Date  Instruct the Patient and family: if dressing is compromised for greater than 2 hours then please remove entire dressing and change or tuck moist Vashe gauze into wound until new dressing can be applied and contact Home Health nurse.      Main Provider: Juan Garcia M.D. December 11, 2024    Call us at [phone] if you have any questions about your wounds, if you have redness or swelling around your wound, have a fever of 101 degrees Fahrenheit or greater or if you have any other problems or concerns. We answer the phone Monday through Friday 8 am to 4 pm, please leave a message as we check the voicemail frequently throughout the day. If you have a concern over the weekend, please leave a message and we will return your call Monday. If the need is urgent, go to the ER or urgent care.    If you had a positive experience please indicate that on your patient satisfaction survey form that [facility] will be sending you.  It was a pleasure meeting with you today.  Thank you for allowing me and my team the privilege of caring for you today.  YOU are the reason we are here, and I truly hope we provided you with the excellent service you deserve.  Please let us know if there is anything else we can do for you so that we can be sure you are leaving completely satisfied with your care experience.    If you have any billing related questions please call the [facility] Business office at [phone]. The clinic staff does not handle billing related  matters.  If you are scheduled to have a follow up appointment, you will receive a reminder call the day before your visit. On the appointment day please arrive 15 minutes prior to your appointment time. If you are unable to keep that appointment, please call the clinic to cancel or reschedule. If you are more than 10 minutes late or greater for your scheduled appointment time, the clinic policy is that you may be asked to reschedule.         ,

## 2024-12-11 NOTE — DISCHARGE INSTRUCTIONS
"12/11/2024   Jag Smith   1985  A DME order was not completed because the patient declined the need for supplies  Dressing changes performed by [facility] according to Wound Clinic ORDERS any issues please call Wound Clinic [phone]     Plan 12/11/2024   Order placed for Wound VAC and Home Care  Continue diet high in protein as tolerated  Antibiotic ordered today; take until completed  Need to continue Strip gauze into DEPTH of wound to prevent infection pockets    Until wound vac arrives please follow these orders...  Wound Dressing Change: Right abdomen, and Left abdomen  - Wash your hands with soap and water and prepare a clean surface for dressings.  -wash the outside of wound with mild unscented soap and water (such as Cetaphil, Cerave or Dove)   -Primary dressing: Cut varying lengths of plain 1/2\" plain strip gauze lightly moistened with VASHE then gently tuck into each open well to fill the depth of wound and ribbon to fill the wound defect, use Cotton tip applicator and leave a tail outside for easy retrieval  -Secondary dressing: Cover with 2 8x10 ABD and secure with Medipore 2\" tape  Change daily and as needed for soiling/saturation     Wound Dressing Change: Upper Proximal Midline abdomen  - Wash your hands with soap and water and prepare a clean surface for dressings.  -Cleanse with mild unscented soap and water (such as Cetaphil, Cerave or Dove)   -Primary dressing: cut 1/20 piece of 4x4 Melgisorb to cover wound  -Secondary dressing: Cover with 1 5x9 ABD and secure with Medipore 2\" tape  Change daily and as needed for soiling/saturation      Once your wound vac has been delivered, please follow these orders...  Please call [facility] [employer] for educational questions if needed    Wound Care to Left and Right Abdomen  Remove old dressing and ensure all white/ black foam is removed  Cleanse wound with Vashe moist gauze, leave in place for 10 minutes; remove   Cleanse periwound skin with " wound spray, pat dry and apply No Sting Barrier film.  Picture frame the wound or lay a long piece of Drape Tape across the abdomen, snip open each wound opening for access to mouth of wound.     Cut individual pieces of WHITE foam like a cinnamon roll and then snip each tip into a point; cut each piece at least the full length of the depth of each wound plus 2 cm for easy retrieval. There are at least 6 or 7 open areas with varying levels of depth and undermine area across the abdomen;    Fill each open area completely the FIRST WEEK of dressing changes; then after first week start to pull back white foam 1cm from the base to allow the bottom of wound to knit closed. Leave at least 2cm OUTSIDE of the wound edge for easy retrieval every time.     Cut one long strip of BLACK foam to bridge across the abdomen to connect ALL of the White pieces of foam and fill ALL shallow areas of the wound as they heal until skin.  Cover entire area of foam with VAC dressing tape to seal the foam, cut appropriate 50 cent sized opening for the TRAC pad.  Connect TRAC pad and connect to pump; NPWT -150 mmHg Continuous, ensure no leaks  Change canister when alarms full or weekly  Change dressing three times a week    Count and document on the outside of the dressing the number of foam pieces used and Date  Instruct the Patient and family: if dressing is compromised for greater than 2 hours then please remove entire dressing and change or tuck moist Vashe gauze into wound until new dressing can be applied and contact Home Health nurse.      Main Provider: Juan Garcia M.D. December 11, 2024    Call us at [phone] if you have any questions about your wounds, if you have redness or swelling around your wound, have a fever of 101 degrees Fahrenheit or greater or if you have any other problems or concerns. We answer the phone Monday through Friday 8 am to 4 pm, please leave a message as we check the voicemail frequently throughout  the day. If you have a concern over the weekend, please leave a message and we will return your call Monday. If the need is urgent, go to the ER or urgent care.    If you had a positive experience please indicate that on your patient satisfaction survey form that [facility] will be sending you.  It was a pleasure meeting with you today.  Thank you for allowing me and my team the privilege of caring for you today.  YOU are the reason we are here, and I truly hope we provided you with the excellent service you deserve.  Please let us know if there is anything else we can do for you so that we can be sure you are leaving completely satisfied with your care experience.    If you have any billing related questions please call the [facility] Business office at [phone]. The clinic staff does not handle billing related matters.  If you are scheduled to have a follow up appointment, you will receive a reminder call the day before your visit. On the appointment day please arrive 15 minutes prior to your appointment time. If you are unable to keep that appointment, please call the clinic to cancel or reschedule. If you are more than 10 minutes late or greater for your scheduled appointment time, the clinic policy is that you may be asked to reschedule.

## 2024-12-12 ENCOUNTER — LAB (OUTPATIENT)
Dept: LAB | Facility: CLINIC | Age: 39
End: 2024-12-12
Payer: MEDICAID

## 2024-12-12 ENCOUNTER — TELEPHONE (OUTPATIENT)
Dept: WOUND CARE | Facility: CLINIC | Age: 39
End: 2024-12-12

## 2024-12-12 ENCOUNTER — OFFICE VISIT (OUTPATIENT)
Dept: TRANSPLANT | Facility: CLINIC | Age: 39
End: 2024-12-12
Attending: TRANSPLANT SURGERY
Payer: MEDICAID

## 2024-12-12 ENCOUNTER — TELEPHONE (OUTPATIENT)
Dept: PHARMACY | Facility: CLINIC | Age: 39
End: 2024-12-12

## 2024-12-12 VITALS
WEIGHT: 229.2 LBS | OXYGEN SATURATION: 100 % | SYSTOLIC BLOOD PRESSURE: 122 MMHG | DIASTOLIC BLOOD PRESSURE: 79 MMHG | TEMPERATURE: 98.2 F | HEART RATE: 96 BPM | BODY MASS INDEX: 31.09 KG/M2

## 2024-12-12 DIAGNOSIS — Z94.4 LIVER REPLACED BY TRANSPLANT (H): ICD-10-CM

## 2024-12-12 DIAGNOSIS — G89.18 ACUTE POST-OPERATIVE PAIN: ICD-10-CM

## 2024-12-12 DIAGNOSIS — L98.492 SKIN ULCER OF ABDOMEN WITH FAT LAYER EXPOSED (H): Primary | ICD-10-CM

## 2024-12-12 LAB
ALBUMIN SERPL BCG-MCNC: 4.3 G/DL (ref 3.5–5.2)
ALP SERPL-CCNC: 167 U/L (ref 40–150)
ALT SERPL W P-5'-P-CCNC: 26 U/L (ref 0–70)
ANION GAP SERPL CALCULATED.3IONS-SCNC: 13 MMOL/L (ref 7–15)
AST SERPL W P-5'-P-CCNC: 21 U/L (ref 0–45)
BILIRUB DIRECT SERPL-MCNC: 0.26 MG/DL (ref 0–0.3)
BILIRUB SERPL-MCNC: 0.6 MG/DL
BUN SERPL-MCNC: 31.3 MG/DL (ref 6–20)
CALCIUM SERPL-MCNC: 10.1 MG/DL (ref 8.8–10.4)
CHLORIDE SERPL-SCNC: 99 MMOL/L (ref 98–107)
CREAT SERPL-MCNC: 1.5 MG/DL (ref 0.67–1.17)
EGFRCR SERPLBLD CKD-EPI 2021: 61 ML/MIN/1.73M2
ERYTHROCYTE [DISTWIDTH] IN BLOOD BY AUTOMATED COUNT: 14 % (ref 10–15)
GLUCOSE SERPL-MCNC: 140 MG/DL (ref 70–99)
HCO3 SERPL-SCNC: 20 MMOL/L (ref 22–29)
HCT VFR BLD AUTO: 30.3 % (ref 40–53)
HGB BLD-MCNC: 9.8 G/DL (ref 13.3–17.7)
MAGNESIUM SERPL-MCNC: 1.6 MG/DL (ref 1.7–2.3)
MCH RBC QN AUTO: 29.3 PG (ref 26.5–33)
MCHC RBC AUTO-ENTMCNC: 32.3 G/DL (ref 31.5–36.5)
MCV RBC AUTO: 91 FL (ref 78–100)
PHOSPHATE SERPL-MCNC: 5.7 MG/DL (ref 2.5–4.5)
PLATELET # BLD AUTO: 357 10E3/UL (ref 150–450)
POTASSIUM SERPL-SCNC: 5.4 MMOL/L (ref 3.4–5.3)
PROT SERPL-MCNC: 7.8 G/DL (ref 6.4–8.3)
RBC # BLD AUTO: 3.34 10E6/UL (ref 4.4–5.9)
SODIUM SERPL-SCNC: 132 MMOL/L (ref 135–145)
TACROLIMUS BLD-MCNC: 9.6 UG/L (ref 5–15)
TME LAST DOSE: NORMAL H
TME LAST DOSE: NORMAL H
WBC # BLD AUTO: 10.1 10E3/UL (ref 4–11)

## 2024-12-12 PROCEDURE — 82374 ASSAY BLOOD CARBON DIOXIDE: CPT | Performed by: TRANSPLANT SURGERY

## 2024-12-12 PROCEDURE — 83735 ASSAY OF MAGNESIUM: CPT | Performed by: TRANSPLANT SURGERY

## 2024-12-12 PROCEDURE — 84100 ASSAY OF PHOSPHORUS: CPT | Performed by: TRANSPLANT SURGERY

## 2024-12-12 PROCEDURE — 99213 OFFICE O/P EST LOW 20 MIN: CPT | Mod: 24 | Performed by: TRANSPLANT SURGERY

## 2024-12-12 PROCEDURE — G0463 HOSPITAL OUTPT CLINIC VISIT: HCPCS | Performed by: TRANSPLANT SURGERY

## 2024-12-12 PROCEDURE — 84155 ASSAY OF PROTEIN SERUM: CPT | Performed by: TRANSPLANT SURGERY

## 2024-12-12 PROCEDURE — 82565 ASSAY OF CREATININE: CPT | Performed by: TRANSPLANT SURGERY

## 2024-12-12 PROCEDURE — 85027 COMPLETE CBC AUTOMATED: CPT | Performed by: PATHOLOGY

## 2024-12-12 PROCEDURE — 80197 ASSAY OF TACROLIMUS: CPT | Performed by: TRANSPLANT SURGERY

## 2024-12-12 PROCEDURE — 99000 SPECIMEN HANDLING OFFICE-LAB: CPT | Performed by: PATHOLOGY

## 2024-12-12 PROCEDURE — 82947 ASSAY GLUCOSE BLOOD QUANT: CPT | Performed by: TRANSPLANT SURGERY

## 2024-12-12 PROCEDURE — 36415 COLL VENOUS BLD VENIPUNCTURE: CPT | Performed by: PATHOLOGY

## 2024-12-12 RX ORDER — TRAMADOL HYDROCHLORIDE 50 MG/1
50 TABLET ORAL 2 TIMES DAILY PRN
Qty: 20 TABLET | Refills: 0 | Status: SHIPPED | OUTPATIENT
Start: 2024-12-12

## 2024-12-12 RX ORDER — METHOCARBAMOL 750 MG/1
750 TABLET, FILM COATED ORAL 3 TIMES DAILY PRN
Qty: 20 TABLET | Refills: 0 | Status: SHIPPED | OUTPATIENT
Start: 2024-12-12

## 2024-12-12 RX ORDER — ACETAMINOPHEN 325 MG/1
650 TABLET ORAL EVERY 8 HOURS PRN
Qty: 60 TABLET | Refills: 0 | Status: SHIPPED | OUTPATIENT
Start: 2024-12-12

## 2024-12-12 NOTE — TELEPHONE ENCOUNTER
Clinical Pharmacy Consult:                                                      Transplant Specific: 2 month post transplant pharmacy review  Date of Transplant: 9/28/2024  Type of Transplant: liver  First Transplant: yes  History of rejection: no    Immunosuppression Regimen   Tacrolimus 4mg qAM & 4mg qPM, Prednisone 5 mg once daily, and Mycophenolate  mg qAM & 750 mg qPM  Patient specific goal: 8 to 10  Most recent level: 6.6 on 12/09/2024  Immunosuppressant Levels:  Subtherapeutic, dose increased  Pt adherent to lab draws: yes  Scr:   Lab Results   Component Value Date    CR 2.05 10/01/2024     Side effects: no side effects    Prophylactic Medications  PJP Prophylactic: Bactrim SS daily  Scheduled Discontinue Date: 6 months Anticipated date 3/28/2025    CMV Prophylactic: Max dose in Liver transplant is Valcyte 450mg once daily   Scheduled Discontinue Date: 3 months Anticipated date 12/28/2024    Acid Reducer: Protonix (pantoprazole)  Scheduled Reviewed Date:  PCP to evaluate    Vascular Prophylactic: Aspirin 81 mg PO daily  Scheduled Discontinue Date:  PCP to evaluate    Blood Pressure Management:   Patient blood pressure goal: <140/90  Frequency of home blood pressure checks: every other day  Most recent home BP: Unsure, he didn't have that book with him. Last OV /72  Patient blood pressure at goal: yes  Hospitalizations/ER visits since last assessment: 0.     Med rec/DUR performed: yes.   Med rec discrepancies: no.    Spoke with Jag today via phone. He reports he is still having some significant pain from the surgery that makes it hard to ambulate. He just got home from seeing a pain specialist. Otherwise he occasionally experiences some nausea. Jag manages his own medications and denies any difficulty getting refills. He plans to move his prescriptions to a local pharmacy up [place] when he returns home.    Jag checks his blood pressure every other day. He didn't have his notebook with him. His  last OV BP was at goal    No questions for care team today. Next pharmacy review in 1 month.     Time Spent: 15 minutes.     Luis Steve, PharmD  [phone]

## 2024-12-12 NOTE — ADDENDUM NOTE
Encounter addended by: Linda Macedo RN on: 12/12/2024 11:36 AM   Actions taken: Edited Discharge Instructions, Flowsheet accepted

## 2024-12-12 NOTE — LETTER
12/12/2024      Jag Smith  [address]  [facility] [address]      Dear Colleague,    Thank you for referring your patient, Jag Smith, to the [facility]. Please see a copy of my visit note below.    Transplant Surgery Progress Note    Transplants:  9/28/2024 (Liver);   S: overall doing well, wound is slowly improving but still has multiple open areas, wound care Dr prescribed antibiotic, will start today, appetite and strength improving, still has pain with wound changes    Transplant History:    Transplant Type:  Liver Tx  Donor Type:     Transplant Date:  9/28/2024 (Liver)   Biliary Stent:  No    Acute Rejection Hx:  No    Present Maintenance Immunosuppression:  Tacrolimus, Mycophenolate mofetil, and Prednisone    CMV IgG Ab Discordance:  No  EBV IgG Ab Discordance:  No    Transplant Coordinator: Karena Villegas     Transplant Office Phone Number: [phone]     Immunosuppressant Medications       Immunosuppressive Agents Disp Start End     mycophenolate (GENERIC EQUIVALENT) 250 MG capsule 180 capsule 12/2/2024 --    Sig - Route: Take 3 capsules (750 mg) by mouth 2 times daily. - Oral    Class: E-Prescribe    Notes to Pharmacy: TXP DT 9/28/2024 (Liver) TXP Dischg DT 10/9/2024 DX Liver replaced by transplant Z94.4 TX [facility] ([place], [place])     tacrolimus (GENERIC EQUIVALENT) 0.5 MG capsule 60 capsule 11/11/2024 --    Sig: HOLD    Patient not taking: Reported on 11/21/2024    Class: E-Prescribe    Notes to Pharmacy: TXP DT 9/28/2024 (Liver) TXP Dischg DT 10/9/2024 DX Liver replaced by transplant Z94.4 [facility] ([place], [place])     tacrolimus (GENERIC EQUIVALENT) 1 MG capsule 180 capsule 12/13/2024 --    Sig - Route: Take 3 capsules (3 mg) by mouth every 12 hours. - Oral    Class: E-Prescribe    Notes to Pharmacy: TXP DT 9/28/2024 (Liver) TXP Dischg DT 10/9/2024 DX Liver replaced by  transplant Z94.4 TX [facility] ([place], [place])     tacrolimus (GENERIC EQUIVALENT) 5 MG capsule -- 11/5/2024 --    Sig - Route: Take 1 capsule (5 mg) by mouth 2 times daily as needed (for dose changes). - Oral    Patient not taking: Reported on 11/21/2024    Class: Historical    Notes to Pharmacy: TXP DT 9/28/2024 (Liver) TXP Dischg DT 10/9/2024 DX Liver replaced by transplant Z94.4 TX [facility] ([place], [place])            Possible Immunosuppression-related side effects:   []             headache  []             vivid dreams  []             irritability  []             cognitive difficuties  []             fine tremor  []             nausea  []             diarrhea  []             neuropathy      []             edema  []             renal calcineurin toxicity  []             hyperkalemia  []             post-transplant diabetes  []             decreased appetite  []             increased appetite  []             other:  []             none    Prescription Medications as of 12/16/2024         Rx Number Disp Refills Start End Last Dispensed Date Next Fill Date Owning Pharmacy    acetaminophen (TYLENOL) 325 MG tablet  60 tablet 0 12/12/2024 --   [address] 5-551    Sig: Take 2 tablets (650 mg) by mouth every 8 hours as needed for mild pain or fever.    Class: E-Prescribe    Route: Oral    amoxicillin-clavulanate (AUGMENTIN) 500-125 MG tablet  28 tablet 0 12/11/2024 12/25/2024   [address] 7-097    Sig: Take 1 tablet by mouth 2 times daily for 14 days.    Class: E-Prescribe    Route: Oral    aspirin (ASA) 81 MG chewable tablet  30 tablet 1 12/2/2024 --   [address] 6[address]472    Sig: Take 1 tablet (81 mg) by mouth or Feeding Tube daily.     Class: E-Prescribe    Route: Oral or Feeding Tube    escitalopram (LEXAPRO) 20 MG tablet  30 tablet 0 11/11/2024 --   [address] 1[address]273    Sig: Take 1 tablet (20 mg) by mouth daily.    Class: E-Prescribe    Route: Oral    hydrOXYzine HCl (ATARAX) 25 MG tablet  20 tablet 0 12/16/2024 --   [address] 1[address]273    Sig: Take 1 tablet (25 mg) by mouth every 8 hours as needed for anxiety or other (adjuvant pain).    Class: E-Prescribe    Route: Oral    magnesium oxide (MAG-OX) 400 MG tablet  120 tablet 1 12/2/2024 --   [address] 1[address]273    Sig: Take 2 tablets (800 mg) by mouth 2 times daily.    Class: E-Prescribe    Route: Oral    methocarbamol (ROBAXIN) 750 MG tablet  20 tablet 0 12/12/2024 --   [address] 1[address]273    Sig: Take 1 tablet (750 mg) by mouth 3 times daily as needed for muscle spasms.    Class: E-Prescribe    Route: Oral    multivitamin w/minerals (CERTAVITE/ANTIOXIDANTS) tablet  30 tablet 1 11/1/2024 --   [address] 1[address]774    Sig: Take 1 tablet by mouth daily.    Class: E-Prescribe    Route: Oral    mycophenolate (GENERIC EQUIVALENT) 250 MG capsule  180 capsule 1 12/2/2024 --   [address] 1[address]140    Sig: Take 3 capsules (750 mg) by mouth 2 times daily.    Class: E-Prescribe    Notes to Pharmacy: TXP DT 9/28/2024 (Liver) TXP Dischg DT 10/9/2024 DX Liver replaced by transplant Z94.4 TX [facility] ([place], [place])    Route: Oral    omeprazole (PRILOSEC) 20 MG DR capsule  30 capsule 2 12/2/2024 --   [address] 1[address]171     Sig: Take 1 capsule (20 mg) by mouth daily.    Class: E-Prescribe    Route: Oral    PHOSPHORUS TABLET 250  MG per tablet  -- -- 8/6/2024 --       Sig: Take by mouth.    Class: Historical    Route: Oral    predniSONE (DELTASONE) 5 MG tablet  30 tablet 1 11/1/2024 --   [address] 1[address]886    Sig: Take 1 tablet (5 mg) by mouth daily.    Class: E-Prescribe    Notes to Pharmacy: TXP DT 9/28/2024 (Liver) TXP Dischg DT 10/9/2024 DX Liver replaced by transplant Z94.4 TX [facility] ([place], [place])    Route: Oral    sodium zirconium cyclosilicate (LOKELMA) 10 g PACK packet  3 packet 0 12/16/2024 12/19/2024   [address] 4[address]134    Sig: Take 1 packet (10 g) by mouth daily for 3 days.    Class: E-Prescribe    Route: Oral    tacrolimus (GENERIC EQUIVALENT) 1 MG capsule  180 capsule 1 12/13/2024 --   [address] 3[address]707    Sig: Take 3 capsules (3 mg) by mouth every 12 hours.    Class: E-Prescribe    Notes to Pharmacy: TXP DT 9/28/2024 (Liver) TXP Dischg DT 10/9/2024 DX Liver replaced by transplant Z94.4 [facility] ([place], [place])    Route: Oral    traMADol (ULTRAM) 50 MG tablet  20 tablet 0 12/12/2024 --   [address] 1[address]424    Sig: Take 1 tablet (50 mg) by mouth 2 times daily as needed for severe pain.    Class: E-Prescribe    Route: Oral    traZODone (DESYREL) 50 MG tablet  30 tablet 0 12/2/2024 --   [address] 1[address]405    Sig: Take 1 tablet (50 mg) by mouth at bedtime.    Class: E-Prescribe    Route: Oral    valGANciclovir (VALCYTE) 450 MG tablet  30 tablet 1 12/2/2024 --   [facility] -  [place], [place] - [address] [facility] 1[address]775    Sig: Take 1 tablet (450 mg) by mouth daily.    Class: E-Prescribe    Route: Oral    polyethylene glycol (MIRALAX) 17 GM/Dose powder  510 g 0 10/9/2024 --   [facility] - [place], [place] - [address] [facility]    Sig: Take 17 g by mouth 2 times daily as needed for constipation.    Class: E-Prescribe    Route: Oral    tacrolimus (GENERIC EQUIVALENT) 0.5 MG capsule  60 capsule 1 11/11/2024 --   [place], [place] - [address] [facility] 1[address]491    Sig: HOLD    Class: E-Prescribe    Notes to Pharmacy: TXP DT 9/28/2024 (Liver) TXP Dischg DT 10/9/2024 DX Liver replaced by transplant Z94.4 TX [facility] ([place], [place])    tacrolimus (GENERIC EQUIVALENT) 5 MG capsule  -- -- 11/5/2024 --       Sig: Take 1 capsule (5 mg) by mouth 2 times daily as needed (for dose changes).    Class: Historical    Notes to Pharmacy: TXP DT 9/28/2024 (Liver) TXP Dischg DT 10/9/2024 DX Liver replaced by transplant Z94.4 TX [facility] ([place], [place])    Route: Oral            O:   [unfilled]        Latest Ref Rng & Units 12/16/2024     9:18 AM 12/12/2024    10:10 AM 12/9/2024    12:58 PM 12/5/2024     8:34 AM 12/2/2024     9:13 AM   Transplant Immunosuppression Labs   Creat 0.67 - 1.17 mg/dL 1.30  1.50  1.30  1.37  1.35    Urea Nitrogen 6.0 - 20.0 mg/dL 33.1  31.3  25.8  33.2  29.6    WBC 4.0 - 11.0 10e3/uL 6.4  10.1  10.7  7.6  9.6        Chemistries:   Recent Labs   Lab Test 12/16/24  0918   BUN 33.1*   CR 1.30*   GFRESTIMATED 73   *     Lab Results   Component Value Date    A1C 5.4 09/28/2024     Recent Labs   Lab Test 12/16/24 0918   ALBUMIN 4.3   BILITOTAL 0.4   ALKPHOS 153*   AST 20   ALT 34     Urine Studies:  Recent Labs   Lab Test 09/27/24 2113   COLOR Dark Yellow*   APPEARANCE Slightly Cloudy*   URINEGLC Negative   URINEBILI Large*  "  URINEKETONE Negative   SG 1.011   UBLD Negative   URINEPH 6.0   PROTEIN Negative   NITRITE Negative   LEUKEST Negative   RBCU 1   WBCU 5     No lab results found.  Hematology:   Recent Labs   Lab Test 12/16/24  0918 12/12/24  1010 12/09/24  1258   HGB 9.9* 9.8* 9.9*    357 312   WBC 6.4 10.1 10.7     Coags:   Recent Labs   Lab Test 10/01/24  0437 09/30/24  0529   INR 1.03 1.14     HLA antibodies:   No results found for: \"KL4CZSRGB\", \"CA7RRFDRMM\", \"ER4YSWDMY\", \"IQ5AISRBPX\"    Assessment: Jag Smith is doing fairly well s/p Liver Tx:  Issues we addressed during his visit include:    Plan:    1. Graft function: LFTs improved, alk phos decreasing  2. Immunosuppression Management: No change tac 6-8  given renal function and wound issues .  Complexity of management:Low.  Contributing factors:  wound   3. Continue wound dressing changes, will see AZALIA on Monday  Followup: 1 week            Fernando Colon MD/PhD        Again, thank you for allowing me to participate in the care of your patient.        Sincerely,        Fernando Colon MD  "

## 2024-12-12 NOTE — ADDENDUM NOTE
Encounter addended by: Juan Garcia MD on: 12/12/2024 10:38 AM   Actions taken: Order list changed, Diagnosis association updated

## 2024-12-13 NOTE — TELEPHONE ENCOUNTER
Messages left with care coordinators at [facility] [phone] and the [facility] [phone] to see if the patient is able to go to the hospital for wound VAC changes. Awaiting response. Another option might be the [place] wound clinic Phone [phone] Fax [phone]. A referral would need to be sent here if this is requested. No home care agency goes to [place] that this writer knows of. Call placed to [facility] to see if they were aware of a home care agency or clinic available for wound VAC changes and the nurse did not know and suggested to contact places in [place]. [facility] has a community clinic. They were called at [phone] to see if the patient could come there for changes but the provider was out today. They recommended calling back on Monday. Will try to call the [facility] and the clinic on Monday.

## 2024-12-16 ENCOUNTER — OFFICE VISIT (OUTPATIENT)
Dept: TRANSPLANT | Facility: CLINIC | Age: 39
End: 2024-12-16
Attending: NURSE PRACTITIONER
Payer: MEDICAID

## 2024-12-16 ENCOUNTER — LAB (OUTPATIENT)
Dept: LAB | Facility: CLINIC | Age: 39
End: 2024-12-16
Attending: NURSE PRACTITIONER
Payer: MEDICAID

## 2024-12-16 ENCOUNTER — TELEPHONE (OUTPATIENT)
Dept: WOUND CARE | Facility: CLINIC | Age: 39
End: 2024-12-16

## 2024-12-16 ENCOUNTER — TELEPHONE (OUTPATIENT)
Dept: TRANSPLANT | Facility: CLINIC | Age: 39
End: 2024-12-16

## 2024-12-16 VITALS
TEMPERATURE: 98.2 F | SYSTOLIC BLOOD PRESSURE: 129 MMHG | DIASTOLIC BLOOD PRESSURE: 80 MMHG | WEIGHT: 232 LBS | HEART RATE: 95 BPM | BODY MASS INDEX: 31.46 KG/M2 | OXYGEN SATURATION: 100 %

## 2024-12-16 DIAGNOSIS — Z94.4 LIVER REPLACED BY TRANSPLANT (H): ICD-10-CM

## 2024-12-16 DIAGNOSIS — K70.10 ALCOHOLIC HEPATITIS (H): ICD-10-CM

## 2024-12-16 DIAGNOSIS — L98.492 SKIN ULCER OF ABDOMEN WITH FAT LAYER EXPOSED (H): Primary | ICD-10-CM

## 2024-12-16 DIAGNOSIS — E87.5 HYPERKALEMIA: Primary | ICD-10-CM

## 2024-12-16 DIAGNOSIS — G89.18 ACUTE POST-OPERATIVE PAIN: ICD-10-CM

## 2024-12-16 LAB
ALBUMIN SERPL BCG-MCNC: 4.3 G/DL (ref 3.5–5.2)
ALP SERPL-CCNC: 153 U/L (ref 40–150)
ALT SERPL W P-5'-P-CCNC: 34 U/L (ref 0–70)
ANION GAP SERPL CALCULATED.3IONS-SCNC: 12 MMOL/L (ref 7–15)
AST SERPL W P-5'-P-CCNC: 20 U/L (ref 0–45)
BILIRUB DIRECT SERPL-MCNC: 0.25 MG/DL (ref 0–0.3)
BILIRUB SERPL-MCNC: 0.4 MG/DL
BUN SERPL-MCNC: 33.1 MG/DL (ref 6–20)
CALCIUM SERPL-MCNC: 10.2 MG/DL (ref 8.8–10.4)
CHLORIDE SERPL-SCNC: 105 MMOL/L (ref 98–107)
CREAT SERPL-MCNC: 1.3 MG/DL (ref 0.67–1.17)
EGFRCR SERPLBLD CKD-EPI 2021: 73 ML/MIN/1.73M2
ERYTHROCYTE [DISTWIDTH] IN BLOOD BY AUTOMATED COUNT: 14.1 % (ref 10–15)
GLUCOSE SERPL-MCNC: 123 MG/DL (ref 70–99)
HCO3 SERPL-SCNC: 21 MMOL/L (ref 22–29)
HCT VFR BLD AUTO: 30.7 % (ref 40–53)
HGB BLD-MCNC: 9.9 G/DL (ref 13.3–17.7)
MAGNESIUM SERPL-MCNC: 1.6 MG/DL (ref 1.7–2.3)
MCH RBC QN AUTO: 29.2 PG (ref 26.5–33)
MCHC RBC AUTO-ENTMCNC: 32.2 G/DL (ref 31.5–36.5)
MCV RBC AUTO: 91 FL (ref 78–100)
PHOSPHATE SERPL-MCNC: 5.7 MG/DL (ref 2.5–4.5)
PLATELET # BLD AUTO: 319 10E3/UL (ref 150–450)
POTASSIUM SERPL-SCNC: 5.8 MMOL/L (ref 3.4–5.3)
PROT SERPL-MCNC: 7.7 G/DL (ref 6.4–8.3)
RBC # BLD AUTO: 3.39 10E6/UL (ref 4.4–5.9)
SODIUM SERPL-SCNC: 138 MMOL/L (ref 135–145)
TACROLIMUS BLD-MCNC: 8.2 UG/L (ref 5–15)
TME LAST DOSE: NORMAL H
TME LAST DOSE: NORMAL H
WBC # BLD AUTO: 6.4 10E3/UL (ref 4–11)

## 2024-12-16 PROCEDURE — 80197 ASSAY OF TACROLIMUS: CPT | Performed by: TRANSPLANT SURGERY

## 2024-12-16 PROCEDURE — 82248 BILIRUBIN DIRECT: CPT | Performed by: PATHOLOGY

## 2024-12-16 PROCEDURE — 80053 COMPREHEN METABOLIC PANEL: CPT | Performed by: PATHOLOGY

## 2024-12-16 PROCEDURE — 84100 ASSAY OF PHOSPHORUS: CPT | Performed by: PATHOLOGY

## 2024-12-16 PROCEDURE — 99024 POSTOP FOLLOW-UP VISIT: CPT | Performed by: NURSE PRACTITIONER

## 2024-12-16 PROCEDURE — 99000 SPECIMEN HANDLING OFFICE-LAB: CPT | Performed by: PATHOLOGY

## 2024-12-16 PROCEDURE — 83735 ASSAY OF MAGNESIUM: CPT | Performed by: PATHOLOGY

## 2024-12-16 PROCEDURE — 36415 COLL VENOUS BLD VENIPUNCTURE: CPT | Performed by: PATHOLOGY

## 2024-12-16 PROCEDURE — 85027 COMPLETE CBC AUTOMATED: CPT | Performed by: PATHOLOGY

## 2024-12-16 PROCEDURE — G0480 DRUG TEST DEF 1-7 CLASSES: HCPCS | Mod: 90 | Performed by: PATHOLOGY

## 2024-12-16 PROCEDURE — G0463 HOSPITAL OUTPT CLINIC VISIT: HCPCS | Performed by: NURSE PRACTITIONER

## 2024-12-16 RX ORDER — HYDROXYZINE HYDROCHLORIDE 25 MG/1
25 TABLET, FILM COATED ORAL EVERY 8 HOURS PRN
Qty: 20 TABLET | Refills: 0 | Status: SHIPPED | OUTPATIENT
Start: 2024-12-16

## 2024-12-16 ASSESSMENT — PAIN SCALES - GENERAL: PAINLEVEL_OUTOF10: SEVERE PAIN (6)

## 2024-12-16 NOTE — TELEPHONE ENCOUNTER
Supply order sent to [facility] for tape and packing strips. Call returned to Sania informing her of this. Discussed also the bleeding wound. She reports they are going to the  of  for dressing changes three times a week and are seeing the surgeon on Thursday. They will ask at that appointment about the bleeding.

## 2024-12-16 NOTE — TELEPHONE ENCOUNTER
[facility] Prior Authorization Team Request    Medication:  LOKELMA 10GM PACK  Dosing: TAKE ONE POCKET BY MOUTH DAILY FOR 3 DAYS.  NDC (required for Medicaid members): [address]-64  Insurance Company: [place] 340[place]  BIN: [id number]  PCN: [id number]  [id number]  ID: [id number]  CoverMyMeds Key (if applicable):   Additional documentation:   Pharmacy Filling the Rx:  [facility]  Filling Pharmacy Phone:  [phone]   Contact:  PHARM Mancos PA (P [id number]) please send all responses to this pool.  Pharmacy NPI (required for Medicaid members):[id number] NPI: [id number]

## 2024-12-16 NOTE — TELEPHONE ENCOUNTER
Patient's mom, Sania, called to update that this morning (12/16/24) one of the patient's wounds began bleeding suddenly and heavily. She says that it bled for 5-10 min and that this is not the first time it's happened but it has always been contained within the bandages. However, this morning the bleeding was heavier than the dressing could contain to the point of dripping on the floor.  They wanted to make Dr Garcia aware of the situation.    They are also asking to order 1/2 in packing strips (only been able to get 1/4 strips) as well as paper tape.

## 2024-12-16 NOTE — LETTER
"12/16/2024      Jag Smith  [address]  [facility] [address]      Dear Colleague,    Thank you for referring your patient, Jag Smith, to the [facility]PL[facility]. Please see a copy of my visit note below.    Transplant Surgery -OUTPATIENT PROGRESS NOTE    Date of Visit: 12/16/24    Transplants:  9/28/2024 (Liver); Postoperative day:  79  ASSESMENT AND PLAN:  Incisional wound:   Stable X6 open areas with tunneling.  Packing with 1/2\" packing strip and dampened with vanshe solution and covered. Advised to change daily per wound care.      -Alk phos down trending.  -Hyperkalemia: lokelma x 3     -Incisional pain: improving on antibiotic. On tylenol, robaxin, atarax and tramadol.   -R lower lid chalazion - improving. Continue warm compress QID.         Date: 12/18/24    Transplant:  [x]                             Liver [x]                              Kidney []                             Pancreas []                              Other:             Chief Complaint:Post-op Visit (Liver txp/Wound care)    History of Present Illness:  Here today for incisional wound care.  Changing daily.   No fever or N/V/D.   Pain improving with antibiotic. Less drainage overall.      Patient Active Problem List   Diagnosis     Alcohol use disorder, severe, in early remission (H)     Alcoholic hepatitis (H)     Epiphora due to insufficient drainage of left side     Hyperbilirubinemia     Hypokalemia     Jaundice     Pneumonia     Liver replaced by transplant (H)     DENI (acute kidney injury) (H)     Immunosuppressed status (H)     Acute post-operative pain     Steroid-induced hyperglycemia     Acute urinary retention     Anemia due to blood loss, acute     Severe malnutrition (H)     Hyponatremia     Hypomagnesemia     Bilateral lower extremity edema     Hyperkalemia     Subconjunctival hemorrhage     Nonhealing surgical wound     Skin ulcer of abdomen with fat layer exposed (H)     Elevated serum creatinine "     SOCIAL /FAMILY HISTORY: [x]                  No recent change    Past Medical History:   Diagnosis Date     Alcohol use disorder      Alcoholic hepatitis (H) 2024     Anxiety      Depression      Hypertension      Liver replaced by transplant (H) 2024     Past Surgical History:   Procedure Laterality Date     BENCH LIVER  2024    Procedure: Bench liver;  Surgeon: Fernando Colon MD;  Location: [facility] OR     DACRYOCYSTORHINOSTOMY Left 2013     INCISION AND DRAINAGE BUTTOCKS Left 2017     TRANSPLANT LIVER RECIPIENT  DONOR N/A 2024    Procedure: Transplant liver recipient  donor;  Surgeon: Fernando Colon MD;  Location: [facility] OR     Social History     Socioeconomic History     Marital status: Single     Spouse name: Not on file     Number of children: Not on file     Years of education: Not on file     Highest education level: Not on file   Occupational History     Not on file   Tobacco Use     Smoking status: Never     Smokeless tobacco: Never   Vaping Use     Vaping status: Never Used   Substance and Sexual Activity     Alcohol use: Not Currently     Comment: last drink 2024     Drug use: Not Currently     Types: Marijuana     Comment: smoking a long time ago     Sexual activity: Not on file   Other Topics Concern     Not on file   Social History Narrative     Not on file     Social Drivers of Health     Financial Resource Strain: Low Risk  (2024)    Financial Resource Strain      Within the past 12 months, have you or your family members you live with been unable to get utilities (heat, electricity) when it was really needed?: No   Food Insecurity: Low Risk  (2024)    Food Insecurity      Within the past 12 months, did you worry that your food would run out before you got money to buy more?: No      Within the past 12 months, did the food you bought just not last and you didn t have money to get more?: No   Transportation Needs: Low Risk  (2024)     Transportation Needs      Within the past 12 months, has lack of transportation kept you from medical appointments, getting your medicines, non-medical meetings or appointments, work, or from getting things that you need?: No   Physical Activity: Not on file   Stress: Not on file   Social Connections: Not on file   Interpersonal Safety: Low Risk  (12/11/2024)    Interpersonal Safety      Do you feel physically and emotionally safe where you currently live?: Yes      Within the past 12 months, have you been hit, slapped, kicked or otherwise physically hurt by someone?: No      Within the past 12 months, have you been humiliated or emotionally abused in other ways by your partner or ex-partner?: No   Recent Concern: Interpersonal Safety - High Risk (9/22/2024)    Interpersonal Safety      Do you feel physically and emotionally safe where you currently live?: No      Within the past 12 months, have you been hit, slapped, kicked or otherwise physically hurt by someone?: No      Within the past 12 months, have you been humiliated or emotionally abused in other ways by your partner or ex-partner?: No   Housing Stability: Low Risk  (9/21/2024)    Housing Stability      Do you have housing? : Yes      Are you worried about losing your housing?: No     Prescription Medications as of 12/16/2024         Rx Number Disp Refills Start End Last Dispensed Date Next Fill Date Owning Pharmacy    acetaminophen (TYLENOL) 325 MG tablet  60 tablet 0 12/12/2024 --   [address] 5[address]537    Sig: Take 2 tablets (650 mg) by mouth every 8 hours as needed for mild pain or fever.    Class: E-Prescribe    Route: Oral    amoxicillin-clavulanate (AUGMENTIN) 500-125 MG tablet  28 tablet 0 12/11/2024 12/25/2024   [address] 3[address]509    Sig: Take 1 tablet by mouth 2 times daily for 14 days.    Class: E-Prescribe    Route:  Oral    aspirin (ASA) 81 MG chewable tablet  30 tablet 1 12/2/2024 --   [address] 1-273    Sig: Take 1 tablet (81 mg) by mouth or Feeding Tube daily.    Class: E-Prescribe    Route: Oral or Feeding Tube    escitalopram (LEXAPRO) 20 MG tablet  30 tablet 0 11/11/2024 --   [address] 1-273    Sig: Take 1 tablet (20 mg) by mouth daily.    Class: E-Prescribe    Route: Oral    hydrOXYzine HCl (ATARAX) 25 MG tablet  20 tablet 0 12/16/2024 --   [address] 1[address]273    Sig: Take 1 tablet (25 mg) by mouth every 8 hours as needed for anxiety or other (adjuvant pain).    Class: E-Prescribe    Route: Oral    magnesium oxide (MAG-OX) 400 MG tablet  120 tablet 1 12/2/2024 --   [address] 1[address]273    Sig: Take 2 tablets (800 mg) by mouth 2 times daily.    Class: E-Prescribe    Route: Oral    methocarbamol (ROBAXIN) 750 MG tablet  20 tablet 0 12/12/2024 --   [address] 1[address]273    Sig: Take 1 tablet (750 mg) by mouth 3 times daily as needed for muscle spasms.    Class: E-Prescribe    Route: Oral    multivitamin w/minerals (CERTAVITE/ANTIOXIDANTS) tablet  30 tablet 1 11/1/2024 --   [address] 1[address]273    Sig: Take 1 tablet by mouth daily.    Class: E-Prescribe    Route: Oral    mycophenolate (GENERIC EQUIVALENT) 250 MG capsule  180 capsule 1 12/2/2024 --   [address] 1[address]273    Sig: Take 3 capsules (750 mg) by mouth 2 times daily.    Class: E-Prescribe    Notes to Pharmacy: TXP DT 9/28/2024 (Liver) TXP Dischg DT 10/9/2024 DX Liver replaced by transplant Z94.4 TX [facility]  [facility] ([place], [place])    Route: Oral    omeprazole (PRILOSEC) 20 MG DR capsule  30 capsule 2 12/2/2024 --   [address] 1[address]535    Sig: Take 1 capsule (20 mg) by mouth daily.    Class: E-Prescribe    Route: Oral    PHOSPHORUS TABLET 250  MG per tablet  -- -- 8/6/2024 --       Sig: Take by mouth.    Class: Historical    Route: Oral    predniSONE (DELTASONE) 5 MG tablet  30 tablet 1 11/1/2024 --   [address] 1[address]517    Sig: Take 1 tablet (5 mg) by mouth daily.    Class: E-Prescribe    Notes to Pharmacy: TXP DT 9/28/2024 (Liver) TXP Dischg DT 10/9/2024 DX Liver replaced by transplant Z94.4 [facility] ([place], [place])    Route: Oral    sodium zirconium cyclosilicate (LOKELMA) 10 g PACK packet  3 packet 0 12/16/2024 12/19/2024   [address] 1[address]360    Sig: Take 1 packet (10 g) by mouth daily for 3 days.    Class: E-Prescribe    Route: Oral    tacrolimus (GENERIC EQUIVALENT) 1 MG capsule  180 capsule 1 12/13/2024 --   [address] 1-747    Sig: Take 3 capsules (3 mg) by mouth every 12 hours.    Class: E-Prescribe    Notes to Pharmacy: TXP DT 9/28/2024 (Liver) TXP Dischg DT 10/9/2024 DX Liver replaced by transplant Z94.4 [facility] ([place], [place])    Route: Oral    traMADol (ULTRAM) 50 MG tablet  20 tablet 0 12/12/2024 --   [address] 1[address]920    Sig: Take 1 tablet (50 mg) by mouth 2 times daily as needed for severe pain.    Class: E-Prescribe    Route: Oral    traZODone (DESYREL) 50 MG tablet  30 tablet 0 12/2/2024 --   [address]  [facility] 1-823    Sig: Take 1 tablet (50 mg) by mouth at bedtime.    Class: E-Prescribe    Route: Oral    valGANciclovir (VALCYTE) 450 MG tablet  30 tablet 1 12/2/2024 --   [place], [place] - [address] [place] [address] 1-283    Sig: Take 1 tablet (450 mg) by mouth daily.    Class: E-Prescribe    Route: Oral    polyethylene glycol (MIRALAX) 17 GM/Dose powder  510 g 0 10/9/2024 --   [facility] - [place], [place] - [address] [facility]    Sig: Take 17 g by mouth 2 times daily as needed for constipation.    Class: E-Prescribe    Route: Oral    tacrolimus (GENERIC EQUIVALENT) 0.5 MG capsule  60 capsule 1 11/11/2024 --   [address] 1[address]752    Sig: HOLD    Class: E-Prescribe    Notes to Pharmacy: TXP DT 9/28/2024 (Liver) TXP Dischg DT 10/9/2024 DX Liver replaced by transplant Z94.4 TX [facility] ([place], [place])    tacrolimus (GENERIC EQUIVALENT) 5 MG capsule  -- -- 11/5/2024 --       Sig: Take 1 capsule (5 mg) by mouth 2 times daily as needed (for dose changes).    Class: Historical    Notes to Pharmacy: TXP DT 9/28/2024 (Liver) TXP Dischg DT 10/9/2024 DX Liver replaced by transplant Z94.4 [facility] ([place], [place])    Route: Oral          Azithromycin   REVIEW OF SYSTEMS (check box if normal)  [x]               GENERAL  [x]                 PULMONARY [x]                GENITOURINARY  [x]                CNS                 [x]                 CARDIAC  [x]                 ENDOCRINE  [x]                EARS,NOSE,THROAT [x]                 GASTROINTESTINAL [x]                 NEUROLOGIC    [x]                MUSCLOSKELTAL  [x]                  HEMATOLOGY      PHYSICAL EXAM (check box if normal)/80   Pulse 95   Temp 98.2  F (36.8  C) (Oral)   Wt 105.2 kg (232 lb)   SpO2 100%   BMI 31.46 kg/m          [x]             GENERAL: NAD  R eye lower lid with 3mm mass, non tender. No drainage. Conjunctiva wnl.     [x]            Incision:  L side with x2 openings with tunneling. Tunneling present midline x2 and x3 R distal incision. Tunneling areas packed with packing strip dampened with vanshe solution.  No surrounding erythema. Moderate drainage present- mostly midline and R sided.  Abd soft throughout. Non-tender.                                                                                   PAIN SCALE:: 8       Again, thank you for allowing me to participate in the care of your patient.        Sincerely,        SIL Blum CNP

## 2024-12-17 NOTE — TELEPHONE ENCOUNTER
Called and spoke with patients mother regarding when she and the patient will be returning up [place] home to [place]. She is unsure at this time as she states the surgeon has not released them to return home. The patient has a follow up appointment on 12/19/24 where they hope to get more information on timing of returning home. Will call and discuss after the appointment on 12/19 to discuss the plan as well as the wound care plan.    Call attempted to [facility] 3 separate times but no answer at any of the attempts. Call placed just to see if VAC changes at [facility] would even be possible.   No

## 2024-12-17 NOTE — TELEPHONE ENCOUNTER
PA Initiation    Medication: LOKELMA 10 G PO PACK  Insurance Company: [place] Medicaid ([facility]) - Phone [phone] Fax [phone]  Pharmacy Filling the Rx: [place], [place] - [address] [place] [address] [phone]  Filling Pharmacy Phone:    Filling Pharmacy Fax:    Start Date: 12/17/2024

## 2024-12-17 NOTE — PROGRESS NOTES
Transplant Surgery Progress Note    Transplants:  9/28/2024 (Liver);   S: overall doing well, wound is slowly improving but still has multiple open areas, wound care Dr prescribed antibiotic, will start today, appetite and strength improving, still has pain with wound changes    Transplant History:    Transplant Type:  Liver Tx  Donor Type:     Transplant Date:  9/28/2024 (Liver)   Biliary Stent:  No    Acute Rejection Hx:  No    Present Maintenance Immunosuppression:  Tacrolimus, Mycophenolate mofetil, and Prednisone    CMV IgG Ab Discordance:  No  EBV IgG Ab Discordance:  No    Transplant Coordinator: Karena Villegas     Transplant Office Phone Number: [phone]     Immunosuppressant Medications       Immunosuppressive Agents Disp Start End     mycophenolate (GENERIC EQUIVALENT) 250 MG capsule 180 capsule 12/2/2024 --    Sig - Route: Take 3 capsules (750 mg) by mouth 2 times daily. - Oral    Class: E-Prescribe    Notes to Pharmacy: TXP DT 9/28/2024 (Liver) TXP Dischg DT 10/9/2024 DX Liver replaced by transplant Z94.4 TX [facility] ([place], [place])     tacrolimus (GENERIC EQUIVALENT) 0.5 MG capsule 60 capsule 11/11/2024 --    Sig: HOLD    Patient not taking: Reported on 11/21/2024    Class: E-Prescribe    Notes to Pharmacy: TXP DT 9/28/2024 (Liver) TXP Dischg DT 10/9/2024 DX Liver replaced by transplant Z94.4 [facility] ([place], [place])     tacrolimus (GENERIC EQUIVALENT) 1 MG capsule 180 capsule 12/13/2024 --    Sig - Route: Take 3 capsules (3 mg) by mouth every 12 hours. - Oral    Class: E-Prescribe    Notes to Pharmacy: TXP DT 9/28/2024 (Liver) TXP Dischg DT 10/9/2024 DX Liver replaced by transplant Z94.4 [facility] ([place], [place])     tacrolimus (GENERIC EQUIVALENT) 5 MG capsule -- 11/5/2024 --    Sig - Route: Take 1 capsule (5 mg) by mouth 2 times daily as needed  (for dose changes). - Oral    Patient not taking: Reported on 11/21/2024    Class: Historical    Notes to Pharmacy: TXP DT 9/28/2024 (Liver) TXP Dischg DT 10/9/2024 DX Liver replaced by transplant Z94.4 TX [facility] ([place], [place])            Possible Immunosuppression-related side effects:   []             headache  []             vivid dreams  []             irritability  []             cognitive difficuties  []             fine tremor  []             nausea  []             diarrhea  []             neuropathy      []             edema  []             renal calcineurin toxicity  []             hyperkalemia  []             post-transplant diabetes  []             decreased appetite  []             increased appetite  []             other:  []             none    Prescription Medications as of 12/16/2024         Rx Number Disp Refills Start End Last Dispensed Date Next Fill Date Owning Pharmacy    acetaminophen (TYLENOL) 325 MG tablet  60 tablet 0 12/12/2024 --   [address] 1-650    Sig: Take 2 tablets (650 mg) by mouth every 8 hours as needed for mild pain or fever.    Class: E-Prescribe    Route: Oral    amoxicillin-clavulanate (AUGMENTIN) 500-125 MG tablet  28 tablet 0 12/11/2024 12/25/2024   [address] [phone]    Sig: Take 1 tablet by mouth 2 times daily for 14 days.    Class: E-Prescribe    Route: Oral    aspirin (ASA) 81 MG chewable tablet  30 tablet 1 12/2/2024 --   [address] 1-597    Sig: Take 1 tablet (81 mg) by mouth or Feeding Tube daily.    Class: E-Prescribe    Route: Oral or Feeding Tube    escitalopram (LEXAPRO) 20 MG tablet  30 tablet 0 11/11/2024 --   [address] 6-197    Sig: Take 1 tablet (20  mg) by mouth daily.    Class: E-Prescribe    Route: Oral    hydrOXYzine HCl (ATARAX) 25 MG tablet  20 tablet 0 12/16/2024 --   [address] 1-599    Sig: Take 1 tablet (25 mg) by mouth every 8 hours as needed for anxiety or other (adjuvant pain).    Class: E-Prescribe    Route: Oral    magnesium oxide (MAG-OX) 400 MG tablet  120 tablet 1 12/2/2024 --   [address] 1-340    Sig: Take 2 tablets (800 mg) by mouth 2 times daily.    Class: E-Prescribe    Route: Oral    methocarbamol (ROBAXIN) 750 MG tablet  20 tablet 0 12/12/2024 --   [address] 1-381    Sig: Take 1 tablet (750 mg) by mouth 3 times daily as needed for muscle spasms.    Class: E-Prescribe    Route: Oral    multivitamin w/minerals (CERTAVITE/ANTIOXIDANTS) tablet  30 tablet 1 11/1/2024 --   [address] 1-082    Sig: Take 1 tablet by mouth daily.    Class: E-Prescribe    Route: Oral    mycophenolate (GENERIC EQUIVALENT) 250 MG capsule  180 capsule 1 12/2/2024 --   [address] 1-890    Sig: Take 3 capsules (750 mg) by mouth 2 times daily.    Class: E-Prescribe    Notes to Pharmacy: TXP DT 9/28/2024 (Liver) TXP Dischg DT 10/9/2024 DX Liver replaced by transplant Z94.4 [facility] ([place], [place])    Route: Oral    omeprazole (PRILOSEC) 20 MG DR capsule  30 capsule 2 12/2/2024 --   [address] 1-744    Sig: Take 1 capsule (20 mg) by mouth daily.    Class: E-Prescribe    Route: Oral    PHOSPHORUS TABLET 250  MG per tablet  -- -- 8/6/2024 --       Sig: Take by mouth.    Class: Historical    Route: Oral    predniSONE (DELTASONE)  5 MG tablet  30 tablet 1 11/1/2024 --   [address] 1-536    Sig: Take 1 tablet (5 mg) by mouth daily.    Class: E-Prescribe    Notes to Pharmacy: TXP DT 9/28/2024 (Liver) TXP Dischg DT 10/9/2024 DX Liver replaced by transplant Z94.4 TX [facility] ([place], [place])    Route: Oral    sodium zirconium cyclosilicate (LOKELMA) 10 g PACK packet  3 packet 0 12/16/2024 12/19/2024   [address] 1-629    Sig: Take 1 packet (10 g) by mouth daily for 3 days.    Class: E-Prescribe    Route: Oral    tacrolimus (GENERIC EQUIVALENT) 1 MG capsule  180 capsule 1 12/13/2024 --   [address] 1-114    Sig: Take 3 capsules (3 mg) by mouth every 12 hours.    Class: E-Prescribe    Notes to Pharmacy: TXP DT 9/28/2024 (Liver) TXP Dischg DT 10/9/2024 DX Liver replaced by transplant Z94.4 [facility] ([place], [place])    Route: Oral    traMADol (ULTRAM) 50 MG tablet  20 tablet 0 12/12/2024 --   [address] 1-871    Sig: Take 1 tablet (50 mg) by mouth 2 times daily as needed for severe pain.    Class: E-Prescribe    Route: Oral    traZODone (DESYREL) 50 MG tablet  30 tablet 0 12/2/2024 --   [address] 1-371    Sig: Take 1 tablet (50 mg) by mouth at bedtime.    Class: E-Prescribe    Route: Oral    valGANciclovir (VALCYTE) 450 MG tablet  30 tablet 1 12/2/2024 --   [address] 1-157    Sig: Take 1 tablet (450 mg) by mouth daily.    Class: E-Prescribe    Route: Oral    polyethylene glycol (MIRALAX) 17 GM/Dose powder  510 g 0 10/9/2024 --   [facility] -  [place], [place] - [address] [facility]    Sig: Take 17 g by mouth 2 times daily as needed for constipation.    Class: E-Prescribe    Route: Oral    tacrolimus (GENERIC EQUIVALENT) 0.5 MG capsule  60 capsule 1 11/11/2024 --   [facility] [place], [place] - [address] [facility] [phone]    Sig: HOLD    Class: E-Prescribe    Notes to Pharmacy: TXP DT 9/28/2024 (Liver) TXP Dischg DT 10/9/2024 DX Liver replaced by transplant Z94.4 TX [facility] ([place], [place])    tacrolimus (GENERIC EQUIVALENT) 5 MG capsule  -- -- 11/5/2024 --       Sig: Take 1 capsule (5 mg) by mouth 2 times daily as needed (for dose changes).    Class: Historical    Notes to Pharmacy: TXP DT 9/28/2024 (Liver) TXP Dischg DT 10/9/2024 DX Liver replaced by transplant Z94.4 TX [facility] ([place], [place])    Route: Oral            O:   [unfilled]        Latest Ref Rng & Units 12/16/2024     9:18 AM 12/12/2024    10:10 AM 12/9/2024    12:58 PM 12/5/2024     8:34 AM 12/2/2024     9:13 AM   Transplant Immunosuppression Labs   Creat 0.67 - 1.17 mg/dL 1.30  1.50  1.30  1.37  1.35    Urea Nitrogen 6.0 - 20.0 mg/dL 33.1  31.3  25.8  33.2  29.6    WBC 4.0 - 11.0 10e3/uL 6.4  10.1  10.7  7.6  9.6        Chemistries:   Recent Labs   Lab Test 12/16/24  0918   BUN 33.1*   CR 1.30*   GFRESTIMATED 73   *     Lab Results   Component Value Date    A1C 5.4 09/28/2024     Recent Labs   Lab Test 12/16/24 0918   ALBUMIN 4.3   BILITOTAL 0.4   ALKPHOS 153*   AST 20   ALT 34     Urine Studies:  Recent Labs   Lab Test 09/27/24 2113   COLOR Dark Yellow*   APPEARANCE Slightly Cloudy*   URINEGLC Negative   URINEBILI Large*   URINEKETONE Negative   SG 1.011   UBLD Negative   URINEPH 6.0   PROTEIN Negative   NITRITE Negative   LEUKEST Negative   RBCU 1   WBCU 5     No lab results found.  Hematology:   Recent Labs   Lab Test 12/16/24  0918 12/12/24  1010  "12/09/24  1258   HGB 9.9* 9.8* 9.9*    357 312   WBC 6.4 10.1 10.7     Coags:   Recent Labs   Lab Test 10/01/24  0437 09/30/24  0529   INR 1.03 1.14     HLA antibodies:   No results found for: \"ML0FUTZTO\", \"GK5JPIJGZB\", \"UN7RZKTFW\", \"FE7NVXRIVT\"    Assessment: Jag Smith is doing fairly well s/p Liver Tx:  Issues we addressed during his visit include:    Plan:    1. Graft function: LFTs improved, alk phos decreasing  2. Immunosuppression Management: No change tac 6-8  given renal function and wound issues .  Complexity of management:Low.  Contributing factors:  wound   3. Continue wound dressing changes, will see AZALIA on Monday  Followup: 1 week            Fernando Colon MD/PhD      "

## 2024-12-17 NOTE — PROGRESS NOTES
"Transplant Surgery -OUTPATIENT PROGRESS NOTE    Date of Visit: 12/16/24    Transplants:  9/28/2024 (Liver); Postoperative day:  79  ASSESMENT AND PLAN:  Incisional wound:   Stable X6 open areas with tunneling.  Packing with 1/2\" packing strip and dampened with vanshe solution and covered. Advised to change daily per wound care.      -Alk phos down trending.  -Hyperkalemia: lokelma x 3     -Incisional pain: improving on antibiotic. On tylenol, robaxin, atarax and tramadol.   -R lower lid chalazion - improving. Continue warm compress QID.         Date: 12/18/24    Transplant:  [x]                             Liver [x]                              Kidney []                             Pancreas []                              Other:             Chief Complaint:Post-op Visit (Liver txp/Wound care)    History of Present Illness:  Here today for incisional wound care.  Changing daily.   No fever or N/V/D.   Pain improving with antibiotic. Less drainage overall.      Patient Active Problem List   Diagnosis    Alcohol use disorder, severe, in early remission (H)    Alcoholic hepatitis (H)    Epiphora due to insufficient drainage of left side    Hyperbilirubinemia    Hypokalemia    Jaundice    Pneumonia    Liver replaced by transplant (H)    DENI (acute kidney injury) (H)    Immunosuppressed status (H)    Acute post-operative pain    Steroid-induced hyperglycemia    Acute urinary retention    Anemia due to blood loss, acute    Severe malnutrition (H)    Hyponatremia    Hypomagnesemia    Bilateral lower extremity edema    Hyperkalemia    Subconjunctival hemorrhage    Nonhealing surgical wound    Skin ulcer of abdomen with fat layer exposed (H)    Elevated serum creatinine     SOCIAL /FAMILY HISTORY: [x]                  No recent change    Past Medical History:   Diagnosis Date    Alcohol use disorder     Alcoholic hepatitis (H) 06/07/2024    Anxiety     Depression     Hypertension     Liver replaced by transplant (H) " 2024     Past Surgical History:   Procedure Laterality Date    BENCH LIVER  2024    Procedure: Bench liver;  Surgeon: Fernando Colon MD;  Location: [facility] OR    DACRYOCYSTORHINOSTOMY Left 2013    INCISION AND DRAINAGE BUTTOCKS Left 2017    TRANSPLANT LIVER RECIPIENT  DONOR N/A 2024    Procedure: Transplant liver recipient  donor;  Surgeon: Fernando Colon MD;  Location: [facility] OR     Social History     Socioeconomic History    Marital status: Single     Spouse name: Not on file    Number of children: Not on file    Years of education: Not on file    Highest education level: Not on file   Occupational History    Not on file   Tobacco Use    Smoking status: Never    Smokeless tobacco: Never   Vaping Use    Vaping status: Never Used   Substance and Sexual Activity    Alcohol use: Not Currently     Comment: last drink 2024    Drug use: Not Currently     Types: Marijuana     Comment: smoking a long time ago    Sexual activity: Not on file   Other Topics Concern    Not on file   Social History Narrative    Not on file     Social Drivers of Health     Financial Resource Strain: Low Risk  (2024)    Financial Resource Strain     Within the past 12 months, have you or your family members you live with been unable to get utilities (heat, electricity) when it was really needed?: No   Food Insecurity: Low Risk  (2024)    Food Insecurity     Within the past 12 months, did you worry that your food would run out before you got money to buy more?: No     Within the past 12 months, did the food you bought just not last and you didn t have money to get more?: No   Transportation Needs: Low Risk  (2024)    Transportation Needs     Within the past 12 months, has lack of transportation kept you from medical appointments, getting your medicines, non-medical meetings or appointments, work, or from getting things that you need?: No   Physical Activity: Not on file   Stress: Not on  file   Social Connections: Not on file   Interpersonal Safety: Low Risk  (12/11/2024)    Interpersonal Safety     Do you feel physically and emotionally safe where you currently live?: Yes     Within the past 12 months, have you been hit, slapped, kicked or otherwise physically hurt by someone?: No     Within the past 12 months, have you been humiliated or emotionally abused in other ways by your partner or ex-partner?: No   Recent Concern: Interpersonal Safety - High Risk (9/22/2024)    Interpersonal Safety     Do you feel physically and emotionally safe where you currently live?: No     Within the past 12 months, have you been hit, slapped, kicked or otherwise physically hurt by someone?: No     Within the past 12 months, have you been humiliated or emotionally abused in other ways by your partner or ex-partner?: No   Housing Stability: Low Risk  (9/21/2024)    Housing Stability     Do you have housing? : Yes     Are you worried about losing your housing?: No     Prescription Medications as of 12/16/2024         Rx Number Disp Refills Start End Last Dispensed Date Next Fill Date Owning Pharmacy    acetaminophen (TYLENOL) 325 MG tablet  60 tablet 0 12/12/2024 --   [address] 9-766    Sig: Take 2 tablets (650 mg) by mouth every 8 hours as needed for mild pain or fever.    Class: E-Prescribe    Route: Oral    amoxicillin-clavulanate (AUGMENTIN) 500-125 MG tablet  28 tablet 0 12/11/2024 12/25/2024   [address] 5-675    Sig: Take 1 tablet by mouth 2 times daily for 14 days.    Class: E-Prescribe    Route: Oral    aspirin (ASA) 81 MG chewable tablet  30 tablet 1 12/2/2024 --   [address] 2-772    Sig: Take 1 tablet (81 mg) by mouth or Feeding Tube daily.    Class: E-Prescribe    Route: Oral or Feeding Tube    escitalopram  (LEXAPRO) 20 MG tablet  30 tablet 0 11/11/2024 --   [address] 1-273    Sig: Take 1 tablet (20 mg) by mouth daily.    Class: E-Prescribe    Route: Oral    hydrOXYzine HCl (ATARAX) 25 MG tablet  20 tablet 0 12/16/2024 --   [address] 1-713    Sig: Take 1 tablet (25 mg) by mouth every 8 hours as needed for anxiety or other (adjuvant pain).    Class: E-Prescribe    Route: Oral    magnesium oxide (MAG-OX) 400 MG tablet  120 tablet 1 12/2/2024 --   [address] 1-273    Sig: Take 2 tablets (800 mg) by mouth 2 times daily.    Class: E-Prescribe    Route: Oral    methocarbamol (ROBAXIN) 750 MG tablet  20 tablet 0 12/12/2024 --   [address] 1-273    Sig: Take 1 tablet (750 mg) by mouth 3 times daily as needed for muscle spasms.    Class: E-Prescribe    Route: Oral    multivitamin w/minerals (CERTAVITE/ANTIOXIDANTS) tablet  30 tablet 1 11/1/2024 --   [address] 1-115    Sig: Take 1 tablet by mouth daily.    Class: E-Prescribe    Route: Oral    mycophenolate (GENERIC EQUIVALENT) 250 MG capsule  180 capsule 1 12/2/2024 --   [address] 1-984    Sig: Take 3 capsules (750 mg) by mouth 2 times daily.    Class: E-Prescribe    Notes to Pharmacy: TXP DT 9/28/2024 (Liver) TXP Dischg DT 10/9/2024 DX Liver replaced by transplant Z94.4 [facility] ([place], [place])    Route: Oral    omeprazole (PRILOSEC) 20 MG DR capsule  30 capsule 2 12/2/2024 --   [address] 1-979    Sig: Take 1 capsule (20 mg) by mouth daily.    Class: E-Prescribe     Route: Oral    PHOSPHORUS TABLET 250  MG per tablet  -- -- 8/6/2024 --       Sig: Take by mouth.    Class: Historical    Route: Oral    predniSONE (DELTASONE) 5 MG tablet  30 tablet 1 11/1/2024 --   [address] 1-649    Sig: Take 1 tablet (5 mg) by mouth daily.    Class: E-Prescribe    Notes to Pharmacy: TXP DT 9/28/2024 (Liver) TXP Dischg DT 10/9/2024 DX Liver replaced by transplant Z94.4 TX [facility] ([place], [place])    Route: Oral    sodium zirconium cyclosilicate (LOKELMA) 10 g PACK packet  3 packet 0 12/16/2024 12/19/2024   [address] 1-714    Sig: Take 1 packet (10 g) by mouth daily for 3 days.    Class: E-Prescribe    Route: Oral    tacrolimus (GENERIC EQUIVALENT) 1 MG capsule  180 capsule 1 12/13/2024 --   [address] 1-739    Sig: Take 3 capsules (3 mg) by mouth every 12 hours.    Class: E-Prescribe    Notes to Pharmacy: TXP DT 9/28/2024 (Liver) TXP Dischg DT 10/9/2024 DX Liver replaced by transplant Z94.4 [facility] ([place], [place])    Route: Oral    traMADol (ULTRAM) 50 MG tablet  20 tablet 0 12/12/2024 --   [address] 1-337    Sig: Take 1 tablet (50 mg) by mouth 2 times daily as needed for severe pain.    Class: E-Prescribe    Route: Oral    traZODone (DESYREL) 50 MG tablet  30 tablet 0 12/2/2024 --   [address] 1-726    Sig: Take 1 tablet (50 mg) by mouth at bedtime.    Class: E-Prescribe    Route: Oral    valGANciclovir (VALCYTE) 450 MG tablet  30 tablet 1 12/2/2024 --   [address] 8-799    Sig: Take 1 tablet (718  mg) by mouth daily.    Class: E-Prescribe    Route: Oral    polyethylene glycol (MIRALAX) 17 GM/Dose powder  510 g 0 10/9/2024 --   [facility] - [place], [place] - [address] [facility]    Sig: Take 17 g by mouth 2 times daily as needed for constipation.    Class: E-Prescribe    Route: Oral    tacrolimus (GENERIC EQUIVALENT) 0.5 MG capsule  60 capsule 1 11/11/2024 --   [facility] [place], [place] - [address] [facility] 4[address]346    Sig: HOLD    Class: E-Prescribe    Notes to Pharmacy: TXP DT 9/28/2024 (Liver) TXP Dischg DT 10/9/2024 DX Liver replaced by transplant Z94.4 TX [facility] ([place], [place])    tacrolimus (GENERIC EQUIVALENT) 5 MG capsule  -- -- 11/5/2024 --       Sig: Take 1 capsule (5 mg) by mouth 2 times daily as needed (for dose changes).    Class: Historical    Notes to Pharmacy: TXP DT 9/28/2024 (Liver) TXP Dischg DT 10/9/2024 DX Liver replaced by transplant Z94.4 TX [facility] ([place], [place])    Route: Oral          Azithromycin   REVIEW OF SYSTEMS (check box if normal)  [x]               GENERAL  [x]                 PULMONARY [x]                GENITOURINARY  [x]                CNS                 [x]                 CARDIAC  [x]                 ENDOCRINE  [x]                EARS,NOSE,THROAT [x]                 GASTROINTESTINAL [x]                 NEUROLOGIC    [x]                MUSCLOSKELTAL  [x]                  HEMATOLOGY      PHYSICAL EXAM (check box if normal)/80   Pulse 95   Temp 98.2  F (36.8  C) (Oral)   Wt 105.2 kg (232 lb)   SpO2 100%   BMI 31.46 kg/m          [x]            GENERAL: NAD  R eye lower lid with 3mm mass, non tender. No drainage. Conjunctiva wnl.     [x]            Incision:  L side with x2 openings with tunneling. Tunneling present midline x2 and x3 R distal incision. Tunneling areas packed with packing strip dampened with vanshe  solution.  No surrounding erythema. Moderate drainage present- mostly midline and R sided.  Abd soft throughout. Non-tender.                                                                                   PAIN SCALE:: 8

## 2024-12-18 ENCOUNTER — OFFICE VISIT (OUTPATIENT)
Dept: TRANSPLANT | Facility: CLINIC | Age: 39
End: 2024-12-18
Attending: NURSE PRACTITIONER
Payer: MEDICAID

## 2024-12-18 VITALS
OXYGEN SATURATION: 100 % | HEART RATE: 89 BPM | BODY MASS INDEX: 31.63 KG/M2 | TEMPERATURE: 97.5 F | WEIGHT: 233.2 LBS | DIASTOLIC BLOOD PRESSURE: 75 MMHG | SYSTOLIC BLOOD PRESSURE: 118 MMHG | RESPIRATION RATE: 16 BRPM

## 2024-12-18 DIAGNOSIS — T14.8XXA SKIN WOUND FROM SURGICAL INCISION: Primary | ICD-10-CM

## 2024-12-18 PROCEDURE — G0463 HOSPITAL OUTPT CLINIC VISIT: HCPCS | Performed by: NURSE PRACTITIONER

## 2024-12-18 RX ORDER — SODIUM POLYSTYRENE SULFONATE 4.1 MEQ/G
POWDER, FOR SUSPENSION ORAL; RECTAL
COMMUNITY
Start: 2024-12-18

## 2024-12-18 NOTE — NURSING NOTE
Chief Complaint   Patient presents with    Follow Up       /75 (BP Location: Right arm, Patient Position: Sitting, Cuff Size: Adult Large)   Pulse 89   Temp 97.5  F (36.4  C) (Oral)   Resp 16   Wt 105.8 kg (233 lb 3.2 oz)   SpO2 100%   BMI 31.63 kg/m      MARIANNA MORRISON RN on 12/18/2024 at 10:02 AM

## 2024-12-18 NOTE — TELEPHONE ENCOUNTER
PRIOR AUTHORIZATION DENIED    Medication: LOKELMA 10 G PO PACK  Insurance Company: [place] Medicaid ([facility]) - Phone [phone] Fax [phone]  Denial Date: 12/18/2024  Denial Reason(s):         Appeal Information:         Patient Notified: yes

## 2024-12-18 NOTE — LETTER
"12/18/2024      Jag Smith  [address]  [facility] [address]      Dear Colleague,    Thank you for referring your patient, Jag Smith, to the [facility] TRANSPLANT [facility]. Please see a copy of my visit note below.    Transplant Surgery -OUTPATIENT PROGRESS NOTE    Date of Visit: 12/18/24    Transplants:  9/28/2024 (Liver); Postoperative day:  82  ASSESMENT AND PLAN:  Incisional wound:   Stable X6 open areas with tunneling.  Packing with 1/2\" packing strip and dampened with vanshe solution and covered. Advised to change daily per wound care.      -Incisional pain: improving on antibiotic. On tylenol, robaxin, atarax and tramadol.           Date: 12/18/24    Transplant:  [x]                             Liver [x]                              Kidney []                             Pancreas []                              Other:             Chief Complaint:Follow Up    History of Present Illness:  Here today for incisional wound care.  Changing daily.   No fever or N/V/D.   Pain improving with antibiotic. Less drainage overall.      Patient Active Problem List   Diagnosis     Alcohol use disorder, severe, in early remission (H)     Alcoholic hepatitis (H)     Epiphora due to insufficient drainage of left side     Hyperbilirubinemia     Hypokalemia     Jaundice     Pneumonia     Liver replaced by transplant (H)     DENI (acute kidney injury) (H)     Immunosuppressed status (H)     Acute post-operative pain     Steroid-induced hyperglycemia     Acute urinary retention     Anemia due to blood loss, acute     Severe malnutrition (H)     Hyponatremia     Hypomagnesemia     Bilateral lower extremity edema     Hyperkalemia     Subconjunctival hemorrhage     Nonhealing surgical wound     Skin ulcer of abdomen with fat layer exposed (H)     Elevated serum creatinine     SOCIAL /FAMILY HISTORY: [x]                  No recent change    Past Medical History:   Diagnosis Date     Alcohol use disorder      Alcoholic " hepatitis (H) 2024     Anxiety      Depression      Hypertension      Liver replaced by transplant (H) 2024     Past Surgical History:   Procedure Laterality Date     BENCH LIVER  2024    Procedure: Bench liver;  Surgeon: Fernando Colon MD;  Location: [facility] OR     DACRYOCYSTORHINOSTOMY Left 2013     INCISION AND DRAINAGE BUTTOCKS Left 2017     TRANSPLANT LIVER RECIPIENT  DONOR N/A 2024    Procedure: Transplant liver recipient  donor;  Surgeon: Fernando Colon MD;  Location:  OR     Social History     Socioeconomic History     Marital status: Single     Spouse name: Not on file     Number of children: Not on file     Years of education: Not on file     Highest education level: Not on file   Occupational History     Not on file   Tobacco Use     Smoking status: Never     Smokeless tobacco: Never   Vaping Use     Vaping status: Never Used   Substance and Sexual Activity     Alcohol use: Not Currently     Comment: last drink 2024     Drug use: Not Currently     Types: Marijuana     Comment: smoking a long time ago     Sexual activity: Not on file   Other Topics Concern     Not on file   Social History Narrative     Not on file     Social Drivers of Health     Financial Resource Strain: Low Risk  (2024)    Financial Resource Strain      Within the past 12 months, have you or your family members you live with been unable to get utilities (heat, electricity) when it was really needed?: No   Food Insecurity: Low Risk  (2024)    Food Insecurity      Within the past 12 months, did you worry that your food would run out before you got money to buy more?: No      Within the past 12 months, did the food you bought just not last and you didn t have money to get more?: No   Transportation Needs: Low Risk  (2024)    Transportation Needs      Within the past 12 months, has lack of transportation kept you from medical appointments, getting your medicines,  non-medical meetings or appointments, work, or from getting things that you need?: No   Physical Activity: Not on file   Stress: Not on file   Social Connections: Not on file   Interpersonal Safety: Low Risk  (12/11/2024)    Interpersonal Safety      Do you feel physically and emotionally safe where you currently live?: Yes      Within the past 12 months, have you been hit, slapped, kicked or otherwise physically hurt by someone?: No      Within the past 12 months, have you been humiliated or emotionally abused in other ways by your partner or ex-partner?: No   Recent Concern: Interpersonal Safety - High Risk (9/22/2024)    Interpersonal Safety      Do you feel physically and emotionally safe where you currently live?: No      Within the past 12 months, have you been hit, slapped, kicked or otherwise physically hurt by someone?: No      Within the past 12 months, have you been humiliated or emotionally abused in other ways by your partner or ex-partner?: No   Housing Stability: Low Risk  (9/21/2024)    Housing Stability      Do you have housing? : Yes      Are you worried about losing your housing?: No     Prescription Medications as of 12/19/2024         Rx Number Disp Refills Start End Last Dispensed Date Next Fill Date Owning Pharmacy    acetaminophen (TYLENOL) 325 MG tablet  60 tablet 0 12/12/2024 --   [address]    Sig: Take 2 tablets (650 mg) by mouth every 8 hours as needed for mild pain or fever.    Class: E-Prescribe    Route: Oral    amoxicillin-clavulanate (AUGMENTIN) 500-125 MG tablet  28 tablet 0 12/11/2024 12/25/2024   [address]    Sig: Take 1 tablet by mouth 2 times daily for 14 days.    Class: E-Prescribe    Route: Oral    aspirin (ASA) 81 MG chewable tablet  30 tablet 1 12/2/2024 --   [address]  [address]    Sig: Take 1 tablet (81 mg) by mouth or Feeding Tube daily.    Class: E-Prescribe    Route: Oral or Feeding Tube    escitalopram (LEXAPRO) 20 MG tablet  30 tablet 0 11/11/2024 --   [address]    Sig: Take 1 tablet (20 mg) by mouth daily.    Class: E-Prescribe    Route: Oral    hydrOXYzine HCl (ATARAX) 25 MG tablet  20 tablet 0 12/16/2024 --   [address]    Sig: Take 1 tablet (25 mg) by mouth every 8 hours as needed for anxiety or other (adjuvant pain).    Class: E-Prescribe    Route: Oral    magnesium oxide (MAG-OX) 400 MG tablet  120 tablet 1 12/2/2024 --   [address]    Sig: Take 2 tablets (800 mg) by mouth 2 times daily.    Class: E-Prescribe    Route: Oral    methocarbamol (ROBAXIN) 750 MG tablet  20 tablet 0 12/12/2024 --   [address]    Sig: Take 1 tablet (750 mg) by mouth 3 times daily as needed for muscle spasms.    Class: E-Prescribe    Route: Oral    multivitamin w/minerals (CERTAVITE/ANTIOXIDANTS) tablet  30 tablet 1 11/1/2024 --   [address]    Sig: Take 1 tablet by mouth daily.    Class: E-Prescribe    Route: Oral    mycophenolate (GENERIC EQUIVALENT) 250 MG capsule  180 capsule 1 12/2/2024 --   [address]    Sig: Take 3 capsules (750 mg) by mouth 2 times daily.    Class: E-Prescribe    Notes to Pharmacy: TXP DT 9/28/2024 (Liver) TXP Dischg DT 10/9/2024 DX Liver replaced by transplant Z94.4 [facility] ([place], [place])    Route: Oral    omeprazole (PRILOSEC) 20 MG DR capsule  30 capsule 2 12/2/2024 --   [facility]  [address]    Sig: Take 1 capsule (20 mg) by mouth daily.    Class: E-Prescribe    Route: Oral    PHOSPHORUS TABLET 250  MG per tablet  -- -- 8/6/2024 --       Sig: Take by mouth.    Class: Historical    Route: Oral    predniSONE (DELTASONE) 5 MG tablet  30 tablet 1 11/1/2024 --   [address]    Sig: Take 1 tablet (5 mg) by mouth daily.    Class: E-Prescribe    Notes to Pharmacy: TXP DT 9/28/2024 (Liver) TXP Dischg DT 10/9/2024 DX Liver replaced by transplant Z94.4 [facility] ([place], [place])    Route: Oral    sodium polystyrene (KAYEXALATE) powder  -- -- 12/18/2024 --       Sig: PRN for hyperkalemia    Class: Historical    sodium zirconium cyclosilicate (LOKELMA) 10 g PACK packet  3 packet 0 12/16/2024 12/19/2024   [address]    Sig: Take 1 packet (10 g) by mouth daily for 3 days.    Class: E-Prescribe    Route: Oral    tacrolimus (GENERIC EQUIVALENT) 1 MG capsule  180 capsule 1 12/13/2024 --   [address]    Sig: Take 3 capsules (3 mg) by mouth every 12 hours.    Class: E-Prescribe    Notes to Pharmacy: TXP DT 9/28/2024 (Liver) TXP Dischg DT 10/9/2024 DX Liver replaced by transplant Z94.4 [facility] ([place], [place])    Route: Oral    traMADol (ULTRAM) 50 MG tablet  20 tablet 0 12/12/2024 --   [address]584    Sig: Take 1 tablet (50 mg) by mouth 2 times daily as needed for severe pain.    Class: E-Prescribe    Route: Oral    traZODone (DESYREL) 50 MG tablet  30 tablet 0 12/2/2024 --   [address] 9[address]731    Sig: Take 1  tablet (50 mg) by mouth at bedtime.    Class: E-Prescribe    Route: Oral    valGANciclovir (VALCYTE) 450 MG tablet  30 tablet 1 12/2/2024 --   [address]    Sig: Take 1 tablet (450 mg) by mouth daily.    Class: E-Prescribe    Route: Oral    polyethylene glycol (MIRALAX) 17 GM/Dose powder  510 g 0 10/9/2024 --   [facility] - [place], [place] - [address] [facility]    Sig: Take 17 g by mouth 2 times daily as needed for constipation.    Class: E-Prescribe    Route: Oral    tacrolimus (GENERIC EQUIVALENT) 0.5 MG capsule  60 capsule 1 11/11/2024 --   [address]    Sig: HOLD    Class: E-Prescribe    Notes to Pharmacy: TXP DT 9/28/2024 (Liver) TXP Dischg DT 10/9/2024 DX Liver replaced by transplant Z94.4 [facility] ([place], [place])    tacrolimus (GENERIC EQUIVALENT) 5 MG capsule  -- -- 11/5/2024 --       Sig: Take 1 capsule (5 mg) by mouth 2 times daily as needed (for dose changes).    Class: Historical    Notes to Pharmacy: TXP DT 9/28/2024 (Liver) TXP Dischg DT 10/9/2024 DX Liver replaced by transplant Z94.4 TX [facility] ([place], [place])    Route: Oral          Azithromycin   REVIEW OF SYSTEMS (check box if normal)  [x]               GENERAL  [x]                 PULMONARY [x]                GENITOURINARY  [x]                CNS                 [x]                 CARDIAC  [x]                 ENDOCRINE  [x]                EARS,NOSE,THROAT [x]                 GASTROINTESTINAL [x]                 NEUROLOGIC    [x]                MUSCLOSKELTAL  [x]                  HEMATOLOGY      PHYSICAL EXAM (check box if normal)/75 (BP Location: Right arm, Patient Position: Sitting, Cuff Size: Adult Large)   Pulse 89   Temp 97.5  F (36.4  C) (Oral)   Resp 16   Wt 105.8 kg  (233 lb 3.2 oz)   SpO2 100%   BMI 31.63 kg/m          [x]            GENERAL: NAD      [x]            Incision:  L side with x2 openings with tunneling. Tunneling present midline x2 and x3 R distal incision. Tunneling areas packed with packing strip dampened with vanshe solution.  No surrounding erythema. Small drainage present- mostly midline and R sided.  Abd soft throughout. Non-tender.                                                                                   PAIN SCALE:: 8       Again, thank you for allowing me to participate in the care of your patient.        Sincerely,        SIL Blum CNP

## 2024-12-19 ENCOUNTER — LAB (OUTPATIENT)
Dept: LAB | Facility: CLINIC | Age: 39
End: 2024-12-19
Attending: NURSE PRACTITIONER
Payer: MEDICAID

## 2024-12-19 ENCOUNTER — OFFICE VISIT (OUTPATIENT)
Dept: TRANSPLANT | Facility: CLINIC | Age: 39
End: 2024-12-19
Attending: NURSE PRACTITIONER
Payer: MEDICAID

## 2024-12-19 VITALS
OXYGEN SATURATION: 99 % | TEMPERATURE: 98 F | BODY MASS INDEX: 31.83 KG/M2 | WEIGHT: 234.7 LBS | SYSTOLIC BLOOD PRESSURE: 136 MMHG | HEART RATE: 85 BPM | DIASTOLIC BLOOD PRESSURE: 86 MMHG

## 2024-12-19 DIAGNOSIS — D84.9 IMMUNOSUPPRESSION (H): ICD-10-CM

## 2024-12-19 DIAGNOSIS — Z94.4 LIVER REPLACED BY TRANSPLANT (H): Primary | ICD-10-CM

## 2024-12-19 DIAGNOSIS — Z94.4 LIVER REPLACED BY TRANSPLANT (H): ICD-10-CM

## 2024-12-19 LAB
ALBUMIN SERPL BCG-MCNC: 4.3 G/DL (ref 3.5–5.2)
ALP SERPL-CCNC: 131 U/L (ref 40–150)
ALT SERPL W P-5'-P-CCNC: 28 U/L (ref 0–70)
ANION GAP SERPL CALCULATED.3IONS-SCNC: 10 MMOL/L (ref 7–15)
AST SERPL W P-5'-P-CCNC: 15 U/L (ref 0–45)
BILIRUB DIRECT SERPL-MCNC: 0.21 MG/DL (ref 0–0.3)
BILIRUB SERPL-MCNC: 0.4 MG/DL
BUN SERPL-MCNC: 28.6 MG/DL (ref 6–20)
CALCIUM SERPL-MCNC: 9.6 MG/DL (ref 8.8–10.4)
CHLORIDE SERPL-SCNC: 105 MMOL/L (ref 98–107)
CREAT SERPL-MCNC: 1.17 MG/DL (ref 0.67–1.17)
EGFRCR SERPLBLD CKD-EPI 2021: 82 ML/MIN/1.73M2
ERYTHROCYTE [DISTWIDTH] IN BLOOD BY AUTOMATED COUNT: 14.2 % (ref 10–15)
GLUCOSE SERPL-MCNC: 89 MG/DL (ref 70–99)
HCO3 SERPL-SCNC: 23 MMOL/L (ref 22–29)
HCT VFR BLD AUTO: 27.9 % (ref 40–53)
HGB BLD-MCNC: 9 G/DL (ref 13.3–17.7)
MAGNESIUM SERPL-MCNC: 1.5 MG/DL (ref 1.7–2.3)
MCH RBC QN AUTO: 29.6 PG (ref 26.5–33)
MCHC RBC AUTO-ENTMCNC: 32.3 G/DL (ref 31.5–36.5)
MCV RBC AUTO: 92 FL (ref 78–100)
PHOSPHATE SERPL-MCNC: 4.7 MG/DL (ref 2.5–4.5)
PLATELET # BLD AUTO: 287 10E3/UL (ref 150–450)
POTASSIUM SERPL-SCNC: 4.7 MMOL/L (ref 3.4–5.3)
PROT SERPL-MCNC: 7.3 G/DL (ref 6.4–8.3)
RBC # BLD AUTO: 3.04 10E6/UL (ref 4.4–5.9)
SODIUM SERPL-SCNC: 138 MMOL/L (ref 135–145)
TACROLIMUS BLD-MCNC: 7.3 UG/L (ref 5–15)
TME LAST DOSE: NORMAL H
TME LAST DOSE: NORMAL H
WBC # BLD AUTO: 5.8 10E3/UL (ref 4–11)

## 2024-12-19 PROCEDURE — 36415 COLL VENOUS BLD VENIPUNCTURE: CPT | Performed by: PATHOLOGY

## 2024-12-19 PROCEDURE — 80053 COMPREHEN METABOLIC PANEL: CPT | Performed by: PATHOLOGY

## 2024-12-19 PROCEDURE — 83735 ASSAY OF MAGNESIUM: CPT | Performed by: PATHOLOGY

## 2024-12-19 PROCEDURE — 99000 SPECIMEN HANDLING OFFICE-LAB: CPT | Performed by: PATHOLOGY

## 2024-12-19 PROCEDURE — 82248 BILIRUBIN DIRECT: CPT | Performed by: PATHOLOGY

## 2024-12-19 PROCEDURE — 84100 ASSAY OF PHOSPHORUS: CPT | Performed by: PATHOLOGY

## 2024-12-19 PROCEDURE — 80197 ASSAY OF TACROLIMUS: CPT | Performed by: TRANSPLANT SURGERY

## 2024-12-19 PROCEDURE — 85027 COMPLETE CBC AUTOMATED: CPT | Performed by: PATHOLOGY

## 2024-12-19 PROCEDURE — G0463 HOSPITAL OUTPT CLINIC VISIT: HCPCS | Performed by: TRANSPLANT SURGERY

## 2024-12-19 ASSESSMENT — PAIN SCALES - GENERAL: PAINLEVEL_OUTOF10: MODERATE PAIN (5)

## 2024-12-19 NOTE — PROGRESS NOTES
Transplant Surgery Progress Note    Transplants:  9/28/2024 (Liver); Postoperative day:  82  S: wound improving on Abx, strength and appetite improving, denies f/c/n/v/d    Transplant History:    Transplant Type:  Liver Tx  Donor Type:     Transplant Date:  9/28/2024 (Liver)   Biliary Stent:  No       Acute Rejection Hx:  No    Present Maintenance Immunosuppression:  Tacrolimus, Mycophenolate mofetil, and Prednisone    CMV IgG Ab Discordance:  No  EBV IgG Ab Discordance:  No    Transplant Coordinator: Karena Villegas     Transplant Office Phone Number: [phone]     Immunosuppressant Medications       Immunosuppressive Agents Disp Start End     mycophenolate (GENERIC EQUIVALENT) 250 MG capsule 180 capsule 12/2/2024 --    Sig - Route: Take 3 capsules (750 mg) by mouth 2 times daily. - Oral    Class: E-Prescribe    Notes to Pharmacy: TXP DT 9/28/2024 (Liver) TXP Dischg DT 10/9/2024 DX Liver replaced by transplant Z94.4 [facility] ([place], [place])     tacrolimus (GENERIC EQUIVALENT) 1 MG capsule 180 capsule 12/13/2024 --    Sig - Route: Take 3 capsules (3 mg) by mouth every 12 hours. - Oral    Class: E-Prescribe    Notes to Pharmacy: TXP DT 9/28/2024 (Liver) TXP Dischg DT 10/9/2024 DX Liver replaced by transplant Z94.4 TX [facility] ([place], [place])     tacrolimus (GENERIC EQUIVALENT) 0.5 MG capsule 60 capsule 11/11/2024 --    Sig: HOLD    Patient not taking: Reported on 12/19/2024    Class: E-Prescribe    Notes to Pharmacy: TXP DT 9/28/2024 (Liver) TXP Dischg DT 10/9/2024 DX Liver replaced by transplant Z94.4 TX [facility] ([place], [place])     tacrolimus (GENERIC EQUIVALENT) 5 MG capsule -- 11/5/2024 --    Sig - Route: Take 1 capsule (5 mg) by mouth 2 times daily as needed (for dose changes). - Oral    Patient not taking: Reported on 12/19/2024    Class: Historical    Notes to  Pharmacy: TXP DT 9/28/2024 (Liver) TXP Dischg DT 10/9/2024 DX Liver replaced by transplant Z94.4 TX [facility] ([place], [place])            Possible Immunosuppression-related side effects:   []             headache  []             vivid dreams  []             irritability  []             cognitive difficuties  []             fine tremor  []             nausea  []             diarrhea  []             neuropathy      []             edema  []             renal calcineurin toxicity  []             hyperkalemia  []             post-transplant diabetes  []             decreased appetite  []             increased appetite  []             other:  []             none    Prescription Medications as of 12/19/2024         Rx Number Disp Refills Start End Last Dispensed Date Next Fill Date Owning Pharmacy    acetaminophen (TYLENOL) 325 MG tablet  60 tablet 0 12/12/2024 --   [address]    Sig: Take 2 tablets (650 mg) by mouth every 8 hours as needed for mild pain or fever.    Class: E-Prescribe    Route: Oral    amoxicillin-clavulanate (AUGMENTIN) 500-125 MG tablet  28 tablet 0 12/11/2024 12/25/2024   [address]    Sig: Take 1 tablet by mouth 2 times daily for 14 days.    Class: E-Prescribe    Route: Oral    aspirin (ASA) 81 MG chewable tablet  30 tablet 1 12/2/2024 --   [address]    Sig: Take 1 tablet (81 mg) by mouth or Feeding Tube daily.    Class: E-Prescribe    Route: Oral or Feeding Tube    escitalopram (LEXAPRO) 20 MG tablet  30 tablet 0 11/11/2024 --   [address]    Sig: Take 1 tablet (20 mg) by mouth daily.    Class: E-Prescribe    Route: Oral    hydrOXYzine HCl (ATARAX) 25 MG tablet  20  tablet 0 12/16/2024 --   [address]    Sig: Take 1 tablet (25 mg) by mouth every 8 hours as needed for anxiety or other (adjuvant pain).    Class: E-Prescribe    Route: Oral    magnesium oxide (MAG-OX) 400 MG tablet  120 tablet 1 12/2/2024 --   [address]    Sig: Take 2 tablets (800 mg) by mouth 2 times daily.    Class: E-Prescribe    Route: Oral    methocarbamol (ROBAXIN) 750 MG tablet  20 tablet 0 12/12/2024 --   [address]    Sig: Take 1 tablet (750 mg) by mouth 3 times daily as needed for muscle spasms.    Class: E-Prescribe    Route: Oral    multivitamin w/minerals (CERTAVITE/ANTIOXIDANTS) tablet  30 tablet 1 11/1/2024 --   [address]    Sig: Take 1 tablet by mouth daily.    Class: E-Prescribe    Route: Oral    mycophenolate (GENERIC EQUIVALENT) 250 MG capsule  180 capsule 1 12/2/2024 --   [address]95    Sig: Take 3 capsules (750 mg) by mouth 2 times daily.    Class: E-Prescribe    Notes to Pharmacy: TXP DT 9/28/2024 (Liver) TXP Dischg DT 10/9/2024 DX Liver replaced by transplant Z94.4 TX [facility] ([place], [place])    Route: Oral    omeprazole (PRILOSEC) 20 MG DR capsule  30 capsule 2 12/2/2024 --   [address]    Sig: Take 1 capsule (20 mg) by mouth daily.    Class: E-Prescribe    Route: Oral    PHOSPHORUS TABLET 250  MG per tablet  -- -- 8/6/2024 --       Sig: Take by mouth.    Class: Historical    Route: Oral    predniSONE (DELTASONE) 5 MG tablet  30 tablet 1 11/1/2024 --   [place] [address]  [facility] 7[address]    Sig: Take 1 tablet (5 mg) by mouth daily.    Class: E-Prescribe    Notes to Pharmacy: TXP DT 9/28/2024 (Liver) TXP Dischg DT 10/9/2024 DX Liver replaced by transplant Z94.4 TX [facility] ([place], [place])    Route: Oral    sodium polystyrene (KAYEXALATE) powder  -- -- 12/18/2024 --       Sig: PRN for hyperkalemia    Class: Historical    sodium zirconium cyclosilicate (LOKELMA) 10 g PACK packet  3 packet 0 12/16/2024 12/19/2024   [address]    Sig: Take 1 packet (10 g) by mouth daily for 3 days.    Class: E-Prescribe    Route: Oral    tacrolimus (GENERIC EQUIVALENT) 1 MG capsule  180 capsule 1 12/13/2024 --   [address]    Sig: Take 3 capsules (3 mg) by mouth every 12 hours.    Class: E-Prescribe    Notes to Pharmacy: TXP DT 9/28/2024 (Liver) TXP Dischg DT 10/9/2024 DX Liver replaced by transplant Z94.4 TX [facility] ([place], [place])    Route: Oral    traMADol (ULTRAM) 50 MG tablet  20 tablet 0 12/12/2024 --   [address]    Sig: Take 1 tablet (50 mg) by mouth 2 times daily as needed for severe pain.    Class: E-Prescribe    Route: Oral    traZODone (DESYREL) 50 MG tablet  30 tablet 0 12/2/2024 --   [address]    Sig: Take 1 tablet (50 mg) by mouth at bedtime.    Class: E-Prescribe    Route: Oral    valGANciclovir (VALCYTE) 450 MG tablet  30 tablet 1 12/2/2024 --   [address]    Sig: Take 1 tablet (450 mg) by mouth daily.    Class: E-Prescribe    Route: Oral    polyethylene glycol (MIRALAX) 17 GM/Dose powder  510 g 0 10/9/2024 --   [facility] [place], [place] - [address] [facility]    Sig: Take 17 g by mouth 2 times daily as needed for constipation.    Class: E-Prescribe    Route: Oral    tacrolimus (GENERIC EQUIVALENT) 0.5 MG capsule  60 capsule 1 11/11/2024 --   [facility] [place], [place] - [address] [facility] [address]    Sig: HOLD    Class: E-Prescribe    Notes to Pharmacy: TXP DT 9/28/2024 (Liver) TXP Dischg DT 10/9/2024 DX Liver replaced by transplant Z94.4 TX [facility] ([place], [place])    tacrolimus (GENERIC EQUIVALENT) 5 MG capsule  -- -- 11/5/2024 --       Sig: Take 1 capsule (5 mg) by mouth 2 times daily as needed (for dose changes).    Class: Historical    Notes to Pharmacy: TXP DT 9/28/2024 (Liver) TXP Dischg DT 10/9/2024 DX Liver replaced by transplant Z94.4 TX [facility] ([place], [place])    Route: Oral            O:   [unfilled]        Latest Ref Rng & Units 12/19/2024     9:36 AM 12/16/2024     9:18 AM 12/12/2024    10:10 AM 12/9/2024    12:58 PM 12/5/2024     8:34 AM   Transplant Immunosuppression Labs   Creat 0.67 - 1.17 mg/dL 1.17  1.30  1.50  1.30  1.37    Urea Nitrogen 6.0 - 20.0 mg/dL 28.6  33.1  31.3  25.8  33.2    WBC 4.0 - 11.0 10e3/uL 5.8  6.4  10.1  10.7  7.6        Chemistries:   Recent Labs   Lab Test 12/19/24  0936   BUN 28.6*   CR 1.17   GFRESTIMATED 82   GLC 89     Lab Results   Component Value Date    A1C 5.4 09/28/2024     Recent Labs   Lab Test 12/19/24  0936   ALBUMIN 4.3   BILITOTAL 0.4   ALKPHOS 131   AST 15   ALT 28     Urine Studies:  Recent Labs   Lab Test 09/27/24 2113   COLOR Dark Yellow*   APPEARANCE Slightly Cloudy*   URINEGLC Negative   URINEBILI Large*   URINEKETONE Negative   SG 1.011   UBLD Negative   URINEPH 6.0   PROTEIN Negative   NITRITE Negative   LEUKEST Negative   RBCU 1   WBCU 5     No lab results found.  Hematology:   Recent Labs   Lab Test 12/19/24  5668  "12/16/24  0918 12/12/24  1010   HGB 9.0* 9.9* 9.8*    319 357   WBC 5.8 6.4 10.1     Coags:   Recent Labs   Lab Test 10/01/24  0437 09/30/24  0529   INR 1.03 1.14     HLA antibodies:   No results found for: \"LK7QZDKIT\", \"IP9XVLCIXU\", \"QN4FRLEHV\", \"NM8GNIOYWZ\"    Assessment: Jag Smith is doing well s/p Liver Tx:  Issues we addressed during his visit include:    Plan:    1. Graft function: LFTs improving, Cr also improved  2. Immunosuppression Management: No change tac target on lower side 7-10, given wound infection    .  Complexity of management:Low.  Contributing factors:  wound infection  3. Wound care, cont current plan, has follow-up with wound doctor on 1/2/05:    Followup: 1-2 weeks          Fernando Colon MD/PhD    "

## 2024-12-19 NOTE — PROGRESS NOTES
"Transplant Surgery -OUTPATIENT PROGRESS NOTE    Date of Visit: 12/18/24    Transplants:  9/28/2024 (Liver); Postoperative day:  82  ASSESMENT AND PLAN:  Incisional wound:   Stable X6 open areas with tunneling.  Packing with 1/2\" packing strip and dampened with vanshe solution and covered. Advised to change daily per wound care.      -Incisional pain: improving on antibiotic. On tylenol, robaxin, atarax and tramadol.           Date: 12/18/24    Transplant:  [x]                             Liver [x]                              Kidney []                             Pancreas []                              Other:             Chief Complaint:Follow Up    History of Present Illness:  Here today for incisional wound care.  Changing daily.   No fever or N/V/D.   Pain improving with antibiotic. Less drainage overall.      Patient Active Problem List   Diagnosis    Alcohol use disorder, severe, in early remission (H)    Alcoholic hepatitis (H)    Epiphora due to insufficient drainage of left side    Hyperbilirubinemia    Hypokalemia    Jaundice    Pneumonia    Liver replaced by transplant (H)    DENI (acute kidney injury) (H)    Immunosuppressed status (H)    Acute post-operative pain    Steroid-induced hyperglycemia    Acute urinary retention    Anemia due to blood loss, acute    Severe malnutrition (H)    Hyponatremia    Hypomagnesemia    Bilateral lower extremity edema    Hyperkalemia    Subconjunctival hemorrhage    Nonhealing surgical wound    Skin ulcer of abdomen with fat layer exposed (H)    Elevated serum creatinine     SOCIAL /FAMILY HISTORY: [x]                  No recent change    Past Medical History:   Diagnosis Date    Alcohol use disorder     Alcoholic hepatitis (H) 06/07/2024    Anxiety     Depression     Hypertension     Liver replaced by transplant (H) 09/28/2024     Past Surgical History:   Procedure Laterality Date    BENCH LIVER  9/28/2024    Procedure: Bench liver;  Surgeon: Fernando Colon MD;  " Location: [facility] OR    DACRYOCYSTORHINOSTOMY Left 2013    INCISION AND DRAINAGE BUTTOCKS Left 2017    TRANSPLANT LIVER RECIPIENT  DONOR N/A 2024    Procedure: Transplant liver recipient  donor;  Surgeon: Fernando Colon MD;  Location: [facility] OR     Social History     Socioeconomic History    Marital status: Single     Spouse name: Not on file    Number of children: Not on file    Years of education: Not on file    Highest education level: Not on file   Occupational History    Not on file   Tobacco Use    Smoking status: Never    Smokeless tobacco: Never   Vaping Use    Vaping status: Never Used   Substance and Sexual Activity    Alcohol use: Not Currently     Comment: last drink 2024    Drug use: Not Currently     Types: Marijuana     Comment: smoking a long time ago    Sexual activity: Not on file   Other Topics Concern    Not on file   Social History Narrative    Not on file     Social Drivers of Health     Financial Resource Strain: Low Risk  (2024)    Financial Resource Strain     Within the past 12 months, have you or your family members you live with been unable to get utilities (heat, electricity) when it was really needed?: No   Food Insecurity: Low Risk  (2024)    Food Insecurity     Within the past 12 months, did you worry that your food would run out before you got money to buy more?: No     Within the past 12 months, did the food you bought just not last and you didn t have money to get more?: No   Transportation Needs: Low Risk  (2024)    Transportation Needs     Within the past 12 months, has lack of transportation kept you from medical appointments, getting your medicines, non-medical meetings or appointments, work, or from getting things that you need?: No   Physical Activity: Not on file   Stress: Not on file   Social Connections: Not on file   Interpersonal Safety: Low Risk  (2024)    Interpersonal Safety     Do you feel physically and  emotionally safe where you currently live?: Yes     Within the past 12 months, have you been hit, slapped, kicked or otherwise physically hurt by someone?: No     Within the past 12 months, have you been humiliated or emotionally abused in other ways by your partner or ex-partner?: No   Recent Concern: Interpersonal Safety - High Risk (9/22/2024)    Interpersonal Safety     Do you feel physically and emotionally safe where you currently live?: No     Within the past 12 months, have you been hit, slapped, kicked or otherwise physically hurt by someone?: No     Within the past 12 months, have you been humiliated or emotionally abused in other ways by your partner or ex-partner?: No   Housing Stability: Low Risk  (9/21/2024)    Housing Stability     Do you have housing? : Yes     Are you worried about losing your housing?: No     Prescription Medications as of 12/19/2024         Rx Number Disp Refills Start End Last Dispensed Date Next Fill Date Owning Pharmacy    acetaminophen (TYLENOL) 325 MG tablet  60 tablet 0 12/12/2024 --   [address] [phone]    Sig: Take 2 tablets (650 mg) by mouth every 8 hours as needed for mild pain or fever.    Class: E-Prescribe    Route: Oral    amoxicillin-clavulanate (AUGMENTIN) 500-125 MG tablet  28 tablet 0 12/11/2024 12/25/2024   [address] [phone]    Sig: Take 1 tablet by mouth 2 times daily for 14 days.    Class: E-Prescribe    Route: Oral    aspirin (ASA) 81 MG chewable tablet  30 tablet 1 12/2/2024 --   [address] 9-821    Sig: Take 1 tablet (81 mg) by mouth or Feeding Tube daily.    Class: E-Prescribe    Route: Oral or Feeding Tube    escitalopram (LEXAPRO) 20 MG tablet  30 tablet 0 11/11/2024 --   [address] 0-631    Sig:  Take 1 tablet (20 mg) by mouth daily.    Class: E-Prescribe    Route: Oral    hydrOXYzine HCl (ATARAX) 25 MG tablet  20 tablet 0 12/16/2024 --   [address] 1-486    Sig: Take 1 tablet (25 mg) by mouth every 8 hours as needed for anxiety or other (adjuvant pain).    Class: E-Prescribe    Route: Oral    magnesium oxide (MAG-OX) 400 MG tablet  120 tablet 1 12/2/2024 --   [address] 1-770    Sig: Take 2 tablets (800 mg) by mouth 2 times daily.    Class: E-Prescribe    Route: Oral    methocarbamol (ROBAXIN) 750 MG tablet  20 tablet 0 12/12/2024 --   [address] 1-735    Sig: Take 1 tablet (750 mg) by mouth 3 times daily as needed for muscle spasms.    Class: E-Prescribe    Route: Oral    multivitamin w/minerals (CERTAVITE/ANTIOXIDANTS) tablet  30 tablet 1 11/1/2024 --   [address] 1-771    Sig: Take 1 tablet by mouth daily.    Class: E-Prescribe    Route: Oral    mycophenolate (GENERIC EQUIVALENT) 250 MG capsule  180 capsule 1 12/2/2024 --   [address] 1-286    Sig: Take 3 capsules (750 mg) by mouth 2 times daily.    Class: E-Prescribe    Notes to Pharmacy: TXP DT 9/28/2024 (Liver) TXP Dischg DT 10/9/2024 DX Liver replaced by transplant Z94.4 [facility] ([place], [place])    Route: Oral    omeprazole (PRILOSEC) 20 MG DR capsule  30 capsule 2 12/2/2024 --   [address] 1-952    Sig: Take 1 capsule (20 mg) by mouth daily.    Class: E-Prescribe    Route: Oral    PHOSPHORUS TABLET 250  MG per tablet  -- -- 8/6/2024 --       Sig: Take by mouth.    Class: Historical    Route: Oral     predniSONE (DELTASONE) 5 MG tablet  30 tablet 1 11/1/2024 --   [address] 1-453    Sig: Take 1 tablet (5 mg) by mouth daily.    Class: E-Prescribe    Notes to Pharmacy: TXP DT 9/28/2024 (Liver) TXP Dischg DT 10/9/2024 DX Liver replaced by transplant Z94.4 TX [facility] ([place], [place])    Route: Oral    sodium polystyrene (KAYEXALATE) powder  -- -- 12/18/2024 --       Sig: PRN for hyperkalemia    Class: Historical    sodium zirconium cyclosilicate (LOKELMA) 10 g PACK packet  3 packet 0 12/16/2024 12/19/2024   [address] 1-061    Sig: Take 1 packet (10 g) by mouth daily for 3 days.    Class: E-Prescribe    Route: Oral    tacrolimus (GENERIC EQUIVALENT) 1 MG capsule  180 capsule 1 12/13/2024 --   [address] 1-002    Sig: Take 3 capsules (3 mg) by mouth every 12 hours.    Class: E-Prescribe    Notes to Pharmacy: TXP DT 9/28/2024 (Liver) TXP Dischg DT 10/9/2024 DX Liver replaced by transplant Z94.4 [facility] ([place], [place])    Route: Oral    traMADol (ULTRAM) 50 MG tablet  20 tablet 0 12/12/2024 --   [address] 1-792    Sig: Take 1 tablet (50 mg) by mouth 2 times daily as needed for severe pain.    Class: E-Prescribe    Route: Oral    traZODone (DESYREL) 50 MG tablet  30 tablet 0 12/2/2024 --   [address] 1-577    Sig: Take 1 tablet (50 mg) by mouth at bedtime.    Class: E-Prescribe    Route: Oral    valGANciclovir (VALCYTE) 450 MG tablet  30 tablet 1 12/2/2024 --   [address] 9-623    Sig: Take 1 tablet (450 mg) by mouth daily.     Class: E-Prescribe    Route: Oral    polyethylene glycol (MIRALAX) 17 GM/Dose powder  510 g 0 10/9/2024 --   [facility] - [place], [place] - [address]    Sig: Take 17 g by mouth 2 times daily as needed for constipation.    Class: E-Prescribe    Route: Oral    tacrolimus (GENERIC EQUIVALENT) 0.5 MG capsule  60 capsule 1 11/11/2024 --   [facility] - [place], [place] - [phone]    Sig: HOLD    Class: E-Prescribe    Notes to Pharmacy: TXP DT 9/28/2024 (Liver) TXP Dischg DT 10/9/2024 DX Liver replaced by transplant Z94.4 [facility] ([place], [place])    tacrolimus (GENERIC EQUIVALENT) 5 MG capsule  -- -- 11/5/2024 --       Sig: Take 1 capsule (5 mg) by mouth 2 times daily as needed (for dose changes).    Class: Historical    Notes to Pharmacy: TXP DT 9/28/2024 (Liver) TXP Dischg DT 10/9/2024 DX Liver replaced by transplant Z94.4 [facility] ([place], [place])    Route: Oral          Azithromycin   REVIEW OF SYSTEMS (check box if normal)  [x]               GENERAL  [x]                 PULMONARY [x]                GENITOURINARY  [x]                CNS                 [x]                 CARDIAC  [x]                 ENDOCRINE  [x]                EARS,NOSE,THROAT [x]                 GASTROINTESTINAL [x]                 NEUROLOGIC    [x]                MUSCLOSKELTAL  [x]                  HEMATOLOGY      PHYSICAL EXAM (check box if normal)/75 (BP Location: Right arm, Patient Position: Sitting, Cuff Size: Adult Large)   Pulse 89   Temp 97.5  F (36.4  C) (Oral)   Resp 16   Wt 105.8 kg (233 lb 3.2 oz)   SpO2 100%   BMI 31.63 kg/m          [x]            GENERAL: NAD      [x]            Incision:  L side with x2 openings with tunneling. Tunneling present midline x2 and x3 R distal incision. Tunneling areas packed with packing strip dampened with vanshe  solution.  No surrounding erythema. Small drainage present- mostly midline and R sided.  Abd soft throughout. Non-tender.                                                                                   PAIN SCALE:: 8

## 2024-12-19 NOTE — LETTER
12/19/2024      Jag Smith  [phone]  [facility] [address]      Dear Colleague,    Thank you for referring your patient, Jag Smith, to the [facility]. Please see a copy of my visit note below.    Transplant Surgery Progress Note    Transplants:  9/28/2024 (Liver); Postoperative day:  82  S: wound improving on Abx, strength and appetite improving, denies f/c/n/v/d    Transplant History:    Transplant Type:  Liver Tx  Donor Type:     Transplant Date:  9/28/2024 (Liver)   Biliary Stent:  No       Acute Rejection Hx:  No    Present Maintenance Immunosuppression:  Tacrolimus, Mycophenolate mofetil, and Prednisone    CMV IgG Ab Discordance:  No  EBV IgG Ab Discordance:  No    Transplant Coordinator: Karena Villegas     Transplant Office Phone Number: [phone]     Immunosuppressant Medications       Immunosuppressive Agents Disp Start End     mycophenolate (GENERIC EQUIVALENT) 250 MG capsule 180 capsule 12/2/2024 --    Sig - Route: Take 3 capsules (750 mg) by mouth 2 times daily. - Oral    Class: E-Prescribe    Notes to Pharmacy: TXP DT 9/28/2024 (Liver) TXP Dischg DT 10/9/2024 DX Liver replaced by transplant Z94.4 [facility] ([place], [place])     tacrolimus (GENERIC EQUIVALENT) 1 MG capsule 180 capsule 12/13/2024 --    Sig - Route: Take 3 capsules (3 mg) by mouth every 12 hours. - Oral    Class: E-Prescribe    Notes to Pharmacy: TXP DT 9/28/2024 (Liver) TXP Dischg DT 10/9/2024 DX Liver replaced by transplant Z94.4 [facility] ([place], [place])     tacrolimus (GENERIC EQUIVALENT) 0.5 MG capsule 60 capsule 11/11/2024 --    Sig: HOLD    Patient not taking: Reported on 12/19/2024    Class: E-Prescribe    Notes to Pharmacy: TXP DT 9/28/2024 (Liver) TXP Dischg DT 10/9/2024 DX Liver replaced by transplant Z94.4 [facility] ([place], [place])      tacrolimus (GENERIC EQUIVALENT) 5 MG capsule -- 11/5/2024 --    Sig - Route: Take 1 capsule (5 mg) by mouth 2 times daily as needed (for dose changes). - Oral    Patient not taking: Reported on 12/19/2024    Class: Historical    Notes to Pharmacy: TXP DT 9/28/2024 (Liver) TXP Dischg DT 10/9/2024 DX Liver replaced by transplant Z94.4 TX Center [facility] ([place], [place])            Possible Immunosuppression-related side effects:   []             headache  []             vivid dreams  []             irritability  []             cognitive difficuties  []             fine tremor  []             nausea  []             diarrhea  []             neuropathy      []             edema  []             renal calcineurin toxicity  []             hyperkalemia  []             post-transplant diabetes  []             decreased appetite  []             increased appetite  []             other:  []             none    Prescription Medications as of 12/19/2024         Rx Number Disp Refills Start End Last Dispensed Date Next Fill Date Owning Pharmacy    acetaminophen (TYLENOL) 325 MG tablet  60 tablet 0 12/12/2024 --   [address] 7-773    Sig: Take 2 tablets (650 mg) by mouth every 8 hours as needed for mild pain or fever.    Class: E-Prescribe    Route: Oral    amoxicillin-clavulanate (AUGMENTIN) 500-125 MG tablet  28 tablet 0 12/11/2024 12/25/2024   [address] 1-525    Sig: Take 1 tablet by mouth 2 times daily for 14 days.    Class: E-Prescribe    Route: Oral    aspirin (ASA) 81 MG chewable tablet  30 tablet 1 12/2/2024 --   [address] 1-387    Sig: Take 1 tablet (81 mg) by mouth or Feeding Tube daily.    Class: E-Prescribe    Route: Oral or Feeding Tube    escitalopram (LEXAPRO) 20 MG tablet  30  tablet 0 11/11/2024 --   [address] 1-968    Sig: Take 1 tablet (20 mg) by mouth daily.    Class: E-Prescribe    Route: Oral    hydrOXYzine HCl (ATARAX) 25 MG tablet  20 tablet 0 12/16/2024 --   [address] 1-882    Sig: Take 1 tablet (25 mg) by mouth every 8 hours as needed for anxiety or other (adjuvant pain).    Class: E-Prescribe    Route: Oral    magnesium oxide (MAG-OX) 400 MG tablet  120 tablet 1 12/2/2024 --   [address] 1-307    Sig: Take 2 tablets (800 mg) by mouth 2 times daily.    Class: E-Prescribe    Route: Oral    methocarbamol (ROBAXIN) 750 MG tablet  20 tablet 0 12/12/2024 --   [address] 1-157    Sig: Take 1 tablet (750 mg) by mouth 3 times daily as needed for muscle spasms.    Class: E-Prescribe    Route: Oral    multivitamin w/minerals (CERTAVITE/ANTIOXIDANTS) tablet  30 tablet 1 11/1/2024 --   [address] 1-770    Sig: Take 1 tablet by mouth daily.    Class: E-Prescribe    Route: Oral    mycophenolate (GENERIC EQUIVALENT) 250 MG capsule  180 capsule 1 12/2/2024 --   [address] 1-446    Sig: Take 3 capsules (750 mg) by mouth 2 times daily.    Class: E-Prescribe    Notes to Pharmacy: TXP DT 9/28/2024 (Liver) TXP Dischg DT 10/9/2024 DX Liver replaced by transplant Z94.4 TX Center [facility] ([place], [place])    Route: Oral    omeprazole (PRILOSEC) 20 MG DR capsule  30 capsule 2 12/2/2024 --   [address] 1-864    Sig: Take 1 capsule (20 mg) by mouth daily.    Class: E-Prescribe    Route: Oral    PHOSPHORUS  TABLET 250  MG per tablet  -- -- 8/6/2024 --       Sig: Take by mouth.    Class: Historical    Route: Oral    predniSONE (DELTASONE) 5 MG tablet  30 tablet 1 11/1/2024 --   [address] 1-193    Sig: Take 1 tablet (5 mg) by mouth daily.    Class: E-Prescribe    Notes to Pharmacy: TXP DT 9/28/2024 (Liver) TXP Dischg DT 10/9/2024 DX Liver replaced by transplant Z94.4 [facility] ([place], [place])    Route: Oral    sodium polystyrene (KAYEXALATE) powder  -- -- 12/18/2024 --       Sig: PRN for hyperkalemia    Class: Historical    sodium zirconium cyclosilicate (LOKELMA) 10 g PACK packet  3 packet 0 12/16/2024 12/19/2024   [address] [phone]    Sig: Take 1 packet (10 g) by mouth daily for 3 days.    Class: E-Prescribe    Route: Oral    tacrolimus (GENERIC EQUIVALENT) 1 MG capsule  180 capsule 1 12/13/2024 --   [address] 3-491    Sig: Take 3 capsules (3 mg) by mouth every 12 hours.    Class: E-Prescribe    Notes to Pharmacy: TXP DT 9/28/2024 (Liver) TXP Dischg DT 10/9/2024 DX Liver replaced by transplant Z94.4 [facility] ([place], [place])    Route: Oral    traMADol (ULTRAM) 50 MG tablet  20 tablet 0 12/12/2024 --   [address] 4-981    Sig: Take 1 tablet (50 mg) by mouth 2 times daily as needed for severe pain.    Class: E-Prescribe    Route: Oral    traZODone (DESYREL) 50 MG tablet  30 tablet 0 12/2/2024 --   [address] 1-168    Sig: Take 1 tablet (50 mg) by mouth at bedtime.    Class: E-Prescribe    Route: Oral    valGANciclovir (VALCYTE) 450 MG tablet  30 tablet 1 12/2/2024 --   [facility]  [place], [place] - [address] [place] [address] 1-720    Sig: Take 1 tablet (450 mg) by mouth daily.    Class: E-Prescribe    Route: Oral    polyethylene glycol (MIRALAX) 17 GM/Dose powder  510 g 0 10/9/2024 --   [facility] - [place], [place] - [address]    Sig: Take 17 g by mouth 2 times daily as needed for constipation.    Class: E-Prescribe    Route: Oral    tacrolimus (GENERIC EQUIVALENT) 0.5 MG capsule  60 capsule 1 11/11/2024 --   [place], [place] - [address] 1-794    Sig: HOLD    Class: E-Prescribe    Notes to Pharmacy: TXP DT 9/28/2024 (Liver) TXP Dischg DT 10/9/2024 DX Liver replaced by transplant Z94.4 TX [facility] ([place], [place])    tacrolimus (GENERIC EQUIVALENT) 5 MG capsule  -- -- 11/5/2024 --       Sig: Take 1 capsule (5 mg) by mouth 2 times daily as needed (for dose changes).    Class: Historical    Notes to Pharmacy: TXP DT 9/28/2024 (Liver) TXP Dischg DT 10/9/2024 DX Liver replaced by transplant Z94.4 TX [facility] ([place], [place])    Route: Oral            O:   [unfilled]        Latest Ref Rng & Units 12/19/2024     9:36 AM 12/16/2024     9:18 AM 12/12/2024    10:10 AM 12/9/2024    12:58 PM 12/5/2024     8:34 AM   Transplant Immunosuppression Labs   Creat 0.67 - 1.17 mg/dL 1.17  1.30  1.50  1.30  1.37    Urea Nitrogen 6.0 - 20.0 mg/dL 28.6  33.1  31.3  25.8  33.2    WBC 4.0 - 11.0 10e3/uL 5.8  6.4  10.1  10.7  7.6        Chemistries:   Recent Labs   Lab Test 12/19/24  0936   BUN 28.6*   CR 1.17   GFRESTIMATED 82   GLC 89     Lab Results   Component Value Date    A1C 5.4 09/28/2024     Recent Labs   Lab Test 12/19/24  0936   ALBUMIN 4.3   BILITOTAL 0.4   ALKPHOS 131   AST 15   ALT 28     Urine Studies:  Recent Labs   Lab Test 09/27/24  2113   COLOR Dark Yellow*   APPEARANCE Slightly Cloudy*   URINEGLC Negative  "  URINEBILI Large*   URINEKETONE Negative   SG 1.011   UBLD Negative   URINEPH 6.0   PROTEIN Negative   NITRITE Negative   LEUKEST Negative   RBCU 1   WBCU 5     No lab results found.  Hematology:   Recent Labs   Lab Test 12/19/24  0936 12/16/24  0918 12/12/24  1010   HGB 9.0* 9.9* 9.8*    319 357   WBC 5.8 6.4 10.1     Coags:   Recent Labs   Lab Test 10/01/24  0437 09/30/24  0529   INR 1.03 1.14     HLA antibodies:   No results found for: \"DR3KEDJJX\", \"NX2TEDQSWC\", \"VB4WVJSZJ\", \"XK1WCIIPJF\"    Assessment: Jag Smith is doing well s/p Liver Tx:  Issues we addressed during his visit include:    Plan:    1. Graft function: LFTs improving, Cr also improved  2. Immunosuppression Management: No change tac target on lower side 7-10, given wound infection    .  Complexity of management:Low.  Contributing factors:  wound infection  3. Wound care, cont current plan, has follow-up with wound doctor on 1/2/05:    Followup: 1-2 weeks          Fernando Colon MD/PhD      Again, thank you for allowing me to participate in the care of your patient.        Sincerely,        Fernando Colon MD  "

## 2024-12-24 NOTE — TELEPHONE ENCOUNTER
The patient is going back home this week for the holidays and the dressing changes will be completed at the [facility] per the chart review. Will assess the wound at the next appointment to see if the wound VAC is still needed. Discussed with Dr. Garcia that the patient does not need to come to [facility] specifically if there is a wound clinic closer to the patient that can manage the care of the patients wounds. Will send a wound care referral to [facility].     Call placed to the patient to discuss this with him. He is not interested in the wound VAC at this time and would like to keep the 1/2/25 appointment as he is seeing liver doctor the same day. We will reassess the wound at the 1/2 appointment and develop a plan going forward at that time.

## 2024-12-26 ENCOUNTER — TELEPHONE (OUTPATIENT)
Dept: TRANSPLANT | Facility: CLINIC | Age: 39
End: 2024-12-26
Payer: MEDICAID

## 2024-12-26 DIAGNOSIS — E83.42 HYPOMAGNESEMIA: ICD-10-CM

## 2024-12-26 DIAGNOSIS — K21.9 GASTROESOPHAGEAL REFLUX DISEASE, UNSPECIFIED WHETHER ESOPHAGITIS PRESENT: ICD-10-CM

## 2024-12-26 DIAGNOSIS — Z94.4 LIVER REPLACED BY TRANSPLANT (H): ICD-10-CM

## 2024-12-26 DIAGNOSIS — Z78.9 TAKES DIETARY SUPPLEMENTS: ICD-10-CM

## 2024-12-26 DIAGNOSIS — Z94.4 LIVER REPLACED BY TRANSPLANT (H): Primary | ICD-10-CM

## 2024-12-26 RX ORDER — TACROLIMUS 1 MG/1
3 CAPSULE ORAL EVERY 12 HOURS
Qty: 180 CAPSULE | Refills: 11 | Status: SHIPPED | OUTPATIENT
Start: 2024-12-26

## 2024-12-26 RX ORDER — MULTIVITAMIN/IRON/FOLIC ACID 18MG-0.4MG
1 TABLET ORAL DAILY
Qty: 90 TABLET | Refills: 3 | Status: SHIPPED | OUTPATIENT
Start: 2024-12-26

## 2024-12-26 RX ORDER — PREDNISONE 5 MG/1
TABLET ORAL
COMMUNITY
Start: 2024-12-27 | End: 2025-01-10

## 2024-12-26 RX ORDER — ASPIRIN 81 MG/1
81 TABLET, CHEWABLE ORAL DAILY
Qty: 90 TABLET | Refills: 3 | Status: SHIPPED | OUTPATIENT
Start: 2024-12-26

## 2024-12-26 RX ORDER — MYCOPHENOLATE MOFETIL 250 MG/1
750 CAPSULE ORAL 2 TIMES DAILY
Qty: 180 CAPSULE | Refills: 1 | Status: SHIPPED | OUTPATIENT
Start: 2024-12-26

## 2024-12-26 RX ORDER — MAGNESIUM OXIDE 400 MG/1
800 TABLET ORAL 2 TIMES DAILY
Qty: 120 TABLET | Refills: 11 | Status: SHIPPED | OUTPATIENT
Start: 2024-12-26

## 2024-12-26 NOTE — TELEPHONE ENCOUNTER
Please inform patient:    Starting tomorrow, decrease Prednisone to 2.5mg daily x1 week, then 2.5mg every other day x1 week, then stop.    Tomorrow, discontinue Valcyte.    Karena Villegas RN

## 2024-12-26 NOTE — LETTER
OUTPATIENT LABORATORY TEST ORDER  [facility]  Fax: [phone]     Patient Name: Jag Smith   YOB: 1985     [facility] MR# [if applicable]: [id number]   Date & Time: 12/26/24 9:53 AM   Expiration Date: 1 year after date issued    Diagnosis: Liver Transplant (ICD-10 Z94.4)   Aftercare following organ transplant (ICD-10 Z48.288)   Long term use of medications (ICD-10 Z79.899)   Contact with and (suspected) exposure to other viral communicable    diseases (Z20.828)      We ask your assistance in obtaining the following laboratory tests, which are part of our routine surveillance program for Solid Organ Transplant patients.     Please fax each result to [phone], same day as resulted/available    Critical lab results page [phone]    Labs monthly  Phoshatdylethanol (PeTH)     Labs weekly (Monday or Tuesday) at 10:00am  CBC with Platelets   Basic Metabolic Panel   Phosphorous  Magnesium  Hepatic panel   Tacrolimus drug level - 12-hour trough, please document time of last dose     If you have any questions, please call [facility]- [phone] or [phone], Fax- [phone].      Fernando Colon MD/PhD  Professor of Surgery  Chief, Division of Transplantation  Executive Medical Director Solid Organ Transplant Service [facility]

## 2024-12-26 NOTE — TELEPHONE ENCOUNTER
Instructed pt the following:     Please inform patient:     Starting tomorrow, decrease Prednisone to 2.5mg daily x1 week, then 2.5mg every other day x1 week, then stop.     Tomorrow, discontinue Valcyte     Pt and mother was able to repeat instructions.

## 2025-01-02 ENCOUNTER — HOSPITAL ENCOUNTER (OUTPATIENT)
Dept: WOUND CARE | Facility: CLINIC | Age: 40
End: 2025-01-02
Attending: FAMILY MEDICINE
Payer: MEDICAID

## 2025-01-02 ENCOUNTER — TELEPHONE (OUTPATIENT)
Dept: WOUND CARE | Facility: CLINIC | Age: 40
End: 2025-01-02

## 2025-01-02 ENCOUNTER — OFFICE VISIT (OUTPATIENT)
Dept: TRANSPLANT | Facility: CLINIC | Age: 40
End: 2025-01-02
Attending: TRANSPLANT SURGERY
Payer: MEDICAID

## 2025-01-02 VITALS
HEART RATE: 112 BPM | OXYGEN SATURATION: 99 % | DIASTOLIC BLOOD PRESSURE: 81 MMHG | SYSTOLIC BLOOD PRESSURE: 127 MMHG | TEMPERATURE: 98.6 F | WEIGHT: 235.5 LBS | BODY MASS INDEX: 31.94 KG/M2

## 2025-01-02 DIAGNOSIS — L98.492 SKIN ULCER OF ABDOMEN WITH FAT LAYER EXPOSED (H): ICD-10-CM

## 2025-01-02 DIAGNOSIS — T81.89XD NON-HEALING SURGICAL WOUND, SUBSEQUENT ENCOUNTER: Primary | ICD-10-CM

## 2025-01-02 DIAGNOSIS — Z94.4 LIVER REPLACED BY TRANSPLANT (H): ICD-10-CM

## 2025-01-02 PROCEDURE — 99213 OFFICE O/P EST LOW 20 MIN: CPT | Performed by: TRANSPLANT SURGERY

## 2025-01-02 PROCEDURE — G0463 HOSPITAL OUTPT CLINIC VISIT: HCPCS | Performed by: TRANSPLANT SURGERY

## 2025-01-02 ASSESSMENT — PAIN SCALES - GENERAL: PAINLEVEL_OUTOF10: MODERATE PAIN (5)

## 2025-01-02 NOTE — DISCHARGE INSTRUCTIONS
"01/02/2025   Jag Luis   1985    Order placed to [facility] 12/16/24.     Dressing changes outside of clinic [facility]    according to Wound Clinic ORDERS any issues please call Wound Clinic [phone]    Plan 01/02/2025   Continue diet high in protein as tolerated  You do not need to change the dressing on the days you are being seen at the wound clinic    Wound Dressing Change: Right abdomen  - Wash your hands with soap and water and prepare a clean surface for dressings.  - Wash the outside of wound with mild unscented soap and water (such as Cetaphil, Cerave or Dove)  - Primary dressing: Cut varying lengths of plain 1/2\" plain strip gauze lightly moistened with VASHE then gently tuck into each open well to fill the depth of wound and ribbon to fill the wound defect, use Cotton tip applicator and leave a tail outside for easy retrieval  - Secondary dressing: Cover with one 8x10 ABD and secure with Medipore 2\" tape  Change daily and as needed for soiling/saturation    Wound Dressing Change: Left abdomen  - Wash your hands with soap and water and prepare a clean surface for dressings.  - Wash the outside of wound with mild unscented soap and water (such as Cetaphil, Cerave or Dove)  - Primary dressing: Cut varying lengths of plain 1/2\" plain strip gauze lightly moistened with VASHE then gently tuck into each open well to fill the depth of wound and ribbon to fill the wound defect, use Cotton tip applicator and leave a tail outside for easy retrieval  - Secondary dressing: Cover with one 8x10 ABD and secure with Medipore 2\" tape  Change daily and as needed for soiling/saturation       Main Provider: Juan Garcia M.D. January 2, 2025    Call us at [phone] if you have any questions about your wounds, if you have redness or swelling around your wound, have a fever of 101 degrees Fahrenheit or greater or if you have any other problems or concerns. We answer the phone " Monday through Friday 8 am to 4 pm, please leave a message as we check the voicemail frequently throughout the day. If you have a concern over the weekend, please leave a message and we will return your call Monday. If the need is urgent, go to the ER or urgent care.    If you had a positive experience please indicate that on your patient satisfaction survey form that [facility] will be sending you.    It was a pleasure meeting with you today.  Thank you for allowing me and my team the privilege of caring for you today.  YOU are the reason we are here, and I truly hope we provided you with the excellent service you deserve.  Please let us know if there is anything else we can do for you so that we can be sure you are leaving completely satisfied with your care experience.      If you have any billing related questions please call the [facility] Business office at [phone]. The clinic staff does not handle billing related matters.    If you are scheduled to have a follow up appointment, you will receive a reminder call the day before your visit. On the appointment day please arrive 15 minutes prior to your appointment time. If you are unable to keep that appointment, please call the clinic to cancel or reschedule. If you are more than 10 minutes late or greater for your scheduled appointment time, the clinic policy is that you may be asked to reschedule.

## 2025-01-02 NOTE — ADDENDUM NOTE
Encounter addended by: Franchesca Stallings RN on: 1/2/2025 3:38 PM   Actions taken: Order list changed, Diagnosis association updated

## 2025-01-02 NOTE — PROGRESS NOTES
Wound Clinic Note          Visit date: 01/02/2025       Cheif Complaint:     Jag Smith is a 39 year old  male had concerns including WOUND CARE.  He has an abdominal surgical wound.      HISTORY OF PRESENT ILLNESS:    Jag Smith reports the ulcer has been present since September 2024.  The wound began after a recent surgery and the incision did not heal normally.   He had a liver transplant on September 26, 2024, postoperatively a portion of the incision dehisced.      Since his last clinic visit with me he completed taking the antibiotics that I prescribed with no symptoms of an adverse reaction.  Also since his last clinic visit with me he has been going to a wound clinic closer to his home for dressing changes 3 times a week.  Also his mother has become more comfortable doing the packing strips after her last clinic visit here.  So the patient's mother has been changing the packing strips on the other days so that the bandages including the packing is changed every single day.  There has been light serous drainage from the wounds.        The pateint denies fevers or chills.  They report the pain from the wound has been 4/10 and has remained about the same recently.      Today the patient reports maintaining a high protein diet and taking protein supplements regularly.        He does not have diabetes and does not smoke cigarettes.  He takes tacrolimus, mycophenolate and prednisone to prevent rejection.  He does not take any antibiotics for prophylaxis.        The patient has not had any symptoms of infection relating to the wound recently and is not currently on antibiotics.       Problem List:   Past Medical History:   Diagnosis Date    Alcohol use disorder     Alcoholic hepatitis (H) 06/07/2024    Anxiety     Depression     Hypertension     Liver replaced by transplant (H) 09/28/2024              Family Hx: family history includes Alcoholism in his brother; Alzheimer Disease (age of onset:  94) in his maternal grandmother; Anxiety Disorder in his brother; Depression in his brother; Diabetes in his maternal aunt; Substance Abuse in his brother.       Surgical Hx:   Past Surgical History:   Procedure Laterality Date    BENCH LIVER  2024    Procedure: Bench liver;  Surgeon: Fernando Colon MD;  Location: [facility] OR    DACRYOCYSTORHINOSTOMY Left 2013    INCISION AND DRAINAGE BUTTOCKS Left 2017    TRANSPLANT LIVER RECIPIENT  DONOR N/A 2024    Procedure: Transplant liver recipient  donor;  Surgeon: Fernando Colon MD;  Location: [facility] OR          Allergies:    Allergies   Allergen Reactions    Azithromycin Rash              Medication History:    Current Outpatient Medications   Medication Sig Dispense Refill    acetaminophen (TYLENOL) 325 MG tablet Take 2 tablets (650 mg) by mouth every 8 hours as needed for mild pain or fever. 60 tablet 0    aspirin (ASA) 81 MG chewable tablet Take 1 tablet (81 mg) by mouth daily. 90 tablet 3    escitalopram (LEXAPRO) 20 MG tablet Take 1 tablet (20 mg) by mouth daily. 30 tablet 0    hydrOXYzine HCl (ATARAX) 25 MG tablet Take 1 tablet (25 mg) by mouth every 8 hours as needed for anxiety or other (adjuvant pain). 20 tablet 0    magnesium oxide (MAG-OX) 400 MG tablet Take 2 tablets (800 mg) by mouth 2 times daily. 120 tablet 11    methocarbamol (ROBAXIN) 750 MG tablet Take 1 tablet (750 mg) by mouth 3 times daily as needed for muscle spasms. 20 tablet 0    multivitamin w/minerals (CERTAVITE/ANTIOXIDANTS) tablet Take 1 tablet by mouth daily. 90 tablet 3    mycophenolate (GENERIC EQUIVALENT) 250 MG capsule Take 3 capsules (750 mg) by mouth 2 times daily. 180 capsule 1    omeprazole (PRILOSEC) 20 MG DR capsule Take 1 capsule (20 mg) by mouth daily. 90 capsule 1    PHOSPHORUS TABLET 250  MG per tablet Take by mouth.      polyethylene glycol (MIRALAX) 17 GM/Dose powder Take 17 g by mouth 2 times daily as needed for constipation. (Patient not  taking: Reported on 1/2/2025) 510 g 0    predniSONE (DELTASONE) 5 MG tablet 2.5mg daily x1 week, then 2.5mg every other day x1 week, then stop      sodium polystyrene (KAYEXALATE) powder PRN for hyperkalemia      tacrolimus (GENERIC EQUIVALENT) 0.5 MG capsule HOLD (Patient not taking: Reported on 11/21/2024) 60 capsule 1    tacrolimus (GENERIC EQUIVALENT) 1 MG capsule Take 3 capsules (3 mg) by mouth every 12 hours. 180 capsule 11    tacrolimus (GENERIC EQUIVALENT) 5 MG capsule Take 1 capsule (5 mg) by mouth 2 times daily as needed (for dose changes). (Patient not taking: Reported on 11/21/2024)      traMADol (ULTRAM) 50 MG tablet Take 1 tablet (50 mg) by mouth 2 times daily as needed for severe pain. 20 tablet 0    traZODone (DESYREL) 50 MG tablet Take 1 tablet (50 mg) by mouth at bedtime. 30 tablet 0     No current facility-administered medications for this encounter.         Tobacco History:  reports that he has never smoked. He has never used smokeless tobacco.       REVIEW OF SYMPTOMS:   The review of systems was negative except as noted in the HPI.           PHYSICAL EXAMINATION:     There were no vitals taken for this visit.           GENERAL: The patient overall appears well and is no acute distress.   HEAD: normocephalic   EYES: Sclera and conjunctiva clear   NECK: no obvious masses   LUNGS: breathing is unlabored.   EXTREMITIES: No clubbing, cyanosis or edema   SKIN: No rashes or other abnormalities except as noted under the Wound section below.   NEUROLOGICAL: normal motor and sensory function       WOUND/ulcer: The wound appears healthy with no sign of infection.   Wound bed: granulation tissue  Periwound: healthy intact skin  His abdominal wounds are significantly improved today compared with his last clinic visit.  There are still some areas of tunneling but these are all much less compared with his last clinic visit.  Many of the areas where he had tunneling previously have completely healed and with  only straight in depth remaining.      Also see below for wound details:     Circumferential volume measures:             No data to display                Ulceration(s)/Wound(s):   Please see the media tab under the chart review for pictures of the wounds.  Nursing staff removed dressings and cleansed wound.    Wound (used by [facility] only) 11/12/24 1013 abdomen Right surgical (Active)   Thickness/Stage full thickness 01/02/25 1350   Base granulating 01/02/25 1350   Periwound intact 01/02/25 1350   Periwound Temperature warm 01/02/25 1350   Periwound Skin Turgor soft 01/02/25 1350   Edges open 01/02/25 1350   Length (cm) 0.4 01/02/25 1350   Width (cm) 20 01/02/25 1350   Depth (cm) 2 01/02/25 1350   Wound (cm^2) 8 cm^2 01/02/25 1350   Wound Volume (cm^3) 16 cm^3 01/02/25 1350   Wound healing % -257.14 01/02/25 1350   Drainage Characteristics/Odor serosanguineous 01/02/25 1350   Drainage Amount copious 01/02/25 1350   Care, Wound non-select wound debridement performed. 01/02/25 1350       Wound (used by [facility] only) 11/12/24 1014 abdomen Left surgical (Active)   Thickness/Stage full thickness 01/02/25 1350   Base granulating 01/02/25 1350   Periwound intact 01/02/25 1350   Periwound Temperature warm 01/02/25 1350   Periwound Skin Turgor soft 01/02/25 1350   Edges open 01/02/25 1350   Length (cm) 0.3 01/02/25 1350   Width (cm) 3.2 01/02/25 1350   Depth (cm) 1.8 01/02/25 1350   Wound (cm^2) 0.96 cm^2 01/02/25 1350   Wound Volume (cm^3) 1.73 cm^3 01/02/25 1350   Wound healing % 96.35 01/02/25 1350   Undermining [Depth (cm)/Location] 12 o'clock / 1cm 01/02/25 1350   Drainage Characteristics/Odor serosanguineous 01/02/25 1350   Drainage Amount copious 01/02/25 1350   Care, Wound non-select wound debridement performed. 01/02/25 1350               Recent Labs   Lab Test 09/28/24  1207 09/23/24  1716   A1C 5.4 4.7          Recent Labs   Lab Test 11/11/24  0734 11/07/24  0728 11/04/24  0814   ALBUMIN 4.1 4.0 4.1              No  "sharp debridement performed today.                  ASSESSMENT:   This is a 39 year old  male with abdominal surgical incisions.          PLAN:   They will continue to pack the deeper areas with Vashe moistened packing strips followed by an ABD pad changed once a day.     I have encouraged the patient to continue on their high protein diet to aid in wound healing.   The patient will return to the wound clinic in 3 to 4 weeks to see me again.        30 minutes spent on the date of the encounter doing chart review, history and exam, documentation and further activities per the note, this time excludes any procedure time      Juan Garcia MD  01/02/2025   2:43 PM   [facility] Vascular/Wound  [phone]    This note was electronically signed by Juan Garcia MD      Further instructions from your care team         01/02/2025   Jag Smith   1985    Order placed to [facility] 12/16/24.     Dressing changes outside of [facility]    according to Wound Clinic ORDERS any issues please call Wound Clinic [phone]    Plan 01/02/2025   Continue diet high in protein as tolerated  Need to continue Strip gauze into DEPTH of wound to prevent infection pockets    Wound Dressing Change: Right abdomen, and Left abdomen  - Wash your hands with soap and water and prepare a clean surface for dressings.  - Wash the outside of wound with mild unscented soap and water (such as Cetaphil, Cerave or Dove)  - Primary dressing: Cut varying lengths of plain 1/2\" plain strip gauze lightly moistened with VASHE then gently tuck into each open well to fill the depth of wound and ribbon to fill the wound defect, use Cotton tip applicator and leave a tail outside for easy retrieval  - Secondary dressing: Cover with two 8x10 ABD and secure with Medipore 2\" tape  Change daily and as needed for soiling/saturation    You do not need to change the dressing on the days you are being seen at " the wound clinic     Main Provider: Juan Garcia M.D. January 2, 2025    Call us at [phone] if you have any questions about your wounds, if you have redness or swelling around your wound, have a fever of 101 degrees Fahrenheit or greater or if you have any other problems or concerns. We answer the phone Monday through Friday 8 am to 4 pm, please leave a message as we check the voicemail frequently throughout the day. If you have a concern over the weekend, please leave a message and we will return your call Monday. If the need is urgent, go to the ER or urgent care.    If you had a positive experience please indicate that on your patient satisfaction survey form that [facility] will be sending you.    It was a pleasure meeting with you today.  Thank you for allowing me and my team the privilege of caring for you today.  YOU are the reason we are here, and I truly hope we provided you with the excellent service you deserve.  Please let us know if there is anything else we can do for you so that we can be sure you are leaving completely satisfied with your care experience.      If you have any billing related questions please call the [facility] Business office at [phone]. The clinic staff does not handle billing related matters.    If you are scheduled to have a follow up appointment, you will receive a reminder call the day before your visit. On the appointment day please arrive 15 minutes prior to your appointment time. If you are unable to keep that appointment, please call the clinic to cancel or reschedule. If you are more than 10 minutes late or greater for your scheduled appointment time, the clinic policy is that you may be asked to reschedule.         ,

## 2025-01-02 NOTE — TELEPHONE ENCOUNTER
[facility] confirmed they received the 12/16/24 order but were awaiting a physical address instead of PO box as well as notifying us that packing strips not covered by insurance.     Writer spoke with patients mother and gave her information for [employer] to purchase over the counter packing strips. She was agreeable. She gave physical address of [id number] [address].     Writer updated [facility] of plan. They should still ship out absorbant pads and tape to address per order. They were instructed to hold packing strip.

## 2025-01-02 NOTE — ADDENDUM NOTE
Encounter addended by: Delmis Tuttle, RN on: 1/2/2025 2:56 PM   Actions taken: Edited Discharge Instructions, Visit diagnoses modified, Order list changed, Diagnosis association updated

## 2025-01-02 NOTE — LETTER
1/2/2025      Jag Smith  [address]  [facility] [address]      Dear Colleague,    Thank you for referring your patient, Jag Smith, to the [facility]PL[facility]. Please see a copy of my visit note below.    Transplant Surgery Progress Note    Transplants:  9/28/2024 (Liver)  S: overall doing well, wound continues to improve, appetite improving, enjoyed getting back home recently      Transplant History:    Transplant Type:  Liver Tx  Donor Type:     Transplant Date:  9/28/2024 (Liver)   Biliary Stent:  No    Acute Rejection Hx:  No    Present Maintenance Immunosuppression:  Tacrolimus, Mycophenolate mofetil, and Prednisone    CMV IgG Ab Discordance:  No  EBV IgG Ab Discordance:  No    Transplant Coordinator: Karena Villegas     Transplant Office Phone Number: [phone]     Immunosuppressant Medications       Immunosuppressive Agents Disp Start End     mycophenolate (GENERIC EQUIVALENT) 250 MG capsule 180 capsule 12/26/2024 --    Sig - Route: Take 3 capsules (750 mg) by mouth 2 times daily. - Oral    Class: E-Prescribe    Notes to Pharmacy: TXP DT 9/28/2024 (Liver) TXP Dischg DT 10/9/2024 DX Liver replaced by transplant Z94.4 [facility] ([place], [place])     tacrolimus (GENERIC EQUIVALENT) 1 MG capsule 180 capsule 12/26/2024 --    Sig - Route: Take 3 capsules (3 mg) by mouth every 12 hours. - Oral    Class: E-Prescribe    Notes to Pharmacy: TXP DT 9/28/2024 (Liver) TXP Dischg DT 10/9/2024 DX Liver replaced by transplant Z94.4 [facility] ([place], [place])     tacrolimus (GENERIC EQUIVALENT) 0.5 MG capsule 60 capsule 11/11/2024 --    Sig: HOLD    Patient not taking: Reported on 11/21/2024    Class: E-Prescribe    Notes to Pharmacy: TXP DT 9/28/2024 (Liver) TXP Dischg DT 10/9/2024 DX Liver replaced by transplant Z94.4 [facility] ([place], [place])      tacrolimus (GENERIC EQUIVALENT) 5 MG capsule -- 11/5/2024 --    Sig - Route: Take 1 capsule (5 mg) by mouth 2 times daily as needed (for dose changes). - Oral    Patient not taking: Reported on 11/21/2024    Class: Historical    Notes to Pharmacy: TXP DT 9/28/2024 (Liver) TXP Dischg DT 10/9/2024 DX Liver replaced by transplant Z94.4 [facility] ([place], [place])            Possible Immunosuppression-related side effects:   []             headache  []             vivid dreams  []             irritability  []             cognitive difficuties  []             fine tremor  []             nausea  []             diarrhea  []             neuropathy      []             edema  []             renal calcineurin toxicity  []             hyperkalemia  []             post-transplant diabetes  []             decreased appetite  []             increased appetite  []             other:  []             none    Prescription Medications as of 1/6/2025         Rx Number Disp Refills Start End Last Dispensed Date Next Fill Date Owning Pharmacy    acetaminophen (TYLENOL) 325 MG tablet  60 tablet 0 12/12/2024 --   [phone]    Sig: Take 2 tablets (650 mg) by mouth every 8 hours as needed for mild pain or fever.    Class: E-Prescribe    Route: Oral    aspirin (ASA) 81 MG chewable tablet  90 tablet 3 12/26/2024 --   [address]    Sig: Take 1 tablet (81 mg) by mouth daily.    Class: E-Prescribe    Route: Oral    escitalopram (LEXAPRO) 20 MG tablet  30 tablet 0 11/11/2024 --   [address]    Sig: Take 1 tablet (20 mg) by mouth daily.    Class: E-Prescribe    Route: Oral    hydrOXYzine HCl (ATARAX) 25 MG tablet  20 tablet 0 12/16/2024 --   [address]  [place] [address]    Sig: Take 1 tablet (25 mg) by mouth every 8 hours as needed for anxiety or other (adjuvant pain).    Class: E-Prescribe    Route: Oral    magnesium oxide (MAG-OX) 400 MG tablet  120 tablet 11 12/26/2024 --   [address]    Sig: Take 2 tablets (800 mg) by mouth 2 times daily.    Class: E-Prescribe    Route: Oral    methocarbamol (ROBAXIN) 750 MG tablet  20 tablet 0 12/20/2024 --   [facility] [address] [facility] [phone]    Sig: Take 1 tablet (750 mg) by mouth 3 times daily as needed for muscle spasms.    Class: E-Prescribe    Route: Oral    multivitamin w/minerals (CERTAVITE/ANTIOXIDANTS) tablet  90 tablet 3 12/26/2024 --   [address]    Sig: Take 1 tablet by mouth daily.    Class: E-Prescribe    Route: Oral    mycophenolate (GENERIC EQUIVALENT) 250 MG capsule  180 capsule 1 12/26/2024 --   [address]    Sig: Take 3 capsules (750 mg) by mouth 2 times daily.    Class: E-Prescribe    Notes to Pharmacy: TXP DT 9/28/2024 (Liver) TXP Dischg DT 10/9/2024 DX Liver replaced by transplant Z94.4 TX [facility] ([place], [place])    Route: Oral    omeprazole (PRILOSEC) 20 MG DR capsule  90 capsule 1 12/26/2024 --   [address]    Sig: Take 1 capsule (20 mg) by mouth daily.    Class: E-Prescribe    Route: Oral    PHOSPHORUS TABLET 250  MG per tablet  -- -- 8/6/2024 --       Sig: Take by mouth.    Class: Historical    Route: Oral    polyethylene glycol (MIRALAX) 17 GM/Dose powder  510 g 0 10/9/2024 --   [facility] - [place], [place] - [address] [facility] [address]    Sig: Take 17 g by mouth 2 times daily as needed for constipation.    Class: E-Prescribe    Route: Oral    predniSONE (DELTASONE) 5 MG tablet  -- --  2024 1/10/2025       Si.5mg daily x1 week, then 2.5mg every other day x1 week, then stop    Class: Historical    Notes to Pharmacy: TXP DT 2024 (Liver) TXP Dischg DT 10/9/2024 DX Liver replaced by transplant Z94.4 TX [facility] ([place], [place])    sodium polystyrene (KAYEXALATE) powder  -- -- 2024 --       Sig: PRN for hyperkalemia    Class: Historical    tacrolimus (GENERIC EQUIVALENT) 0.5 MG capsule  60 capsule 1 2024 --   [place], [place] - [address] [place] [address] [phone]    Sig: HOLD    Class: E-Prescribe    Notes to Pharmacy: TXP DT 2024 (Liver) TXP Dischg DT 10/9/2024 DX Liver replaced by transplant Z94.4 [facility] ([place], [place])    tacrolimus (GENERIC EQUIVALENT) 1 MG capsule  180 capsule 11 2024 --   [place] [address] [address] [facility]    Sig: Take 3 capsules (3 mg) by mouth every 12 hours.    Class: E-Prescribe    Notes to Pharmacy: TXP DT 2024 (Liver) TXP Dischg DT 10/9/2024 DX Liver replaced by transplant Z94.4 [facility] ([place], [place])    Route: Oral    tacrolimus (GENERIC EQUIVALENT) 5 MG capsule  -- -- 2024 --       Sig: Take 1 capsule (5 mg) by mouth 2 times daily as needed (for dose changes).    Class: Historical    Notes to Pharmacy: TXP DT 2024 (Liver) TXP Dischg DT 10/9/2024 DX Liver replaced by transplant Z94.4 [facility] ([place], [place])    Route: Oral    traMADol (ULTRAM) 50 MG tablet  20 tablet 0 2024 --   [place], [place] - [address] [place] [address] [phone]    Sig: Take 1 tablet (50 mg) by mouth 2 times daily as needed for severe pain.    Class: E-Prescribe    Route: Oral    traZODone (DESYREL) 50 MG tablet  30 tablet 0 2024 --   [facility]  "[place], [place] - [address] [place] [address] 3[address]    Sig: Take 1 tablet (50 mg) by mouth at bedtime.    Class: E-Prescribe    Route: Oral            O:   [unfilled]        Latest Ref Rng & Units 12/19/2024     9:36 AM 12/16/2024     9:18 AM 12/12/2024    10:10 AM 12/9/2024    12:58 PM 12/5/2024     8:34 AM   Transplant Immunosuppression Labs   Creat 0.67 - 1.17 mg/dL 1.17  1.30  1.50  1.30  1.37    Urea Nitrogen 6.0 - 20.0 mg/dL 28.6  33.1  31.3  25.8  33.2    WBC 4.0 - 11.0 10e3/uL 5.8  6.4  10.1  10.7  7.6        Chemistries:   Recent Labs   Lab Test 12/19/24  0936   BUN 28.6*   CR 1.17   GFRESTIMATED 82   GLC 89     Lab Results   Component Value Date    A1C 5.4 09/28/2024     Recent Labs   Lab Test 12/19/24  0936   ALBUMIN 4.3   BILITOTAL 0.4   ALKPHOS 131   AST 15   ALT 28     Urine Studies:  Recent Labs   Lab Test 09/27/24  2113   COLOR Dark Yellow*   APPEARANCE Slightly Cloudy*   URINEGLC Negative   URINEBILI Large*   URINEKETONE Negative   SG 1.011   UBLD Negative   URINEPH 6.0   PROTEIN Negative   NITRITE Negative   LEUKEST Negative   RBCU 1   WBCU 5     No lab results found.  Hematology:   Recent Labs   Lab Test 12/19/24  0936 12/16/24  0918 12/12/24  1010   HGB 9.0* 9.9* 9.8*    319 357   WBC 5.8 6.4 10.1     Coags:   Recent Labs   Lab Test 12/23/24  1147 10/01/24  0437   INR 1.1 1.03     HLA antibodies:   No results found for: \"HN3ZMWJOD\", \"MU8FEDZMIK\", \"NM8XONXUX\", \"PA5CBGXJXK\"    Assessment: Jag Smith is doing fairly well s/p Liver Tx:  Issues we addressed during his visit include:    Plan:    1. Graft function: LFTs normalized  2. Immunosuppression Management: No change tac 8-10    .  Complexity of management:Low.  Contributing factors:  mild renal insufficiency, wound  3. Wound improving, seeing wound care today  Followup: 2-3 weeks            Fernando Colon MD/PhD  Professor of Surgery      Again, thank you for allowing me to participate in the care of your " patient.        Sincerely,        Fernando Colon MD    Electronically signed

## 2025-01-07 ENCOUNTER — MEDICAL CORRESPONDENCE (OUTPATIENT)
Dept: HEALTH INFORMATION MANAGEMENT | Facility: CLINIC | Age: 40
End: 2025-01-07
Payer: MEDICAID

## 2025-01-07 NOTE — PROGRESS NOTES
Transplant Surgery Progress Note    Transplants:  9/28/2024 (Liver)  S: overall doing well, wound continues to improve, appetite improving, enjoyed getting back home recently      Transplant History:    Transplant Type:  Liver Tx  Donor Type:     Transplant Date:  9/28/2024 (Liver)   Biliary Stent:  No    Acute Rejection Hx:  No    Present Maintenance Immunosuppression:  Tacrolimus, Mycophenolate mofetil, and Prednisone    CMV IgG Ab Discordance:  No  EBV IgG Ab Discordance:  No    Transplant Coordinator: Karena Villegas     Transplant Office Phone Number: [phone]     Immunosuppressant Medications       Immunosuppressive Agents Disp Start End     mycophenolate (GENERIC EQUIVALENT) 250 MG capsule 180 capsule 12/26/2024 --    Sig - Route: Take 3 capsules (750 mg) by mouth 2 times daily. - Oral    Class: E-Prescribe    Notes to Pharmacy: TXP DT 9/28/2024 (Liver) TXP Dischg DT 10/9/2024 DX Liver replaced by transplant Z94.4 TX [facility] ([place], [place])     tacrolimus (GENERIC EQUIVALENT) 1 MG capsule 180 capsule 12/26/2024 --    Sig - Route: Take 3 capsules (3 mg) by mouth every 12 hours. - Oral    Class: E-Prescribe    Notes to Pharmacy: TXP DT 9/28/2024 (Liver) TXP Dischg DT 10/9/2024 DX Liver replaced by transplant Z94.4 [facility] ([place], [place])     tacrolimus (GENERIC EQUIVALENT) 0.5 MG capsule 60 capsule 11/11/2024 --    Sig: HOLD    Patient not taking: Reported on 11/21/2024    Class: E-Prescribe    Notes to Pharmacy: TXP DT 9/28/2024 (Liver) TXP Dischg DT 10/9/2024 DX Liver replaced by transplant Z94.4 TX [facility] ([place], [place])     tacrolimus (GENERIC EQUIVALENT) 5 MG capsule -- 11/5/2024 --    Sig - Route: Take 1 capsule (5 mg) by mouth 2 times daily as needed (for dose changes). - Oral    Patient not taking: Reported on 11/21/2024    Class: Historical    Notes  to Pharmacy: TXP DT 9/28/2024 (Liver) TXP Dischg DT 10/9/2024 DX Liver replaced by transplant Z94.4 TX Center [facility] ([place], [place])            Possible Immunosuppression-related side effects:   []             headache  []             vivid dreams  []             irritability  []             cognitive difficuties  []             fine tremor  []             nausea  []             diarrhea  []             neuropathy      []             edema  []             renal calcineurin toxicity  []             hyperkalemia  []             post-transplant diabetes  []             decreased appetite  []             increased appetite  []             other:  []             none    Prescription Medications as of 1/6/2025         Rx Number Disp Refills Start End Last Dispensed Date Next Fill Date Owning Pharmacy    acetaminophen (TYLENOL) 325 MG tablet  60 tablet 0 12/12/2024 --   [phone]    Sig: Take 2 tablets (650 mg) by mouth every 8 hours as needed for mild pain or fever.    Class: E-Prescribe    Route: Oral    aspirin (ASA) 81 MG chewable tablet  90 tablet 3 12/26/2024 --   [address]    Sig: Take 1 tablet (81 mg) by mouth daily.    Class: E-Prescribe    Route: Oral    escitalopram (LEXAPRO) 20 MG tablet  30 tablet 0 11/11/2024 --   [phone]    Sig: Take 1 tablet (20 mg) by mouth daily.    Class: E-Prescribe    Route: Oral    hydrOXYzine HCl (ATARAX) 25 MG tablet  20 tablet 0 12/16/2024 --   [phone]    Sig: Take 1 tablet (25 mg) by mouth every 8 hours as needed for anxiety or other (adjuvant pain).    Class: E-Prescribe    Route: Oral    magnesium oxide (MAG-OX) 400 MG tablet  120 tablet 11 12/26/2024 --   [place]  [address]    Sig: Take 2 tablets (800 mg) by mouth 2 times daily.    Class: E-Prescribe    Route: Oral    methocarbamol (ROBAXIN) 750 MG tablet  20 tablet 0 2024 --   [place], [place] - [address] [facility] [address]    Sig: Take 1 tablet (750 mg) by mouth 3 times daily as needed for muscle spasms.    Class: E-Prescribe    Route: Oral    multivitamin w/minerals (CERTAVITE/ANTIOXIDANTS) tablet  90 tablet 3 2024 --   [address]    Sig: Take 1 tablet by mouth daily.    Class: E-Prescribe    Route: Oral    mycophenolate (GENERIC EQUIVALENT) 250 MG capsule  180 capsule 1 2024 --   [address]    Sig: Take 3 capsules (750 mg) by mouth 2 times daily.    Class: E-Prescribe    Notes to Pharmacy: TXP DT 2024 (Liver) TXP Dischg DT 10/9/2024 DX Liver replaced by transplant Z94.4 TX [facility] ([place], [place])    Route: Oral    omeprazole (PRILOSEC) 20 MG DR capsule  90 capsule 1 2024 --   [address]    Sig: Take 1 capsule (20 mg) by mouth daily.    Class: E-Prescribe    Route: Oral    PHOSPHORUS TABLET 250  MG per tablet  -- -- 2024 --       Sig: Take by mouth.    Class: Historical    Route: Oral    polyethylene glycol (MIRALAX) 17 GM/Dose powder  510 g 0 10/9/2024 --   [facility] - [place], [place] - [address] [facility]    Sig: Take 17 g by mouth 2 times daily as needed for constipation.    Class: E-Prescribe    Route: Oral    predniSONE (DELTASONE) 5 MG tablet  -- -- 2024 1/10/2025       Si.5mg daily x1 week, then 2.5mg every other day x1 week, then stop    Class: Historical    Notes to Pharmacy: TXP DT 2024 (Liver) TXP Dischg DT 10/9/2024 DX Liver replaced by transplant Z94.4 TX  [facility] ([place], [place])    sodium polystyrene (KAYEXALATE) powder  -- -- 12/18/2024 --       Sig: PRN for hyperkalemia    Class: Historical    tacrolimus (GENERIC EQUIVALENT) 0.5 MG capsule  60 capsule 1 11/11/2024 --   [place], [place] - [address] [facility] [phone]    Sig: HOLD    Class: E-Prescribe    Notes to Pharmacy: TXP DT 9/28/2024 (Liver) TXP Dischg DT 10/9/2024 DX Liver replaced by transplant Z94.4 [facility] ([place], [place])    tacrolimus (GENERIC EQUIVALENT) 1 MG capsule  180 capsule 11 12/26/2024 --   [facility] - [place] [address] [address]    Sig: Take 3 capsules (3 mg) by mouth every 12 hours.    Class: E-Prescribe    Notes to Pharmacy: TXP DT 9/28/2024 (Liver) TXP Dischg DT 10/9/2024 DX Liver replaced by transplant Z94.4 TX [facility] ([place], [place])    Route: Oral    tacrolimus (GENERIC EQUIVALENT) 5 MG capsule  -- -- 11/5/2024 --       Sig: Take 1 capsule (5 mg) by mouth 2 times daily as needed (for dose changes).    Class: Historical    Notes to Pharmacy: TXP DT 9/28/2024 (Liver) TXP Dischg DT 10/9/2024 DX Liver replaced by transplant Z94.4 TX [facility] ([place], [place])    Route: Oral    traMADol (ULTRAM) 50 MG tablet  20 tablet 0 12/12/2024 --   [place], [place] -  [facility] [phone]    Sig: Take 1 tablet (50 mg) by mouth 2 times daily as needed for severe pain.    Class: E-Prescribe    Route: Oral    traZODone (DESYREL) 50 MG tablet  30 tablet 0 12/2/2024 --   [place], [place] - [address] [facility] [address]    Sig: Take 1 tablet (50 mg) by mouth at bedtime.    Class: E-Prescribe    Route: Oral            O:   [unfilled]        Latest Ref Rng & Units 12/19/2024     9:36  "AM 12/16/2024     9:18 AM 12/12/2024    10:10 AM 12/9/2024    12:58 PM 12/5/2024     8:34 AM   Transplant Immunosuppression Labs   Creat 0.67 - 1.17 mg/dL 1.17  1.30  1.50  1.30  1.37    Urea Nitrogen 6.0 - 20.0 mg/dL 28.6  33.1  31.3  25.8  33.2    WBC 4.0 - 11.0 10e3/uL 5.8  6.4  10.1  10.7  7.6        Chemistries:   Recent Labs   Lab Test 12/19/24  0936   BUN 28.6*   CR 1.17   GFRESTIMATED 82   GLC 89     Lab Results   Component Value Date    A1C 5.4 09/28/2024     Recent Labs   Lab Test 12/19/24  0936   ALBUMIN 4.3   BILITOTAL 0.4   ALKPHOS 131   AST 15   ALT 28     Urine Studies:  Recent Labs   Lab Test 09/27/24  2113   COLOR Dark Yellow*   APPEARANCE Slightly Cloudy*   URINEGLC Negative   URINEBILI Large*   URINEKETONE Negative   SG 1.011   UBLD Negative   URINEPH 6.0   PROTEIN Negative   NITRITE Negative   LEUKEST Negative   RBCU 1   WBCU 5     No lab results found.  Hematology:   Recent Labs   Lab Test 12/19/24  0936 12/16/24  0918 12/12/24  1010   HGB 9.0* 9.9* 9.8*    319 357   WBC 5.8 6.4 10.1     Coags:   Recent Labs   Lab Test 12/23/24  1147 10/01/24  0437   INR 1.1 1.03     HLA antibodies:   No results found for: \"GV3ICTJWH\", \"BQ2LFVCFAB\", \"EW8IPZDZO\", \"TK0SNDPQDM\"    Assessment: Jag Smith is doing fairly well s/p Liver Tx:  Issues we addressed during his visit include:    Plan:    1. Graft function: LFTs normalized  2. Immunosuppression Management: No change tac 8-10    .  Complexity of management:Low.  Contributing factors:  mild renal insufficiency, wound  3. Wound improving, seeing wound care today  Followup: 2-3 weeks            Fernando Colon MD/PhD  Professor of Surgery    "

## 2025-01-08 ENCOUNTER — TELEPHONE (OUTPATIENT)
Dept: TRANSPLANT | Facility: CLINIC | Age: 40
End: 2025-01-08
Payer: MEDICAID

## 2025-01-08 ENCOUNTER — VIRTUAL VISIT (OUTPATIENT)
Dept: PHARMACY | Facility: CLINIC | Age: 40
End: 2025-01-08
Payer: MEDICAID

## 2025-01-08 DIAGNOSIS — R52 PAIN: ICD-10-CM

## 2025-01-08 DIAGNOSIS — Z78.9 TAKES DIETARY SUPPLEMENTS: ICD-10-CM

## 2025-01-08 DIAGNOSIS — G47.00 INSOMNIA, UNSPECIFIED TYPE: ICD-10-CM

## 2025-01-08 DIAGNOSIS — Z94.4 LIVER REPLACED BY TRANSPLANT (H): Primary | ICD-10-CM

## 2025-01-08 DIAGNOSIS — K21.9 GASTROESOPHAGEAL REFLUX DISEASE, UNSPECIFIED WHETHER ESOPHAGITIS PRESENT: ICD-10-CM

## 2025-01-08 DIAGNOSIS — F32.A DEPRESSION, UNSPECIFIED DEPRESSION TYPE: ICD-10-CM

## 2025-01-08 PROCEDURE — 99606 MTMS BY PHARM EST 15 MIN: CPT | Mod: 93 | Performed by: PHARMACIST

## 2025-01-08 PROCEDURE — 99607 MTMS BY PHARM ADDL 15 MIN: CPT | Mod: 93 | Performed by: PHARMACIST

## 2025-01-08 NOTE — PROGRESS NOTES
"Medication Therapy Management (MTM) Encounter    ASSESSMENT:                            Medication Adherence/Access: No issues identified.    Liver Transplant:    Patient's core and surface antibodies for hepatitis did not result pre-transplant, we will check these again at next labs to see if Hepatitis B series is indicated. Discussed long term Vaccines as well, >6 months post txp. Patient has not received Pentamidine since October, and is taking no PJP prophylaxis, will check with team if this is intentional.      Supplements:   Stable.     GERD    Patient can taper off omeprazole if tolerated.     Mental Health   Depression  Patient's mood has been \"dragging\" per patient. He can start Escitalopram, although there is an interaction with tramadol (serotonin syndrome) the risk is low. Would recommend using a lower dose, 10mg daily, 1/2 tablet of the 20mg daily because of this. Monitor for serotonin syndrome symptoms.      Pain:   Stable.     Insomnia   Stable.     PLAN:                          Txp team...  FYI patient is not on any PJP prophylaxis. Would you like patient on Bactrim for 3 months?    Pt to...  Take Omeprazole 20mg every other day for 4 weeks, then stop.   Can try Escitalopram 10mg (1/2 tablet) daily. Monitor for tremors, mental status changes, fevers, seizures etc, signs of serotonin syndrome.  6 months post transplant due for Shingrix (2 doses 8 weeks apart).  Hepatitis B labs on 1/21.     Follow-up: as needed    SUBJECTIVE/OBJECTIVE:                          Jag Smith is a 39 year old male seen for a follow-up visit.       Reason for visit: 3 months post transplant.    Allergies/ADRs: Reviewed in chart  Past Medical History: Reviewed in chart  Tobacco: He reports that he has never smoked. He has never used smokeless tobacco.  Alcohol: not currently using    Medication Adherence/Access: no issues reported.    Liver Transplant:    Tacrolimus 3 mg twice daily  Mycophenolate Mofetil 750 mg twice " daily  Prednisone 2.5 mg every other day x 1 week, then will stop.  Pt reports no side effects  Transplant date: 9/28/24  Vascular Prophylaxis: Aspirin 81mg daily. Denies gastrointestinal bleed   CMV prophylaxis: completed.   PJP prophylaxis: none, was on Inhaled Pentamidine 300mg once.   Tx Coordinator: Kateryna Blank MD: Dr. Colon, Dr. Leventhal, Using Med Card: Yes  Immunizations: annual flu shot unknown; [id number]:  NA; Prevnar 20: 2024; TDaP:  2019; Shingrix: unknown, HBV: unknown, COVID: Primary x 3 and Bivalent x 1    Estimated Creatinine Clearance: 109.2 mL/min (based on SCr of 1.17 mg/dL).     Supplements:   Mag Oxide 800mg twice daily  MVI daily.   Lab Results   Component Value Date    MAG 1.5 (L) 12/19/2024     GERD    Omeprazole 20 mg once daily  Patient reports no current symptoms.   Patient feels that current regimen is effective.     Mental Health   Depression  Escitalopram 20mg once daily- has not been taking due to interaction with Tramadol.   Patient reports no current medication side effects.  Current symptoms include: feels a little worse than usual. .     Pain:   Methocarbamol 750mg prn- 2-3 times daily  Acetaminophen 650mg 3 times daily.  Tramadol 50mg once to twice daily  Hydroxyzine 25mg twice daily 3 times daily.   Incision is still open per patient. Packing the wound once daily.     Insomnia   trazodone 50 mg at bedtime   Patient reports trouble falling asleep, sleeps from 10-11pm- 7am. Wakes up several times overnight.     Today's Vitals: There were no vitals taken for this visit.  ----------------    I spent 20 minutes with this patient today (an extra 15 minutes was spent creating the Medication Action Plan). All changes were made via collaborative practice agreement with Dr. leventhal .     A summary of these recommendations was sent via [employer].    Issa Hubbard, PharmD  MTM Pharmacist    Phone: [phone]     Telemedicine Visit Details  The patient's medications can be safely  assessed via a telemedicine encounter.  Type of service:  Telephone visit  Originating Location (pt. Location): Home    Distant Location (provider location):  Off-site  Start Time: 9:49 AM  End Time: 10:09 AM     Medication Therapy Recommendations  Depression, unspecified depression type   1 Rationale: Untreated condition - Needs additional medication therapy - Indication   Recommendation: Start Medication - escitalopram 10 MG tablet   Status: Accepted per CPA   Identified Date: 1/8/2025 Completed Date: 1/8/2025         Gastroesophageal reflux disease, unspecified whether esophagitis present   1 Current Medication: omeprazole (PRILOSEC) 20 MG DR capsule   Current Medication Sig: Take 1 capsule (20 mg) by mouth daily.   Rationale: No medical indication at this time - Unnecessary medication therapy - Indication   Recommendation: Discontinue Medication   Status: Accepted per CPA   Identified Date: 1/8/2025 Completed Date: 1/8/2025         Liver replaced by transplant (H)   1 Rationale: Untreated condition - Needs additional medication therapy - Indication   Recommendation: Start Medication - Bactrim 400-80 MG Tabs   Status: Contact Provider - Awaiting Response   Identified Date: 1/8/2025         2 Rationale: Preventive therapy - Needs additional medication therapy - Indication   Recommendation: Order Vaccine - Shingrix 50 MCG/0.5ML Susr   Status: Accepted - no CPA Needed   Identified Date: 1/8/2025 Completed Date: 1/8/2025

## 2025-01-08 NOTE — PATIENT INSTRUCTIONS
"Recommendations from today's MTM visit:                                                      Take Omeprazole 20mg every other day for 4 weeks, then stop.   Can try Escitalopram 10mg (1/2 tablet) daily. Monitor for tremors, mental status changes, fevers, seizures etc, signs of serotonin syndrome.  6 months post transplant due for Shingrix (2 doses 8 weeks apart).  Hepatitis B labs on 1/21.     Follow-up: as needed     It was great speaking with you today.  I value your experience and would be very thankful for your time in providing feedback in our clinic survey. In the next few days, you may receive an email or text message from [employer] with a link to a survey related to your  clinical pharmacist.\"     To schedule another MTM appointment, please call the clinic directly or you may call the MTM scheduling line at [phone] or toll-free at [phone].     My Clinical Pharmacist's contact information:                                                      Please feel free to contact me with any questions or concerns you have.      Issa Hubbard, PharmD  MTM Pharmacist    Phone: [phone]     "

## 2025-01-08 NOTE — TELEPHONE ENCOUNTER
Call from Mom- she talked with Astrid Stallings from [facility]- she is working on getting re-imbruement from her expenses re parking.here - From time of discharge to when he went home- discharge 10/9/24 she would like copies of his clinic  visits (AVS) - Can call Mom to review what she needs- paid for parking for ab visits and Dr Salas- her email address is re [email or web address]

## 2025-01-09 NOTE — TELEPHONE ENCOUNTER
I called Sania to inquire further. Docusign sent to Jag for this writer to release appointment information to [facility]. This writer sent encrypted email to email provided re: patient's appointment with ALTHEA attached. All appointment were sent up until 1/9/2025, and started from his admission for transplant.

## 2025-01-14 ENCOUNTER — TELEPHONE (OUTPATIENT)
Dept: TRANSPLANT | Facility: CLINIC | Age: 40
End: 2025-01-14
Payer: MEDICAID

## 2025-01-14 DIAGNOSIS — Z94.4 LIVER REPLACED BY TRANSPLANT (H): ICD-10-CM

## 2025-01-14 DIAGNOSIS — K70.10 ALCOHOLIC HEPATITIS (H): ICD-10-CM

## 2025-01-14 RX ORDER — DIPHENHYDRAMINE HYDROCHLORIDE 50 MG/ML
50 INJECTION INTRAMUSCULAR; INTRAVENOUS
Start: 2025-01-21

## 2025-01-14 RX ORDER — EPINEPHRINE 1 MG/ML
0.3 INJECTION, SOLUTION, CONCENTRATE INTRAVENOUS EVERY 5 MIN PRN
OUTPATIENT
Start: 2025-01-21

## 2025-01-14 RX ORDER — ALBUTEROL SULFATE 90 UG/1
1-2 INHALANT RESPIRATORY (INHALATION)
Start: 2025-01-21

## 2025-01-14 RX ORDER — ALBUTEROL SULFATE 0.83 MG/ML
2.5 SOLUTION RESPIRATORY (INHALATION)
OUTPATIENT
Start: 2025-01-21

## 2025-01-14 RX ORDER — ALBUTEROL SULFATE 0.83 MG/ML
2.5 SOLUTION RESPIRATORY (INHALATION) ONCE
Start: 2025-01-21 | End: 2025-01-21

## 2025-01-14 RX ORDER — MEPERIDINE HYDROCHLORIDE 25 MG/ML
25 INJECTION INTRAMUSCULAR; INTRAVENOUS; SUBCUTANEOUS
OUTPATIENT
Start: 2025-01-21

## 2025-01-14 RX ORDER — PENTAMIDINE ISETHIONATE 300 MG/300MG
300 INHALANT RESPIRATORY (INHALATION) ONCE
Start: 2025-01-21 | End: 2025-01-21

## 2025-01-14 RX ORDER — METHYLPREDNISOLONE SODIUM SUCCINATE 40 MG/ML
40 INJECTION INTRAMUSCULAR; INTRAVENOUS
Start: 2025-01-21

## 2025-01-14 RX ORDER — DIPHENHYDRAMINE HYDROCHLORIDE 50 MG/ML
25 INJECTION INTRAMUSCULAR; INTRAVENOUS
Start: 2025-01-21

## 2025-01-14 NOTE — TELEPHONE ENCOUNTER
Dr. Colon recommends pentamadine nebs for PJP prophylaxis due to hyperkalemia. Orders entered.     ISSUE:   Tacrolimus IR level 11 on 01/13/25, goal 7-10, dose 3 mg BID.    PLAN:   Call Patient and confirm this was an accurate 12-hour trough.   Verify Tacrolimus IR dose 3 mg BID.   Confirm no new medications or illness.   Confirm no missed doses.     If accurate trough and accurate dose, decrease Tacrolimus IR dose to 2 mg BID.    Repeat labs in 1 weeks.     Karena Villegas RN     OUTCOME:   Spoke with Patient, they confirm accurate trough level and current dose 3 mg BID.   Patient confirmed dose change to 2 mg BID.  Patient agreed to repeat labs in 1 weeks.   Orders sent to preferred pharmacy for dose change and lab for repeat labs.   Patient voiced understanding of plan.     Magda Gant LPN

## 2025-01-15 RX ORDER — TACROLIMUS 1 MG/1
2 CAPSULE ORAL EVERY 12 HOURS
Qty: 120 CAPSULE | Refills: 11 | Status: SHIPPED | OUTPATIENT
Start: 2025-01-15

## 2025-01-15 NOTE — TELEPHONE ENCOUNTER
Informed pt the need to receive pentamidine neb treatments. Writer scheduled pt on 1/21 @ noon, 2/6 @ 11am. Pt is aware.

## 2025-01-21 ENCOUNTER — LAB (OUTPATIENT)
Dept: LAB | Facility: CLINIC | Age: 40
End: 2025-01-21
Attending: TRANSPLANT SURGERY
Payer: MEDICAID

## 2025-01-21 ENCOUNTER — OFFICE VISIT (OUTPATIENT)
Dept: TRANSPLANT | Facility: CLINIC | Age: 40
End: 2025-01-21
Attending: TRANSPLANT SURGERY
Payer: MEDICAID

## 2025-01-21 VITALS
RESPIRATION RATE: 16 BRPM | HEART RATE: 83 BPM | SYSTOLIC BLOOD PRESSURE: 127 MMHG | TEMPERATURE: 98.2 F | OXYGEN SATURATION: 100 % | DIASTOLIC BLOOD PRESSURE: 86 MMHG | BODY MASS INDEX: 32.64 KG/M2 | WEIGHT: 240.7 LBS

## 2025-01-21 DIAGNOSIS — Z94.4 LIVER REPLACED BY TRANSPLANT (H): ICD-10-CM

## 2025-01-21 DIAGNOSIS — Z94.4 LIVER TRANSPLANTED (H): Primary | ICD-10-CM

## 2025-01-21 LAB
ALBUMIN SERPL BCG-MCNC: 4.6 G/DL (ref 3.5–5.2)
ALP SERPL-CCNC: 166 U/L (ref 40–150)
ALT SERPL W P-5'-P-CCNC: 20 U/L (ref 0–70)
AST SERPL W P-5'-P-CCNC: 20 U/L (ref 0–45)
BILIRUB DIRECT SERPL-MCNC: <0.2 MG/DL (ref 0–0.3)
BILIRUB SERPL-MCNC: 0.2 MG/DL
ERYTHROCYTE [DISTWIDTH] IN BLOOD BY AUTOMATED COUNT: 15.2 % (ref 10–15)
HCT VFR BLD AUTO: 35 % (ref 40–53)
HGB BLD-MCNC: 11.2 G/DL (ref 13.3–17.7)
MCH RBC QN AUTO: 29.6 PG (ref 26.5–33)
MCHC RBC AUTO-ENTMCNC: 32 G/DL (ref 31.5–36.5)
MCV RBC AUTO: 92 FL (ref 78–100)
PLATELET # BLD AUTO: 274 10E3/UL (ref 150–450)
PROT SERPL-MCNC: 7.4 G/DL (ref 6.4–8.3)
RBC # BLD AUTO: 3.79 10E6/UL (ref 4.4–5.9)
TACROLIMUS BLD-MCNC: 7.1 UG/L (ref 5–15)
TME LAST DOSE: NORMAL H
TME LAST DOSE: NORMAL H
WBC # BLD AUTO: 6.7 10E3/UL (ref 4–11)

## 2025-01-21 PROCEDURE — 99000 SPECIMEN HANDLING OFFICE-LAB: CPT | Performed by: PATHOLOGY

## 2025-01-21 PROCEDURE — 36415 COLL VENOUS BLD VENIPUNCTURE: CPT | Performed by: PATHOLOGY

## 2025-01-21 PROCEDURE — 94642 AEROSOL INHALATION TREATMENT: CPT | Performed by: INTERNAL MEDICINE

## 2025-01-21 PROCEDURE — 85027 COMPLETE CBC AUTOMATED: CPT | Performed by: PATHOLOGY

## 2025-01-21 PROCEDURE — 94640 AIRWAY INHALATION TREATMENT: CPT | Performed by: INTERNAL MEDICINE

## 2025-01-21 PROCEDURE — G0463 HOSPITAL OUTPT CLINIC VISIT: HCPCS | Performed by: NURSE PRACTITIONER

## 2025-01-21 PROCEDURE — 80197 ASSAY OF TACROLIMUS: CPT | Performed by: INTERNAL MEDICINE

## 2025-01-21 PROCEDURE — 80076 HEPATIC FUNCTION PANEL: CPT | Performed by: PATHOLOGY

## 2025-01-21 RX ORDER — MEPERIDINE HYDROCHLORIDE 25 MG/ML
25 INJECTION INTRAMUSCULAR; INTRAVENOUS; SUBCUTANEOUS
OUTPATIENT
Start: 2025-02-18

## 2025-01-21 RX ORDER — METHYLPREDNISOLONE SODIUM SUCCINATE 40 MG/ML
40 INJECTION INTRAMUSCULAR; INTRAVENOUS
Start: 2025-02-18

## 2025-01-21 RX ORDER — PENTAMIDINE ISETHIONATE 300 MG/300MG
300 INHALANT RESPIRATORY (INHALATION) ONCE
Status: COMPLETED | OUTPATIENT
Start: 2025-01-21 | End: 2025-01-21

## 2025-01-21 RX ORDER — DIPHENHYDRAMINE HYDROCHLORIDE 50 MG/ML
25 INJECTION INTRAMUSCULAR; INTRAVENOUS
Start: 2025-02-18

## 2025-01-21 RX ORDER — ALBUTEROL SULFATE 0.83 MG/ML
2.5 SOLUTION RESPIRATORY (INHALATION) ONCE
Start: 2025-02-18 | End: 2025-02-18

## 2025-01-21 RX ORDER — ALBUTEROL SULFATE 0.83 MG/ML
2.5 SOLUTION RESPIRATORY (INHALATION) ONCE
Status: COMPLETED | OUTPATIENT
Start: 2025-01-21 | End: 2025-01-21

## 2025-01-21 RX ORDER — PENTAMIDINE ISETHIONATE 300 MG/300MG
300 INHALANT RESPIRATORY (INHALATION) ONCE
Start: 2025-02-18 | End: 2025-02-18

## 2025-01-21 RX ORDER — EPINEPHRINE 1 MG/ML
0.3 INJECTION, SOLUTION, CONCENTRATE INTRAVENOUS EVERY 5 MIN PRN
OUTPATIENT
Start: 2025-02-18

## 2025-01-21 RX ORDER — ALBUTEROL SULFATE 0.83 MG/ML
2.5 SOLUTION RESPIRATORY (INHALATION)
OUTPATIENT
Start: 2025-02-18

## 2025-01-21 RX ORDER — DIPHENHYDRAMINE HYDROCHLORIDE 50 MG/ML
50 INJECTION INTRAMUSCULAR; INTRAVENOUS
Start: 2025-02-18

## 2025-01-21 RX ORDER — ALBUTEROL SULFATE 90 UG/1
1-2 INHALANT RESPIRATORY (INHALATION)
Start: 2025-02-18

## 2025-01-21 RX ADMIN — PENTAMIDINE ISETHIONATE 300 MG: 300 INHALANT RESPIRATORY (INHALATION) at 11:27

## 2025-01-21 RX ADMIN — ALBUTEROL SULFATE 2.5 MG: 0.83 SOLUTION RESPIRATORY (INHALATION) at 11:26

## 2025-01-21 NOTE — PROGRESS NOTES
Transplant Surgery -OUTPATIENT IMMUNOSUPPRESSION PROGRESS NOTE    Date of Visit: 01/21/2025    Transplants:  9/28/2024 (Liver); Postoperative day:  115  ASSESMENT AND PLAN:  1.Graft Function:Alk phos up 113 -> 166 Trend for now. Dr. Colon aware.   2.Immunosuppression Management: No changes. On  and tac.   3.Hypertension:stable   4.Renal Function: Cr stable at 1.5   5.Lab frequency:as scheduled  6.Other:bactrim switched to pentamidine nebs due to hyperkalemia.   Incisional wound: healing well. Packing daily.  Seeing wound clinic tomorrow.     Date: January 21, 2025    Transplant:  [x]                             Liver [x]                              Kidney []                             Pancreas []                              Other:             Chief Complaint:Follow Up (Liver transplant follow-up)    History of Present Illness:  Doing well. Minimal pain.   Appetite is normal. Notes some weight gain but feels this is from eating more.    No edema.   No N/V/D.   Seeing wound clinic tomorrow. No fevers. Still packing wounds but getting smaller.         Patient Active Problem List   Diagnosis    Alcohol use disorder, severe, in early remission (H)    Alcoholic hepatitis (H)    Epiphora due to insufficient drainage of left side    Hyperbilirubinemia    Hypokalemia    Jaundice    Pneumonia    Liver transplanted (H)    DENI (acute kidney injury)    Immunosuppressed status    Acute post-operative pain    Steroid-induced hyperglycemia    Acute urinary retention    Anemia due to blood loss, acute    Severe malnutrition    Hyponatremia    Hypomagnesemia    Bilateral lower extremity edema    Hyperkalemia    Subconjunctival hemorrhage    Nonhealing surgical wound    Skin ulcer of abdomen with fat layer exposed (H)    Elevated serum creatinine     SOCIAL /FAMILY HISTORY: [x]                  No recent change    Past Medical History:   Diagnosis Date    Alcohol use disorder     Alcoholic hepatitis (H) 06/07/2024    Anxiety      Depression     Hypertension     Liver replaced by transplant (H) 2024     Past Surgical History:   Procedure Laterality Date    BENCH LIVER  2024    Procedure: Bench liver;  Surgeon: Fernando Colon MD;  Location: [facility] OR    DACRYOCYSTORHINOSTOMY Left 2013    INCISION AND DRAINAGE BUTTOCKS Left 2017    TRANSPLANT LIVER RECIPIENT  DONOR N/A 2024    Procedure: Transplant liver recipient  donor;  Surgeon: Fernando Colon MD;  Location: [facility] OR     Social History     Socioeconomic History    Marital status: Single     Spouse name: Not on file    Number of children: Not on file    Years of education: Not on file    Highest education level: Not on file   Occupational History    Not on file   Tobacco Use    Smoking status: Never    Smokeless tobacco: Never   Vaping Use    Vaping status: Never Used   Substance and Sexual Activity    Alcohol use: Not Currently     Comment: last drink 2024    Drug use: Not Currently     Types: Marijuana     Comment: smoking a long time ago    Sexual activity: Not on file   Other Topics Concern    Not on file   Social History Narrative    Not on file     Social Drivers of Health     Financial Resource Strain: Low Risk  (2024)    Financial Resource Strain     Within the past 12 months, have you or your family members you live with been unable to get utilities (heat, electricity) when it was really needed?: No   Food Insecurity: Low Risk  (2024)    Food Insecurity     Within the past 12 months, did you worry that your food would run out before you got money to buy more?: No     Within the past 12 months, did the food you bought just not last and you didn t have money to get more?: No   Transportation Needs: Low Risk  (2024)    Transportation Needs     Within the past 12 months, has lack of transportation kept you from medical appointments, getting your medicines, non-medical meetings or appointments, work, or from getting things  that you need?: No   Physical Activity: Not on file   Stress: Not on file   Social Connections: Not on file   Interpersonal Safety: Low Risk  (1/2/2025)    Interpersonal Safety     Do you feel physically and emotionally safe where you currently live?: Yes     Within the past 12 months, have you been hit, slapped, kicked or otherwise physically hurt by someone?: No     Within the past 12 months, have you been humiliated or emotionally abused in other ways by your partner or ex-partner?: No   Housing Stability: Low Risk  (9/21/2024)    Housing Stability     Do you have housing? : Yes     Are you worried about losing your housing?: No     Prescription Medications as of 1/21/2025         Rx Number Disp Refills Start End Last Dispensed Date Next Fill Date Owning Pharmacy    acetaminophen (TYLENOL) 325 MG tablet  60 tablet 0 12/12/2024 --   [address]    Sig: Take 2 tablets (650 mg) by mouth every 8 hours as needed for mild pain or fever.    Class: E-Prescribe    Route: Oral    aspirin (ASA) 81 MG chewable tablet  90 tablet 3 12/26/2024 --   [place] [address] [facility]    Sig: Take 1 tablet (81 mg) by mouth daily.    Class: E-Prescribe    Route: Oral    hydrOXYzine HCl (ATARAX) 25 MG tablet  20 tablet 0 12/16/2024 --   [address]    Sig: Take 1 tablet (25 mg) by mouth every 8 hours as needed for anxiety or other (adjuvant pain).    Class: E-Prescribe    Route: Oral    magnesium oxide (MAG-OX) 400 MG tablet  120 tablet 11 12/26/2024 --   [place] [address] [address]     Sig: Take 2 tablets (800 mg) by mouth 2 times daily.    Class: E-Prescribe    Route: Oral    methocarbamol (ROBAXIN) 750 MG tablet  20 tablet 0 12/20/2024 --   [address]    Sig:  Take 1 tablet (750 mg) by mouth 3 times daily as needed for muscle spasms.    Class: E-Prescribe    Route: Oral    multivitamin w/minerals (CERTAVITE/ANTIOXIDANTS) tablet  90 tablet 3 12/26/2024 --   [facility] [address] [address]    Sig: Take 1 tablet by mouth daily.    Class: E-Prescribe    Route: Oral    mycophenolate (GENERIC EQUIVALENT) 250 MG capsule  180 capsule 1 12/26/2024 --   [address]    Sig: Take 3 capsules (750 mg) by mouth 2 times daily.    Class: E-Prescribe    Notes to Pharmacy: TXP DT 9/28/2024 (Liver) TXP Dischg DT 10/9/2024 DX Liver replaced by transplant Z94.4 [facility] ([place], [place])    Route: Oral    omeprazole (PRILOSEC) 20 MG DR capsule  90 capsule 1 12/26/2024 --   [address]    Sig: Take 1 capsule (20 mg) by mouth daily.    Class: E-Prescribe    Route: Oral    tacrolimus (GENERIC EQUIVALENT) 1 MG capsule  120 capsule 11 1/15/2025 --   [address]    Sig: Take 2 capsules (2 mg) by mouth every 12 hours.    Class: E-Prescribe    Notes to Pharmacy: TXP DT 9/28/2024 (Liver) TXP Dischg DT 10/9/2024 DX Liver replaced by transplant Z94.4 [facility] ([place], [place])    Route: Oral    tacrolimus (GENERIC EQUIVALENT) 5 MG capsule  -- -- 11/5/2024 --       Sig: Take 5 mg by mouth 2 times daily as needed (for dose changes).    Class: Historical    Notes to Pharmacy: TXP DT 9/28/2024 (Liver) TXP Dischg DT 10/9/2024 DX Liver replaced by transplant Z94.4 [facility] ([place], [place])    Route: Oral    traMADol (ULTRAM) 50 MG tablet  20 tablet 0 12/12/2024 --   [place], [place] - [address] [place] [address] [address]    Sig: Take 1 tablet (50 mg) by mouth  2 times daily as needed for severe pain.    Class: E-Prescribe    Route: Oral    traZODone (DESYREL) 50 MG tablet  30 tablet 0 12/2/2024 --   [address]    Sig: Take 1 tablet (50 mg) by mouth at bedtime.    Class: E-Prescribe    Route: Oral    escitalopram (LEXAPRO) 20 MG tablet  30 tablet 0 11/11/2024 --   [address]    Sig: Take 1 tablet (20 mg) by mouth daily.    Class: E-Prescribe    Route: Oral    polyethylene glycol (MIRALAX) 17 GM/Dose powder  510 g 0 10/9/2024 --   [place], [place] - [address] [place] [address]    Sig: Take 17 g by mouth 2 times daily as needed for constipation.    Class: E-Prescribe    Route: Oral    tacrolimus (GENERIC EQUIVALENT) 0.5 MG capsule  60 capsule 1 11/11/2024 --   [address]    Sig: HOLD    Class: E-Prescribe    Notes to Pharmacy: TXP DT 9/28/2024 (Liver) TXP Dischg DT 10/9/2024 DX Liver replaced by transplant Z94.4 [facility] ([place], [place])          Azithromycin   REVIEW OF SYSTEMS (check box if normal)  [x]               GENERAL  [x]                 PULMONARY [x]                GENITOURINARY  [x]                CNS                 [x]                 CARDIAC  [x]                 ENDOCRINE  [x]                EARS,NOSE,THROAT [x]                 GASTROINTESTINAL [x]                 NEUROLOGIC    [x]                MUSCLOSKELTAL  [x]                  HEMATOLOGY      PHYSICAL EXAM (check box if normal)/86 (BP Location: Right arm, Patient Position: Chair, Cuff Size: Adult Regular)   Pulse 83   Temp 98.2  F (36.8  C) (Oral)   Resp 16   Wt 109.2 kg (240 lb 11.2 oz)   SpO2 100%   BMI 32.64 kg/m          [x]            GENERAL:    [x]       EYES:  ICTERIC   []        YES  []                     NO  [x]           EXTREMITIES: Clubbing []       Y     [x]           N    [x]           EARS, NOSE, THROAT: Membranes Moist    YES   [x]                   NO []                  [x]           LUNGS:  CLEAR    YES       [x]                  NO    []                                [x]           SKIN: Jaundice           YES       []                  NO    [x]                   Rash: YES       []                  NO    [x]                                     [x]             HEART: Regular Rate          YES       [x]                  NO    []                   Incision Clean:  YES       [x]                  NO    []                                [x]                    ABDOMEN: Organomegaly YES       []                  NO    [x]                       [x]                    NEUROLOGICAL:  Nonfocal  YES       [x]                  NO    []                       [x]                    Hernia YES       []                  NO    [x]                   PSYCHIATRIC:  Appropriate  YES       [x]                  NO    []                       OTHER:    X 5 open incisional wounds along incision with iodoform packing. Wounds cleansed and repacked in clinic with no issues. Abd soft throughout. No surrounding erythema, induration or drainage.                                                                                                PAIN SCALE:: 3

## 2025-01-21 NOTE — LETTER
1/21/2025      Jag Smith  [address]  [facility] [address]      Dear Colleague,    Thank you for referring your patient, Jag Smith, to the [facility] TRANSPLANT CLINIC. Please see a copy of my visit note below.    Transplant Surgery -OUTPATIENT IMMUNOSUPPRESSION PROGRESS NOTE    Date of Visit: 01/21/2025    Transplants:  9/28/2024 (Liver); Postoperative day:  115  ASSESMENT AND PLAN:  1.Graft Function:Alk phos up 113 -> 166 Trend for now. Dr. Colon aware.   2.Immunosuppression Management: No changes. On  and tac.   3.Hypertension:stable   4.Renal Function: Cr stable at 1.5   5.Lab frequency:as scheduled  6.Other:bactrim switched to pentamidine nebs due to hyperkalemia.   Incisional wound: healing well. Packing daily.  Seeing wound clinic tomorrow.     Date: January 21, 2025    Transplant:  [x]                             Liver [x]                              Kidney []                             Pancreas []                              Other:             Chief Complaint:Follow Up (Liver transplant follow-up)    History of Present Illness:  Doing well. Minimal pain.   Appetite is normal. Notes some weight gain but feels this is from eating more.    No edema.   No N/V/D.   Seeing wound clinic tomorrow. No fevers. Still packing wounds but getting smaller.         Patient Active Problem List   Diagnosis     Alcohol use disorder, severe, in early remission (H)     Alcoholic hepatitis (H)     Epiphora due to insufficient drainage of left side     Hyperbilirubinemia     Hypokalemia     Jaundice     Pneumonia     Liver transplanted (H)     DENI (acute kidney injury)     Immunosuppressed status     Acute post-operative pain     Steroid-induced hyperglycemia     Acute urinary retention     Anemia due to blood loss, acute     Severe malnutrition     Hyponatremia     Hypomagnesemia     Bilateral lower extremity edema     Hyperkalemia     Subconjunctival hemorrhage     Nonhealing surgical wound     Skin  ulcer of abdomen with fat layer exposed (H)     Elevated serum creatinine     SOCIAL /FAMILY HISTORY: [x]                  No recent change    Past Medical History:   Diagnosis Date     Alcohol use disorder      Alcoholic hepatitis (H) 2024     Anxiety      Depression      Hypertension      Liver replaced by transplant (H) 2024     Past Surgical History:   Procedure Laterality Date     BENCH LIVER  2024    Procedure: Bench liver;  Surgeon: Fernando Colon MD;  Location: [facility] OR     DACRYOCYSTORHINOSTOMY Left 2013     INCISION AND DRAINAGE BUTTOCKS Left 2017     TRANSPLANT LIVER RECIPIENT  DONOR N/A 2024    Procedure: Transplant liver recipient  donor;  Surgeon: Fernando Colon MD;  Location: [facility] OR     Social History     Socioeconomic History     Marital status: Single     Spouse name: Not on file     Number of children: Not on file     Years of education: Not on file     Highest education level: Not on file   Occupational History     Not on file   Tobacco Use     Smoking status: Never     Smokeless tobacco: Never   Vaping Use     Vaping status: Never Used   Substance and Sexual Activity     Alcohol use: Not Currently     Comment: last drink 2024     Drug use: Not Currently     Types: Marijuana     Comment: smoking a long time ago     Sexual activity: Not on file   Other Topics Concern     Not on file   Social History Narrative     Not on file     Social Drivers of Health     Financial Resource Strain: Low Risk  (2024)    Financial Resource Strain      Within the past 12 months, have you or your family members you live with been unable to get utilities (heat, electricity) when it was really needed?: No   Food Insecurity: Low Risk  (2024)    Food Insecurity      Within the past 12 months, did you worry that your food would run out before you got money to buy more?: No      Within the past 12 months, did the food you bought just not last and you didn t  have money to get more?: No   Transportation Needs: Low Risk  (9/21/2024)    Transportation Needs      Within the past 12 months, has lack of transportation kept you from medical appointments, getting your medicines, non-medical meetings or appointments, work, or from getting things that you need?: No   Physical Activity: Not on file   Stress: Not on file   Social Connections: Not on file   Interpersonal Safety: Low Risk  (1/2/2025)    Interpersonal Safety      Do you feel physically and emotionally safe where you currently live?: Yes      Within the past 12 months, have you been hit, slapped, kicked or otherwise physically hurt by someone?: No      Within the past 12 months, have you been humiliated or emotionally abused in other ways by your partner or ex-partner?: No   Housing Stability: Low Risk  (9/21/2024)    Housing Stability      Do you have housing? : Yes      Are you worried about losing your housing?: No     Prescription Medications as of 1/21/2025         Rx Number Disp Refills Start End Last Dispensed Date Next Fill Date Owning Pharmacy    acetaminophen (TYLENOL) 325 MG tablet  60 tablet 0 12/12/2024 --   [address]    Sig: Take 2 tablets (650 mg) by mouth every 8 hours as needed for mild pain or fever.    Class: E-Prescribe    Route: Oral    aspirin (ASA) 81 MG chewable tablet  90 tablet 3 12/26/2024 --   [address]    Sig: Take 1 tablet (81 mg) by mouth daily.    Class: E-Prescribe    Route: Oral    hydrOXYzine HCl (ATARAX) 25 MG tablet  20 tablet 0 12/16/2024 --   [address]    Sig: Take 1 tablet (25 mg) by mouth every 8 hours as needed for anxiety or other (adjuvant pain).    Class: E-Prescribe    Route: Oral    magnesium oxide (MAG-OX) 400 MG tablet  120 tablet 11 12/26/2024 --   [place] [address]  [address]    Sig: Take 2 tablets (800 mg) by mouth 2 times daily.    Class: E-Prescribe    Route: Oral    methocarbamol (ROBAXIN) 750 MG tablet  20 tablet 0 12/20/2024 --   [place], [place] - [address] [place] [address] [address]    Sig: Take 1 tablet (750 mg) by mouth 3 times daily as needed for muscle spasms.    Class: E-Prescribe    Route: Oral    multivitamin w/minerals (CERTAVITE/ANTIOXIDANTS) tablet  90 tablet 3 12/26/2024 --   [address]    Sig: Take 1 tablet by mouth daily.    Class: E-Prescribe    Route: Oral    mycophenolate (GENERIC EQUIVALENT) 250 MG capsule  180 capsule 1 12/26/2024 --   [address]    Sig: Take 3 capsules (750 mg) by mouth 2 times daily.    Class: E-Prescribe    Notes to Pharmacy: TXP DT 9/28/2024 (Liver) TXP Dischg DT 10/9/2024 DX Liver replaced by transplant Z94.4 [facility] ([place], [place])    Route: Oral    omeprazole (PRILOSEC) 20 MG DR capsule  90 capsule 1 12/26/2024 --   [address]    Sig: Take 1 capsule (20 mg) by mouth daily.    Class: E-Prescribe    Route: Oral    tacrolimus (GENERIC EQUIVALENT) 1 MG capsule  120 capsule 11 1/15/2025 --   [address]    Sig: Take 2 capsules (2 mg) by mouth every 12 hours.    Class: E-Prescribe    Notes to Pharmacy: TXP DT 9/28/2024 (Liver) TXP Dischg DT 10/9/2024 DX Liver replaced by transplant Z94.4 [facility] ([place], [place])    Route: Oral    tacrolimus (GENERIC EQUIVALENT) 5 MG capsule  -- -- 11/5/2024 --       Sig: Take 5 mg by mouth 2 times daily as needed (for dose changes).    Class: Historical    Notes to Pharmacy: TXP DT 9/28/2024 (Liver) TXP Dischg DT 10/9/2024 DX Liver replaced  by transplant Z94.4 [place] [facility] ([place], [place])    Route: Oral    traMADol (ULTRAM) 50 MG tablet  20 tablet 0 12/12/2024 --   [address]    Sig: Take 1 tablet (50 mg) by mouth 2 times daily as needed for severe pain.    Class: E-Prescribe    Route: Oral    traZODone (DESYREL) 50 MG tablet  30 tablet 0 12/2/2024 --   [address]    Sig: Take 1 tablet (50 mg) by mouth at bedtime.    Class: E-Prescribe    Route: Oral    escitalopram (LEXAPRO) 20 MG tablet  30 tablet 0 11/11/2024 --   [address]    Sig: Take 1 tablet (20 mg) by mouth daily.    Class: E-Prescribe    Route: Oral    polyethylene glycol (MIRALAX) 17 GM/Dose powder  510 g 0 10/9/2024 --   [place], [place] - [address] [facility]    Sig: Take 17 g by mouth 2 times daily as needed for constipation.    Class: E-Prescribe    Route: Oral    tacrolimus (GENERIC EQUIVALENT) 0.5 MG capsule  60 capsule 1 11/11/2024 --   [address]    Sig: HOLD    Class: E-Prescribe    Notes to Pharmacy: TXP DT 9/28/2024 (Liver) TXP Dischg DT 10/9/2024 DX Liver replaced by transplant Z94.4 [place] [facility] ([place], [place])          Azithromycin   REVIEW OF SYSTEMS (check box if normal)  [x]               GENERAL  [x]                 PULMONARY [x]                GENITOURINARY  [x]                CNS                 [x]                 CARDIAC  [x]                 ENDOCRINE  [x]                EARS,NOSE,THROAT [x]                 GASTROINTESTINAL [x]                 NEUROLOGIC    [x]                MUSCLOSKELTAL  [x]                  HEMATOLOGY      PHYSICAL EXAM (check box if  normal)/86 (BP Location: Right arm, Patient Position: Chair, Cuff Size: Adult Regular)   Pulse 83   Temp 98.2  F (36.8  C) (Oral)   Resp 16   Wt 109.2 kg (240 lb 11.2 oz)   SpO2 100%   BMI 32.64 kg/m          [x]            GENERAL:    [x]       EYES:  ICTERIC   []        YES  []                    NO  [x]           EXTREMITIES: Clubbing []       Y     [x]           N    [x]           EARS, NOSE, THROAT: Membranes Moist    YES   [x]                   NO []                  [x]           LUNGS:  CLEAR    YES       [x]                  NO    []                                [x]           SKIN: Jaundice           YES       []                  NO    [x]                   Rash: YES       []                  NO    [x]                                     [x]             HEART: Regular Rate          YES       [x]                  NO    []                   Incision Clean:  YES       [x]                  NO    []                                [x]                    ABDOMEN: Organomegaly YES       []                  NO    [x]                       [x]                    NEUROLOGICAL:  Nonfocal  YES       [x]                  NO    []                       [x]                    Hernia YES       []                  NO    [x]                   PSYCHIATRIC:  Appropriate  YES       [x]                  NO    []                       OTHER:    X 5 open incisional wounds along incision with iodoform packing. Wounds cleansed and repacked in clinic with no issues. Abd soft throughout. No surrounding erythema, induration or drainage.                                                                                                PAIN SCALE:: 3       Again, thank you for allowing me to participate in the care of your patient.        Sincerely,        SIL Blum CNP    Electronically signed

## 2025-01-21 NOTE — PROGRESS NOTES
Jag Smith  Patient was seen today for a Pentamidine nebulizer tx ordered by Dr. Zuniga.    Patient was first given 2.5 mg of Albuterol nebulizer, after which Pentamidine 300 mg (Lot # [id number]) mixed with 6cc Sterile H20 was administered through a filtered nebulizer.    Pre-treatment: SpO2 = 100%   HR = 73   BBS = Clear   Post-treatment: SpO2 = 99%  HR = 86  BBS = Clear    No adverse side effects noted by the patient.    This service today was provided under the direct supervision of Dr. Porter, who was available if needed.     Procedure was completed by AMBER WHELAN.

## 2025-01-21 NOTE — NURSING NOTE
Chief Complaint   Patient presents with    Follow Up     Liver transplant follow-up       Vital signs:  Temp: 98.2  F (36.8  C) Temp src: Oral BP: 127/86 Pulse: 83   Resp: 16 SpO2: 100 %       Weight: 109.2 kg (240 lb 11.2 oz)  Estimated body mass index is 32.64 kg/m  as calculated from the following:    Height as of 12/9/24: 1.829 m (6').    Weight as of this encounter: 109.2 kg (240 lb 11.2 oz).      Aniket Ray RN on 1/21/2025 at 9:37 AM

## 2025-01-22 ENCOUNTER — HOSPITAL ENCOUNTER (OUTPATIENT)
Dept: WOUND CARE | Facility: CLINIC | Age: 40
Discharge: HOME | End: 2025-01-22
Attending: FAMILY MEDICINE | Admitting: FAMILY MEDICINE
Payer: MEDICAID

## 2025-01-22 VITALS — SYSTOLIC BLOOD PRESSURE: 129 MMHG | DIASTOLIC BLOOD PRESSURE: 87 MMHG | HEART RATE: 79 BPM | TEMPERATURE: 98.7 F

## 2025-01-22 DIAGNOSIS — T81.89XD NON-HEALING SURGICAL WOUND, SUBSEQUENT ENCOUNTER: Primary | ICD-10-CM

## 2025-01-22 DIAGNOSIS — L98.492 SKIN ULCER OF ABDOMEN WITH FAT LAYER EXPOSED (H): ICD-10-CM

## 2025-01-22 PROCEDURE — 97602 WOUND(S) CARE NON-SELECTIVE: CPT

## 2025-01-22 PROCEDURE — 99214 OFFICE O/P EST MOD 30 MIN: CPT | Performed by: FAMILY MEDICINE

## 2025-01-22 NOTE — PROGRESS NOTES
Wound Clinic Note          Visit date: 01/22/2025       Cheif Complaint:     Jag Smith is a 39 year old  male had concerns including WOUND CARE.  He has an abdominal surgical wound.      HISTORY OF PRESENT ILLNESS:    Jag Smith reports the ulcer has been present since September 2024.  The wound began after a recent surgery and the incision did not heal normally.   He had a liver transplant on September 26, 2024, postoperatively a portion of the incision dehisced.      Since his last clinic visit with me the patient's mother has been changing the bandages every day with quarter inch packing strip moistened with Vashe and an ABD pad.  She reports she can hardly do any packing anymore because there is really no areas of undermining.  There has been light serous drainage from the wounds.        The pateint denies fevers or chills.  They report the pain from the wound has been 0/10 and has remained about the same recently.      Today the patient reports maintaining a high protein diet and taking protein supplements regularly.        He does not have diabetes and does not smoke cigarettes.  He takes tacrolimus, mycophenolate and prednisone to prevent rejection.  He does not take any antibiotics for prophylaxis.        The patient has not had any symptoms of infection relating to the wound recently and is not currently on antibiotics.       Problem List:   Past Medical History:   Diagnosis Date    Alcohol use disorder     Alcoholic hepatitis (H) 06/07/2024    Anxiety     Depression     Hypertension     Liver replaced by transplant (H) 09/28/2024              Family Hx: family history includes Alcoholism in his brother; Alzheimer Disease (age of onset: 94) in his maternal grandmother; Anxiety Disorder in his brother; Depression in his brother; Diabetes in his maternal aunt; Substance Abuse in his brother.       Surgical Hx:   Past Surgical History:   Procedure Laterality Date    BENCH LIVER  9/28/2024     Procedure: Bench liver;  Surgeon: Fernando Colon MD;  Location: [facility] OR    DACRYOCYSTORHINOSTOMY Left 2013    INCISION AND DRAINAGE BUTTOCKS Left 2017    TRANSPLANT LIVER RECIPIENT  DONOR N/A 2024    Procedure: Transplant liver recipient  donor;  Surgeon: Fernando Colon MD;  Location: [facility] OR          Allergies:    Allergies   Allergen Reactions    Azithromycin Rash              Medication History:    Current Outpatient Medications   Medication Sig Dispense Refill    acetaminophen (TYLENOL) 325 MG tablet Take 2 tablets (650 mg) by mouth every 8 hours as needed for mild pain or fever. 60 tablet 0    aspirin (ASA) 81 MG chewable tablet Take 1 tablet (81 mg) by mouth daily. 90 tablet 3    escitalopram (LEXAPRO) 20 MG tablet Take 1 tablet (20 mg) by mouth daily. (Patient not taking: Reported on 2025) 30 tablet 0    hydrOXYzine HCl (ATARAX) 25 MG tablet Take 1 tablet (25 mg) by mouth every 8 hours as needed for anxiety or other (adjuvant pain). 20 tablet 0    magnesium oxide (MAG-OX) 400 MG tablet Take 2 tablets (800 mg) by mouth 2 times daily. 120 tablet 11    methocarbamol (ROBAXIN) 750 MG tablet Take 1 tablet (750 mg) by mouth 3 times daily as needed for muscle spasms. 20 tablet 0    multivitamin w/minerals (CERTAVITE/ANTIOXIDANTS) tablet Take 1 tablet by mouth daily. 90 tablet 3    mycophenolate (GENERIC EQUIVALENT) 250 MG capsule Take 3 capsules (750 mg) by mouth 2 times daily. 180 capsule 1    omeprazole (PRILOSEC) 20 MG DR capsule Take 1 capsule (20 mg) by mouth daily. 90 capsule 1    tacrolimus (GENERIC EQUIVALENT) 0.5 MG capsule HOLD (Patient not taking: Reported on 2024) 60 capsule 1    tacrolimus (GENERIC EQUIVALENT) 1 MG capsule Take 2 capsules (2 mg) by mouth every 12 hours. 120 capsule 11    tacrolimus (GENERIC EQUIVALENT) 5 MG capsule Take 5 mg by mouth 2 times daily as needed (for dose changes).      traMADol (ULTRAM) 50 MG tablet Take 1 tablet (50 mg) by mouth 2  times daily as needed for severe pain. 20 tablet 0    traZODone (DESYREL) 50 MG tablet Take 1 tablet (50 mg) by mouth at bedtime. 30 tablet 0     No current facility-administered medications for this encounter.         Tobacco History:  reports that he has never smoked. He has never used smokeless tobacco.       REVIEW OF SYMPTOMS:   The review of systems was negative except as noted in the HPI.           PHYSICAL EXAMINATION:     /87 (BP Location: Right arm, Patient Position: Sitting, Cuff Size: Adult Regular)   Pulse 79   Temp 98.7  F (37.1  C) (Temporal)            GENERAL: The patient overall appears well and is no acute distress.   HEAD: normocephalic   EYES: Sclera and conjunctiva clear   NECK: no obvious masses   LUNGS: breathing is unlabored.   EXTREMITIES: No clubbing, cyanosis or edema   SKIN: No rashes or other abnormalities except as noted under the Wound section below.   NEUROLOGICAL: normal motor and sensory function       WOUND/ulcer: The wound appears healthy with no sign of infection.   Wound bed: granulation tissue  Periwound: healthy intact skin  The wound still have a little bit of depth to them but there is no areas of undermining anymore.  Overall the wounds are healing wonderfully.      Also see below for wound details:     Circumferential volume measures:             No data to display                Ulceration(s)/Wound(s):   Please see the media tab under the chart review for pictures of the wounds.  Nursing staff removed dressings and cleansed wound.    Wound (used by OP [facility] only) 11/12/24 1013 abdomen Right surgical (Active)   Thickness/Stage full thickness 01/22/25 1444   Base granulating;slough 01/22/25 1444   Periwound intact 01/22/25 1444   Periwound Temperature warm 01/22/25 1444   Periwound Skin Turgor soft 01/22/25 1444   Edges open 01/22/25 1444   Length (cm) 0.4 01/22/25 1444   Width (cm) 17 01/22/25 1444   Depth (cm) 0.5 01/22/25 1444   Wound (cm^2) 6.8 cm^2 01/22/25 1444    Wound Volume (cm^3) 3.4 cm^3 01/22/25 1444   Wound healing % -203.57 01/22/25 1444   Drainage Characteristics/Odor serosanguineous 01/22/25 1444   Drainage Amount moderate 01/22/25 1444   Care, Wound non-select wound debridement performed. 01/22/25 1444       Wound (used by OP [facility] only) 11/12/24 1014 abdomen Left surgical (Active)   Thickness/Stage full thickness 01/22/25 1444   Base granulating 01/22/25 1444   Periwound intact 01/22/25 1444   Periwound Temperature warm 01/22/25 1444   Periwound Skin Turgor soft 01/22/25 1444   Edges open 01/22/25 1444   Length (cm) 0.3 01/22/25 1444   Width (cm) 2.4 01/22/25 1444   Depth (cm) 1.1 01/22/25 1444   Wound (cm^2) 0.72 cm^2 01/22/25 1444   Wound Volume (cm^3) 0.79 cm^3 01/22/25 1444   Wound healing % 97.26 01/22/25 1444   Drainage Characteristics/Odor serosanguineous 01/22/25 1444   Drainage Amount moderate 01/22/25 1444   Care, Wound non-select wound debridement performed. 01/22/25 1444                 Recent Labs   Lab Test 09/28/24  1207 09/23/24  1716   A1C 5.4 4.7          Recent Labs   Lab Test 11/11/24  0734 11/07/24  0728 11/04/24  0814   ALBUMIN 4.1 4.0 4.1              No sharp debridement performed today.                  ASSESSMENT:   This is a 39 year old  male with abdominal surgical incisions.          PLAN:   They will switch to bandaging the wounds with Fibracol followed by a silicone adhesive bandage changed once a day.    I have encouraged the patient to continue on their high protein diet to aid in wound healing.   The patient will return to the wound clinic in 3 to 4 weeks to see me again.        30 minutes spent on the date of the encounter doing chart review, history and exam, documentation and further activities per the note, this time excludes any procedure time      Juan Garcia MD  01/22/2025   3:12 PM   [facility] Vascular/Wound  [phone]    This note was electronically signed by Juan Garcia MD      Further  "instructions from your care team         01/22/2025   Jag Smith   1985    A DME order for supplies has been placed to [employer]. If there are any issues with your order including not receiving the order please call our clinic at [phone]. Do not call [employer]. We are better able to help you. We can contact the [employer] rep to better assist than if you call the general [employer] number. We can provide a tracking number also if needed.     [employer] is now sending an ancillary kit free of charge. This kit includes gauze, gloves, and saline. You will receive 1 kit per 15 days of the supply order. We typically order 30 days of supplies so you will receive 2 kits. Please let us know if you would not like to receive this kit and we can communicate this to [employer].     Dressing changes outside of clinic are being performed by  Mom and Primary Care Nurse    Plan 01/22/2025     Wound Dressing Change: Right abdomen  - Wash your hands with soap and water and prepare a clean surface for dressings.  - Wash the outside of wound with mild unscented soap and water (such as Cetaphil, Cerave or Dove)  - Tuck 1/2 of a 4x5 Fibracol into wound bed  - Cover with one 4.7x9\" oval Zetuvit Silicone Border  Change daily and as needed for soiling/saturation     Wound Dressing Change: Left abdomen  - Wash your hands with soap and water and prepare a clean surface for dressings.  - Wash the outside of wound with mild unscented soap and water (such as Cetaphil, Cerave or Dove)  - Tuck 1/2 of a 4x5 Fibracol into wound bed  - Cover with one 5x6 oval zetuvit silicone border  Change daily and as needed for soiling/saturation    Protein:  A diet high in protein is important for wound healing, we recommend getting 90 grams of protein per day.   Taking protein shakes or bars are a good way to get extra protein in your diet.         Main Provider: Juan Garcia M.D. January 22, 2025    Call us at [phone] if you have any questions about " your wounds, if you have redness or swelling around your wound, have a fever of 101 degrees Fahrenheit or greater or if you have any other problems or concerns. We answer the phone Monday through Friday 8 am to 4 pm, please leave a message as we check the voicemail frequently throughout the day. If you have a concern over the weekend, please leave a message and we will return your call Monday. If the need is urgent, go to the ER or urgent care.    If you had a positive experience please indicate that on your patient satisfaction survey form that [facility] will be sending you.    It was a pleasure meeting with you today.  Thank you for allowing me and my team the privilege of caring for you today.  YOU are the reason we are here, and I truly hope we provided you with the excellent service you deserve.  Please let us know if there is anything else we can do for you so that we can be sure you are leaving completely satisfied with your care experience.      If you have any billing related questions please call the [facility] Business office at [phone]. The clinic staff does not handle billing related matters.    If you are scheduled to have a follow up appointment, you will receive a reminder call the day before your visit. On the appointment day please arrive 15 minutes prior to your appointment time. If you are unable to keep that appointment, please call the clinic to cancel or reschedule. If you are more than 10 minutes late or greater for your scheduled appointment time, the clinic policy is that you may be asked to reschedule.        ,

## 2025-01-22 NOTE — DISCHARGE INSTRUCTIONS
"01/22/2025   Jag Smith   1985    A DME order for supplies has been placed to [employer]. If there are any issues with your order including not receiving the order please call our clinic at [phone]. Do not call [employer]. We are better able to help you. We can contact the [employer] rep to better assist than if you call the general [employer] number. We can provide a tracking number also if needed.     [employer] is now sending an ancillary kit free of charge. This kit includes gauze, gloves, and saline. You will receive 1 kit per 15 days of the supply order. We typically order 30 days of supplies so you will receive 2 kits. Please let us know if you would not like to receive this kit and we can communicate this to [employer].     Dressing changes outside of clinic are being performed by  Mom and Primary Care Nurse    Plan 01/22/2025     Wound Dressing Change: Right abdomen  - Wash your hands with soap and water and prepare a clean surface for dressings.  - Wash the outside of wound with mild unscented soap and water (such as Cetaphil, Cerave or Dove)  - Tuck 1/2 of a 4x5 Fibracol into wound bed  - Cover with one 4.7x9\" oval Zetuvit Silicone Border  Change daily and as needed for soiling/saturation     Wound Dressing Change: Left abdomen  - Wash your hands with soap and water and prepare a clean surface for dressings.  - Wash the outside of wound with mild unscented soap and water (such as Cetaphil, Cerave or Dove)  - Tuck 1/2 of a 4x5 Fibracol into wound bed  - Cover with one 5x6 oval zetuvit silicone border  Change daily and as needed for soiling/saturation    Protein:  A diet high in protein is important for wound healing, we recommend getting 90 grams of protein per day.   Taking protein shakes or bars are a good way to get extra protein in your diet.         Main Provider: Juan Garcia M.D. January 22, 2025    Call us at [phone] if you have any questions about your wounds, if you have redness or swelling " around your wound, have a fever of 101 degrees Fahrenheit or greater or if you have any other problems or concerns. We answer the phone Monday through Friday 8 am to 4 pm, please leave a message as we check the voicemail frequently throughout the day. If you have a concern over the weekend, please leave a message and we will return your call Monday. If the need is urgent, go to the ER or urgent care.    If you had a positive experience please indicate that on your patient satisfaction survey form that [facility] will be sending you.    It was a pleasure meeting with you today.  Thank you for allowing me and my team the privilege of caring for you today.  YOU are the reason we are here, and I truly hope we provided you with the excellent service you deserve.  Please let us know if there is anything else we can do for you so that we can be sure you are leaving completely satisfied with your care experience.      If you have any billing related questions please call the [facility] office at [phone]. The clinic staff does not handle billing related matters.    If you are scheduled to have a follow up appointment, you will receive a reminder call the day before your visit. On the appointment day please arrive 15 minutes prior to your appointment time. If you are unable to keep that appointment, please call the clinic to cancel or reschedule. If you are more than 10 minutes late or greater for your scheduled appointment time, the clinic policy is that you may be asked to reschedule.

## 2025-01-24 ENCOUNTER — TELEPHONE (OUTPATIENT)
Dept: TRANSPLANT | Facility: CLINIC | Age: 40
End: 2025-01-24
Payer: MEDICAID

## 2025-01-27 ENCOUNTER — EXTERNAL ORDER RESULTS (OUTPATIENT)
Dept: LAB | Facility: CLINIC | Age: 40
End: 2025-01-27
Payer: MEDICAID

## 2025-01-27 DIAGNOSIS — Z94.4 LIVER REPLACED BY TRANSPLANT (H): ICD-10-CM

## 2025-01-28 ENCOUNTER — MEDICAL CORRESPONDENCE (OUTPATIENT)
Dept: HEALTH INFORMATION MANAGEMENT | Facility: CLINIC | Age: 40
End: 2025-01-28
Payer: MEDICAID

## 2025-01-29 LAB
% BASOPHILS (EXTERNAL): 0.6 % (ref 0–2)
% EOSINOPHILS (EXTERNAL): 3.1 % (ref 0–3)
% IMMATURE GRANULOCYTES (EXTERNAL): 0.5 % (ref 0–0.5)
% LYMPHOCYTES (EXTERNAL): 24.3 % (ref 20–45)
% MONOCYTES (EXTERNAL): 5.2 % (ref 0–4)
% NEUTROPHILS (EXTERNAL): 66.3 % (ref 54–75)
ABSOLUTE BASOPHILS (EXTERNAL): 0.04 10*3/UL (ref 0–0.2)
ABSOLUTE EOSINOPHILS (EXTERNAL): 0.2 10*3/UL (ref 0–0.7)
ABSOLUTE IMMATURE GRANULOCYTES (EXTERNAL): 0.03 10*3/UL (ref 0–0.5)
ABSOLUTE LYMPHOCYTES (EXTERNAL): 1.55 10*3/UL (ref 0.8–4.1)
ABSOLUTE MONOCYTES (EXTERNAL): 0.33 10*3/UL (ref 0–1)
ABSOLUTE NEUTROPHILS (EXTERNAL): 4.24 10*3/UL (ref 1.8–8)
ALBUMIN (EXTERNAL): 4.82 G/DL (ref 3.5–5.2)
ALK PHOSPHATASE (EXTERNAL): 140 U/L (ref 40–129)
ALT SERPL-CCNC: 24.1 U/L
ANION GAP SERPL CALC-SCNC: 13.1 MMOL/L
AST SERPL-CCNC: 21.5 U/L
BILIRUB SERPL-MCNC: 0.6 MG/DL
BILIRUBIN DIRECT (EXTERNAL): 0.18 MG/DL (ref 0–0.3)
BUN SERPL-MCNC: 20.7 MG/DL (ref 6–20)
CALCIUM (EXTERNAL): 9.9 MG/DL (ref 8.6–10)
CHLORIDE (EXTERNAL): 106 MMOL/L (ref 98–107)
CO2 (EXTERNAL): 23.4 MMOL/L (ref 22–29)
CREATININE (EXTERNAL): 1.5 MG/DL (ref 0.7–1.2)
ERYTHROCYTE [DISTWIDTH] IN BLOOD BY AUTOMATED COUNT: 15 % (ref 11–14)
GFR ESTIMATED (EXTERNAL): 58.22 ML/MIN
GLUCOSE (EXTERNAL): 157.4 MG/DL (ref 74–109)
HEMATOCRIT (EXTERNAL): 34.7 % (ref 42–52)
HEMOGLOBIN (EXTERNAL): 11.6 G/DL (ref 14–18)
MAGNESIUM (EXTERNAL): 1.83 MG/DL (ref 1.6–2.6)
MCH RBC QN AUTO: 29.7 PG (ref 27–35)
MCHC RBC AUTO-ENTMCNC: 33.4 G/DL (ref 33–37)
MCV RBC AUTO: 88.7 FL (ref 80–94)
PHOSPHATE SERPL-MCNC: 5.1 MG/DL (ref 2.5–4.5)
PLATELET COUNT (EXTERNAL): 264 K/CMM (ref 150–350)
POTASSIUM (EXTERNAL): 4.5 MMOL/L (ref 3.4–4.5)
PROTEIN TOTAL (EXTERNAL): 7.77 G/DL (ref 6.4–8.3)
RBC # BLD AUTO: 3.91 M/CMM (ref 4.7–6.1)
SODIUM (EXTERNAL): 138 MMOL/L (ref 136–145)
WBC COUNT (EXTERNAL): 6.4 K/CMM (ref 4.8–10.8)

## 2025-02-03 ENCOUNTER — TELEPHONE (OUTPATIENT)
Dept: TRANSPLANT | Facility: CLINIC | Age: 40
End: 2025-02-03
Payer: MEDICAID

## 2025-02-03 NOTE — TELEPHONE ENCOUNTER
Patient Call: General  Route to LPN    Reason for call: Sindy CEJA from patient's clinic stated she would like to talk with Coordinator, regarding Wound Care, Labs, and scheduling for patient. No other details was provided to this writer.    Call back needed? Yes    Return Call Needed  Same as documented in contacts section  When to return call?: Same day: Route High Priority  
Please inform clinic of the following:    Patient has an appt with Dr. Leventhal on 2/6 and wound care on 2/18. Will discuss weaning MMF on 2/6. Continue weekly labs until MMF is discontinued.   
Spoke to Sindy and informed of the clinic appts that pt has. Sindy says that the wound is looking like it won't require any care. The 2/6 appt can determine future appts with wound care.   Also Sindy can only order cbc diff and plt but not cbc with plt. Informed her whatever works for lab orders.   
Intractable epigastric abdominal pain

## 2025-02-05 ENCOUNTER — DOCUMENTATION ONLY (OUTPATIENT)
Dept: TRANSPLANT | Facility: CLINIC | Age: 40
End: 2025-02-05
Payer: MEDICAID

## 2025-02-05 NOTE — PROGRESS NOTES
Appetite: Good  Weight: Stable  Nausea: Denies  Diarrhea: Denies  Incision: Healing; had multiple open areas requiring packing and antibiotics.  Pain management: Tylenol PRN  Rejection: None  ERCP: None  Stent: None  Current immunosuppression:   MMF 750mg BID  Tacrolimus 2mg BID  Lab frequency: Weekly  Med changes: None recently  Physician follow-up: 2/6 Dr. Leventhal  Concerns / Questions: Can we start weaning MMF?

## 2025-02-06 ENCOUNTER — TELEPHONE (OUTPATIENT)
Dept: TRANSPLANT | Facility: CLINIC | Age: 40
End: 2025-02-06

## 2025-02-06 ENCOUNTER — OFFICE VISIT (OUTPATIENT)
Dept: GASTROENTEROLOGY | Facility: CLINIC | Age: 40
End: 2025-02-06
Attending: INTERNAL MEDICINE
Payer: MEDICAID

## 2025-02-06 VITALS
HEIGHT: 72 IN | OXYGEN SATURATION: 99 % | SYSTOLIC BLOOD PRESSURE: 128 MMHG | DIASTOLIC BLOOD PRESSURE: 80 MMHG | WEIGHT: 240 LBS | BODY MASS INDEX: 32.51 KG/M2 | HEART RATE: 91 BPM

## 2025-02-06 DIAGNOSIS — Z48.298 CARE AFTER ORGAN TRANSPLANT: ICD-10-CM

## 2025-02-06 DIAGNOSIS — D84.9 IMMUNOSUPPRESSED STATUS: ICD-10-CM

## 2025-02-06 DIAGNOSIS — Z94.4 LIVER REPLACED BY TRANSPLANT (H): ICD-10-CM

## 2025-02-06 DIAGNOSIS — N17.9 AKI (ACUTE KIDNEY INJURY): ICD-10-CM

## 2025-02-06 DIAGNOSIS — L98.492 SKIN ULCER OF ABDOMEN WITH FAT LAYER EXPOSED (H): Primary | ICD-10-CM

## 2025-02-06 PROCEDURE — G0463 HOSPITAL OUTPT CLINIC VISIT: HCPCS | Performed by: INTERNAL MEDICINE

## 2025-02-06 ASSESSMENT — PAIN SCALES - GENERAL: PAINLEVEL_OUTOF10: NO PAIN (0)

## 2025-02-06 NOTE — TELEPHONE ENCOUNTER
Saw patient in clinic today with Dr. Leventhal. Patient is doing well. 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 Incision is healing well. He has an appt on 2/18 with wound care provider.    Will continue MMF for 3 months and decrease Tacrolimus goal to 6 to help improve kidney function.

## 2025-02-06 NOTE — Clinical Note
2/6/2025         RE: Jag Smith  [address]  [facility] [address]      Date of Service: 2/6/2025     Subjective:          Jag Smith is a 40 year old male presenting for follow up with history of liver transplantation    History of Present Illness   Jag Smith is a 40 year old male with past medical history of alcohol use disorder with resultant alcohol-related hepatitis and cirrhosis who is status post liver transplant for early consideration on September 28, 2024 who presents to establish care with hepatology in clinic.    Initially decompensated in June 2024 when he presented with severe acute alcohol-related hepatitis.  Had a course of decompensation until presentation to the [facility] in September with further clinical decompensation.  He underwent evaluation and was transplanted on September 28.  Had a relatively uneventful hospital course.  His transplant course has been complicated by onset of depression, and wound dehiscence and infection.  He has been referred to wound care specialist and was treated with a course of amoxicillin which he reports dramatically improved the status of multiple areas of skin breakdown at his incision site.  He notes that it is almost completely healed but does have a follow-up wound care visit in the next few days.    He notes that his appetite is good and he is eating approximately 3 meals a day.  Notes that his peak before getting sick he weighed around 260 pounds.  We discussed in detail maintaining a healthy weight and its importance for his liver    Denies any issues with taking his medications    We had a long discussion today about his historical alcohol use disorder and the importance of engaging with the rehab or recovery program.  Did discuss that there may be significant [place] resources that he could utilize    He presents today on tacrolimus with a goal level of 8-10 as well as MMF.  He is already weaned off his  prednisone      Surgical Course  - OLT date: 2024  - etiology: Alcohol related liver disease; sober 2024  - donor: type DBD  - CMV status D positive/R positive  - operation: Surgical technique piggyback, biliary anastomosis: Duct to duct no stent  - CiT 310 minutes, WiT 45 minutes  - EBL: 6000 mL  - Episodes of Rejection: None  - Biliary complications: None; no biliary stent  - Other complications of immediate post-operative course: Depression, DENI, wound dehiscence    Past Medical History:  Past Medical History:   Diagnosis Date    Alcohol use disorder     Alcoholic hepatitis (H) 2024    Anxiety     Depression     Hypertension     Liver replaced by transplant (H) 2024       Past Surgical History:  Past Surgical History:   Procedure Laterality Date    BENCH LIVER  2024    Procedure: Bench liver;  Surgeon: Fernando Colon MD;  Location: [facility] OR    DACRYOCYSTORHINOSTOMY Left 2013    INCISION AND DRAINAGE BUTTOCKS Left 2017    TRANSPLANT LIVER RECIPIENT  DONOR N/A 2024    Procedure: Transplant liver recipient  donor;  Surgeon: Fernando Colon MD;  Location:  OR       Past Social History:  Social History     Tobacco Use    Smoking status: Never    Smokeless tobacco: Never   Vaping Use    Vaping status: Never Used   Substance Use Topics    Alcohol use: Not Currently     Comment: last drink 2024    Drug use: Not Currently     Types: Marijuana     Comment: smoking a long time ago        Family History:  Family History   Problem Relation Age of Onset    Alzheimer Disease Maternal Grandmother 94    Anxiety Disorder Brother     Depression Brother     Alcoholism Brother     Substance Abuse Brother     Diabetes Maternal Aunt     Colon Cancer No family hx of        Review of Systems  A complete 10 point review of systems was asked and answered in the negative unless specifically commented upon in the HPI    Current Outpatient Medications   Medication  Sig Dispense Refill    acetaminophen (TYLENOL) 325 MG tablet Take 2 tablets (650 mg) by mouth every 8 hours as needed for mild pain or fever. 60 tablet 0    aspirin (ASA) 81 MG chewable tablet Take 1 tablet (81 mg) by mouth daily. 90 tablet 3    hydrOXYzine HCl (ATARAX) 25 MG tablet Take 1 tablet (25 mg) by mouth every 8 hours as needed for anxiety or other (adjuvant pain). 20 tablet 0    magnesium oxide (MAG-OX) 400 MG tablet Take 2 tablets (800 mg) by mouth 2 times daily. 120 tablet 11    methocarbamol (ROBAXIN) 750 MG tablet Take 1 tablet (750 mg) by mouth 3 times daily as needed for muscle spasms. 20 tablet 0    multivitamin w/minerals (CERTAVITE/ANTIOXIDANTS) tablet Take 1 tablet by mouth daily. 90 tablet 3    mycophenolate (GENERIC EQUIVALENT) 250 MG capsule Take 3 capsules (750 mg) by mouth 2 times daily. 180 capsule 1    omeprazole (PRILOSEC) 20 MG DR capsule Take 1 capsule (20 mg) by mouth daily. 90 capsule 1    tacrolimus (GENERIC EQUIVALENT) 1 MG capsule Take 2 capsules (2 mg) by mouth every 12 hours. 120 capsule 11    tacrolimus (GENERIC EQUIVALENT) 5 MG capsule Take 5 mg by mouth 2 times daily as needed (for dose changes).      traMADol (ULTRAM) 50 MG tablet Take 1 tablet (50 mg) by mouth 2 times daily as needed for severe pain. 20 tablet 0    traZODone (DESYREL) 50 MG tablet Take 1 tablet (50 mg) by mouth at bedtime. 30 tablet 0    escitalopram (LEXAPRO) 20 MG tablet Take 1 tablet (20 mg) by mouth daily. (Patient not taking: Reported on 1/8/2025) 30 tablet 0    tacrolimus (GENERIC EQUIVALENT) 0.5 MG capsule HOLD (Patient not taking: Reported on 2/6/2025) 60 capsule 1     No current facility-administered medications for this visit.       Objective:         Vitals:    02/06/25 0818   BP: 128/80   BP Location: Right arm   Patient Position: Sitting   Pulse: 91   SpO2: 99%   Weight: 108.9 kg (240 lb)   Height: 1.829 m (6')     Body mass index is 32.55 kg/m .     Physical Exam    Vitals reviewed.    Constitutional: Well-developed, well-nourished, in no apparent distress.    HEENT: Normocephalic. no scleral icterus. Moist oral mucosa. Dentition normal  Neck/Lymph: Normal ROM, supple.  Cardiac:  Regular rate and rhythm.   Respiratory: Normal respiratory excursion   GI:  Abdomen soft, non-distended, non-tender. BS present. Bilateral subcostal incision - with scant areas of wound breakdown healing by secondary intention  Skin:  Skin is warm and dry. No rash noted.  Peripheral Vascular: no lower extremity edema. 2+ pulses in all extremities  Musculoskeletal:  ROM intact, normal muscle bulk    Psychiatric: Normal mood and affect. Behavior is normal.  Neuro: no tremor, A&Ox3    Labs and Diagnostic tests:  CBC w/Diff    Lab Results   Component Value Date/Time    WBC 6.7 01/21/2025 08:56 AM    RBC 3.79 (L) 01/21/2025 08:56 AM    HGB 11.2 (L) 01/21/2025 08:56 AM    HCT 35.0 (L) 01/21/2025 08:56 AM    MCV 92 01/21/2025 08:56 AM    MCH 29.6 01/21/2025 08:56 AM    MCHC 32.0 01/21/2025 08:56 AM    RDW 15.2 (H) 01/21/2025 08:56 AM         Comprehensive Metabolic Profile    Lab Results   Component Value Date/Time     12/19/2024 09:36 AM    CO2 23 12/19/2024 09:36 AM    BUN 28.6 (H) 12/19/2024 09:36 AM    CR 1.17 12/19/2024 09:36 AM    Lab Results   Component Value Date/Time    ALBUMIN 4.6 01/21/2025 08:56 AM    ALKPHOS 166 (H) 01/21/2025 08:56 AM    AST 20 01/21/2025 08:56 AM    ALT 20 01/21/2025 08:56 AM          Imaging:  US doppler 11/22/24  LIVER DOPPLER:  Splenic vein:  Patent continuous normal antegrade direction flow  towards the liver, 51 cm/sec.  Extrahepatic portal vein:  Patent continuous antegrade flow, 55  cm/sec.  Portal vein at anastomosis: Patent continuous antegrade flow, 68  cm/sec.  Intrahepatic portal vein:  Patent continuous antegrade flow, 60  cm/sec.  Right portal vein flow is antegrade, measuring 35 cm/sec.  Left portal vein flow is antegrade, measuring 17 cm/sec.     Inferior vena cava: patent  with flow toward the heart throughout..  IVC above anastomosis:  127 cm/sec.  IVC at anastomosis:  143 cm/sec.  Intrahepatic IVC:  71 cm/sec.  Extrahepatic IVC:  29 cm/sec.     Right, mid, left hepatic veins: Patent with flow towards the inferior  vena cava.     Extrahepatic hepatic artery: Low resistance waveform with flow towards  the liver. 173 cm/sec with resistive index 0.66.  Right hepatic artery: 90 cm/sec with resistive index 0.68.  Left hepatic artery: 48 cm/sec with resistive index 0.55      Assessment and Plan:    S/P Liver Transplantation:   -Status post  donor liver transplant for early consideration of alcohol-related liver disease 2024  -Relatively uneventful postop course with no episodes of overt rejection nor biliary issues  -Noted mild elevation in alkaline phosphatase on last labs and will continue to follow-up  -He has had issues with wound dehiscence and is following with the wound care team  -He will continue his aspirin 81 mg for 6 months  -We will continue to follow labs per protocol    Immunosuppression:   -He is currently on a regimen of tacrolimus 2 mg twice daily with a goal trough level of 8-10 as well as  mg twice daily  -We will change standard of care protocol and continue the patient on MMF while decreasing tacrolimus trough target to 6-8 given the DENI on more recent labs  - Will plan to for intensive monitoring of immunosuppression trough levels per protocol to assess for adequate target dosing and will assess CBC and kidney function for toxicity of medications    Infectious Prophylaxis:   - PJP and CMV prophylaxis per protocol  -He has completed CMV prophylaxis and continues on monthly pentamadine for PJP    Kidney Health:   -Has DENI intermittently posttransplant including now  -Discussed the need for ongoing hydration and we will try to minimize exposure to CNI as able    Alcohol use disorder:  -Long discussion today about his historical alcohol use,  and significant need for him to engage in recovery program  -He admits that he strongly committed to his sobriety, we did discuss typical posttransplant outcomes of risk of 20% relapse severe alcohol consumption, and he is amenable to getting more information    -We will have our transplant social work team reach out to them for local resources    Nutrition/Weight:    It is very common for patients to put on significant weight after liver transplantation, as they become conditioned to eating as much as possible to maintain a healthy weight pre-transplant.  There is a very significant risk of developing fatty liver and non-alcoholic steatohepatitis in post-transplant patients, even in those who were not transplanted for PRICE cirrhosis.  - Please work on eating a diet that is rice in fresh fruits and vegetables, moderate amounts of lean protein and dairy, and modest amounts of carbohydrates and fats.  - An exercise plan/regimen is an essential part of remaining healthy in the post-transplant setting and we recommend 30-35 minutes of exercise at least 4 days a week    Routine Health Care:  - You need to establish and maintain care with a primary care physician to manage: hypertension, high cholesterol, abnormal blood sugars - as these are all potential complications of your health after liver transplantation and will need a local physician to manage these issues.  - All patients with liver diseases (even post transplant) are at an increased risk for osteoporosis.  We strongly recommend screening for Vitamin D deficiency at least twice yearly with aggressive supplementation/replacement as indicated.    - We recommend a screening DEXA scan to evaluate for osteoporosis.  If present, should treat with calcium, Vitamin D supplementation, and recommend consideration of bisphosphonate therapy.  Also recommend follow up DEXA scans to evaluate for improvement of bone density on therapy.  - Recommend yearly skin exams with  dermatology, and if skin cancers are found, recommend at least twice yearly exams  - Recommend post transplant patients stay up to date for cancer screening: mammograms/PAP for women and prostate cancer screening for men, and colon cancer screening for all.  - Practice good hygiene with washing of hands and maintaining a clean living space as this will decrease your chances of developing an infection in the post-transplantation setting  - Eat a health diet: rich in fresh fruits and vegetables, lean proteins, and minimize carbohydrate and fat intake.  - Remain physically active: this is key to keeping the liver transplant healthy and patient's feeling strong and healthy       Thank you very much for the opportunity to participate in the care of this patient.  If you have any further questions, please don't hesitate to contact our office.    __________________________________  Thomas M. Leventhal, M.D.  Associate Professor of Medicine  Advanced & Transplant Hepatology  [facility]     Approximately 35 minutes of non face-to-face time were spent in review of the patient's medical record on 2/5/25.  This included review of previous: clinic visits, hospital records, lab results, imaging studies, and procedural documentation.  The findings from this review are summarized in the above note.    Note - this patient is new to me and just establishing care with outpatient hepatology     The longitudinal plan of care for liver transplantation and immunosuppression management (and possible complications) was addressed during this visit. Due to the added complexity in care, I will continue to support this patient in the subsequent management of this condition(s) and with the ongoing continuity of care of this condition          Thomas M. Leventhal, MD

## 2025-02-06 NOTE — TELEPHONE ENCOUNTER
Spoke to Padmaja CEJA who states that she had just faxed the results yesterday. When we get outside results, lab DE manual enters the result. I am sure it will be entered today. However, I did ask padmaja to fax again. GI

## 2025-02-06 NOTE — PROGRESS NOTES
Date of Service: 2/6/2025     Subjective:          Jag Smith is a 40 year old male presenting for follow up with history of liver transplantation    History of Present Illness   Jag Smith is a 40 year old male with past medical history of alcohol use disorder with resultant alcohol-related hepatitis and cirrhosis who is status post liver transplant for early consideration on September 28, 2024 who presents to establish care with hepatology in clinic.    Initially decompensated in June 2024 when he presented with severe acute alcohol-related hepatitis.  Had a course of decompensation until presentation to the [facility] in September with further clinical decompensation.  He underwent evaluation and was transplanted on September 28.  Had a relatively uneventful hospital course.  His transplant course has been complicated by onset of depression, and wound dehiscence and infection.  He has been referred to wound care specialist and was treated with a course of amoxicillin which he reports dramatically improved the status of multiple areas of skin breakdown at his incision site.  He notes that it is almost completely healed but does have a follow-up wound care visit in the next few days.    He notes that his appetite is good and he is eating approximately 3 meals a day.  Notes that his peak before getting sick he weighed around 260 pounds.  We discussed in detail maintaining a healthy weight and its importance for his liver    Denies any issues with taking his medications    We had a long discussion today about his historical alcohol use disorder and the importance of engaging with the rehab or recovery program.  Did discuss that there may be significant [place] resources that he could utilize    He presents today on tacrolimus with a goal level of 8-10 as well as MMF.  He is already weaned off his prednisone      Surgical Course  - OLT date: September 28, 2024  - etiology: Alcohol related  liver disease; sober 2024  - donor: type DBD  - CMV status D positive/R positive  - operation: Surgical technique piggyback, biliary anastomosis: Duct to duct no stent  - CiT 310 minutes, WiT 45 minutes  - EBL: 6000 mL  - Episodes of Rejection: None  - Biliary complications: None; no biliary stent  - Other complications of immediate post-operative course: Depression, DENI, wound dehiscence    Past Medical History:  Past Medical History:   Diagnosis Date    Alcohol use disorder     Alcoholic hepatitis (H) 2024    Anxiety     Depression     Hypertension     Liver replaced by transplant (H) 2024       Past Surgical History:  Past Surgical History:   Procedure Laterality Date    BENCH LIVER  2024    Procedure: Bench liver;  Surgeon: Fernando Colon MD;  Location: [facility] OR    DACRYOCYSTORHINOSTOMY Left 2013    INCISION AND DRAINAGE BUTTOCKS Left 2017    TRANSPLANT LIVER RECIPIENT  DONOR N/A 2024    Procedure: Transplant liver recipient  donor;  Surgeon: Fernando Colon MD;  Location:  OR       Past Social History:  Social History     Tobacco Use    Smoking status: Never    Smokeless tobacco: Never   Vaping Use    Vaping status: Never Used   Substance Use Topics    Alcohol use: Not Currently     Comment: last drink 2024    Drug use: Not Currently     Types: Marijuana     Comment: smoking a long time ago        Family History:  Family History   Problem Relation Age of Onset    Alzheimer Disease Maternal Grandmother 94    Anxiety Disorder Brother     Depression Brother     Alcoholism Brother     Substance Abuse Brother     Diabetes Maternal Aunt     Colon Cancer No family hx of        Review of Systems  A complete 10 point review of systems was asked and answered in the negative unless specifically commented upon in the HPI    Current Outpatient Medications   Medication Sig Dispense Refill    acetaminophen (TYLENOL) 325 MG tablet Take 2 tablets (650 mg) by mouth  every 8 hours as needed for mild pain or fever. 60 tablet 0    aspirin (ASA) 81 MG chewable tablet Take 1 tablet (81 mg) by mouth daily. 90 tablet 3    hydrOXYzine HCl (ATARAX) 25 MG tablet Take 1 tablet (25 mg) by mouth every 8 hours as needed for anxiety or other (adjuvant pain). 20 tablet 0    magnesium oxide (MAG-OX) 400 MG tablet Take 2 tablets (800 mg) by mouth 2 times daily. 120 tablet 11    methocarbamol (ROBAXIN) 750 MG tablet Take 1 tablet (750 mg) by mouth 3 times daily as needed for muscle spasms. 20 tablet 0    multivitamin w/minerals (CERTAVITE/ANTIOXIDANTS) tablet Take 1 tablet by mouth daily. 90 tablet 3    mycophenolate (GENERIC EQUIVALENT) 250 MG capsule Take 3 capsules (750 mg) by mouth 2 times daily. 180 capsule 1    omeprazole (PRILOSEC) 20 MG DR capsule Take 1 capsule (20 mg) by mouth daily. 90 capsule 1    tacrolimus (GENERIC EQUIVALENT) 1 MG capsule Take 2 capsules (2 mg) by mouth every 12 hours. 120 capsule 11    tacrolimus (GENERIC EQUIVALENT) 5 MG capsule Take 5 mg by mouth 2 times daily as needed (for dose changes).      traMADol (ULTRAM) 50 MG tablet Take 1 tablet (50 mg) by mouth 2 times daily as needed for severe pain. 20 tablet 0    traZODone (DESYREL) 50 MG tablet Take 1 tablet (50 mg) by mouth at bedtime. 30 tablet 0    escitalopram (LEXAPRO) 20 MG tablet Take 1 tablet (20 mg) by mouth daily. (Patient not taking: Reported on 1/8/2025) 30 tablet 0    tacrolimus (GENERIC EQUIVALENT) 0.5 MG capsule HOLD (Patient not taking: Reported on 2/6/2025) 60 capsule 1     No current facility-administered medications for this visit.       Objective:         Vitals:    02/06/25 0818   BP: 128/80   BP Location: Right arm   Patient Position: Sitting   Pulse: 91   SpO2: 99%   Weight: 108.9 kg (240 lb)   Height: 1.829 m (6')     Body mass index is 32.55 kg/m .     Physical Exam    Vitals reviewed.   Constitutional: Well-developed, well-nourished, in no apparent distress.    HEENT: Normocephalic. no  scleral icterus. Moist oral mucosa. Dentition normal  Neck/Lymph: Normal ROM, supple.  Cardiac:  Regular rate and rhythm.   Respiratory: Normal respiratory excursion   GI:  Abdomen soft, non-distended, non-tender. BS present. Bilateral subcostal incision - with scant areas of wound breakdown healing by secondary intention  Skin:  Skin is warm and dry. No rash noted.  Peripheral Vascular: no lower extremity edema. 2+ pulses in all extremities  Musculoskeletal:  ROM intact, normal muscle bulk    Psychiatric: Normal mood and affect. Behavior is normal.  Neuro: no tremor, A&Ox3    Labs and Diagnostic tests:  CBC w/Diff    Lab Results   Component Value Date/Time    WBC 6.7 01/21/2025 08:56 AM    RBC 3.79 (L) 01/21/2025 08:56 AM    HGB 11.2 (L) 01/21/2025 08:56 AM    HCT 35.0 (L) 01/21/2025 08:56 AM    MCV 92 01/21/2025 08:56 AM    MCH 29.6 01/21/2025 08:56 AM    MCHC 32.0 01/21/2025 08:56 AM    RDW 15.2 (H) 01/21/2025 08:56 AM         Comprehensive Metabolic Profile    Lab Results   Component Value Date/Time     12/19/2024 09:36 AM    CO2 23 12/19/2024 09:36 AM    BUN 28.6 (H) 12/19/2024 09:36 AM    CR 1.17 12/19/2024 09:36 AM    Lab Results   Component Value Date/Time    ALBUMIN 4.6 01/21/2025 08:56 AM    ALKPHOS 166 (H) 01/21/2025 08:56 AM    AST 20 01/21/2025 08:56 AM    ALT 20 01/21/2025 08:56 AM          Imaging:  US doppler 11/22/24  LIVER DOPPLER:  Splenic vein:  Patent continuous normal antegrade direction flow  towards the liver, 51 cm/sec.  Extrahepatic portal vein:  Patent continuous antegrade flow, 55  cm/sec.  Portal vein at anastomosis: Patent continuous antegrade flow, 68  cm/sec.  Intrahepatic portal vein:  Patent continuous antegrade flow, 60  cm/sec.  Right portal vein flow is antegrade, measuring 35 cm/sec.  Left portal vein flow is antegrade, measuring 17 cm/sec.     Inferior vena cava: patent with flow toward the heart throughout..  IVC above anastomosis:  127 cm/sec.  IVC at anastomosis:  143  cm/sec.  Intrahepatic IVC:  71 cm/sec.  Extrahepatic IVC:  29 cm/sec.     Right, mid, left hepatic veins: Patent with flow towards the inferior  vena cava.     Extrahepatic hepatic artery: Low resistance waveform with flow towards  the liver. 173 cm/sec with resistive index 0.66.  Right hepatic artery: 90 cm/sec with resistive index 0.68.  Left hepatic artery: 48 cm/sec with resistive index 0.55      Assessment and Plan:    S/P Liver Transplantation:   -Status post  donor liver transplant for early consideration of alcohol-related liver disease 2024  -Relatively uneventful postop course with no episodes of overt rejection nor biliary issues  -Noted mild elevation in alkaline phosphatase on last labs and will continue to follow-up  -He has had issues with wound dehiscence and is following with the wound care team  -He will continue his aspirin 81 mg for 6 months  -We will continue to follow labs per protocol    Immunosuppression:   -He is currently on a regimen of tacrolimus 2 mg twice daily with a goal trough level of 8-10 as well as  mg twice daily  -We will change standard of care protocol and continue the patient on MMF while decreasing tacrolimus trough target to 6-8 given the DENI on more recent labs  - Will plan to for intensive monitoring of immunosuppression trough levels per protocol to assess for adequate target dosing and will assess CBC and kidney function for toxicity of medications    Infectious Prophylaxis:   - PJP and CMV prophylaxis per protocol  -He has completed CMV prophylaxis and continues on monthly pentamadine for PJP    Kidney Health:   -Has DENI intermittently posttransplant including now  -Discussed the need for ongoing hydration and we will try to minimize exposure to CNI as able    Alcohol use disorder:  -Long discussion today about his historical alcohol use, and significant need for him to engage in recovery program  -He admits that he strongly committed to  his sobriety, we did discuss typical posttransplant outcomes of risk of 20% relapse severe alcohol consumption, and he is amenable to getting more information    -We will have our transplant social work team reach out to them for local resources    Nutrition/Weight:    It is very common for patients to put on significant weight after liver transplantation, as they become conditioned to eating as much as possible to maintain a healthy weight pre-transplant.  There is a very significant risk of developing fatty liver and non-alcoholic steatohepatitis in post-transplant patients, even in those who were not transplanted for PRICE cirrhosis.  - Please work on eating a diet that is rice in fresh fruits and vegetables, moderate amounts of lean protein and dairy, and modest amounts of carbohydrates and fats.  - An exercise plan/regimen is an essential part of remaining healthy in the post-transplant setting and we recommend 30-35 minutes of exercise at least 4 days a week    Routine Health Care:  - You need to establish and maintain care with a primary care physician to manage: hypertension, high cholesterol, abnormal blood sugars - as these are all potential complications of your health after liver transplantation and will need a local physician to manage these issues.  - All patients with liver diseases (even post transplant) are at an increased risk for osteoporosis.  We strongly recommend screening for Vitamin D deficiency at least twice yearly with aggressive supplementation/replacement as indicated.    - We recommend a screening DEXA scan to evaluate for osteoporosis.  If present, should treat with calcium, Vitamin D supplementation, and recommend consideration of bisphosphonate therapy.  Also recommend follow up DEXA scans to evaluate for improvement of bone density on therapy.  - Recommend yearly skin exams with dermatology, and if skin cancers are found, recommend at least twice yearly exams  - Recommend post  transplant patients stay up to date for cancer screening: mammograms/PAP for women and prostate cancer screening for men, and colon cancer screening for all.  - Practice good hygiene with washing of hands and maintaining a clean living space as this will decrease your chances of developing an infection in the post-transplantation setting  - Eat a health diet: rich in fresh fruits and vegetables, lean proteins, and minimize carbohydrate and fat intake.  - Remain physically active: this is key to keeping the liver transplant healthy and patient's feeling strong and healthy       Thank you very much for the opportunity to participate in the care of this patient.  If you have any further questions, please don't hesitate to contact our office.    __________________________________  Thomas M. Leventhal, M.D.  Associate Professor of Medicine  Advanced & Transplant Hepatology  [facility]     Approximately 35 minutes of non face-to-face time were spent in review of the patient's medical record on 2/5/25.  This included review of previous: clinic visits, hospital records, lab results, imaging studies, and procedural documentation.  The findings from this review are summarized in the above note.    Note - this patient is new to me and just establishing care with outpatient hepatology     The longitudinal plan of care for liver transplantation and immunosuppression management (and possible complications) was addressed during this visit. Due to the added complexity in care, I will continue to support this patient in the subsequent management of this condition(s) and with the ongoing continuity of care of this condition

## 2025-02-06 NOTE — LETTER
2/6/2025      Jag Smith  [address]  [facility] [address]      Dear Colleague,    Thank you for referring your patient, Jag Smith, to the [facility] [place]. Please see a copy of my visit note below.    Date of Service: 2/6/2025     Subjective:          Jag Smith is a 40 year old male presenting for follow up with history of liver transplantation    History of Present Illness   Jag Smith is a 40 year old male with past medical history of alcohol use disorder with resultant alcohol-related hepatitis and cirrhosis who is status post liver transplant for early consideration on September 28, 2024 who presents to establish care with hepatology in clinic.    Initially decompensated in June 2024 when he presented with severe acute alcohol-related hepatitis.  Had a course of decompensation until presentation to the [facility] in September with further clinical decompensation.  He underwent evaluation and was transplanted on September 28.  Had a relatively uneventful hospital course.  His transplant course has been complicated by onset of depression, and wound dehiscence and infection.  He has been referred to wound care specialist and was treated with a course of amoxicillin which he reports dramatically improved the status of multiple areas of skin breakdown at his incision site.  He notes that it is almost completely healed but does have a follow-up wound care visit in the next few days.    He notes that his appetite is good and he is eating approximately 3 meals a day.  Notes that his peak before getting sick he weighed around 260 pounds.  We discussed in detail maintaining a healthy weight and its importance for his liver    Denies any issues with taking his medications    We had a long discussion today about his historical alcohol use disorder and the importance of engaging with the rehab or recovery program.  Did discuss that there may be  significant [place] resources that he could utilize    He presents today on tacrolimus with a goal level of 8-10 as well as MMF.  He is already weaned off his prednisone      Surgical Course  - OLT date: 2024  - etiology: Alcohol related liver disease; sober 2024  - donor: type DBD  - CMV status D positive/R positive  - operation: Surgical technique piggyback, biliary anastomosis: Duct to duct no stent  - CiT 310 minutes, WiT 45 minutes  - EBL: 6000 mL  - Episodes of Rejection: None  - Biliary complications: None; no biliary stent  - Other complications of immediate post-operative course: Depression, DENI, wound dehiscence    Past Medical History:  Past Medical History:   Diagnosis Date     Alcohol use disorder      Alcoholic hepatitis (H) 2024     Anxiety      Depression      Hypertension      Liver replaced by transplant (H) 2024       Past Surgical History:  Past Surgical History:   Procedure Laterality Date     BENCH LIVER  2024    Procedure: Bench liver;  Surgeon: Fernando Colon MD;  Location: [facility] OR     DACRYOCYSTORHINOSTOMY Left 2013     INCISION AND DRAINAGE BUTTOCKS Left 2017     TRANSPLANT LIVER RECIPIENT  DONOR N/A 2024    Procedure: Transplant liver recipient  donor;  Surgeon: Fernando Colon MD;  Location: [facility] OR       Past Social History:  Social History     Tobacco Use     Smoking status: Never     Smokeless tobacco: Never   Vaping Use     Vaping status: Never Used   Substance Use Topics     Alcohol use: Not Currently     Comment: last drink 2024     Drug use: Not Currently     Types: Marijuana     Comment: smoking a long time ago        Family History:  Family History   Problem Relation Age of Onset     Alzheimer Disease Maternal Grandmother 94     Anxiety Disorder Brother      Depression Brother      Alcoholism Brother      Substance Abuse Brother      Diabetes Maternal Aunt      Colon Cancer No family hx of        Review of  Systems  A complete 10 point review of systems was asked and answered in the negative unless specifically commented upon in the HPI    Current Outpatient Medications   Medication Sig Dispense Refill     acetaminophen (TYLENOL) 325 MG tablet Take 2 tablets (650 mg) by mouth every 8 hours as needed for mild pain or fever. 60 tablet 0     aspirin (ASA) 81 MG chewable tablet Take 1 tablet (81 mg) by mouth daily. 90 tablet 3     hydrOXYzine HCl (ATARAX) 25 MG tablet Take 1 tablet (25 mg) by mouth every 8 hours as needed for anxiety or other (adjuvant pain). 20 tablet 0     magnesium oxide (MAG-OX) 400 MG tablet Take 2 tablets (800 mg) by mouth 2 times daily. 120 tablet 11     methocarbamol (ROBAXIN) 750 MG tablet Take 1 tablet (750 mg) by mouth 3 times daily as needed for muscle spasms. 20 tablet 0     multivitamin w/minerals (CERTAVITE/ANTIOXIDANTS) tablet Take 1 tablet by mouth daily. 90 tablet 3     mycophenolate (GENERIC EQUIVALENT) 250 MG capsule Take 3 capsules (750 mg) by mouth 2 times daily. 180 capsule 1     omeprazole (PRILOSEC) 20 MG DR capsule Take 1 capsule (20 mg) by mouth daily. 90 capsule 1     tacrolimus (GENERIC EQUIVALENT) 1 MG capsule Take 2 capsules (2 mg) by mouth every 12 hours. 120 capsule 11     tacrolimus (GENERIC EQUIVALENT) 5 MG capsule Take 5 mg by mouth 2 times daily as needed (for dose changes).       traMADol (ULTRAM) 50 MG tablet Take 1 tablet (50 mg) by mouth 2 times daily as needed for severe pain. 20 tablet 0     traZODone (DESYREL) 50 MG tablet Take 1 tablet (50 mg) by mouth at bedtime. 30 tablet 0     escitalopram (LEXAPRO) 20 MG tablet Take 1 tablet (20 mg) by mouth daily. (Patient not taking: Reported on 1/8/2025) 30 tablet 0     tacrolimus (GENERIC EQUIVALENT) 0.5 MG capsule HOLD (Patient not taking: Reported on 2/6/2025) 60 capsule 1     No current facility-administered medications for this visit.       Objective:         Vitals:    02/06/25 0818   BP: 128/80   BP Location: Right  arm   Patient Position: Sitting   Pulse: 91   SpO2: 99%   Weight: 108.9 kg (240 lb)   Height: 1.829 m (6')     Body mass index is 32.55 kg/m .     Physical Exam    Vitals reviewed.   Constitutional: Well-developed, well-nourished, in no apparent distress.    HEENT: Normocephalic. no scleral icterus. Moist oral mucosa. Dentition normal  Neck/Lymph: Normal ROM, supple.  Cardiac:  Regular rate and rhythm.   Respiratory: Normal respiratory excursion   GI:  Abdomen soft, non-distended, non-tender. BS present. Bilateral subcostal incision - with scant areas of wound breakdown healing by secondary intention  Skin:  Skin is warm and dry. No rash noted.  Peripheral Vascular: no lower extremity edema. 2+ pulses in all extremities  Musculoskeletal:  ROM intact, normal muscle bulk    Psychiatric: Normal mood and affect. Behavior is normal.  Neuro: no tremor, A&Ox3    Labs and Diagnostic tests:  CBC w/Diff    Lab Results   Component Value Date/Time    WBC 6.7 01/21/2025 08:56 AM    RBC 3.79 (L) 01/21/2025 08:56 AM    HGB 11.2 (L) 01/21/2025 08:56 AM    HCT 35.0 (L) 01/21/2025 08:56 AM    MCV 92 01/21/2025 08:56 AM    MCH 29.6 01/21/2025 08:56 AM    MCHC 32.0 01/21/2025 08:56 AM    RDW 15.2 (H) 01/21/2025 08:56 AM         Comprehensive Metabolic Profile    Lab Results   Component Value Date/Time     12/19/2024 09:36 AM    CO2 23 12/19/2024 09:36 AM    BUN 28.6 (H) 12/19/2024 09:36 AM    CR 1.17 12/19/2024 09:36 AM    Lab Results   Component Value Date/Time    ALBUMIN 4.6 01/21/2025 08:56 AM    ALKPHOS 166 (H) 01/21/2025 08:56 AM    AST 20 01/21/2025 08:56 AM    ALT 20 01/21/2025 08:56 AM          Imaging:  US doppler 11/22/24  LIVER DOPPLER:  Splenic vein:  Patent continuous normal antegrade direction flow  towards the liver, 51 cm/sec.  Extrahepatic portal vein:  Patent continuous antegrade flow, 55  cm/sec.  Portal vein at anastomosis: Patent continuous antegrade flow, 68  cm/sec.  Intrahepatic portal vein:  Patent  continuous antegrade flow, 60  cm/sec.  Right portal vein flow is antegrade, measuring 35 cm/sec.  Left portal vein flow is antegrade, measuring 17 cm/sec.     Inferior vena cava: patent with flow toward the heart throughout..  IVC above anastomosis:  127 cm/sec.  IVC at anastomosis:  143 cm/sec.  Intrahepatic IVC:  71 cm/sec.  Extrahepatic IVC:  29 cm/sec.     Right, mid, left hepatic veins: Patent with flow towards the inferior  vena cava.     Extrahepatic hepatic artery: Low resistance waveform with flow towards  the liver. 173 cm/sec with resistive index 0.66.  Right hepatic artery: 90 cm/sec with resistive index 0.68.  Left hepatic artery: 48 cm/sec with resistive index 0.55      Assessment and Plan:    S/P Liver Transplantation:   -Status post  donor liver transplant for early consideration of alcohol-related liver disease 2024  -Relatively uneventful postop course with no episodes of overt rejection nor biliary issues  -Noted mild elevation in alkaline phosphatase on last labs and will continue to follow-up  -He has had issues with wound dehiscence and is following with the wound care team  -He will continue his aspirin 81 mg for 6 months  -We will continue to follow labs per protocol    Immunosuppression:   -He is currently on a regimen of tacrolimus 2 mg twice daily with a goal trough level of 8-10 as well as  mg twice daily  -We will change standard of care protocol and continue the patient on MMF while decreasing tacrolimus trough target to 6-8 given the DENI on more recent labs  - Will plan to for intensive monitoring of immunosuppression trough levels per protocol to assess for adequate target dosing and will assess CBC and kidney function for toxicity of medications    Infectious Prophylaxis:   - PJP and CMV prophylaxis per protocol  -He has completed CMV prophylaxis and continues on monthly pentamadine for PJP    Kidney Health:   -Has DENI intermittently posttransplant  including now  -Discussed the need for ongoing hydration and we will try to minimize exposure to CNI as able    Alcohol use disorder:  -Long discussion today about his historical alcohol use, and significant need for him to engage in recovery program  -He admits that he strongly committed to his sobriety, we did discuss typical posttransplant outcomes of risk of 20% relapse severe alcohol consumption, and he is amenable to getting more information    -We will have our transplant social work team reach out to them for local resources    Nutrition/Weight:    It is very common for patients to put on significant weight after liver transplantation, as they become conditioned to eating as much as possible to maintain a healthy weight pre-transplant.  There is a very significant risk of developing fatty liver and non-alcoholic steatohepatitis in post-transplant patients, even in those who were not transplanted for PRICE cirrhosis.  - Please work on eating a diet that is rice in fresh fruits and vegetables, moderate amounts of lean protein and dairy, and modest amounts of carbohydrates and fats.  - An exercise plan/regimen is an essential part of remaining healthy in the post-transplant setting and we recommend 30-35 minutes of exercise at least 4 days a week    Routine Health Care:  - You need to establish and maintain care with a primary care physician to manage: hypertension, high cholesterol, abnormal blood sugars - as these are all potential complications of your health after liver transplantation and will need a local physician to manage these issues.  - All patients with liver diseases (even post transplant) are at an increased risk for osteoporosis.  We strongly recommend screening for Vitamin D deficiency at least twice yearly with aggressive supplementation/replacement as indicated.    - We recommend a screening DEXA scan to evaluate for osteoporosis.  If present, should treat with calcium, Vitamin D  supplementation, and recommend consideration of bisphosphonate therapy.  Also recommend follow up DEXA scans to evaluate for improvement of bone density on therapy.  - Recommend yearly skin exams with dermatology, and if skin cancers are found, recommend at least twice yearly exams  - Recommend post transplant patients stay up to date for cancer screening: mammograms/PAP for women and prostate cancer screening for men, and colon cancer screening for all.  - Practice good hygiene with washing of hands and maintaining a clean living space as this will decrease your chances of developing an infection in the post-transplantation setting  - Eat a health diet: rich in fresh fruits and vegetables, lean proteins, and minimize carbohydrate and fat intake.  - Remain physically active: this is key to keeping the liver transplant healthy and patient's feeling strong and healthy       Thank you very much for the opportunity to participate in the care of this patient.  If you have any further questions, please don't hesitate to contact our office.    __________________________________  Thomas M. Leventhal, M.D.  Associate Professor of Medicine  Advanced & Transplant Hepatology  [facility]     Approximately 35 minutes of non face-to-face time were spent in review of the patient's medical record on 2/5/25.  This included review of previous: clinic visits, hospital records, lab results, imaging studies, and procedural documentation.  The findings from this review are summarized in the above note.    Note - this patient is new to me and just establishing care with outpatient hepatology     The longitudinal plan of care for liver transplantation and immunosuppression management (and possible complications) was addressed during this visit. Due to the added complexity in care, I will continue to support this patient in the subsequent management of this condition(s) and with the ongoing continuity of care of  this condition      Again, thank you for allowing me to participate in the care of your patient.        Sincerely,        Thomas M. Leventhal, MD    Electronically signed

## 2025-02-06 NOTE — NURSING NOTE
Chief Complaint   Patient presents with    RECHECK      POST RETURN TXP HEPT - 90 days     /80 (BP Location: Right arm, Patient Position: Sitting)   Pulse 91   Ht 1.829 m (6')   Wt 108.9 kg (240 lb)   SpO2 99%   BMI 32.55 kg/m    Tabitha Oro CMA on 2/6/2025 at 8:22 AM

## 2025-02-10 ENCOUNTER — TELEPHONE (OUTPATIENT)
Dept: TRANSPLANT | Facility: CLINIC | Age: 40
End: 2025-02-10
Payer: MEDICAID

## 2025-02-10 DIAGNOSIS — Z94.4 LIVER REPLACED BY TRANSPLANT (H): Primary | ICD-10-CM

## 2025-02-10 NOTE — TELEPHONE ENCOUNTER
----- Message from Thomas M. Leventhal sent at 2/10/2025  3:29 PM CST -----  Could be do to kidney dysfunction given normal ALT/AST    - If no Abd US liver in past month, lets get that to look for biliary issues    TL  ----- Message -----  From: Karena Villegas, RN  Sent: 2/10/2025  10:52 AM CST  To: Thomas Michael Leventhal, MD    Alk Phos remains elevated.  Tacrolimus level was within goal at 6.9.  Labs from today are pending.

## 2025-02-10 NOTE — TELEPHONE ENCOUNTER
Last ultrasound was on 11/22/24. Ultrasound ordered.     Ultrasound scheduled on 2/18 at noon. Check-in at 11:45am (or as soon as neb is completed). No eating, drinking, gum, or smoking for 8 hours before ultrasound.    Patient informed and verbalized understanding.

## 2025-02-13 ENCOUNTER — TELEPHONE (OUTPATIENT)
Dept: TRANSPLANT | Facility: CLINIC | Age: 40
End: 2025-02-13
Payer: MEDICAID

## 2025-02-13 DIAGNOSIS — Z94.4 LIVER REPLACED BY TRANSPLANT (H): ICD-10-CM

## 2025-02-13 RX ORDER — TACROLIMUS 1 MG/1
1 CAPSULE ORAL EVERY 12 HOURS
Qty: 60 CAPSULE | Refills: 11 | Status: SHIPPED | OUTPATIENT
Start: 2025-02-13

## 2025-02-13 RX ORDER — TACROLIMUS 0.5 MG/1
0.5 CAPSULE ORAL EVERY 12 HOURS
Qty: 60 CAPSULE | Refills: 11 | Status: SHIPPED | OUTPATIENT
Start: 2025-02-13

## 2025-02-13 NOTE — TELEPHONE ENCOUNTER
ISSUE:   Tacrolimus IR level 8.1 on 02/10/25, goal 6, dose 2 mg BID.    PLAN:   Call Patient and confirm this was an accurate 12-hour trough.   Verify Tacrolimus IR dose 2 mg BID.   Confirm no new medications or illness.   Confirm no missed doses.     If accurate trough and accurate dose, decrease Tacrolimus IR dose to 1.5 mg BID.    Repeat labs in 1 week.    Karena Villegas RN     OUTCOME:   Spoke with Patient, they confirm accurate trough level and current dose 2 mg BID.   Patient confirmed dose change to 1.5 mg BID.  Patient agreed to repeat labs in 1 weeks.   Orders sent to preferred pharmacy for dose change and lab for repeat labs.   Patient voiced understanding of plan.     Magda Gant LPN

## 2025-02-18 ENCOUNTER — ANCILLARY PROCEDURE (OUTPATIENT)
Dept: ULTRASOUND IMAGING | Facility: CLINIC | Age: 40
End: 2025-02-18
Attending: INTERNAL MEDICINE
Payer: MEDICAID

## 2025-02-18 ENCOUNTER — HOSPITAL ENCOUNTER (OUTPATIENT)
Dept: WOUND CARE | Facility: CLINIC | Age: 40
Discharge: HOME | End: 2025-02-18
Attending: FAMILY MEDICINE | Admitting: FAMILY MEDICINE
Payer: MEDICAID

## 2025-02-18 VITALS
TEMPERATURE: 96.9 F | RESPIRATION RATE: 20 BRPM | SYSTOLIC BLOOD PRESSURE: 136 MMHG | DIASTOLIC BLOOD PRESSURE: 97 MMHG | HEART RATE: 88 BPM

## 2025-02-18 DIAGNOSIS — T81.89XD NON-HEALING SURGICAL WOUND, SUBSEQUENT ENCOUNTER: Primary | ICD-10-CM

## 2025-02-18 DIAGNOSIS — Z94.4 LIVER REPLACED BY TRANSPLANT (H): ICD-10-CM

## 2025-02-18 DIAGNOSIS — Z94.4 LIVER TRANSPLANTED (H): Primary | ICD-10-CM

## 2025-02-18 DIAGNOSIS — L98.492 SKIN ULCER OF ABDOMEN WITH FAT LAYER EXPOSED (H): ICD-10-CM

## 2025-02-18 PROCEDURE — 93975 VASCULAR STUDY: CPT | Mod: GC | Performed by: RADIOLOGY

## 2025-02-18 PROCEDURE — 94640 AIRWAY INHALATION TREATMENT: CPT | Performed by: INTERNAL MEDICINE

## 2025-02-18 PROCEDURE — 97602 WOUND(S) CARE NON-SELECTIVE: CPT

## 2025-02-18 PROCEDURE — 99214 OFFICE O/P EST MOD 30 MIN: CPT | Performed by: FAMILY MEDICINE

## 2025-02-18 PROCEDURE — 94642 AEROSOL INHALATION TREATMENT: CPT | Performed by: INTERNAL MEDICINE

## 2025-02-18 RX ORDER — EPINEPHRINE 1 MG/ML
0.3 INJECTION, SOLUTION, CONCENTRATE INTRAVENOUS EVERY 5 MIN PRN
OUTPATIENT
Start: 2025-03-18

## 2025-02-18 RX ORDER — ALBUTEROL SULFATE 0.83 MG/ML
2.5 SOLUTION RESPIRATORY (INHALATION) ONCE
Status: COMPLETED | OUTPATIENT
Start: 2025-02-18 | End: 2025-02-18

## 2025-02-18 RX ORDER — PENTAMIDINE ISETHIONATE 300 MG/300MG
300 INHALANT RESPIRATORY (INHALATION) ONCE
Status: COMPLETED | OUTPATIENT
Start: 2025-02-18 | End: 2025-02-18

## 2025-02-18 RX ORDER — PENTAMIDINE ISETHIONATE 300 MG/300MG
300 INHALANT RESPIRATORY (INHALATION) ONCE
Status: CANCELLED
Start: 2025-03-18 | End: 2025-03-18

## 2025-02-18 RX ORDER — DIPHENHYDRAMINE HYDROCHLORIDE 50 MG/ML
25 INJECTION INTRAMUSCULAR; INTRAVENOUS
Start: 2025-03-18

## 2025-02-18 RX ORDER — DIPHENHYDRAMINE HYDROCHLORIDE 50 MG/ML
50 INJECTION INTRAMUSCULAR; INTRAVENOUS
Start: 2025-03-18

## 2025-02-18 RX ORDER — ALBUTEROL SULFATE 0.83 MG/ML
2.5 SOLUTION RESPIRATORY (INHALATION)
OUTPATIENT
Start: 2025-03-18

## 2025-02-18 RX ORDER — MEPERIDINE HYDROCHLORIDE 25 MG/ML
25 INJECTION INTRAMUSCULAR; INTRAVENOUS; SUBCUTANEOUS
OUTPATIENT
Start: 2025-03-18

## 2025-02-18 RX ORDER — ALBUTEROL SULFATE 90 UG/1
1-2 INHALANT RESPIRATORY (INHALATION)
Start: 2025-03-18

## 2025-02-18 RX ORDER — ALBUTEROL SULFATE 0.83 MG/ML
2.5 SOLUTION RESPIRATORY (INHALATION) ONCE
Start: 2025-03-18 | End: 2025-03-18

## 2025-02-18 RX ORDER — METHYLPREDNISOLONE SODIUM SUCCINATE 40 MG/ML
40 INJECTION INTRAMUSCULAR; INTRAVENOUS
Start: 2025-03-18

## 2025-02-18 RX ADMIN — PENTAMIDINE ISETHIONATE 300 MG: 300 INHALANT RESPIRATORY (INHALATION) at 10:53

## 2025-02-18 RX ADMIN — ALBUTEROL SULFATE 2.5 MG: 0.83 SOLUTION RESPIRATORY (INHALATION) at 10:53

## 2025-02-18 NOTE — PROGRESS NOTES
Patient was seen today for a Pentamidine nebulizer tx ordered by Dr. Colon.    Patient was first given 2.5 mg of Albuterol via nebulizer, after which Pentamidine 300 mg (Lot # [id number]) mixed with 6cc Sterile H20 was administered through a filtered nebulizer.    Pre-treatment: SpO2 = 99%   HR = 82 BBS = Clear   Post-treatment: SpO2 = 98%  HR = 86  BBS = Clear    No adverse side effects noted by the patient.A little shaky with albuterol     This service today was provided under the direct supervision of Dr. Porter, who was available if needed.     Procedure was completed by Sally Patten. BARRETT

## 2025-02-18 NOTE — DISCHARGE INSTRUCTIONS
"02/18/2025   Jag Smith   1985    A DME order for supplies has been placed to [employer] If there are any issues with your order including not receiving the order please call our clinic at [phone]. Do not call [employer]. We are better able to help you. We can contact the [employer] rep to better assist than if you call the general [facility] number. We can provide a tracking number also if needed.     [employer] is now sending an ancillary kit free of charge. This kit includes gauze, gloves, and saline. You will receive 1 kit per 15 days of the supply order. We typically order 30 days of supplies so you will receive 2 kits. Please let us know if you would not like to receive this kit and we can communicate this to [facility].    Dressing changes outside of clinic are being performed by  Mom and Primary Care Nurse    Plan 02/18/2025    Ok to shower. Remove bandages before or during showering. Clean with a mild, unscented soap. Pat dry after showering and apply new dressings as directed below.  Do not soak wounds in water. No Bathtubs, pools, lakes, etc  Keep bandaging with both Fibracol and Zetuvit until no more drainage seen on dressings for 2 days. Then continue to bandage with Zetuvit for another 10 days. After no drainage for 10-14 days can stop bandaging.  No additional appointments made for here at the wound clinic. If you have any new concerns please call us for an appointment.    Wound Dressing Change: Right abdomen  - Wash your hands with soap and water and prepare a clean surface for dressings.  - Wash the outside of wound with mild unscented soap and water (such as Cetaphil, Cerave or Dove)  - Tuck 1/6 of a 4x5 Fibracol into wound bed  - Cover with one 5x6\" oval Zetuvit Silicone Border  Change daily and as needed for soiling/saturation     Wound Dressing Change: Left abdomen  - Wash your hands with soap and water and prepare a clean surface for dressings.  - Wash the outside of wound with mild unscented soap " and water (such as Cetaphil, Cerave or Dove)  - Tuck 1/10 of a 4x5 Fibracol into wound bed  - Cover with one 3x3 zetuvit silicone border  Change daily and as needed for soiling/saturation          Main Provider: Juan Garcia M.D. February 18, 2025    Call us at [phone] if you have any questions about your wounds, if you have redness or swelling around your wound, have a fever of 101 degrees Fahrenheit or greater or if you have any other problems or concerns. We answer the phone Monday through Friday 8 am to 4 pm, please leave a message as we check the voicemail frequently throughout the day. If you have a concern over the weekend, please leave a message and we will return your call Monday. If the need is urgent, go to the ER or urgent care.    If you had a positive experience please indicate that on your patient satisfaction survey form that [facility] will be sending you.    It was a pleasure meeting with you today.  Thank you for allowing me and my team the privilege of caring for you today.  YOU are the reason we are here, and I truly hope we provided you with the excellent service you deserve.  Please let us know if there is anything else we can do for you so that we can be sure you are leaving completely satisfied with your care experience.      If you have any billing related questions please call the [facility] office at [phone]. The clinic staff does not handle billing related matters.    If you are scheduled to have a follow up appointment, you will receive a reminder call the day before your visit. On the appointment day please arrive 15 minutes prior to your appointment time. If you are unable to keep that appointment, please call the clinic to cancel or reschedule. If you are more than 10 minutes late or greater for your scheduled appointment time, the clinic policy is that you may be asked to reschedule.

## 2025-02-18 NOTE — PROGRESS NOTES
Patient arrived for wound care visit. Wound Care Nurse time spent evaluating patient record, and completed a full evaluation; provided recommendation based on treatment plan. Reviewed discharge instructions, patient education, and discussed plan of care with appropriate medical team staff members and patient and/or family members.

## 2025-02-18 NOTE — PROGRESS NOTES
Wound Clinic Note          Visit date: 02/18/2025       Cheif Complaint:     Jag Smith is a 40 year old  male had concerns including WOUND CARE.  He has an abdominal surgical wound.      HISTORY OF PRESENT ILLNESS:    Jag Smith reports the ulcer has been present since September 2024.  The wound began after a recent surgery and the incision did not heal normally.   He had a liver transplant on September 26, 2024, postoperatively a portion of the incision dehisced.      Since his last clinic visit with me the patient's mother has been changing the bandages every day with Fibracol and a silicone adhesive bandage.  There is been light serous drainage from the wounds.  There has been no difficulties with the dressing changes.        The pateint denies fevers or chills.  They report the pain from the wound has been 0/10 and has remained about the same recently.      Today the patient reports maintaining a high protein diet and taking protein supplements regularly.        He does not have diabetes and does not smoke cigarettes.  He takes tacrolimus, mycophenolate and prednisone to prevent rejection.  He does not take any antibiotics for prophylaxis.        The patient has not had any symptoms of infection relating to the wound recently and is not currently on antibiotics.       Problem List:   Past Medical History:   Diagnosis Date    Alcohol use disorder     Alcoholic hepatitis (H) 06/07/2024    Anxiety     Depression     Hypertension     Liver replaced by transplant (H) 09/28/2024              Family Hx: family history includes Alcoholism in his brother; Alzheimer Disease (age of onset: 94) in his maternal grandmother; Anxiety Disorder in his brother; Depression in his brother; Diabetes in his maternal aunt; Substance Abuse in his brother.       Surgical Hx:   Past Surgical History:   Procedure Laterality Date    BENCH LIVER  9/28/2024    Procedure: Bench liver;  Surgeon: Fernando Colon MD;   Location: [facility] OR    DACRYOCYSTORHINOSTOMY Left 2013    INCISION AND DRAINAGE BUTTOCKS Left 2017    TRANSPLANT LIVER RECIPIENT  DONOR N/A 2024    Procedure: Transplant liver recipient  donor;  Surgeon: Fernando Colon MD;  Location: [facility] OR          Allergies:    Allergies   Allergen Reactions    Azithromycin Rash              Medication History:    Current Outpatient Medications   Medication Sig Dispense Refill    acetaminophen (TYLENOL) 325 MG tablet Take 2 tablets (650 mg) by mouth every 8 hours as needed for mild pain or fever. 60 tablet 0    aspirin (ASA) 81 MG chewable tablet Take 1 tablet (81 mg) by mouth daily. 90 tablet 3    escitalopram (LEXAPRO) 20 MG tablet Take 1 tablet (20 mg) by mouth daily. (Patient not taking: Reported on 2025) 30 tablet 0    hydrOXYzine HCl (ATARAX) 25 MG tablet Take 1 tablet (25 mg) by mouth every 8 hours as needed for anxiety or other (adjuvant pain). 20 tablet 0    magnesium oxide (MAG-OX) 400 MG tablet Take 2 tablets (800 mg) by mouth 2 times daily. 120 tablet 11    methocarbamol (ROBAXIN) 750 MG tablet Take 1 tablet (750 mg) by mouth 3 times daily as needed for muscle spasms. 20 tablet 0    multivitamin w/minerals (CERTAVITE/ANTIOXIDANTS) tablet Take 1 tablet by mouth daily. 90 tablet 3    mycophenolate (GENERIC EQUIVALENT) 250 MG capsule Take 3 capsules (750 mg) by mouth 2 times daily. 180 capsule 1    omeprazole (PRILOSEC) 20 MG DR capsule Take 1 capsule (20 mg) by mouth daily. 90 capsule 1    tacrolimus (GENERIC EQUIVALENT) 0.5 MG capsule Take 1 capsule (0.5 mg) by mouth every 12 hours. Total dose 1.5mg BID 60 capsule 11    tacrolimus (GENERIC EQUIVALENT) 1 MG capsule Take 1 capsule (1 mg) by mouth every 12 hours. Total dose 1.5mg BID 60 capsule 11    tacrolimus (GENERIC EQUIVALENT) 5 MG capsule Take 5 mg by mouth 2 times daily as needed (for dose changes).      traMADol (ULTRAM) 50 MG tablet Take 1 tablet (50 mg) by mouth 2 times daily as  needed for severe pain. 20 tablet 0    traZODone (DESYREL) 50 MG tablet Take 1 tablet (50 mg) by mouth at bedtime. 30 tablet 0     No current facility-administered medications for this encounter.         Tobacco History:  reports that he has never smoked. He has never used smokeless tobacco.       REVIEW OF SYMPTOMS:   The review of systems was negative except as noted in the HPI.           PHYSICAL EXAMINATION:     BP (!) 136/97 (BP Location: Left arm, Patient Position: Sitting, Cuff Size: Adult Regular)   Pulse 88   Temp 96.9  F (36.1  C) (Temporal)   Resp 20            GENERAL: The patient overall appears well and is no acute distress.   HEAD: normocephalic   EYES: Sclera and conjunctiva clear   NECK: no obvious masses   LUNGS: breathing is unlabored.   EXTREMITIES: No clubbing, cyanosis or edema   SKIN: No rashes or other abnormalities except as noted under the Wound section below.   NEUROLOGICAL: normal motor and sensory function       WOUND/ulcer: The wound appears healthy with no sign of infection.   Wound bed: granulation tissue  Periwound: healthy intact skin  There are just 3 tiny open areas remaining, the wounds are nearly healed.      Also see below for wound details:     Circumferential volume measures:             No data to display                Ulceration(s)/Wound(s):   Please see the media tab under the chart review for pictures of the wounds.  Nursing staff removed dressings and cleansed wound.    Wound (used by [facility] only) 11/12/24 1013 abdomen Right surgical (Active)   Thickness/Stage full thickness 02/18/25 1406   Base granulating;hypergranulation;pink 02/18/25 1406   Periwound intact 02/18/25 1406   Periwound Temperature warm 02/18/25 1406   Periwound Skin Turgor soft 02/18/25 1406   Edges open 02/18/25 1406   Length (cm) 0.7 02/18/25 1406   Width (cm) 7.8 02/18/25 1406   Depth (cm) 0.5 02/18/25 1406   Wound (cm^2) 5.46 cm^2 02/18/25 1406   Wound Volume (cm^3) 2.73 cm^3 02/18/25 1406    Wound healing % -143.75 02/18/25 1406   Drainage Characteristics/Odor serosanguineous 02/18/25 1406   Drainage Amount moderate 02/18/25 1406   Care, Wound non-select wound debridement performed. 02/18/25 1406       Wound (used by [facility] only) 11/12/24 1014 abdomen Left surgical (Active)   Thickness/Stage full thickness 02/18/25 1406   Base granulating 02/18/25 1406   Periwound intact 02/18/25 1406   Periwound Temperature warm 02/18/25 1406   Periwound Skin Turgor soft 02/18/25 1406   Edges open 02/18/25 1406   Length (cm) 0.2 02/18/25 1406   Width (cm) 0.7 02/18/25 1406   Depth (cm) 1.3 02/18/25 1406   Wound (cm^2) 0.14 cm^2 02/18/25 1406   Wound Volume (cm^3) 0.18 cm^3 02/18/25 1406   Wound healing % 99.47 02/18/25 1406   Drainage Characteristics/Odor serosanguineous 02/18/25 1406   Drainage Amount moderate 02/18/25 1406   Care, Wound non-select wound debridement performed. 02/18/25 1406                   Recent Labs   Lab Test 09/28/24  1207 09/23/24  1716   A1C 5.4 4.7          Recent Labs   Lab Test 11/11/24  0734 11/07/24  0728 11/04/24  0814   ALBUMIN 4.1 4.0 4.1              No sharp debridement performed today.                  ASSESSMENT:   This is a 40 year old  male with abdominal surgical incisions.          PLAN:   They will switch to bandaging the wounds with Fibracol followed by a silicone adhesive bandage changed once a day for another 2 to 3 weeks until the wounds have healed after this no further bandages to be required.    I have encouraged the patient to continue on their high protein diet to aid in wound healing.   The patient will return to the wound clinic on an as-needed basis if further wounds develop.        30 minutes spent on the date of the encounter doing chart review, history and exam, documentation and further activities per the note, this time excludes any procedure time      Juan Garcia MD  02/18/2025   2:24 PM   [facility]  [phone]    This  "note was electronically signed by Juan Garcia MD      Further instructions from your care team         02/18/2025   Jagpamela Jiménezs   1985    A DME order for supplies has been placed to [employer]. If there are any issues with your order including not receiving the order please call our clinic at [phone]. Do not call [employer]. We are better able to help you. We can contact the [employer] rep to better assist than if you call the general [employer] number. We can provide a tracking number also if needed.     [employer] is now sending an ancillary kit free of charge. This kit includes gauze, gloves, and saline. You will receive 1 kit per 15 days of the supply order. We typically order 30 days of supplies so you will receive 2 kits. Please let us know if you would not like to receive this kit and we can communicate this to [employer].    Dressing changes outside of clinic are being performed by  Mom and Primary Care Nurse    Plan 02/18/2025    Ok to shower. Remove bandages before or during showering. Clean with a mild, unscented soap. Pat dry after showering and apply new dressings as directed below.  Do not soak wounds in water. No Bathtubs, pools, lakes, etc  Keep bandaging with both Fibracol and Zetuvit until no more drainage seen on dressings for 2 days. Then continue to bandage with Zetuvit for another 10 days. After no drainage for 10-14 days can stop bandaging.  No additional appointments made for here at the wound clinic. If you have any new concerns please call us for an appointment.    Wound Dressing Change: Right abdomen  - Wash your hands with soap and water and prepare a clean surface for dressings.  - Wash the outside of wound with mild unscented soap and water (such as Cetaphil, Cerave or Dove)  - Tuck 1/4 of a 4x5 Fibracol into wound bed  - Cover with one 5x6\" oval Zetuvit Silicone Border  Change daily and as needed for soiling/saturation     Wound Dressing Change: Left abdomen  - Wash your hands with " soap and water and prepare a clean surface for dressings.  - Wash the outside of wound with mild unscented soap and water (such as Cetaphil, Cerave or Dove)  - Tuck 1/2 of a 4x5 Fibracol into wound bed  - Cover with one 3x3 zetuvit silicone border  Change daily and as needed for soiling/saturation          Main Provider: Juan Garcia M.D. February 18, 2025    Call us at [phone] if you have any questions about your wounds, if you have redness or swelling around your wound, have a fever of 101 degrees Fahrenheit or greater or if you have any other problems or concerns. We answer the phone Monday through Friday 8 am to 4 pm, please leave a message as we check the voicemail frequently throughout the day. If you have a concern over the weekend, please leave a message and we will return your call Monday. If the need is urgent, go to the ER or urgent care.    If you had a positive experience please indicate that on your patient satisfaction survey form that [employer] will be sending you.    It was a pleasure meeting with you today.  Thank you for allowing me and my team the privilege of caring for you today.  YOU are the reason we are here, and I truly hope we provided you with the excellent service you deserve.  Please let us know if there is anything else we can do for you so that we can be sure you are leaving completely satisfied with your care experience.      If you have any billing related questions please call the [facility] Business office at [phone]. The clinic staff does not handle billing related matters.    If you are scheduled to have a follow up appointment, you will receive a reminder call the day before your visit. On the appointment day please arrive 15 minutes prior to your appointment time. If you are unable to keep that appointment, please call the clinic to cancel or reschedule. If you are more than 10 minutes late or greater for your scheduled appointment time, the clinic  policy is that you may be asked to reschedule.         ,

## 2025-02-20 ENCOUNTER — TELEPHONE (OUTPATIENT)
Dept: GASTROENTEROLOGY | Facility: CLINIC | Age: 40
End: 2025-02-20
Payer: MEDICAID

## 2025-02-20 NOTE — TELEPHONE ENCOUNTER
Patient confirmed scheduled appointment:     Date: 8/11/25  Time: 10:00 am  Appointment Type: Liver Post-Return TXP HEPT  Provider: Dr. Thomas Leventhal  Location: [place]  Testing/imaging: Labs  Additional Notes:

## 2025-02-24 ENCOUNTER — TELEPHONE (OUTPATIENT)
Dept: WOUND CARE | Facility: CLINIC | Age: 40
End: 2025-02-24
Payer: MEDICAID

## 2025-02-24 ENCOUNTER — TELEPHONE (OUTPATIENT)
Dept: TRANSPLANT | Facility: CLINIC | Age: 40
End: 2025-02-24
Payer: MEDICAID

## 2025-02-24 DIAGNOSIS — Z94.4 LIVER REPLACED BY TRANSPLANT (H): Primary | ICD-10-CM

## 2025-02-24 NOTE — LETTER
OUTPATIENT LABORATORY TEST ORDER  [facility]  Fax#[phone]    Patient Name: Jag Smith   YOB: 1985     [facility] MR# [if applicable]: [id number]   Date & Time: 02/24/25 12:48 PM   Expiration Date: 1 year after date issued    Diagnosis: Liver Transplant (ICD-10 Z94.4)   Aftercare following organ transplant (ICD-10 Z48.288)   Long term use of medications (ICD-10 Z79.899)   Contact with and (suspected) exposure to other viral communicable    diseases (Z20.828)      We ask your assistance in obtaining the following laboratory tests, which are part of our routine surveillance program for Solid Organ Transplant patients.     Please fax each result to [phone], same day as resulted/available    Critical lab results page [phone]    Add to next lab draw: Alkaline phosphatase isoenzyme     If you have any questions, please call [facility]- [phone] or [phone], Fax- [phone].      Thomas M. Leventhal, M.D.   of Medicine  Advanced & Transplant Hepatology  [facility]

## 2025-02-24 NOTE — TELEPHONE ENCOUNTER
No fax found for Dr. Garcia to sign for this patient. Call placed to [facility] to inform them of this. The fax number was confirmed. They are sending another fax today. Will watch for this fax and will fax back once signed by Dr. Garcia.

## 2025-02-24 NOTE — TELEPHONE ENCOUNTER
----- Message from Thomas M. Leventhal sent at 2/23/2025  1:33 PM CST -----  Can you please add on an Alkaline phosphatase isoenzyme with next lab in ~ 2 weeks  ----- Message -----  From: Echo Holt RN  Sent: 2/21/2025   1:31 PM CST  To: Thomas Michael Leventhal, MD; #    Alk Phos remains elevated, 165. US completed 2/18 normal with no biliary issues.   Echo Holt RN   Transplant Coordinator   [phone]

## 2025-02-24 NOTE — TELEPHONE ENCOUNTER
[facility]    Who is the name of the provider?:  Radha    What is the location you see this provider at/preferred location?: [facility]  [facility]  Phone number:  [phone]   Nurse call back needed:  YES   Can we leave a detailed message on this number?  YES     Reason for call:  Calling to confirm that wound care order faxed to [facility] on 2/20/25 has been received.  Needs to be signed and returned to [place].    Pharmacy location:  [place]

## 2025-02-25 ENCOUNTER — TELEPHONE (OUTPATIENT)
Dept: TRANSPLANT | Facility: CLINIC | Age: 40
End: 2025-02-25
Payer: MEDICAID

## 2025-02-25 DIAGNOSIS — Z94.4 LIVER REPLACED BY TRANSPLANT (H): ICD-10-CM

## 2025-02-25 RX ORDER — TACROLIMUS 1 MG/1
CAPSULE ORAL
Qty: 90 CAPSULE | Refills: 11 | Status: SHIPPED | OUTPATIENT
Start: 2025-02-25

## 2025-02-25 RX ORDER — TACROLIMUS 0.5 MG/1
0.5 CAPSULE ORAL EVERY MORNING
Qty: 30 CAPSULE | Refills: 11 | Status: SHIPPED | OUTPATIENT
Start: 2025-02-25

## 2025-02-25 NOTE — TELEPHONE ENCOUNTER
ISSUE:   Tacrolimus IR level 5.2 on 02/19/25, goal 6, dose 1.5 mg BID.    PLAN:   Call Patient and confirm this was an accurate 12-hour trough.   Verify Tacrolimus IR dose 1.5 mg BID.   Confirm no new medications or illness.   Confirm no missed doses.     If accurate trough and accurate dose, increase Tacrolimus IR dose to 1.5mg AM and 2mg PM.    Repeat labs in 2 weeks.     Karena Villegas RN     OUTCOME:   Spoke with Patient, they confirm accurate trough level and current dose 1.5 mg BID.   Patient confirmed dose change to 1.5 mg am, 2 mg pm.  Patient agreed to repeat labs in 1 weeks.   Orders sent to preferred pharmacy for dose change and lab for repeat labs.   Patient voiced understanding of plan.     Magda Gant LPN

## 2025-02-26 ENCOUNTER — MEDICAL CORRESPONDENCE (OUTPATIENT)
Dept: HEALTH INFORMATION MANAGEMENT | Facility: CLINIC | Age: 40
End: 2025-02-26
Payer: MEDICAID

## 2025-02-26 ENCOUNTER — TELEPHONE (OUTPATIENT)
Dept: WOUND CARE | Facility: CLINIC | Age: 40
End: 2025-02-26
Payer: MEDICAID

## 2025-02-26 NOTE — TELEPHONE ENCOUNTER
[facility] contacted. Awaiting response. [facility] has not contacted [facility] that any information or documentation is missing.

## 2025-02-26 NOTE — TELEPHONE ENCOUNTER
[facility] responded that they are waiting for the signature on the [facility] form from Dr. Garcia for the order. [facility] faxed the order. The order was received, signed, and faxed back to [facility]. No order had previously been received by [facility]. Call returned to patient informing him of this.

## 2025-02-26 NOTE — TELEPHONE ENCOUNTER
[facility]    Who is the name of the provider?:  Radha    What is the location you see this provider at/preferred location?: [facility] [place] [facility]  Person calling / Facility: Self  Phone number:  [phone]  Nurse call back needed:  YES   Can we leave a detailed message on this number?  YES     Reason for call:  [employer] is telling him the order for supplies is on hold, requiring further documentation from provider.  Nearly out of those supplies.     Pharmacy location:  NA

## 2025-03-03 DIAGNOSIS — Z94.4 LIVER REPLACED BY TRANSPLANT (H): ICD-10-CM

## 2025-03-03 RX ORDER — MYCOPHENOLATE MOFETIL 250 MG/1
750 CAPSULE ORAL 2 TIMES DAILY
Qty: 180 CAPSULE | Refills: 1 | Status: SHIPPED | OUTPATIENT
Start: 2025-03-03

## 2025-03-05 DIAGNOSIS — Z94.4 LIVER REPLACED BY TRANSPLANT (H): ICD-10-CM

## 2025-03-05 RX ORDER — MYCOPHENOLATE MOFETIL 250 MG/1
750 CAPSULE ORAL 2 TIMES DAILY
Qty: 180 CAPSULE | Refills: 1 | OUTPATIENT
Start: 2025-03-05

## 2025-03-10 DIAGNOSIS — Z94.4 LIVER REPLACED BY TRANSPLANT (H): ICD-10-CM

## 2025-03-10 RX ORDER — MYCOPHENOLATE MOFETIL 250 MG/1
750 CAPSULE ORAL 2 TIMES DAILY
Qty: 180 CAPSULE | Refills: 1 | OUTPATIENT
Start: 2025-03-10

## 2025-03-11 ENCOUNTER — TELEPHONE (OUTPATIENT)
Dept: TRANSPLANT | Facility: CLINIC | Age: 40
End: 2025-03-11
Payer: MEDICAID

## 2025-03-11 DIAGNOSIS — Z94.4 LIVER REPLACED BY TRANSPLANT (H): Primary | ICD-10-CM

## 2025-03-11 DIAGNOSIS — T14.8XXA SKIN WOUND FROM SURGICAL INCISION: ICD-10-CM

## 2025-03-11 NOTE — TELEPHONE ENCOUNTER
Aparna dressing change nurse stated she would like to follow up with coordinator, stated patient has some concerns that his wound isn't healing, and that she sent lab value's out today. Requesting a call back.

## 2025-03-11 NOTE — TELEPHONE ENCOUNTER
ISSUE:  Return call to wound nurse Sindy    PLAN:   Nurse Sindy states wound is looking great. It does keep closing and reopening with additional tunneling. Concern for not being packed well enough at home. Having home care M/W/F for wound care. Nurse states wound does not appear to be infectious, but is not healing and has increased tunneling.     OUTCOME:   Return visit ordered for surgical AZAL[facility] to assess incision site and healing. Attempted to call patient, no answer. Message left via my chart. Staff message to AZALIA team to update on plan.     Echo Holt, BRENNA   Transplant Coordinator  [phone]

## 2025-03-12 ENCOUNTER — TELEPHONE (OUTPATIENT)
Dept: TRANSPLANT | Facility: CLINIC | Age: 40
End: 2025-03-12
Payer: MEDICAID

## 2025-03-12 NOTE — TELEPHONE ENCOUNTER
Patient Call: General  Route to LPN    Reason for call: Pt called to speak to RNCC. [facility] Scheduling called him to schedule, but pt was not sure what the ordered appointment is for and why it's necessary. Please call back ASAP    Call back needed? Yes    Return Call Needed  Same as documented in contacts section  When to return call?: Same day: Route [facility]

## 2025-03-12 NOTE — TELEPHONE ENCOUNTER
Spoke with patient regarding upcoming appointment on 3/18. Encouraged patient to attend this appointment to identify health care needs regarding unhealing incision. Patient expressed frustration regarding coming this appointment but states he will attend. Encouraged to call back with concerns/questions.

## 2025-03-13 DIAGNOSIS — Z94.4 LIVER REPLACED BY TRANSPLANT (H): ICD-10-CM

## 2025-03-13 RX ORDER — MYCOPHENOLATE MOFETIL 250 MG/1
750 CAPSULE ORAL 2 TIMES DAILY
Qty: 180 CAPSULE | Refills: 1 | OUTPATIENT
Start: 2025-03-13

## 2025-03-18 ENCOUNTER — OFFICE VISIT (OUTPATIENT)
Dept: TRANSPLANT | Facility: CLINIC | Age: 40
End: 2025-03-18
Attending: TRANSPLANT SURGERY
Payer: MEDICAID

## 2025-03-18 ENCOUNTER — HOSPITAL ENCOUNTER (OUTPATIENT)
Dept: WOUND CARE | Facility: CLINIC | Age: 40
Discharge: HOME | End: 2025-03-18
Attending: FAMILY MEDICINE | Admitting: FAMILY MEDICINE
Payer: MEDICAID

## 2025-03-18 ENCOUNTER — LAB (OUTPATIENT)
Dept: LAB | Facility: CLINIC | Age: 40
End: 2025-03-18
Payer: MEDICAID

## 2025-03-18 VITALS
HEART RATE: 108 BPM | OXYGEN SATURATION: 98 % | RESPIRATION RATE: 16 BRPM | TEMPERATURE: 98 F | SYSTOLIC BLOOD PRESSURE: 136 MMHG | BODY MASS INDEX: 33.76 KG/M2 | DIASTOLIC BLOOD PRESSURE: 82 MMHG | WEIGHT: 248.9 LBS

## 2025-03-18 VITALS
DIASTOLIC BLOOD PRESSURE: 76 MMHG | HEART RATE: 90 BPM | RESPIRATION RATE: 16 BRPM | TEMPERATURE: 98 F | SYSTOLIC BLOOD PRESSURE: 145 MMHG

## 2025-03-18 DIAGNOSIS — Z94.4 LIVER REPLACED BY TRANSPLANT (H): ICD-10-CM

## 2025-03-18 DIAGNOSIS — J02.9 SORE THROAT: ICD-10-CM

## 2025-03-18 DIAGNOSIS — T14.8XXA SKIN WOUND FROM SURGICAL INCISION: ICD-10-CM

## 2025-03-18 DIAGNOSIS — J02.9 SORE THROAT: Primary | ICD-10-CM

## 2025-03-18 DIAGNOSIS — T81.89XD NON-HEALING SURGICAL WOUND, SUBSEQUENT ENCOUNTER: Primary | ICD-10-CM

## 2025-03-18 DIAGNOSIS — L98.492 SKIN ULCER OF ABDOMEN WITH FAT LAYER EXPOSED (H): ICD-10-CM

## 2025-03-18 LAB
ALBUMIN SERPL BCG-MCNC: 4.4 G/DL (ref 3.5–5.2)
ALP SERPL-CCNC: 151 U/L (ref 40–150)
ALT SERPL W P-5'-P-CCNC: 17 U/L (ref 0–70)
ANION GAP SERPL CALCULATED.3IONS-SCNC: 11 MMOL/L (ref 7–15)
AST SERPL W P-5'-P-CCNC: 17 U/L (ref 0–45)
BASOPHILS # BLD AUTO: 0 10E3/UL (ref 0–0.2)
BASOPHILS NFR BLD AUTO: 0 %
BILIRUB SERPL-MCNC: 0.2 MG/DL
BUN SERPL-MCNC: 13.4 MG/DL (ref 6–20)
C PNEUM DNA SPEC QL NAA+PROBE: NOT DETECTED
CALCIUM SERPL-MCNC: 9.3 MG/DL (ref 8.8–10.4)
CHLORIDE SERPL-SCNC: 106 MMOL/L (ref 98–107)
CREAT SERPL-MCNC: 1.34 MG/DL (ref 0.67–1.17)
EGFRCR SERPLBLD CKD-EPI 2021: 70 ML/MIN/1.73M2
EOSINOPHIL # BLD AUTO: 0.1 10E3/UL (ref 0–0.7)
EOSINOPHIL NFR BLD AUTO: 1 %
ERYTHROCYTE [DISTWIDTH] IN BLOOD BY AUTOMATED COUNT: 13.5 % (ref 10–15)
FLUAV H1 2009 PAND RNA SPEC QL NAA+PROBE: NOT DETECTED
FLUAV H1 RNA SPEC QL NAA+PROBE: NOT DETECTED
FLUAV H3 RNA SPEC QL NAA+PROBE: NOT DETECTED
FLUAV RNA SPEC QL NAA+PROBE: NOT DETECTED
FLUBV RNA SPEC QL NAA+PROBE: NOT DETECTED
GLUCOSE SERPL-MCNC: 131 MG/DL (ref 70–99)
HADV DNA SPEC QL NAA+PROBE: NOT DETECTED
HCO3 SERPL-SCNC: 22 MMOL/L (ref 22–29)
HCOV PNL SPEC NAA+PROBE: NOT DETECTED
HCT VFR BLD AUTO: 38.2 % (ref 40–53)
HGB BLD-MCNC: 12.3 G/DL (ref 13.3–17.7)
HMPV RNA SPEC QL NAA+PROBE: NOT DETECTED
HPIV1 RNA SPEC QL NAA+PROBE: NOT DETECTED
HPIV2 RNA SPEC QL NAA+PROBE: NOT DETECTED
HPIV3 RNA SPEC QL NAA+PROBE: NOT DETECTED
HPIV4 RNA SPEC QL NAA+PROBE: NOT DETECTED
IMM GRANULOCYTES # BLD: 0.1 10E3/UL
IMM GRANULOCYTES NFR BLD: 1 %
LYMPHOCYTES # BLD AUTO: 2.3 10E3/UL (ref 0.8–5.3)
LYMPHOCYTES NFR BLD AUTO: 31 %
M PNEUMO DNA SPEC QL NAA+PROBE: NOT DETECTED
MCH RBC QN AUTO: 29.2 PG (ref 26.5–33)
MCHC RBC AUTO-ENTMCNC: 32.2 G/DL (ref 31.5–36.5)
MCV RBC AUTO: 91 FL (ref 78–100)
MONOCYTES # BLD AUTO: 0.5 10E3/UL (ref 0–1.3)
MONOCYTES NFR BLD AUTO: 7 %
NEUTROPHILS # BLD AUTO: 4.5 10E3/UL (ref 1.6–8.3)
NEUTROPHILS NFR BLD AUTO: 60 %
NRBC # BLD AUTO: 0 10E3/UL
NRBC BLD AUTO-RTO: 0 /100
PLATELET # BLD AUTO: 235 10E3/UL (ref 150–450)
POTASSIUM SERPL-SCNC: 4.4 MMOL/L (ref 3.4–5.3)
PROT SERPL-MCNC: 7 G/DL (ref 6.4–8.3)
RBC # BLD AUTO: 4.21 10E6/UL (ref 4.4–5.9)
RSV RNA SPEC QL NAA+PROBE: NOT DETECTED
RSV RNA SPEC QL NAA+PROBE: NOT DETECTED
RV+EV RNA SPEC QL NAA+PROBE: NOT DETECTED
SODIUM SERPL-SCNC: 139 MMOL/L (ref 135–145)
WBC # BLD AUTO: 7.6 10E3/UL (ref 4–11)

## 2025-03-18 PROCEDURE — 85025 COMPLETE CBC W/AUTO DIFF WBC: CPT | Performed by: PATHOLOGY

## 2025-03-18 PROCEDURE — 87635 SARS-COV-2 COVID-19 AMP PRB: CPT | Performed by: NURSE PRACTITIONER

## 2025-03-18 PROCEDURE — 87633 RESP VIRUS 12-25 TARGETS: CPT | Performed by: NURSE PRACTITIONER

## 2025-03-18 PROCEDURE — 99214 OFFICE O/P EST MOD 30 MIN: CPT | Performed by: FAMILY MEDICINE

## 2025-03-18 PROCEDURE — 86704 HEP B CORE ANTIBODY TOTAL: CPT | Performed by: INTERNAL MEDICINE

## 2025-03-18 PROCEDURE — 80053 COMPREHEN METABOLIC PANEL: CPT | Performed by: PATHOLOGY

## 2025-03-18 PROCEDURE — 87581 M.PNEUMON DNA AMP PROBE: CPT | Performed by: NURSE PRACTITIONER

## 2025-03-18 PROCEDURE — 99000 SPECIMEN HANDLING OFFICE-LAB: CPT | Performed by: PATHOLOGY

## 2025-03-18 PROCEDURE — 86706 HEP B SURFACE ANTIBODY: CPT | Performed by: INTERNAL MEDICINE

## 2025-03-18 PROCEDURE — 97602 WOUND(S) CARE NON-SELECTIVE: CPT

## 2025-03-18 PROCEDURE — 87486 CHLMYD PNEUM DNA AMP PROBE: CPT | Performed by: NURSE PRACTITIONER

## 2025-03-18 PROCEDURE — 36415 COLL VENOUS BLD VENIPUNCTURE: CPT | Performed by: PATHOLOGY

## 2025-03-18 NOTE — PROGRESS NOTES
Wound Clinic Note          Visit date: 03/18/2025       Cheif Complaint:     Jag Smith is a 40 year old  male had concerns including WOUND CARE.  He has an abdominal surgical wound.      HISTORY OF PRESENT ILLNESS:    Jag Smith reports the ulcer has been present since September 2024.  The wound began after a recent surgery and the incision did not heal normally.   He had a liver transplant on September 26, 2024, postoperatively a portion of the incision dehisced.      I last saw this patient in the wound clinic on February 18, 2025 at which time he had to very shallow open areas remaining 1 on the right side 1 on the left side.  There was not significant depth requiring packing anymore so I recommended that they bandaged both areas with Fibracol collagen bandage followed by a silicone adhesive bandage changed once a day or every other day depending on the drainage.  I advised them that I expected these final little wounds would heal up within the next 2 to 3 weeks and then no further bandages to be required.  We decided together that they would follow-up in the wound clinic on an as-needed basis.    The patient is accompanied to the wound clinic today by his mother who does his dressing changes and they both provide portions of the interval history.  The patient's mother reports that within just a few days of the last appointment with me she noted that the right abdominal wound was quite a bit deeper than it was when I saw him.  She continue to use the above bandage regiment and did not go back to using the packing strips that we have been using previously.    Since his last clinic visit with me the patient's mother has been changing the bandages every day with Fibracol and a silicone adhesive bandage.  There is been light serous drainage from the wounds.  There has been no difficulties with the dressing changes.        The pateint denies fevers or chills.  They report the pain from the wound  has been 0/10 and has remained about the same recently.      Today the patient reports maintaining a high protein diet and taking protein supplements regularly.        He does not have diabetes and does not smoke cigarettes.  He takes tacrolimus, mycophenolate and prednisone to prevent rejection.  He does not take any antibiotics for prophylaxis.        The patient has not had any symptoms of infection relating to the wound recently and is not currently on antibiotics.       Problem List:   Past Medical History:   Diagnosis Date    Alcohol use disorder     Alcoholic hepatitis (H) 2024    Anxiety     Depression     Hypertension     Liver replaced by transplant (H) 2024              Family Hx: family history includes Alcoholism in his brother; Alzheimer Disease (age of onset: 94) in his maternal grandmother; Anxiety Disorder in his brother; Depression in his brother; Diabetes in his maternal aunt; Substance Abuse in his brother.       Surgical Hx:   Past Surgical History:   Procedure Laterality Date    BENCH LIVER  2024    Procedure: Bench liver;  Surgeon: Fernando Colon MD;  Location: [facility] OR    DACRYOCYSTORHINOSTOMY Left 2013    INCISION AND DRAINAGE BUTTOCKS Left 2017    TRANSPLANT LIVER RECIPIENT  DONOR N/A 2024    Procedure: Transplant liver recipient  donor;  Surgeon: Fernando Colon MD;  Location: [facility] OR          Allergies:    Allergies   Allergen Reactions    Azithromycin Rash              Medication History:    Current Outpatient Medications   Medication Sig Dispense Refill    acetaminophen (TYLENOL) 325 MG tablet Take 2 tablets (650 mg) by mouth every 8 hours as needed for mild pain or fever. 60 tablet 0    aspirin (ASA) 81 MG chewable tablet Take 1 tablet (81 mg) by mouth daily. 90 tablet 3    escitalopram (LEXAPRO) 20 MG tablet Take 1 tablet (20 mg) by mouth daily. (Patient not taking: Reported on 3/18/2025) 30 tablet 0    hydrOXYzine HCl (ATARAX) 25 MG tablet  Take 1 tablet (25 mg) by mouth every 8 hours as needed for anxiety or other (adjuvant pain). 20 tablet 0    magnesium oxide (MAG-OX) 400 MG tablet Take 2 tablets (800 mg) by mouth 2 times daily. 120 tablet 11    melatonin 1 MG CAPS Take by mouth.      multivitamin w/minerals (CERTAVITE/ANTIOXIDANTS) tablet Take 1 tablet by mouth daily. 90 tablet 3    mycophenolate (GENERIC EQUIVALENT) 250 MG capsule Take 3 capsules (750 mg) by mouth 2 times daily. 180 capsule 1    omeprazole (PRILOSEC) 20 MG DR capsule Take 1 capsule (20 mg) by mouth daily. (Patient not taking: Reported on 3/18/2025) 90 capsule 1    tacrolimus (GENERIC EQUIVALENT) 0.5 MG capsule Take 1 capsule (0.5 mg) by mouth 2 times daily as needed (for dose changes). 30 capsule 11    tacrolimus (GENERIC EQUIVALENT) 1 MG capsule Take 2 capsules (2 mg) by mouth every 12 hours. 120 capsule 11    tacrolimus (GENERIC EQUIVALENT) 5 MG capsule Take 5 mg by mouth 2 times daily as needed (for dose changes).      traMADol (ULTRAM) 50 MG tablet Take 1 tablet (50 mg) by mouth 2 times daily as needed for severe pain. (Patient not taking: Reported on 3/18/2025) 20 tablet 0    traZODone (DESYREL) 50 MG tablet Take 1 tablet (50 mg) by mouth at bedtime. 30 tablet 0     No current facility-administered medications for this encounter.         Tobacco History:  reports that he has never smoked. He has never used smokeless tobacco.       REVIEW OF SYMPTOMS:   The review of systems was negative except as noted in the HPI.           PHYSICAL EXAMINATION:     BP (!) 145/76 (BP Location: Left arm, Patient Position: Chair, Cuff Size: Adult Regular)   Pulse 90   Temp 98  F (36.7  C) (Temporal)   Resp 16            GENERAL: The patient overall appears well and is no acute distress.   HEAD: normocephalic   EYES: Sclera and conjunctiva clear   NECK: no obvious masses   LUNGS: breathing is unlabored.   EXTREMITIES: No clubbing, cyanosis or edema   SKIN: No rashes or other abnormalities  except as noted under the Wound section below.   NEUROLOGICAL: normal motor and sensory function       WOUND/ulcer: The wound appears healthy with no sign of infection.   Wound bed: granulation tissue  Periwound: healthy intact skin  The left lateral abdominal wound is still very shallow and small.  I probed this area extensively to make sure there was no deeper areas and there was not.  The right lateral abdominal wound is deeper as the patient's mother described however there is not any large cavity here there is just a narrow tunnel that goes straight down for about 2 cm.  There is no purulent material expressed from this area.      Also see below for wound details:     Circumferential volume measures:             No data to display                Ulceration(s)/Wound(s):   Please see the media tab under the chart review for pictures of the wounds.  Nursing staff removed dressings and cleansed wound.    Wound (used by [facility] only) 11/12/24 1013 abdomen Right surgical (Active)   Thickness/Stage full thickness 03/18/25 1426   Base granulating 03/18/25 1426   Periwound intact 03/18/25 1426   Periwound Temperature warm 03/18/25 1426   Periwound Skin Turgor soft 03/18/25 1426   Edges open 03/18/25 1426   Length (cm) 0.2 03/18/25 1426   Width (cm) 0.4 03/18/25 1426   Depth (cm) 2.1 03/18/25 1426   Wound (cm^2) 0.08 cm^2 03/18/25 1426   Wound Volume (cm^3) 0.17 cm^3 03/18/25 1426   Wound healing % 96.43 03/18/25 1426   Drainage Characteristics/Odor serosanguineous 03/18/25 1426   Drainage Amount moderate 03/18/25 1426   Care, Wound non-select wound debridement performed. 03/18/25 1426       Wound (used by [facility] only) 11/12/24 1014 abdomen Left surgical (Active)   Thickness/Stage full thickness 03/18/25 1426   Base granulating 03/18/25 1426   Periwound intact 03/18/25 1426   Periwound Temperature warm 03/18/25 1426   Periwound Skin Turgor soft 03/18/25 1426   Edges open 03/18/25 1426   Length (cm) 0.3 03/18/25 1426    Width (cm) 0.3 03/18/25 1426   Depth (cm) 0.8 03/18/25 1426   Wound (cm^2) 0.09 cm^2 03/18/25 1426   Wound Volume (cm^3) 0.07 cm^3 03/18/25 1426   Wound healing % 99.66 03/18/25 1426   Drainage Characteristics/Odor serosanguineous 03/18/25 1426   Drainage Amount moderate 03/18/25 1426   Care, Wound non-select wound debridement performed. 03/18/25 1426                     Recent Labs   Lab Test 09/28/24  1207 09/23/24  1716   A1C 5.4 4.7          Recent Labs   Lab Test 11/11/24  0734 11/07/24  0728 11/04/24  0814   ALBUMIN 4.1 4.0 4.1              No sharp debridement performed today.                  ASSESSMENT:   This is a 40 year old  male with abdominal surgical incisions.          PLAN:   For the right abdominal wound I would like them to go back to using the Vashe moistened packing strip followed by a silicone adhesive bandage changed once a day or every other day depending on the drainage.  For the left abdominal wound she can continue to use the Fibracol and the silicone adhesive bandage changed once a day or every other day.    I reassured the patient and his mother that overall there is no sign of pathology here.  No sign of infection.  I think at the last clinic visit there was false bottom to the right abdominal wound which made it appear that the wound was closer to being healed than it was.  This false bottom has now broken away and we see the true depth of the wound.  I think this will still heal just fine with appropriate bandaging.    I have encouraged the patient to continue on their high protein diet to aid in wound healing.   The patient will return to the wound clinic to see me again in 3 weeks.        30 minutes spent on the date of the encounter doing chart review, history and exam, documentation and further activities per the note, this time excludes any procedure time      Juan Garcia MD  03/18/2025   2:52 PM   [facility]/Wound  [phone]    This note was  "electronically signed by Juan Garcia MD      Further instructions from your care team         03/18/2025   Jag Smith   1985    A DME order for supplies has been placed to [employer]. If there are any issues with your order including not receiving the order please call our clinic at [phone]. Do not call [employer]. We are better able to help you. We can contact the [employer] rep to better assist than if you call the general [employer] number. We can provide a tracking number also if needed.     [employer] is now sending an ancillary kit free of charge. This kit includes gauze, gloves, and saline. You will receive 1 kit per 15 days of the supply order. We typically order 30 days of supplies so you will receive 2 kits. Please let us know if you would not like to receive this kit and we can communicate this to [employer].    Dressing changes outside of clinic are being performed by  Primary Clinic Nurse & Mom    Plan 03/18/2025   Ok to shower. Remove bandages before or during showering. Clean with a mild, unscented soap. Pat dry after showering and apply new dressings as directed below.  Do not soak wounds in water. No Bathtubs, pools, lakes, etc  Keep bandaging with both Fibracol and Zetuvit until no more drainage seen on dressings for 2 days. Then continue to bandage with Zetuvit for another 10 days. After no drainage for 10-14 days can stop bandaging.  No additional appointments made for here at the wound clinic. If you have any new concerns please call us for an appointment.     Wound Dressing Change: Right abdomen  - Wash your hands with soap and water and prepare a clean surface for dressings.  - Wash the outside of wound with mild unscented soap and water (such as Cetaphil, Cerave or Dove)  - Gently pack in about 2 inches of 1/4\" Plain packing strip moistened with VASHE into wound bed  - Cover with one 3x3 Zetuvit Silicone Border  Change daily and as needed for saturation/soiling/dislodgment     Wound " Dressing Change: Left abdomen  - Wash your hands with soap and water and prepare a clean surface for dressings.  - Wash the outside of wound with mild unscented soap and water (such as Cetaphil, Cerave or Dove)  - Tuck 1/20 of a 4x5 Fibracol into wound bed  - Cover with one 3x3 zetuvit silicone border  Change daily and as needed for saturation/soiling/dislodgment     You do not need to change the dressing on the days you are being seen at the wound clinic     Main Provider: Juan Garcia M.D. March 18, 2025    Call us at [phone] if you have any questions about your wounds, if you have redness or swelling around your wound, have a fever of 101 degrees Fahrenheit or greater or if you have any other problems or concerns. We answer the phone Monday through Friday 8 am to 4 pm, please leave a message as we check the voicemail frequently throughout the day. If you have a concern over the weekend, please leave a message and we will return your call Monday. If the need is urgent, go to the ER or urgent care.    If you had a positive experience please indicate that on your patient satisfaction survey form that [facility] will be sending you.    It was a pleasure meeting with you today.  Thank you for allowing me and my team the privilege of caring for you today.  YOU are the reason we are here, and I truly hope we provided you with the excellent service you deserve.  Please let us know if there is anything else we can do for you so that we can be sure you are leaving completely satisfied with your care experience.      If you have any billing related questions please call the [facility] Business office at [phone]. The clinic staff does not handle billing related matters.    If you are scheduled to have a follow up appointment, you will receive a reminder call the day before your visit. On the appointment day please arrive 15 minutes prior to your appointment time. If you are unable to keep that  appointment, please call the clinic to cancel or reschedule. If you are more than 10 minutes late or greater for your scheduled appointment time, the clinic policy is that you may be asked to reschedule.         ,

## 2025-03-18 NOTE — PROGRESS NOTES
Transplant Surgery -OUTPATIENT IMMUNOSUPPRESSION PROGRESS NOTE    Date of Visit: 2025    Transplants:  2024 (Liver); Postoperative day:  171  ASSESMENT AND PLAN:  1.Graft Function:  2.Immunosuppression Management:  3.Hypertension:  4.Renal Function:  5.Lab frequency:  6.Other:    Date: 2025    Transplant:  [x]                             Liver [x]                              Kidney []                             Pancreas []                              Other:             Chief Complaint:Follow Up (Wound care)    History of Present Illness:    Wounds were improving and now feels more tunneling returned.   No N/V/D.   Bactrim for 1 week         Patient Active Problem List   Diagnosis    Alcohol use disorder, severe, in early remission (H)    Alcoholic hepatitis (H)    Epiphora due to insufficient drainage of left side    Hyperbilirubinemia    Hypokalemia    Jaundice    Pneumonia    Liver transplanted (H)    DENI (acute kidney injury)    Immunosuppressed status    Acute post-operative pain    Steroid-induced hyperglycemia    Acute urinary retention    Anemia due to blood loss, acute    Severe malnutrition    Hyponatremia    Hypomagnesemia    Bilateral lower extremity edema    Hyperkalemia    Subconjunctival hemorrhage    Nonhealing surgical wound    Skin ulcer of abdomen with fat layer exposed (H)    Elevated serum creatinine     SOCIAL /FAMILY HISTORY: [x]                  No recent change    Past Medical History:   Diagnosis Date    Alcohol use disorder     Alcoholic hepatitis (H) 2024    Anxiety     Depression     Hypertension     Liver replaced by transplant (H) 2024     Past Surgical History:   Procedure Laterality Date    BENCH LIVER  2024    Procedure: Bench liver;  Surgeon: Fernando Colon MD;  Location: [facility] OR    DACRYOCYSTORHINOSTOMY Left 2013    INCISION AND DRAINAGE BUTTOCKS Left 2017    TRANSPLANT LIVER RECIPIENT  DONOR N/A 2024    Procedure:  Transplant liver recipient  donor;  Surgeon: Fernando Colon MD;  Location:  OR     Social History     Socioeconomic History    Marital status: Single     Spouse name: Not on file    Number of children: Not on file    Years of education: Not on file    Highest education level: Not on file   Occupational History    Not on file   Tobacco Use    Smoking status: Never    Smokeless tobacco: Never   Vaping Use    Vaping status: Never Used   Substance and Sexual Activity    Alcohol use: Not Currently     Comment: last drink 2024    Drug use: Not Currently     Types: Marijuana     Comment: smoking a long time ago    Sexual activity: Not on file   Other Topics Concern    Not on file   Social History Narrative    Not on file     Social Drivers of Health     Financial Resource Strain: Low Risk  (2024)    Financial Resource Strain     Within the past 12 months, have you or your family members you live with been unable to get utilities (heat, electricity) when it was really needed?: No   Food Insecurity: Low Risk  (2024)    Food Insecurity     Within the past 12 months, did you worry that your food would run out before you got money to buy more?: No     Within the past 12 months, did the food you bought just not last and you didn t have money to get more?: No   Transportation Needs: Low Risk  (2024)    Transportation Needs     Within the past 12 months, has lack of transportation kept you from medical appointments, getting your medicines, non-medical meetings or appointments, work, or from getting things that you need?: No   Physical Activity: Not on file   Stress: Not on file   Social Connections: Not on file   Interpersonal Safety: Low Risk  (2025)    Interpersonal Safety     Do you feel physically and emotionally safe where you currently live?: Yes     Within the past 12 months, have you been hit, slapped, kicked or otherwise physically hurt by someone?: No     Within the past 12 months,  have you been humiliated or emotionally abused in other ways by your partner or ex-partner?: No   Housing Stability: Low Risk  (9/21/2024)    Housing Stability     Do you have housing? : Yes     Are you worried about losing your housing?: No     Prescription Medications as of 3/18/2025         Rx Number Disp Refills Start End Last Dispensed Date Next Fill Date Owning Pharmacy    acetaminophen (TYLENOL) 325 MG tablet  60 tablet 0 12/12/2024 --   [address]    Sig: Take 2 tablets (650 mg) by mouth every 8 hours as needed for mild pain or fever.    Class: E-Prescribe    Route: Oral    aspirin (ASA) 81 MG chewable tablet  90 tablet 3 12/26/2024 --   [address]    Sig: Take 1 tablet (81 mg) by mouth daily.    Class: E-Prescribe    Route: Oral    hydrOXYzine HCl (ATARAX) 25 MG tablet  20 tablet 0 12/16/2024 --   [phone]    Sig: Take 1 tablet (25 mg) by mouth every 8 hours as needed for anxiety or other (adjuvant pain).    Class: E-Prescribe    Route: Oral    magnesium oxide (MAG-OX) 400 MG tablet  120 tablet 11 12/26/2024 --   [address]    Sig: Take 2 tablets (800 mg) by mouth 2 times daily.    Class: E-Prescribe    Route: Oral    melatonin 1 MG CAPS  -- --  --       Sig: Take by mouth.    Class: Historical    Route: Oral    multivitamin w/minerals (CERTAVITE/ANTIOXIDANTS) tablet  90 tablet 3 [facility]26[facility]2024 --   [place] [address] [facility]    Sig: Take 1 tablet by mouth daily.    Class: E-Prescribe    Route: Oral    mycophenolate (GENERIC EQUIVALENT) 250 MG capsule  180 capsule 1 3/3/2025 --   [address]    Sig: Take 3 capsules (750 mg) by mouth 2 times daily.    Class: E-Prescribe    Notes to  Pharmacy: TXP DT 9/28/2024 (Liver) TXP Dischg DT 10/9/2024 DX Liver replaced by transplant Z94.4 TX [facility] ([place], [place])    Route: Oral    tacrolimus (GENERIC EQUIVALENT) 0.5 MG capsule  30 capsule 11 2/28/2025 --   [facility]    Sig: Take 1 capsule (0.5 mg) by mouth 2 times daily as needed (for dose changes).    Class: E-Prescribe    Notes to Pharmacy: TXP DT 9/28/2024 (Liver) TXP Dischg DT 10/9/2024 DX Liver replaced by transplant Z94.4 [facility] ([place], [place])    Route: Oral    tacrolimus (GENERIC EQUIVALENT) 1 MG capsule  120 capsule 11 2/28/2025 --   [facility] [address] [facility] [place] [id number]    Sig: Take 2 capsules (2 mg) by mouth every 12 hours.    Class: E-Prescribe    Notes to Pharmacy: TXP DT 9/28/2024 (Liver) TXP Dischg DT 10/9/2024 DX Liver replaced by transplant Z94.4 TX [facility] ([place], [place])    Route: Oral    tacrolimus (GENERIC EQUIVALENT) 5 MG capsule  -- -- 11/5/2024 --       Sig: Take 5 mg by mouth 2 times daily as needed (for dose changes).    Class: Historical    Notes to Pharmacy: TXP DT 9/28/2024 (Liver) TXP Dischg DT 10/9/2024 DX Liver replaced by transplant Z94.4 [facility] ([place], [place])    Route: Oral    traZODone (DESYREL) 50 MG tablet  30 tablet 0 12/2/2024 --   [address]    Sig: Take 1 tablet (50 mg) by mouth at bedtime.    Class: E-Prescribe    Route: Oral    escitalopram (LEXAPRO) 20 MG tablet  30 tablet 0 11/11/2024 --   [address]    Sig: Take 1 tablet (20 mg) by mouth daily.    Class: E-Prescribe    Route: Oral    omeprazole (PRILOSEC) 20 MG DR capsule  90 capsule 1  12/26/2024 --   [place] [address] [facility] - [place], [place] - [address]     Sig: Take 1 capsule (20 mg) by mouth daily.    Class: E-Prescribe    Route: Oral    traMADol (ULTRAM) 50 MG tablet  20 tablet 0 12/12/2024 --   [place], [place] - [address] [facility] [address]    Sig: Take 1 tablet (50 mg) by mouth 2 times daily as needed for severe pain.    Class: E-Prescribe    Route: Oral          Azithromycin   REVIEW OF SYSTEMS (check box if normal)  [x]               GENERAL  [x]                 PULMONARY [x]                GENITOURINARY  [x]                CNS                 [x]                 CARDIAC  [x]                 ENDOCRINE  [x]                EARS,NOSE,THROAT [x]                 GASTROINTESTINAL [x]                 NEUROLOGIC    [x]                MUSCLOSKELTAL  [x]                  HEMATOLOGY      PHYSICAL EXAM (check box if normal)/82 (BP Location: Right arm, Patient Position: Chair, Cuff Size: Adult Regular)   Pulse 108   Temp 98  F (36.7  C)   Resp 16   Wt 112.9 kg (248 lb 14.4 oz)   SpO2 98%   BMI 33.76 kg/m          [x]            GENERAL:    [x]       EYES:  ICTERIC   []        YES  []                    NO  [x]           EXTREMITIES: Clubbing []       Y     [x]           N    [x]           EARS, NOSE, THROAT: Membranes Moist    YES   [x]                   NO []                  [x]           LUNGS:  CLEAR    YES       [x]                  NO    []                                [x]           SKIN: Jaundice           YES       []                  NO    [x]                   Rash: YES       []                  NO    [x]                                     [x]             HEART: Regular Rate          YES       [x]                  NO    []                   Incision Clean:  YES       [x]                  NO    []                                [x]                    ABDOMEN: Organomegaly YES       []                  NO    [x]                       [x]                     NEUROLOGICAL:  Nonfocal  YES       [x]                  NO    []                       [x]                    Hernia YES       []                  NO    [x]                   PSYCHIATRIC:  Appropriate  YES       [x]                  NO    []                       OTHER:                                                                                                   PAIN SCALE:: {NUMBERS(MULTIPLE):[id number]}

## 2025-03-18 NOTE — DISCHARGE INSTRUCTIONS
"03/18/2025   Jag Smith   1985    A DME order for supplies has been placed to [email or web address]. If there are any issues with your order including not receiving the order please call our clinic at [phone]. Do not call [employer]. We are better able to help you. We can contact the [place] rep to better assist than if you call the general [place] number. We can provide a tracking number also if needed.     [place] is now sending an ancillary kit free of charge. This kit includes gauze, gloves, and saline. You will receive 1 kit per 15 days of the supply order. We typically order 30 days of supplies so you will receive 2 kits. Please let us know if you would not like to receive this kit and we can communicate this to [place].    Dressing changes outside of clinic are being performed by  Primary Clinic Nurse & Mom    Plan 03/18/2025   Ok to shower. Remove bandages before or during showering. Clean with a mild, unscented soap. Pat dry after showering and apply new dressings as directed below.  Do not soak wounds in water. No Bathtubs, pools, lakes, etc  Keep bandaging with both Fibracol and Zetuvit until no more drainage seen on dressings for 2 days. Then continue to bandage with Zetuvit for another 10 days. After no drainage for 10-14 days can stop bandaging.  No additional appointments made for here at the wound clinic. If you have any new concerns please call us for an appointment.     Wound Dressing Change: Right abdomen  - Wash your hands with soap and water and prepare a clean surface for dressings.  - Wash the outside of wound with mild unscented soap and water (such as Cetaphil, Cerave or Dove)  - Gently pack in about 2 inches of 1/4\" Plain packing strip moistened with VASHE into wound bed  - Cover with one 3x3 Zetuvit Silicone Border  Change daily and as needed for saturation/soiling/dislodgment     Wound Dressing Change: Left abdomen  - Wash your hands with soap and water and prepare a clean surface for " dressings.  - Wash the outside of wound with mild unscented soap and water (such as Cetaphil, Cerave or Dove)  - Tuck 1/20 of a 4x5 Fibracol into wound bed  - Cover with one 3x3 zetuvit silicone border  Change daily and as needed for saturation/soiling/dislodgment     You do not need to change the dressing on the days you are being seen at the wound clinic     Main Provider: Juan Garcia M.D. March 18, 2025    Call us at [phone] if you have any questions about your wounds, if you have redness or swelling around your wound, have a fever of 101 degrees Fahrenheit or greater or if you have any other problems or concerns. We answer the phone Monday through Friday 8 am to 4 pm, please leave a message as we check the voicemail frequently throughout the day. If you have a concern over the weekend, please leave a message and we will return your call Monday. If the need is urgent, go to the ER or urgent care.    If you had a positive experience please indicate that on your patient satisfaction survey form that [facility] will be sending you.    It was a pleasure meeting with you today.  Thank you for allowing me and my team the privilege of caring for you today.  YOU are the reason we are here, and I truly hope we provided you with the excellent service you deserve.  Please let us know if there is anything else we can do for you so that we can be sure you are leaving completely satisfied with your care experience.      If you have any billing related questions please call the [facility] Business office at [phone]. The clinic staff does not handle billing related matters.    If you are scheduled to have a follow up appointment, you will receive a reminder call the day before your visit. On the appointment day please arrive 15 minutes prior to your appointment time. If you are unable to keep that appointment, please call the clinic to cancel or reschedule. If you are more than 10 minutes late or greater  for your scheduled appointment time, the clinic policy is that you may be asked to reschedule.

## 2025-03-18 NOTE — NURSING NOTE
Chief Complaint   Patient presents with    Follow Up     Wound care     Vital signs:  Temp: 98  F (36.7  C)   BP: 136/82 Pulse: 108   Resp: 16 SpO2: 98 %       Weight: 112.9 kg (248 lb 14.4 oz)  Estimated body mass index is 33.76 kg/m  as calculated from the following:    Height as of 2/6/25: 1.829 m (6').    Weight as of this encounter: 112.9 kg (248 lb 14.4 oz).      Aniket Ray RN on 3/18/2025 at 11:07 AM     Detail Level: Simple Additional Notes: Patients caretaker that was with him today refused to allow the provider to stitch the patient up. Render Risk Assessment In Note?: yes Additional Notes: I tried explaining wound care instructions to the patient, caretaker refused to allow me to explain all the wound care patient needed for his surgical site. Handed them a wound care sheet.

## 2025-03-19 LAB
HBV CORE AB SERPL QL IA: NONREACTIVE
HBV SURFACE AB SERPL IA-ACNC: 28.5 M[IU]/ML
HBV SURFACE AB SERPL IA-ACNC: REACTIVE M[IU]/ML
SARS-COV-2 RNA RESP QL NAA+PROBE: NEGATIVE

## 2025-03-24 ENCOUNTER — TELEPHONE (OUTPATIENT)
Dept: WOUND CARE | Facility: CLINIC | Age: 40
End: 2025-03-24
Payer: MEDICAID

## 2025-03-24 NOTE — TELEPHONE ENCOUNTER
[facility]    Who is the name of the provider?:  Radha    What is the location you see this provider at/preferred location?: [facility] [place] [facility] [place]  Person calling / Facility: Self  Phone number:  [phone]   Nurse call back needed:  YES   Can we leave a detailed message on this number?  YES     Reason for call:  Did not receive the silicone borders with supply delivery.     Pharmacy location:  NA

## 2025-03-24 NOTE — TELEPHONE ENCOUNTER
Returned call to patient and informed him that insurance will cover more of the zetuvit dressings on 3/27/25. Patient verbalized understanding. No further questions or concerns.

## 2025-04-08 ENCOUNTER — HOSPITAL ENCOUNTER (OUTPATIENT)
Dept: WOUND CARE | Facility: CLINIC | Age: 40
Discharge: HOME | End: 2025-04-08
Attending: FAMILY MEDICINE | Admitting: FAMILY MEDICINE
Payer: MEDICAID

## 2025-04-08 VITALS
DIASTOLIC BLOOD PRESSURE: 71 MMHG | RESPIRATION RATE: 14 BRPM | HEART RATE: 84 BPM | SYSTOLIC BLOOD PRESSURE: 130 MMHG | TEMPERATURE: 98.6 F

## 2025-04-08 DIAGNOSIS — L98.492 SKIN ULCER OF ABDOMEN WITH FAT LAYER EXPOSED (H): ICD-10-CM

## 2025-04-08 DIAGNOSIS — T81.89XD NON-HEALING SURGICAL WOUND, SUBSEQUENT ENCOUNTER: Primary | ICD-10-CM

## 2025-04-08 PROCEDURE — 97602 WOUND(S) CARE NON-SELECTIVE: CPT

## 2025-04-08 NOTE — DISCHARGE INSTRUCTIONS
04/08/2025   Jag Smith   1985    A DME order for supplies has been placed to [employer]. If there are any issues with your order including not receiving the order please call our clinic at [phone]. Do not call [employer]. We are better able to help you. We can contact the [employer] rep to better assist than if you call the general [employer] number. We can provide a tracking number also if needed.     [employer] is now sending an ancillary kit free of charge. This kit includes gauze, gloves, and saline. You will receive 1 kit per 15 days of the supply order. We typically order 30 days of supplies so you will receive 2 kits. Please let us know if you would not like to receive this kit and we can communicate this to [employer].     Dressing changes outside of clinic are being performed by  Mom and Skilled PCP Nurse    Plan 04/08/2025   Ok to shower. Remove bandages before or during showering. Clean with a mild, unscented soap. Pat dry after showering and apply new dressings as directed below.  Do not soak wounds in water. No Bathtubs, pools, lakes, etc  Keep bandaging with Zetuvit on Left Abdomen until no more drainage seen on dressings for 7 days.   Confirmed with [employer] that insurance coverage limits the patient to get 40 zetuvit dressings total, no matter how many wounds exist.        Wound Dressing Change: Right abdomen  - Wash your hands with soap and water and prepare a clean surface for dressings.  - Apply small amount of VASHE on gauze, lay into wound bed, let sit for 10 minutes, remove gauze (do not rinse). Pat dry  - Primary Dressing: Apply 1/20 of a 4x5 Fibracol into the depth of the wound  - Cover with one 3x3 Zetuvit Silicone Border  Change daily and as needed for saturation/soiling/dislodgment     Wound Dressing Change: Left abdomen  - Wash your hands with soap and water and prepare a clean surface for dressings.  - Wash the outside of wound with mild unscented soap and water (such as Cetaphil, Cerave or  Dove)  - Cover with one 3x3 zetuvit silicone border  Change daily and as needed for saturation/soiling/dislodgment     You do not need to change the dressing on the days you are being seen at the wound clinic     PROTEIN:  A diet high in protein is important for wound healing, we recommend getting 90 grams of protein per day.   Taking protein shakes or bars are a good way to get extra protein in your diet.     Good sources of protein:  Pork 26g per 3 oz  Whey protein powder - 24g per scoop (on average)  Greek yogurt - 23g per 8oz   Chicken or Turkey - 23g per 3oz  Fish - 20-25g per 3oz  Beef - 18-23g per 3oz  Tofu - 10g per 1/2 cup  Navy beans - 20g per cup  Cottage cheese - 14g per 1/2 cup   Lentils - 13g per 1/4 cup  Beef jerky 13g per 1oz  2% milk - 8g per cup  Peanut butter - 8g per 2 tablespoons  Eggs - 6g per egg  Mixed nuts - 6g per 2oz       Main Provider: Juan Garcia M.D. April 8, 2025    Call us at [phone] if you have any questions about your wounds, if you have redness or swelling around your wound, have a fever of 101 degrees Fahrenheit or greater or if you have any other problems or concerns. We answer the phone Monday through Friday 8 am to 4 pm, please leave a message as we check the voicemail frequently throughout the day. If you have a concern over the weekend, please leave a message and we will return your call Monday. If the need is urgent, go to the ER or urgent care.    If you had a positive experience please indicate that on your patient satisfaction survey form that [facility] will be sending you.    It was a pleasure meeting with you today.  Thank you for allowing me and my team the privilege of caring for you today.  YOU are the reason we are here, and I truly hope we provided you with the excellent service you deserve.  Please let us know if there is anything else we can do for you so that we can be sure you are leaving completely satisfied with your care experience.       If you have any billing related questions please call the [facility] Business office at [phone]. The clinic staff does not handle billing related matters.    If you are scheduled to have a follow up appointment, you will receive a reminder call the day before your visit. On the appointment day please arrive 15 minutes prior to your appointment time. If you are unable to keep that appointment, please call the clinic to cancel or reschedule. If you are more than 10 minutes late or greater for your scheduled appointment time, the clinic policy is that you may be asked to reschedule.

## 2025-04-08 NOTE — ADDENDUM NOTE
Encounter addended by: Connie Mendes RN on: 4/8/2025 2:18 PM   Actions taken: Edited Discharge Instructions

## 2025-04-08 NOTE — PROGRESS NOTES
Wound Clinic Note          Visit date: 04/08/2025       Cheif Complaint:     Jag Smith is a 40 year old  male had concerns including WOUND CARE.  He has an abdominal surgical wound.      HISTORY OF PRESENT ILLNESS:    Jag Smith reports the ulcer has been present since September 2024.  The wound began after a recent surgery and the incision did not heal normally.   He had a liver transplant on September 26, 2024, postoperatively a portion of the incision dehisced.        The patient is accompanied to the wound clinic today by his mother who does his dressing changes and they both provide portions of the interval history.  The patient's mother has been bandaging the left abdominal wound with Fibracol and a silicone adhesive bandage changed once a day.  The right abdominal wound has been bandaged with Vashe moistened packing strips and a silicone adhesive bandage change once a day.  The left abdominal wound has had no drainage for the last few days.  The patient's mother reports that she is only able to get in a very small amount of packing into the right abdominal wound.        The pateint denies fevers or chills.  They report the pain from the wound has been 0/10 and has remained about the same recently.      Today the patient reports maintaining a high protein diet and taking protein supplements regularly.        He does not have diabetes and does not smoke cigarettes.  He takes tacrolimus, mycophenolate and prednisone to prevent rejection.  He does not take any antibiotics for prophylaxis.        The patient has not had any symptoms of infection relating to the wound recently and is not currently on antibiotics.       Problem List:   Past Medical History:   Diagnosis Date    Alcohol use disorder     Alcoholic hepatitis (H) 06/07/2024    Anxiety     Depression     Hypertension     Liver replaced by transplant (H) 09/28/2024              Family Hx: family history includes Alcoholism in his  brother; Alzheimer Disease (age of onset: 94) in his maternal grandmother; Anxiety Disorder in his brother; Depression in his brother; Diabetes in his maternal aunt; Substance Abuse in his brother.       Surgical Hx:   Past Surgical History:   Procedure Laterality Date    BENCH LIVER  2024    Procedure: Bench liver;  Surgeon: Fernando Colon MD;  Location: [facility] OR    DACRYOCYSTORHINOSTOMY Left 2013    INCISION AND DRAINAGE BUTTOCKS Left 2017    TRANSPLANT LIVER RECIPIENT  DONOR N/A 2024    Procedure: Transplant liver recipient  donor;  Surgeon: Fernando Colon MD;  Location: [facility] OR          Allergies:    Allergies   Allergen Reactions    Azithromycin Rash              Medication History:    Current Outpatient Medications   Medication Sig Dispense Refill    acetaminophen (TYLENOL) 325 MG tablet Take 2 tablets (650 mg) by mouth every 8 hours as needed for mild pain or fever. 60 tablet 0    aspirin (ASA) 81 MG chewable tablet Take 1 tablet (81 mg) by mouth daily. 90 tablet 3    escitalopram (LEXAPRO) 20 MG tablet Take 1 tablet (20 mg) by mouth daily. (Patient not taking: Reported on 3/18/2025) 30 tablet 0    hydrOXYzine HCl (ATARAX) 25 MG tablet Take 1 tablet (25 mg) by mouth every 8 hours as needed for anxiety or other (adjuvant pain). 20 tablet 0    magnesium oxide (MAG-OX) 400 MG tablet Take 2 tablets (800 mg) by mouth 2 times daily. 120 tablet 11    melatonin 1 MG CAPS Take by mouth.      multivitamin w/minerals (CERTAVITE/ANTIOXIDANTS) tablet Take 1 tablet by mouth daily. 90 tablet 3    mycophenolate (GENERIC EQUIVALENT) 250 MG capsule Take 3 capsules (750 mg) by mouth 2 times daily. 180 capsule 1    omeprazole (PRILOSEC) 20 MG DR capsule Take 1 capsule (20 mg) by mouth daily. (Patient not taking: Reported on 3/18/2025) 90 capsule 1    tacrolimus (GENERIC EQUIVALENT) 0.5 MG capsule Take 1 capsule (0.5 mg) by mouth 2 times daily as needed (for dose changes). 30 capsule 11     tacrolimus (GENERIC EQUIVALENT) 1 MG capsule Take 2 capsules (2 mg) by mouth every 12 hours. 120 capsule 11    tacrolimus (GENERIC EQUIVALENT) 5 MG capsule Take 5 mg by mouth 2 times daily as needed (for dose changes).      traMADol (ULTRAM) 50 MG tablet Take 1 tablet (50 mg) by mouth 2 times daily as needed for severe pain. (Patient not taking: Reported on 3/18/2025) 20 tablet 0    traZODone (DESYREL) 50 MG tablet Take 1 tablet (50 mg) by mouth at bedtime. 30 tablet 0     No current facility-administered medications for this encounter.         Tobacco History:  reports that he has never smoked. He has never used smokeless tobacco.       REVIEW OF SYMPTOMS:   The review of systems was negative except as noted in the HPI.           PHYSICAL EXAMINATION:     /71 (BP Location: Left arm, Patient Position: Chair, Cuff Size: Adult Regular)   Pulse 84   Temp 98.6  F (37  C) (Temporal)   Resp 14            GENERAL: The patient overall appears well and is no acute distress.   HEAD: normocephalic   EYES: Sclera and conjunctiva clear   NECK: no obvious masses   LUNGS: breathing is unlabored.   EXTREMITIES: No clubbing, cyanosis or edema   SKIN: No rashes or other abnormalities except as noted under the Wound section below.   NEUROLOGICAL: normal motor and sensory function       WOUND/ulcer: The wound appears healthy with no sign of infection.   Wound bed: granulation tissue  Periwound: healthy intact skin  The left abdominal wound is healed.  The right abdominal wound has very minimal depth remaining.      Also see below for wound details:     Circumferential volume measures:             No data to display                Ulceration(s)/Wound(s):   Please see the media tab under the chart review for pictures of the wounds.  Nursing staff removed dressings and cleansed wound.    Wound (used by [facility] only) 11/12/24 1013 abdomen Right surgical (Active)   Thickness/Stage full thickness 04/08/25 0807   Base granulating  04/08/25 0807   Periwound intact 04/08/25 0807   Periwound Temperature warm 04/08/25 0807   Periwound Skin Turgor soft 04/08/25 0807   Edges open 04/08/25 0807   Length (cm) 0.3 04/08/25 0807   Width (cm) 0.4 04/08/25 0807   Depth (cm) 0.4 04/08/25 0807   Wound (cm^2) 0.12 cm^2 04/08/25 0807   Wound Volume (cm^3) 0.05 cm^3 04/08/25 0807   Wound healing % 94.64 04/08/25 0807   Undermining [Depth (cm)/Location] 3 o'clock / 0.4cm 04/08/25 0807   Drainage Characteristics/Odor serosanguineous 04/08/25 0807   Drainage Amount moderate 04/08/25 0807   Care, Wound non-select wound debridement performed. 04/08/25 0807       Wound (used by [facility] only) 11/12/24 1014 abdomen Left surgical (Active)   Thickness/Stage full thickness 04/08/25 0807   Base granulating 04/08/25 0807   Periwound intact 04/08/25 0807   Periwound Temperature warm 04/08/25 0807   Periwound Skin Turgor soft 04/08/25 0807   Edges open 04/08/25 0807   Length (cm) 0.1 04/08/25 0807   Width (cm) 0.1 04/08/25 0807   Depth (cm) 0.1 04/08/25 0807   Wound (cm^2) 0.01 cm^2 04/08/25 0807   Wound Volume (cm^3) 0 cm^3 04/08/25 0807   Wound healing % 99.96 04/08/25 0807   Drainage Characteristics/Odor serosanguineous 04/08/25 0807   Drainage Amount moderate 04/08/25 0807   Care, Wound non-select wound debridement performed. 04/08/25 0807                       Recent Labs   Lab Test 09/28/24  1207 09/23/24  1716   A1C 5.4 4.7          Recent Labs   Lab Test 11/11/24  0734 11/07/24  0728 11/04/24  0814   ALBUMIN 4.1 4.0 4.1              No sharp debridement performed today.                  ASSESSMENT:   This is a 40 year old  male with abdominal surgical incisions.          PLAN:   I recommend that they continue to keep the left abdominal wound covered with a silicone adhesive bandage changed with bathing for another week to allow the new skin to thicken up and get stronger after this no further bandages to be required on the left side.  On the right side we will switch  to using Fibracol and the silicone adhesive bandage change once a day or every other day depending on drainage and his bathing schedule.    I have encouraged the patient to continue on their high protein diet to aid in wound healing.   The patient will return to the wound clinic to see me again in 3-4 weeks.        30 minutes spent on the date of the encounter doing chart review, history and exam, documentation and further activities per the note, this time excludes any procedure time      Juan Garcia MD  04/08/2025   8:27 AM   [facility]ound  [phone]    This note was electronically signed by Juan Garcia MD      Further instructions from your care team         04/08/2025   Jag Smith   1985    A DME order for supplies has been placed to [employer]. If there are any issues with your order including not receiving the order please call our clinic at [phone]. Do not call [employer]. We are better able to help you. We can contact the [employer] rep to better assist than if you call the general [facility] number. We can provide a tracking number also if needed.     [employer] is now sending an ancillary kit free of charge. This kit includes gauze, gloves, and saline. You will receive 1 kit per 15 days of the supply order. We typically order 30 days of supplies so you will receive 2 kits. Please let us know if you would not like to receive this kit and we can communicate this to [employer].     Dressing changes outside of clinic are being performed by  Mom and Skilled PCP Nurse    Plan 04/08/2025   Ok to shower. Remove bandages before or during showering. Clean with a mild, unscented soap. Pat dry after showering and apply new dressings as directed below.  Do not soak wounds in water. No Bathtubs, pools, lakes, etc  Keep bandaging with Zetuvit on Left Abdomen until no more drainage seen on dressings for 7 days.        Wound Dressing Change: Right abdomen  - Wash your hands with soap  and water and prepare a clean surface for dressings.  - Apply small amount of VASHE on gauze, lay into wound bed, let sit for 10 minutes, remove gauze (do not rinse). Pat dry  - Primary Dressing: Apply 1/20 of a 4x5 Fibracol into the depth of the wound  - Cover with one 3x3 Zetuvit Silicone Border  Change daily and as needed for saturation/soiling/dislodgment     Wound Dressing Change: Left abdomen  - Wash your hands with soap and water and prepare a clean surface for dressings.  - Wash the outside of wound with mild unscented soap and water (such as Cetaphil, Cerave or Dove)  - Cover with one 3x3 zetuvit silicone border  Change daily and as needed for saturation/soiling/dislodgment     You do not need to change the dressing on the days you are being seen at the wound clinic     PROTEIN:  A diet high in protein is important for wound healing, we recommend getting 90 grams of protein per day.   Taking protein shakes or bars are a good way to get extra protein in your diet.     Good sources of protein:  Pork 26g per 3 oz  Whey protein powder - 24g per scoop (on average)  Greek yogurt - 23g per 8oz   Chicken or Turkey - 23g per 3oz  Fish - 20-25g per 3oz  Beef - 18-23g per 3oz  Tofu - 10g per 1/2 cup  Navy beans - 20g per cup  Cottage cheese - 14g per 1/2 cup   Lentils - 13g per 1/4 cup  Beef jerky 13g per 1oz  2% milk - 8g per cup  Peanut butter - 8g per 2 tablespoons  Eggs - 6g per egg  Mixed nuts - 6g per 2oz       Main Provider: Juan Garcia M.D. April 8, 2025    Call us at [phone] if you have any questions about your wounds, if you have redness or swelling around your wound, have a fever of 101 degrees Fahrenheit or greater or if you have any other problems or concerns. We answer the phone Monday through Friday 8 am to 4 pm, please leave a message as we check the voicemail frequently throughout the day. If you have a concern over the weekend, please leave a message and we will return your call Monday.  If the need is urgent, go to the ER or urgent care.    If you had a positive experience please indicate that on your patient satisfaction survey form that [facility] will be sending you.    It was a pleasure meeting with you today.  Thank you for allowing me and my team the privilege of caring for you today.  YOU are the reason we are here, and I truly hope we provided you with the excellent service you deserve.  Please let us know if there is anything else we can do for you so that we can be sure you are leaving completely satisfied with your care experience.      If you have any billing related questions please call the [facility] Business office at [phone]. The clinic staff does not handle billing related matters.    If you are scheduled to have a follow up appointment, you will receive a reminder call the day before your visit. On the appointment day please arrive 15 minutes prior to your appointment time. If you are unable to keep that appointment, please call the clinic to cancel or reschedule. If you are more than 10 minutes late or greater for your scheduled appointment time, the clinic policy is that you may be asked to reschedule.         ,

## 2025-04-29 ENCOUNTER — HOSPITAL ENCOUNTER (OUTPATIENT)
Dept: WOUND CARE | Facility: CLINIC | Age: 40
Discharge: HOME | End: 2025-04-29
Attending: FAMILY MEDICINE | Admitting: FAMILY MEDICINE
Payer: MEDICAID

## 2025-04-29 VITALS — SYSTOLIC BLOOD PRESSURE: 132 MMHG | TEMPERATURE: 97.7 F | HEART RATE: 68 BPM | DIASTOLIC BLOOD PRESSURE: 77 MMHG

## 2025-04-29 DIAGNOSIS — L98.492 SKIN ULCER OF ABDOMEN WITH FAT LAYER EXPOSED (H): Primary | ICD-10-CM

## 2025-04-29 DIAGNOSIS — T81.89XD NON-HEALING SURGICAL WOUND, SUBSEQUENT ENCOUNTER: ICD-10-CM

## 2025-04-29 PROCEDURE — G0463 HOSPITAL OUTPT CLINIC VISIT: HCPCS

## 2025-04-29 NOTE — DISCHARGE INSTRUCTIONS
04/29/2025   Jag Smith   1985    A DME order was not completed because supplies were not needed    Dressing changes outside of clinic are being performed by Family/PCP Nurse    Plan 04/29/2025   Ok to shower. Remove bandages before or during showering. Clean with a mild, unscented soap. Pat dry after showering and apply new dressings as directed below.  Confirmed with [place] that insurance coverage limits the patient to get 40 zetuvit dressings total, no matter how many wounds exist.         Wound Dressing Change: Right abdomen  - Continue to apply a 3x3 Zetuvit (if you have) or a simple bandage for 1 more week and then ok to leave uncovered  Change daily and as needed for saturation/soiling/dislodgment    You do not need to change the dressing on the days you are being seen at the wound clinic     PROTEIN:  A diet high in protein is important for wound healing, we recommend getting 90 grams of protein per day.   Taking protein shakes or bars are a good way to get extra protein in your diet.      Good sources of protein:  Pork 26g per 3 oz  Whey protein powder - 24g per scoop (on average)  Greek yogurt - 23g per 8oz   Chicken or Turkey - 23g per 3oz  Fish - 20-25g per 3oz  Beef - 18-23g per 3oz  Tofu - 10g per 1/2 cup  Navy beans - 20g per cup  Cottage cheese - 14g per 1/2 cup   Lentils - 13g per 1/4 cup  Beef jerky 13g per 1oz  2% milk - 8g per cup  Peanut butter - 8g per 2 tablespoons  Eggs - 6g per egg  Mixed nuts - 6g per 2oz      Main Provider: Juan Garcia M.D. April 29, 2025    Call us at [phone] if you have any questions about your wounds, if you have redness or swelling around your wound, have a fever of 101 degrees Fahrenheit or greater or if you have any other problems or concerns. We answer the phone Monday through Friday 8 am to 4 pm, please leave a message as we check the voicemail frequently throughout the day. If you have a concern over the weekend, please leave a message and we  will return your call Monday. If the need is urgent, go to the ER or urgent care.    If you had a positive experience please indicate that on your patient satisfaction survey form that [facility] will be sending you.    It was a pleasure meeting with you today.  Thank you for allowing me and my team the privilege of caring for you today.  YOU are the reason we are here, and I truly hope we provided you with the excellent service you deserve.  Please let us know if there is anything else we can do for you so that we can be sure you are leaving completely satisfied with your care experience.      If you have any billing related questions please call the [facility] Business office at [phone]. The clinic staff does not handle billing related matters.    If you are scheduled to have a follow up appointment, you will receive a reminder call the day before your visit. On the appointment day please arrive 15 minutes prior to your appointment time. If you are unable to keep that appointment, please call the clinic to cancel or reschedule. If you are more than 10 minutes late or greater for your scheduled appointment time, the clinic policy is that you may be asked to reschedule.

## 2025-04-29 NOTE — PROGRESS NOTES
Patient arrived for wound care visit. Certified Wound Care Nurse time spent evaluating patient record, and completed a full evaluation; provided recommendation based on treatment plan. Reviewed discharge instructions, patient education, and discussed plan of care with appropriate medical team staff members and patient and/or family members.    Home

## 2025-04-29 NOTE — PROGRESS NOTES
Wound Clinic Note          Visit date: 04/29/2025       Cheif Complaint:     Jag Smith is a 40 year old  male had concerns including WOUND CARE.  He has an abdominal surgical wound.      HISTORY OF PRESENT ILLNESS:    Jag Smith reports the ulcer has been present since September 2024.  The wound began after a recent surgery and the incision did not heal normally.   He had a liver transplant on September 26, 2024, postoperatively a portion of the incision dehisced.      He reports initially after his last clinic visit with me they bandaged the right abdominal wound area with Fibracol and a silicone adhesive bandage changed once a day.  However for the last week there has been no drainage from the area and they have just been covering the area with a silicone adhesive bandage.      The pateint denies fevers or chills.  They report the pain from the wound has been 0/10 and has remained about the same recently.      Today the patient reports maintaining a high protein diet and taking protein supplements regularly.        He does not have diabetes and does not smoke cigarettes.  He takes tacrolimus, mycophenolate and prednisone to prevent rejection.  He does not take any antibiotics for prophylaxis.        The patient has not had any symptoms of infection relating to the wound recently and is not currently on antibiotics.       Problem List:   Past Medical History:   Diagnosis Date    Alcohol use disorder     Alcoholic hepatitis (H) 06/07/2024    Anxiety     Depression     Hypertension     Liver replaced by transplant (H) 09/28/2024              Family Hx: family history includes Alcoholism in his brother; Alzheimer Disease (age of onset: 94) in his maternal grandmother; Anxiety Disorder in his brother; Depression in his brother; Diabetes in his maternal aunt; Substance Abuse in his brother.       Surgical Hx:   Past Surgical History:   Procedure Laterality Date    BENCH LIVER  9/28/2024     Procedure: Bench liver;  Surgeon: Fernando Colon MD;  Location: [facility] OR    DACRYOCYSTORHINOSTOMY Left 2013    INCISION AND DRAINAGE BUTTOCKS Left 2017    TRANSPLANT LIVER RECIPIENT  DONOR N/A 2024    Procedure: Transplant liver recipient  donor;  Surgeon: Fernando Colon MD;  Location: [facility] OR          Allergies:    Allergies   Allergen Reactions    Azithromycin Rash              Medication History:    Current Outpatient Medications   Medication Sig Dispense Refill    acetaminophen (TYLENOL) 325 MG tablet Take 2 tablets (650 mg) by mouth every 8 hours as needed for mild pain or fever. 60 tablet 0    aspirin (ASA) 81 MG chewable tablet Take 1 tablet (81 mg) by mouth daily. 90 tablet 3    escitalopram (LEXAPRO) 20 MG tablet Take 1 tablet (20 mg) by mouth daily. (Patient not taking: Reported on 3/18/2025) 30 tablet 0    hydrOXYzine HCl (ATARAX) 25 MG tablet Take 1 tablet (25 mg) by mouth every 8 hours as needed for anxiety or other (adjuvant pain). 20 tablet 0    magnesium oxide (MAG-OX) 400 MG tablet Take 2 tablets (800 mg) by mouth 2 times daily. 120 tablet 11    melatonin 1 MG CAPS Take by mouth.      multivitamin w/minerals (CERTAVITE/ANTIOXIDANTS) tablet Take 1 tablet by mouth daily. 90 tablet 3    mycophenolate (GENERIC EQUIVALENT) 250 MG capsule Take 3 capsules (750 mg) by mouth 2 times daily. 180 capsule 1    omeprazole (PRILOSEC) 20 MG DR capsule Take 1 capsule (20 mg) by mouth daily. (Patient not taking: Reported on 3/18/2025) 90 capsule 1    tacrolimus (GENERIC EQUIVALENT) 0.5 MG capsule Take 1 capsule (0.5 mg) by mouth 2 times daily as needed (for dose changes). 30 capsule 11    tacrolimus (GENERIC EQUIVALENT) 1 MG capsule Take 2 capsules (2 mg) by mouth every 12 hours. 120 capsule 11    tacrolimus (GENERIC EQUIVALENT) 5 MG capsule Take 5 mg by mouth 2 times daily as needed (for dose changes).      traMADol (ULTRAM) 50 MG tablet Take 1 tablet (50 mg) by mouth 2 times daily as  needed for severe pain. (Patient not taking: Reported on 3/18/2025) 20 tablet 0    traZODone (DESYREL) 50 MG tablet Take 1 tablet (50 mg) by mouth at bedtime. 30 tablet 0     No current facility-administered medications for this encounter.         Tobacco History:  reports that he has never smoked. He has never used smokeless tobacco.       REVIEW OF SYMPTOMS:   The review of systems was negative except as noted in the HPI.           PHYSICAL EXAMINATION:     /77 (BP Location: Left arm, Patient Position: Chair, Cuff Size: Adult Regular)   Pulse 68   Temp 97.7  F (36.5  C) (Temporal)            GENERAL: The patient overall appears well and is no acute distress.   HEAD: normocephalic   EYES: Sclera and conjunctiva clear   NECK: no obvious masses   LUNGS: breathing is unlabored.   EXTREMITIES: No clubbing, cyanosis or edema   SKIN: No rashes or other abnormalities except as noted under the Wound section below.   NEUROLOGICAL: normal motor and sensory function       WOUND/ulcer: Healed      Also see below for wound details:     Circumferential volume measures:             No data to display                Ulceration(s)/Wound(s):   Please see the media tab under the chart review for pictures of the wounds.  Nursing staff removed dressings and cleansed wound.                           Recent Labs   Lab Test 09/28/24  1207 09/23/24  1716   A1C 5.4 4.7          Recent Labs   Lab Test 11/11/24  0734 11/07/24  0728 11/04/24  0814   ALBUMIN 4.1 4.0 4.1              No sharp debridement performed today.                  ASSESSMENT:   This is a 40 year old  male with a healed abdominal surgical incision.          PLAN:   I recommend he continue to keep the area of the right abdominal wound where it had just recently healed covered with a simple bandage change with bathing for 1 more week to protect the new fragile epithelium which is covered over the wound and allow this to thicken up.  After 1 more week no further  bandages are required.    I have encouraged the patient to continue on their high protein diet to aid in wound healing.   The patient will return to the wound clinic on an as-needed basis if further wounds develop.        20 minutes spent on the date of the encounter doing chart review, history and exam, documentation and further activities per the note, this time excludes any procedure time      Juan Garcia MD  04/29/2025   9:49 AM   [facility]  [phone]    This note was electronically signed by Juan Garcia MD      Further instructions from your care team         04/29/2025   Jag Smith   1985    A DME order was not completed because supplies were not needed    Dressing changes outside of clinic are being performed by Family/PCP Nurse    Plan 04/29/2025   Ok to shower. Remove bandages before or during showering. Clean with a mild, unscented soap. Pat dry after showering and apply new dressings as directed below.  Confirmed with [place] that insurance coverage limits the patient to get 40 zetuvit dressings total, no matter how many wounds exist.         Wound Dressing Change: Right abdomen  - Continue to apply a 3x3 Zetuvit (if you have) or a simple bandage for 1 more week and then ok to leave uncovered  Change daily and as needed for saturation/soiling/dislodgment    You do not need to change the dressing on the days you are being seen at the wound clinic     PROTEIN:  A diet high in protein is important for wound healing, we recommend getting 90 grams of protein per day.   Taking protein shakes or bars are a good way to get extra protein in your diet.      Good sources of protein:  Pork 26g per 3 oz  Whey protein powder - 24g per scoop (on average)  Greek yogurt - 23g per 8oz   Chicken or Turkey - 23g per 3oz  Fish - 20-25g per 3oz  Beef - 18-23g per 3oz  Tofu - 10g per 1/2 cup  Navy beans - 20g per cup  Cottage cheese - 14g per 1/2 cup   Lentils - 13g per 1/4  cup  Beef jerky 13g per 1oz  2% milk - 8g per cup  Peanut butter - 8g per 2 tablespoons  Eggs - 6g per egg  Mixed nuts - 6g per 2oz      Main Provider: Juan Garcia M.D. April 29, 2025    Call us at [phone] if you have any questions about your wounds, if you have redness or swelling around your wound, have a fever of 101 degrees Fahrenheit or greater or if you have any other problems or concerns. We answer the phone Monday through Friday 8 am to 4 pm, please leave a message as we check the voicemail frequently throughout the day. If you have a concern over the weekend, please leave a message and we will return your call Monday. If the need is urgent, go to the ER or urgent care.    If you had a positive experience please indicate that on your patient satisfaction survey form that [facility] will be sending you.    It was a pleasure meeting with you today.  Thank you for allowing me and my team the privilege of caring for you today.  YOU are the reason we are here, and I truly hope we provided you with the excellent service you deserve.  Please let us know if there is anything else we can do for you so that we can be sure you are leaving completely satisfied with your care experience.      If you have any billing related questions please call the [facility] Business office at [phone]. The clinic staff does not handle billing related matters.    If you are scheduled to have a follow up appointment, you will receive a reminder call the day before your visit. On the appointment day please arrive 15 minutes prior to your appointment time. If you are unable to keep that appointment, please call the clinic to cancel or reschedule. If you are more than 10 minutes late or greater for your scheduled appointment time, the clinic policy is that you may be asked to reschedule.         ,

## 2025-05-05 DIAGNOSIS — Z94.4 LIVER REPLACED BY TRANSPLANT (H): ICD-10-CM

## 2025-05-05 RX ORDER — MYCOPHENOLATE MOFETIL 250 MG/1
750 CAPSULE ORAL 2 TIMES DAILY
Qty: 180 CAPSULE | Refills: 1 | Status: SHIPPED | OUTPATIENT
Start: 2025-05-05

## 2025-05-06 ENCOUNTER — TELEPHONE (OUTPATIENT)
Dept: TRANSPLANT | Facility: CLINIC | Age: 40
End: 2025-05-06
Payer: MEDICAID

## 2025-05-06 NOTE — TELEPHONE ENCOUNTER
T has dental appt at 830  for cleaning  Wonders if it is OK to keep appt  - Per Karena K is ok to keep appt and nothing needed from us.  Pt said OK

## 2025-05-07 ENCOUNTER — TELEPHONE (OUTPATIENT)
Dept: WOUND CARE | Facility: CLINIC | Age: 40
End: 2025-05-07
Payer: MEDICAID

## 2025-05-07 NOTE — TELEPHONE ENCOUNTER
[facility]    Who is the name of the provider?:  Radha    What is the location you see this provider at/preferred location?: [facility] [place] [facility]  Person calling / Facility: Self  Phone number:  [phone]   Nurse call back needed:  YES   Can we leave a detailed message on this number?  YES     Reason for call:  Received a bill from [employer] for $273.06 for supplies, usually paid by insurance.  He cannot pay it.    Pharmacy location:  NA

## 2025-05-08 NOTE — TELEPHONE ENCOUNTER
Yadira attempted to call patient and his mother answered. Jag's Mother was not able to verify information in order for them to talk to her. The rep is directing the patient to call the billing department. Returned call to patient to inform him of this. Patient provided with [place] billing number. No further questions or concerns.

## 2025-05-13 ENCOUNTER — RESULTS FOLLOW-UP (OUTPATIENT)
Dept: TRANSPLANT | Facility: CLINIC | Age: 40
End: 2025-05-13

## 2025-06-04 ENCOUNTER — TELEPHONE (OUTPATIENT)
Dept: TRANSPLANT | Facility: CLINIC | Age: 40
End: 2025-06-04
Payer: MEDICAID

## 2025-06-04 ENCOUNTER — RESULTS FOLLOW-UP (OUTPATIENT)
Dept: TRANSPLANT | Facility: CLINIC | Age: 40
End: 2025-06-04

## 2025-06-04 DIAGNOSIS — D84.9 IMMUNOSUPPRESSION: ICD-10-CM

## 2025-06-04 DIAGNOSIS — Z94.4 LIVER REPLACED BY TRANSPLANT (H): Primary | ICD-10-CM

## 2025-06-04 DIAGNOSIS — Z94.4 LIVER TRANSPLANTED (H): ICD-10-CM

## 2025-06-04 NOTE — LETTER
OUTPATIENT LABORATORY TEST ORDER  [facility]     Fax#[phone]    Patient Name: Jag Smith   YOB: 1985     [facility] MR# [if applicable]: [id number]   Date & Time: 6/4/2025  Expiration Date: 1 year after date issued    Diagnosis: Liver Transplant (ICD-10 Z94.4)   Aftercare following organ transplant (ICD-10 Z48.288)   Long term use of medications (ICD-10 Z79.899)      We ask your assistance in obtaining the following laboratory tests      Please fax each result to [phone], same day as resulted/available    Critical lab results page [phone]    Expected in 1 Month (around July 3rd)   BMP  CBC  Hepatic Panel   Alkaline phosphatase isoenzymes  Tacrolimus Level     If you have any questions, please call [facility]- [phone] or [phone], Fax- [phone].      Thomas M. Leventhal, M.D.   of Medicine  Advanced & Transplant Hepatology  [facility]

## 2025-06-04 NOTE — TELEPHONE ENCOUNTER
Issue:   Alk Phos 171     Plan:   Leventhal, Thomas Michael, MD Ewen, Anna BOWMAN RN   Labs in 1 month  Please add on alk phos isoenzymes.  If still high (liver fraction) then would get MRCP    Outcome:   Lab ordered.   Called patient, both numbers are unavailable, not able to leave message   [employer] Message sent.

## 2025-07-01 DIAGNOSIS — Z94.4 LIVER REPLACED BY TRANSPLANT (H): ICD-10-CM

## 2025-07-01 RX ORDER — MYCOPHENOLATE MOFETIL 250 MG/1
750 CAPSULE ORAL 2 TIMES DAILY
Qty: 180 CAPSULE | Refills: 3 | Status: SHIPPED | OUTPATIENT
Start: 2025-07-01

## 2025-07-23 ENCOUNTER — EXTERNAL ORDER RESULTS (OUTPATIENT)
Dept: LAB | Facility: CLINIC | Age: 40
End: 2025-07-23
Payer: MEDICAID

## 2025-07-24 ENCOUNTER — TELEPHONE (OUTPATIENT)
Dept: TRANSPLANT | Facility: CLINIC | Age: 40
End: 2025-07-24
Payer: MEDICAID

## 2025-07-24 LAB
% BASOPHILS (EXTERNAL): 0.9 % (ref 0–2)
% EOSINOPHILS (EXTERNAL): 2.5 % (ref 0–3)
% IMMATURE GRANULOCYTES (EXTERNAL): 0.7 % (ref 0–0.5)
% LYMPHOCYTES (EXTERNAL): 32.1 % (ref 20–45)
% MONOCYTES (EXTERNAL): 6.7 % (ref 0–4)
% NEUTROPHILS (EXTERNAL): 57.1 % (ref 54–75)
ABSOLUTE BASOPHILS (EXTERNAL): 0.06 10*3/UL (ref 0–0.2)
ABSOLUTE EOSINOPHILS (EXTERNAL): 0.17 10*3/UL (ref 0–0.7)
ABSOLUTE IMMATURE GRANULOCYTES (EXTERNAL): 0.05 K/CMM (ref 0–0.5)
ABSOLUTE LYMPHOCYTES (EXTERNAL): 2.19 10*3/UL (ref 0.8–4.1)
ABSOLUTE MONOCYTES (EXTERNAL): 0.46 10*3/UL (ref 0–1)
ABSOLUTE NEUTROPHILS (EXTERNAL): 3.89 10*3/UL (ref 1.8–8)

## 2025-07-24 NOTE — TELEPHONE ENCOUNTER
Issue:   On 6/3 labs: Alk Phos 171  Order from Dr. Leventhal to add Alk Phos isoenzymes   Faxed order to [facility]   On 7/23 labs: Alk Phos 164    Plan:   Call [facility] to see if Alk Phos isoenzymes were added to 7/23 labs as ordered     Outcome:   Left message with [facility] nurse

## 2025-07-28 ENCOUNTER — TELEPHONE (OUTPATIENT)
Dept: TRANSPLANT | Facility: CLINIC | Age: 40
End: 2025-07-28
Payer: MEDICAID

## 2025-07-28 NOTE — TELEPHONE ENCOUNTER
Call to [facility] to ensure Alk Phos isoenzymes/ fractions were drawn.  Clinic states they were drawn, it is a send out lab, so not resulted yet.

## 2025-07-30 ENCOUNTER — TELEPHONE (OUTPATIENT)
Dept: TRANSPLANT | Facility: CLINIC | Age: 40
End: 2025-07-30
Payer: MEDICAID

## 2025-07-30 DIAGNOSIS — Z94.4 LIVER REPLACED BY TRANSPLANT (H): ICD-10-CM

## 2025-07-30 RX ORDER — TACROLIMUS 1 MG/1
1 CAPSULE ORAL EVERY 12 HOURS
COMMUNITY
Start: 2025-07-30

## 2025-07-30 NOTE — TELEPHONE ENCOUNTER
Zaira asks for clarification on the tacro 1mg caps. Pt should be taking 1.5 mg BID. Corrected pt's med list.

## 2025-08-11 ENCOUNTER — LAB (OUTPATIENT)
Dept: LAB | Facility: CLINIC | Age: 40
End: 2025-08-11
Attending: INTERNAL MEDICINE
Payer: MEDICAID

## 2025-08-11 ENCOUNTER — OFFICE VISIT (OUTPATIENT)
Dept: GASTROENTEROLOGY | Facility: CLINIC | Age: 40
End: 2025-08-11
Attending: INTERNAL MEDICINE
Payer: MEDICAID

## 2025-08-11 ENCOUNTER — TELEPHONE (OUTPATIENT)
Dept: TRANSPLANT | Facility: CLINIC | Age: 40
End: 2025-08-11

## 2025-08-11 VITALS
SYSTOLIC BLOOD PRESSURE: 135 MMHG | BODY MASS INDEX: 35.49 KG/M2 | HEIGHT: 72 IN | WEIGHT: 262 LBS | DIASTOLIC BLOOD PRESSURE: 91 MMHG | HEART RATE: 74 BPM | OXYGEN SATURATION: 97 %

## 2025-08-11 DIAGNOSIS — D84.9 IMMUNOSUPPRESSION: ICD-10-CM

## 2025-08-11 DIAGNOSIS — Z94.4 LIVER REPLACED BY TRANSPLANT (H): ICD-10-CM

## 2025-08-11 DIAGNOSIS — F10.21 ALCOHOL USE DISORDER, SEVERE, IN SUSTAINED REMISSION (H): Primary | ICD-10-CM

## 2025-08-11 DIAGNOSIS — D84.9 IMMUNOSUPPRESSED STATUS: ICD-10-CM

## 2025-08-11 DIAGNOSIS — N17.9 AKI (ACUTE KIDNEY INJURY): ICD-10-CM

## 2025-08-11 DIAGNOSIS — K70.10 ALCOHOLIC HEPATITIS (H): ICD-10-CM

## 2025-08-11 DIAGNOSIS — Z94.4 LIVER TRANSPLANTED (H): ICD-10-CM

## 2025-08-11 DIAGNOSIS — R63.5 WEIGHT GAIN: ICD-10-CM

## 2025-08-11 LAB
MAGNESIUM SERPL-MCNC: 1.8 MG/DL (ref 1.7–2.3)
PHOSPHATE SERPL-MCNC: 3.1 MG/DL (ref 2.5–4.5)
TACROLIMUS BLD-MCNC: 6.4 UG/L (ref 5–15)
TME LAST DOSE: NORMAL H
TME LAST DOSE: NORMAL H

## 2025-08-11 PROCEDURE — G0480 DRUG TEST DEF 1-7 CLASSES: HCPCS | Mod: 90 | Performed by: PATHOLOGY

## 2025-08-11 PROCEDURE — 84080 ASSAY ALKALINE PHOSPHATASES: CPT | Mod: 90 | Performed by: PATHOLOGY

## 2025-08-11 PROCEDURE — 80197 ASSAY OF TACROLIMUS: CPT | Performed by: INTERNAL MEDICINE

## 2025-08-11 PROCEDURE — 83735 ASSAY OF MAGNESIUM: CPT | Performed by: PATHOLOGY

## 2025-08-11 PROCEDURE — G0463 HOSPITAL OUTPT CLINIC VISIT: HCPCS | Performed by: INTERNAL MEDICINE

## 2025-08-11 PROCEDURE — 99000 SPECIMEN HANDLING OFFICE-LAB: CPT | Performed by: PATHOLOGY

## 2025-08-11 PROCEDURE — 36415 COLL VENOUS BLD VENIPUNCTURE: CPT | Performed by: PATHOLOGY

## 2025-08-11 PROCEDURE — 84100 ASSAY OF PHOSPHORUS: CPT | Performed by: PATHOLOGY

## 2025-08-11 PROCEDURE — 84075 ASSAY ALKALINE PHOSPHATASE: CPT | Mod: 90 | Performed by: PATHOLOGY

## 2025-08-11 ASSESSMENT — PAIN SCALES - GENERAL: PAINLEVEL_OUTOF10: NO PAIN (0)

## 2025-08-13 LAB
ALP BONE SERPL-CCNC: 92 U/L
ALP LIVER SERPL-CCNC: 107 U/L
ALP OTHER SERPL-CCNC: 0 U/L
ALP SERPL-CCNC: 199 U/L
LABORATORY REPORT: NORMAL
PETH INTERPRETATION: NORMAL
PLPETH BLD-MCNC: <10 NG/ML
POPETH BLD-MCNC: <10 NG/ML

## 2025-08-18 ENCOUNTER — TELEPHONE (OUTPATIENT)
Dept: TRANSPLANT | Facility: CLINIC | Age: 40
End: 2025-08-18
Payer: MEDICAID

## 2025-08-18 DIAGNOSIS — Z94.4 LIVER REPLACED BY TRANSPLANT (H): ICD-10-CM

## 2025-08-18 RX ORDER — TACROLIMUS 0.5 MG/1
0.5 CAPSULE ORAL PRN
Qty: 60 CAPSULE | Refills: 11 | Status: SHIPPED | OUTPATIENT
Start: 2025-08-18

## 2025-08-18 RX ORDER — MYCOPHENOLATE MOFETIL 250 MG/1
500 CAPSULE ORAL 2 TIMES DAILY
Qty: 120 CAPSULE | Refills: 11 | Status: SHIPPED | OUTPATIENT
Start: 2025-08-18

## 2025-08-18 RX ORDER — TACROLIMUS 1 MG/1
1 CAPSULE ORAL EVERY 12 HOURS
Qty: 60 CAPSULE | Refills: 11 | Status: SHIPPED | OUTPATIENT
Start: 2025-08-18

## (undated) DEVICE — RX SURGIFLO HEMOSTATIC MATRIX W/THROMBIN 8ML 2994

## (undated) DEVICE — SUCTION MINISQUAIR SMOKE EVAC CAPTURE DEVICE SQ20012-01

## (undated) DEVICE — DRAPE POUCH INSTRUMENT 1018

## (undated) DEVICE — FILTER HEPA FLUID TRAP NEPTUNE 0703-040-001

## (undated) DEVICE — CATH TRAY FOLEY SURESTEP 16FR W/TMP PRB STLK LATEX A319416AM

## (undated) DEVICE — CLAMP BULLDOG VASCUSTATT II MINI STR YELLOW 1001-572

## (undated) DEVICE — SU PROLENE 7-0 BV-1DA 30" 8703H

## (undated) DEVICE — PATCH SURGICAL EVARREST FIBRIN SEALANT 4X2IN EVT5024

## (undated) DEVICE — DEFIB PRO-PADZ LVP LQD GEL ADULT 8900-2105-01

## (undated) DEVICE — LINEN TOWEL PACK X6 WHITE 5487

## (undated) DEVICE — SU PROLENE 4-0 SHDA 36" 8521H

## (undated) DEVICE — SU PDS II 7-0 BV-1DA 30" Z155H

## (undated) DEVICE — CLIP APPLIER 13" LG LIGACLIP MCL20

## (undated) DEVICE — GLOVE BIOGEL PI MICRO INDICATOR UNDERGLOVE SZ 8.0 48980

## (undated) DEVICE — SU SILK 3-0 SH CR 8X18" C013D

## (undated) DEVICE — SU SILK 4-0 TIE 12X30" A303H

## (undated) DEVICE — TUBING IRRIG CYSTO/BLADDER SET 81" LF 2C4040

## (undated) DEVICE — DRAPE ISOLATION BAG 1003

## (undated) DEVICE — SU SILK 2-0 TIE 12X30" A305H

## (undated) DEVICE — ESU GROUND PAD THERMOGUARD PLUS ABC PEDS 7-382

## (undated) DEVICE — SU PROLENE 6-0 C-1DA 30" 8706H

## (undated) DEVICE — PACK SET-UP STD 9102

## (undated) DEVICE — SU ETHILON 3-0 PS-1 18" 1663H

## (undated) DEVICE — SUCTION MANIFOLD NEPTUNE 2 SYS 4 PORT 0702-020-000

## (undated) DEVICE — DRAPE FLUID WARMING 52 X 60" ORS-321

## (undated) DEVICE — BLADE CLIPPER SGL USE 9680

## (undated) DEVICE — DRSG TELFA ISLAND 4X14" 7544

## (undated) DEVICE — ESU HANDPIECE ABC BEND-A-BEAM 6" 134006

## (undated) DEVICE — Device

## (undated) DEVICE — LINEN TOWEL PACK X30 5481

## (undated) DEVICE — SU SILK 0 TIE 6X30" A306H

## (undated) DEVICE — SYR 01ML 27GA 0.5" NDL TBC 309623

## (undated) DEVICE — STPL SKIN 35W ROTATING HEAD PRW35

## (undated) DEVICE — CLIP APPLIER 09 3/8" SM LIGACLIP MCS20

## (undated) DEVICE — WIPES FOLEY CARE SURESTEP PROVON DFC100

## (undated) DEVICE — SURGICEL ABSORBABLE HEMOSTAT SNOW 4"X4" 2083

## (undated) DEVICE — DECANTER BAG 2002S

## (undated) DEVICE — SU SILK 3-0 TIE 12X30" A304H

## (undated) DEVICE — GLOVE BIOGEL PI MICRO SZ 7.5 48575

## (undated) DEVICE — NDL COUNTER 40CT  31142311

## (undated) DEVICE — DRAIN JACKSON PRATT CHANNEL 19FR ROUND HUBLESS SIL JP-2230

## (undated) DEVICE — DRAIN JACKSON PRATT RESERVOIR 400ML SU130-1000

## (undated) DEVICE — SU PDS II 1 TP-1 48" Z880G

## (undated) DEVICE — DRAPE SHEET REV FOLD 3/4 9349

## (undated) DEVICE — SPONGE LAP 18X18" X8435

## (undated) DEVICE — DRSG PRIMAPORE 02X3" 7133

## (undated) DEVICE — SU SILK 3-0 SH CR 8X30" C017D

## (undated) DEVICE — STPL ENDO LINEAR CUT ECHELON GST 45MM COMPACT PCEE45A

## (undated) DEVICE — SU PROLENE 5-0 C-1DA 36" 8720H

## (undated) DEVICE — SUCTION TIP YANKAUER W/O VENT K86

## (undated) DEVICE — ESU LIGASURE IMPACT OPEN SEALER/DVDR CVD LG JAW LF4418

## (undated) DEVICE — DRSG TEGADERM 8X12" 1629

## (undated) DEVICE — CELL SAVER

## (undated) DEVICE — DRSG QUICKCLOT TRAUMA PAD 8X8" 4010

## (undated) DEVICE — SOL NACL 0.9% IRRIG 1000ML BOTTLE 2F7124

## (undated) DEVICE — SU SILK 1 TIE 6X30" A307H

## (undated) DEVICE — CLIP APPLIER 11" MED LIGACLIP MCM30

## (undated) DEVICE — SU VICRYL 3-0 SH 27" UND J416H

## (undated) DEVICE — DRAPE IOBAN INCISE LARGE 6658

## (undated) DEVICE — ESU GROUND PAD ADULT W/CORD E7507

## (undated) RX ORDER — ALBUTEROL SULFATE 0.83 MG/ML
SOLUTION RESPIRATORY (INHALATION)
Status: DISPENSED
Start: 2025-02-18

## (undated) RX ORDER — FENTANYL CITRATE-0.9 % NACL/PF 10 MCG/ML
PLASTIC BAG, INJECTION (ML) INTRAVENOUS
Status: DISPENSED
Start: 2024-09-28

## (undated) RX ORDER — PENTAMIDINE ISETHIONATE 300 MG/300MG
INHALANT RESPIRATORY (INHALATION)
Status: DISPENSED
Start: 2025-01-21

## (undated) RX ORDER — HYDROMORPHONE HYDROCHLORIDE 1 MG/ML
INJECTION, SOLUTION INTRAMUSCULAR; INTRAVENOUS; SUBCUTANEOUS
Status: DISPENSED
Start: 2024-09-28

## (undated) RX ORDER — PENTAMIDINE ISETHIONATE 300 MG/300MG
INHALANT RESPIRATORY (INHALATION)
Status: DISPENSED
Start: 2025-02-18

## (undated) RX ORDER — PROPOFOL 10 MG/ML
INJECTION, EMULSION INTRAVENOUS
Status: DISPENSED
Start: 2024-09-28

## (undated) RX ORDER — ALBUTEROL SULFATE 90 UG/1
AEROSOL, METERED RESPIRATORY (INHALATION)
Status: DISPENSED
Start: 2024-09-28

## (undated) RX ORDER — FENTANYL CITRATE 50 UG/ML
INJECTION, SOLUTION INTRAMUSCULAR; INTRAVENOUS
Status: DISPENSED
Start: 2024-09-28

## (undated) RX ORDER — FUROSEMIDE 10 MG/ML
INJECTION INTRAMUSCULAR; INTRAVENOUS
Status: DISPENSED
Start: 2024-09-28

## (undated) RX ORDER — HEPARIN SODIUM 1000 [USP'U]/ML
INJECTION, SOLUTION INTRAVENOUS; SUBCUTANEOUS
Status: DISPENSED
Start: 2024-09-28

## (undated) RX ORDER — ALBUTEROL SULFATE 0.83 MG/ML
SOLUTION RESPIRATORY (INHALATION)
Status: DISPENSED
Start: 2025-01-21

## (undated) RX ORDER — DIPHENHYDRAMINE HYDROCHLORIDE 50 MG/ML
INJECTION INTRAMUSCULAR; INTRAVENOUS
Status: DISPENSED
Start: 2024-09-25

## (undated) RX ORDER — FENTANYL CITRATE 50 UG/ML
INJECTION, SOLUTION INTRAMUSCULAR; INTRAVENOUS
Status: DISPENSED
Start: 2024-09-25